# Patient Record
Sex: FEMALE | Race: WHITE | NOT HISPANIC OR LATINO | Employment: OTHER | URBAN - METROPOLITAN AREA
[De-identification: names, ages, dates, MRNs, and addresses within clinical notes are randomized per-mention and may not be internally consistent; named-entity substitution may affect disease eponyms.]

---

## 2017-01-23 DIAGNOSIS — M79.7 FIBROMYALGIA: ICD-10-CM

## 2017-01-23 DIAGNOSIS — E03.9 HYPOTHYROIDISM: ICD-10-CM

## 2017-01-23 DIAGNOSIS — I10 ESSENTIAL (PRIMARY) HYPERTENSION: ICD-10-CM

## 2017-01-23 DIAGNOSIS — Z12.31 ENCOUNTER FOR SCREENING MAMMOGRAM FOR MALIGNANT NEOPLASM OF BREAST: ICD-10-CM

## 2017-01-24 ENCOUNTER — ALLSCRIPTS OFFICE VISIT (OUTPATIENT)
Dept: OTHER | Facility: OTHER | Age: 57
End: 2017-01-24

## 2017-02-24 ENCOUNTER — ALLSCRIPTS OFFICE VISIT (OUTPATIENT)
Dept: OTHER | Facility: OTHER | Age: 57
End: 2017-02-24

## 2017-02-27 ENCOUNTER — APPOINTMENT (OUTPATIENT)
Dept: LAB | Facility: HOSPITAL | Age: 57
End: 2017-02-27
Attending: INTERNAL MEDICINE
Payer: COMMERCIAL

## 2017-02-27 DIAGNOSIS — E03.9 HYPOTHYROIDISM: ICD-10-CM

## 2017-02-27 DIAGNOSIS — I10 ESSENTIAL (PRIMARY) HYPERTENSION: ICD-10-CM

## 2017-02-27 DIAGNOSIS — M79.7 FIBROMYALGIA: ICD-10-CM

## 2017-02-27 LAB
25(OH)D3 SERPL-MCNC: 5.8 NG/ML (ref 30–100)
ALBUMIN SERPL BCP-MCNC: 4.1 G/DL (ref 3.5–5)
ALP SERPL-CCNC: 115 U/L (ref 46–116)
ALT SERPL W P-5'-P-CCNC: 27 U/L (ref 12–78)
ANION GAP SERPL CALCULATED.3IONS-SCNC: 6 MMOL/L (ref 4–13)
AST SERPL W P-5'-P-CCNC: 16 U/L (ref 5–45)
BASOPHILS # BLD AUTO: 0.01 THOUSANDS/ΜL (ref 0–0.1)
BASOPHILS NFR BLD AUTO: 0 % (ref 0–1)
BILIRUB SERPL-MCNC: 0.32 MG/DL (ref 0.2–1)
BUN SERPL-MCNC: 18 MG/DL (ref 5–25)
CALCIUM SERPL-MCNC: 9.1 MG/DL (ref 8.3–10.1)
CHLORIDE SERPL-SCNC: 106 MMOL/L (ref 100–108)
CHOLEST SERPL-MCNC: 245 MG/DL (ref 50–200)
CO2 SERPL-SCNC: 29 MMOL/L (ref 21–32)
CREAT SERPL-MCNC: 0.78 MG/DL (ref 0.6–1.3)
EOSINOPHIL # BLD AUTO: 0.11 THOUSAND/ΜL (ref 0–0.61)
EOSINOPHIL NFR BLD AUTO: 2 % (ref 0–6)
ERYTHROCYTE [DISTWIDTH] IN BLOOD BY AUTOMATED COUNT: 12.9 % (ref 11.6–15.1)
EST. AVERAGE GLUCOSE BLD GHB EST-MCNC: 137 MG/DL
GFR SERPL CREATININE-BSD FRML MDRD: >60 ML/MIN/1.73SQ M
GLUCOSE SERPL-MCNC: 144 MG/DL (ref 65–140)
HBA1C MFR BLD: 6.4 % (ref 4.2–6.3)
HCT VFR BLD AUTO: 42.8 % (ref 34.8–46.1)
HDLC SERPL-MCNC: 37 MG/DL (ref 40–60)
HGB BLD-MCNC: 14.5 G/DL (ref 11.5–15.4)
LDLC SERPL CALC-MCNC: 150 MG/DL (ref 0–100)
LYMPHOCYTES # BLD AUTO: 1.68 THOUSANDS/ΜL (ref 0.6–4.47)
LYMPHOCYTES NFR BLD AUTO: 26 % (ref 14–44)
MCH RBC QN AUTO: 30.6 PG (ref 26.8–34.3)
MCHC RBC AUTO-ENTMCNC: 33.9 G/DL (ref 31.4–37.4)
MCV RBC AUTO: 90 FL (ref 82–98)
MONOCYTES # BLD AUTO: 0.3 THOUSAND/ΜL (ref 0.17–1.22)
MONOCYTES NFR BLD AUTO: 5 % (ref 4–12)
NEUTROPHILS # BLD AUTO: 4.24 THOUSANDS/ΜL (ref 1.85–7.62)
NEUTS SEG NFR BLD AUTO: 67 % (ref 43–75)
NRBC BLD AUTO-RTO: 0 /100 WBCS
PLATELET # BLD AUTO: 271 THOUSANDS/UL (ref 149–390)
PMV BLD AUTO: 11.3 FL (ref 8.9–12.7)
POTASSIUM SERPL-SCNC: 4.5 MMOL/L (ref 3.5–5.3)
PROT SERPL-MCNC: 7.7 G/DL (ref 6.4–8.2)
RBC # BLD AUTO: 4.74 MILLION/UL (ref 3.81–5.12)
SODIUM SERPL-SCNC: 141 MMOL/L (ref 136–145)
T4 FREE SERPL-MCNC: 1.28 NG/DL (ref 0.76–1.46)
TRIGL SERPL-MCNC: 291 MG/DL
TSH SERPL DL<=0.05 MIU/L-ACNC: 0.21 UIU/ML (ref 0.36–3.74)
WBC # BLD AUTO: 6.36 THOUSAND/UL (ref 4.31–10.16)

## 2017-02-27 PROCEDURE — 36415 COLL VENOUS BLD VENIPUNCTURE: CPT

## 2017-02-27 PROCEDURE — 83036 HEMOGLOBIN GLYCOSYLATED A1C: CPT

## 2017-02-27 PROCEDURE — 82306 VITAMIN D 25 HYDROXY: CPT

## 2017-02-27 PROCEDURE — 84439 ASSAY OF FREE THYROXINE: CPT

## 2017-02-27 PROCEDURE — 85025 COMPLETE CBC W/AUTO DIFF WBC: CPT

## 2017-02-27 PROCEDURE — 84443 ASSAY THYROID STIM HORMONE: CPT

## 2017-02-27 PROCEDURE — 80061 LIPID PANEL: CPT

## 2017-02-27 PROCEDURE — 80053 COMPREHEN METABOLIC PANEL: CPT

## 2017-03-21 ENCOUNTER — ALLSCRIPTS OFFICE VISIT (OUTPATIENT)
Dept: OTHER | Facility: OTHER | Age: 57
End: 2017-03-21

## 2017-04-17 ENCOUNTER — TRANSCRIBE ORDERS (OUTPATIENT)
Dept: ADMINISTRATIVE | Facility: HOSPITAL | Age: 57
End: 2017-04-17

## 2017-04-17 ENCOUNTER — ALLSCRIPTS OFFICE VISIT (OUTPATIENT)
Dept: OTHER | Facility: OTHER | Age: 57
End: 2017-04-17

## 2017-04-17 DIAGNOSIS — I25.10 ATHEROSCLEROSIS OF NATIVE CORONARY ARTERY OF NATIVE HEART WITHOUT ANGINA PECTORIS: ICD-10-CM

## 2017-04-17 DIAGNOSIS — E78.00 PURE HYPERCHOLESTEROLEMIA: Primary | ICD-10-CM

## 2017-04-17 DIAGNOSIS — I10 ESSENTIAL HYPERTENSION, MALIGNANT: ICD-10-CM

## 2017-04-18 ENCOUNTER — ALLSCRIPTS OFFICE VISIT (OUTPATIENT)
Dept: OTHER | Facility: OTHER | Age: 57
End: 2017-04-18

## 2017-04-27 ENCOUNTER — HOSPITAL ENCOUNTER (OUTPATIENT)
Dept: NON INVASIVE DIAGNOSTICS | Facility: HOSPITAL | Age: 57
Discharge: HOME | End: 2017-04-28
Attending: INTERNAL MEDICINE | Admitting: INTERNAL MEDICINE
Payer: COMMERCIAL

## 2017-04-27 DIAGNOSIS — I25.10 CORONARY ARTERIOSCLEROSIS: ICD-10-CM

## 2017-04-27 LAB
ANION GAP SERPL CALCULATED.3IONS-SCNC: 9 MMOL/L (ref 4–13)
BUN SERPL-MCNC: 13 MG/DL (ref 5–25)
CALCIUM SERPL-MCNC: 8.8 MG/DL (ref 8.3–10.1)
CHLORIDE SERPL-SCNC: 108 MMOL/L (ref 100–108)
CO2 SERPL-SCNC: 25 MMOL/L (ref 21–32)
CREAT SERPL-MCNC: 0.88 MG/DL (ref 0.6–1.3)
ERYTHROCYTE [DISTWIDTH] IN BLOOD BY AUTOMATED COUNT: 13.1 % (ref 11.6–15.1)
GFR SERPL CREATININE-BSD FRML MDRD: >60 ML/MIN/1.73SQ M
GLUCOSE P FAST SERPL-MCNC: 138 MG/DL (ref 65–99)
GLUCOSE SERPL-MCNC: 138 MG/DL (ref 65–140)
HCT VFR BLD AUTO: 39.3 % (ref 34.8–46.1)
HGB BLD-MCNC: 13.8 G/DL (ref 11.5–15.4)
MCH RBC QN AUTO: 30.9 PG (ref 26.8–34.3)
MCHC RBC AUTO-ENTMCNC: 35.1 G/DL (ref 31.4–37.4)
MCV RBC AUTO: 88 FL (ref 82–98)
PLATELET # BLD AUTO: 227 THOUSANDS/UL (ref 149–390)
PMV BLD AUTO: 10.7 FL (ref 8.9–12.7)
POTASSIUM SERPL-SCNC: 3.2 MMOL/L (ref 3.5–5.3)
RBC # BLD AUTO: 4.47 MILLION/UL (ref 3.81–5.12)
SODIUM SERPL-SCNC: 142 MMOL/L (ref 136–145)
WBC # BLD AUTO: 4.54 THOUSAND/UL (ref 4.31–10.16)

## 2017-04-27 PROCEDURE — 99152 MOD SED SAME PHYS/QHP 5/>YRS: CPT | Performed by: INTERNAL MEDICINE

## 2017-04-27 PROCEDURE — C1725 CATH, TRANSLUMIN NON-LASER: HCPCS | Performed by: INTERNAL MEDICINE

## 2017-04-27 PROCEDURE — C1769 GUIDE WIRE: HCPCS | Performed by: INTERNAL MEDICINE

## 2017-04-27 PROCEDURE — C9607 PERC D-E COR REVASC CHRO SIN: HCPCS | Performed by: INTERNAL MEDICINE

## 2017-04-27 PROCEDURE — C1887 CATHETER, GUIDING: HCPCS | Performed by: INTERNAL MEDICINE

## 2017-04-27 PROCEDURE — C1874 STENT, COATED/COV W/DEL SYS: HCPCS

## 2017-04-27 PROCEDURE — 85027 COMPLETE CBC AUTOMATED: CPT | Performed by: PHYSICIAN ASSISTANT

## 2017-04-27 PROCEDURE — C1894 INTRO/SHEATH, NON-LASER: HCPCS | Performed by: INTERNAL MEDICINE

## 2017-04-27 PROCEDURE — 85347 COAGULATION TIME ACTIVATED: CPT

## 2017-04-27 PROCEDURE — 93458 L HRT ARTERY/VENTRICLE ANGIO: CPT | Performed by: INTERNAL MEDICINE

## 2017-04-27 PROCEDURE — 99153 MOD SED SAME PHYS/QHP EA: CPT | Performed by: INTERNAL MEDICINE

## 2017-04-27 PROCEDURE — 80048 BASIC METABOLIC PNL TOTAL CA: CPT | Performed by: PHYSICIAN ASSISTANT

## 2017-04-27 RX ORDER — LOSARTAN POTASSIUM 50 MG/1
50 TABLET ORAL DAILY
Status: DISCONTINUED | OUTPATIENT
Start: 2017-04-27 | End: 2017-04-28 | Stop reason: HOSPADM

## 2017-04-27 RX ORDER — ZOLPIDEM TARTRATE 10 MG/1
10 TABLET ORAL
COMMUNITY
End: 2018-04-16 | Stop reason: SDUPTHER

## 2017-04-27 RX ORDER — LEVOTHYROXINE SODIUM 0.1 MG/1
100 TABLET ORAL DAILY
Status: DISCONTINUED | OUTPATIENT
Start: 2017-04-28 | End: 2017-04-28 | Stop reason: HOSPADM

## 2017-04-27 RX ORDER — CLOPIDOGREL BISULFATE 75 MG/1
75 TABLET ORAL DAILY
Status: DISCONTINUED | OUTPATIENT
Start: 2017-04-27 | End: 2017-04-28 | Stop reason: HOSPADM

## 2017-04-27 RX ORDER — SODIUM CHLORIDE 9 MG/ML
100 INJECTION, SOLUTION INTRAVENOUS CONTINUOUS
Status: DISPENSED | OUTPATIENT
Start: 2017-04-27 | End: 2017-04-27

## 2017-04-27 RX ORDER — LOSARTAN POTASSIUM 50 MG/1
50 TABLET ORAL DAILY
COMMUNITY
End: 2018-03-12

## 2017-04-27 RX ORDER — FENTANYL CITRATE 50 UG/ML
INJECTION, SOLUTION INTRAMUSCULAR; INTRAVENOUS CODE/TRAUMA/SEDATION MEDICATION
Status: COMPLETED | OUTPATIENT
Start: 2017-04-27 | End: 2017-04-27

## 2017-04-27 RX ORDER — ASPIRIN 81 MG/1
81 TABLET, CHEWABLE ORAL DAILY
COMMUNITY

## 2017-04-27 RX ORDER — SODIUM CHLORIDE 9 MG/ML
100 INJECTION, SOLUTION INTRAVENOUS ONCE
Status: COMPLETED | OUTPATIENT
Start: 2017-04-27 | End: 2017-04-27

## 2017-04-27 RX ORDER — PANTOPRAZOLE SODIUM 40 MG/1
40 TABLET, DELAYED RELEASE ORAL
Status: DISCONTINUED | OUTPATIENT
Start: 2017-04-27 | End: 2017-04-28 | Stop reason: HOSPADM

## 2017-04-27 RX ORDER — ASPIRIN 81 MG/1
81 TABLET, CHEWABLE ORAL DAILY
Status: DISCONTINUED | OUTPATIENT
Start: 2017-04-27 | End: 2017-04-27 | Stop reason: SDUPTHER

## 2017-04-27 RX ORDER — ASPIRIN 81 MG/1
81 TABLET, CHEWABLE ORAL DAILY
Status: DISCONTINUED | OUTPATIENT
Start: 2017-04-27 | End: 2017-04-28 | Stop reason: HOSPADM

## 2017-04-27 RX ORDER — METOPROLOL SUCCINATE 25 MG/1
25 TABLET, EXTENDED RELEASE ORAL DAILY
Status: DISCONTINUED | OUTPATIENT
Start: 2017-04-27 | End: 2017-04-28 | Stop reason: HOSPADM

## 2017-04-27 RX ORDER — MIDAZOLAM HYDROCHLORIDE 1 MG/ML
INJECTION INTRAMUSCULAR; INTRAVENOUS CODE/TRAUMA/SEDATION MEDICATION
Status: COMPLETED | OUTPATIENT
Start: 2017-04-27 | End: 2017-04-27

## 2017-04-27 RX ORDER — ALBUTEROL SULFATE 90 UG/1
2 AEROSOL, METERED RESPIRATORY (INHALATION) EVERY 6 HOURS PRN
COMMUNITY
End: 2018-03-16 | Stop reason: SDUPTHER

## 2017-04-27 RX ORDER — PREGABALIN 150 MG/1
150 CAPSULE ORAL 3 TIMES DAILY
COMMUNITY
End: 2018-02-12 | Stop reason: SDUPTHER

## 2017-04-27 RX ORDER — ZOLPIDEM TARTRATE 5 MG/1
10 TABLET ORAL
Status: DISCONTINUED | OUTPATIENT
Start: 2017-04-27 | End: 2017-04-28 | Stop reason: HOSPADM

## 2017-04-27 RX ORDER — CLOPIDOGREL BISULFATE 75 MG/1
75 TABLET ORAL DAILY
COMMUNITY
End: 2018-05-07 | Stop reason: SDUPTHER

## 2017-04-27 RX ORDER — PREGABALIN 75 MG/1
150 CAPSULE ORAL 3 TIMES DAILY
Status: DISCONTINUED | OUTPATIENT
Start: 2017-04-27 | End: 2017-04-28 | Stop reason: HOSPADM

## 2017-04-27 RX ORDER — VERAPAMIL HYDROCHLORIDE 2.5 MG/ML
INJECTION, SOLUTION INTRAVENOUS CODE/TRAUMA/SEDATION MEDICATION
Status: COMPLETED | OUTPATIENT
Start: 2017-04-27 | End: 2017-04-27

## 2017-04-27 RX ORDER — ATORVASTATIN CALCIUM 40 MG/1
80 TABLET, FILM COATED ORAL
Status: DISCONTINUED | OUTPATIENT
Start: 2017-04-27 | End: 2017-04-28 | Stop reason: HOSPADM

## 2017-04-27 RX ORDER — HEPARIN SODIUM 1000 [USP'U]/ML
INJECTION, SOLUTION INTRAVENOUS; SUBCUTANEOUS CODE/TRAUMA/SEDATION MEDICATION
Status: COMPLETED | OUTPATIENT
Start: 2017-04-27 | End: 2017-04-27

## 2017-04-27 RX ORDER — METOPROLOL SUCCINATE 25 MG/1
25 TABLET, EXTENDED RELEASE ORAL DAILY
COMMUNITY

## 2017-04-27 RX ORDER — LEVOTHYROXINE SODIUM 0.1 MG/1
100 TABLET ORAL DAILY
COMMUNITY
End: 2018-03-12

## 2017-04-27 RX ORDER — LIDOCAINE HYDROCHLORIDE 10 MG/ML
INJECTION, SOLUTION INFILTRATION; PERINEURAL CODE/TRAUMA/SEDATION MEDICATION
Status: COMPLETED | OUTPATIENT
Start: 2017-04-27 | End: 2017-04-27

## 2017-04-27 RX ORDER — ALBUTEROL SULFATE 90 UG/1
2 AEROSOL, METERED RESPIRATORY (INHALATION) EVERY 6 HOURS PRN
Status: DISCONTINUED | OUTPATIENT
Start: 2017-04-27 | End: 2017-04-28 | Stop reason: HOSPADM

## 2017-04-27 RX ORDER — CLOPIDOGREL BISULFATE 75 MG/1
75 TABLET ORAL DAILY
Status: DISCONTINUED | OUTPATIENT
Start: 2017-04-27 | End: 2017-04-27 | Stop reason: SDUPTHER

## 2017-04-27 RX ORDER — POTASSIUM CHLORIDE 20 MEQ/1
TABLET, EXTENDED RELEASE ORAL CODE/TRAUMA/SEDATION MEDICATION
Status: COMPLETED | OUTPATIENT
Start: 2017-04-27 | End: 2017-04-27

## 2017-04-27 RX ADMIN — MIDAZOLAM 2 MG: 1 INJECTION INTRAMUSCULAR; INTRAVENOUS at 08:35

## 2017-04-27 RX ADMIN — FENTANYL CITRATE 50 MCG: 50 INJECTION, SOLUTION INTRAMUSCULAR; INTRAVENOUS at 08:35

## 2017-04-27 RX ADMIN — IOHEXOL 100 ML: 350 INJECTION, SOLUTION INTRAVENOUS at 09:06

## 2017-04-27 RX ADMIN — MIDAZOLAM 1 MG: 1 INJECTION INTRAMUSCULAR; INTRAVENOUS at 08:57

## 2017-04-27 RX ADMIN — SODIUM CHLORIDE 100 ML/HR: 0.9 INJECTION, SOLUTION INTRAVENOUS at 10:18

## 2017-04-27 RX ADMIN — SODIUM CHLORIDE 100 ML/HR: 0.9 INJECTION, SOLUTION INTRAVENOUS at 07:46

## 2017-04-27 RX ADMIN — LIDOCAINE HYDROCHLORIDE 1 ML: 10 INJECTION, SOLUTION INFILTRATION; PERINEURAL at 08:40

## 2017-04-27 RX ADMIN — ATORVASTATIN CALCIUM 80 MG: 40 TABLET, FILM COATED ORAL at 17:49

## 2017-04-27 RX ADMIN — POTASSIUM CHLORIDE 30 MEQ: 1500 TABLET, EXTENDED RELEASE ORAL at 08:40

## 2017-04-27 RX ADMIN — HEPARIN SODIUM 4000 UNITS: 1000 INJECTION INTRAVENOUS; SUBCUTANEOUS at 08:47

## 2017-04-27 RX ADMIN — MIDAZOLAM 2 MG: 1 INJECTION INTRAMUSCULAR; INTRAVENOUS at 08:40

## 2017-04-27 RX ADMIN — FENTANYL CITRATE 50 MCG: 50 INJECTION, SOLUTION INTRAMUSCULAR; INTRAVENOUS at 08:56

## 2017-04-27 RX ADMIN — VERAPAMIL HYDROCHLORIDE 2.5 MG: 2.5 INJECTION, SOLUTION INTRAVENOUS at 08:46

## 2017-04-27 RX ADMIN — SODIUM CHLORIDE 100 ML/HR: 0.9 INJECTION, SOLUTION INTRAVENOUS at 14:49

## 2017-04-27 RX ADMIN — FENTANYL CITRATE 50 MCG: 50 INJECTION, SOLUTION INTRAMUSCULAR; INTRAVENOUS at 08:40

## 2017-04-27 RX ADMIN — PREGABALIN 150 MG: 75 CAPSULE ORAL at 22:00

## 2017-04-27 RX ADMIN — PREGABALIN 150 MG: 75 CAPSULE ORAL at 17:49

## 2017-04-27 RX ADMIN — PANTOPRAZOLE SODIUM 40 MG: 40 TABLET, DELAYED RELEASE ORAL at 10:20

## 2017-04-27 RX ADMIN — ZOLPIDEM TARTRATE 10 MG: 5 TABLET, COATED ORAL at 22:33

## 2017-04-27 RX ADMIN — LOSARTAN POTASSIUM 50 MG: 50 TABLET, FILM COATED ORAL at 17:51

## 2017-04-27 RX ADMIN — HEPARIN SODIUM 6000 UNITS: 1000 INJECTION INTRAVENOUS; SUBCUTANEOUS at 08:55

## 2017-04-28 VITALS
OXYGEN SATURATION: 98 % | TEMPERATURE: 98.1 F | HEIGHT: 64 IN | WEIGHT: 128.97 LBS | DIASTOLIC BLOOD PRESSURE: 67 MMHG | SYSTOLIC BLOOD PRESSURE: 117 MMHG | RESPIRATION RATE: 20 BRPM | BODY MASS INDEX: 22.02 KG/M2 | HEART RATE: 70 BPM

## 2017-04-28 LAB
KCT BLD-ACNC: 486 SEC (ref 89–137)
SPECIMEN SOURCE: ABNORMAL

## 2017-04-28 RX ORDER — CLOPIDOGREL BISULFATE 75 MG/1
75 TABLET ORAL DAILY
Qty: 30 TABLET | Refills: 12 | Status: SHIPPED | OUTPATIENT
Start: 2017-04-28 | End: 2018-02-05 | Stop reason: SDUPTHER

## 2017-04-28 RX ORDER — ATORVASTATIN CALCIUM 80 MG/1
80 TABLET, FILM COATED ORAL
Qty: 30 TABLET | Refills: 12 | Status: SHIPPED | OUTPATIENT
Start: 2017-04-28 | End: 2018-05-08 | Stop reason: SDUPTHER

## 2017-04-28 RX ADMIN — PANTOPRAZOLE SODIUM 40 MG: 40 TABLET, DELAYED RELEASE ORAL at 06:03

## 2017-04-28 RX ADMIN — PREGABALIN 150 MG: 75 CAPSULE ORAL at 09:03

## 2017-04-28 RX ADMIN — FLUTICASONE PROPIONATE AND SALMETEROL 1 PUFF: 50; 250 POWDER RESPIRATORY (INHALATION) at 09:16

## 2017-04-28 RX ADMIN — LOSARTAN POTASSIUM 50 MG: 50 TABLET, FILM COATED ORAL at 09:03

## 2017-04-28 RX ADMIN — ASPIRIN 81 MG: 81 TABLET, CHEWABLE ORAL at 09:03

## 2017-04-28 RX ADMIN — LEVOTHYROXINE SODIUM 100 MCG: 100 TABLET ORAL at 06:03

## 2017-04-28 RX ADMIN — METOPROLOL SUCCINATE 25 MG: 25 TABLET, EXTENDED RELEASE ORAL at 09:05

## 2017-04-28 RX ADMIN — CLOPIDOGREL BISULFATE 75 MG: 75 TABLET, FILM COATED ORAL at 09:03

## 2017-05-01 ENCOUNTER — GENERIC CONVERSION - ENCOUNTER (OUTPATIENT)
Dept: OTHER | Facility: OTHER | Age: 57
End: 2017-05-01

## 2017-05-08 ENCOUNTER — ALLSCRIPTS OFFICE VISIT (OUTPATIENT)
Dept: OTHER | Facility: OTHER | Age: 57
End: 2017-05-08

## 2017-05-08 DIAGNOSIS — I20.8 OTHER FORMS OF ANGINA PECTORIS (HCC): ICD-10-CM

## 2017-05-08 DIAGNOSIS — E78.00 PURE HYPERCHOLESTEROLEMIA: ICD-10-CM

## 2017-05-08 DIAGNOSIS — M54.12 RADICULOPATHY OF CERVICAL REGION: ICD-10-CM

## 2017-05-08 DIAGNOSIS — I25.10 ATHEROSCLEROTIC HEART DISEASE OF NATIVE CORONARY ARTERY WITHOUT ANGINA PECTORIS: ICD-10-CM

## 2017-05-10 ENCOUNTER — GENERIC CONVERSION - ENCOUNTER (OUTPATIENT)
Dept: OTHER | Facility: OTHER | Age: 57
End: 2017-05-10

## 2017-05-12 ENCOUNTER — GENERIC CONVERSION - ENCOUNTER (OUTPATIENT)
Dept: OTHER | Facility: OTHER | Age: 57
End: 2017-05-12

## 2017-05-15 ENCOUNTER — APPOINTMENT (OUTPATIENT)
Dept: PHYSICAL THERAPY | Facility: REHABILITATION | Age: 57
End: 2017-05-15
Payer: COMMERCIAL

## 2017-05-15 DIAGNOSIS — M54.12 RADICULOPATHY OF CERVICAL REGION: ICD-10-CM

## 2017-05-15 PROCEDURE — 97163 PT EVAL HIGH COMPLEX 45 MIN: CPT

## 2017-05-15 PROCEDURE — 97140 MANUAL THERAPY 1/> REGIONS: CPT

## 2017-05-16 ENCOUNTER — ALLSCRIPTS OFFICE VISIT (OUTPATIENT)
Dept: OTHER | Facility: OTHER | Age: 57
End: 2017-05-16

## 2017-05-18 ENCOUNTER — APPOINTMENT (OUTPATIENT)
Dept: PHYSICAL THERAPY | Facility: REHABILITATION | Age: 57
End: 2017-05-18
Payer: COMMERCIAL

## 2017-05-22 ENCOUNTER — APPOINTMENT (OUTPATIENT)
Dept: PHYSICAL THERAPY | Facility: REHABILITATION | Age: 57
End: 2017-05-22
Payer: COMMERCIAL

## 2017-05-25 ENCOUNTER — APPOINTMENT (OUTPATIENT)
Dept: PHYSICAL THERAPY | Facility: REHABILITATION | Age: 57
End: 2017-05-25
Payer: COMMERCIAL

## 2017-06-01 ENCOUNTER — APPOINTMENT (OUTPATIENT)
Dept: PHYSICAL THERAPY | Facility: REHABILITATION | Age: 57
End: 2017-06-01
Payer: COMMERCIAL

## 2017-06-05 ENCOUNTER — APPOINTMENT (OUTPATIENT)
Dept: PHYSICAL THERAPY | Facility: REHABILITATION | Age: 57
End: 2017-06-05
Payer: COMMERCIAL

## 2017-06-08 ENCOUNTER — APPOINTMENT (OUTPATIENT)
Dept: PHYSICAL THERAPY | Facility: REHABILITATION | Age: 57
End: 2017-06-08
Payer: COMMERCIAL

## 2017-06-08 PROCEDURE — 97110 THERAPEUTIC EXERCISES: CPT

## 2017-06-08 PROCEDURE — 97140 MANUAL THERAPY 1/> REGIONS: CPT

## 2017-06-12 ENCOUNTER — ALLSCRIPTS OFFICE VISIT (OUTPATIENT)
Dept: OTHER | Facility: OTHER | Age: 57
End: 2017-06-12

## 2017-06-28 ENCOUNTER — ALLSCRIPTS OFFICE VISIT (OUTPATIENT)
Dept: RADIOLOGY | Facility: CLINIC | Age: 57
End: 2017-06-28
Payer: COMMERCIAL

## 2017-07-06 ENCOUNTER — HOSPITAL ENCOUNTER (OUTPATIENT)
Dept: NON INVASIVE DIAGNOSTICS | Facility: CLINIC | Age: 57
Discharge: HOME | End: 2017-07-06
Payer: COMMERCIAL

## 2017-07-06 ENCOUNTER — ALLSCRIPTS OFFICE VISIT (OUTPATIENT)
Dept: OTHER | Facility: OTHER | Age: 57
End: 2017-07-06

## 2017-07-06 DIAGNOSIS — I20.8 OTHER FORMS OF ANGINA PECTORIS (HCC): ICD-10-CM

## 2017-07-06 DIAGNOSIS — E78.00 PURE HYPERCHOLESTEROLEMIA: ICD-10-CM

## 2017-07-06 DIAGNOSIS — I25.10 ATHEROSCLEROTIC HEART DISEASE OF NATIVE CORONARY ARTERY WITHOUT ANGINA PECTORIS: ICD-10-CM

## 2017-07-06 PROCEDURE — 93306 TTE W/DOPPLER COMPLETE: CPT

## 2017-07-21 ENCOUNTER — GENERIC CONVERSION - ENCOUNTER (OUTPATIENT)
Dept: OTHER | Facility: OTHER | Age: 57
End: 2017-07-21

## 2017-08-30 ENCOUNTER — ALLSCRIPTS OFFICE VISIT (OUTPATIENT)
Dept: OTHER | Facility: OTHER | Age: 57
End: 2017-08-30

## 2017-08-30 DIAGNOSIS — I10 ESSENTIAL (PRIMARY) HYPERTENSION: ICD-10-CM

## 2017-09-11 ENCOUNTER — APPOINTMENT (OUTPATIENT)
Dept: LAB | Facility: HOSPITAL | Age: 57
End: 2017-09-11
Attending: INTERNAL MEDICINE
Payer: COMMERCIAL

## 2017-09-11 DIAGNOSIS — I10 ESSENTIAL (PRIMARY) HYPERTENSION: ICD-10-CM

## 2017-09-11 DIAGNOSIS — I25.10 ATHEROSCLEROTIC HEART DISEASE OF NATIVE CORONARY ARTERY WITHOUT ANGINA PECTORIS: ICD-10-CM

## 2017-09-11 LAB
ALBUMIN SERPL BCP-MCNC: 3.9 G/DL (ref 3.5–5)
ALP SERPL-CCNC: 109 U/L (ref 46–116)
ALT SERPL W P-5'-P-CCNC: 25 U/L (ref 12–78)
ANION GAP SERPL CALCULATED.3IONS-SCNC: 7 MMOL/L (ref 4–13)
AST SERPL W P-5'-P-CCNC: 16 U/L (ref 5–45)
BILIRUB SERPL-MCNC: 0.35 MG/DL (ref 0.2–1)
BUN SERPL-MCNC: 10 MG/DL (ref 5–25)
CALCIUM SERPL-MCNC: 8.9 MG/DL (ref 8.3–10.1)
CHLORIDE SERPL-SCNC: 106 MMOL/L (ref 100–108)
CHOLEST SERPL-MCNC: 161 MG/DL (ref 50–200)
CO2 SERPL-SCNC: 29 MMOL/L (ref 21–32)
CREAT SERPL-MCNC: 0.78 MG/DL (ref 0.6–1.3)
GFR SERPL CREATININE-BSD FRML MDRD: 85 ML/MIN/1.73SQ M
GLUCOSE SERPL-MCNC: 114 MG/DL (ref 65–140)
HDLC SERPL-MCNC: 34 MG/DL (ref 40–60)
LDLC SERPL CALC-MCNC: 89 MG/DL (ref 0–100)
POTASSIUM SERPL-SCNC: 4.6 MMOL/L (ref 3.5–5.3)
PROT SERPL-MCNC: 7.4 G/DL (ref 6.4–8.2)
SODIUM SERPL-SCNC: 142 MMOL/L (ref 136–145)
T4 FREE SERPL-MCNC: 0.88 NG/DL (ref 0.76–1.46)
TRIGL SERPL-MCNC: 188 MG/DL
TSH SERPL DL<=0.05 MIU/L-ACNC: 8.58 UIU/ML (ref 0.36–3.74)

## 2017-09-11 PROCEDURE — 36415 COLL VENOUS BLD VENIPUNCTURE: CPT

## 2017-09-11 PROCEDURE — 80053 COMPREHEN METABOLIC PANEL: CPT

## 2017-09-11 PROCEDURE — 84443 ASSAY THYROID STIM HORMONE: CPT

## 2017-09-11 PROCEDURE — 80061 LIPID PANEL: CPT

## 2017-09-11 PROCEDURE — 84439 ASSAY OF FREE THYROXINE: CPT

## 2017-09-19 ENCOUNTER — GENERIC CONVERSION - ENCOUNTER (OUTPATIENT)
Dept: OTHER | Facility: OTHER | Age: 57
End: 2017-09-19

## 2017-09-29 ENCOUNTER — GENERIC CONVERSION - ENCOUNTER (OUTPATIENT)
Dept: OTHER | Facility: OTHER | Age: 57
End: 2017-09-29

## 2017-10-25 NOTE — PROGRESS NOTES
Assessment  1  Benign essential HTN (401 1) (I10)   2  Depression with anxiety (300 4) (F41 8)   3  Insomnia (780 52) (G47 00)   4  Hypothyroidism (244 9) (E03 9)   5  Bites (959 9) (T14 8)   6  Skin rash (782 1) (R21)   7  Coronary arteriosclerosis (414 00) (I25 10)    Plan  Benign essential HTN    · (1) COMPREHENSIVE METABOLIC PANEL; Status:Active; Requested for:51Vcd7419;    · (1) T4, FREE; Status:Active; Requested for:42Kgx9695;    · (1) TSH; Status:Active; Requested for:20Llo0052; Insomnia    · Zolpidem Tartrate 10 MG Oral Tablet (Ambien); TAKE 1 TABLET AT  BEDTIME AS NEEDED  FOR INSOMNIA  PMH: Diverticulitis, PMH: Screening for malignant neoplasm of colon    · COLONOSCOPY; Status:Active; Requested for:91Zzy0753;   PMH: Encounter for screening mammogram for malignant neoplasm of breast    · * MAMMO SCREENING BILATERAL W CAD; Status:Active; Requested for:35Qme5529;   Skin rash    · Triamcinolone Acetonide 0 1 % External Cream; APPLY SPARINGLY AND MASSAGE IN TWICE  DAILY AS NEEDED   · From  HydrOXYzine HCl - 25 MG Oral Tablet TAKE 1 TABLET 3 TIMES DAILY AS NEEDED  To HydrOXYzine HCl - 25 MG Oral Tablet Take 1 tablet 2 times daily as needed   · DrRx Keflex 500 MG #30; one tab tid for 10 days    Discussion/Summary  Discussion Summary:   Keflex for the skin rash/bites, along with Hydroxizine for itch, triamcinolone cream gargles for throat  daily for low potassium  blood work done  Chief Complaint  Chief Complaint Free Text Note Form: pt here for 3 month f/u of HTNis requesting medication for bug biteswill be getting blood work for Cardiologist c/o swollen gland on left side of neck for about 4 days      Active Problems  1  Abnormal glucose (790 29) (R73 09)   2  Back pain (724 5) (M54 9)   3  Benign essential HTN (401 1) (I10)   4  Bites (959 9) (T14 8)   5  Cervical herniated disc (722 0) (M50 20)   6  Cervical radiculopathy (723 4) (M54 12)   7  Chronic stable angina (413 9) (I20 8)   8   Coronary arteriosclerosis (414 00) (I25 10)   9  Degenerative disc disease, cervical (722 4) (M50 30)   10  Depression with anxiety (300 4) (F41 8)   11  Fibromyalgia (729 1) (M79 7)   12  Gastroesophageal reflux disease (530 81) (K21 9)   13  Hypercholesterolemia (272 0) (E78 00)   14  Hypothyroidism (244 9) (E03 9)   15  Insomnia (780 52) (G47 00)   16  Lumbar foraminal stenosis (724 02) (M99 83)   17  Lumbar radiculopathy (724 4) (M54 16)   18  S/P cardiac cath (V45 89) (Z98 890)   19  Sacroiliitis (720 2) (M46 1)   20  Severe episode of recurrent major depressive disorder, without psychotic features (296 33) (F33 2)    Past Medical History  1  History of Abnormal echocardiogram (793 2) (R93 1)   2  History of Abnormal stress test (794 39) (R94 39)   3  History of Brachial neuritis (723 4) (M54 12)   4  History of Displacement of intervertebral disc of mid-cervical region (722 0) (M50 220)   5  History of Diverticulitis (562 11) (K57 92)   6  History of Hemoptysis (786 30) (R04 2)   7  History of arthritis (V13 4) (Z87 39)   8  History of cardiac disorder (V12 50) (Z86 79)   9  History of chest pain (V13 89) (Z87 898)   10  History of fatigue (V13 89) (Z87 898)   11  History of frequent headaches   12  History of hearing loss (V12 49) (Z86 69)   13  History of herpes simplex infection (V12 09) (Z86 19)   14  History of hiatal hernia (V12 79) (Z87 19)   15  History of neck disorder (V13 59) (Z87 39)   16  History of reflex sympathetic dystrophy (V12 49) (Z86 69)   17  History of shortness of breath (V13 89) (Z87 898)   18  History of spinal stenosis (V13 59) (Z87 39)   19  History of HNP (herniated nucleus pulposus) (722 2) (M51 9)   20  History of Insect bite, infected (919 5,E906 4) (W57 XXXA)   21  History of Memory loss (780 93) (R41 3)   22  Personal history of asthma (V12 69) (Z87 09)   23  History of Restless leg syndrome (333 94) (G25 81)   24  History of Somnolence, daytime (780 54) (R40 0)   25   History of Spinal stenosis of cervical region (723 0) (M48 02)    Surgical History  1  History of Coronary Angiography With Concomitant Left Heart Catheterization   2  History of Lower Back Surgery   3  History of Tonsillectomy   4  History of Tympanoplasty    Family History  Mother    1  Family history of Coronary artery atheroma   2  Family history of cardiac disorder (V17 49) (Z82 49)   3  Family history of diabetes mellitus (V18 0) (Z83 3)   4  Family history of hypertension (V17 49) (Z82 49)  Father    5  No pertinent family history  Sister    10  Family history of Coronary artery atheroma   7  Family history of cardiac disorder (V17 49) (Z82 49)   8  Family history of cerebrovascular accident (CVA) (V17 1) (Z82 3)   9  Family history of diabetes mellitus (V18 0) (Z83 3)   10  Family history of hypertension (V17 49) (Z82 49)    Social History   · Former smoker (V15 82) (O07 684)   · No alcohol use   · Uses marijuana (305 20) (F12 90)    Current Meds   1  Advair Diskus 250-50 MCG/DOSE Inhalation Aerosol Powder Breath Activated; INHALE 1 PUFF   TWICE DAILY; Therapy: 35NUD9665 to (Last Rx:24Jan2017) Ordered   2  Aspirin Low Dose TABS; Therapy: 16GKJ4068 to Recorded   3  Atorvastatin Calcium 80 MG Oral Tablet; TAKE 1 TABLET DAILY; Therapy: 38VFS0420 to (Pedro Gandhi)  Requested for: 31VGK2322; Last Rx:23Nsg6408 Ordered   4  Clopidogrel Bisulfate 75 MG Oral Tablet; Take 4 tablets today then 1 tablet daily; Therapy: 58DKQ7549 to (Bhaskar May)  Requested for: 60DJH4022; Last Rx:05Oqi4375   Ordered   5  DULoxetine HCl - 30 MG Oral Capsule Delayed Release Particles; take 1 capsule daily; Therapy: 65GJK3967 to (Evaluate:23Jjb0159)  Requested for: 12Jun2017; Last Rx:12Jun2017   Ordered   6  Famotidine 40 MG Oral Tablet; TAKE 1 TABLET DAILY AS DIRECTED; Therapy: 95GMR4374 to (Evaluate:13Nov2017)  Requested for: 52Oph7813; Last Rx:38Tkg7829   Ordered   7   Levothyroxine Sodium 100 MCG Oral Tablet; TAKE 1 TABLET DAILY; Therapy: 55MLI5727 to (Evaluate:27Aug2017)  Requested for: 28Aug2017; Last Rx:28Feb2017   Ordered   8  Losartan Potassium 25 MG Oral Tablet; TAKE 1 TABLET DAILY; Therapy: 28OQP0541 to (Evaluate:02Jan2018)  Requested for: 20RMO1407; Last Rx:14Sfd7120   Ordered   9  Lyrica 150 MG Oral Capsule; TAKE 1 CAPSULE 3 times daily; Therapy: 32ULK8701 to (Evaluate:14Sep2017); Last Rx:15Aug2017 Ordered   10  TiZANidine HCl - 2 MG Oral Tablet; TAKE 1 TABLET EVERY 8 HOURS AS NEEDED; Therapy: 91JFP4718 to (Evaluate:10Sep2017)  Requested for: 12Jun2017; Last Rx:12Jun2017    Ordered   11  Ventolin  (90 Base) MCG/ACT Inhalation Aerosol Solution; INHALE 1 TO 2 PUFFS EVERY 4    TO 6 HOURS AS NEEDED; Therapy: 95QMS5638 to (Evaluate:13Feb2017); Last Rx:24Jan2017 Ordered   12  Zolpidem Tartrate 10 MG Oral Tablet; TAKE 1 TABLET AT  BEDTIME AS NEEDED FOR INSOMNIA; Last    Rx:27Jun2017 Ordered    Allergies  1  Augmentin TABS   2  Chantix TABS  3  No Known Environmental Allergies   4  No Known Food Allergies    Vitals  Vital Signs    Recorded: 30Aug2017 11:06AM   Temperature 96 5 F, Tympanic   Heart Rate 58   Systolic 314, LUE, Sitting   Diastolic 70, LUE, Sitting   Height 5 ft 4 in   Weight 182 lb 8 oz   BMI Calculated 31 33   BSA Calculated 1 88   O2 Saturation 98, RA     Results/Data  PHQ-9 Adult Depression Screening 30Aug2017 11:00AM User, s     Test Name Result Flag Reference   PHQ-9 Adult Depression Score 2     Over the last two weeks, how often have you been bothered by any of the following problems?   Little interest or pleasure in doing things: Not at all - 0  Feeling down, depressed, or hopeless: Not at all - 0  Trouble falling or staying asleep, or sleeping too much: Several days - 1  Feeling tired or having little energy: Several days - 1  Poor appetite or over eating: Not at all - 0  Feeling bad about yourself - or that you are a failure or have let yourself or your family down: Not at all - 0  Trouble concentrating on things, such as reading the newspaper or watching television: Not at all - 0  Moving or speaking so slowly that other people could have noticed   Or the opposite -  being so fidgety or restless that you have been moving around a lot more than usual: Not at all - 0  Thoughts that you would be better off dead, or of hurting yourself in some way: Not at all - 0   PHQ-9 Adult Depression Screening Negative     PHQ-9 Difficulty Level Not difficult at all     PHQ-9 Severity Minimal Depression         Future Appointments    Date/Time Provider Specialty Site   11/08/2017 02:20 PM Shea Olea DO Cardiology [address] [place] [address]     Signatures   Electronically signed by : Faheem Rodriguez MD; Aug 30 2017 11:42AM EST                       (Author)

## 2017-11-08 ENCOUNTER — ALLSCRIPTS OFFICE VISIT (OUTPATIENT)
Dept: OTHER | Facility: OTHER | Age: 57
End: 2017-11-08

## 2017-11-10 NOTE — PROGRESS NOTES
Assessment  Assessed    1  Benign essential HTN (401 1) (I10)   2  Chronic stable angina (413 9) (I20 8)   3  Coronary arteriosclerosis (414 00) (I25 10)   4  S/P cardiac cath (V45 89) (Z98 890)   5  Hypercholesterolemia (272 0) (E78 00)   6  Palpitations (785 1) (R00 2)    Plan  Benign essential HTN    · Losartan Potassium 25 MG Oral Tablet; TAKE 1 TABLET DAILY   Rx By: Renato Milan; Dispense: 30 Days ; #:30 Tablet; Refill: 5;For: Benign essential HTN; LIBRADO = N; Verified Transmission to [id number] Last Updated By: System [employer]; 11/8/2017 3:06:29 PM  Chronic stable angina, Health Maintenance, Hypercholesterolemia    · EKG/ECG- POC; Status:Complete;   Done: 98WSI6904 03:20PM   Perform: In Office; Last Updated By:Woods, Severa Crow; 11/8/2017 3:20:09 PM;Ordered; For:Chronic stable angina, Health Maintenance, Hypercholesterolemia; Ordered By:Juancarlos Lau; Hypercholesterolemia    · Ezetimibe 10 MG Oral Tablet; Take 1 tablet daily   Rx By: Renato Milan; Dispense: 90 Days ; #:90 Tablet; Refill: 3;Hypercholesterolemia; LIBRADO = N; Verified Transmission to [id number] Last Updated By: System, [employer]; 11/8/2017 3:06:30 PM   · (1) LIPID PANEL FASTING W DIRECT LDL REFLEX; Status:Active; Requestedfor:72Bjc5633; Perform:[facility]; DLW:94LHO8048;BIVFJVE;OLWIYVP By:Juancarlos Lau;    Discussion/Summary  Cardiology Discussion Summary Free Text Note Form [facility]:   #1  CAD status post PCI/UTE to the proximal LAD in July 2014, status post PCI/UTE to the OM 2 in April 2017, no residual obstructive disease: Patient feeling well, one episode of throat burning, but significantly milder since PCI 3 months ago  Continue aspirin and Plavix , atorvastatin 80 milligrams daily  Okay to hold Plavix for back injection, and is now post 6 months after drug-eluting stent placement April 2017  Hypertension: well controlled, continue current medications  Dyslipidemia: Continue atorvastatin 80 milligrams daily  Reviewed lipid panel from September 2017 with LDL 89 milligrams/deciliter, not at goal in this high-risk patient  Will add ezetimibe 10 milligrams daily to attempt to get LDL less than 70 milligrams/deciliter for optimal risk reduction    Palpitations: Described as a pounding / rapid heartbeat about 2 months ago for several days associated with lightheadedness  Strongly encouraged patient to get medical attention right away if any recurrent symptoms  She has been symptom free for the past month, therefore will hold off on cardiac monitoring for now  ECG normal on today's visit  in 4 months after lipid panel  Chief Complaint  Chief Complaint Free Text Note Form: CAD      History of Present Illness  Cardiology HPI Free Text Note Form [facility]: This is a 70-year-old female who I had initially seen in July 2014 secondary to symptoms of exertional throat discomfort, exertional shortness of breath, and several episodes of atypical chest discomfort  Her nuclear stress test was somewhat ambiguous, therefore I had requested a cardiac CT which she completed revealing LAD stenosis  Thereafter a cardiac catheterization on 7/18/14 revealed 90% proximal LAD stenosis, and she received a 2 5 x 28 mm drug-eluting stent for the lesion  underwent a repeat cardiac catheterization on 3/27/15  The patient's prior LAD stent was patent  She had 99% midcircumflex stenosis after the origin of the major OM branch, which was unchanged compared to her prior study from 2014  She also had 70% RCA stenosis with FFR performed within normal limits at 0 8 to indicating that the lesion was not slow critical   underwent repeat cardiac catheterization on 4/27/17 secondary to recurrent exertional throat burning revealing patent LAD stent, with 100% chronic total occlusion of the OM 2  She underwent PCI/drug-eluting placement with 2 25 x 30 mm Resolute Integrity UTE with good results  presents today for followup   About 2 months ago, she had symptoms of palpitations where she felt her heart pounding hard and fast, lasting up to 1 hour at 1 point, associated with lightheadedness and dizziness  She had several days of intermittent palpitations with similar sensations lasting several minutes  She did not call me, nor did she get medical attention  She states for the past month she has had no palpitations whatsoever  Review of Systems  Cardiology Female ROS:    Cardiac: No complaints of chest pain, no palpitations, no fainting ,-- no syncope/fainting,-- no AM fatigue-- and-- no witnessed apnea episodes  Skin: No complaints of nonhealing sores or skin rash  Genitourinary: frequent urination at night-- (2x per night)-- and-- post menopausal, but-- no recurrent urinary tract infections,-- no difficult urination,-- no blood in urine,-- no kidney stones,-- no loss of bladder control,-- no kidney problems,-- no birth control or hormone replacement therapy-- and-- not pregnant  Psychological: anxiety, but-- no depression,-- no panic attacks,-- no difficulty concentrating-- and-- no palpitations present  General: lack of energy/fatigue, but-- no trouble sleeping,-- no appetite changes,-- no changes in weight,-- no fever,-- no night sweats-- and-- no frequent infections  Respiratory: No complaints of shortness of breath, cough with sputum, or wheezing  HEENT: No complaints of serious problems, hearing problems, nose problems, throat problems, or snoring  Gastrointestinal: heartburn-- and-- constipation, but-- no liver problems,-- no nausea,-- no vomiting,-- no bloody stools,-- no diarrhea,-- no abdonimal pain-- and-- no rectal bleeding  Hematologic: No complaints of bleeding disorders, anemia, blood clots, or excessive brusing    Neurological: numbnes-- (fingers)-- and-- tingling-- (fingers), but-- no weakness,-- no seizures,-- no headaches,-- no dizziness,-- no diplopia-- and-- no daytime sleepiness  Musculoskeletal: arthritis,-- back pain-- and-- swelling/pain   ROS Reviewed:   ROS reviewed  Active Problems  Problems    1  Abnormal glucose (790 29) (R73 09)   2  Back pain (724 5) (M54 9)   3  Benign essential HTN (401 1) (I10)   4  Bites (959 9) (T14 8XXA)   5  Cervical herniated disc (722 0) (M50 20)   6  Cervical radiculopathy (723 4) (M54 12)   7  Chronic stable angina (413 9) (I20 8)   8  Coronary arteriosclerosis (414 00) (I25 10)   9  Degenerative disc disease, cervical (722 4) (M50 30)   10  Depression with anxiety (300 4) (F41 8)   11  Fibromyalgia (729 1) (M79 7)   12  Gastroesophageal reflux disease (530 81) (K21 9)   13  Hypercholesterolemia (272 0) (E78 00)   14  Hypothyroidism (244 9) (E03 9)   15  Insomnia (780 52) (G47 00)   16  Lumbar foraminal stenosis (724 02) (M99 83)   17  Lumbar radiculopathy (724 4) (M54 16)   18  S/P cardiac cath (V45 89) (Z98 890)   19  Sacroiliitis (720 2) (M46 1)   20  Severe episode of recurrent major depressive disorder, without psychotic features  (296 33) (F33 2)   21  Skin rash (782 1) (R21)    Past Medical History  Problems    1  History of Abnormal echocardiogram (793 2) (R93 1)   2  History of Abnormal stress test (794 39) (R94 39)   3  History of Brachial neuritis (723 4) (M54 12)   4  History of Displacement of intervertebral disc of mid-cervical region (722 0) (M50 220)   5  History of Diverticulitis (562 11) (K57 92)   6  History of Hemoptysis (786 30) (R04 2)   7  History of arthritis (V13 4) (Z87 39)   8  History of cardiac disorder (V12 50) (Z86 79)   9  History of chest pain (V13 89) (Z87 898)   10  History of fatigue (V13 89) (Z87 898)   11  History of frequent headaches   12  History of hearing loss (V12 49) (Z86 69)   13  History of herpes simplex infection (V12 09) (Z86 19)   14  History of hiatal hernia (V12 79) (Z87 19)   15  History of neck disorder (V13 59) (Z87 39)   16  History of reflex sympathetic dystrophy (V12 49) (Z86 69)   17   History of shortness of breath (V13 89) (L14 669) 18  History of spinal stenosis (V13 59) (Z87 39)   19  History of HNP (herniated nucleus pulposus) (722 2) (M51 9)   20  History of Insect bite, infected (919 5,E906 4) (W57 XXXA)   21  History of Memory loss (780 93) (R41 3)   22  Personal history of asthma (V12 69) (Z87 09)   23  History of Restless leg syndrome (333 94) (G25 81)   24  History of Somnolence, daytime (780 54) (R40 0)   25  History of Spinal stenosis of cervical region (723 0) (M48 02)  Active Problems And Past Medical History Reviewed: The active problems and past medical history were reviewed and updated today  Surgical History  Problems    1  History of Coronary Angiography With Concomitant Left Heart Catheterization   2  History of Lower Back Surgery   3  History of Tonsillectomy   4  History of Tympanoplasty    Family History  Mother    1  Family history of Coronary artery atheroma   2  Family history of cardiac disorder (V17 49) (Z82 49)   3  Family history of diabetes mellitus (V18 0) (Z83 3)   4  Family history of hypertension (V17 49) (Z82 49)  Father    5  No pertinent family history  Sister    10  Family history of Coronary artery atheroma   7  Family history of cardiac disorder (V17 49) (Z82 49)   8  Family history of cerebrovascular accident (CVA) (V17 1) (Z82 3)   9  Family history of diabetes mellitus (V18 0) (Z83 3)   10  Family history of hypertension (V17 49) (Z82 49)  Family History Reviewed: The family history was reviewed and updated today  Social History  Problems    · Former smoker (L47 44) (M38 489)   · No alcohol use   · Uses marijuana (305 20) (F12 90)  Social History Reviewed: The social history was reviewed and updated today  Current Meds   1  Advair Diskus 250-50 MCG/DOSE Inhalation Aerosol Powder Breath Activated; INHALE 1 PUFF TWICE DAILY; Therapy: 33YZT3005 to (Last Rx:24Jan2017) Ordered   2  Aspirin Low Dose TABS; Therapy: 27RIK0923 to Recorded   3   Atorvastatin Calcium 80 MG Oral Tablet; TAKE 1 TABLET DAILY; Therapy: 05VKC2826 to (467 76 127)  Requested for: 55WIM7865; Last Rx:01Mlm8229 Ordered   4  Clopidogrel Bisulfate 75 MG Oral Tablet; Take 4 tablets today then 1 tablet daily; Therapy: 27PBM6336 to (Hauser Neds)  Requested for: 58YGV6883; Last Rx:21Fqh9360 Ordered   5  DrRx Keflex 500 MG #30; one tab tid for 10 days; Therapy: 21YCL9237 to (Evaluate:09Sep2017); Last Rx:19Jol7712 Ordered   6  DULoxetine HCl - 30 MG Oral Capsule Delayed Release Particles; take 1 capsule daily; Therapy: 50CRD0282 to (Evaluate:76Inr9991)  Requested for: 12Jun2017; Last Rx:12Jun2017 Ordered   7  Famotidine 40 MG Oral Tablet; TAKE 1 TABLET DAILY AS DIRECTED; Therapy: 17PCR2329 to (Evaluate:13Nov2017)  Requested for: 34MQA8207; Last Rx:25Qpj6032; Status: ACTIVE - Renewal Denied Ordered   8  HydrOXYzine HCl - 25 MG Oral Tablet; Take 1 tablet 2 times daily as needed  Requested for: 77Xnp0744; Last Rx:25Dnu8738 Ordered   9  Levothyroxine Sodium 125 MCG Oral Tablet; TAKE 1 TABLET DAILY; Therapy: 82QTQ9541 to (Evaluate:13Mar2018)  Requested for: 02RXI8187; Last Rx:99Kps4563 Ordered   10  Losartan Potassium 25 MG Oral Tablet; TAKE 1 TABLET DAILY; Therapy: 92HFX7698 to (Evaluate:02Jan2018)  Requested for: 31ZER9412; Last  Rx:72Ibp2325 Ordered   11  Lyrica 150 MG Oral Capsule; TAKE 1 CAPSULE 3 times daily; Therapy: 99USL4679 to (Evaluate:18Nov2017); Last Rx:19Oct2017 Ordered   12  TiZANidine HCl - 2 MG Oral Tablet; TAKE 1 TABLET EVERY 8 HOURS AS NEEDED; Therapy: 43GOS3188 to (Evaluate:88Ywh3896)  Requested for: 03Ajl5212; Last  Rx:32Ptu5387 Ordered   13  Triamcinolone Acetonide 0 1 % External Cream; APPLY SPARINGLY AND MASSAGE IN  TWICE DAILY AS NEEDED; Therapy: 47Eem7368 to (Last Rx:55Snf3265)  Requested for: 37Oki1930 Ordered   14  Ventolin  (90 Base) MCG/ACT Inhalation Aerosol Solution; INHALE 1 TO 2 PUFFS  EVERY 4 TO 6 HOURS AS NEEDED; Therapy: 39FVG8435 to (Evaluate:85Qoh1795);  Last CK:39YBX8659 Ordered   15  Zolpidem Tartrate 10 MG Oral Tablet; TAKE 1 TABLET AT  BEDTIME AS NEEDED FOR  INSOMNIA; Last Rx:76Eap0955 Ordered  Medication List Reviewed: The medication list was reviewed and updated today  Allergies  Medication    1  Augmentin TABS   2  Chantix TABS  Non-Medication    3  No Known Environmental Allergies   4  No Known Food Allergies    Vitals  Vital Signs    Recorded: 95MTZ5713 02:34PM   Heart Rate 68   Pulse Quality Normal, L Radial   Respiration Quality Normal   Respiration 16   Systolic 702   Diastolic 84   Height 5 ft 4 in   Weight 183 lb 8 oz   BMI Calculated 31 5   BSA Calculated 1 89       Physical Exam   Constitutional  General appearance: No acute distress, well appearing and well nourished  Eyes  Conjunctiva and Sclera examination: Conjunctiva pink, sclera anicteric  Ears, Nose, Mouth, and Throat - External inspection of ears and nose: Normal without deformities or discharge  Neck  Neck and thyroid: Normal, supple, trachea midline, no thyromegaly  Pulmonary  Respiratory effort: No increased work of breathing or signs of respiratory distress  Auscultation of lungs: Clear to auscultation, no rales, no rhonchi, no wheezing, good air movement  Cardiovascular  Auscultation of heart: Normal rate and rhythm, normal S1 and S2, no murmurs  Carotid pulses: Normal, 2+ bilaterally  Peripheral vascular exam: Normal pulses throughout, no tenderness, erythema or swelling  Pedal pulses: Normal, 2+ bilaterally  Examination of extremities for edema and/or varicosities: Normal    Abdomen  Abdomen: Non-tender and no distention  Liver and spleen: No hepatomegaly or splenomegaly  Musculoskeletal Gait and station: Normal gait  -- Digits and nails: Normal without clubbing or cyanosis  -- Inspection/palpation of joints, bones, and muscles: Normal, ROM normal    Skin - Skin and subcutaneous tissue: Normal without rashes or lesions  Skin is warm and well perfused, normal turgor  Neurologic - Cranial nerves: II - XII intact  -- Speech: Normal    Psychiatric - Orientation to person, place, and time: Normal -- Mood and affect: Normal       Results/Data  ECG Report: A 12 lead ECG was performed and was normal       Future Appointments    Date/Time Provider Specialty Site   12/19/2017 01:30 PM Cha Rosenberg MD Internal Medicine [facility]       Signatures   Electronically signed by : Harris Burnham DO; Nov 8 2017  3:39PM EST                       (Author)

## 2017-11-13 DIAGNOSIS — M54.12 RADICULOPATHY OF CERVICAL REGION: ICD-10-CM

## 2017-11-13 DIAGNOSIS — M54.16 RADICULOPATHY OF LUMBAR REGION: ICD-10-CM

## 2017-11-14 ENCOUNTER — GENERIC CONVERSION - ENCOUNTER (OUTPATIENT)
Dept: OTHER | Facility: OTHER | Age: 57
End: 2017-11-14

## 2017-11-21 ENCOUNTER — GENERIC CONVERSION - ENCOUNTER (OUTPATIENT)
Dept: OTHER | Facility: OTHER | Age: 57
End: 2017-11-21

## 2017-11-22 ENCOUNTER — APPOINTMENT (OUTPATIENT)
Dept: PHYSICAL THERAPY | Age: 57
End: 2017-11-22
Payer: COMMERCIAL

## 2017-11-22 DIAGNOSIS — M54.12 RADICULOPATHY OF CERVICAL REGION: ICD-10-CM

## 2017-11-22 DIAGNOSIS — M54.16 RADICULOPATHY OF LUMBAR REGION: ICD-10-CM

## 2017-11-22 PROCEDURE — 97162 PT EVAL MOD COMPLEX 30 MIN: CPT

## 2017-11-22 PROCEDURE — G8991 OTHER PT/OT GOAL STATUS: HCPCS

## 2017-11-22 PROCEDURE — G8990 OTHER PT/OT CURRENT STATUS: HCPCS

## 2017-11-29 ENCOUNTER — GENERIC CONVERSION - ENCOUNTER (OUTPATIENT)
Dept: OTHER | Facility: OTHER | Age: 57
End: 2017-11-29

## 2017-12-06 ENCOUNTER — ALLSCRIPTS OFFICE VISIT (OUTPATIENT)
Dept: RADIOLOGY | Facility: CLINIC | Age: 57
End: 2017-12-06
Payer: COMMERCIAL

## 2017-12-11 ENCOUNTER — APPOINTMENT (OUTPATIENT)
Dept: PHYSICAL THERAPY | Age: 57
End: 2017-12-11
Payer: COMMERCIAL

## 2017-12-15 ENCOUNTER — APPOINTMENT (OUTPATIENT)
Dept: PHYSICAL THERAPY | Age: 57
End: 2017-12-15
Payer: COMMERCIAL

## 2017-12-15 PROCEDURE — 97150 GROUP THERAPEUTIC PROCEDURES: CPT

## 2017-12-15 PROCEDURE — 97113 AQUATIC THERAPY/EXERCISES: CPT

## 2017-12-18 ENCOUNTER — APPOINTMENT (OUTPATIENT)
Dept: PHYSICAL THERAPY | Age: 57
End: 2017-12-18
Payer: COMMERCIAL

## 2017-12-18 PROCEDURE — 97150 GROUP THERAPEUTIC PROCEDURES: CPT

## 2017-12-18 PROCEDURE — 97113 AQUATIC THERAPY/EXERCISES: CPT

## 2017-12-22 ENCOUNTER — APPOINTMENT (OUTPATIENT)
Dept: PHYSICAL THERAPY | Age: 57
End: 2017-12-22
Payer: COMMERCIAL

## 2017-12-22 PROCEDURE — 97113 AQUATIC THERAPY/EXERCISES: CPT

## 2017-12-27 ENCOUNTER — APPOINTMENT (OUTPATIENT)
Dept: PHYSICAL THERAPY | Age: 57
End: 2017-12-27
Payer: COMMERCIAL

## 2017-12-27 PROCEDURE — 97113 AQUATIC THERAPY/EXERCISES: CPT

## 2017-12-29 ENCOUNTER — APPOINTMENT (OUTPATIENT)
Dept: PHYSICAL THERAPY | Age: 57
End: 2017-12-29
Payer: COMMERCIAL

## 2017-12-29 PROCEDURE — 97113 AQUATIC THERAPY/EXERCISES: CPT

## 2017-12-29 PROCEDURE — 97150 GROUP THERAPEUTIC PROCEDURES: CPT

## 2018-01-09 NOTE — MISCELLANEOUS
Message   Recorded as Task   Date: 11/10/2017 08:12 AM, Created By: Ishan Paz   Task Name: Financial Authorization   Assigned To: Liss Singh   Regarding Patient: Doug Wilson, Status: Active   CommentGwesandra Foil - 10 Nov 6298 1:26 AM     TASK CREATED  MRI Cervical spine was denied because there is no evidence that patient has done any supervised HEP, Physical therapy  I called the physical therapy to find out if the patient completed the therapy and they confirmed patient went for the initial evalation and that was it  If you would like to perform a peer to peer you can do so by calling     [phone]   Patient ID # [id number]   Amrit Holder - 13 Nov 2017 7:34 AM     TASK REPLIED TO: Previously Assigned To Praful Aburto                   Aware  The patient will need to go back to physical therapy for at least 4 weeks and if she does not improve we can then Re submit for MRI of cervical spine  Prescription for physical therapy printed at office for patient to    Ishan Paz - 13 Nov 8368 6:50 PM     TASK EDITED  Patient was informed of below and she said that she went to PT 2 times and the first time she was really sore then the next time she went all they did was massage her  She said PT is very hard for her to do because it hurts her too much  Do you think aqua therapy would be an option? Amrit Holder - 13 Nov 2017 4:09 PM     TASK REPLIED TO: Previously Assigned To Amrit Holder                      Aquatic therapy with prescription printed  I am not sure if this will count for insurance purposes, but worth trying   Ishan Paz - 14 Nov 5733 6:99 AM     TASK EDITED  MSG left for patient to advise rx for aqua therapy was sent in mail  Active Problems    1  Abnormal glucose (790 29) (R73 09)   2  Back pain (724 5) (M54 9)   3  Benign essential HTN (401 1) (I10)   4  Bites (959 9) (T14 8XXA)   5  Cervical herniated disc (722 0) (M50 20)   6   Cervical radiculopathy (723 4) (M54 12)   7  Chronic stable angina (413 9) (I20 8)   8  Coronary arteriosclerosis (414 00) (I25 10)   9  Degenerative disc disease, cervical (722 4) (M50 30)   10  Depression with anxiety (300 4) (F41 8)   11  Fibromyalgia (729 1) (M79 7)   12  Gastroesophageal reflux disease (530 81) (K21 9)   13  Hypercholesterolemia (272 0) (E78 00)   14  Hypothyroidism (244 9) (E03 9)   15  Insomnia (780 52) (G47 00)   16  Lumbar foraminal stenosis (724 02) (M99 83)   17  Lumbar radiculopathy (724 4) (M54 16)   18  Palpitations (785 1) (R00 2)   19  S/P cardiac cath (V45 89) (Z98 890)   20  Sacroiliitis (720 2) (M46 1)   21  Severe episode of recurrent major depressive disorder, without psychotic features    (296 33) (F33 2)   22  Skin rash (782 1) (R21)    Current Meds   1  Advair Diskus 250-50 MCG/DOSE Inhalation Aerosol Powder Breath Activated; INHALE   1 PUFF TWICE DAILY; Therapy: 88FBY2294 to (Last Rx:24Jan2017) Ordered   2  Aspirin Low Dose TABS; Therapy: 07PVJ8460 to Recorded   3  Atorvastatin Calcium 80 MG Oral Tablet; TAKE 1 TABLET DAILY; Therapy: 12UXD4330 to (Mylene Felty)  Requested for: 88TGA6444; Last   Rx:71Mwx5741 Ordered   4  Clopidogrel Bisulfate 75 MG Oral Tablet; Take 4 tablets today then 1 tablet daily; Therapy: 21DIQ3937 to (Ryanne Sam)  Requested for: 22HPI9376; Last   Rx:69Aay4596 Ordered   5  DULoxetine HCl - 30 MG Oral Capsule Delayed Release Particles; take 1 capsule daily; Therapy: 81AKS8411 to (Evaluate:26Sbx4452)  Requested for: 12Jun2017; Last   Rx:12Jun2017 Ordered   6  Ezetimibe 10 MG Oral Tablet; Take 1 tablet daily; Therapy: 25RBO8693 to (Nena Carreon)  Requested for: 66EXK4268; Last   Rx:08Nov2017 Ordered   7  Famotidine 40 MG Oral Tablet; TAKE 1 TABLET DAILY AS DIRECTED; Therapy: 28EPV0276 to (Evaluate:13Nov2017)  Requested for: 85KUW1916; Last   Rx:46Fcq0253; Status: ACTIVE - Renewal Denied Ordered   8   HydrOXYzine HCl - 25 MG Oral Tablet; Take 1 tablet 2 times daily as needed  Requested   for: 59Fiu7949; Last Rx:55Pka9309 Ordered   9  Levothyroxine Sodium 125 MCG Oral Tablet; TAKE 1 TABLET DAILY; Therapy: 88OSF9577 to (Evaluate:13Mar2018)  Requested for: 36VFR1379; Last   Rx:54Rrb5263 Ordered   10  Losartan Potassium 25 MG Oral Tablet; TAKE 1 TABLET DAILY; Therapy: 92WXI9432 to (Sunitha Porras)  Requested for: 06UJE0131; Last    Rx:08Nov2017 Ordered   11  Lyrica 150 MG Oral Capsule; TAKE 1 CAPSULE 3 times daily; Therapy: 60PWK1775 to (Evaluate:18Nov2017); Last Rx:12Jhj7173 Ordered   12  TiZANidine HCl - 2 MG Oral Tablet; TAKE 1 TABLET EVERY 8 HOURS AS NEEDED; Therapy: 96XWS1754 to (Evaluate:38Zqo5831)  Requested for: 76Eaf2769; Last    Rx:18Ycw5815 Ordered   13  Triamcinolone Acetonide 0 1 % External Cream; APPLY SPARINGLY AND MASSAGE IN    TWICE DAILY AS NEEDED; Therapy: 78Cwt4253 to (Last Rx:36Jkc1132)  Requested for: 80Cfr7085 Ordered   14  Ventolin  (90 Base) MCG/ACT Inhalation Aerosol Solution; INHALE 1 TO 2    PUFFS EVERY 4 TO 6 HOURS AS NEEDED; Therapy: 38ZAI7813 to (Evaluate:17Sgg5601); Last Rx:24Jan2017 Ordered   15  Zolpidem Tartrate 10 MG Oral Tablet (Ambien); TAKE 1 TABLET AT  BEDTIME AS    NEEDED FOR INSOMNIA; Last Rx:32Oiy8321 Ordered    Allergies    1  Augmentin TABS   2  Chantix TABS    3  No Known Environmental Allergies   4   No Known Food Allergies    Signatures   Electronically signed by : Mookie Decker, ; Nov 14 2017  9:27AM EST                       (Author)

## 2018-01-10 NOTE — RESULT NOTES
Message   Recorded as Task   Date: 05/08/2017 03:11 PM, Created By: Catie Garcia   Task Name: Miscellaneous   Assigned To: [facility]   Regarding Patient: Felipe Peoples, Status: Active   CommentCathlene Burgess - 08 May 2017 3:11 PM     TASK CREATED  Faxed anti coag hold form to Dr Lisa Adrian at [address] [place] [address] for pt to hold Plavix  Pt will need to be scheduled for B/L L5 TFESI with Dr Keely Bender in [place]  Dr Gilberto Echevarria fax: [phone], phone: [phone]  [place] - 11 May 2017 10:21 AM     TASK IN PROGRESS   Cypress - 11 May 2017 5:06 PM     TASK EDITED  received anticoagulant hold request form from Dr Gilberto Echevarria  He does not given permission to hold plavix and wrote a note on the form  He also faxed a patient's office note  Form and note to be scanned into the pt's chart  Please advise  Pranav Amaral - 11 May 2017 6:39 PM     TASK REPLIED TO: Previously Assigned To [facility]                      Since we do not have her permission to hold anticoagulation please notify the patient that until anticoagulation is able to safely be held we will not be able to proceed with intervention  We will discuss further treatment options at next office visit   Deja Dominique - 12 May 2017 9:25 AM     TASK REASSIGNED: Previously Assigned To [facility]   Rebekah Wilburn - 12 May 2017 9:39 AM     TASK EDITED  S/w the pt  and discussed previous task  Pt  verbalized understanding and scheduled a sovs c/ JW to review treatment options          Signatures   Electronically signed by : Gerianne Goltz, ; May 12 2017  9:40AM EST                       (Author)

## 2018-01-12 VITALS
BODY MASS INDEX: 1.72 KG/M2 | HEART RATE: 46 BPM | WEIGHT: 10 LBS | RESPIRATION RATE: 16 BRPM | SYSTOLIC BLOOD PRESSURE: 100 MMHG | DIASTOLIC BLOOD PRESSURE: 60 MMHG

## 2018-01-12 VITALS
HEIGHT: 64 IN | TEMPERATURE: 98.2 F | WEIGHT: 180 LBS | HEART RATE: 98 BPM | SYSTOLIC BLOOD PRESSURE: 146 MMHG | BODY MASS INDEX: 30.73 KG/M2 | DIASTOLIC BLOOD PRESSURE: 80 MMHG

## 2018-01-12 VITALS
HEART RATE: 58 BPM | TEMPERATURE: 96.5 F | WEIGHT: 182.5 LBS | HEIGHT: 64 IN | BODY MASS INDEX: 31.16 KG/M2 | OXYGEN SATURATION: 98 % | SYSTOLIC BLOOD PRESSURE: 120 MMHG | DIASTOLIC BLOOD PRESSURE: 70 MMHG

## 2018-01-12 NOTE — MISCELLANEOUS
Assessment    1  S/P cardiac cath (V45 89) (Z98 890)    History of Present Illness  TCM Communication [facility]: The patient is being contacted for follow-up after hospitalization and Pt is following up w/ cardiology and declined to schedule WHIT  She was hospitalized at [facility]  The date of admission: 4/27/17, date of discharge: 4/28/17  Diagnosis: coronary arteriosclerosis, s/p elective cath PCI for stable angina  She was discharged to home  Medications were not reviewed today  She did not schedule a follow up appointment  Follow-up appointments with other specialists: cardiology  Counseling was provided to the patient  Communication performed and completed by kb      Active Problems    1  Abnormal glucose (790 29) (R73 09)   2  Back pain (724 5) (M54 9)   3  Benign essential HTN (401 1) (I10)   4  Chronic stable angina (413 9) (I20 8)   5  Coronary arteriosclerosis (414 00) (I25 10)   6  Depression screen (V79 0) (Z13 89)   7  Depression with anxiety (300 4) (F41 8)   8  Fibromyalgia (729 1) (M79 7)   9  Gastroesophageal reflux disease (530 81) (K21 9)   10  Hypercholesterolemia (272 0) (E78 00)   11  Hypothyroidism (244 9) (E03 9)   12  Hypothyroidism (244 9) (E03 9)   13  Insomnia (780 52) (G47 00)   14  Severe episode of recurrent major depressive disorder, without psychotic features    (296 33) (F33 2)    Past Medical History    1  History of Abnormal echocardiogram (793 2) (R93 1)   2  History of Abnormal stress test (794 39) (R94 39)   3  History of Brachial neuritis (723 4) (M54 12)   4  History of Displacement of intervertebral disc of mid-cervical region (722 0) (M50 220)   5  History of Diverticulitis (562 11) (K57 92)   6  History of Hemoptysis (786 30) (R04 2)   7  History of arthritis (V13 4) (Z87 39)   8  History of cardiac disorder (V12 50) (Z86 79)   9  History of chest pain (V13 89) (Z87 898)   10  History of fatigue (V13 89) (Z87 898)   11  History of frequent headaches   12   History of hearing loss (V12 49) (Z86 69)   13  History of herpes simplex infection (V12 09) (Z86 19)   14  History of hiatal hernia (V12 79) (Z87 19)   15  History of neck disorder (V13 59) (Z87 39)   16  History of reflex sympathetic dystrophy (V12 49) (Z86 69)   17  History of shortness of breath (V13 89) (Z87 898)   18  History of spinal stenosis (V13 59) (Z87 39)   19  History of HNP (herniated nucleus pulposus) (722 2) (M51 9)   20  History of Insect bite, infected (919 5,E906 4) (W57 XXXA)   21  History of Memory loss (780 93) (R41 3)   22  Personal history of asthma (V12 69) (Z87 09)   23  History of Restless leg syndrome (333 94) (G25 81)   24  History of Skin rash (782 1) (R21)   25  History of Somnolence, daytime (780 54) (R40 0)   26  History of Spinal stenosis of cervical region (723 0) (M48 02)    Surgical History    1  History of Coronary Angiography With Concomitant Left Heart Catheterization   2  History of Lower Back Surgery   3  History of Tonsillectomy   4  History of Tympanoplasty    Family History  Mother    1  Family history of Coronary artery atheroma   2  Family history of cardiac disorder (V17 49) (Z82 49)   3  Family history of diabetes mellitus (V18 0) (Z83 3)   4  Family history of hypertension (V17 49) (Z82 49)  Father    5  No pertinent family history  Sister    10  Family history of Coronary artery atheroma   7  Family history of cardiac disorder (V17 49) (Z82 49)   8  Family history of cerebrovascular accident (CVA) (V17 1) (Z82 3)   9  Family history of diabetes mellitus (V18 0) (Z83 3)   10  Family history of hypertension (V17 49) (Z82 49)    Social History    · Former smoker (V15 82) (V42 556)   · No alcohol use   · Uses marijuana (305 20) (F12 90)    Current Meds   1  Advair Diskus 250-50 MCG/DOSE Inhalation Aerosol Powder Breath Activated; INHALE 1   PUFF TWICE DAILY; Therapy: 17YMO8182 to (Last Rx:24Jan2017) Ordered   2  Aspirin Low Dose TABS; Therapy: 22SZP0047 to Recorded   3  Atorvastatin Calcium 80 MG Oral Tablet; TAKE 1 TABLET DAILY; Therapy: 35Bml2200 to (Evaluate:31Gfw1215)  Requested for: 93Msr3605; Last   Rx:57Hij3575 Ordered   4  Clopidogrel Bisulfate 75 MG Oral Tablet; TAKE 4 TABLETS (300 MG) ON MORNING OF   CARDIAC CATHETERIZATION; Therapy: 79Jei0372 to (Last Rx:03Bdk3686)  Requested for: 93Scb5155 Ordered   5  Levothyroxine Sodium 100 MCG Oral Tablet; TAKE 1 TABLET DAILY; Therapy: 13SGW9813 to (Evaluate:06Rwd2720)  Requested for: 63VKI7266; Last   Rx:25Ldc6316 Ordered   6  Losartan Potassium 50 MG Oral Tablet; take 1 tablet by mouth every day; Therapy: 18KYM2240 to (Evaluate:02Cev7407)  Requested for: 79RHG3941; Last   Rx:24Jan2017 Ordered   7  Lyrica 150 MG Oral Capsule; TAKE 1 CAPSULE 3 times daily; Therapy: 13BWY6914 to (Evaluate:62Bhd2642); Last Rx:23Mar2017 Ordered   8  Metoprolol Succinate ER 25 MG Oral Tablet Extended Release 24 Hour; take 1 tablet   once daily; Therapy: 85NED3755 to (Evaluate:48Sfh3467)  Requested for: 29HWO7576; Last   Rx:24Jan2017 Ordered   9  Omeprazole 40 MG Oral Capsule Delayed Release; Take 1 capsule twice daily; Therapy: 62Obl2844 to (Evaluate:58Vrx1992)  Requested for: 18Apr2017; Last   Rx:18Apr2017 Ordered   10  Trintellix 10 MG Oral Tablet; Take 1 tablet daily; Therapy: 14ZHG9374 to (Evaluate:84Xqj9371); Last Rx:21Mar2017 Ordered   11  Trintellix 5 MG Oral Tablet; TAKE 1 TABLET Daily for 2 weeks then increase to 10mg; Therapy: 75RAJ4525 to (Evaluate:12Lco5850); Last Rx:21Mar2017 Ordered   12  Ventolin  (90 Base) MCG/ACT Inhalation Aerosol Solution; INHALE 1 TO 2 PUFFS    EVERY 4 TO 6 HOURS AS NEEDED; Therapy: 61NJA6230 to (Evaluate:35Bce9960); Last Rx:24Jan2017 Ordered   13  Zolpidem Tartrate 10 MG Oral Tablet; TAKE 1 TABLET AT  BEDTIME AS NEEDED FOR    INSOMNIA; Last Rx:18Apr2017 Ordered    Allergies    1  Augmentin TABS   2  Chantix TABS    3  No Known Environmental Allergies   4   No Known Food Allergies    Future Appointments    Date/Time Provider Specialty Site   05/16/2017 11:15 AM Jefry Brewster MD Internal Medicine [facility]   05/24/2017 04:00 PM Shanelle Whiting DO Cardiology  CARDIOLOGY  [place]   05/08/2017 01:30 PM Quentin Mchugh DO Pain Management [address]   Electronically signed by : Letty Todd, ; May  1 2017  3:53PM EST                       (Co-author)    Electronically signed by : Rosa Wu MD; May  8 2017  7:13AM EST                       (Validation)

## 2018-01-13 VITALS
BODY MASS INDEX: 30.63 KG/M2 | HEIGHT: 64 IN | WEIGHT: 179.38 LBS | HEART RATE: 82 BPM | TEMPERATURE: 98.4 F | SYSTOLIC BLOOD PRESSURE: 120 MMHG | DIASTOLIC BLOOD PRESSURE: 71 MMHG

## 2018-01-13 VITALS
SYSTOLIC BLOOD PRESSURE: 116 MMHG | TEMPERATURE: 98.2 F | OXYGEN SATURATION: 98 % | HEART RATE: 75 BPM | HEIGHT: 64 IN | WEIGHT: 179 LBS | DIASTOLIC BLOOD PRESSURE: 80 MMHG | BODY MASS INDEX: 30.56 KG/M2

## 2018-01-13 VITALS
HEIGHT: 64 IN | DIASTOLIC BLOOD PRESSURE: 84 MMHG | BODY MASS INDEX: 31.33 KG/M2 | RESPIRATION RATE: 16 BRPM | WEIGHT: 183.5 LBS | HEART RATE: 68 BPM | SYSTOLIC BLOOD PRESSURE: 124 MMHG

## 2018-01-13 VITALS
SYSTOLIC BLOOD PRESSURE: 136 MMHG | WEIGHT: 180.38 LBS | HEART RATE: 64 BPM | OXYGEN SATURATION: 98 % | BODY MASS INDEX: 30.8 KG/M2 | DIASTOLIC BLOOD PRESSURE: 78 MMHG | HEIGHT: 64 IN | TEMPERATURE: 98 F

## 2018-01-14 VITALS
HEART RATE: 67 BPM | WEIGHT: 177.25 LBS | BODY MASS INDEX: 30.26 KG/M2 | SYSTOLIC BLOOD PRESSURE: 130 MMHG | DIASTOLIC BLOOD PRESSURE: 78 MMHG | OXYGEN SATURATION: 98 % | HEIGHT: 64 IN | TEMPERATURE: 98 F

## 2018-01-14 VITALS
HEART RATE: 72 BPM | DIASTOLIC BLOOD PRESSURE: 82 MMHG | SYSTOLIC BLOOD PRESSURE: 120 MMHG | RESPIRATION RATE: 16 BRPM | BODY MASS INDEX: 31.65 KG/M2 | WEIGHT: 181.5 LBS

## 2018-01-14 VITALS
OXYGEN SATURATION: 98 % | DIASTOLIC BLOOD PRESSURE: 72 MMHG | HEART RATE: 72 BPM | WEIGHT: 182.13 LBS | TEMPERATURE: 98.5 F | HEIGHT: 64 IN | SYSTOLIC BLOOD PRESSURE: 126 MMHG | BODY MASS INDEX: 31.09 KG/M2

## 2018-01-14 VITALS
WEIGHT: 181.38 LBS | SYSTOLIC BLOOD PRESSURE: 134 MMHG | BODY MASS INDEX: 30.96 KG/M2 | HEIGHT: 64 IN | HEART RATE: 77 BPM | OXYGEN SATURATION: 98 % | TEMPERATURE: 98.3 F | DIASTOLIC BLOOD PRESSURE: 90 MMHG

## 2018-01-15 NOTE — MISCELLANEOUS
Message  Received letter from pain management requesting hold of plavix x 7 days prior to back injection  Called pt, as recent UTE placed in April  Pt states she has not been taking plavix and that she "never got it " Reviewed hospital records, and Rx was printed out for her, not sure if she lost Rx, or never got it from RN  Called in another Rx for Plavix  She is to take 300 mg today and 75 mg daily thereafter  Pt NOT cleared for back injection as would wait atleast 6 months before interrupation, 3 months if she can't wait much longer and significant back pain  Plan  S/P cardiac cath    · Clopidogrel Bisulfate 75 MG Oral Tablet;  Take 4 tablets today then 1 tablet daily    Signatures   Electronically signed by : Fredo Betts DO; May 10 2017 10:58AM EST                       (Author)

## 2018-01-16 NOTE — RESULT NOTES
Message   Recorded as Task   Date: 07/06/2017 09:56 AM, Created By: Keny Balevelyn   Task Name: Follow Up   Assigned To: [facility]   Regarding Patient: Ihsan Nayak, Status: In Progress   CommentAdolphus Nip - 06 Jul 5069 9:90 AM     TASK CREATED  S/P B/L SIJ INJ ON 6/28/2017 W/ DR Severiano Brown - NO F/U SCHEDULED   Liss Singh - 06 Jul 5452 0:23 PM     TASK EDITED  Pt reports no relief so far  I advised patient ot give more time and we will f/u next week to check on progress  No f/u scheduled   Xu May - 06 Jul 2017 1:51 PM     TASK REPLIED TO: Previously Assigned To Praful Aburto   Kenymonique Ervin - 06 Jul 4611 2:87 PM     TASK EDITED   Kenymonique Ervin - 13 Jul 1544 3:25 AM     TASK EDITED   Kenymonique Ervin - 20 Jul 5235 64:94 AM     TASK EDITED  MSG left on Vm to CB to get update if any progress  Kenymonique Ervin - 21 Jul 3801 42:31 AM     TASK EDITED  Attempted to reach patient     Patient did not   - No MSg left          Signatures   Electronically signed by : Emily Dalton, ; Jul 21 2017 10:17AM EST                       (Author)

## 2018-01-16 NOTE — MISCELLANEOUS
Message   Recorded as Task   Date: 09/19/2017 11:23 AM, Created By: Mari Antonio   Task Name: Miscellaneous   Assigned To: [facility]   Regarding Patient: Claire Herrera, Status: Active   Comment:    Cintia Cavazos - 19 Sep 2017 11:23 AM     TASK CREATED  Pt called requesting refill of Tizanidine 2mg  She said she has about 6 left  Pt uses [facility] pharmacy (on file in [facility])  Pt can be reached at [phone]  Jeff Gross - 19 Sep 2017 2:17 PM     TASK EDITED  LMOM with c/b # and office hours  Cintia Cavazos - 19 Sep 2017 2:28 PM     TASK EDITED  Pt returned call and can be reached at [phone]  Jeff Gross - 19 Sep 2017 2:44 PM     TASK EDITED  S/W pt regarding Tizanidine 2 mg  Pt advised she has 6 pills left  Please advise  Thanks  Jayden Hope - 19 Sep 2017 4:27 PM     TASK REPLIED TO: Previously Assigned To Praful Aburto                      Refill sent to pharmacy        Active Problems    1  Abnormal glucose (790 29) (R73 09)   2  Back pain (724 5) (M54 9)   3  Benign essential HTN (401 1) (I10)   4  Bites (959 9) (T14 8)   5  Cervical herniated disc (722 0) (M50 20)   6  Cervical radiculopathy (723 4) (M54 12)   7  Chronic stable angina (413 9) (I20 8)   8  Coronary arteriosclerosis (414 00) (I25 10)   9  Degenerative disc disease, cervical (722 4) (M50 30)   10  Depression with anxiety (300 4) (F41 8)   11  Fibromyalgia (729 1) (M79 7)   12  Gastroesophageal reflux disease (530 81) (K21 9)   13  Hypercholesterolemia (272 0) (E78 00)   14  Hypothyroidism (244 9) (E03 9)   15  Insomnia (780 52) (G47 00)   16  Lumbar foraminal stenosis (724 02) (M99 83)   17  Lumbar radiculopathy (724 4) (M54 16)   18  S/P cardiac cath (V45 89) (Z98 890)   19  Sacroiliitis (720 2) (M46 1)   20  Severe episode of recurrent major depressive disorder, without psychotic features    (296 33) (F33 2)   21  Skin rash (782 1) (R21)    Current Meds   1   Advair Diskus 250-50 MCG/DOSE Inhalation Aerosol Powder Breath Activated; INHALE   1 PUFF TWICE DAILY; Therapy: 69PUA8486 to (Last Rx:24Jan2017) Ordered   2  Aspirin Low Dose TABS; Therapy: 41UWW2092 to Recorded   3  Atorvastatin Calcium 80 MG Oral Tablet; TAKE 1 TABLET DAILY; Therapy: 40PWG5436 to (Sarmad Govea)  Requested for: 81AGJ8194; Last   Rx:67Hkt4425 Ordered   4  Clopidogrel Bisulfate 75 MG Oral Tablet; Take 4 tablets today then 1 tablet daily; Therapy: 50MYM5745 to (Dorinda Casanova)  Requested for: 48RGZ9176; Last   Rx:93Oew6346 Ordered   5  DrRx Keflex 500 MG #30; one tab tid for 10 days; Therapy: 46PQW6366 to (Evaluate:09Sep2017); Last Rx:61Mpj5145 Ordered   6  DULoxetine HCl - 30 MG Oral Capsule Delayed Release Particles; take 1 capsule daily; Therapy: 63YTP2357 to (Evaluate:15Gxs4193)  Requested for: 12Jun2017; Last   Rx:12Jun2017 Ordered   7  Famotidine 40 MG Oral Tablet; TAKE 1 TABLET DAILY AS DIRECTED; Therapy: 94OZY6479 to (Evaluate:13Nov2017)  Requested for: 85Ipy0664; Last   Rx:86Ajl1504 Ordered   8  HydrOXYzine HCl - 25 MG Oral Tablet; Take 1 tablet 2 times daily as needed  Requested   for: 33Uug9694; Last Rx:44Nfz6220 Ordered   9  Levothyroxine Sodium 125 MCG Oral Tablet; TAKE 1 TABLET DAILY; Therapy: 43KSQ7905 to (Evaluate:13Mar2018)  Requested for: 16Nna6758; Last   Rx:08Nqe4129; Status: ACTIVE - Retrospective By Protocol Authorization Ordered   10  Losartan Potassium 25 MG Oral Tablet; TAKE 1 TABLET DAILY; Therapy: 59WBL7983 to (Evaluate:02Jan2018)  Requested for: 71QRF3726; Last    Rx:04Dyv4540 Ordered   11  Lyrica 150 MG Oral Capsule; TAKE 1 CAPSULE 3 times daily; Therapy: 24SNM6085 to (Evaluate:19Oct2017); Last Rx:76Cww9064 Ordered   12  TiZANidine HCl - 2 MG Oral Tablet; TAKE 1 TABLET EVERY 8 HOURS AS NEEDED; Therapy: 99ZHT1567 to (Evaluate:33Zzg2695)  Requested for: 05Yqv4524; Last    Rx:57Qlo4426 Ordered   13   Triamcinolone Acetonide 0 1 % External Cream; APPLY SPARINGLY AND MASSAGE IN    TWICE DAILY AS NEEDED; Therapy: 41Mvq4617 to (Last Rx:30Aug2017)  Requested for: 30Aug2017 Ordered   14  Ventolin  (90 Base) MCG/ACT Inhalation Aerosol Solution; INHALE 1 TO 2    PUFFS EVERY 4 TO 6 HOURS AS NEEDED; Therapy: 38NMC6444 to (Evaluate:30Kom6218); Last Rx:24Jan2017 Ordered   15  Zolpidem Tartrate 10 MG Oral Tablet (Ambien); TAKE 1 TABLET AT  BEDTIME AS    NEEDED FOR INSOMNIA; Last Rx:30Aug2017 Ordered    Allergies    1  Augmentin TABS   2  Chantix TABS    3  No Known Environmental Allergies   4   No Known Food Allergies    Signatures   Electronically signed by : Cathy Carrera RN; Sep 19 2017  4:34PM EST                       (Author)

## 2018-01-29 ENCOUNTER — TELEPHONE (OUTPATIENT)
Dept: OTHER | Facility: OTHER | Age: 58
End: 2018-01-29

## 2018-01-29 DIAGNOSIS — M54.16 LUMBAR RADICULOPATHY: Primary | ICD-10-CM

## 2018-01-29 DIAGNOSIS — M79.18 MYOFASCIAL PAIN: ICD-10-CM

## 2018-01-29 NOTE — TELEPHONE ENCOUNTER
[address] [place] [address]- message left on anserve    Given to:             Yahir RAMOS [facility]    Reason: ROUTINE/OFFICE   Pt's Dr: David Jobs       For: OFFICE     2nd Call: NO        From: [facility]     Phone: [phone]   Ext:     Pt Name: [facility]    Pt : 1960     Message: REFILLS FOR TIZANIDINE HCL 2MG;& DULOXETINE HCL DR-30MG            CALL MEDS TO PHARM & PT NEEDS A CB TO DISCUSS PHYS THER/INJS

## 2018-01-30 RX ORDER — DULOXETIN HYDROCHLORIDE 30 MG/1
1 CAPSULE, DELAYED RELEASE ORAL DAILY
COMMUNITY
Start: 2017-06-12 | End: 2018-01-30 | Stop reason: SDUPTHER

## 2018-01-30 RX ORDER — DULOXETIN HYDROCHLORIDE 30 MG/1
30 CAPSULE, DELAYED RELEASE ORAL DAILY
Qty: 30 CAPSULE | Refills: 1 | Status: SHIPPED | OUTPATIENT
Start: 2018-01-30 | End: 2018-03-12

## 2018-01-30 RX ORDER — TIZANIDINE 2 MG/1
2 TABLET ORAL EVERY 8 HOURS PRN
Qty: 90 TABLET | Refills: 1 | Status: SHIPPED | OUTPATIENT
Start: 2018-01-30 | End: 2018-03-12 | Stop reason: SDUPTHER

## 2018-01-30 RX ORDER — TIZANIDINE 2 MG/1
1 TABLET ORAL EVERY 8 HOURS PRN
COMMUNITY
Start: 2017-06-12 | End: 2018-01-30 | Stop reason: SDUPTHER

## 2018-01-30 NOTE — TELEPHONE ENCOUNTER
The Tizanidine last ordered on 9/19/17 c/ 2 refills  The Duloxetine last ordered on 6/12/17 c/ 1 refill  Pt  Last seen on 6/12/17  JW to advise  thanks  Would you like to see in the office first?

## 2018-01-30 NOTE — TELEPHONE ENCOUNTER
S/W Nia-pt's daughter as per release of health information  Advised her of the same  She verbalized understanding and will tell the pt

## 2018-02-05 ENCOUNTER — OFFICE VISIT (OUTPATIENT)
Dept: INTERNAL MEDICINE CLINIC | Facility: CLINIC | Age: 58
End: 2018-02-05
Payer: COMMERCIAL

## 2018-02-05 VITALS
TEMPERATURE: 97.3 F | BODY MASS INDEX: 31.65 KG/M2 | HEART RATE: 74 BPM | OXYGEN SATURATION: 98 % | SYSTOLIC BLOOD PRESSURE: 132 MMHG | DIASTOLIC BLOOD PRESSURE: 76 MMHG | WEIGHT: 185.4 LBS | HEIGHT: 64 IN

## 2018-02-05 DIAGNOSIS — I10 BENIGN ESSENTIAL HTN: ICD-10-CM

## 2018-02-05 DIAGNOSIS — I20.8 CHRONIC STABLE ANGINA (HCC): ICD-10-CM

## 2018-02-05 DIAGNOSIS — R42 DIZZINESS: Primary | ICD-10-CM

## 2018-02-05 DIAGNOSIS — E03.9 HYPOTHYROIDISM, UNSPECIFIED TYPE: ICD-10-CM

## 2018-02-05 PROBLEM — M79.7 FIBROMYALGIA: Status: ACTIVE | Noted: 2017-01-24

## 2018-02-05 PROBLEM — R73.09 ABNORMAL GLUCOSE: Status: ACTIVE | Noted: 2017-02-24

## 2018-02-05 PROBLEM — R00.2 PALPITATIONS: Status: ACTIVE | Noted: 2017-11-08

## 2018-02-05 PROBLEM — M50.30 DEGENERATIVE DISC DISEASE, CERVICAL: Status: ACTIVE | Noted: 2017-05-08

## 2018-02-05 PROBLEM — M48.061 LUMBAR FORAMINAL STENOSIS: Status: ACTIVE | Noted: 2017-05-08

## 2018-02-05 PROBLEM — F33.2 SEVERE EPISODE OF RECURRENT MAJOR DEPRESSIVE DISORDER, WITHOUT PSYCHOTIC FEATURES (HCC): Status: ACTIVE | Noted: 2017-04-18

## 2018-02-05 PROBLEM — M54.12 CERVICAL RADICULOPATHY: Status: ACTIVE | Noted: 2017-05-08

## 2018-02-05 PROBLEM — M50.20 CERVICAL HERNIATED DISC: Status: ACTIVE | Noted: 2017-05-08

## 2018-02-05 PROBLEM — M54.9 BACK PAIN: Status: ACTIVE | Noted: 2017-01-24

## 2018-02-05 PROBLEM — F41.8 DEPRESSION WITH ANXIETY: Status: ACTIVE | Noted: 2017-03-24

## 2018-02-05 PROBLEM — R21 SKIN RASH: Status: ACTIVE | Noted: 2017-01-24

## 2018-02-05 PROBLEM — E78.00 HYPERCHOLESTEROLEMIA: Status: ACTIVE | Noted: 2017-03-24

## 2018-02-05 PROBLEM — I20.89 CHRONIC STABLE ANGINA: Status: ACTIVE | Noted: 2017-04-17

## 2018-02-05 PROBLEM — T14.8XXA BITES: Status: ACTIVE | Noted: 2017-05-16

## 2018-02-05 PROBLEM — M54.16 LUMBAR RADICULOPATHY: Status: ACTIVE | Noted: 2017-05-08

## 2018-02-05 PROBLEM — K21.9 GASTROESOPHAGEAL REFLUX DISEASE: Status: ACTIVE | Noted: 2017-01-24

## 2018-02-05 PROBLEM — M46.1 SACROILIITIS (HCC): Status: ACTIVE | Noted: 2017-06-12

## 2018-02-05 PROCEDURE — 3075F SYST BP GE 130 - 139MM HG: CPT | Performed by: PHYSICIAN ASSISTANT

## 2018-02-05 PROCEDURE — 3078F DIAST BP <80 MM HG: CPT | Performed by: PHYSICIAN ASSISTANT

## 2018-02-05 PROCEDURE — 93000 ELECTROCARDIOGRAM COMPLETE: CPT | Performed by: PHYSICIAN ASSISTANT

## 2018-02-05 PROCEDURE — 99214 OFFICE O/P EST MOD 30 MIN: CPT | Performed by: PHYSICIAN ASSISTANT

## 2018-02-05 RX ORDER — MELOXICAM 15 MG/1
15 TABLET ORAL DAILY
COMMUNITY
Start: 2014-10-01 | End: 2018-03-16 | Stop reason: ALTCHOICE

## 2018-02-05 RX ORDER — HYDROXYZINE HYDROCHLORIDE 25 MG/1
1 TABLET, FILM COATED ORAL 2 TIMES DAILY PRN
COMMUNITY
End: 2018-03-16 | Stop reason: SDUPTHER

## 2018-02-05 RX ORDER — TRIAMCINOLONE ACETONIDE 1 MG/G
CREAM TOPICAL
COMMUNITY
Start: 2017-08-30 | End: 2018-03-16 | Stop reason: SDUPTHER

## 2018-02-05 RX ORDER — ATORVASTATIN CALCIUM 80 MG/1
1 TABLET, FILM COATED ORAL DAILY
COMMUNITY
Start: 2017-07-06 | End: 2018-02-05 | Stop reason: SDUPTHER

## 2018-02-05 RX ORDER — FLUTICASONE PROPIONATE 50 MCG
1 SPRAY, SUSPENSION (ML) NASAL DAILY
Qty: 16 G | Refills: 0 | Status: SHIPPED | OUTPATIENT
Start: 2018-02-05 | End: 2019-03-14 | Stop reason: ALTCHOICE

## 2018-02-05 NOTE — PATIENT INSTRUCTIONS
Hypothyroidism  Will follow TSH, continue synthroid  Benign essential HTN  BP stable, non-orthostatic today in office  Will obtain labs to further evaluate  Encouraged hydration, do not skip meals  Continue same meds  Chronic stable angina (HCC)  NO cardiac sx at this time  EKG unchanged from prior  Continue with cardio follow up  Will check carotid duplex to r/o carotid disease as cause of dizziness with known CAD  Continue same meds no change  Dizziness  Suspect BPPV  Will send for labs to further eval   With known CAD s/p stending will check carotid duplex r/o carotid cause  Sx prevent for 2-3 months, intermittent and fluctuate  Will start therapy    May need imaging if sx persist

## 2018-02-05 NOTE — ASSESSMENT & PLAN NOTE
NO cardiac sx at this time  EKG unchanged from prior  Continue with cardio follow up  Will check carotid duplex to r/o carotid disease as cause of dizziness with known CAD  Continue same meds no change

## 2018-02-05 NOTE — ASSESSMENT & PLAN NOTE
Suspect BPPV  Will send for labs to further eval   With known CAD s/p stending will check carotid duplex r/o carotid cause  Sx prevent for 2-3 months, intermittent and fluctuate  Will start therapy    May need imaging if sx persist

## 2018-02-05 NOTE — PROGRESS NOTES
Assessment/Plan:    Hypothyroidism  Will follow TSH, continue synthroid  Benign essential HTN  BP stable, non-orthostatic today in office  Will obtain labs to further evaluate  Encouraged hydration, do not skip meals  Continue same meds  Chronic stable angina (HCC)  NO cardiac sx at this time  EKG unchanged from prior  Continue with cardio follow up  Will check carotid duplex to r/o carotid disease as cause of dizziness with known CAD  Continue same meds no change  Dizziness  Suspect BPPV  Will send for labs to further eval   With known CAD s/p stending will check carotid duplex r/o carotid cause  Sx prevent for 2-3 months, intermittent and fluctuate  Will start therapy  May need imaging if sx persist          Diagnoses and all orders for this visit:    Dizziness  -     VAS carotid complete study; Future  -     Comprehensive metabolic panel; Future  -     CBC and differential; Future  -     Vitamin B12; Future  -     TSH, 3rd generation with T4 reflex; Future  -     Lyme Antibody Profile with reflex to WB; Future  -     Ambulatory referral to Physical Therapy; Future  -     Folate; Future  -     POCT ECG  -     fluticasone (FLONASE) 50 mcg/act nasal spray; 1 spray into each nostril daily    Hypothyroidism, unspecified type  -     TSH, 3rd generation with T4 reflex; Future    Chronic stable angina (HCC)  -     Lipid panel; Future    Benign essential HTN    Other orders  -     hydrOXYzine HCL (ATARAX) 25 mg tablet; Take 1 tablet by mouth 2 (two) times a day as needed  -     meloxicam (MOBIC) 15 mg tablet; Take 15 mg by mouth daily  -     triamcinolone (KENALOG) 0 1 % cream; Apply topically  -     atorvastatin (LIPITOR) 80 mg tablet; Take 1 tablet by mouth daily          Subjective:          Patient ID: Golden Rodríguez is a 65 y o  female  64 yo female for eval for dizziness  No recent head cold  No personal hx of vertigo  Described it as spinning sensation    Worse when she is laying and gets up to stand  Occurs every day when she gets up out of bed  Lasts seconds  No headache with it  No visual changes  NO CP or palpitations with it  Makes her nervous  No nausea or vomiting with it  No hearing changes  Was happening last summer and had to lay down it was so bad  Sensation of spinning when it occurs  No headache  Dizziness   This is a recurrent problem  The current episode started more than 1 month ago (2-3 months)  The problem occurs daily  The problem has been waxing and waning  Associated symptoms include fatigue (+ fibromyaligia, chronic ) and vertigo  Pertinent negatives include no abdominal pain, chest pain, coughing, diaphoresis, fever, headaches, nausea, numbness, visual change, vomiting or weakness  The treatment provided no relief  Thyroid Problem   Presents for follow-up visit  Symptoms include depressed mood (stable, chronic, on meds for her fibromyalgia) and fatigue (+ fibromyaligia, chronic )  Patient reports no diaphoresis, palpitations or visual change  Hypertension   This is a chronic problem  The current episode started more than 1 year ago  Progression since onset: stable  The problem is controlled  Pertinent negatives include no blurred vision, chest pain, headaches, palpitations or shortness of breath  There are no compliance problems  Hypertensive end-organ damage includes CAD/MI and a thyroid problem  There is no history of angina or kidney disease  The following portions of the patient's history were reviewed and updated as appropriate: allergies, current medications, past family history, past medical history, past social history, past surgical history and problem list     Review of Systems   Constitutional: Positive for fatigue (+ fibromyaligia, chronic )  Negative for diaphoresis and fever  Eyes: Negative for blurred vision  Respiratory: Negative for cough and shortness of breath  Cardiovascular: Negative for chest pain and palpitations  Gastrointestinal: Negative for abdominal pain, nausea and vomiting  Musculoskeletal:        Chronic aches and pains, tx for fibromyalgia  Neurological: Positive for dizziness and vertigo  Negative for syncope, speech difficulty, weakness, light-headedness, numbness and headaches     Psychiatric/Behavioral:        Stable on meds         Past Medical History:   Diagnosis Date    Abnormal echocardiogram     Abnormal stress test     Brachial neuritis     Displacement of intervertebral disc of mid-cervical region     Frequent headaches     Herniated nucleus pulposus     History of arthritis     History of asthma     History of cardiac disorder     History of chest pain     History of diverticulitis     History of fatigue     History of hearing loss     History of hemoptysis     History of herpes simplex infection     History of hiatal hernia     History of insect bite     INFECTED    History of memory loss     History of neck disorder     History of reflex sympathetic dystrophy     History of restless legs syndrome     History of shortness of breath     History of spinal stenosis     Hyperlipidemia     Hypertension     Skin rash     Somnolence, daytime     Spinal stenosis of cervical region          Current Outpatient Prescriptions:     albuterol (PROVENTIL HFA,VENTOLIN HFA) 90 mcg/act inhaler, Inhale 2 puffs every 6 (six) hours as needed for wheezing, Disp: , Rfl:     aspirin 81 mg chewable tablet, Chew 81 mg daily, Disp: , Rfl:     atorvastatin (LIPITOR) 80 mg tablet, Take 1 tablet by mouth daily, Disp: , Rfl:     clopidogrel (PLAVIX) 75 mg tablet, Take 75 mg by mouth daily, Disp: , Rfl:     DULoxetine (CYMBALTA) 30 mg delayed release capsule, Take 1 capsule (30 mg total) by mouth daily, Disp: 30 capsule, Rfl: 1    fluticasone-salmeterol (ADVAIR) 250-50 mcg/dose inhaler, Inhale 1 puff every 12 (twelve) hours as needed  , Disp: , Rfl:     hydrOXYzine HCL (ATARAX) 25 mg tablet, Take 1 tablet by mouth 2 (two) times a day as needed, Disp: , Rfl:     levothyroxine 100 mcg tablet, Take 100 mcg by mouth daily, Disp: , Rfl:     losartan (COZAAR) 50 mg tablet, Take 50 mg by mouth daily, Disp: , Rfl:     meloxicam (MOBIC) 15 mg tablet, Take 15 mg by mouth daily, Disp: , Rfl:     pregabalin (LYRICA) 150 mg capsule, Take 150 mg by mouth 3 (three) times a day, Disp: , Rfl:     tiZANidine (ZANAFLEX) 2 mg tablet, Take 1 tablet (2 mg total) by mouth every 8 (eight) hours as needed for muscle spasms, Disp: 90 tablet, Rfl: 1    triamcinolone (KENALOG) 0 1 % cream, Apply topically, Disp: , Rfl:     zolpidem (AMBIEN) 10 mg tablet, Take 10 mg by mouth daily at bedtime as needed for sleep, Disp: , Rfl:     atorvastatin (LIPITOR) 80 mg tablet, Take 1 tablet by mouth daily with dinner for 30 days, Disp: 30 tablet, Rfl: 12    clopidogrel (PLAVIX) 75 mg tablet, Take 1 tablet by mouth daily for 30 days, Disp: 30 tablet, Rfl: 12    fluticasone (FLONASE) 50 mcg/act nasal spray, 1 spray into each nostril daily, Disp: 16 g, Rfl: 0    metoprolol succinate (TOPROL-XL) 25 mg 24 hr tablet, Take 25 mg by mouth daily, Disp: , Rfl:     Vortioxetine HBr (TRINTELLIX) 10 MG TABS, Take by mouth, Disp: , Rfl:     Allergies   Allergen Reactions    Amoxicillin-Pot Clavulanate     Chantix [Varenicline] Swelling     Other reaction(s): Swollen abdomen  stomach bloating       Social History   Past Surgical History:   Procedure Laterality Date    BACK SURGERY      BACK SURGERY      LOWER BACK SURGERY    CARDIAC CATHETERIZATION      HISTORY OF CORONARY ANGIOGRAPHY WITH CONCOMITANT LEFT HEART CATHETERIZATION    EAR SURGERY      TONSILLECTOMY      TYMPANOPLASTY       Family History   Problem Relation Age of Onset    Coronary artery disease Mother      ATHEROMA    Heart disease Mother     Diabetes Mother     Hypertension Mother     No Known Problems Father      NO PERTINENT FAMILY HISTORY    Coronary artery disease Sister      ATHEROMA    Heart disease Sister     Stroke Sister     Diabetes Sister     Hypertension Sister        Objective:  /76 (BP Location: Left arm, Patient Position: Sitting, Cuff Size: Large)   Pulse 74   Temp (!) 97 3 °F (36 3 °C) (Oral)   Ht 5' 3 5" (1 613 m)   Wt 84 1 kg (185 lb 6 4 oz)   SpO2 98%   BMI 32 33 kg/m²   Body mass index is 32 33 kg/m²  Physical Exam   Constitutional: She is oriented to person, place, and time  She appears well-developed and well-nourished  She appears distressed  HENT:   Head: Normocephalic and atraumatic  Mouth/Throat: No oropharyngeal exudate  Scaring of TM bilaterally Right more than left  No erythema   Eyes: Conjunctivae and EOM are normal  Pupils are equal, round, and reactive to light  Right eye exhibits no discharge  Left eye exhibits no discharge  Slight lateral beating nystagmus, no vertical nystagmus  Pupils reactive   Neck: Neck supple  Cardiovascular: Normal rate, regular rhythm and normal heart sounds  No murmur heard  Pulmonary/Chest: Effort normal and breath sounds normal  No respiratory distress  She has no wheezes  Abdominal: Soft  Bowel sounds are normal  She exhibits no distension  There is no guarding  Musculoskeletal: She exhibits no edema  Symmetric UE and LE strength   Neurological: She is alert and oriented to person, place, and time  No gross focal neuro deficits  Symmetric strength UE and LE   Skin: Skin is warm and dry  No rash noted  She is not diaphoretic  Psychiatric: She has a normal mood and affect  Her behavior is normal  Thought content normal    Alert, pleasant cooperative, good eye contact  Nursing note and vitals reviewed

## 2018-02-05 NOTE — ASSESSMENT & PLAN NOTE
BP stable, non-orthostatic today in office  Will obtain labs to further evaluate  Encouraged hydration, do not skip meals  Continue same meds

## 2018-02-12 DIAGNOSIS — M79.7 FIBROMYALGIA: Primary | ICD-10-CM

## 2018-02-12 RX ORDER — PREGABALIN 150 MG/1
150 CAPSULE ORAL 3 TIMES DAILY
Qty: 90 CAPSULE | Refills: 0 | Status: SHIPPED | OUTPATIENT
Start: 2018-02-12 | End: 2018-03-16 | Stop reason: SDUPTHER

## 2018-02-13 ENCOUNTER — TELEPHONE (OUTPATIENT)
Dept: INTERNAL MEDICINE CLINIC | Facility: CLINIC | Age: 58
End: 2018-02-13

## 2018-02-13 NOTE — TELEPHONE ENCOUNTER
Called pt to notify her script for Tawana ready for p/u @ [facility] office, but phone # says that it is "not reachable"

## 2018-02-14 RX ORDER — FAMOTIDINE 40 MG/1
TABLET, FILM COATED ORAL
Qty: 30 TABLET | Refills: 2 | OUTPATIENT
Start: 2018-02-14

## 2018-02-20 ENCOUNTER — TELEPHONE (OUTPATIENT)
Dept: CARDIOLOGY CLINIC | Facility: CLINIC | Age: 58
End: 2018-02-20

## 2018-02-27 DIAGNOSIS — K21.9 GASTROESOPHAGEAL REFLUX DISEASE, ESOPHAGITIS PRESENCE NOT SPECIFIED: Primary | ICD-10-CM

## 2018-02-27 RX ORDER — FAMOTIDINE 40 MG/1
40 TABLET, FILM COATED ORAL DAILY
Qty: 30 TABLET | Refills: 0 | Status: SHIPPED | OUTPATIENT
Start: 2018-02-27 | End: 2018-03-26 | Stop reason: SDUPTHER

## 2018-03-05 DIAGNOSIS — E78.00 PURE HYPERCHOLESTEROLEMIA: ICD-10-CM

## 2018-03-05 DIAGNOSIS — E03.9 HYPOTHYROIDISM, UNSPECIFIED TYPE: Primary | ICD-10-CM

## 2018-03-06 RX ORDER — LEVOTHYROXINE SODIUM 0.12 MG/1
TABLET ORAL
Qty: 30 TABLET | Refills: 5 | Status: SHIPPED | OUTPATIENT
Start: 2018-03-06 | End: 2018-09-02 | Stop reason: SDUPTHER

## 2018-03-07 NOTE — PROCEDURES
Procedure    Bilateral L5 Transforaminal Epidural Steroid Injection    Indication: Low back and leg pain  Preoperative diagnosis: Lumbar radiculitis  Postoperative diagnosis: Lumbar radiculitis    Procedure: Fluoroscopically-guided bilateral L5 transforaminal epidural steroid injection under fluoroscopy      After discussing the risks, benefits, and alternatives to the procedure, the patient expressed understanding and wished to proceed  The patient was brought to the fluoroscopy suite and placed in the prone position  A procedural pause was conducted to verify: correct patient identity, procedure to be performed and as applicable, correct side and site, correct patient position, and availability of implants, special equipment and special requirements  After identifying the bilateral L5 pedicles fluoroscopically with an oblique view, the skin was sterilely prepped and draped in the usual fashion using Chloraprep skin prep  The skin and subcutaneous tissue were anesthetized with 1% lidocaine  A 3 and half inch 22 gauge spinal needle was then advanced under fluoroscopic guidance to the posterior aspect of the bilateral L5 neural foramens  Appropriate foraminal depth was determined with a lateral fluoroscopic view, and AP visualization confirmed needle positioning at approximately the 6 o'clock position relative to the pedicles  After negative aspiration, 2 mL of Omnipaque 300 contrast was injected using live fluoroscopy/digital subtraction angiography, confirming appropriate transforaminal spread without evidence of intravascular or intrathecal uptake  Next, a local anesthetic test dose consisting of 1 mL of 2% lidocaine was injected through the needle at each level  After an appropriate period of observation, a directed neurological exam was performed which revealed no new neurologic deficits   Next, a 1 5 ml solution consisting of 7 5 mg of dexamethasone in sterile saline was injected slowly and incrementally into the epidural space at each level  Following the injection the needles were withdrawn slightly and flushed with lidocaine as they were fully extracted  The patient tolerated the procedure well and there were no apparent complications  The patient did not develop any new neurologic deficits  After appropriate observation, the patient was dismissed from the clinic in good condition under their own power  COMMENTS   The patient received a total steroid dose of 15 mg of dexamethasone        Signatures   Electronically signed by : Bishnu Condon DO; Dec  6 2017  5:20PM EST                       (Author)

## 2018-03-12 ENCOUNTER — CLINICAL SUPPORT (OUTPATIENT)
Dept: PAIN MEDICINE | Facility: CLINIC | Age: 58
End: 2018-03-12
Payer: COMMERCIAL

## 2018-03-12 VITALS
SYSTOLIC BLOOD PRESSURE: 138 MMHG | TEMPERATURE: 98 F | BODY MASS INDEX: 31.41 KG/M2 | WEIGHT: 184 LBS | HEIGHT: 64 IN | DIASTOLIC BLOOD PRESSURE: 71 MMHG | HEART RATE: 75 BPM

## 2018-03-12 DIAGNOSIS — M79.7 FIBROMYALGIA: ICD-10-CM

## 2018-03-12 DIAGNOSIS — M79.18 MYOFASCIAL PAIN: ICD-10-CM

## 2018-03-12 DIAGNOSIS — M46.1 SACROILIITIS (HCC): ICD-10-CM

## 2018-03-12 DIAGNOSIS — M54.12 CERVICAL RADICULOPATHY: ICD-10-CM

## 2018-03-12 DIAGNOSIS — M54.16 LUMBAR RADICULOPATHY: Primary | ICD-10-CM

## 2018-03-12 DIAGNOSIS — M48.061 LUMBAR FORAMINAL STENOSIS: ICD-10-CM

## 2018-03-12 PROCEDURE — 99214 OFFICE O/P EST MOD 30 MIN: CPT | Performed by: ANESTHESIOLOGY

## 2018-03-12 RX ORDER — TIZANIDINE 2 MG/1
2 TABLET ORAL EVERY 8 HOURS PRN
Qty: 90 TABLET | Refills: 2 | Status: SHIPPED | OUTPATIENT
Start: 2018-03-12 | End: 2018-03-12 | Stop reason: SDUPTHER

## 2018-03-12 RX ORDER — TIZANIDINE 2 MG/1
2 TABLET ORAL EVERY 8 HOURS PRN
Qty: 90 TABLET | Refills: 2 | Status: SHIPPED | OUTPATIENT
Start: 2018-03-12 | End: 2018-05-08 | Stop reason: SDUPTHER

## 2018-03-12 RX ORDER — LOSARTAN POTASSIUM 25 MG/1
TABLET ORAL
COMMUNITY
Start: 2018-03-05 | End: 2018-07-03 | Stop reason: SDUPTHER

## 2018-03-12 NOTE — PROGRESS NOTES
Assessment:  1  Lumbar radiculopathy    2  Myofascial pain    3  Cervical radiculopathy    4  Fibromyalgia    5  Lumbar foraminal stenosis    6  Sacroiliitis ([address] )        Plan:  60-year-old female with a history of fibromyalgia, previous L5-S1 anterior interbody fusion, cervical degenerative disc disease, lumbar degenerative disc disease and foraminal stenosis with chronic radiculopathy into the lower extremities and radiculopathy into bilateral upper extremities more so on the left than the right returning for follow-up  The patient did have bilateral L5 TFESI without any significant relief  She has also had SI joint injections and other epidural steroid injections in the past without any relief  The patient was prescribed physical therapy for her neck and radicular symptoms, however the patient was unable to go secondary to transportation issues  The patient is currently taking Lyrica 150 mg t i d , duloxetine 30 mg daily, and tizanidine 2 mg q 8 hours p r n     This gives her mild relief  She is not experiencing any side effects from the medications  I did discuss possibly repeating the epidural steroid injection versus spinal cord stimulation  I discussed spinal cord stimulation using diagrams and models including the trial and permanent implantation  The patient was interested in this option and would like to learn more about it  I did provide the patient with a booklet of information regarding spinal cord stimulation to review  I also discussed the authorization process including MRI of her thoracic spine, psychological evaluation, and education from the [facility] team   The patient does not wish to move forward with any more injections since they were not helpful  I also discussed with the patient that once her transportation issues improve I will send her back to physical therapy to see if this improves her cervical symptoms and if not we may proceed with an MRI of her cervical spine    1   The patient was given a booklet of literature regarding spinal cord stimulation  I discussed with the patient that if she would like to move forward with the authorization procedure after reviewing the literature she may call the office and we will place the order for MRI of her thoracic spine and psychological evaluation  The patient was agreeable  2   The patient may continue with Lyrica 150 mg t i d  as prescribed for  3  The patient may continue with tizanidine 2 mg q 8 hours p r n  for myofascial pain  4  The patient may continue with duloxetine 30 mg daily  5  Will avoid opioids secondary to marijuana use and average urine drug screens in the past  6  Will avoid NSAIDs secondary to cardiovascular disease  7  Once her transportation issues improved we will send her back to physical therapy for neck and cervical radicular symptoms  8  If she fails conservative therapy for the neck and cervical radicular symptoms we may consider an MRI of her cervical spine  9  I will follow up the patient in 2 months or sooner if needed    [place] Prescription Drug Monitoring Program report was reviewed and was appropriate       My impressions and treatment recommendations were discussed in detail with the patient who verbalized understanding and had no further questions  Discharge instructions were provided  I personally saw and examined the patient and I agree with the above discussed plan of care  No orders of the defined types were placed in this encounter      New Medications Ordered This Visit   Medications    losartan (COZAAR) 25 mg tablet       History of Present Illness:    Yusuf Montilla is a 65 y o  female with a history of fibromyalgia, previous L5-S1 anterior interbody fusion, cervical degenerative disc disease, lumbar degenerative disc disease and foraminal stenosis with chronic radiculopathy into the lower extremities and radiculopathy into bilateral upper extremities more so on the left than the right returning for follow-up  The patient did have bilateral L5 TFESI without any significant relief  She has also had SI joint injections and other epidural steroid injections in the past without any relief  The patient was prescribed physical therapy for her neck and radicular symptoms, however the patient was unable to go secondary to transportation issues  She denies any bladder or bowel incontinence or saddle anesthesia  The patient is currently taking Lyrica 150 mg t i d , duloxetine 30 mg daily, and tizanidine 2 mg q 8 hours p r n     This gives her mild relief  She is not experiencing any side effects from the medications  The patient rates her pain 8/10 and the pain does not follow any particular pattern throughout the day  The pain is constant and described as burning, sharp, pressure-like, and shooting  The pain is worse with standing, walking, exercise, bending and twisting at the neck and waste  The pain is alleviated with relaxation and lying down  I have personally reviewed and/or updated the patient's past medical history, past surgical history, family history, social history, current medications, allergies, and vital signs today  Other than as stated above, the patient denies any interval changes in medications, medical condition, mental condition, symptoms, or allergies since the last office visit  Review of Systems:    Review of Systems   Respiratory: Negative for shortness of breath  Cardiovascular: Positive for chest pain  Gastrointestinal: Positive for constipation  Negative for diarrhea, nausea and vomiting  Musculoskeletal: Negative for arthralgias, gait problem, joint swelling and myalgias  Difficulty walking, Decreased ROM, Joint stiffness    Skin: Negative for rash  Neurological: Positive for dizziness  Negative for seizures and weakness  All other systems reviewed and are negative        Patient Active Problem List   Diagnosis    Abnormal glucose  Back pain    Benign essential HTN    Bites    Cervical herniated disc    Cervical radiculopathy    Coronary arteriosclerosis    Chronic stable angina (HCC)    Degenerative disc disease, cervical    Depression with anxiety    Fibromyalgia    Gastroesophageal reflux disease    Hypothyroidism    Hypercholesterolemia    Insomnia    Lumbar foraminal stenosis    Lumbar radiculopathy    Palpitations    Sacroiliitis (HCC)    Severe episode of recurrent major depressive disorder, without psychotic features ([facility] [address] )    Skin rash    Dizziness       Past Medical History:   Diagnosis Date    Abnormal echocardiogram     Abnormal stress test     Brachial neuritis     Displacement of intervertebral disc of mid-cervical region     Frequent headaches     Herniated nucleus pulposus     History of arthritis     History of asthma     History of cardiac disorder     History of chest pain     History of diverticulitis     History of fatigue     History of hearing loss     History of hemoptysis     History of herpes simplex infection     History of hiatal hernia     History of insect bite     INFECTED    History of memory loss     History of neck disorder     History of reflex sympathetic dystrophy     History of restless legs syndrome     History of shortness of breath     History of spinal stenosis     Hyperlipidemia     Hypertension     Skin rash     Somnolence, daytime     Spinal stenosis of cervical region        Past Surgical History:   Procedure Laterality Date    BACK SURGERY      BACK SURGERY      LOWER BACK SURGERY    CARDIAC CATHETERIZATION      HISTORY OF CORONARY ANGIOGRAPHY WITH CONCOMITANT LEFT HEART CATHETERIZATION    EAR SURGERY      TONSILLECTOMY      TYMPANOPLASTY         Family History   Problem Relation Age of Onset    Coronary artery disease Mother      ATHEROMA    Heart disease Mother     Diabetes Mother     Hypertension Mother     No Known Problems Father NO PERTINENT FAMILY HISTORY    Coronary artery disease Sister      ATHEROMA    Heart disease Sister     Stroke Sister     Diabetes Sister     Hypertension Sister        Social History     Occupational History    Not on file  Social History Main Topics    Smoking status: Former Smoker    Smokeless tobacco: Former User      Comment: quit in 2013    Alcohol use No    Drug use: Yes      Comment: USES [facility] [facility] [address] [place] [address]    Sexual activity: Not on file       Current Outpatient Prescriptions on File Prior to Visit   Medication Sig    albuterol (PROVENTIL HFA,VENTOLIN HFA) 90 mcg/act inhaler Inhale 2 puffs every 6 (six) hours as needed for wheezing    aspirin 81 mg chewable tablet Chew 81 mg daily    atorvastatin (LIPITOR) 80 mg tablet Take 1 tablet by mouth daily with dinner for 30 days    clopidogrel (PLAVIX) 75 mg tablet Take 75 mg by mouth daily    DULoxetine (CYMBALTA) 30 mg delayed release capsule Take 1 capsule (30 mg total) by mouth daily    famotidine (PEPCID) 40 MG tablet Take 1 tablet (40 mg total) by mouth daily    fluticasone (FLONASE) 50 mcg/act nasal spray 1 spray into each nostril daily    fluticasone-salmeterol (ADVAIR) 250-50 mcg/dose inhaler Inhale 1 puff every 12 (twelve) hours as needed      hydrOXYzine HCL (ATARAX) 25 mg tablet Take 1 tablet by mouth 2 (two) times a day as needed    levothyroxine 125 mcg tablet TAKE 1 TABLET DAILY      meloxicam (MOBIC) 15 mg tablet Take 15 mg by mouth daily    metoprolol succinate (TOPROL-XL) 25 mg 24 hr tablet Take 25 mg by mouth daily    pregabalin (LYRICA) 150 mg capsule Take 1 capsule (150 mg total) by mouth 3 (three) times a day    tiZANidine (ZANAFLEX) 2 mg tablet Take 1 tablet (2 mg total) by mouth every 8 (eight) hours as needed for muscle spasms    triamcinolone (KENALOG) 0 1 % cream Apply topically    zolpidem (AMBIEN) 10 mg tablet Take 10 mg by mouth daily at bedtime as needed for sleep    [DISCONTINUED] levothyroxine 100 mcg tablet Take 100 mcg by mouth daily    [DISCONTINUED] losartan (COZAAR) 50 mg tablet Take 50 mg by mouth daily    [DISCONTINUED] Vortioxetine HBr (TRINTELLIX) 10 MG TABS Take by mouth     No current facility-administered medications on file prior to visit  Allergies   Allergen Reactions    Amoxicillin-Pot Clavulanate        Physical Exam:    /71   Pulse 75   Temp 98 °F (36 7 °C)   Ht 5' 3 5" (1 613 m)   Wt 83 5 kg (184 lb)   BMI 32 08 kg/m²     Constitutional: normal, well developed, well nourished, alert, in no distress and non-toxic and no overt pain behavior  Eyes: anicteric  HEENT: grossly intact  Neck: supple, symmetric, trachea midline and no masses   Pulmonary:even and unlabored  Cardiovascular:No edema or pitting edema present  Skin:Normal without rashes or lesions and well hydrated  Psychiatric:Mood and affect appropriate  Neurologic:Cranial Nerves II-XII grossly intact  Musculoskeletal:antalgic gait  Bilateral cervical paraspinals and trapezii tender to palpation and ropy in texture  Positive Spurling's to the left and equivocal to the right  Bilateral upper extremity strength 5/5 in all muscle groups  Bilateral lumbar paraspinals tender to palpation ropy in texture  Bilateral SI joints tender to palpation  Bilateral lower extremity strength 5/5 in all muscle groups  Positive seated straight leg raise on the left and equivocal on the right      Imaging  Imaging reviewed

## 2018-03-15 ENCOUNTER — TELEPHONE (OUTPATIENT)
Dept: INTERNAL MEDICINE CLINIC | Facility: CLINIC | Age: 58
End: 2018-03-15

## 2018-03-16 ENCOUNTER — TELEPHONE (OUTPATIENT)
Dept: PAIN MEDICINE | Facility: MEDICAL CENTER | Age: 58
End: 2018-03-16

## 2018-03-16 ENCOUNTER — OFFICE VISIT (OUTPATIENT)
Dept: INTERNAL MEDICINE CLINIC | Facility: CLINIC | Age: 58
End: 2018-03-16
Payer: COMMERCIAL

## 2018-03-16 VITALS
TEMPERATURE: 97.9 F | DIASTOLIC BLOOD PRESSURE: 80 MMHG | WEIGHT: 186.2 LBS | BODY MASS INDEX: 31.79 KG/M2 | HEART RATE: 86 BPM | HEIGHT: 64 IN | SYSTOLIC BLOOD PRESSURE: 138 MMHG | OXYGEN SATURATION: 98 %

## 2018-03-16 DIAGNOSIS — R21 RASH, SKIN: ICD-10-CM

## 2018-03-16 DIAGNOSIS — R06.02 SOBOE (SHORTNESS OF BREATH ON EXERTION): Primary | ICD-10-CM

## 2018-03-16 DIAGNOSIS — M54.16 LUMBAR RADICULOPATHY: ICD-10-CM

## 2018-03-16 DIAGNOSIS — R21 SKIN RASH: ICD-10-CM

## 2018-03-16 DIAGNOSIS — M79.7 FIBROMYALGIA: ICD-10-CM

## 2018-03-16 DIAGNOSIS — F41.8 DEPRESSION WITH ANXIETY: ICD-10-CM

## 2018-03-16 DIAGNOSIS — R42 DIZZINESS: ICD-10-CM

## 2018-03-16 DIAGNOSIS — Z12.31 ENCOUNTER FOR SCREENING MAMMOGRAM FOR MALIGNANT NEOPLASM OF BREAST: ICD-10-CM

## 2018-03-16 DIAGNOSIS — E03.9 HYPOTHYROIDISM, UNSPECIFIED TYPE: ICD-10-CM

## 2018-03-16 DIAGNOSIS — G89.4 CHRONIC PAIN SYNDROME: Primary | ICD-10-CM

## 2018-03-16 DIAGNOSIS — F32.89 OTHER DEPRESSION: ICD-10-CM

## 2018-03-16 DIAGNOSIS — I20.8 CHRONIC STABLE ANGINA (HCC): ICD-10-CM

## 2018-03-16 DIAGNOSIS — J45.909 ASTHMA, UNSPECIFIED ASTHMA SEVERITY, UNSPECIFIED WHETHER COMPLICATED, UNSPECIFIED WHETHER PERSISTENT: ICD-10-CM

## 2018-03-16 DIAGNOSIS — Z12.31 SCREENING MAMMOGRAM, ENCOUNTER FOR: ICD-10-CM

## 2018-03-16 LAB
ALBUMIN SERPL BCP-MCNC: 4.1 G/DL (ref 3.5–5)
ALP SERPL-CCNC: 141 U/L (ref 46–116)
ALT SERPL W P-5'-P-CCNC: 38 U/L (ref 12–78)
ANION GAP SERPL CALCULATED.3IONS-SCNC: 8 MMOL/L (ref 4–13)
AST SERPL W P-5'-P-CCNC: 20 U/L (ref 5–45)
BASOPHILS # BLD AUTO: 0.03 THOUSANDS/ΜL (ref 0–0.1)
BASOPHILS NFR BLD AUTO: 0 % (ref 0–1)
BILIRUB SERPL-MCNC: 0.56 MG/DL (ref 0.2–1)
BUN SERPL-MCNC: 12 MG/DL (ref 5–25)
CALCIUM SERPL-MCNC: 8.9 MG/DL (ref 8.3–10.1)
CHLORIDE SERPL-SCNC: 105 MMOL/L (ref 100–108)
CHOLEST SERPL-MCNC: 157 MG/DL (ref 50–200)
CO2 SERPL-SCNC: 26 MMOL/L (ref 21–32)
CREAT SERPL-MCNC: 0.67 MG/DL (ref 0.6–1.3)
EOSINOPHIL # BLD AUTO: 0.09 THOUSAND/ΜL (ref 0–0.61)
EOSINOPHIL NFR BLD AUTO: 1 % (ref 0–6)
ERYTHROCYTE [DISTWIDTH] IN BLOOD BY AUTOMATED COUNT: 13.4 % (ref 11.6–15.1)
FOLATE SERPL-MCNC: 19.8 NG/ML (ref 3.1–17.5)
GFR SERPL CREATININE-BSD FRML MDRD: 99 ML/MIN/1.73SQ M
GLUCOSE SERPL-MCNC: 115 MG/DL (ref 65–140)
HCT VFR BLD AUTO: 41.4 % (ref 34.8–46.1)
HDLC SERPL-MCNC: 41 MG/DL (ref 40–60)
HGB BLD-MCNC: 14.2 G/DL (ref 11.5–15.4)
LDLC SERPL CALC-MCNC: 56 MG/DL (ref 0–100)
LYMPHOCYTES # BLD AUTO: 2.9 THOUSANDS/ΜL (ref 0.6–4.47)
LYMPHOCYTES NFR BLD AUTO: 40 % (ref 14–44)
MCH RBC QN AUTO: 29.7 PG (ref 26.8–34.3)
MCHC RBC AUTO-ENTMCNC: 34.3 G/DL (ref 31.4–37.4)
MCV RBC AUTO: 87 FL (ref 82–98)
MONOCYTES # BLD AUTO: 0.38 THOUSAND/ΜL (ref 0.17–1.22)
MONOCYTES NFR BLD AUTO: 5 % (ref 4–12)
NEUTROPHILS # BLD AUTO: 3.81 THOUSANDS/ΜL (ref 1.85–7.62)
NEUTS SEG NFR BLD AUTO: 54 % (ref 43–75)
NRBC BLD AUTO-RTO: 0 /100 WBCS
PLATELET # BLD AUTO: 269 THOUSANDS/UL (ref 149–390)
PMV BLD AUTO: 10.6 FL (ref 8.9–12.7)
POTASSIUM SERPL-SCNC: 3.6 MMOL/L (ref 3.5–5.3)
PROT SERPL-MCNC: 8 G/DL (ref 6.4–8.2)
RBC # BLD AUTO: 4.78 MILLION/UL (ref 3.81–5.12)
SODIUM SERPL-SCNC: 139 MMOL/L (ref 136–145)
TRIGL SERPL-MCNC: 299 MG/DL
TSH SERPL DL<=0.05 MIU/L-ACNC: 1.03 UIU/ML (ref 0.36–3.74)
VIT B12 SERPL-MCNC: 351 PG/ML (ref 100–900)
WBC # BLD AUTO: 7.23 THOUSAND/UL (ref 4.31–10.16)

## 2018-03-16 PROCEDURE — 84443 ASSAY THYROID STIM HORMONE: CPT | Performed by: NURSE PRACTITIONER

## 2018-03-16 PROCEDURE — 36415 COLL VENOUS BLD VENIPUNCTURE: CPT | Performed by: NURSE PRACTITIONER

## 2018-03-16 PROCEDURE — 99213 OFFICE O/P EST LOW 20 MIN: CPT | Performed by: NURSE PRACTITIONER

## 2018-03-16 PROCEDURE — 82607 VITAMIN B-12: CPT | Performed by: NURSE PRACTITIONER

## 2018-03-16 PROCEDURE — 80053 COMPREHEN METABOLIC PANEL: CPT | Performed by: NURSE PRACTITIONER

## 2018-03-16 PROCEDURE — 80061 LIPID PANEL: CPT | Performed by: NURSE PRACTITIONER

## 2018-03-16 PROCEDURE — 85025 COMPLETE CBC W/AUTO DIFF WBC: CPT | Performed by: NURSE PRACTITIONER

## 2018-03-16 PROCEDURE — 86618 LYME DISEASE ANTIBODY: CPT | Performed by: NURSE PRACTITIONER

## 2018-03-16 PROCEDURE — 82746 ASSAY OF FOLIC ACID SERUM: CPT | Performed by: NURSE PRACTITIONER

## 2018-03-16 RX ORDER — CITALOPRAM 10 MG/1
10 TABLET ORAL DAILY
Qty: 30 TABLET | Refills: 1 | Status: SHIPPED | OUTPATIENT
Start: 2018-03-16 | End: 2018-04-26 | Stop reason: SDUPTHER

## 2018-03-16 RX ORDER — PREGABALIN 150 MG/1
150 CAPSULE ORAL 3 TIMES DAILY
Qty: 270 CAPSULE | Refills: 1 | Status: CANCELLED | OUTPATIENT
Start: 2018-03-16

## 2018-03-16 RX ORDER — HYDROXYZINE HYDROCHLORIDE 25 MG/1
25 TABLET, FILM COATED ORAL 2 TIMES DAILY
Qty: 180 TABLET | Refills: 1 | Status: CANCELLED | OUTPATIENT
Start: 2018-03-16

## 2018-03-16 RX ORDER — ALBUTEROL SULFATE 90 UG/1
2 AEROSOL, METERED RESPIRATORY (INHALATION) EVERY 6 HOURS PRN
Qty: 18 G | Refills: 1 | Status: CANCELLED | OUTPATIENT
Start: 2018-03-16

## 2018-03-16 RX ORDER — PREGABALIN 150 MG/1
150 CAPSULE ORAL 3 TIMES DAILY
Qty: 180 CAPSULE | Refills: 0 | Status: SHIPPED | OUTPATIENT
Start: 2018-03-16 | End: 2018-04-16 | Stop reason: SDUPTHER

## 2018-03-16 RX ORDER — HYDROXYZINE HYDROCHLORIDE 25 MG/1
25 TABLET, FILM COATED ORAL 2 TIMES DAILY PRN
Qty: 90 TABLET | Refills: 1 | Status: SHIPPED | OUTPATIENT
Start: 2018-03-16 | End: 2018-08-07 | Stop reason: SDUPTHER

## 2018-03-16 RX ORDER — ALBUTEROL SULFATE 90 UG/1
2 AEROSOL, METERED RESPIRATORY (INHALATION) EVERY 6 HOURS PRN
Qty: 18 G | Refills: 1 | Status: SHIPPED | OUTPATIENT
Start: 2018-03-16 | End: 2018-03-20 | Stop reason: SDUPTHER

## 2018-03-16 RX ORDER — TRIAMCINOLONE ACETONIDE 1 MG/G
CREAM TOPICAL 2 TIMES DAILY
Qty: 80 G | Refills: 1 | Status: SHIPPED | OUTPATIENT
Start: 2018-03-16 | End: 2020-09-10 | Stop reason: SDUPTHER

## 2018-03-16 RX ORDER — TRIAMCINOLONE ACETONIDE 1 MG/G
CREAM TOPICAL 2 TIMES DAILY
Qty: 453.6 G | Refills: 1 | Status: CANCELLED | OUTPATIENT
Start: 2018-03-16

## 2018-03-16 NOTE — PROGRESS NOTES
Assessment/Plan:     Diagnoses and all orders for this visit:    Depression with anxiety  Patient with significant life/family stress  Will start Celexa 10 mg daily  Patient instructed to call office should her depression/anxiety worsen while on this medication  Instructed to schedule follow up in 4-6weeks  Dizziness  Patient has been unable to get testing ordered at her last visit which included blood work, carotid duplex, and physical therapy  She states her dizziness has improved but has not resolved  Will have blood drawn today in our office  Instructed to get testing performed when she is able to find transportation  Encounter for screening mammogram for malignant neoplasm of breast  -     Cancel: Mammo screening bilateral w cad; Future      Subjective:      Patient ID: Tung Jensen is a 65 y o  female  Patient here for one month follow up for multiple medical conditions  She was unable to go for physical therapy for her vertigo as she does not have a car and no one to take her  She was given a slip for carotid doppler but she has not had this performed either due to lack of transportation  Patient taking prednisone from her dermatologist and will be finsihed with this on Sunday  She has order for blood work and did try to have blood drawn on Monday but "they couldn't get any blood out of me"  She has been having family stress  She was renting the upstairs in someone else's home  She had to move in with her daughter due to her son getting out of custodial for 7 years - this is a "toxic" relationship  Last summer, she had been living in a "tent in the woods" for 3 weeks (hiding from her son)  She states if she could go back to tent living at this time, she would  On last office visit, she was evaluated for dizziness  She had no recent head cold  No personal hx of vertigo  Described her symptoms as "spinning sensation "  Worse when she is laying and gets up to stand    Occurs every day when she gets up out of bed  Lasts seconds  No headache with it  No visual changes  NO CP or palpitations with it  Makes her nervous  No nausea or vomiting with it  No hearing changes  Was happening last summer and had to lay down it was so bad  Sensation of spinning when it occurs  No headache        Dizziness   This is a recurrent problem  The current episode started more than 1 month ago (2-3 months)  The problem occurs daily  The problem has been waxing and waning  Associated symptoms include fatigue (+ fibromyaligia, chronic ) and vertigo  Pertinent negatives include no abdominal pain, chest pain, coughing, diaphoresis, fever, headaches, nausea, numbness, visual change, vomiting or weakness  The treatment provided no relief  She has been using Flonase nasal spray since her last visit and feels this has helped her dizziness  She states she only now gets dizzy in the morning when going from lying to standing  Thyroid Problem   Presents for follow-up visit  Symptoms include depressed mood (stable, chronic, on meds for her fibromyalgia) and fatigue (+ fibromyaligia, chronic )  Patient reports no diaphoresis, palpitations or visual change  Hypertension   This is a chronic problem  The current episode started more than 1 year ago  Progression since onset: stable  The problem is controlled  Pertinent negatives include no blurred vision, chest pain, headaches, palpitations or shortness of breath  There are no compliance problems  Hypertensive end-organ damage includes CAD/MI and a thyroid problem  There is no history of angina or kidney disease         The following portions of the patient's history were reviewed and updated as appropriate: allergies, current medications, past family history, past medical history, past social history, past surgical history and problem list     Review of Systems   Constitutional: Negative  HENT: Negative  Eyes: Negative      Respiratory: Negative for cough, choking, chest tightness, shortness of breath and wheezing  Cardiovascular: Negative for chest pain, palpitations and leg swelling  Gastrointestinal: Negative for abdominal distention, abdominal pain, blood in stool, constipation, diarrhea and nausea  Genitourinary: Negative for difficulty urinating, dysuria, flank pain, hematuria and urgency  Neurological: Positive for dizziness  Negative for syncope, light-headedness, numbness and headaches  Psychiatric/Behavioral: The patient is nervous/anxious  Objective:    Vitals:    03/16/18 1354   BP: 138/80   Pulse: 86   Temp: 97 9 °F (36 6 °C)   SpO2: 98%        Physical Exam   Constitutional: She is oriented to person, place, and time  She appears well-developed and well-nourished  No distress  HENT:   Head: Normocephalic and atraumatic  Eyes: Conjunctivae are normal  Pupils are equal, round, and reactive to light  Right eye exhibits no discharge  Left eye exhibits no discharge  No scleral icterus  Neck: Normal range of motion  Neck supple  No thyromegaly present  Cardiovascular: Normal rate and regular rhythm  No murmur heard  Pulmonary/Chest: Effort normal and breath sounds normal  She has no wheezes  Abdominal: Soft  Bowel sounds are normal  She exhibits no distension  There is no tenderness  Musculoskeletal: Normal range of motion  She exhibits no edema  Lymphadenopathy:     She has no cervical adenopathy  Neurological: She is alert and oriented to person, place, and time  Skin: Skin is warm and dry  No rash noted  No erythema  Psychiatric: Her behavior is normal  Judgment and thought content normal  Her mood appears anxious  Cognition and memory are normal  She expresses no suicidal plans and no homicidal plans

## 2018-03-16 NOTE — TELEPHONE ENCOUNTER
Dr Duane Osborne,  Pt would like to move forward with SCS trial  Can you please order psych eval and MRI and I will mail them to her?  Thanks

## 2018-03-16 NOTE — PATIENT INSTRUCTIONS
We will try to draw blood today  Try to get other testing prior to next visit in 4-6 weeks  Start Celexa 10 mg daily  Call office should your depression/anxiety symptoms increase on this medication  If you are tolerating this medication, we will see you at your next visit to further discuss  Call with questions

## 2018-03-16 NOTE — TELEPHONE ENCOUNTER
[facility]- patient called and LM on [place] in regards to s/w someone to schedule an appt (stimulator)    c/b [phone]

## 2018-03-19 LAB
B BURGDOR IGG SER IA-ACNC: 0.08
B BURGDOR IGM SER IA-ACNC: 0.24

## 2018-03-20 DIAGNOSIS — J45.909 ASTHMA, UNSPECIFIED ASTHMA SEVERITY, UNSPECIFIED WHETHER COMPLICATED, UNSPECIFIED WHETHER PERSISTENT: ICD-10-CM

## 2018-03-20 RX ORDER — ALBUTEROL SULFATE 90 UG/1
2 AEROSOL, METERED RESPIRATORY (INHALATION) EVERY 6 HOURS PRN
Qty: 18 G | Refills: 0 | Status: SHIPPED | OUTPATIENT
Start: 2018-03-20 | End: 2019-03-14 | Stop reason: SDUPTHER

## 2018-03-23 ENCOUNTER — OFFICE VISIT (OUTPATIENT)
Dept: URGENT CARE | Age: 58
End: 2018-03-23
Payer: COMMERCIAL

## 2018-03-23 VITALS
RESPIRATION RATE: 18 BRPM | TEMPERATURE: 97.8 F | HEART RATE: 83 BPM | DIASTOLIC BLOOD PRESSURE: 68 MMHG | OXYGEN SATURATION: 97 % | BODY MASS INDEX: 31.58 KG/M2 | WEIGHT: 185 LBS | SYSTOLIC BLOOD PRESSURE: 143 MMHG | HEIGHT: 64 IN

## 2018-03-23 DIAGNOSIS — J06.9 ACUTE URI: Primary | ICD-10-CM

## 2018-03-23 DIAGNOSIS — J02.9 SORE THROAT: ICD-10-CM

## 2018-03-23 LAB — S PYO AG THROAT QL: NEGATIVE

## 2018-03-23 PROCEDURE — 87070 CULTURE OTHR SPECIMN AEROBIC: CPT | Performed by: NURSE PRACTITIONER

## 2018-03-23 PROCEDURE — 99213 OFFICE O/P EST LOW 20 MIN: CPT | Performed by: FAMILY MEDICINE

## 2018-03-23 NOTE — PATIENT INSTRUCTIONS
As we discussed your illness is viral   No antibiotics are indicated at this time  Rest and drink extra fluids  OTC cough and cold medications as needed  Tylenol or Motrin as needed for pain or fever  Salt water gargles and throat lozenges for sore throat  Rapid strep negative  Call in 2-3 days for throat culture results  Follow up with PCP if no improvement  Go to ER with worsening symptoms  Cold Symptoms   WHAT YOU NEED TO KNOW:   A cold is an infection caused by a virus  The infection causes your upper respiratory system to become inflamed  Common symptoms of a cold include sneezing, dry throat, a stuffy nose, headache, watery eyes, and a cough  Your cough may be dry, or you may cough up mucus  You may also have muscle aches, joint pain, and tiredness  Rarely, you may have a fever  Most colds go away without treatment  DISCHARGE INSTRUCTIONS:   Return to the emergency department if:   · You have increased tiredness and weakness  · You are unable to eat  · Your heart is beating much faster than usual for you  · You see white spots in the back of your throat and your neck is swollen and sore to the touch  · You see pinpoint or larger reddish-purple dots on your skin  Contact your healthcare provider if:   · You have a fever higher than 102°F (38 9°C)  · You have new or worsening shortness of breath  · You have thick nasal drainage for more than 2 days  · Your symptoms do not improve or get worse within 5 days  · You have questions or concerns about your condition or care  Medicines: The following medicines may be suggested by your healthcare provider to decrease your cold symptoms  These medicines are available without a doctor's order  Ask which medicines to take and when to take them  Follow directions  · NSAIDs or acetaminophen  help to bring down a fever or decrease pain  · Decongestants  help decrease nasal stuffiness       · Antihistamines  help decrease sneezing and a runny nose  · Cough suppressants  help decrease how much you cough  · Expectorants  help loosen mucus so you can cough it up  · Take your medicine as directed  Contact your healthcare provider if you think your medicine is not helping or if you have side effects  Tell him of her if you are allergic to any medicine  Keep a list of the medicines, vitamins, and herbs you take  Include the amounts, and when and why you take them  Bring the list or the pill bottles to follow-up visits  Carry your medicine list with you in case of an emergency  Symptom relief: The following may help relieve cold symptoms, such as a dry throat and congestion:  · Gargle with mouthwash or warm salt water as directed  · Suck on throat lozenges or hard candy  · Use a cold or warm vaporizer or humidifier to ease your breathing  · Rest for at least 2 days and then as needed to decrease tiredness and weakness  · Use petroleum based jelly around your nostrils to decrease irritation from blowing your nose  Drink liquids:  Liquids will help thin and loosen thick mucus so you can cough it up  Liquids will also keep you hydrated  Ask your healthcare provider which liquids are best for you and how much to drink each day  Prevent the spread of germs: You can spread your cold germs to others for at least 3 days after your symptoms start  Wash your hands often  Do not share items, such as eating utensils  Cover your nose and mouth when you cough or sneeze using the crook of your elbow instead of your hands  Throw used tissues in the garbage  Do not smoke:  Smoking may worsen your symptoms and increase the length of time you feel sick  Talk with your healthcare provider if you need help to stop smoking  Follow up with your healthcare provider as directed:  Write down your questions so you remember to ask them during your visits     © 2017 [employer] Information is for End User's use only and may not be sold, redistributed or otherwise used for commercial purposes  All illustrations and images included in CareNotes® are the copyrighted property of A [employer] Inc  or Declan Garcia  The above information is an  only  It is not intended as medical advice for individual conditions or treatments  Talk to your doctor, nurse or pharmacist before following any medical regimen to see if it is safe and effective for you

## 2018-03-23 NOTE — PROGRESS NOTES
[facility]        NAME: Claudetta Maria is a 65 y o  female  : 1960    MRN: [id number]  DATE: 2018  TIME: 1:10 PM    Assessment and Plan   Acute URI [J06 9]  1  Acute URI     2  Sore throat  POCT rapid strepA    Throat culture         Patient Instructions     Patient Instructions   As we discussed your illness is viral   No antibiotics are indicated at this time  Rest and drink extra fluids  OTC cough and cold medications as needed  Tylenol or Motrin as needed for pain or fever  Salt water gargles and throat lozenges for sore throat  Rapid strep negative  Call in 2-3 days for throat culture results  Follow up with PCP if no improvement  Go to ER with worsening symptoms  Chief Complaint     Chief Complaint   Patient presents with    Cold Like Symptoms     sinus pressure ,sore throat, body aches x tuesday         History of Present Illness   Claudetta Maria presents to the clinic c/o    This is a 65year old female here today with sore throat, cough, congestion  She states symptoms started about 3- 4 days ago  She states she has loss of voice  She has been using clearquil, honey with tea  She has been using Flonase  She denies having fevers but is feeling poorly  She states she was exposed to strep  Review of Systems   Review of Systems   Constitutional: Positive for fatigue  Negative for chills and fever  HENT: Positive for congestion, sinus pain, sinus pressure and sore throat  Respiratory: Positive for cough  Negative for chest tightness and wheezing  Cardiovascular: Negative  Neurological: Negative  Psychiatric/Behavioral: Negative            Current Medications     Long-Term Prescriptions   Medication Sig Dispense Refill    aspirin 81 mg chewable tablet Chew 81 mg daily      atorvastatin (LIPITOR) 80 mg tablet Take 1 tablet by mouth daily with dinner for 30 days 30 tablet 12    citalopram (CeleXA) 10 mg tablet Take 1 tablet (10 mg total) by mouth daily 30 tablet 1    clopidogrel (PLAVIX) 75 mg tablet Take 75 mg by mouth daily      famotidine (PEPCID) 40 MG tablet Take 1 tablet (40 mg total) by mouth daily 30 tablet 0    fluticasone (FLONASE) 50 mcg/act nasal spray 1 spray into each nostril daily 16 g 0    hydrOXYzine HCL (ATARAX) 25 mg tablet Take 1 tablet (25 mg total) by mouth 2 (two) times a day as needed for itching 90 tablet 1    levothyroxine 125 mcg tablet TAKE 1 TABLET DAILY  30 tablet 5    losartan (COZAAR) 25 mg tablet       metoprolol succinate (TOPROL-XL) 25 mg 24 hr tablet Take 25 mg by mouth daily      pregabalin (LYRICA) 150 mg capsule Take 1 capsule (150 mg total) by mouth 3 (three) times a day 180 capsule 0    tiZANidine (ZANAFLEX) 2 mg tablet Take 1 tablet (2 mg total) by mouth every 8 (eight) hours as needed for muscle spasms 90 tablet 2    triamcinolone (KENALOG) 0 1 % cream Apply topically 2 (two) times a day 80 g 1    zolpidem (AMBIEN) 10 mg tablet Take 10 mg by mouth daily at bedtime as needed for sleep         Current Allergies     Allergies as of 03/23/2018 - Reviewed 03/23/2018   Allergen Reaction Noted    Augmentin [amoxicillin-pot clavulanate] Nausea Only             The following portions of the patient's history were reviewed and updated as appropriate: allergies, current medications, past family history, past medical history, past social history, past surgical history and problem list     Objective   /68   Pulse 83   Temp 97 8 °F (36 6 °C) (Temporal)   Resp 18   Ht 5' 4" (1 626 m)   Wt 83 9 kg (185 lb)   SpO2 97%   BMI 31 76 kg/m²        Physical Exam     Physical Exam   Constitutional: She is oriented to person, place, and time  She appears well-developed and well-nourished  HENT:   Head: Normocephalic  Right Ear: External ear normal    Left Ear: External ear normal    TM clear    Neck: Normal range of motion  Neck supple  Cardiovascular: Normal rate, regular rhythm and normal heart sounds  Pulmonary/Chest: Effort normal and breath sounds normal    Neurological: She is alert and oriented to person, place, and time  Skin: Skin is warm  Psychiatric: She has a normal mood and affect  Her behavior is normal    Nursing note and vitals reviewed  Rapid strep: negative

## 2018-03-25 LAB — BACTERIA THROAT CULT: NORMAL

## 2018-03-26 DIAGNOSIS — K21.9 GASTROESOPHAGEAL REFLUX DISEASE, ESOPHAGITIS PRESENCE NOT SPECIFIED: ICD-10-CM

## 2018-03-26 RX ORDER — FAMOTIDINE 40 MG/1
40 TABLET, FILM COATED ORAL DAILY
Qty: 90 TABLET | Refills: 1 | Status: SHIPPED | OUTPATIENT
Start: 2018-03-26 | End: 2018-10-30 | Stop reason: ALTCHOICE

## 2018-03-29 ENCOUNTER — TELEPHONE (OUTPATIENT)
Dept: INTERNAL MEDICINE CLINIC | Facility: CLINIC | Age: 58
End: 2018-03-29

## 2018-03-29 NOTE — TELEPHONE ENCOUNTER
Phoned patient to discuss elevated alk phos and elevated triglycerides  LMOM  Recommendation is to repeat alk phos as this is the first time she had elevation  Patient is on Lipitor 80 mg daily with LDL of 56, total cholesterol of 157 and HDL of 41  Triglycerides are elevated at 299  Consider decreasing Lipitor? And add fish oil or Tricor for elevated triglycerides? Patient does have CAD with stent placement so LDL of 56 might be appropriate due to her history  Also, patient's folate was elevated but specimen was hemolyzed  Consider reordering folate

## 2018-03-30 ENCOUNTER — OFFICE VISIT (OUTPATIENT)
Dept: INTERNAL MEDICINE CLINIC | Facility: CLINIC | Age: 58
End: 2018-03-30
Payer: COMMERCIAL

## 2018-03-30 VITALS
WEIGHT: 184.2 LBS | DIASTOLIC BLOOD PRESSURE: 84 MMHG | HEART RATE: 84 BPM | SYSTOLIC BLOOD PRESSURE: 140 MMHG | BODY MASS INDEX: 31.45 KG/M2 | TEMPERATURE: 98.3 F | OXYGEN SATURATION: 98 % | HEIGHT: 64 IN

## 2018-03-30 DIAGNOSIS — J04.0 LARYNGITIS: Primary | ICD-10-CM

## 2018-03-30 DIAGNOSIS — J01.00 ACUTE NON-RECURRENT MAXILLARY SINUSITIS: ICD-10-CM

## 2018-03-30 PROCEDURE — 99213 OFFICE O/P EST LOW 20 MIN: CPT | Performed by: NURSE PRACTITIONER

## 2018-03-30 RX ORDER — DOXYCYCLINE HYCLATE 100 MG/1
100 CAPSULE ORAL EVERY 12 HOURS SCHEDULED
Qty: 14 CAPSULE | Refills: 0 | Status: SHIPPED | OUTPATIENT
Start: 2018-03-30 | End: 2018-04-06

## 2018-03-30 RX ORDER — ATORVASTATIN CALCIUM 80 MG/1
80 TABLET, FILM COATED ORAL DAILY
Refills: 3 | COMMUNITY
Start: 2018-03-16 | End: 2018-07-12 | Stop reason: SDUPTHER

## 2018-03-30 NOTE — PATIENT INSTRUCTIONS
Continue same therapeutic remedies such as neti pot, warm tea and honey and warm salt water gargles  Avoid decongestants  Start antibiotic and take twice a day for 2 days  If no improvement in 5 days we would like to see you back in the office  Laryngitis may take 2-3 weeks to resolve   Voice rest

## 2018-03-30 NOTE — PROGRESS NOTES
Assessment/Plan:     Diagnoses and all orders for this visit:    Laryngitis       -     Can take 2-3 weeks to resolve  Advised to continue warm gargles, warm tea with honey and voice  rest  Acute non-recurrent maxillary sinusitis  -     doxycycline hyclate (VIBRAMYCIN) 100 mg capsule; Take 1 capsule (100 mg total) by mouth every 12 (twelve) hours for 7 days        -     Likely super-imposed bacterial infection        -     Continue Neti Pot        -     Plenty of rest and hydration    Subjective:      Patient ID: Gilford Cranker is a 65 y o  female  HPI    Patient is here today complaining of possible laryngitis  Symptom onset was 2 weeks ago  Symptoms started as sinus pain, ear pain, chest congestion and sore throat  She tried OTC cold medications without relief and went to urgent care 1 week ago  She was told it was viral   She has been drinking tea with honey, throat lozenges, gargling with warm salt water and using a Neti pot  Symptoms are unchanged with therapies  Today her symptoms include losing her voice, swollen gland on right side, sore throat, sinus headache, rhinorrhea with yellow thick mucous, sweating, cough with yellowish green mucous  She has been around sick contacts, including her grandson with strep throat  She was tested for strep at urgent care and was negative  The following portions of the patient's history were reviewed and updated as appropriate: allergies, current medications, past family history, past medical history, past social history, past surgical history and problem list     Review of Systems   Constitutional: Positive for activity change (run down), appetite change (diminished appetite), chills, diaphoresis, fatigue and fever (feverish with no measured temps)  HENT: Positive for congestion, ear pain (bilateral), postnasal drip, rhinorrhea, sinus pain, sinus pressure, sneezing and sore throat  Negative for trouble swallowing      Respiratory: Positive for cough, chest tightness and wheezing  Negative for shortness of breath  Gastrointestinal: Negative for diarrhea, nausea and vomiting  Neurological: Positive for light-headedness and headaches  Negative for dizziness and syncope           Past Medical History:   Diagnosis Date    Abnormal echocardiogram     Abnormal stress test     Brachial neuritis     Displacement of intervertebral disc of mid-cervical region     Frequent headaches     Herniated nucleus pulposus     History of arthritis     History of asthma     History of cardiac disorder     History of chest pain     History of diverticulitis     History of fatigue     History of hearing loss     History of hemoptysis     History of herpes simplex infection     History of hiatal hernia     History of insect bite     INFECTED    History of memory loss     History of neck disorder     History of reflex sympathetic dystrophy     History of restless legs syndrome     History of shortness of breath     History of spinal stenosis     Hyperlipidemia     Hypertension     Skin rash     Somnolence, daytime     Spinal stenosis of cervical region          Current Outpatient Prescriptions:     albuterol (PROVENTIL HFA,VENTOLIN HFA) 90 mcg/act inhaler, Inhale 2 puffs every 6 (six) hours as needed for wheezing, Disp: 18 g, Rfl: 0    aspirin 81 mg chewable tablet, Chew 81 mg daily, Disp: , Rfl:     atorvastatin (LIPITOR) 80 mg tablet, Take 80 mg by mouth daily, Disp: , Rfl: 3    clopidogrel (PLAVIX) 75 mg tablet, Take 75 mg by mouth daily, Disp: , Rfl:     famotidine (PEPCID) 40 MG tablet, Take 1 tablet (40 mg total) by mouth daily, Disp: 90 tablet, Rfl: 1    fluticasone (FLONASE) 50 mcg/act nasal spray, 1 spray into each nostril daily, Disp: 16 g, Rfl: 0    fluticasone-salmeterol (ADVAIR) 250-50 mcg/dose inhaler, Inhale 1 puff every 12 (twelve) hours as needed (sob), Disp: 1 each, Rfl: 1    hydrOXYzine HCL (ATARAX) 25 mg tablet, Take 1 tablet (25 mg total) by mouth 2 (two) times a day as needed for itching, Disp: 90 tablet, Rfl: 1    levothyroxine 125 mcg tablet, TAKE 1 TABLET DAILY  , Disp: 30 tablet, Rfl: 5    losartan (COZAAR) 25 mg tablet, , Disp: , Rfl:     metoprolol succinate (TOPROL-XL) 25 mg 24 hr tablet, Take 25 mg by mouth daily, Disp: , Rfl:     pregabalin (LYRICA) 150 mg capsule, Take 1 capsule (150 mg total) by mouth 3 (three) times a day, Disp: 180 capsule, Rfl: 0    tiZANidine (ZANAFLEX) 2 mg tablet, Take 1 tablet (2 mg total) by mouth every 8 (eight) hours as needed for muscle spasms, Disp: 90 tablet, Rfl: 2    triamcinolone (KENALOG) 0 1 % cream, Apply topically 2 (two) times a day, Disp: 80 g, Rfl: 1    zolpidem (AMBIEN) 10 mg tablet, Take 10 mg by mouth daily at bedtime as needed for sleep, Disp: , Rfl:     atorvastatin (LIPITOR) 80 mg tablet, Take 1 tablet by mouth daily with dinner for 30 days, Disp: 30 tablet, Rfl: 12    citalopram (CeleXA) 10 mg tablet, Take 1 tablet (10 mg total) by mouth daily, Disp: 30 tablet, Rfl: 1    Allergies   Allergen Reactions    Augmentin [Amoxicillin-Pot Clavulanate] Nausea Only     PT stated she only allergic to medication AUGMENTIN       Social History   Past Surgical History:   Procedure Laterality Date    BACK SURGERY      BACK SURGERY      LOWER BACK SURGERY    CARDIAC CATHETERIZATION      HISTORY OF CORONARY ANGIOGRAPHY WITH CONCOMITANT LEFT HEART CATHETERIZATION    EAR SURGERY      TONSILLECTOMY      TYMPANOPLASTY       Family History   Problem Relation Age of Onset    Coronary artery disease Mother      ATHEROMA    Heart disease Mother     Diabetes Mother     Hypertension Mother     No Known Problems Father      NO PERTINENT FAMILY HISTORY    Coronary artery disease Sister      ATHEROMA    Heart disease Sister     Stroke Sister     Diabetes Sister     Hypertension Sister        Objective:  /84 (BP Location: Left arm, Patient Position: Sitting, Cuff Size: Adult)   Pulse 84   Temp 98 3 °F (36 8 °C) (Oral)   Ht 5' 4" (1 626 m)   Wt 83 6 kg (184 lb 3 2 oz)   SpO2 98%   BMI 31 62 kg/m²      Physical Exam   Constitutional: She is oriented to person, place, and time  She appears well-developed and well-nourished  She appears ill  HENT:   Head: Normocephalic and atraumatic  Right Ear: External ear normal  Tympanic membrane is perforated (chronic)  Left Ear: Tympanic membrane and external ear normal    Nose: Rhinorrhea present  No mucosal edema  Right sinus exhibits maxillary sinus tenderness  Right sinus exhibits no frontal sinus tenderness  Left sinus exhibits maxillary sinus tenderness  Left sinus exhibits no frontal sinus tenderness  Mouth/Throat: Mucous membranes are normal  Posterior oropharyngeal erythema present  No oropharyngeal exudate or posterior oropharyngeal edema  Eyes: Conjunctivae are normal  Pupils are equal, round, and reactive to light  Neck: Neck supple  No thyromegaly present  Cardiovascular: Normal rate, regular rhythm and normal heart sounds  No murmur heard  Pulmonary/Chest: No respiratory distress  She has no decreased breath sounds  She has no wheezes  She has rhonchi in the right upper field and the left upper field  Rhonchi improves with cough  Productive cough on exam   Musculoskeletal: She exhibits no edema  Lymphadenopathy:     She has cervical adenopathy  Neurological: She is alert and oriented to person, place, and time  Skin: Skin is warm and dry  She is not diaphoretic  Psychiatric: She has a normal mood and affect  Her behavior is normal    Vitals reviewed

## 2018-04-02 ENCOUNTER — HOSPITAL ENCOUNTER (OUTPATIENT)
Dept: RADIOLOGY | Facility: HOSPITAL | Age: 58
Discharge: HOME | End: 2018-04-02
Attending: ANESTHESIOLOGY
Payer: COMMERCIAL

## 2018-04-02 ENCOUNTER — TRANSCRIBE ORDERS (OUTPATIENT)
Dept: INTERNAL MEDICINE CLINIC | Facility: CLINIC | Age: 58
End: 2018-04-02

## 2018-04-02 DIAGNOSIS — M54.16 LUMBAR RADICULOPATHY: ICD-10-CM

## 2018-04-02 PROCEDURE — 72146 MRI CHEST SPINE W/O DYE: CPT

## 2018-04-12 ENCOUNTER — TELEPHONE (OUTPATIENT)
Dept: INTERNAL MEDICINE CLINIC | Facility: CLINIC | Age: 58
End: 2018-04-12

## 2018-04-12 DIAGNOSIS — M79.7 FIBROMYALGIA: Primary | ICD-10-CM

## 2018-04-12 NOTE — TELEPHONE ENCOUNTER
This patient called the office and needs to get a physician referral for Rheumatologist due to her fibromyalgia  Once the order is done, I will call the patient and let her know

## 2018-04-13 NOTE — TELEPHONE ENCOUNTER
lmom informing this patient that her physician order for Rheumatology is in her chart and can be picked up at any office of her choice

## 2018-04-16 DIAGNOSIS — G47.00 INSOMNIA, UNSPECIFIED TYPE: Primary | ICD-10-CM

## 2018-04-16 DIAGNOSIS — M79.7 FIBROMYALGIA: ICD-10-CM

## 2018-04-16 RX ORDER — PREGABALIN 150 MG/1
150 CAPSULE ORAL 3 TIMES DAILY
Qty: 90 CAPSULE | Refills: 0 | Status: SHIPPED | OUTPATIENT
Start: 2018-04-16 | End: 2018-06-07 | Stop reason: SDUPTHER

## 2018-04-16 RX ORDER — ZOLPIDEM TARTRATE 10 MG/1
10 TABLET ORAL
Qty: 30 TABLET | Refills: 0 | Status: SHIPPED | OUTPATIENT
Start: 2018-04-16 | End: 2018-05-18 | Stop reason: SDUPTHER

## 2018-04-16 NOTE — TELEPHONE ENCOUNTER
PDMP verified ok to fill    Lyrica last filled 03/19/2018  Zolpidem last filled 03/17/2018 04/23/2018 is up coming appointment

## 2018-04-18 NOTE — TELEPHONE ENCOUNTER
Received a phone call from Dr Madison Campos to say patient was a no show for her psych eval appointment with him today  Attempt to reach pt to discuss, LM for return call

## 2018-04-26 ENCOUNTER — OFFICE VISIT (OUTPATIENT)
Dept: INTERNAL MEDICINE CLINIC | Facility: CLINIC | Age: 58
End: 2018-04-26
Payer: COMMERCIAL

## 2018-04-26 VITALS
HEART RATE: 68 BPM | HEIGHT: 64 IN | OXYGEN SATURATION: 96 % | BODY MASS INDEX: 31.14 KG/M2 | SYSTOLIC BLOOD PRESSURE: 144 MMHG | WEIGHT: 182.4 LBS | TEMPERATURE: 97.8 F | DIASTOLIC BLOOD PRESSURE: 74 MMHG

## 2018-04-26 DIAGNOSIS — F41.8 DEPRESSION WITH ANXIETY: Primary | ICD-10-CM

## 2018-04-26 DIAGNOSIS — F32.89 OTHER DEPRESSION: ICD-10-CM

## 2018-04-26 PROCEDURE — 99214 OFFICE O/P EST MOD 30 MIN: CPT | Performed by: NURSE PRACTITIONER

## 2018-04-26 RX ORDER — CITALOPRAM 20 MG/1
20 TABLET ORAL DAILY
Qty: 30 TABLET | Refills: 1 | Status: SHIPPED | OUTPATIENT
Start: 2018-04-26 | End: 2018-07-24 | Stop reason: SDUPTHER

## 2018-04-26 RX ORDER — CITALOPRAM 20 MG/1
20 TABLET ORAL DAILY
Qty: 30 TABLET | Refills: 1
Start: 2018-04-26 | End: 2018-04-26 | Stop reason: SDUPTHER

## 2018-04-26 RX ORDER — ALBUTEROL SULFATE 90 UG/1
POWDER, METERED RESPIRATORY (INHALATION)
COMMUNITY
Start: 2018-04-11 | End: 2018-11-07

## 2018-04-26 NOTE — PROGRESS NOTES
Assessment/Plan:     Diagnoses and all orders for this visit:    Depression with anxiety    Other depression  -     citalopram (CeleXA) 20 mg tablet; Take 1 tablet (20 mg total) by mouth daily    Other orders  -     PROAIR RESPICLICK 527 (90 Base) MCG/ACT AEPB;       Patient without improvement on Celexa at week 5 of therapy  Will increase her dose from Celexa 10 mg daily to Celexa 20 mg daily and re-eval in 1 month  Much of her anxiety and depression is secondary to her chronic pain with her fibromyalgia  She will see Psychiatry next week for psych clearance prior to potentially having a pain stimulator placed in her back by [facility] Pain [facility]ist    25 minutes was spent with the patient  20 minutes was spent counseling     Subjective:      Patient ID: Vitaliy Velazco is a 65 y o  female  HPI     Patient is here today for 1 month follow up anxiety and depression  At her last visit she was started on celexa 10mg daily  She is not sure if it is working  She states she still has good and bad days  Anxiety  Patient is here for follow up of anxiety  Current symptoms include: excessive crying, excessive worrying  She does not feel that it is situational  Right now she thinks most of her anxiety is  Secondary to her chronic pain related to fibromyalgia and back problems  She is overwhelmed with appointments coming pertaining to potentially having a stim placed in her back  She is also concerned whether this will work for her not and is frustrated that her symptoms have been ongoing for the past 20 years  Symptoms have been unchanged since last visit  She denies current suicidal and homicidal ideation  On days that she feels good, she feels very good, but on bad days she feels very down  Family history significant for none  Current treatment includes celexa 10mg daily  She complains of the following medication side effects: none  Depression  Patient is here for follow up of depression   Current symptoms include: frustration related to her chronic pain, family issues  Symptoms have been unchanged since last appointment  Patient Denies suicidal and homicidal thoughts and ideation  Family history significant for none  Current treatment includes celexa 10mg daily    She complains of the following side effects from the treatment: none  She is complaint with medications  Depression is also worsened by her chronic pain which has made her unable to work  She currently lives with her daughter who also has boyfriend with schizophrenia that she states is very hard to live with  Currently her daughter needs to pay for everything for her  Patient is [facility] [place]  Her appointment is next week  The following portions of the patient's history were reviewed and updated as appropriate: allergies, current medications, past family history, past medical history, past social history, past surgical history and problem list     Review of Systems   Constitutional: Negative for activity change, appetite change, chills, diaphoresis, fatigue, fever and unexpected weight change  HENT: Negative for congestion  Hypersensitivity to sound     Respiratory: Negative for cough, chest tightness, shortness of breath and wheezing  Cardiovascular: Negative for chest pain, palpitations and leg swelling  Musculoskeletal: Positive for back pain and neck pain  Neurological: Positive for headaches  Negative for dizziness, syncope and light-headedness  Psychiatric/Behavioral: Positive for dysphoric mood  The patient is nervous/anxious            Past Medical History:   Diagnosis Date    Abnormal echocardiogram     Abnormal stress test     Brachial neuritis     Displacement of intervertebral disc of mid-cervical region     Frequent headaches     Herniated nucleus pulposus     History of arthritis     History of asthma     History of cardiac disorder     History of chest pain     History of diverticulitis     History of fatigue     History of hearing loss     History of hemoptysis     History of herpes simplex infection     History of hiatal hernia     History of insect bite     INFECTED    History of memory loss     History of neck disorder     History of reflex sympathetic dystrophy     History of restless legs syndrome     History of shortness of breath     History of spinal stenosis     Hyperlipidemia     Hypertension     Skin rash     Somnolence, daytime     Spinal stenosis of cervical region          Current Outpatient Prescriptions:     albuterol (PROVENTIL HFA,VENTOLIN HFA) 90 mcg/act inhaler, Inhale 2 puffs every 6 (six) hours as needed for wheezing, Disp: 18 g, Rfl: 0    aspirin 81 mg chewable tablet, Chew 81 mg daily, Disp: , Rfl:     atorvastatin (LIPITOR) 80 mg tablet, Take 80 mg by mouth daily, Disp: , Rfl: 3    citalopram (CeleXA) 10 mg tablet, Take 1 tablet (10 mg total) by mouth daily, Disp: 30 tablet, Rfl: 1    clopidogrel (PLAVIX) 75 mg tablet, Take 75 mg by mouth daily, Disp: , Rfl:     famotidine (PEPCID) 40 MG tablet, Take 1 tablet (40 mg total) by mouth daily, Disp: 90 tablet, Rfl: 1    fluticasone-salmeterol (ADVAIR) 250-50 mcg/dose inhaler, Inhale 1 puff every 12 (twelve) hours as needed (sob), Disp: 1 each, Rfl: 1    hydrOXYzine HCL (ATARAX) 25 mg tablet, Take 1 tablet (25 mg total) by mouth 2 (two) times a day as needed for itching, Disp: 90 tablet, Rfl: 1    levothyroxine 125 mcg tablet, TAKE 1 TABLET DAILY  , Disp: 30 tablet, Rfl: 5    losartan (COZAAR) 25 mg tablet, , Disp: , Rfl:     pregabalin (LYRICA) 150 mg capsule, Take 1 capsule (150 mg total) by mouth 3 (three) times a day, Disp: 90 capsule, Rfl: 0    PROAIR RESPICLICK 568 (90 Base) MCG/ACT AEPB, , Disp: , Rfl:     tiZANidine (ZANAFLEX) 2 mg tablet, Take 1 tablet (2 mg total) by mouth every 8 (eight) hours as needed for muscle spasms, Disp: 90 tablet, Rfl: 2    triamcinolone (KENALOG) 0 1 % cream, Apply topically 2 (two) times a day, Disp: 80 g, Rfl: 1    zolpidem (AMBIEN) 10 mg tablet, Take 1 tablet (10 mg total) by mouth daily at bedtime as needed for sleep, Disp: 30 tablet, Rfl: 0    atorvastatin (LIPITOR) 80 mg tablet, Take 1 tablet by mouth daily with dinner for 30 days, Disp: 30 tablet, Rfl: 12    fluticasone (FLONASE) 50 mcg/act nasal spray, 1 spray into each nostril daily, Disp: 16 g, Rfl: 0    metoprolol succinate (TOPROL-XL) 25 mg 24 hr tablet, Take 25 mg by mouth daily, Disp: , Rfl:     Allergies   Allergen Reactions    Augmentin [Amoxicillin-Pot Clavulanate] Nausea Only     PT stated she only allergic to medication AUGMENTIN       Social History   Past Surgical History:   Procedure Laterality Date    BACK SURGERY      BACK SURGERY      LOWER BACK SURGERY    CARDIAC CATHETERIZATION      HISTORY OF CORONARY ANGIOGRAPHY WITH CONCOMITANT LEFT HEART CATHETERIZATION    EAR SURGERY      TONSILLECTOMY      TYMPANOPLASTY       Family History   Problem Relation Age of Onset    Coronary artery disease Mother      ATHEROMA    Heart disease Mother     Diabetes Mother     Hypertension Mother     No Known Problems Father      NO PERTINENT FAMILY HISTORY    Coronary artery disease Sister      ATHEROMA    Heart disease Sister     Stroke Sister     Diabetes Sister     Hypertension Sister        Objective:  /74 (BP Location: Left arm, Patient Position: Sitting, Cuff Size: Adult)   Pulse 68   Temp 97 8 °F (36 6 °C) (Oral)   Ht 5' 3 5" (1 613 m)   Wt 82 7 kg (182 lb 6 4 oz)   SpO2 96%   BMI 31 80 kg/m²      Physical Exam   Constitutional: She is oriented to person, place, and time  She appears well-developed and well-nourished  No distress  HENT:   Head: Normocephalic and atraumatic  Right Ear: External ear normal  Tympanic membrane is perforated (chronic)     Left Ear: Tympanic membrane and external ear normal    Eyes: Conjunctivae and EOM are normal  Pupils are equal, round, and reactive to light  Neck: Neck supple  No thyromegaly present  Cardiovascular: Normal rate, regular rhythm and normal heart sounds  No murmur heard  Pulmonary/Chest: Effort normal and breath sounds normal  No respiratory distress  She has no decreased breath sounds  She has no wheezes  She has no rhonchi  Musculoskeletal: She exhibits no edema  Lymphadenopathy:     She has no cervical adenopathy  Neurological: She is alert and oriented to person, place, and time  Skin: Skin is warm and dry  She is not diaphoretic  Psychiatric:   Tearful   Vitals reviewed

## 2018-05-01 ENCOUNTER — TELEPHONE (OUTPATIENT)
Dept: INTERNAL MEDICINE CLINIC | Facility: CLINIC | Age: 58
End: 2018-05-01

## 2018-05-01 ENCOUNTER — OFFICE VISIT (OUTPATIENT)
Dept: CARDIOLOGY CLINIC | Facility: CLINIC | Age: 58
End: 2018-05-01
Payer: COMMERCIAL

## 2018-05-01 VITALS
RESPIRATION RATE: 16 BRPM | SYSTOLIC BLOOD PRESSURE: 124 MMHG | DIASTOLIC BLOOD PRESSURE: 90 MMHG | HEIGHT: 64 IN | BODY MASS INDEX: 31.24 KG/M2 | WEIGHT: 183 LBS | HEART RATE: 60 BPM

## 2018-05-01 DIAGNOSIS — E78.5 DYSLIPIDEMIA: ICD-10-CM

## 2018-05-01 DIAGNOSIS — I25.10 CORONARY ARTERY DISEASE INVOLVING NATIVE CORONARY ARTERY OF NATIVE HEART WITHOUT ANGINA PECTORIS: ICD-10-CM

## 2018-05-01 DIAGNOSIS — R06.00 DOE (DYSPNEA ON EXERTION): Primary | ICD-10-CM

## 2018-05-01 DIAGNOSIS — I10 BENIGN ESSENTIAL HTN: ICD-10-CM

## 2018-05-01 PROCEDURE — 99214 OFFICE O/P EST MOD 30 MIN: CPT | Performed by: INTERNAL MEDICINE

## 2018-05-01 NOTE — PROGRESS NOTES
Cardiology Follow Up        Nixon Graves      1960      [id number]      Discussion/Summary:    1   CAD, prior PCI/UTE to proximal LAD July 2014, PCI/UTE to the OM 2 April 2017 without residual obstructive disease:  Recommend long-term dual anti-platelet treatment as DAPT score is 2  Okay to hold aspirin and Plavix for potential spinal cord stimulator placement in the future  Continue atoravstatin  2   Hypertension:  Well controlled    3  Dyslipidemia:  Reviewed lipid panel 03/2018, elevated triglycerides noted with well controlled LDL  Advised her to cut down on carbohydrate intake which she has been eating more of  Follow-up in 6 months, patient to call if any changes        Interval History: This is a 44-year-old female who I had initially seen in July 2014 secondary to symptoms of exertional throat discomfort, exertional shortness of breath, and several episodes of atypical chest discomfort  Her nuclear stress test was somewhat ambiguous, therefore I had requested a cardiac CT which she completed revealing LAD stenosis  Thereafter a cardiac catheterization on 7/18/14 revealed 90% proximal LAD stenosis, and she received a 2 5 x 28 mm drug-eluting stent for the lesion  underwent a repeat cardiac catheterization on 3/27/15  The patient's prior LAD stent was patent  She had 99% midcircumflex stenosis after the origin of the major OM branch, which was unchanged compared to her prior study from 2014  She also had 70% RCA stenosis with FFR performed within normal limits at 0 8 to indicating that the lesion was not slow critical   She underwent repeat cardiac catheterization on 4/27/17 secondary to recurrent exertional throat burning revealing patent LAD stent, with 100% chronic total occlusion of the OM 2  She underwent PCI/drug-eluting placement with 2 25 x 30 mm Resolute Integrity UTE with good results      She presents today for follow-up with no complaints  She denies exertional dyspnea or chest discomfort  Occasionally she will become short of breath simply sitting there which she attributes seen anxiety and stress  She denies lower extremity edema, dizziness  Vitals:  Vitals:    05/01/18 1025   BP: 124/90   BP Location: Left arm   Patient Position: Sitting   Cuff Size: Large   Pulse: 60   Resp: 16   Weight: 83 kg (183 lb)   Height: 5' 4" (1 626 m)         Past Medical History:   Diagnosis Date    Abnormal echocardiogram     Abnormal stress test     Brachial neuritis     Displacement of intervertebral disc of mid-cervical region     Frequent headaches     Herniated nucleus pulposus     History of arthritis     History of asthma     History of cardiac disorder     History of chest pain     History of diverticulitis     History of fatigue     History of hearing loss     History of hemoptysis     History of herpes simplex infection     History of hiatal hernia     History of insect bite     INFECTED    History of memory loss     History of neck disorder     History of reflex sympathetic dystrophy     History of restless legs syndrome     History of shortness of breath     History of spinal stenosis     Hyperlipidemia     Hypertension     Skin rash     Somnolence, daytime     Spinal stenosis of cervical region      Social History     Social History    Marital status: Single     Spouse name: N/A    Number of children: N/A    Years of education: N/A     Occupational History    Not on file       Social History Main Topics    Smoking status: Former Smoker     Quit date: 2012    Smokeless tobacco: Former User      Comment: quit in 2013    Alcohol use No    Drug use: Yes      Comment: USES [address]    Sexual activity: Not on file     Other Topics Concern    Not on file     Social History Narrative    No narrative on file      Family History   Problem Relation Age of Onset    Coronary artery disease Mother ATHEROMA    Heart disease Mother     Diabetes Mother     Hypertension Mother     No Known Problems Father      NO PERTINENT FAMILY HISTORY    Coronary artery disease Sister      ATHEROMA    Heart disease Sister     Stroke Sister     Diabetes Sister     Hypertension Sister      Past Surgical History:   Procedure Laterality Date    BACK SURGERY      BACK SURGERY      LOWER BACK SURGERY    CARDIAC CATHETERIZATION      HISTORY OF CORONARY ANGIOGRAPHY WITH CONCOMITANT LEFT HEART CATHETERIZATION    EAR SURGERY      TONSILLECTOMY      TYMPANOPLASTY         Current Outpatient Prescriptions:     albuterol (PROVENTIL HFA,VENTOLIN HFA) 90 mcg/act inhaler, Inhale 2 puffs every 6 (six) hours as needed for wheezing, Disp: 18 g, Rfl: 0    aspirin 81 mg chewable tablet, Chew 81 mg daily, Disp: , Rfl:     atorvastatin (LIPITOR) 80 mg tablet, Take 80 mg by mouth daily, Disp: , Rfl: 3    citalopram (CeleXA) 20 mg tablet, Take 1 tablet (20 mg total) by mouth daily, Disp: 30 tablet, Rfl: 1    clopidogrel (PLAVIX) 75 mg tablet, Take 75 mg by mouth daily, Disp: , Rfl:     famotidine (PEPCID) 40 MG tablet, Take 1 tablet (40 mg total) by mouth daily, Disp: 90 tablet, Rfl: 1    fluticasone (FLONASE) 50 mcg/act nasal spray, 1 spray into each nostril daily, Disp: 16 g, Rfl: 0    fluticasone-salmeterol (ADVAIR) 250-50 mcg/dose inhaler, Inhale 1 puff every 12 (twelve) hours as needed (sob), Disp: 1 each, Rfl: 1    hydrOXYzine HCL (ATARAX) 25 mg tablet, Take 1 tablet (25 mg total) by mouth 2 (two) times a day as needed for itching, Disp: 90 tablet, Rfl: 1    levothyroxine 125 mcg tablet, TAKE 1 TABLET DAILY  , Disp: 30 tablet, Rfl: 5    losartan (COZAAR) 25 mg tablet, , Disp: , Rfl:     metoprolol succinate (TOPROL-XL) 25 mg 24 hr tablet, Take 25 mg by mouth daily, Disp: , Rfl:     pregabalin (LYRICA) 150 mg capsule, Take 1 capsule (150 mg total) by mouth 3 (three) times a day, Disp: 90 capsule, Rfl: 0    PROAIR RESPICLICK 408 (90 Base) MCG/ACT AEPB, , Disp: , Rfl:     tiZANidine (ZANAFLEX) 2 mg tablet, Take 1 tablet (2 mg total) by mouth every 8 (eight) hours as needed for muscle spasms, Disp: 90 tablet, Rfl: 2    triamcinolone (KENALOG) 0 1 % cream, Apply topically 2 (two) times a day, Disp: 80 g, Rfl: 1    zolpidem (AMBIEN) 10 mg tablet, Take 1 tablet (10 mg total) by mouth daily at bedtime as needed for sleep, Disp: 30 tablet, Rfl: 0    atorvastatin (LIPITOR) 80 mg tablet, Take 1 tablet by mouth daily with dinner for 30 days, Disp: 30 tablet, Rfl: 12        Review of Systems:  Review of Systems   Constitutional: Negative for activity change, fever and unexpected weight change  HENT: Negative for facial swelling, nosebleeds and voice change  Respiratory: Negative for chest tightness, shortness of breath and wheezing  Cardiovascular: Negative for chest pain, palpitations and leg swelling  Gastrointestinal: Negative for abdominal distention  Genitourinary: Negative for hematuria  Musculoskeletal: Positive for back pain  Negative for arthralgias  Skin: Negative for color change, pallor, rash and wound  Neurological: Negative for dizziness, seizures and syncope  Psychiatric/Behavioral: Negative for agitation  Physical Exam:  Physical Exam   Constitutional: She is oriented to person, place, and time  She appears well-developed and well-nourished  HENT:   Head: Normocephalic and atraumatic  Eyes: EOM are normal    Neck: Normal range of motion  Neck supple  Cardiovascular: Normal rate, regular rhythm, normal heart sounds and intact distal pulses  Pulmonary/Chest: Effort normal and breath sounds normal    Abdominal: Soft  Musculoskeletal: Normal range of motion  Neurological: She is alert and oriented to person, place, and time  Skin: Skin is warm and dry  Psychiatric: She has a normal mood and affect   Her behavior is normal  Judgment and thought content normal    Vitals reviewed

## 2018-05-01 NOTE — TELEPHONE ENCOUNTER
Pt called script line and stated she needs a prior Auth for her Lyrica, she says she is out of medication  Pharmacy said they had sent paperwork but was not sure if you received anything    Thank You

## 2018-05-02 ENCOUNTER — TELEPHONE (OUTPATIENT)
Dept: INTERNAL MEDICINE CLINIC | Facility: CLINIC | Age: 58
End: 2018-05-02

## 2018-05-02 NOTE — TELEPHONE ENCOUNTER
Prior Desmond Echavarria is pending approval  Please see message in chart from yesterday for details  Thank you!

## 2018-05-02 NOTE — TELEPHONE ENCOUNTER
Patient calling because her Lyrica 150 mg 3 times daily needs prior authorization  Uses [facility] in [place]

## 2018-05-03 ENCOUNTER — TELEPHONE (OUTPATIENT)
Dept: INTERNAL MEDICINE CLINIC | Facility: CLINIC | Age: 58
End: 2018-05-03

## 2018-05-03 DIAGNOSIS — G89.4 CHRONIC PAIN SYNDROME: ICD-10-CM

## 2018-05-03 DIAGNOSIS — M79.7 FIBROMYALGIA: Primary | ICD-10-CM
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
## 2018-05-03 NOTE — TELEPHONE ENCOUNTER
This patient called the [facility] referral line on 5/2/2018 which stated to give her a call back in regards to referral for [facility]  I did call her and lmom for her to give me a call back

## 2018-05-07 DIAGNOSIS — I25.10 CORONARY ARTERY DISEASE INVOLVING NATIVE HEART WITHOUT ANGINA PECTORIS, UNSPECIFIED VESSEL OR LESION TYPE: Primary | ICD-10-CM

## 2018-05-07 RX ORDER — CLOPIDOGREL BISULFATE 75 MG/1
TABLET ORAL
Qty: 90 TABLET | Refills: 3 | Status: SHIPPED | OUTPATIENT
Start: 2018-05-07 | End: 2018-10-30 | Stop reason: SDUPTHER

## 2018-05-08 ENCOUNTER — CLINICAL SUPPORT (OUTPATIENT)
Dept: PAIN MEDICINE | Facility: CLINIC | Age: 58
End: 2018-05-08
Payer: COMMERCIAL

## 2018-05-08 VITALS
TEMPERATURE: 98.4 F | BODY MASS INDEX: 31.24 KG/M2 | SYSTOLIC BLOOD PRESSURE: 128 MMHG | DIASTOLIC BLOOD PRESSURE: 72 MMHG | HEIGHT: 64 IN | WEIGHT: 183 LBS | HEART RATE: 72 BPM

## 2018-05-08 DIAGNOSIS — M48.061 LUMBAR FORAMINAL STENOSIS: ICD-10-CM

## 2018-05-08 DIAGNOSIS — M46.1 SACROILIITIS (HCC): ICD-10-CM

## 2018-05-08 DIAGNOSIS — G89.4 CHRONIC PAIN SYNDROME: Primary | ICD-10-CM

## 2018-05-08 DIAGNOSIS — M54.16 LUMBAR RADICULOPATHY: ICD-10-CM

## 2018-05-08 DIAGNOSIS — M79.7 FIBROMYALGIA: ICD-10-CM

## 2018-05-08 DIAGNOSIS — M79.18 MYOFASCIAL PAIN: ICD-10-CM

## 2018-05-08 DIAGNOSIS — M54.12 CERVICAL RADICULOPATHY: ICD-10-CM

## 2018-05-08 PROCEDURE — 99213 OFFICE O/P EST LOW 20 MIN: CPT | Performed by: ANESTHESIOLOGY

## 2018-05-08 RX ORDER — PREGABALIN 150 MG/1
150 CAPSULE ORAL 3 TIMES DAILY
Qty: 90 CAPSULE | Refills: 2 | Status: CANCELLED | OUTPATIENT
Start: 2018-05-08

## 2018-05-08 RX ORDER — TIZANIDINE 2 MG/1
2 TABLET ORAL EVERY 8 HOURS PRN
Qty: 90 TABLET | Refills: 2 | Status: SHIPPED | OUTPATIENT
Start: 2018-05-08 | End: 2018-08-02 | Stop reason: SDUPTHER

## 2018-05-08 NOTE — PROGRESS NOTES
Assessment:  1  Chronic pain syndrome    2  Myofascial pain    3  Fibromyalgia    4  Sacroiliitis ([address] )    5  Lumbar foraminal stenosis    6  Lumbar radiculopathy    7  Cervical radiculopathy        Plan:  69-year-old female with a history of fibromyalgia, previous L5-S1 fusion, cervical degenerative disc disease, lumbar degenerative disc disease and foraminal stenosis with chronic radiculopathy in bilateral lower extremities returning for follow-up  She also complains of radiculopathy into bilateral upper extremities as well  The patient was ordered physical therapy for her neck and upper extremity radicular symptoms but because of transportation issue she was unable to start this  She did have bilateral L5 TFESI, SI joint injections, and other epidural steroid injections without any relief of her low back and lower extremity symptoms  She has almost completed the authorization process for spinal cord stimulator trial for her low back and lower extremity symptoms  Before she moves forward with the trial, the patient would like to get an opinion from a rheumatologist that she will be seen next week  The patient is currently taking Lyrica 150 milligrams t i d  and tizanidine 2 milligrams q 8 hours p r n  Oksana Lightning She has previously trying duloxetine without any relief  She gets about 15-20 percent relief from her current pain medicine regimen and denies any side effects other than some drowsiness with the tizanidine  1   I advised the patient that when she has her rheumatology consult she may call our office whenever she would like to proceed with spinal cord stimulator trial   The patient was agreeable to this  I do feel that this is the best long-term option at controlling the patient's low back and lower extremity symptoms  2   The patient may continue with Lyrica 150 milligrams t i d   3   I will continue tizanidine 2 milligrams q 8 hours p r n     The patient was unable tolerate higher doses of this    4   Once her transportation issues resolve we will proceed with physical therapy for the patient's neck and upper extremity radicular symptoms  5  If the patient fails conservative therapy for her neck and upper extremity symptoms we may consider an MRI of her cervical spine  6  Will avoid opioids secondary to aberramt urine drug screens in the past and marijuana use  7  I will follow up the patient in 2-3 months      [place] [id number] Program report was reviewed and was appropriate       My impressions and treatment recommendations were discussed in detail with the patient who verbalized understanding and had no further questions  Discharge instructions were provided  I personally saw and examined the patient and I agree with the above discussed plan of care  No orders of the defined types were placed in this encounter  No orders of the defined types were placed in this encounter  History of Present Illness:    Keaton Subramanian is a 65 y o  female with a history of fibromyalgia, previous L5-S1 fusion, cervical degenerative disc disease, lumbar degenerative disc disease and foraminal stenosis with chronic radiculopathy in bilateral lower extremities returning for follow-up  She also complains of radiculopathy into bilateral upper extremities as well  She denies any bladder or bowel incontinence or saddle anesthesia  She denies any balance issues  The patient was ordered physical therapy for her neck and upper extremity radicular symptoms but because of transportation issue she was unable to start this  She did have bilateral L5 TFESI, SI joint injections, and other epidural steroid injections without any relief of her low back and lower extremity symptoms  She has almost completed the authorization process for spinal cord stimulator trial for her low back and lower extremity symptoms    Before she moves forward with the trial, the patient would like to get an opinion from a rheumatologist that she will be seen next week  The patient is currently taking Lyrica 150 milligrams t i d  and tizanidine 2 milligrams q 8 hours p r n  Marinadaina Chaudhry She has previously trying duloxetine without any relief  She gets about 15-20 percent relief from her current pain medicine regimen and denies any side effects other than some drowsiness with the tizanidine  The patient rates her pain a 7 to 8/10 on the pain is worse at night  The pain is constant and described as burning, dull, aching, sharp, pressure-like, and shooting  The pain is worse with standing, walking, exercise, and bending and twisting  The pain is alleviated with relaxation and lying down  I have personally reviewed and/or updated the patient's past medical history, past surgical history, family history, social history, current medications, allergies, and vital signs today  Other than as stated above, the patient denies any interval changes in medications, medical condition, mental condition, symptoms, or allergies since the last office visit  Review of Systems:    Review of Systems   Respiratory: Negative for shortness of breath  Cardiovascular: Positive for chest pain  Gastrointestinal: Positive for constipation  Negative for diarrhea, nausea and vomiting  Musculoskeletal: Positive for gait problem  Negative for arthralgias, joint swelling and myalgias  Difficulty walking   Skin: Negative for rash  Neurological: Negative for dizziness, seizures and weakness  All other systems reviewed and are negative        Patient Active Problem List   Diagnosis    Abnormal glucose    Back pain    Benign essential HTN    Bites    Cervical herniated disc    Cervical radiculopathy    Coronary arteriosclerosis    Chronic stable angina (HCC)    Degenerative disc disease, cervical    Depression with anxiety    Fibromyalgia    Gastroesophageal reflux disease    Hypothyroidism    Hypercholesterolemia    Insomnia    Lumbar foraminal stenosis    Lumbar radiculopathy    Palpitations    Sacroiliitis (HCC)    Severe episode of recurrent major depressive disorder, without psychotic features (HCC)    Skin rash    Dizziness    Myofascial pain    Asthma    Brachial neuritis    Cervical spondylosis    Chronic low back pain    Diverticulitis of colon    Dysthymic disorder    Irritable bowel syndrome without diarrhea    Pain in thoracic spine    Reflex sympathetic dystrophy    Cervical stenosis of spinal canal    Status post lumbar surgery    Chronic pain syndrome    Acute non-recurrent maxillary sinusitis    Laryngitis       Past Medical History:   Diagnosis Date    Abnormal echocardiogram     Abnormal stress test     Brachial neuritis     Displacement of intervertebral disc of mid-cervical region     Frequent headaches     Herniated nucleus pulposus     History of arthritis     History of asthma     History of cardiac disorder     History of chest pain     History of diverticulitis     History of fatigue     History of hearing loss     History of hemoptysis     History of herpes simplex infection     History of hiatal hernia     History of insect bite     INFECTED    History of memory loss     History of neck disorder     History of reflex sympathetic dystrophy     History of restless legs syndrome     History of shortness of breath     History of spinal stenosis     Hyperlipidemia     Hypertension     Skin rash     Somnolence, daytime     Spinal stenosis of cervical region        Past Surgical History:   Procedure Laterality Date    BACK SURGERY      BACK SURGERY      LOWER BACK SURGERY    CARDIAC CATHETERIZATION      HISTORY OF CORONARY ANGIOGRAPHY WITH CONCOMITANT LEFT HEART CATHETERIZATION    EAR SURGERY      TONSILLECTOMY      TYMPANOPLASTY         Family History   Problem Relation Age of Onset    Coronary artery disease Mother      ATHEROMA    Heart disease Mother    [facility] Diabetes Mother     Hypertension Mother     No Known Problems Father      NO PERTINENT FAMILY HISTORY    Coronary artery disease Sister      ATHEROMA    Heart disease Sister     Stroke Sister     Diabetes Sister     Hypertension Sister        Social History     Occupational History    Not on file  Social History Main Topics    Smoking status: Former Smoker     Quit date: 2012    Smokeless tobacco: Former User      Comment: quit in 2013    Alcohol use No    Drug use: Yes      Comment: USES [address] [place] [address]    Sexual activity: Not on file       Current Outpatient Prescriptions on File Prior to Visit   Medication Sig    albuterol (PROVENTIL HFA,VENTOLIN HFA) 90 mcg/act inhaler Inhale 2 puffs every 6 (six) hours as needed for wheezing    aspirin 81 mg chewable tablet Chew 81 mg daily    atorvastatin (LIPITOR) 80 mg tablet Take 80 mg by mouth daily    citalopram (CeleXA) 20 mg tablet Take 1 tablet (20 mg total) by mouth daily    clopidogrel (PLAVIX) 75 mg tablet TAKE 4 TABLETS TODAY THEN 1 TABLET DAILY    famotidine (PEPCID) 40 MG tablet Take 1 tablet (40 mg total) by mouth daily    fluticasone (FLONASE) 50 mcg/act nasal spray 1 spray into each nostril daily    fluticasone-salmeterol (ADVAIR) 250-50 mcg/dose inhaler Inhale 1 puff every 12 (twelve) hours as needed (sob)    hydrOXYzine HCL (ATARAX) 25 mg tablet Take 1 tablet (25 mg total) by mouth 2 (two) times a day as needed for itching    levothyroxine 125 mcg tablet TAKE 1 TABLET DAILY      losartan (COZAAR) 25 mg tablet     metoprolol succinate (TOPROL-XL) 25 mg 24 hr tablet Take 25 mg by mouth daily    pregabalin (LYRICA) 150 mg capsule Take 1 capsule (150 mg total) by mouth 3 (three) times a day    PROAIR RESPICLICK 100 (90 Base) MCG/ACT AEPB     tiZANidine (ZANAFLEX) 2 mg tablet Take 1 tablet (2 mg total) by mouth every 8 (eight) hours as needed for muscle spasms    triamcinolone (KENALOG) 0 1 % cream Apply topically 2 (two) times a day    zolpidem (AMBIEN) 10 mg tablet Take 1 tablet (10 mg total) by mouth daily at bedtime as needed for sleep    [DISCONTINUED] atorvastatin (LIPITOR) 80 mg tablet Take 1 tablet by mouth daily with dinner for 30 days     No current facility-administered medications on file prior to visit  Allergies   Allergen Reactions    Augmentin [Amoxicillin-Pot Clavulanate] Nausea Only     PT stated she only allergic to medication AUGMENTIN       Physical Exam:    /72   Pulse 72   Temp 98 4 °F (36 9 °C)   Ht 5' 4" (1 626 m)   Wt 83 kg (183 lb)   BMI 31 41 kg/m²     Constitutional: normal, well developed, well nourished, alert, in no distress and non-toxic and no overt pain behavior  Eyes: anicteric  HEENT: grossly intact  Neck: supple, symmetric, trachea midline and no masses   Pulmonary:even and unlabored  Cardiovascular:No edema or pitting edema present  Skin:Normal without rashes or lesions and well hydrated  Psychiatric:Mood and affect appropriate  Neurologic:Cranial Nerves II-XII grossly intact  Musculoskeletal:antalgic gait  The bilateral lumbar paraspinals and SI joints tender to palpation  Bilateral lower extremity strength 5/5 in all muscle groups  He negative seated straight leg raise bilaterally  Bilateral cervical paraspinals and trapezii tender to palpation ropy in texture  Bilateral upper extremity strength 5/5 in all muscle groups  Negative Spurling's bilaterally      Imaging  Imaging reviewed

## 2018-05-12 DIAGNOSIS — F32.89 OTHER DEPRESSION: ICD-10-CM

## 2018-05-12 DIAGNOSIS — J45.909 ASTHMA, UNSPECIFIED ASTHMA SEVERITY, UNSPECIFIED WHETHER COMPLICATED, UNSPECIFIED WHETHER PERSISTENT: ICD-10-CM

## 2018-05-14 RX ORDER — CITALOPRAM 10 MG/1
TABLET ORAL
Qty: 30 TABLET | Refills: 1 | OUTPATIENT
Start: 2018-05-14

## 2018-05-18 DIAGNOSIS — G47.00 INSOMNIA, UNSPECIFIED TYPE: ICD-10-CM

## 2018-05-21 RX ORDER — ZOLPIDEM TARTRATE 10 MG/1
10 TABLET ORAL
Qty: 30 TABLET | Refills: 0 | Status: SHIPPED | OUTPATIENT
Start: 2018-05-21 | End: 2018-06-20 | Stop reason: SDUPTHER

## 2018-05-23 NOTE — TELEPHONE ENCOUNTER
Pt left 3 messages inquiring about Rx refill  She was upset no one called her to let her know it was ready  I apologized and let her know that the Rx was sent electronically to [facility] pharmacy and this was a change with narcotic  Pt was happy I called

## 2018-05-31 ENCOUNTER — OFFICE VISIT (OUTPATIENT)
Dept: INTERNAL MEDICINE CLINIC | Facility: CLINIC | Age: 58
End: 2018-05-31
Payer: COMMERCIAL

## 2018-05-31 VITALS
TEMPERATURE: 98.4 F | HEART RATE: 68 BPM | SYSTOLIC BLOOD PRESSURE: 130 MMHG | OXYGEN SATURATION: 98 % | RESPIRATION RATE: 20 BRPM | HEIGHT: 64 IN | BODY MASS INDEX: 30.97 KG/M2 | DIASTOLIC BLOOD PRESSURE: 88 MMHG | WEIGHT: 181.4 LBS

## 2018-05-31 DIAGNOSIS — Z13.89 SCREENING FOR RHEUMATIC DISORDER: ICD-10-CM

## 2018-05-31 DIAGNOSIS — K21.9 GASTROESOPHAGEAL REFLUX DISEASE, ESOPHAGITIS PRESENCE NOT SPECIFIED: ICD-10-CM

## 2018-05-31 DIAGNOSIS — F41.8 DEPRESSION WITH ANXIETY: Primary | ICD-10-CM

## 2018-05-31 DIAGNOSIS — M54.5 CHRONIC LOW BACK PAIN, UNSPECIFIED BACK PAIN LATERALITY, WITH SCIATICA PRESENCE UNSPECIFIED: ICD-10-CM

## 2018-05-31 DIAGNOSIS — G89.29 CHRONIC LOW BACK PAIN, UNSPECIFIED BACK PAIN LATERALITY, WITH SCIATICA PRESENCE UNSPECIFIED: ICD-10-CM

## 2018-05-31 DIAGNOSIS — R53.81 MALAISE: ICD-10-CM

## 2018-05-31 DIAGNOSIS — E78.00 HYPERCHOLESTEROLEMIA: ICD-10-CM

## 2018-05-31 DIAGNOSIS — H91.93 HEARING PROBLEM OF BOTH EARS: ICD-10-CM

## 2018-05-31 DIAGNOSIS — M25.50 DIFFUSE ARTHRALGIA: ICD-10-CM

## 2018-05-31 LAB
ALBUMIN SERPL BCP-MCNC: 4.2 G/DL (ref 3.5–5)
ALP SERPL-CCNC: 97 U/L (ref 46–116)
ALT SERPL W P-5'-P-CCNC: 37 U/L (ref 12–78)
ANION GAP SERPL CALCULATED.3IONS-SCNC: 7 MMOL/L (ref 4–13)
AST SERPL W P-5'-P-CCNC: 21 U/L (ref 5–45)
BASOPHILS # BLD AUTO: 0.02 THOUSANDS/ΜL (ref 0–0.1)
BASOPHILS NFR BLD AUTO: 0 % (ref 0–1)
BILIRUB SERPL-MCNC: 0.87 MG/DL (ref 0.2–1)
BUN SERPL-MCNC: 11 MG/DL (ref 5–25)
CALCIUM SERPL-MCNC: 8.9 MG/DL (ref 8.3–10.1)
CHLORIDE SERPL-SCNC: 105 MMOL/L (ref 100–108)
CHOLEST SERPL-MCNC: 144 MG/DL (ref 50–200)
CK SERPL-CCNC: 93 U/L (ref 26–192)
CO2 SERPL-SCNC: 27 MMOL/L (ref 21–32)
CREAT SERPL-MCNC: 0.77 MG/DL (ref 0.6–1.3)
CRP SERPL QL: 4 MG/L
EOSINOPHIL # BLD AUTO: 0.1 THOUSAND/ΜL (ref 0–0.61)
EOSINOPHIL NFR BLD AUTO: 2 % (ref 0–6)
ERYTHROCYTE [DISTWIDTH] IN BLOOD BY AUTOMATED COUNT: 12.8 % (ref 11.6–15.1)
ERYTHROCYTE [SEDIMENTATION RATE] IN BLOOD: 11 MM/HOUR (ref 0–20)
GFR SERPL CREATININE-BSD FRML MDRD: 87 ML/MIN/1.73SQ M
GLUCOSE P FAST SERPL-MCNC: 98 MG/DL (ref 65–99)
HCT VFR BLD AUTO: 42.1 % (ref 34.8–46.1)
HDLC SERPL-MCNC: 34 MG/DL (ref 40–60)
HGB BLD-MCNC: 14.1 G/DL (ref 11.5–15.4)
IMM GRANULOCYTES # BLD AUTO: 0.01 THOUSAND/UL (ref 0–0.2)
IMM GRANULOCYTES NFR BLD AUTO: 0 % (ref 0–2)
LDLC SERPL CALC-MCNC: 70 MG/DL (ref 0–100)
LYMPHOCYTES # BLD AUTO: 1.95 THOUSANDS/ΜL (ref 0.6–4.47)
LYMPHOCYTES NFR BLD AUTO: 38 % (ref 14–44)
MCH RBC QN AUTO: 29.7 PG (ref 26.8–34.3)
MCHC RBC AUTO-ENTMCNC: 33.5 G/DL (ref 31.4–37.4)
MCV RBC AUTO: 89 FL (ref 82–98)
MONOCYTES # BLD AUTO: 0.32 THOUSAND/ΜL (ref 0.17–1.22)
MONOCYTES NFR BLD AUTO: 6 % (ref 4–12)
NEUTROPHILS # BLD AUTO: 2.68 THOUSANDS/ΜL (ref 1.85–7.62)
NEUTS SEG NFR BLD AUTO: 54 % (ref 43–75)
NRBC BLD AUTO-RTO: 0 /100 WBCS
PLATELET # BLD AUTO: 260 THOUSANDS/UL (ref 149–390)
PMV BLD AUTO: 10.7 FL (ref 8.9–12.7)
POTASSIUM SERPL-SCNC: 4.4 MMOL/L (ref 3.5–5.3)
PROT SERPL-MCNC: 7.7 G/DL (ref 6.4–8.2)
RBC # BLD AUTO: 4.74 MILLION/UL (ref 3.81–5.12)
SODIUM SERPL-SCNC: 139 MMOL/L (ref 136–145)
T4 FREE SERPL-MCNC: 1.05 NG/DL (ref 0.76–1.46)
TRIGL SERPL-MCNC: 201 MG/DL
TSH SERPL DL<=0.05 MIU/L-ACNC: 0.52 UIU/ML (ref 0.36–3.74)
WBC # BLD AUTO: 5.08 THOUSAND/UL (ref 4.31–10.16)

## 2018-05-31 PROCEDURE — 80053 COMPREHEN METABOLIC PANEL: CPT | Performed by: NURSE PRACTITIONER

## 2018-05-31 PROCEDURE — 84165 PROTEIN E-PHORESIS SERUM: CPT | Performed by: INTERNAL MEDICINE

## 2018-05-31 PROCEDURE — 85652 RBC SED RATE AUTOMATED: CPT | Performed by: INTERNAL MEDICINE

## 2018-05-31 PROCEDURE — 86618 LYME DISEASE ANTIBODY: CPT | Performed by: INTERNAL MEDICINE

## 2018-05-31 PROCEDURE — 86235 NUCLEAR ANTIGEN ANTIBODY: CPT | Performed by: INTERNAL MEDICINE

## 2018-05-31 PROCEDURE — 82306 VITAMIN D 25 HYDROXY: CPT | Performed by: NURSE PRACTITIONER

## 2018-05-31 PROCEDURE — 84439 ASSAY OF FREE THYROXINE: CPT | Performed by: NURSE PRACTITIONER

## 2018-05-31 PROCEDURE — 86140 C-REACTIVE PROTEIN: CPT | Performed by: NURSE PRACTITIONER

## 2018-05-31 PROCEDURE — 80074 ACUTE HEPATITIS PANEL: CPT | Performed by: INTERNAL MEDICINE

## 2018-05-31 PROCEDURE — 80061 LIPID PANEL: CPT | Performed by: NURSE PRACTITIONER

## 2018-05-31 PROCEDURE — 85025 COMPLETE CBC W/AUTO DIFF WBC: CPT | Performed by: INTERNAL MEDICINE

## 2018-05-31 PROCEDURE — 99214 OFFICE O/P EST MOD 30 MIN: CPT | Performed by: NURSE PRACTITIONER

## 2018-05-31 PROCEDURE — 84443 ASSAY THYROID STIM HORMONE: CPT | Performed by: NURSE PRACTITIONER

## 2018-05-31 PROCEDURE — 84165 PROTEIN E-PHORESIS SERUM: CPT | Performed by: PATHOLOGY

## 2018-05-31 PROCEDURE — 81374 HLA I TYPING 1 ANTIGEN LR: CPT | Performed by: INTERNAL MEDICINE

## 2018-05-31 PROCEDURE — 82550 ASSAY OF CK (CPK): CPT | Performed by: NURSE PRACTITIONER

## 2018-05-31 PROCEDURE — 36415 COLL VENOUS BLD VENIPUNCTURE: CPT | Performed by: NURSE PRACTITIONER

## 2018-05-31 PROCEDURE — 86200 CCP ANTIBODY: CPT | Performed by: INTERNAL MEDICINE

## 2018-05-31 PROCEDURE — 86430 RHEUMATOID FACTOR TEST QUAL: CPT | Performed by: INTERNAL MEDICINE

## 2018-05-31 PROCEDURE — 86038 ANTINUCLEAR ANTIBODIES: CPT | Performed by: INTERNAL MEDICINE

## 2018-05-31 RX ORDER — PANTOPRAZOLE SODIUM 40 MG/1
40 TABLET, DELAYED RELEASE ORAL DAILY
Qty: 90 TABLET | Refills: 0 | Status: SHIPPED | OUTPATIENT
Start: 2018-05-31 | End: 2018-09-05 | Stop reason: SDUPTHER

## 2018-05-31 NOTE — PROGRESS NOTES
Assessment/Plan:     Diagnoses and all orders for this visit:    Depression with anxiety  -     CBC and differential    Gastroesophageal reflux disease, esophagitis presence not specified  -     pantoprazole (PROTONIX) 40 mg tablet; Take 1 tablet (40 mg total) by mouth daily  -     CBC and differential    Hearing problem of both ears  -     Ambulatory Referral to Otolaryngology; Future  -     CBC and differential    Hypercholesterolemia  -     Cancel: Lipid Panel with Direct LDL reflex;  Future  -     Lipid Panel with Direct LDL reflex  -     CBC and differential    Chronic low back pain, unspecified back pain laterality, with sciatica presence unspecified  -     CBC and differential  -     LUANN Screen w/ Reflex to Titer/Pattern  -     RF Screen w/ Reflex to Titer  -     Sedimentation rate, automated  -     Sjogren's Antibodies  -     TSH, 3rd generation  -     T4, free  -     Vitamin D 25 hydroxy  -     HLA-B27 antigen  -     Lyme Antibody Profile with reflex to WB  -     Protein electrophoresis, serum  -     C-reactive protein  -     Cyclic citrul peptide antibody, IgG  -     Hepatitis panel, acute  -     CK  -     Comprehensive metabolic panel    Diffuse arthralgia  -     CBC and differential  -     LUANN Screen w/ Reflex to Titer/Pattern  -     RF Screen w/ Reflex to Titer  -     Sedimentation rate, automated  -     Sjogren's Antibodies  -     TSH, 3rd generation  -     T4, free  -     Vitamin D 25 hydroxy  -     HLA-B27 antigen  -     Lyme Antibody Profile with reflex to WB  -     Protein electrophoresis, serum  -     C-reactive protein  -     Cyclic citrul peptide antibody, IgG  -     Hepatitis panel, acute  -     CK  -     Comprehensive metabolic panel    Malaise  -     CBC and differential  -     LUANN Screen w/ Reflex to Titer/Pattern  -     RF Screen w/ Reflex to Titer  -     Sedimentation rate, automated  -     Sjogren's Antibodies  -     TSH, 3rd generation  -     T4, free  -     Vitamin D 25 hydroxy  - HLA-B27 antigen  -     Lyme Antibody Profile with reflex to WB  -     Protein electrophoresis, serum  -     C-reactive protein  -     Cyclic citrul peptide antibody, IgG  -     Hepatitis panel, acute  -     CK  -     Comprehensive metabolic panel    Screening for rheumatic disorder  -     CBC and differential  -     LUANN Screen w/ Reflex to Titer/Pattern  -     RF Screen w/ Reflex to Titer  -     Sedimentation rate, automated  -     Sjogren's Antibodies  -     TSH, 3rd generation  -     T4, free  -     Vitamin D 25 hydroxy  -     HLA-B27 antigen  -     Lyme Antibody Profile with reflex to WB  -     Protein electrophoresis, serum  -     C-reactive protein  -     Cyclic citrul peptide antibody, IgG  -     Hepatitis panel, acute  -     CK  -     Comprehensive metabolic panel      Patient will continue on Celexa 20 mg daily  I started her on Protonix 40 mg once a day for her chronic GERD  Advised to stop Pepcid  Referral given to ENT and advised patient to follow up for her hyper sensitivity to noise  Patient will have labs drawn after her appointment today that were ordered by her rheumatologist   We have added on a lipid panel as well  Patient will follow up in 1 month with Dr Giovany Moon for follow-up of her chronic conditions  Subjective:      Patient ID: Michael Alvarez is a 65 y o  female  HPI    Anxiety  Patient is here for 1 follow up of anxiety  At her last visit we increased her celexa from 10mg daily to 20mg daily  Current symptoms include: moments of crying which have become less frequent  Symptoms have been gradually improving since last visit  She denies current suicidal and homicidal ideation  She feels that she is doing better on the celexa 20mg daily  Depression  Patient is here for follow up of depression  Current symptoms include: continue to be depressed related to her chronic pain  She is very tearful and crying    Symptoms have been unchanged since last appointment  Current treatment includes celexa 20mg daily  She is complaint with medications  Hypersensitivity to noise  She reports excessive hypersensitivity to noise  She feels like "my ears drums are vibrating" She often wears cotton in her ears to muffle the sounds because it bothers her so much  She cant drive with windows open, use headphones or fans because she is so sensitive to the noise  This has been ongoing over the past year, but has been worsening over past few months  GERD  Paitent complains of heartburn  This has been associated with abdominal bloating, heartburn and acid taste, worse at night  She denies belching, difficulty swallowing and dysphagia  Symptoms have been present for >30 years  She denies dysphagia  She has not lost weight  She denies melena, hematochezia, hematemesis, and coffee ground emesis  Medical therapy in the past has included H2 antagonists and proton pump inhibitors  Patient did not see her psychiatrist for the eval for the pain stimulator in her back that was possibly being recommended by pain and spine specialists  She instead made an appointment to follow up with rheumatology  She was not previously following with them  She saw Dr Radha Barbosa with [facility] rheumatology  She sees them again at the beginning June  She was given an extensive panel of labs to have done to further work up possible etiologies of her pain  The following portions of the patient's history were reviewed and updated as appropriate: allergies, current medications, past family history, past medical history, past social history, past surgical history and problem list     Review of Systems   Constitutional: Negative for activity change, appetite change, chills, diaphoresis, fatigue and fever  HENT: Positive for hearing loss (and hypersensitivity to noise)  Respiratory: Negative for cough, chest tightness, shortness of breath and wheezing      Cardiovascular: Negative for chest pain, palpitations and leg swelling  Gastrointestinal: Positive for abdominal distention (bloating and reflux)  Musculoskeletal: Positive for arthralgias, back pain and myalgias  Psychiatric/Behavioral: Positive for dysphoric mood  The patient is nervous/anxious            Past Medical History:   Diagnosis Date    Abnormal echocardiogram     Abnormal stress test     Brachial neuritis     Displacement of intervertebral disc of mid-cervical region     Frequent headaches     Herniated nucleus pulposus     History of arthritis     History of asthma     History of cardiac disorder     History of chest pain     History of diverticulitis     History of fatigue     History of hearing loss     History of hemoptysis     History of herpes simplex infection     History of hiatal hernia     History of insect bite     INFECTED    History of memory loss     History of neck disorder     History of reflex sympathetic dystrophy     History of restless legs syndrome     History of shortness of breath     History of spinal stenosis     Hyperlipidemia     Hypertension     Skin rash     Somnolence, daytime     Spinal stenosis of cervical region          Current Outpatient Prescriptions:     albuterol (PROVENTIL HFA,VENTOLIN HFA) 90 mcg/act inhaler, Inhale 2 puffs every 6 (six) hours as needed for wheezing, Disp: 18 g, Rfl: 0    aspirin 81 mg chewable tablet, Chew 81 mg daily, Disp: , Rfl:     atorvastatin (LIPITOR) 80 mg tablet, Take 80 mg by mouth daily, Disp: , Rfl: 3    citalopram (CeleXA) 20 mg tablet, Take 1 tablet (20 mg total) by mouth daily, Disp: 30 tablet, Rfl: 1    clopidogrel (PLAVIX) 75 mg tablet, TAKE 4 TABLETS TODAY THEN 1 TABLET DAILY, Disp: 90 tablet, Rfl: 3    famotidine (PEPCID) 40 MG tablet, Take 1 tablet (40 mg total) by mouth daily, Disp: 90 tablet, Rfl: 1    fluticasone (FLONASE) 50 mcg/act nasal spray, 1 spray into each nostril daily, Disp: 16 g, Rfl: 0    fluticasone-salmeterol (ADVAIR) 250-50 mcg/dose inhaler, Inhale 1 puff every 12 (twelve) hours as needed (sob), Disp: 1 each, Rfl: 1    hydrOXYzine HCL (ATARAX) 25 mg tablet, Take 1 tablet (25 mg total) by mouth 2 (two) times a day as needed for itching, Disp: 90 tablet, Rfl: 1    levothyroxine 125 mcg tablet, TAKE 1 TABLET DAILY  , Disp: 30 tablet, Rfl: 5    losartan (COZAAR) 25 mg tablet, , Disp: , Rfl:     pregabalin (LYRICA) 150 mg capsule, Take 1 capsule (150 mg total) by mouth 3 (three) times a day, Disp: 90 capsule, Rfl: 0    PROAIR RESPICLICK 805 (90 Base) MCG/ACT AEPB, , Disp: , Rfl:     tiZANidine (ZANAFLEX) 2 mg tablet, Take 1 tablet (2 mg total) by mouth every 8 (eight) hours as needed for muscle spasms, Disp: 90 tablet, Rfl: 2    triamcinolone (KENALOG) 0 1 % cream, Apply topically 2 (two) times a day, Disp: 80 g, Rfl: 1    zolpidem (AMBIEN) 10 mg tablet, Take 1 tablet (10 mg total) by mouth daily at bedtime as needed for sleep, Disp: 30 tablet, Rfl: 0    metoprolol succinate (TOPROL-XL) 25 mg 24 hr tablet, Take 25 mg by mouth daily, Disp: , Rfl:     Allergies   Allergen Reactions    Augmentin [Amoxicillin-Pot Clavulanate] Nausea Only     PT stated she only allergic to medication AUGMENTIN       Social History   Past Surgical History:   Procedure Laterality Date    BACK SURGERY      BACK SURGERY      LOWER BACK SURGERY    CARDIAC CATHETERIZATION      HISTORY OF CORONARY ANGIOGRAPHY WITH CONCOMITANT LEFT HEART CATHETERIZATION    EAR SURGERY      TONSILLECTOMY      TYMPANOPLASTY       Family History   Problem Relation Age of Onset    Coronary artery disease Mother      ATHEROMA    Heart disease Mother     Diabetes Mother     Hypertension Mother     No Known Problems Father      NO PERTINENT FAMILY HISTORY    Coronary artery disease Sister      ATHEROMA    Heart disease Sister     Stroke Sister     Diabetes Sister     Hypertension Sister        Objective:  /88 (BP Location: Left arm, Patient Position: Sitting, Cuff Size: Large)   Pulse 68   Temp 98 4 °F (36 9 °C) (Oral)   Resp 20   Ht 5' 4" (1 626 m)   Wt 82 3 kg (181 lb 6 4 oz)   SpO2 98%   BMI 31 14 kg/m²      Physical Exam   Constitutional: She is oriented to person, place, and time  She appears well-developed and well-nourished  No distress  HENT:   Head: Normocephalic and atraumatic  Right Ear: Tympanic membrane and external ear normal    Left Ear: Tympanic membrane and external ear normal    Nose: Nose normal    Mouth/Throat: Oropharynx is clear and moist  No oropharyngeal exudate, posterior oropharyngeal edema or posterior oropharyngeal erythema  Eyes: Conjunctivae and EOM are normal  Pupils are equal, round, and reactive to light  Neck: Normal range of motion  Neck supple  Cardiovascular: Normal rate, regular rhythm and normal heart sounds  No murmur heard  Pulmonary/Chest: Effort normal and breath sounds normal  No respiratory distress  She has no decreased breath sounds  She has no wheezes  She has no rhonchi  Musculoskeletal: She exhibits no edema  Right knee: Tenderness found  Left knee: Tenderness found  Arms:  Lymphadenopathy:     She has no cervical adenopathy  Neurological: She is alert and oriented to person, place, and time  Skin: Skin is warm and dry  She is not diaphoretic  Psychiatric: She has a normal mood and affect  Her behavior is normal    Tearful when discussing depression     Vitals reviewed

## 2018-05-31 NOTE — PATIENT INSTRUCTIONS
Continue celexa 20mg daily    Go for labs, keep follow up with rheumatology    Bring copy of labs to visit in 1 month    Follow up with ENT for sensitive hearing    Start protonix 1 tablet daily 30 minutes before breakfast

## 2018-06-01 LAB
B BURGDOR IGG SER IA-ACNC: 0.08
B BURGDOR IGM SER IA-ACNC: 0.21
ENA SS-A AB SER-ACNC: <0.2 AI (ref 0–0.9)
ENA SS-B AB SER-ACNC: <0.2 AI (ref 0–0.9)
HAV IGM SER QL: NORMAL
HBV CORE IGM SER QL: NORMAL
HBV SURFACE AG SER QL: NORMAL
HCV AB SER QL: NORMAL
RHEUMATOID FACT SER QL LA: NEGATIVE
RYE IGE QN: NEGATIVE

## 2018-06-02 LAB — CCP IGA+IGG SERPL IA-ACNC: 4 UNITS (ref 0–19)

## 2018-06-03 DIAGNOSIS — M79.18 MYOFASCIAL PAIN: ICD-10-CM

## 2018-06-03 LAB
ALBUMIN SERPL ELPH-MCNC: 4.55 G/DL (ref 3.5–5)
ALBUMIN SERPL ELPH-MCNC: 57.6 % (ref 52–65)
ALPHA1 GLOB SERPL ELPH-MCNC: 0.32 G/DL (ref 0.1–0.4)
ALPHA1 GLOB SERPL ELPH-MCNC: 4 % (ref 2.5–5)
ALPHA2 GLOB SERPL ELPH-MCNC: 0.73 G/DL (ref 0.4–1.2)
ALPHA2 GLOB SERPL ELPH-MCNC: 9.3 % (ref 7–13)
BETA GLOB ABNORMAL SERPL ELPH-MCNC: 0.43 G/DL (ref 0.4–0.8)
BETA1 GLOB SERPL ELPH-MCNC: 5.5 % (ref 5–13)
BETA2 GLOB SERPL ELPH-MCNC: 5.6 % (ref 2–8)
BETA2+GAMMA GLOB SERPL ELPH-MCNC: 0.44 G/DL (ref 0.2–0.5)
GAMMA GLOB ABNORMAL SERPL ELPH-MCNC: 1.42 G/DL (ref 0.5–1.6)
GAMMA GLOB SERPL ELPH-MCNC: 18 % (ref 12–22)
IGG/ALB SER: 1.36 {RATIO} (ref 1.1–1.8)
PROT PATTERN SERPL ELPH-IMP: NORMAL
PROT SERPL-MCNC: 7.9 G/DL (ref 6.4–8.2)

## 2018-06-04 RX ORDER — TIZANIDINE 2 MG/1
TABLET ORAL
Qty: 90 TABLET | Refills: 2 | OUTPATIENT
Start: 2018-06-04

## 2018-06-05 LAB — 25(OH)D3 SERPL-MCNC: 9.5 NG/ML (ref 30–100)

## 2018-06-06 ENCOUNTER — TELEPHONE (OUTPATIENT)
Dept: PAIN MEDICINE | Facility: CLINIC | Age: 58
End: 2018-06-06

## 2018-06-06 NOTE — TELEPHONE ENCOUNTER
Pt called and stated she does not need a refill for Tizanidine 2mg  Will call when she needs a refill

## 2018-06-07 DIAGNOSIS — M79.7 FIBROMYALGIA: ICD-10-CM

## 2018-06-07 LAB — HLA-B27 QL NAA+PROBE: NEGATIVE

## 2018-06-07 RX ORDER — PREGABALIN 150 MG/1
150 CAPSULE ORAL 3 TIMES DAILY
Qty: 90 CAPSULE | Refills: 0 | Status: SHIPPED | OUTPATIENT
Start: 2018-06-07 | End: 2018-07-11 | Stop reason: SDUPTHER

## 2018-06-09 DIAGNOSIS — R21 SKIN RASH: ICD-10-CM

## 2018-06-10 RX ORDER — HYDROXYZINE HYDROCHLORIDE 25 MG/1
TABLET, FILM COATED ORAL
Qty: 90 TABLET | Refills: 1 | OUTPATIENT
Start: 2018-06-10

## 2018-06-20 DIAGNOSIS — G47.00 INSOMNIA, UNSPECIFIED TYPE: ICD-10-CM

## 2018-06-20 RX ORDER — ZOLPIDEM TARTRATE 10 MG/1
10 TABLET ORAL
Qty: 30 TABLET | Refills: 0 | Status: SHIPPED | OUTPATIENT
Start: 2018-06-20 | End: 2018-07-24 | Stop reason: SDUPTHER

## 2018-07-03 DIAGNOSIS — I10 HTN (HYPERTENSION), BENIGN: Primary | ICD-10-CM

## 2018-07-03 RX ORDER — LOSARTAN POTASSIUM 25 MG/1
TABLET ORAL
Qty: 30 TABLET | Refills: 5 | Status: SHIPPED | OUTPATIENT
Start: 2018-07-03 | End: 2018-12-30 | Stop reason: SDUPTHER

## 2018-07-09 ENCOUNTER — TELEPHONE (OUTPATIENT)
Dept: PAIN MEDICINE | Facility: CLINIC | Age: 58
End: 2018-07-09

## 2018-07-11 ENCOUNTER — OFFICE VISIT (OUTPATIENT)
Dept: INTERNAL MEDICINE CLINIC | Facility: CLINIC | Age: 58
End: 2018-07-11
Payer: COMMERCIAL

## 2018-07-11 VITALS
BODY MASS INDEX: 30.82 KG/M2 | DIASTOLIC BLOOD PRESSURE: 70 MMHG | TEMPERATURE: 98.4 F | SYSTOLIC BLOOD PRESSURE: 124 MMHG | RESPIRATION RATE: 20 BRPM | WEIGHT: 185 LBS | OXYGEN SATURATION: 98 % | HEIGHT: 65 IN | HEART RATE: 67 BPM

## 2018-07-11 DIAGNOSIS — M54.16 LUMBAR RADICULOPATHY: ICD-10-CM

## 2018-07-11 DIAGNOSIS — M79.7 FIBROMYALGIA: ICD-10-CM

## 2018-07-11 DIAGNOSIS — M54.12 CERVICAL RADICULOPATHY: ICD-10-CM

## 2018-07-11 DIAGNOSIS — B00.1 RECURRENT COLD SORES: Primary | ICD-10-CM

## 2018-07-11 PROCEDURE — 99214 OFFICE O/P EST MOD 30 MIN: CPT | Performed by: INTERNAL MEDICINE

## 2018-07-11 RX ORDER — PREGABALIN 150 MG/1
150 CAPSULE ORAL 3 TIMES DAILY
Qty: 90 CAPSULE | Refills: 2 | Status: SHIPPED | OUTPATIENT
Start: 2018-07-11 | End: 2018-08-02 | Stop reason: SDUPTHER

## 2018-07-11 RX ORDER — PREGABALIN 150 MG/1
150 CAPSULE ORAL 3 TIMES DAILY
Qty: 90 CAPSULE | Refills: 0 | Status: CANCELLED | OUTPATIENT
Start: 2018-07-11

## 2018-07-11 RX ORDER — VALACYCLOVIR HYDROCHLORIDE 1 G/1
1000 TABLET, FILM COATED ORAL 3 TIMES DAILY
Qty: 30 TABLET | Refills: 1 | Status: SHIPPED | OUTPATIENT
Start: 2018-07-11 | End: 2018-09-02 | Stop reason: SDUPTHER

## 2018-07-12 DIAGNOSIS — E78.5 DYSLIPIDEMIA: Primary | ICD-10-CM

## 2018-07-12 NOTE — PROGRESS NOTES
Assessment/Plan:    No problem-specific Assessment & Plan notes found for this encounter  Diagnoses and all orders for this visit:    Recurrent cold sores  -     valACYclovir (VALTREX) 1,000 mg tablet; Take 1 tablet (1,000 mg total) by mouth 3 (three) times a day for 10 days  -     silver sulfadiazine (SILVADENE,SSD) 1 % cream; Apply topically daily    Fibromyalgia  -     pregabalin (LYRICA) 150 mg capsule; Take 1 capsule (150 mg total) by mouth 3 (three) times a day          Subjective:      Patient ID: Nixon Graves is a 65 y o  female      HPI    The following portions of the patient's history were reviewed and updated as appropriate: past family history, past medical history, past social history, past surgical history and problem list     Review of Systems      Objective:      /70 (BP Location: Left arm, Patient Position: Sitting, Cuff Size: Large)   Pulse 67   Temp 98 4 °F (36 9 °C) (Oral)   Resp 20   Ht 5' 4 5" (1 638 m)   Wt 83 9 kg (185 lb)   SpO2 98%   BMI 31 26 kg/m²          Physical Exam

## 2018-07-13 RX ORDER — ATORVASTATIN CALCIUM 80 MG/1
TABLET, FILM COATED ORAL
Qty: 90 TABLET | Refills: 2 | Status: SHIPPED | OUTPATIENT
Start: 2018-07-13 | End: 2019-04-10 | Stop reason: SDUPTHER

## 2018-07-24 DIAGNOSIS — G47.00 INSOMNIA, UNSPECIFIED TYPE: ICD-10-CM

## 2018-07-24 DIAGNOSIS — F32.89 OTHER DEPRESSION: ICD-10-CM

## 2018-07-25 RX ORDER — CITALOPRAM 20 MG/1
20 TABLET ORAL DAILY
Qty: 30 TABLET | Refills: 3 | Status: SHIPPED | OUTPATIENT
Start: 2018-07-25 | End: 2018-11-18 | Stop reason: SDUPTHER

## 2018-07-25 RX ORDER — ZOLPIDEM TARTRATE 10 MG/1
10 TABLET ORAL
Qty: 30 TABLET | Refills: 0 | Status: SHIPPED | OUTPATIENT
Start: 2018-07-25 | End: 2018-08-22 | Stop reason: SDUPTHER

## 2018-08-02 ENCOUNTER — CLINICAL SUPPORT (OUTPATIENT)
Dept: PAIN MEDICINE | Facility: CLINIC | Age: 58
End: 2018-08-02
Payer: COMMERCIAL

## 2018-08-02 ENCOUNTER — TELEPHONE (OUTPATIENT)
Dept: PAIN MEDICINE | Facility: CLINIC | Age: 58
End: 2018-08-02

## 2018-08-02 VITALS
TEMPERATURE: 97.7 F | BODY MASS INDEX: 30.42 KG/M2 | DIASTOLIC BLOOD PRESSURE: 60 MMHG | WEIGHT: 180 LBS | SYSTOLIC BLOOD PRESSURE: 130 MMHG

## 2018-08-02 DIAGNOSIS — M46.1 SACROILIITIS (HCC): ICD-10-CM

## 2018-08-02 DIAGNOSIS — M48.061 LUMBAR FORAMINAL STENOSIS: ICD-10-CM

## 2018-08-02 DIAGNOSIS — M54.12 CERVICAL RADICULOPATHY: Primary | ICD-10-CM

## 2018-08-02 DIAGNOSIS — M79.18 MYOFASCIAL PAIN: ICD-10-CM

## 2018-08-02 DIAGNOSIS — M54.16 LUMBAR RADICULOPATHY: ICD-10-CM

## 2018-08-02 DIAGNOSIS — G89.4 CHRONIC PAIN SYNDROME: ICD-10-CM

## 2018-08-02 DIAGNOSIS — M79.7 FIBROMYALGIA: ICD-10-CM

## 2018-08-02 PROCEDURE — 99214 OFFICE O/P EST MOD 30 MIN: CPT | Performed by: ANESTHESIOLOGY

## 2018-08-02 RX ORDER — ERGOCALCIFEROL 1.25 MG/1
50000 CAPSULE ORAL WEEKLY
Refills: 0 | COMMUNITY
Start: 2018-07-11 | End: 2018-10-30 | Stop reason: ALTCHOICE

## 2018-08-02 RX ORDER — TIZANIDINE 2 MG/1
4 TABLET ORAL EVERY 8 HOURS PRN
Qty: 180 TABLET | Refills: 2 | Status: SHIPPED | OUTPATIENT
Start: 2018-08-02 | End: 2018-10-31

## 2018-08-02 RX ORDER — PREGABALIN 150 MG/1
150 CAPSULE ORAL 3 TIMES DAILY
Qty: 90 CAPSULE | Refills: 2 | Status: SHIPPED | OUTPATIENT
Start: 2018-08-02 | End: 2018-11-21 | Stop reason: SDUPTHER

## 2018-08-02 NOTE — PROGRESS NOTES
Pt is c/o back pain  Assessment:  1  Cervical radiculopathy    2  Myofascial pain    3  Fibromyalgia    4  Lumbar radiculopathy    5  Lumbar foraminal stenosis    6  Chronic pain syndrome    7  Sacroiliitis ([facility] [address] )        Plan:  52-year-old female with a history of fibromyalgia and previous L5-S1 fusion, cervical degenerative disc disease, lumbar degenerative disc disease and foraminal stenosis with radiculopathy in the bilateral lower extremities and radicular symptoms into bilateral upper extremities returning for follow-up  The patient did have epidural steroid injections and SI joint injections without any relief in the past   She has undergone most of the workup for a spinal cord stimulator trial for her low back and lower extremity symptoms, however she is having difficulty finding a psychologist that is affordable in order to obtain her psychological evaluation  The patient would like to move forward with the spinal cord stimulator trial if she can complete the evaluation  The patient is currently taking Lyrica 150 mg t i d  and tizanidine 2-4 mg q 8 hours p r n  with approximately 20-25% relief  She denies any side effects from the medications  The patient was ordered physical therapy for her neck and upper extremity symptoms, however was unable to participate in physical therapy as she was having transportation issues  The patient now has transportation and would like to proceed with physical therapy  The patient does note that she was prescribed aquatic therapy for her fibromyalgia as well as for her neck by her rheumatologist   The patient is currently in the process of scheduling this  1   I advised the patient that I would reach out to our spinal cord stimulator coordinator to see if we can find her some assistance in finding an affordable psychologist for evaluation  2    The patient will continue with Lyrica 150 mg t i d   3   The patient may continue with tizanidine 2-4 mg q 8 hours p r n   4  The patient will proceed with physical therapy as ordered  5  If the patient fails conservative therapy we may consider an MRI of her cervical spine  6  Will avoid opioids secondary to aberrant urine drug screen in the past and marijuana use  7  I will follow up the patient in 3 months    [place] [address] Program report was reviewed and was appropriate       History of Present Illness: The patient is a 63 y o  female with a history of fibromyalgia and previous L5-S1 fusion, cervical degenerative disc disease, lumbar degenerative disc disease and foraminal stenosis with radiculopathy in the bilateral lower extremities and radicular symptoms into bilateral upper extremities returning for follow-up  She denies any bladder or bowel incontinence, saddle anesthesia, or balance issues  The patient did recently see a rheumatologist who is ordered aquatic therapy for her fibromyalgia as well as her neck and upper extremity symptoms  The patient is currently in the process of setting this up  The patient did have epidural steroid injections and SI joint injections without any relief in the past   She has undergone most of the workup for a spinal cord stimulator trial for her low back and lower extremity symptoms, however she is having difficulty finding a psychologist that is affordable in order to obtain her psychological evaluation  The patient would like to move forward with the spinal cord stimulator trial if she can complete the evaluation  The patient is currently taking Lyrica 150 mg t i d  and tizanidine 2-4 mg q 8 hours p r n  with approximately 20-25% relief  She denies any side effects from the medications  The patient was ordered physical therapy for her neck and upper extremity symptoms, however was unable to participate in physical therapy as she was having transportation issues   Now that her transportation issues have resolved she will proceed with physical therapy as ordered  The patient rates her pain as 6/10 on the pain is worse at night  The pain is constant and described as burning, dull, aching, and pressure like  The pain is worse with standing, walking, bending, and exercise  The pain is alleviated with relaxation, sitting, and lying down  I have personally reviewed and/or updated the patient's past medical history, past surgical history, family history, social history, allergies, and vital signs today  Other than as stated above, the patient denies any interval changes in medications, medical condition, mental condition, symptoms, or allergies since the last office visit  Review of Systems:    Review of Systems   Constitutional: Negative for fever and unexpected weight change  HENT: Negative for trouble swallowing  Eyes: Negative for visual disturbance  Respiratory: Negative for shortness of breath and wheezing  Cardiovascular: Negative for chest pain and palpitations  Gastrointestinal: Positive for constipation  Negative for diarrhea, nausea and vomiting  Endocrine: Negative for cold intolerance, heat intolerance and polydipsia  Genitourinary: Negative for difficulty urinating and frequency  Musculoskeletal: Negative for arthralgias, gait problem, joint swelling and myalgias  Difficulty walking  Decreased rom  Joint stiffness   Skin: Negative for rash  Neurological: Negative for dizziness, seizures, syncope, weakness and headaches  Hematological: Does not bruise/bleed easily  Psychiatric/Behavioral: Negative for dysphoric mood  All other systems reviewed and are negative          Past Medical History:   Diagnosis Date    Abnormal echocardiogram     Abnormal stress test     Anxiety     Asthma     Brachial neuritis     Depression     Disease of thyroid gland     Displacement of intervertebral disc of mid-cervical region     Fibromyalgia     Frequent headaches     GERD (gastroesophageal reflux disease)     Herniated nucleus pulposus     History of arthritis     History of asthma     History of cardiac disorder     History of chest pain     History of diverticulitis     History of fatigue     History of hearing loss     History of hemoptysis     History of herpes simplex infection     History of hiatal hernia     History of insect bite     INFECTED    History of memory loss     History of neck disorder     History of reflex sympathetic dystrophy     History of restless legs syndrome     History of shortness of breath     History of spinal stenosis     Hyperlipidemia     Hyperlipidemia     Hypertension     Skin rash     Somnolence, daytime     Spinal stenosis of cervical region        Past Surgical History:   Procedure Laterality Date    BACK SURGERY      BACK SURGERY      LOWER BACK SURGERY    CARDIAC CATHETERIZATION      HISTORY OF CORONARY ANGIOGRAPHY WITH CONCOMITANT LEFT HEART CATHETERIZATION    CORONARY ANGIOPLASTY WITH STENT PLACEMENT      EAR SURGERY      TONSILLECTOMY      TYMPANOPLASTY         Family History   Problem Relation Age of Onset    Coronary artery disease Mother         ATHEROMA    Heart disease Mother     Diabetes Mother     Hypertension Mother     No Known Problems Father         NO PERTINENT FAMILY HISTORY    Coronary artery disease Sister         ATHEROMA    Heart disease Sister     Stroke Sister     Diabetes Sister     Hypertension Sister        Social History     Occupational History    Not on file       Social History Main Topics    Smoking status: Former Smoker     Quit date: 2012    Smokeless tobacco: Former User      Comment: quit in 2013    Alcohol use No    Drug use: Yes      Comment: USES [facility] [address]    Sexual activity: Not on file         Current Outpatient Prescriptions:     albuterol (PROVENTIL HFA,VENTOLIN HFA) 90 mcg/act inhaler, Inhale 2 puffs every 6 (six) hours as needed for wheezing, Disp: 18 g, Rfl: 0    aspirin 81 mg chewable tablet, Chew 81 mg daily, Disp: , Rfl:     atorvastatin (LIPITOR) 80 mg tablet, TAKE 1 TABLET BY MOUTH EVERY DAY, Disp: 90 tablet, Rfl: 2    citalopram (CeleXA) 20 mg tablet, Take 1 tablet (20 mg total) by mouth daily, Disp: 30 tablet, Rfl: 3    clopidogrel (PLAVIX) 75 mg tablet, TAKE 4 TABLETS TODAY THEN 1 TABLET DAILY, Disp: 90 tablet, Rfl: 3    ergocalciferol (VITAMIN D2) 50,000 units, Take 50,000 Units by mouth once a week, Disp: , Rfl: 0    famotidine (PEPCID) 40 MG tablet, Take 1 tablet (40 mg total) by mouth daily, Disp: 90 tablet, Rfl: 1    fluticasone (FLONASE) 50 mcg/act nasal spray, 1 spray into each nostril daily, Disp: 16 g, Rfl: 0    fluticasone-salmeterol (ADVAIR) 250-50 mcg/dose inhaler, Inhale 1 puff every 12 (twelve) hours as needed (sob), Disp: 1 each, Rfl: 1    hydrOXYzine HCL (ATARAX) 25 mg tablet, Take 1 tablet (25 mg total) by mouth 2 (two) times a day as needed for itching, Disp: 90 tablet, Rfl: 1    levothyroxine 125 mcg tablet, TAKE 1 TABLET DAILY  , Disp: 30 tablet, Rfl: 5    losartan (COZAAR) 25 mg tablet, TAKE 1 TABLET DAILY  , Disp: 30 tablet, Rfl: 5    metoprolol succinate (TOPROL-XL) 25 mg 24 hr tablet, Take 25 mg by mouth daily, Disp: , Rfl:     pantoprazole (PROTONIX) 40 mg tablet, Take 1 tablet (40 mg total) by mouth daily, Disp: 90 tablet, Rfl: 0    pregabalin (LYRICA) 150 mg capsule, Take 1 capsule (150 mg total) by mouth 3 (three) times a day, Disp: 90 capsule, Rfl: 2    PROAIR RESPICLICK 446 (90 Base) MCG/ACT AEPB, , Disp: , Rfl:     silver sulfadiazine (SILVADENE,SSD) 1 % cream, Apply topically daily, Disp: 50 g, Rfl: 0    tiZANidine (ZANAFLEX) 2 mg tablet, Take 1 tablet (2 mg total) by mouth every 8 (eight) hours as needed for muscle spasms, Disp: 90 tablet, Rfl: 2    triamcinolone (KENALOG) 0 1 % cream, Apply topically 2 (two) times a day, Disp: 80 g, Rfl: 1    zolpidem (AMBIEN) 10 mg tablet, Take 1 tablet (10 mg total) by mouth daily at bedtime as needed for sleep, Disp: 30 tablet, Rfl: 0    valACYclovir (VALTREX) 1,000 mg tablet, Take 1 tablet (1,000 mg total) by mouth 3 (three) times a day for 10 days, Disp: 30 tablet, Rfl: 1    Allergies   Allergen Reactions    Augmentin [Amoxicillin-Pot Clavulanate] Nausea Only     PT stated she only allergic to medication AUGMENTIN       Physical Exam:    /60   Temp 97 7 °F (36 5 °C)   Wt 81 6 kg (180 lb)   BMI 30 42 kg/m²     Constitutional: normal, well developed, well nourished, alert, in no distress and non-toxic and no overt pain behavior  Eyes: anicteric  HEENT: grossly intact  Neck: supple, symmetric, trachea midline and no masses   Pulmonary:even and unlabored  Cardiovascular:No edema or pitting edema present  Skin:Normal without rashes or lesions and well hydrated  Psychiatric:Mood and affect appropriate  Neurologic:Cranial Nerves II-XII grossly intact  Musculoskeletal:normal gait  Bilateral cervical paraspinals and trapezii tender to palpation ropy in texture  Bilateral upper extremity strength 5/5 in all muscle groups  Negative Spurling's bilaterally  Bilateral lumbar paraspinals tender to palpation and ropy in texture from L2-L5  Bilateral SI joints tender to palpation  Bilateral lower extremity strength 5/5 in all muscle groups  Negative seated straight leg raise bilaterally  Imaging  No orders to display   MRI thoracic spine wo contrast   Status: Final result   PACS Images     Show images for MRI thoracic spine wo contrast   Order Report      Order Details   Study Result     MRI THORACIC SPINE WITHOUT CONTRAST     INDICATION:  32-year-old female, chronic back and bilateral leg pain     COMPARISON:  None      TECHNIQUE:  Sagittal T1, sagittal T2, sagittal inversion recovery, axial T2,  axial 2D MERGE      IMAGE QUALITY: Diagnostic      FINDINGS:     ALIGNMENT: Normal alignment of the thoracic spine  No compression fracture  No subluxation    No scoliosis      MARROW SIGNAL:  Normal marrow signal is identified within the visualized bony structures  No discrete marrow lesion      THORACIC CORD: Normal signal within the thoracic cord      PARAVERTEBRAL SOFT TISSUES:  Normal      THORACIC DEGENERATIVE CHANGE:   Mild degenerative spondylosis is present throughout the mid to lower thoracic segments  No evidence of disc herniation, central canal or foraminal stenosis      IMPRESSION:  Mild multilevel degenerative spondylosis     No evidence of disc herniation, central canal or foraminal stenosis           Workstation performed: [id number]          No orders of the defined types were placed in this encounter

## 2018-08-02 NOTE — TELEPHONE ENCOUNTER
Feliciano Crooks,    This patient is interested in a spinal cord stimulator trial, however she is having difficulty finding an affordable psychologist to get the psychological evaluation  The only when she was able to fine was charging 250 dollars out-of-pocket which she is unable to afford it    Are there any other psychologists in the area that may be more affordable that could evaluate the patient that you know of?

## 2018-08-06 NOTE — TELEPHONE ENCOUNTER
Attempt to reach pt, LM informing her that she may try [facility] or Dr Morris Aragon, where she would have to pay OOP put he generally works with patients whose insurance does not cover evals

## 2018-08-07 DIAGNOSIS — R21 SKIN RASH: ICD-10-CM

## 2018-08-07 RX ORDER — HYDROXYZINE HYDROCHLORIDE 25 MG/1
25 TABLET, FILM COATED ORAL 2 TIMES DAILY PRN
Qty: 180 TABLET | Refills: 1 | Status: SHIPPED | OUTPATIENT
Start: 2018-08-07 | End: 2019-04-15 | Stop reason: SDUPTHER

## 2018-08-22 ENCOUNTER — EVALUATION (OUTPATIENT)
Dept: PHYSICAL THERAPY | Age: 58
End: 2018-08-22
Payer: COMMERCIAL

## 2018-08-22 ENCOUNTER — TRANSCRIBE ORDERS (OUTPATIENT)
Dept: PHYSICAL THERAPY | Age: 58
End: 2018-08-22

## 2018-08-22 DIAGNOSIS — G89.29 CHRONIC NECK PAIN: Primary | ICD-10-CM

## 2018-08-22 DIAGNOSIS — M54.2 CERVICALGIA: Primary | ICD-10-CM

## 2018-08-22 DIAGNOSIS — G47.00 INSOMNIA, UNSPECIFIED TYPE: ICD-10-CM

## 2018-08-22 DIAGNOSIS — M54.2 CHRONIC NECK PAIN: Primary | ICD-10-CM

## 2018-08-22 PROCEDURE — G8981 BODY POS CURRENT STATUS: HCPCS | Performed by: PHYSICAL THERAPIST

## 2018-08-22 PROCEDURE — G8982 BODY POS GOAL STATUS: HCPCS | Performed by: PHYSICAL THERAPIST

## 2018-08-22 PROCEDURE — 97162 PT EVAL MOD COMPLEX 30 MIN: CPT | Performed by: PHYSICAL THERAPIST

## 2018-08-22 PROCEDURE — G8990 OTHER PT/OT CURRENT STATUS: HCPCS | Performed by: PHYSICAL THERAPIST

## 2018-08-22 PROCEDURE — G8991 OTHER PT/OT GOAL STATUS: HCPCS | Performed by: PHYSICAL THERAPIST

## 2018-08-22 RX ORDER — ZOLPIDEM TARTRATE 10 MG/1
10 TABLET ORAL
Qty: 30 TABLET | Refills: 0 | Status: SHIPPED | OUTPATIENT
Start: 2018-08-22 | End: 2018-09-21 | Stop reason: SDUPTHER
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
## 2018-08-29 ENCOUNTER — OFFICE VISIT (OUTPATIENT)
Dept: PHYSICAL THERAPY | Age: 58
End: 2018-08-29
Payer: COMMERCIAL

## 2018-08-29 DIAGNOSIS — G89.29 CHRONIC NECK PAIN: Primary | ICD-10-CM

## 2018-08-29 DIAGNOSIS — M54.2 CHRONIC NECK PAIN: Primary | ICD-10-CM

## 2018-08-29 PROCEDURE — 97113 AQUATIC THERAPY/EXERCISES: CPT
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
## 2018-08-31 ENCOUNTER — OFFICE VISIT (OUTPATIENT)
Dept: PHYSICAL THERAPY | Age: 58
End: 2018-08-31
Payer: COMMERCIAL

## 2018-08-31 DIAGNOSIS — G89.29 CHRONIC NECK PAIN: Primary | ICD-10-CM

## 2018-08-31 DIAGNOSIS — M54.2 CHRONIC NECK PAIN: Primary | ICD-10-CM

## 2018-08-31 PROCEDURE — 97113 AQUATIC THERAPY/EXERCISES: CPT
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
## 2018-09-02 DIAGNOSIS — B00.1 RECURRENT COLD SORES: ICD-10-CM

## 2018-09-02 DIAGNOSIS — E03.9 HYPOTHYROIDISM, UNSPECIFIED TYPE: ICD-10-CM

## 2018-09-02 RX ORDER — VALACYCLOVIR HYDROCHLORIDE 1 G/1
TABLET, FILM COATED ORAL
Qty: 30 TABLET | Refills: 1 | Status: SHIPPED | OUTPATIENT
Start: 2018-09-02 | End: 2018-10-28 | Stop reason: HOSPADM

## 2018-09-02 RX ORDER — LEVOTHYROXINE SODIUM 0.12 MG/1
TABLET ORAL
Qty: 30 TABLET | Refills: 5 | Status: SHIPPED | OUTPATIENT
Start: 2018-09-02 | End: 2019-03-14 | Stop reason: SDUPTHER

## 2018-09-05 ENCOUNTER — TELEPHONE (OUTPATIENT)
Dept: PAIN MEDICINE | Facility: MEDICAL CENTER | Age: 58
End: 2018-09-05

## 2018-09-05 DIAGNOSIS — K21.9 GASTROESOPHAGEAL REFLUX DISEASE, ESOPHAGITIS PRESENCE NOT SPECIFIED: ICD-10-CM

## 2018-09-05 RX ORDER — PANTOPRAZOLE SODIUM 40 MG/1
40 TABLET, DELAYED RELEASE ORAL DAILY
Qty: 90 TABLET | Refills: 0 | Status: SHIPPED | OUTPATIENT
Start: 2018-09-05 | End: 2019-01-14 | Stop reason: SDUPTHER

## 2018-09-05 NOTE — TELEPHONE ENCOUNTER
SW patient, states that she started aquatic therapy last week on Wednesday  On Friday her arm started hurting so bad she was unable to continue the session  Her PT therapists said she should contact our office before continuing the therapy  Patient states that she has been putting ice to the area since Friday and it is not helping  Ask patient if she s/w the PT therapist about starting a less strenuous exercise until she could get some relief and her arm was stronger  Patient verbalized she did not because the PT said to call her doctor first   Please advise

## 2018-09-05 NOTE — TELEPHONE ENCOUNTER
Patient can hold off on physical therapy for the next week and then restart  Let her arm recover    Patient to continue with Lyrica and tizanidine as prescribed

## 2018-09-05 NOTE — TELEPHONE ENCOUNTER
S/W patient made aware of JW recommendations   Patient verbalized understanding and appreciative of c/b

## 2018-09-05 NOTE — TELEPHONE ENCOUNTER
Patient called stating she has been doing water therapy  Stated since starting therapy her L arm hurts and she doesn't know what to do   C/b [phone]

## 2018-09-10 ENCOUNTER — APPOINTMENT (OUTPATIENT)
Dept: PHYSICAL THERAPY | Age: 58
End: 2018-09-10
Payer: COMMERCIAL

## 2018-09-12 ENCOUNTER — TELEPHONE (OUTPATIENT)
Dept: INTERNAL MEDICINE CLINIC | Facility: CLINIC | Age: 58
End: 2018-09-12

## 2018-09-12 NOTE — TELEPHONE ENCOUNTER
Our office no longer fills pt's Tawana Carpenter through pain management now fills medication  It was last filled on 8/2/18 for 90 and 3 refills  Pt needs to call pharmacy for medication refill
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
## 2018-09-12 NOTE — TELEPHONE ENCOUNTER
Please check PMED for patients lyrica  Patient would need to be seen for bite on her face, as she was not seen for this recently  Please call and schedule

## 2018-09-14 ENCOUNTER — OFFICE VISIT (OUTPATIENT)
Dept: PHYSICAL THERAPY | Age: 58
End: 2018-09-14
Payer: COMMERCIAL

## 2018-09-14 DIAGNOSIS — G89.29 CHRONIC NECK PAIN: Primary | ICD-10-CM

## 2018-09-14 DIAGNOSIS — M54.2 CHRONIC NECK PAIN: Primary | ICD-10-CM

## 2018-09-14 PROCEDURE — G8978 MOBILITY CURRENT STATUS: HCPCS | Performed by: PHYSICAL THERAPIST

## 2018-09-14 PROCEDURE — 97113 AQUATIC THERAPY/EXERCISES: CPT

## 2018-09-14 PROCEDURE — G8979 MOBILITY GOAL STATUS: HCPCS | Performed by: PHYSICAL THERAPIST

## 2018-09-14 NOTE — PROGRESS NOTES
Daily Note     Today's date: 2018  Patient name: Samantha Chaidez  : 1960  MRN: [id number]  Referring provider: Gabe Hicks MD  Dx:   Encounter Diagnosis     ICD-10-CM    1  Chronic neck pain M54 2     G89 29                   Subjective: Reports pain as 7/10  Pain diffuse all over body  Reports she took time off therapy due to L arm pain after last tx      Objective: See treatment diary below      Assessment: Decreased arm ex emphasis due to pain issues Added in sidesteps for more activity in water  Will monitpr pt response as pt has delayed pain reactions      Plan: Cont with plan of care         Precautions: PMHx includes cLBP that has required two surgeries ( and ) and resulted in L-sided nerve damage  Fibromyalgia  Opioid addiction       Daily Treatment Diary   Manual                                                                  Exercise Diary      Pool  reg  sidesteps 2 laps pre 2 laps pre 2laps pre  SS x 5 reps     Corner st 10x 10sec  At corner  standing on pool speedo step Declined due to pain      Chin tucks seated   20x 3sec  20sec x3 3sec x10     Shoulder rolls seated retro  20x  20x 20x     UT st B  20sec 5x  20sec x5 [id number]     Cervical AROM 4 ways  20x 20 20     LS st B  20sec 3x    20sec x3 20sx3     Jets at bench seated    5 min  5min 5min     shld extension and rows, horiz ab,add  20 Rows x 15  horiz abd/add x15

## 2018-09-21 DIAGNOSIS — G47.00 INSOMNIA, UNSPECIFIED TYPE: ICD-10-CM

## 2018-09-21 RX ORDER — ZOLPIDEM TARTRATE 10 MG/1
10 TABLET ORAL
Qty: 30 TABLET | Refills: 0 | Status: SHIPPED | OUTPATIENT
Start: 2018-09-21 | End: 2018-10-22 | Stop reason: SDUPTHER

## 2018-10-02 ENCOUNTER — EVALUATION (OUTPATIENT)
Dept: PHYSICAL THERAPY | Age: 58
End: 2018-10-02
Payer: COMMERCIAL

## 2018-10-02 DIAGNOSIS — G89.29 CHRONIC NECK PAIN: Primary | ICD-10-CM

## 2018-10-02 DIAGNOSIS — M54.42 CHRONIC LEFT-SIDED LOW BACK PAIN WITH LEFT-SIDED SCIATICA: ICD-10-CM

## 2018-10-02 DIAGNOSIS — G89.29 CHRONIC LEFT-SIDED LOW BACK PAIN WITH LEFT-SIDED SCIATICA: ICD-10-CM

## 2018-10-02 DIAGNOSIS — M54.2 CHRONIC NECK PAIN: Primary | ICD-10-CM

## 2018-10-02 DIAGNOSIS — M79.7 FIBROMYALGIA: ICD-10-CM

## 2018-10-02 PROCEDURE — G8990 OTHER PT/OT CURRENT STATUS: HCPCS | Performed by: PHYSICAL THERAPIST

## 2018-10-02 PROCEDURE — G8991 OTHER PT/OT GOAL STATUS: HCPCS | Performed by: PHYSICAL THERAPIST

## 2018-10-02 PROCEDURE — 97164 PT RE-EVAL EST PLAN CARE: CPT | Performed by: PHYSICAL THERAPIST
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
## 2018-10-05 ENCOUNTER — APPOINTMENT (OUTPATIENT)
Dept: PHYSICAL THERAPY | Age: 58
End: 2018-10-05
Payer: COMMERCIAL

## 2018-10-05 ENCOUNTER — TRANSCRIBE ORDERS (OUTPATIENT)
Dept: PHYSICAL THERAPY | Age: 58
End: 2018-10-05

## 2018-10-05 DIAGNOSIS — M54.2 CERVICALGIA: Primary | ICD-10-CM

## 2018-10-08 ENCOUNTER — APPOINTMENT (OUTPATIENT)
Dept: PHYSICAL THERAPY | Age: 58
End: 2018-10-08
Payer: COMMERCIAL

## 2018-10-12 ENCOUNTER — OFFICE VISIT (OUTPATIENT)
Dept: PHYSICAL THERAPY | Age: 58
End: 2018-10-12
Payer: COMMERCIAL

## 2018-10-12 DIAGNOSIS — M54.2 CHRONIC NECK PAIN: ICD-10-CM

## 2018-10-12 DIAGNOSIS — G89.29 CHRONIC NECK PAIN: ICD-10-CM

## 2018-10-12 DIAGNOSIS — M79.7 FIBROMYALGIA: Primary | ICD-10-CM

## 2018-10-12 PROCEDURE — 97113 AQUATIC THERAPY/EXERCISES: CPT

## 2018-10-12 NOTE — PROGRESS NOTES
Daily Note     Today's date: 10/12/2018  Patient name: Tung Jensen  : 1960  MRN: [id number]  Referring provider: Tiffani Gomez MD  Dx:   Encounter Diagnosis     ICD-10-CM    1  Fibromyalgia M79 7    2  Chronic neck pain M54 2     G89 29                   Subjective: Pain rated at 7/10   Most pain in ankle & foot that does not change when unloaded    Objective: See treatment diary below  Manual  10/02            CP and TENS to cervical and lumbar spine seated              STM to cervical and thoracic spine and bilateral UT regions while seated                                                                                      Exercise Diary        Pool  reg  sidesteps 2 laps pre 2 laps pre 2laps pre  SS x 5 reps       Corner st 10x 10sec  At corner  standing on pool speedo step Declined due to pain         Chin tucks seated    20x 3sec  20sec x3 3sec x10       Shoulder rolls seated retro  20x  20x 20x       UT st B  20sec 5x  20sec x5 [id number]       Cervical AROM 4 ways  20x 20 20       LS st B  20sec 3x     20sec x3 20sx3       Jets at bench seated     5 min  5min 5min       shld extension and rows, horiz ab,add   20 Rows x 15  horiz abd/add x15       Pool:  10/12           Water walking pre and post             Seated ankle arom 15           Seated LAQ and hip flexion:B:  20x           DLS abdominal bracing  + pain           Postural correction slough over correct             Cervical spine arom:  10x           Scapular squeeze  15           Standing slr x 3:B:  15           Standing HR and TR:B:  15           SLS and Tandem stance:B:             Side stepping             Tandem ambulation             Shoulder scaption and flexion:B:  +pain                         Land:             pulleys             Scapular squeeze             Postural correction slouch over correct             Cervical spine arom: seated             Shoulder table slides flexion and scaption:B:             Biceps curls             Supine shoulder flexion and bench press with spc             Right side lying left shoulder abduction and ER:             Supine DLS abdominal bracing             LTR:B:             DLS with hip flexion             DLS with slr flexoin                                           Assessment: Tolerated treatment fair  Painful in legs, back and neck with most ex which limits ex progression  Pool is still best option to exercise without as much pain increase  No pain improvement with pool ex        Plan: Cont PT

## 2018-10-15 ENCOUNTER — TELEPHONE (OUTPATIENT)
Dept: PAIN MEDICINE | Facility: MEDICAL CENTER | Age: 58
End: 2018-10-15

## 2018-10-15 NOTE — TELEPHONE ENCOUNTER
Pt left a message in voicemail at 1:25 this afternoon, stating that she has tried physical therapy several times and it just causes her more pain for several days after each session  Pt states she is not able to continue  Asking for a call back to advise  Pt left a call back number of [phone]

## 2018-10-15 NOTE — TELEPHONE ENCOUNTER
S/w pt, she said she started therapy and has gone twice and feels like she can not go anymore d/t the pain it is causing during and after  Pt said they wanted her to go twice a week but she needs up to a week to recuperate from it  Pt said that her insurance company is requiring 6 weeks prior to her MRI  Pt wondering if she could get an MRI if she went to the ER for her pain  Per PT notes it says plan is continued PT  Pt wondering what else she can do  Any suggestions?

## 2018-10-15 NOTE — TELEPHONE ENCOUNTER
No, going to the ER will not change the insurance is requirements    My suggestion is to continue physical therapy so that we can get the diagnostic imaging if she continues to not respond to conservative treatment

## 2018-10-17 ENCOUNTER — DOCUMENTATION (OUTPATIENT)
Dept: PHYSICAL THERAPY | Age: 58
End: 2018-10-17

## 2018-10-17 DIAGNOSIS — M79.7 FIBROMYALGIA: Primary | ICD-10-CM

## 2018-10-17 DIAGNOSIS — G89.29 CHRONIC NECK PAIN: ICD-10-CM

## 2018-10-17 DIAGNOSIS — G89.29 CHRONIC LEFT-SIDED LOW BACK PAIN WITH LEFT-SIDED SCIATICA: ICD-10-CM

## 2018-10-17 DIAGNOSIS — M54.2 CHRONIC NECK PAIN: ICD-10-CM

## 2018-10-17 DIAGNOSIS — M54.42 CHRONIC LEFT-SIDED LOW BACK PAIN WITH LEFT-SIDED SCIATICA: ICD-10-CM

## 2018-10-17 NOTE — PROGRESS NOTES
I spoke with patient after 10-12-18 aqua PT visit and again today with patient noting low back pain was aggravated to severe levels immediately after aqua therapy with pt noting she not only had difficulty time walking out of the pool but she noted she had difficulty with getting dressing and walking out of the building and has had an increase in low back and bilateral le pain since her 10-12-18 aqua therapy PT visit  Plus, she noted after initial trial aqua therapy for chronic neck pain she had left arm pain aggravation that required holding PT for a week  Patient has indicated after trial of aqua therapy for neck pain and low back pain with left sided sciatica that pain aggravation increased to severe levels that limited all functional activities for over a week  Thus, PT recommends d/c of aqua PT to hep

## 2018-10-22 DIAGNOSIS — G47.00 INSOMNIA, UNSPECIFIED TYPE: ICD-10-CM

## 2018-10-23 ENCOUNTER — APPOINTMENT (EMERGENCY)
Dept: CT IMAGING | Facility: HOSPITAL | Age: 58
End: 2018-10-23
Payer: COMMERCIAL

## 2018-10-23 ENCOUNTER — OFFICE VISIT (OUTPATIENT)
Dept: INTERNAL MEDICINE CLINIC | Facility: CLINIC | Age: 58
End: 2018-10-23
Payer: COMMERCIAL

## 2018-10-23 ENCOUNTER — HOSPITAL ENCOUNTER (EMERGENCY)
Facility: HOSPITAL | Age: 58
Discharge: HOME | End: 2018-10-24
Attending: EMERGENCY MEDICINE
Payer: COMMERCIAL

## 2018-10-23 VITALS
SYSTOLIC BLOOD PRESSURE: 167 MMHG | RESPIRATION RATE: 16 BRPM | BODY MASS INDEX: 29.81 KG/M2 | HEART RATE: 56 BPM | TEMPERATURE: 97.6 F | OXYGEN SATURATION: 96 % | WEIGHT: 176.37 LBS | DIASTOLIC BLOOD PRESSURE: 72 MMHG

## 2018-10-23 VITALS
TEMPERATURE: 98.7 F | BODY MASS INDEX: 29.39 KG/M2 | HEART RATE: 70 BPM | SYSTOLIC BLOOD PRESSURE: 130 MMHG | HEIGHT: 65 IN | OXYGEN SATURATION: 97 % | WEIGHT: 176.4 LBS | DIASTOLIC BLOOD PRESSURE: 80 MMHG

## 2018-10-23 DIAGNOSIS — R10.32 LLQ ABDOMINAL PAIN: Primary | ICD-10-CM

## 2018-10-23 DIAGNOSIS — K57.92 DIVERTICULITIS: ICD-10-CM

## 2018-10-23 DIAGNOSIS — Z87.19 HISTORY OF DIVERTICULITIS: ICD-10-CM

## 2018-10-23 DIAGNOSIS — R10.824 LEFT LOWER QUADRANT ABDOMINAL TENDERNESS WITH REBOUND TENDERNESS: Primary | ICD-10-CM

## 2018-10-23 LAB
ALBUMIN SERPL BCP-MCNC: 3.9 G/DL (ref 3.5–5)
ALP SERPL-CCNC: 132 U/L (ref 46–116)
ALT SERPL W P-5'-P-CCNC: 44 U/L (ref 12–78)
ANION GAP SERPL CALCULATED.3IONS-SCNC: 7 MMOL/L (ref 4–13)
AST SERPL W P-5'-P-CCNC: 31 U/L (ref 5–45)
BASOPHILS # BLD AUTO: 0.03 THOUSANDS/ΜL (ref 0–0.1)
BASOPHILS NFR BLD AUTO: 0 % (ref 0–1)
BILIRUB SERPL-MCNC: 0.35 MG/DL (ref 0.2–1)
BILIRUB UR QL STRIP: ABNORMAL
BUN SERPL-MCNC: 13 MG/DL (ref 5–25)
CALCIUM SERPL-MCNC: 8.9 MG/DL (ref 8.3–10.1)
CHLORIDE SERPL-SCNC: 104 MMOL/L (ref 100–108)
CLARITY UR: CLEAR
CO2 SERPL-SCNC: 29 MMOL/L (ref 21–32)
COLOR UR: YELLOW
COLOR, POC: YELLOW
CREAT SERPL-MCNC: 1.03 MG/DL (ref 0.6–1.3)
EOSINOPHIL # BLD AUTO: 0.06 THOUSAND/ΜL (ref 0–0.61)
EOSINOPHIL NFR BLD AUTO: 1 % (ref 0–6)
ERYTHROCYTE [DISTWIDTH] IN BLOOD BY AUTOMATED COUNT: 13.1 % (ref 11.6–15.1)
GFR SERPL CREATININE-BSD FRML MDRD: 60 ML/MIN/1.73SQ M
GLUCOSE SERPL-MCNC: 132 MG/DL (ref 65–140)
GLUCOSE UR STRIP-MCNC: NEGATIVE MG/DL
HCT VFR BLD AUTO: 39.9 % (ref 34.8–46.1)
HGB BLD-MCNC: 13.9 G/DL (ref 11.5–15.4)
HGB UR QL STRIP.AUTO: NEGATIVE
IMM GRANULOCYTES # BLD AUTO: 0.03 THOUSAND/UL (ref 0–0.2)
IMM GRANULOCYTES NFR BLD AUTO: 0 % (ref 0–2)
KETONES UR STRIP-MCNC: ABNORMAL MG/DL
LACTATE SERPL-SCNC: 1.1 MMOL/L (ref 0.5–2)
LEUKOCYTE ESTERASE UR QL STRIP: NEGATIVE
LIPASE SERPL-CCNC: 97 U/L (ref 73–393)
LYMPHOCYTES # BLD AUTO: 2.88 THOUSANDS/ΜL (ref 0.6–4.47)
LYMPHOCYTES NFR BLD AUTO: 36 % (ref 14–44)
MCH RBC QN AUTO: 30.2 PG (ref 26.8–34.3)
MCHC RBC AUTO-ENTMCNC: 34.8 G/DL (ref 31.4–37.4)
MCV RBC AUTO: 87 FL (ref 82–98)
MONOCYTES # BLD AUTO: 0.41 THOUSAND/ΜL (ref 0.17–1.22)
MONOCYTES NFR BLD AUTO: 5 % (ref 4–12)
NEUTROPHILS # BLD AUTO: 4.51 THOUSANDS/ΜL (ref 1.85–7.62)
NEUTS SEG NFR BLD AUTO: 58 % (ref 43–75)
NITRITE UR QL STRIP: NEGATIVE
NRBC BLD AUTO-RTO: 0 /100 WBCS
PH UR STRIP.AUTO: 5 [PH] (ref 4.5–8)
PLATELET # BLD AUTO: 256 THOUSANDS/UL (ref 149–390)
PMV BLD AUTO: 10.3 FL (ref 8.9–12.7)
POTASSIUM SERPL-SCNC: 3.4 MMOL/L (ref 3.5–5.3)
PROT SERPL-MCNC: 8.3 G/DL (ref 6.4–8.2)
PROT UR STRIP-MCNC: NEGATIVE MG/DL
RBC # BLD AUTO: 4.6 MILLION/UL (ref 3.81–5.12)
SODIUM SERPL-SCNC: 140 MMOL/L (ref 136–145)
SP GR UR STRIP.AUTO: >=1.03 (ref 1–1.03)
UROBILINOGEN UR QL STRIP.AUTO: 0.2 E.U./DL
WBC # BLD AUTO: 7.92 THOUSAND/UL (ref 4.31–10.16)

## 2018-10-23 PROCEDURE — 96375 TX/PRO/DX INJ NEW DRUG ADDON: CPT

## 2018-10-23 PROCEDURE — 74177 CT ABD & PELVIS W/CONTRAST: CPT

## 2018-10-23 PROCEDURE — 80053 COMPREHEN METABOLIC PANEL: CPT | Performed by: EMERGENCY MEDICINE

## 2018-10-23 PROCEDURE — 36415 COLL VENOUS BLD VENIPUNCTURE: CPT | Performed by: EMERGENCY MEDICINE

## 2018-10-23 PROCEDURE — 81003 URINALYSIS AUTO W/O SCOPE: CPT

## 2018-10-23 PROCEDURE — 85025 COMPLETE CBC W/AUTO DIFF WBC: CPT | Performed by: EMERGENCY MEDICINE

## 2018-10-23 PROCEDURE — 96367 TX/PROPH/DG ADDL SEQ IV INF: CPT

## 2018-10-23 PROCEDURE — 96365 THER/PROPH/DIAG IV INF INIT: CPT

## 2018-10-23 PROCEDURE — 99284 EMERGENCY DEPT VISIT MOD MDM: CPT

## 2018-10-23 PROCEDURE — 83605 ASSAY OF LACTIC ACID: CPT | Performed by: EMERGENCY MEDICINE

## 2018-10-23 PROCEDURE — 83690 ASSAY OF LIPASE: CPT | Performed by: EMERGENCY MEDICINE

## 2018-10-23 PROCEDURE — 99214 OFFICE O/P EST MOD 30 MIN: CPT | Performed by: NURSE PRACTITIONER

## 2018-10-23 RX ORDER — VALACYCLOVIR HYDROCHLORIDE 1 G/1
1 TABLET, FILM COATED ORAL 3 TIMES DAILY
Refills: 1 | COMMUNITY
Start: 2018-10-04 | End: 2018-10-28 | Stop reason: HOSPADM

## 2018-10-23 RX ORDER — ZOLPIDEM TARTRATE 10 MG/1
10 TABLET ORAL
Qty: 30 TABLET | Refills: 2 | Status: SHIPPED | OUTPATIENT
Start: 2018-10-22 | End: 2018-10-28 | Stop reason: HOSPADM

## 2018-10-23 RX ORDER — CIPROFLOXACIN 2 MG/ML
400 INJECTION, SOLUTION INTRAVENOUS ONCE
Status: COMPLETED | OUTPATIENT
Start: 2018-10-23 | End: 2018-10-24

## 2018-10-23 RX ORDER — KETOROLAC TROMETHAMINE 30 MG/ML
30 INJECTION, SOLUTION INTRAMUSCULAR; INTRAVENOUS ONCE
Status: COMPLETED | OUTPATIENT
Start: 2018-10-23 | End: 2018-10-23

## 2018-10-23 RX ORDER — ONDANSETRON 2 MG/ML
4 INJECTION INTRAMUSCULAR; INTRAVENOUS ONCE
Status: COMPLETED | OUTPATIENT
Start: 2018-10-23 | End: 2018-10-23

## 2018-10-23 RX ORDER — MORPHINE SULFATE 4 MG/ML
4 INJECTION, SOLUTION INTRAMUSCULAR; INTRAVENOUS ONCE
Status: COMPLETED | OUTPATIENT
Start: 2018-10-23 | End: 2018-10-23

## 2018-10-23 RX ADMIN — ONDANSETRON 4 MG: 2 INJECTION INTRAMUSCULAR; INTRAVENOUS at 22:33

## 2018-10-23 RX ADMIN — CIPROFLOXACIN 400 MG: 2 INJECTION, SOLUTION INTRAVENOUS at 23:45

## 2018-10-23 RX ADMIN — MORPHINE SULFATE 4 MG: 4 INJECTION INTRAVENOUS at 22:35

## 2018-10-23 RX ADMIN — METRONIDAZOLE 500 MG: 500 INJECTION, SOLUTION INTRAVENOUS at 22:44

## 2018-10-23 RX ADMIN — KETOROLAC TROMETHAMINE 30 MG: 30 INJECTION, SOLUTION INTRAMUSCULAR at 22:35

## 2018-10-23 RX ADMIN — IOHEXOL 100 ML: 350 INJECTION, SOLUTION INTRAVENOUS at 23:13

## 2018-10-23 RX ADMIN — SODIUM CHLORIDE 1000 ML: 0.9 INJECTION, SOLUTION INTRAVENOUS at 22:33

## 2018-10-23 NOTE — TELEPHONE ENCOUNTER
Spoke with patient, states PT is making her pain worse, PT agrees, patient spoke w/[employer] insurance, who directed patient to have Dr Osmar Paris send a letter stating pain increases w/PT therefore she can have the MRI          Call back number for patient is [phone] to discuss

## 2018-10-23 NOTE — TELEPHONE ENCOUNTER
Please type note to sent to insurance company stating: The patient has been participating in physical therapy and has completed four sessions  The patient's symptoms are worsening with PT and she is no longer able to continue with physical therapy secondary to the pain    Since the patient is failing conservative therapy and is unable to continue with physical therapy secondary to worsening pain I am requesting an authorization for MRI of the cervical

## 2018-10-23 NOTE — TELEPHONE ENCOUNTER
Spoke w/ patient, returning call to nurse; tried to reach nurse through IM; nurse could not take call is assisting another patient; requests to update task and will call back patient   Patient states the letter is not for her to , but to have [facility] - Dr Thi Oneal take care of w/her insurance co  So she can have the MRI -- ASAP    Call back number  Is [phone], patient requests call back today

## 2018-10-23 NOTE — PROGRESS NOTES
Assessment/Plan:    No problem-specific Assessment & Plan notes found for this encounter  Diagnoses and all orders for this visit:    Left lower quadrant abdominal tenderness with rebound tenderness  -     Transfer to other facility    History of diverticulitis  -     Transfer to other facility    Other orders  -     valACYclovir (VALTREX) 1,000 mg tablet; Take 1 tablet by mouth 3 (three) times a day For 10 days      Outpatient testing and potential treatment was offered to the patient initially to assess her likely diagnosis of diverticulitis, however, the patient's significant and seemingly worsening abdominal pain made this option less feasible as the appointment  Progressed  It was suggested to the patient that she go to the emergency department for more urgent evaluation and prompt treatment, as well as symptom control  Initially the patient was reluctant, however, when she realized that her pain was worsening throughout the course of the appointment, she was agreeable to going to the emergency department tonight  Referral placed in chart  Patient to follow up post hospitalization  Subjective:      Patient ID: Montana Salinas is a 63 y o  female  Patient presents today for an acute visit  She reports a 3 day history of pain that started in her left lower back and radiated around to her left lower quadrant  She reports that the pain has worsened significantly today  She wrote describes the pain as constant sharp and stabbing  She feels that her lower abdomen is swollen and the pain is worsening to the point of having difficulty walking  While she denies a known fever, she does have the sensation of feeling off yesterday and takes Tylenol around the clock for her chronic back pain at baseline  She reports that her abdominal pain awoke her from an Ambien-induced sleep     She reports a history of diverticulitis in the past, most recently in 2014, which resulted in a 5 day hospital admission  She also reports nausea, but denies vomiting or diarrhea  She reports a decreased appetite  When asked about the left-sided back pain, she reports that her back is tender  She denies urinary symptoms at this time  She also denies a history of kidney stones  Abdominal Pain   This is a new problem  The current episode started in the past 7 days  The onset quality is sudden  The problem occurs constantly  The problem has been rapidly worsening  The pain is located in the LLQ and left flank  The pain is severe  The quality of the pain is sharp  The abdominal pain radiates to the back and left flank  Associated symptoms include anorexia, constipation (takes stool softener ) and nausea  Pertinent negatives include no arthralgias, belching, diarrhea, flatus, frequency, hematochezia, hematuria, melena or vomiting  The pain is aggravated by certain positions, movement and palpation  The pain is relieved by nothing  She has tried nothing for the symptoms  Diverticulitis in 2014       The following portions of the patient's history were reviewed and updated as appropriate: allergies, current medications, past family history, past medical history, past social history, past surgical history and problem list     Review of Systems   Constitutional: Negative for chills, diaphoresis and fatigue  Respiratory: Negative for shortness of breath  Cardiovascular: Positive for palpitations  Gastrointestinal: Positive for abdominal distention, anorexia, constipation (takes stool softener ) and nausea  Negative for anal bleeding, blood in stool, diarrhea, flatus, hematochezia, melena, rectal pain and vomiting  Genitourinary: Positive for flank pain  Negative for difficulty urinating, frequency and hematuria  Musculoskeletal: Positive for neck pain  Negative for arthralgias  Neurological: Positive for dizziness and light-headedness           Objective:      /80 (BP Location: Left arm, Patient Position: Sitting, Cuff Size: Large)   Pulse 70   Temp 98 7 °F (37 1 °C) (Oral)   Ht 5' 4 5" (1 638 m)   Wt 80 kg (176 lb 6 4 oz)   SpO2 97%   BMI 29 81 kg/m²          Physical Exam   Constitutional: She is oriented to person, place, and time  Patient lying on examination table, appears to be very uncomfortable  HENT:   Head: Normocephalic and atraumatic  Mouth/Throat: No oropharyngeal exudate  Eyes: Pupils are equal, round, and reactive to light  No scleral icterus  Neck: Normal range of motion  Neck supple  Cardiovascular: Normal rate, regular rhythm and normal heart sounds  Pulmonary/Chest: Effort normal and breath sounds normal  No respiratory distress  Abdominal: Bowel sounds are normal  She exhibits distension  She exhibits no fluid wave, no pulsatile midline mass and no mass  There is no hepatosplenomegaly  There is tenderness in the left lower quadrant  There is no rebound and no guarding  Musculoskeletal: Normal range of motion  Arms:  Neurological: She is alert and oriented to person, place, and time  Skin: Skin is warm and dry  Psychiatric: She has a normal mood and affect   Her behavior is normal  Judgment and thought content normal

## 2018-10-23 NOTE — PATIENT INSTRUCTIONS
Due to patient's significant pain and her symptoms, which are consistent with diverticulitis, outpatient vs ER testing/evaluation was discussed with patient  Pain is worsening and therefore patient has elected to go to the ER at [facility] [place] [facility]OS[facility]  Referral placed in chart

## 2018-10-23 NOTE — TELEPHONE ENCOUNTER
S/W pt  Asked pt for fax # she wants the letter faxed to  Pt stated she will either call her insurance company today or tomorrow and then the pt will call [facility] back with the fax number  Letter is at the  in the pt  envelope

## 2018-10-23 NOTE — TELEPHONE ENCOUNTER
Letter drafted, reviewed by Jazzmine Diego and Jazzmine Diego signed  LMOM for pt to C/B, C/B # provided  Put letter at  for pt to pickup

## 2018-10-23 NOTE — PROGRESS NOTES
MEDICAL STUDENT  Inpatient Progress Note for TRAINING ONLY  Not Part of Legal Medical Record       Progress Note - Stacey Edgar 63 y o  female MRN: [id number]    Unit/Bed#:  Encounter: [id number]      Assessment:  ***    Plan:  ***    Subjective:   ***    Objective:     Vitals: Blood pressure 130/80, pulse 70, temperature 98 7 °F (37 1 °C), temperature source Oral, height 5' 4 5" (1 638 m), weight 80 kg (176 lb 6 4 oz), SpO2 97 %  ,Body mass index is 29 81 kg/m²      [unfilled]    Physical Exam: {Exam, Complete:66094}     Invasive Devices          No matching active lines, drains, or airways          Lab, Imaging and other studies: {Results Review Statement:94460}  VTE Pharmacologic Prophylaxis: {Pharmacologic VTE Prophylaxis:[id number]}  VTE Mechanical Prophylaxis: {Mechanical VTE Prophylaxis:02545}

## 2018-10-24 RX ORDER — CIPROFLOXACIN 500 MG/1
500 TABLET, FILM COATED ORAL 2 TIMES DAILY
Qty: 14 TABLET | Refills: 0 | Status: ON HOLD | OUTPATIENT
Start: 2018-10-24 | End: 2018-10-28

## 2018-10-24 RX ORDER — METRONIDAZOLE 500 MG/1
500 TABLET ORAL EVERY 8 HOURS SCHEDULED
Qty: 21 TABLET | Refills: 0 | Status: ON HOLD | OUTPATIENT
Start: 2018-10-24 | End: 2018-10-28

## 2018-10-24 NOTE — DISCHARGE INSTRUCTIONS
Diverticulitis   WHAT YOU NEED TO KNOW:   Diverticulitis is a condition that causes small pockets along your intestine called diverticula to become inflamed or infected  This is caused by hard bowel movements, food, or bacteria that get stuck in the pockets  DISCHARGE INSTRUCTIONS:   Seek care immediately if:   · You have bowel movement or foul-smelling discharge leaking from your vagina or in your urine  · You have severe diarrhea  · You urinate less than usual or not at all  · You are not able to have a bowel movement  · You cannot stop vomiting  · You have severe abdominal pain, a fever, and your abdomen is larger than usual      · You have new or increased blood in your bowel movements  Contact your healthcare provider if:   · You have pain when you urinate  · Your symptoms get worse or do not go away  · You have questions or concerns about your condition or care  Medicines:   · Antibiotics  may be given to help prevent or treat a bacterial infection  · Prescription pain medicine  may be given  Ask your healthcare provider how to take this medicine safely  Some prescription pain medicines contain acetaminophen  Do not take other medicines that contain acetaminophen without talking to your healthcare provider  Too much acetaminophen may cause liver damage  Prescription pain medicine may cause constipation  Ask your healthcare provider how to prevent or treat constipation  · Take your medicine as directed  Contact your healthcare provider if you think your medicine is not helping or if you have side effects  Tell him or her if you are allergic to any medicine  Keep a list of the medicines, vitamins, and herbs you take  Include the amounts, and when and why you take them  Bring the list or the pill bottles to follow-up visits  Carry your medicine list with you in case of an emergency  Clear liquid diet:  A clear liquid diet includes any liquids that you can see through  Examples include water, ginger-jeramy, cranberry or apple juice, frozen fruit ice, or broth  Stay on a clear liquid diet until your symptoms are gone, or as directed  Follow up with your healthcare provider as directed: You may need to return for a colonoscopy  When your symptoms are gone, you may need a low-fat, high-fiber diet to help prevent diverticulitis from developing again  Your healthcare provider or dietitian can help you create meal plans  Write down your questions so you remember to ask them during your visits  © 2017 [facility] Information is for End User's use only and may not be sold, redistributed or otherwise used for commercial purposes  All illustrations and images included in CareNotes® are the copyrighted property of A D A M , Inc  or Declan Garcia  The above information is an  only  It is not intended as medical advice for individual conditions or treatments  Talk to your doctor, nurse or pharmacist before following any medical regimen to see if it is safe and effective for you  Full Liquid Diet - advance after 24 hours as tolerated, slowly  WHAT YOU NEED TO KNOW:   A full liquid diet includes only liquids and foods that are liquid at room temperature  You may need to follow this diet if you have trouble chewing or swallowing  You may also need to follow this diet if you have a severe intestinal illness or had surgery on your intestine  Your healthcare provider will tell you how long to follow this diet and when to start solid foods     DISCHARGE INSTRUCTIONS:   Liquids and foods to include:   · Fruit juices or pureed fruit without seeds    · Vegetable juices or pureed vegetables in broth     · Broth or strained cream soups    · Refined and strained cooked cereals thinned with milk or half-and-half creamer    · Flavored gelatin without chunks of fruit    · Plain ice cream, milk shakes, sherbet, or popsicles    · Yogurt, pudding, or soft custard    · Milk or liquid nutrition supplements    · Coffee, tea, sodas, water, or sports drinks    · Butter, margarine, or cream  Other things you should know about a full liquid diet:   · You may need nutrition supplements  if you will be on this diet for more than 5 to 7 days  The full liquid diet does not provide all the nutrients, vitamins, minerals, or calories that your body needs  · You may need other diet changes  if you have another medical condition such as diabetes, high blood pressure, or heart disease  For example, you may need to choose sugar-free, low-sodium, or low-fat foods as part of your full liquid diet  · Choose lactose-free liquids if you are lactose intolerant  Examples include acidophilus milk, lactose-free milk, soy milk, or lactose-free liquid nutrition supplements  © 2017 [address] [employer] Information is for End User's use only and may not be sold, redistributed or otherwise used for commercial purposes  All illustrations and images included in CareNotes® are the copyrighted property of [employer] Inc  or Declan Garcia  The above information is an  only  It is not intended as medical advice for individual conditions or treatments  Talk to your doctor, nurse or pharmacist before following any medical regimen to see if it is safe and effective for you

## 2018-10-24 NOTE — ED PROVIDER NOTES
History  Chief Complaint   Patient presents with    Abdominal Pain     Pt states "think I am having a flare up of diverticulitis  left side pain  nausea"     62 yo female with 3 days of LLQ abd pain which is worsening  Pt seen by PCP today who was going to start oral abx but did not like how tender she was as she has a hx of abscess and perf and sent her in  Pt very uncomfortable, car ride here hurt pt  History provided by:  Patient   used: No    Abdominal Pain   Pain location:  LLQ  Pain quality: aching    Pain radiates to:  L flank  Pain severity:  Severe  Onset quality:  Gradual  Duration:  3 days  Timing:  Constant  Progression:  Worsening  Chronicity:  New  Relieved by:  Nothing  Worsened by: Movement and palpation  Ineffective treatments:  None tried  Associated symptoms: anorexia, fatigue and nausea    Associated symptoms: no chest pain, no chills, no constipation, no cough, no diarrhea, no dysuria, no fever, no shortness of breath, no sore throat, no vaginal bleeding, no vaginal discharge and no vomiting    Risk factors: has not had multiple surgeries        Prior to Admission Medications   Prescriptions Last Dose Informant Patient Reported? Taking?    PROAIR RESPICLICK 482 (90 Base) MCG/ACT AEPB  Self Yes No   albuterol (PROVENTIL HFA,VENTOLIN HFA) 90 mcg/act inhaler  Self No No   Sig: Inhale 2 puffs every 6 (six) hours as needed for wheezing   aspirin 81 mg chewable tablet  Self Yes No   Sig: Chew 81 mg daily   atorvastatin (LIPITOR) 80 mg tablet  Self No No   Sig: TAKE 1 TABLET BY MOUTH EVERY DAY   citalopram (CeleXA) 20 mg tablet  Self No No   Sig: Take 1 tablet (20 mg total) by mouth daily   clopidogrel (PLAVIX) 75 mg tablet  Self No No   Sig: TAKE 4 TABLETS TODAY THEN 1 TABLET DAILY   ergocalciferol (VITAMIN D2) 50,000 units  Self Yes No   Sig: Take 50,000 Units by mouth once a week   famotidine (PEPCID) 40 MG tablet  Self No No   Sig: Take 1 tablet (40 mg total) by mouth daily   fluticasone (FLONASE) 50 mcg/act nasal spray  Self No No   Si spray into each nostril daily   fluticasone-salmeterol (ADVAIR) 250-50 mcg/dose inhaler  Self No No   Sig: Inhale 1 puff every 12 (twelve) hours as needed (sob)   hydrOXYzine HCL (ATARAX) 25 mg tablet  Self No No   Sig: Take 1 tablet (25 mg total) by mouth 2 (two) times a day as needed for itching   levothyroxine 125 mcg tablet  Self No No   Sig: TAKE 1 TABLET DAILY  losartan (COZAAR) 25 mg tablet  Self No No   Sig: TAKE 1 TABLET DAILY     metoprolol succinate (TOPROL-XL) 25 mg 24 hr tablet  Self Yes No   Sig: Take 25 mg by mouth daily   pantoprazole (PROTONIX) 40 mg tablet  Self No No   Sig: Take 1 tablet (40 mg total) by mouth daily   pregabalin (LYRICA) 150 mg capsule  Self No No   Sig: Take 1 capsule (150 mg total) by mouth 3 (three) times a day   silver sulfadiazine (SILVADENE,SSD) 1 % cream  Self No No   Sig: Apply topically daily   tiZANidine (ZANAFLEX) 2 mg tablet  Self No No   Sig: Take 2 tablets (4 mg total) by mouth every 8 (eight) hours as needed for muscle spasms   triamcinolone (KENALOG) 0 1 % cream  Self No No   Sig: Apply topically 2 (two) times a day   valACYclovir (VALTREX) 1,000 mg tablet   No No   Sig: TAKE 1 TABLET BY MOUTH THREE TIMES A DAY FOR 10 DAYS   valACYclovir (VALTREX) 1,000 mg tablet  Self Yes No   Sig: Take 1 tablet by mouth 3 (three) times a day For 10 days   zolpidem (AMBIEN) 10 mg tablet  Self No No   Sig: Take 1 tablet (10 mg total) by mouth daily at bedtime as needed for sleep for up to 30 days      Facility-Administered Medications: None       Past Medical History:   Diagnosis Date    Abnormal echocardiogram     Abnormal stress test     Anxiety     Asthma     Brachial neuritis     Depression     Disease of thyroid gland     Displacement of intervertebral disc of mid-cervical region     Fibromyalgia     Frequent headaches     GERD (gastroesophageal reflux disease)     Herniated nucleus pulposus     History of arthritis     History of asthma     History of cardiac disorder     History of chest pain     History of diverticulitis     History of fatigue     History of hearing loss     History of hemoptysis     History of herpes simplex infection     History of hiatal hernia     History of insect bite     INFECTED    History of memory loss     History of neck disorder     History of reflex sympathetic dystrophy     History of restless legs syndrome     History of shortness of breath     History of spinal stenosis     Hyperlipidemia     Hyperlipidemia     Hypertension     Skin rash     Somnolence, daytime     Spinal stenosis of cervical region        Past Surgical History:   Procedure Laterality Date    BACK SURGERY      BACK SURGERY      LOWER BACK SURGERY    CARDIAC CATHETERIZATION      HISTORY OF CORONARY ANGIOGRAPHY WITH CONCOMITANT LEFT HEART CATHETERIZATION    CORONARY ANGIOPLASTY WITH STENT PLACEMENT      EAR SURGERY      TONSILLECTOMY      TYMPANOPLASTY         Family History   Problem Relation Age of Onset    Coronary artery disease Mother         ATHEROMA    Heart disease Mother     Diabetes Mother     Hypertension Mother     No Known Problems Father         NO PERTINENT FAMILY HISTORY    Coronary artery disease Sister         ATHEROMA    Heart disease Sister     Stroke Sister     Diabetes Sister     Hypertension Sister      I have reviewed and agree with the history as documented  Social History   Substance Use Topics    Smoking status: Former Smoker     Quit date: 2012    Smokeless tobacco: Former User      Comment: quit in 2013    Alcohol use No        Review of Systems   Constitutional: Positive for appetite change and fatigue  Negative for chills and fever  HENT: Negative for congestion and sore throat  Eyes: Negative for visual disturbance  Respiratory: Negative for cough and shortness of breath      Cardiovascular: Negative for chest pain    Gastrointestinal: Positive for abdominal pain, anorexia and nausea  Negative for constipation, diarrhea and vomiting  Genitourinary: Negative for dysuria, frequency, vaginal bleeding and vaginal discharge  Musculoskeletal: Negative for back pain, neck pain and neck stiffness  Skin: Negative for pallor and rash  Allergic/Immunologic: Negative for immunocompromised state  Neurological: Negative for light-headedness and headaches  Psychiatric/Behavioral: Negative for confusion  All other systems reviewed and are negative  Physical Exam  Physical Exam   Constitutional: She is oriented to person, place, and time  She appears well-developed and well-nourished  She appears distressed (uncomfortable)  HENT:   Head: Normocephalic and atraumatic  Right Ear: External ear normal    Left Ear: External ear normal    Mouth/Throat: Oropharynx is clear and moist    Eyes: EOM are normal    Neck: Neck supple  Cardiovascular: Normal rate and regular rhythm  No murmur heard  Pulmonary/Chest: Effort normal and breath sounds normal  No respiratory distress  Abdominal: Soft  Bowel sounds are normal  There is tenderness (severe LLQ tenderness)  Musculoskeletal: Normal range of motion  Neurological: She is alert and oriented to person, place, and time  Skin: Skin is warm  Capillary refill takes less than 2 seconds  No rash noted  No pallor  Psychiatric: She has a normal mood and affect  Her behavior is normal    Nursing note and vitals reviewed        Vital Signs  ED Triage Vitals   Temperature Pulse Respirations Blood Pressure SpO2   10/23/18 1906 10/23/18 1906 10/23/18 1906 10/23/18 1906 10/23/18 1906   97 6 °F (36 4 °C) 69 18 120/74 97 %      Temp src Heart Rate Source Patient Position - Orthostatic VS BP Location FiO2 (%)   -- 10/23/18 2108 10/23/18 2108 10/23/18 2108 --    Monitor Lying Right arm       Pain Score       10/23/18 1906       7           Vitals:    10/23/18 1906 10/23/18 2108 10/23/18 2303   BP: 120/74 148/71 167/72   Pulse: 69 60 56   Patient Position - Orthostatic VS:  Lying Lying       Visual Acuity      ED Medications  Medications   sodium chloride 0 9 % bolus 1,000 mL (0 mL Intravenous Stopped 10/23/18 2340)   ondansetron (ZOFRAN) injection 4 mg (4 mg Intravenous Given 10/23/18 2233)   morphine (PF) 4 mg/mL injection 4 mg (4 mg Intravenous Given 10/23/18 2235)   ketorolac (TORADOL) injection 30 mg (30 mg Intravenous Given 10/23/18 2235)   ciprofloxacin (CIPRO) IVPB (premix) 400 mg (0 mg Intravenous Stopped 10/24/18 0107)   metroNIDAZOLE (FLAGYL) IVPB (premix) 500 mg (0 mg Intravenous Stopped 10/23/18 2340)   iohexol (OMNIPAQUE) 350 MG/ML injection (SINGLE-DOSE) 100 mL (100 mL Intravenous Given 10/23/18 2313)       Diagnostic Studies  Results Reviewed     Procedure Component Value Units Date/Time    Comprehensive metabolic panel [[id number]]  (Abnormal) Collected:  10/23/18 2221    Lab Status:  Final result Specimen:  Blood from Arm, Left Updated:  10/23/18 2300     Sodium 140 mmol/L      Potassium 3 4 (L) mmol/L      Chloride 104 mmol/L      CO2 29 mmol/L      ANION GAP 7 mmol/L      BUN 13 mg/dL      Creatinine 1 03 mg/dL      Glucose 132 mg/dL      Calcium 8 9 mg/dL      AST 31 U/L      ALT 44 U/L      Alkaline Phosphatase 132 (H) U/L      Total Protein 8 3 (H) g/dL      Albumin 3 9 g/dL      Total Bilirubin 0 35 mg/dL      eGFR 60 ml/min/1 73sq m     Narrative:         National Kidney Disease Education Program recommendations are as follows:  GFR calculation is accurate only with a steady state creatinine  Chronic Kidney disease less than 60 ml/min/1 73 sq  meters  Kidney failure less than 15 ml/min/1 73 sq  meters      Lipase [[id number]]  (Normal) Collected:  10/23/18 2221    Lab Status:  Final result Specimen:  Blood from Arm, Left Updated:  10/23/18 2300     Lipase 97 u/L     Lactic acid, plasma [[id number]]  (Normal) Collected:  10/23/18 2222    Lab Status:  Final result Specimen: Blood from Arm, Left Updated:  10/23/18 2251     LACTIC ACID 1 1 mmol/L     Narrative:         Result may be elevated if tourniquet was used during collection  CBC and differential [[id number]] Collected:  10/23/18 2221    Lab Status:  Final result Specimen:  Blood from Arm, Left Updated:  10/23/18 2229     WBC 7 92 Thousand/uL      RBC 4 60 Million/uL      Hemoglobin 13 9 g/dL      Hematocrit 39 9 %      MCV 87 fL      MCH 30 2 pg      MCHC 34 8 g/dL      RDW 13 1 %      MPV 10 3 fL      Platelets 892 Thousands/uL      nRBC 0 /100 WBCs      Neutrophils Relative 58 %      Immat GRANS % 0 %      Lymphocytes Relative 36 %      Monocytes Relative 5 %      Eosinophils Relative 1 %      Basophils Relative 0 %      Neutrophils Absolute 4 51 Thousands/µL      Immature Grans Absolute 0 03 Thousand/uL      Lymphocytes Absolute 2 88 Thousands/µL      Monocytes Absolute 0 41 Thousand/µL      Eosinophils Absolute 0 06 Thousand/µL      Basophils Absolute 0 03 Thousands/µL     POCT urinalysis dipstick [[id number]]  (Normal) Resulted:  10/23/18 2208    Lab Status:  Final result Specimen:  Urine Updated:  10/23/18 2208     Color, UA yellow    ED Urine Macroscopic [[id number]]  (Abnormal) Collected:  10/23/18 2219    Lab Status:  Final result Specimen:  Urine Updated:  10/23/18 2207     Color, UA Yellow     Clarity, UA Clear     pH, UA 5 0     Leukocytes, UA Negative     Nitrite, UA Negative     Protein, UA Negative mg/dl      Glucose, UA Negative mg/dl      Ketones, UA Trace (A) mg/dl      Urobilinogen, UA 0 2 E U /dl      Bilirubin, UA Interference- unable to analyze (A)     Blood, UA Negative     Specific Gravity, UA >=1 030    Narrative:       CLINITEK RESULT                 CT abdomen pelvis with contrast   Final Result by Leonard Keller MD (10/23 2346)      Very mild soft tissue stranding adjacent to the distal descending colon which may represent very mild/early diverticulitis        Asymmetric nodular focus in the region of the left labia  Location suggesting possible Bartholin's gland cyst       Workstation performed: [id number]                    Procedures  Procedures       Phone Contacts  ED Phone Contact    ED Course  ED Course as of Oct 24 0309   Tue Oct 23, 2018   2108 Pt seen and examined  62 yo female with 3 days of LLQ abd pain which is worsening  Pt seen by PCP today who was going to start oral abx but did not like how tender she was as she has a hx of abscess and perf and sent her in  Pt very uncomfortable, car ride here hurt pt  Will give IVF, keep NPO, give morphine, toradol, cipro and flagyl and check labs, CT a/p      2252 CBC WNL, lactate 1 1      2320 Creat ok, GFR 60 - will send for CT a/p  Wed Oct 24, 2018   0002 CT a/p -   Very mild soft tissue stranding adjacent to the distal descending colon which may represent very mild/early diverticulitis  Asymmetric nodular focus in the region of the left labia   Location suggesting possible Bartholin's gland cyst - no pain in this area but discussed with pt f/u with her GYN  Pt feels good and wishes to go home on ciproflagyl, liquids and advance diet  Will d/c when IV cipro finished  MDM  CritCare Time    Disposition  Final diagnoses:   LLQ abdominal pain   Diverticulitis     Time reflects when diagnosis was documented in both MDM as applicable and the Disposition within this note     Time User Action Codes Description Comment    10/24/2018 12:47 AM Ruthell Fleischer M Add [R10 32] LLQ abdominal pain     10/24/2018 12:47 AM Felicia Marr Add [K57 92] Diverticulitis       ED Disposition     ED Disposition Condition Comment    Discharge  [address] discharge to home/self care      Condition at discharge: Good        Follow-up Information     Follow up With Specialties Details Why [address], [place] Internal Medicine Schedule an appointment as soon as possible for a visit As needed [address]  [facility]   [phone]            Discharge Medication List as of 10/24/2018 12:49 AM      START taking these medications    Details   ciprofloxacin (CIPRO) 500 mg tablet Take 1 tablet (500 mg total) by mouth 2 (two) times a day for 7 days, Starting Wed 10/24/2018, Until Wed 10/31/2018, Print      metroNIDAZOLE (FLAGYL) 500 mg tablet Take 1 tablet (500 mg total) by mouth every 8 (eight) hours for 7 days, Starting Wed 10/24/2018, Until Wed 10/31/2018, Print         CONTINUE these medications which have NOT CHANGED    Details   albuterol (PROVENTIL HFA,VENTOLIN HFA) 90 mcg/act inhaler Inhale 2 puffs every 6 (six) hours as needed for wheezing, Starting Tue 3/20/2018, Normal      aspirin 81 mg chewable tablet Chew 81 mg daily, Historical Med      atorvastatin (LIPITOR) 80 mg tablet TAKE 1 TABLET BY MOUTH EVERY DAY, Normal      citalopram (CeleXA) 20 mg tablet Take 1 tablet (20 mg total) by mouth daily, Starting Wed 7/25/2018, Normal      clopidogrel (PLAVIX) 75 mg tablet TAKE 4 TABLETS TODAY THEN 1 TABLET DAILY, Normal      ergocalciferol (VITAMIN D2) 50,000 units Take 50,000 Units by mouth once a week, Starting Wed 7/11/2018, Historical Med      famotidine (PEPCID) 40 MG tablet Take 1 tablet (40 mg total) by mouth daily, Starting Mon 3/26/2018, Normal      fluticasone (FLONASE) 50 mcg/act nasal spray 1 spray into each nostril daily, Starting Mon 2/5/2018, Normal      fluticasone-salmeterol (ADVAIR) 250-50 mcg/dose inhaler Inhale 1 puff every 12 (twelve) hours as needed (sob), Starting Fri 3/16/2018, Normal      hydrOXYzine HCL (ATARAX) 25 mg tablet Take 1 tablet (25 mg total) by mouth 2 (two) times a day as needed for itching, Starting Tue 8/7/2018, Normal      levothyroxine 125 mcg tablet TAKE 1 TABLET DAILY , Normal      losartan (COZAAR) 25 mg tablet TAKE 1 TABLET DAILY , Normal      metoprolol succinate (TOPROL-XL) 25 mg 24 hr tablet Take 25 mg by mouth daily, Historical Med      pantoprazole (PROTONIX) 40 mg tablet Take 1 tablet (40 mg total) by mouth daily, Starting Wed 9/5/2018, Normal      pregabalin (LYRICA) 150 mg capsule Take 1 capsule (150 mg total) by mouth 3 (three) times a day, Starting Thu 8/2/2018, Normal      PROAIR RESPICLICK 339 (90 Base) MCG/ACT AEPB Starting Wed 4/11/2018, Historical Med      silver sulfadiazine (SILVADENE,SSD) 1 % cream Apply topically daily, Starting Wed 7/11/2018, Normal      tiZANidine (ZANAFLEX) 2 mg tablet Take 2 tablets (4 mg total) by mouth every 8 (eight) hours as needed for muscle spasms, Starting Thu 8/2/2018, Normal      triamcinolone (KENALOG) 0 1 % cream Apply topically 2 (two) times a day, Starting Fri 3/16/2018, Normal      valACYclovir (VALTREX) 1,000 mg tablet Take 1 tablet by mouth 3 (three) times a day For 10 days, Starting Thu 10/4/2018, Historical Med      zolpidem (AMBIEN) 10 mg tablet Take 1 tablet (10 mg total) by mouth daily at bedtime as needed for sleep for up to 30 days, Starting Mon 10/22/2018, Until Wed 11/21/2018, Normal           No discharge procedures on file      ED Provider  Electronically Signed by           Mauricio Herrera DO  10/24/18 5862

## 2018-10-24 NOTE — ED NOTES
RN unable to obtain IV access at this time  Another RN at patient bedside at this time attempting        Freedom Zuniga RN  10/23/18 2246

## 2018-10-25 ENCOUNTER — APPOINTMENT (EMERGENCY)
Dept: RADIOLOGY | Facility: HOSPITAL | Age: 58
DRG: 351 | End: 2018-10-25
Payer: COMMERCIAL

## 2018-10-25 ENCOUNTER — HOSPITAL ENCOUNTER (INPATIENT)
Facility: HOSPITAL | Age: 58
LOS: 3 days | Discharge: HOME | DRG: 351 | End: 2018-10-28
Attending: EMERGENCY MEDICINE | Admitting: INTERNAL MEDICINE
Payer: COMMERCIAL

## 2018-10-25 ENCOUNTER — TELEPHONE (OUTPATIENT)
Dept: INTERNAL MEDICINE CLINIC | Facility: CLINIC | Age: 58
End: 2018-10-25

## 2018-10-25 DIAGNOSIS — M54.16 LUMBAR RADICULOPATHY: ICD-10-CM

## 2018-10-25 DIAGNOSIS — R51.9 HEADACHE: Primary | ICD-10-CM

## 2018-10-25 DIAGNOSIS — H02.409 PTOSIS: ICD-10-CM

## 2018-10-25 DIAGNOSIS — M79.7 FIBROMYALGIA: ICD-10-CM

## 2018-10-25 DIAGNOSIS — K57.92 DIVERTICULITIS: ICD-10-CM

## 2018-10-25 DIAGNOSIS — R53.1 GENERALIZED WEAKNESS: ICD-10-CM

## 2018-10-25 PROBLEM — H02.403: Status: ACTIVE | Noted: 2018-10-25

## 2018-10-25 LAB
ALBUMIN SERPL BCP-MCNC: 3.7 G/DL (ref 3.5–5)
ALP SERPL-CCNC: 119 U/L (ref 46–116)
ALT SERPL W P-5'-P-CCNC: 48 U/L (ref 12–78)
ANION GAP SERPL CALCULATED.3IONS-SCNC: 7 MMOL/L (ref 4–13)
AST SERPL W P-5'-P-CCNC: 34 U/L (ref 5–45)
ATRIAL RATE: 50 BPM
BASOPHILS # BLD AUTO: 0.03 THOUSANDS/ΜL (ref 0–0.1)
BASOPHILS NFR BLD AUTO: 1 % (ref 0–1)
BILIRUB SERPL-MCNC: 0.58 MG/DL (ref 0.2–1)
BILIRUB UR QL STRIP: NEGATIVE
BUN SERPL-MCNC: 11 MG/DL (ref 5–25)
CALCIUM SERPL-MCNC: 8.9 MG/DL (ref 8.3–10.1)
CHLORIDE SERPL-SCNC: 107 MMOL/L (ref 100–108)
CLARITY UR: CLEAR
CO2 SERPL-SCNC: 28 MMOL/L (ref 21–32)
COLOR UR: YELLOW
COLOR, POC: NORMAL
CREAT SERPL-MCNC: 0.88 MG/DL (ref 0.6–1.3)
EOSINOPHIL # BLD AUTO: 0.08 THOUSAND/ΜL (ref 0–0.61)
EOSINOPHIL NFR BLD AUTO: 2 % (ref 0–6)
ERYTHROCYTE [DISTWIDTH] IN BLOOD BY AUTOMATED COUNT: 13.2 % (ref 11.6–15.1)
GFR SERPL CREATININE-BSD FRML MDRD: 73 ML/MIN/1.73SQ M
GLUCOSE SERPL-MCNC: 111 MG/DL (ref 65–140)
GLUCOSE UR STRIP-MCNC: NEGATIVE MG/DL
HCT VFR BLD AUTO: 38 % (ref 34.8–46.1)
HGB BLD-MCNC: 12.9 G/DL (ref 11.5–15.4)
HGB UR QL STRIP.AUTO: NEGATIVE
IMM GRANULOCYTES # BLD AUTO: 0.02 THOUSAND/UL (ref 0–0.2)
IMM GRANULOCYTES NFR BLD AUTO: 0 % (ref 0–2)
KETONES UR STRIP-MCNC: NEGATIVE MG/DL
LEUKOCYTE ESTERASE UR QL STRIP: NEGATIVE
LYMPHOCYTES # BLD AUTO: 2.11 THOUSANDS/ΜL (ref 0.6–4.47)
LYMPHOCYTES NFR BLD AUTO: 42 % (ref 14–44)
MCH RBC QN AUTO: 29.8 PG (ref 26.8–34.3)
MCHC RBC AUTO-ENTMCNC: 33.9 G/DL (ref 31.4–37.4)
MCV RBC AUTO: 88 FL (ref 82–98)
MONOCYTES # BLD AUTO: 0.27 THOUSAND/ΜL (ref 0.17–1.22)
MONOCYTES NFR BLD AUTO: 5 % (ref 4–12)
NEUTROPHILS # BLD AUTO: 2.56 THOUSANDS/ΜL (ref 1.85–7.62)
NEUTS SEG NFR BLD AUTO: 50 % (ref 43–75)
NITRITE UR QL STRIP: NEGATIVE
NRBC BLD AUTO-RTO: 0 /100 WBCS
P AXIS: 41 DEGREES
PH UR STRIP.AUTO: 5.5 [PH] (ref 4.5–8)
PLATELET # BLD AUTO: 241 THOUSANDS/UL (ref 149–390)
PMV BLD AUTO: 10.7 FL (ref 8.9–12.7)
POTASSIUM SERPL-SCNC: 3.7 MMOL/L (ref 3.5–5.3)
PR INTERVAL: 176 MS
PROT SERPL-MCNC: 7.5 G/DL (ref 6.4–8.2)
PROT UR STRIP-MCNC: NEGATIVE MG/DL
QRS AXIS: 15 DEGREES
QRSD INTERVAL: 88 MS
QT INTERVAL: 460 MS
QTC INTERVAL: 419 MS
RBC # BLD AUTO: 4.33 MILLION/UL (ref 3.81–5.12)
SODIUM SERPL-SCNC: 142 MMOL/L (ref 136–145)
SP GR UR STRIP.AUTO: 1.01 (ref 1–1.03)
T WAVE AXIS: 20 DEGREES
T4 FREE SERPL-MCNC: 1.48 NG/DL (ref 0.76–1.46)
TROPONIN I SERPL-MCNC: <0.02 NG/ML
TSH SERPL DL<=0.05 MIU/L-ACNC: 0.14 UIU/ML (ref 0.36–3.74)
UROBILINOGEN UR QL STRIP.AUTO: 0.2 E.U./DL
VENTRICULAR RATE: 50 BPM
WBC # BLD AUTO: 5.07 THOUSAND/UL (ref 4.31–10.16)

## 2018-10-25 PROCEDURE — 96375 TX/PRO/DX INJ NEW DRUG ADDON: CPT

## 2018-10-25 PROCEDURE — 84484 ASSAY OF TROPONIN QUANT: CPT | Performed by: EMERGENCY MEDICINE

## 2018-10-25 PROCEDURE — 93005 ELECTROCARDIOGRAM TRACING: CPT

## 2018-10-25 PROCEDURE — 96374 THER/PROPH/DIAG INJ IV PUSH: CPT

## 2018-10-25 PROCEDURE — 71046 X-RAY EXAM CHEST 2 VIEWS: CPT

## 2018-10-25 PROCEDURE — 99223 1ST HOSP IP/OBS HIGH 75: CPT | Performed by: INTERNAL MEDICINE

## 2018-10-25 PROCEDURE — 70450 CT HEAD/BRAIN W/O DYE: CPT

## 2018-10-25 PROCEDURE — 84443 ASSAY THYROID STIM HORMONE: CPT | Performed by: EMERGENCY MEDICINE

## 2018-10-25 PROCEDURE — 80053 COMPREHEN METABOLIC PANEL: CPT | Performed by: EMERGENCY MEDICINE

## 2018-10-25 PROCEDURE — 93010 ELECTROCARDIOGRAM REPORT: CPT | Performed by: INTERNAL MEDICINE

## 2018-10-25 PROCEDURE — 36415 COLL VENOUS BLD VENIPUNCTURE: CPT | Performed by: EMERGENCY MEDICINE

## 2018-10-25 PROCEDURE — 84439 ASSAY OF FREE THYROXINE: CPT | Performed by: EMERGENCY MEDICINE

## 2018-10-25 PROCEDURE — 81003 URINALYSIS AUTO W/O SCOPE: CPT

## 2018-10-25 PROCEDURE — 85025 COMPLETE CBC W/AUTO DIFF WBC: CPT | Performed by: EMERGENCY MEDICINE

## 2018-10-25 PROCEDURE — 99285 EMERGENCY DEPT VISIT HI MDM: CPT

## 2018-10-25 RX ORDER — ALBUTEROL SULFATE 90 UG/1
2 AEROSOL, METERED RESPIRATORY (INHALATION) EVERY 6 HOURS PRN
Status: DISCONTINUED | OUTPATIENT
Start: 2018-10-25 | End: 2018-10-28 | Stop reason: HOSPADM

## 2018-10-25 RX ORDER — FLUTICASONE FUROATE AND VILANTEROL 100; 25 UG/1; UG/1
1 POWDER RESPIRATORY (INHALATION)
Status: DISCONTINUED | OUTPATIENT
Start: 2018-10-26 | End: 2018-10-28 | Stop reason: HOSPADM

## 2018-10-25 RX ORDER — CITALOPRAM 20 MG/1
20 TABLET ORAL DAILY
Status: DISCONTINUED | OUTPATIENT
Start: 2018-10-26 | End: 2018-10-28 | Stop reason: HOSPADM

## 2018-10-25 RX ORDER — LOSARTAN POTASSIUM 25 MG/1
25 TABLET ORAL DAILY
Status: DISCONTINUED | OUTPATIENT
Start: 2018-10-26 | End: 2018-10-28 | Stop reason: HOSPADM

## 2018-10-25 RX ORDER — LEVOTHYROXINE SODIUM 0.12 MG/1
125 TABLET ORAL DAILY
Status: DISCONTINUED | OUTPATIENT
Start: 2018-10-26 | End: 2018-10-26

## 2018-10-25 RX ORDER — ATROPINE SULFATE 0.1 MG/ML
INJECTION INTRAVENOUS
Status: DISCONTINUED
Start: 2018-10-25 | End: 2018-10-25 | Stop reason: WASHOUT

## 2018-10-25 RX ORDER — ATORVASTATIN CALCIUM 80 MG/1
80 TABLET, FILM COATED ORAL
Status: DISCONTINUED | OUTPATIENT
Start: 2018-10-26 | End: 2018-10-28 | Stop reason: HOSPADM

## 2018-10-25 RX ORDER — ACETAMINOPHEN 325 MG/1
650 TABLET ORAL EVERY 6 HOURS PRN
Status: DISCONTINUED | OUTPATIENT
Start: 2018-10-25 | End: 2018-10-28 | Stop reason: HOSPADM

## 2018-10-25 RX ORDER — HYDROXYZINE HYDROCHLORIDE 25 MG/1
25 TABLET, FILM COATED ORAL 2 TIMES DAILY PRN
Status: DISCONTINUED | OUTPATIENT
Start: 2018-10-25 | End: 2018-10-28 | Stop reason: HOSPADM

## 2018-10-25 RX ORDER — ALBUTEROL SULFATE 90 UG/1
1 AEROSOL, METERED RESPIRATORY (INHALATION) EVERY 6 HOURS PRN
Status: DISCONTINUED | OUTPATIENT
Start: 2018-10-25 | End: 2018-10-28 | Stop reason: HOSPADM

## 2018-10-25 RX ORDER — ZOLPIDEM TARTRATE 5 MG/1
10 TABLET ORAL
Status: DISCONTINUED | OUTPATIENT
Start: 2018-10-25 | End: 2018-10-26

## 2018-10-25 RX ORDER — ACETAMINOPHEN 325 MG/1
650 TABLET ORAL EVERY 6 HOURS PRN
Status: DISCONTINUED | OUTPATIENT
Start: 2018-10-25 | End: 2018-10-25

## 2018-10-25 RX ORDER — ASPIRIN 81 MG/1
81 TABLET, CHEWABLE ORAL DAILY
Status: DISCONTINUED | OUTPATIENT
Start: 2018-10-26 | End: 2018-10-28 | Stop reason: HOSPADM

## 2018-10-25 RX ORDER — FLUTICASONE PROPIONATE 50 MCG
1 SPRAY, SUSPENSION (ML) NASAL DAILY
Status: DISCONTINUED | OUTPATIENT
Start: 2018-10-26 | End: 2018-10-28 | Stop reason: HOSPADM

## 2018-10-25 RX ORDER — TRIAMCINOLONE ACETONIDE 1 MG/G
CREAM TOPICAL 2 TIMES DAILY
Status: DISCONTINUED | OUTPATIENT
Start: 2018-10-25 | End: 2018-10-28 | Stop reason: HOSPADM

## 2018-10-25 RX ORDER — PANTOPRAZOLE SODIUM 40 MG/1
40 TABLET, DELAYED RELEASE ORAL DAILY
Status: DISCONTINUED | OUTPATIENT
Start: 2018-10-26 | End: 2018-10-28 | Stop reason: HOSPADM

## 2018-10-25 RX ORDER — METOPROLOL SUCCINATE 25 MG/1
25 TABLET, EXTENDED RELEASE ORAL DAILY
Status: DISCONTINUED | OUTPATIENT
Start: 2018-10-26 | End: 2018-10-28 | Stop reason: HOSPADM

## 2018-10-25 RX ORDER — METOCLOPRAMIDE HYDROCHLORIDE 5 MG/ML
10 INJECTION INTRAMUSCULAR; INTRAVENOUS ONCE
Status: COMPLETED | OUTPATIENT
Start: 2018-10-25 | End: 2018-10-25

## 2018-10-25 RX ORDER — KETOROLAC TROMETHAMINE 30 MG/ML
15 INJECTION, SOLUTION INTRAMUSCULAR; INTRAVENOUS ONCE
Status: COMPLETED | OUTPATIENT
Start: 2018-10-25 | End: 2018-10-25

## 2018-10-25 RX ORDER — HYDRALAZINE HYDROCHLORIDE 20 MG/ML
5 INJECTION INTRAMUSCULAR; INTRAVENOUS EVERY 6 HOURS PRN
Status: DISCONTINUED | OUTPATIENT
Start: 2018-10-25 | End: 2018-10-28 | Stop reason: HOSPADM

## 2018-10-25 RX ORDER — CLOPIDOGREL BISULFATE 75 MG/1
75 TABLET ORAL ONCE
Status: DISCONTINUED | OUTPATIENT
Start: 2018-10-25 | End: 2018-10-28 | Stop reason: HOSPADM

## 2018-10-25 RX ADMIN — KETOROLAC TROMETHAMINE 15 MG: 30 INJECTION, SOLUTION INTRAMUSCULAR at 16:46

## 2018-10-25 RX ADMIN — SODIUM CHLORIDE 1000 ML: 0.9 INJECTION, SOLUTION INTRAVENOUS at 16:46

## 2018-10-25 RX ADMIN — METOCLOPRAMIDE 10 MG: 5 INJECTION, SOLUTION INTRAMUSCULAR; INTRAVENOUS at 16:46

## 2018-10-25 NOTE — ED NOTES
Admitting aware of pt's heart rate of 42-45 at this time  Atropine pulled per admitting verbal orders and on stand-by  Will continue to monitor pt's heart rate        Lorne Villarreal RN  10/25/18 7091

## 2018-10-25 NOTE — H&P
INTERNAL MEDICINE HISTORY AND PHYSICAL  ED 14 SOD Team C     NAME: Isaura Brito  AGE: 63 y o  SEX: female  : 1960   MRN: [id number]  ENCOUNTER: [id number]    DATE: 10/25/2018  TIME: 4:58 PM    Primary Care Physician: Valery Amin MD  Admitting Provider: Cassia Urbina DO    Chief complaint:  Generalized weakness, bilateral ptosis    History of Present Illness     Enrique Chou is a 63 y o  female witha past medical history of hypertension, hyperlipidemia, CAD status post stenting 1 5 years ago, fibromyalgia, thyroid disease and chronic back pain who presents with left-sided upper and lower extremity weakness as well as bilateral ptosis that she noted this morning upon waking up  Her daughter also noticed the symptoms so they sought care at [facility] Emergency Department  She also noted decreased volume in her voice  She reports a 9/10 headache that is made worse by lights and loud noises  She does not have a history of migraines she has not had a headache like this in the past   She reports subjective weakness currently, though she and her daughter believes it has gotten slightly better since this morning  Neurological exam is 5 his out of 5 strength in the upper and lower distal extremities  She has 4/5 in the proximal lower extremities bilaterally  Cranial nerves are grossly intact  There is an area of ulceration in the area anterior to her uvula  Of note on Tuesday night she sought medical attention at [facility] the emergency department for left lower quadrant pain  Of note, she was diagnosed with diverticulitis at this time and given a prescription for oral antibiotics  The patient has not yet had the chance to take these antibiotics  She reports mild left lower quadrant pain  She denies any fevers or chills, nausea or vomiting  She has been constipated for the past 3 days    She reports that this is normal and that if she is constipated after 4 days she uses an enema  She is still passing flatus  CT abdomen demonstrated mild soft tissue stranding along the anterior aspect of the distal descending colon wall which may represent early mild diverticulitis  Review of Systems   Review of Systems   Constitutional: Positive for fatigue and unexpected weight change (10 lb)  Negative for appetite change, chills and fever  HENT: Positive for mouth sores and voice change (Decreased volume)  Negative for hearing loss  Eyes: Positive for photophobia  Negative for visual disturbance  Respiratory: Negative for chest tightness and shortness of breath  Cardiovascular: Negative for chest pain and leg swelling  Gastrointestinal: Positive for constipation  Negative for blood in stool, nausea and vomiting  Genitourinary: Negative  Musculoskeletal: Positive for arthralgias (Chronic) and back pain (Chronic)  Skin: Positive for rash (Blood bites)  Negative for color change  Allergic/Immunologic: Negative  Neurological: Positive for weakness  Negative for numbness  Hematological: Negative  Psychiatric/Behavioral: Negative for behavioral problems and confusion         Past Medical History     Past Medical History:   Diagnosis Date    Abnormal echocardiogram     Abnormal stress test     Anxiety     Asthma     Brachial neuritis     Depression     Disease of thyroid gland     Displacement of intervertebral disc of mid-cervical region     Fibromyalgia     Frequent headaches     GERD (gastroesophageal reflux disease)     Herniated nucleus pulposus     History of arthritis     History of asthma     History of cardiac disorder     History of chest pain     History of diverticulitis     History of fatigue     History of hearing loss     History of hemoptysis     History of herpes simplex infection     History of hiatal hernia     History of insect bite     INFECTED    History of memory loss     History of neck disorder     History of reflex sympathetic dystrophy     History of restless legs syndrome     History of shortness of breath     History of spinal stenosis     Hyperlipidemia     Hyperlipidemia     Hypertension     Skin rash     Somnolence, daytime     Spinal stenosis of cervical region        Past Surgical History     Past Surgical History:   Procedure Laterality Date    BACK SURGERY      BACK SURGERY      LOWER BACK SURGERY    CARDIAC CATHETERIZATION      HISTORY OF CORONARY ANGIOGRAPHY WITH CONCOMITANT LEFT HEART CATHETERIZATION    CORONARY ANGIOPLASTY WITH STENT PLACEMENT      EAR SURGERY      TONSILLECTOMY      TYMPANOPLASTY         Social History     History   Alcohol Use No     History   Drug Use    Types: Marijuana     Comment: USES MARIJUNA     History   Smoking Status    Former Smoker    Quit date: 2012   Smokeless Tobacco    Former User     Comment: quit in 2013       Family History     Family History   Problem Relation Age of Onset    Coronary artery disease Mother         ATHEROMA    Heart disease Mother     Diabetes Mother     Hypertension Mother     No Known Problems Father         NO PERTINENT FAMILY HISTORY    Coronary artery disease Sister         ATHEROMA    Heart disease Sister     Stroke Sister     Diabetes Sister     Hypertension Sister        Medications Prior to Admission     Prior to Admission medications    Medication Sig Start Date End Date Taking?  Authorizing Provider   albuterol (PROVENTIL HFA,VENTOLIN HFA) 90 mcg/act inhaler Inhale 2 puffs every 6 (six) hours as needed for wheezing 3/20/18  Yes JORGE A Marquez   aspirin 81 mg chewable tablet Chew 81 mg daily   Yes Historical Provider, MD   atorvastatin (LIPITOR) 80 mg tablet TAKE 1 TABLET BY MOUTH EVERY DAY 7/13/18  Yes Juancarlos Lau DO   ciprofloxacin (CIPRO) 500 mg tablet Take 1 tablet (500 mg total) by mouth 2 (two) times a day for 7 days 10/24/18 10/31/18 Yes Rene Capps DO   citalopram (CeleXA) 20 mg tablet Take 1 tablet (20 mg total) by mouth daily 7/25/18  Yes Rufus Jackson MD   clopidogrel (PLAVIX) 75 mg tablet TAKE 4 TABLETS TODAY THEN 1 TABLET DAILY 5/7/18  Yes Juancarlos Lau DO   ergocalciferol (VITAMIN D2) 50,000 units Take 50,000 Units by mouth once a week 7/11/18  Yes Historical Provider, MD   famotidine (PEPCID) 40 MG tablet Take 1 tablet (40 mg total) by mouth daily 3/26/18  Yes JORGE A Mccoy   fluticasone (FLONASE) 50 mcg/act nasal spray 1 spray into each nostril daily 2/5/18  Yes Ashley Augustin PA-C   fluticasone-salmeterol (ADVAIR) 250-50 mcg/dose inhaler Inhale 1 puff every 12 (twelve) hours as needed (sob) 3/16/18  Yes Blessing Proctor MD   hydrOXYzine HCL (ATARAX) 25 mg tablet Take 1 tablet (25 mg total) by mouth 2 (two) times a day as needed for itching 8/7/18  Yes JORGE A Marquez   levothyroxine 125 mcg tablet TAKE 1 TABLET DAILY  9/2/18  Yes Rufus Jackson MD   losartan (COZAAR) 25 mg tablet TAKE 1 TABLET DAILY  7/3/18  Yes Juancarlos Lau DO   metoprolol succinate (TOPROL-XL) 25 mg 24 hr tablet Take 25 mg by mouth daily   Yes Historical Provider, MD   metroNIDAZOLE (FLAGYL) 500 mg tablet Take 1 tablet (500 mg total) by mouth every 8 (eight) hours for 7 days 10/24/18 10/31/18 Yes Heavenly Patterson DO   pantoprazole (PROTONIX) 40 mg tablet Take 1 tablet (40 mg total) by mouth daily 9/5/18  Yes [address]   JORGE A Antoine   pregabalin (LYRICA) 150 mg capsule Take 1 capsule (150 mg total) by mouth 3 (three) times a day 8/2/18  Yes Jean Claude Suárez,    PROAIR RESPICLICK 458 (34 Base) MCG/ACT AEPB  4/11/18  Yes Historical Provider, MD   silver sulfadiazine (SILVADENE,SSD) 1 % cream Apply topically daily 7/11/18  Yes Rufus Jackson MD   tiZANidine (ZANAFLEX) 2 mg tablet Take 2 tablets (4 mg total) by mouth every 8 (eight) hours as needed for muscle spasms 8/2/18  Yes Destiney Aburto,    triamcinolone (KENALOG) 0 1 % cream Apply topically 2 (two) times a day 3/16/18  Yes Blessing Proctor MD valACYclovir (VALTREX) 1,000 mg tablet Take 1 tablet by mouth 3 (three) times a day For 10 days 10/4/18  Yes Historical Provider, MD   zolpidem (AMBIEN) 10 mg tablet Take 1 tablet (10 mg total) by mouth daily at bedtime as needed for sleep for up to 30 days 10/22/18 11/21/18 Yes Arleen Loza MD   valACYclovir (VALTREX) 1,000 mg tablet TAKE 1 TABLET BY MOUTH THREE TIMES A DAY FOR 10 DAYS 9/2/18 9/9/18  Arleen Loza MD       Allergies     Allergies   Allergen Reactions    Augmentin [Amoxicillin-Pot Clavulanate] Nausea Only     PT stated she only allergic to medication AUGMENTIN       Objective     Vitals:    10/25/18 1345 10/25/18 1448 10/25/18 1500 10/25/18 1623   BP: 119/56 123/58 129/58 137/62   BP Location:  Right arm     Pulse: (!) 50 (!) 450 (!) 48 (!) 50   Resp: 14 14 16 18   Temp:       TempSrc:       SpO2: 93% 94% 95% 95%     There is no height or weight on file to calculate BMI  No intake or output data in the 24 hours ending 10/25/18 1658  Invasive Devices     Peripheral Intravenous Line            Peripheral IV 10/25/18 Right Antecubital less than 1 day                Physical Exam  Physical Exam   Constitutional: She is oriented to person, place, and time  She appears well-developed and well-nourished  No distress  HENT:   Head: Normocephalic and atraumatic  Clear base ulcer in the oropharynx visible   Eyes: Pupils are equal, round, and reactive to light  Conjunctivae and EOM are normal  No scleral icterus  Cardiovascular: Normal rate, regular rhythm and normal heart sounds  Exam reveals no gallop and no friction rub  No murmur heard  Pulmonary/Chest: Effort normal  No respiratory distress  She has no wheezes  Occasional crackles   Abdominal: Soft  She exhibits no distension and no mass  There is tenderness (Mild tenderness in the left lower quadrant)  There is no rebound and no guarding  Musculoskeletal: Normal range of motion   She exhibits tenderness (Tenderness secondary to fibromyalgia)  She exhibits no edema or deformity  Neurological: She is alert and oriented to person, place, and time  No cranial nerve deficit  5/5 in the upper and lower extremities distally  She has 4/5 in the lower extremities proximally  She has cranial nerves 2-12 grossly intact  Skin: Skin is warm and dry  She is not diaphoretic  No erythema  Psychiatric: She has a normal mood and affect  Her behavior is normal  Judgment and thought content normal      Lab Results: I have personally reviewed pertinent reports      CBC:   Results from last 7 days  Lab Units 10/25/18  1439   WBC Thousand/uL 5 07   RBC Million/uL 4 33   HEMOGLOBIN g/dL 12 9   HEMATOCRIT % 38 0   MCV fL 88   MCH pg 29 8   MCHC g/dL 33 9   RDW % 13 2   MPV fL 10 7   PLATELETS Thousands/uL 241   NRBC AUTO /100 WBCs 0   NEUTROS PCT % 50   LYMPHS PCT % 42   MONOS PCT % 5   EOS PCT % 2   BASOS PCT % 1   NEUTROS ABS Thousands/µL 2 56   LYMPHS ABS Thousands/µL 2 11   MONOS ABS Thousand/µL 0 27   EOS ABS Thousand/µL 0 08   , Chemistry Profile:   Results from last 7 days  Lab Units 10/25/18  1439   SODIUM mmol/L 142   POTASSIUM mmol/L 3 7   CHLORIDE mmol/L 107   CO2 mmol/L 28   BUN mg/dL 11   CREATININE mg/dL 0 88   CALCIUM mg/dL 8 9   AST U/L 34   ALT U/L 48   ALK PHOS U/L 119*   EGFR ml/min/1 73sq m 73   , Coagulation Studies:   , Cardiac Studies:   Results from last 7 days  Lab Units 10/25/18  1439   TROPONIN I ng/mL <0 02   , Additional Labs:   Results from last 7 days  Lab Units 10/23/18  2222 10/23/18  2221   LIPASE u/L  --  97   LACTIC ACID mmol/L 1 1  --    , iSTAT CHEM 8:   Results from last 7 days  Lab Units 10/25/18  1439   ANION GAP mmol/L 7   EGFR ml/min/1 73sq m 73   HEMOGLOBIN g/dL 12 9   , ABG:   , Toxicology:   , Last A1C/Lipid Panel/Thyroid Panel:   Lab Results   Component Value Date    HGBA1C 6 4 (H) 02/27/2017    TRIG 201 (H) 05/31/2018    TRIG 299 (H) 03/16/2018    HDL 34 (L) 05/31/2018    HDL 41 03/16/2018    LDLCALC 70 05/31/2018    LDLCALC 56 03/16/2018    UIL6TMEJUAXM 0 144 (L) 10/25/2018    FREET4 1 48 (H) 10/25/2018       Imaging: I have personally reviewed pertinent films in PACS  Xr Chest 2 Views    Result Date: 10/25/2018  Narrative: CHEST INDICATION:   Altered mental status  COMPARISON:  None EXAM PERFORMED/VIEWS:  XR CHEST PA & LATERAL FINDINGS: Cardiomediastinal silhouette appears unremarkable  The lungs are clear  No pneumothorax or pleural effusion  Osseous structures appear within normal limits for patient age  Impression: No acute cardiopulmonary disease  Workstation performed: [id number]     Ct Head Without Contrast    Result Date: 10/25/2018  Narrative: CT BRAIN - WITHOUT CONTRAST INDICATION:   headache, numbness  COMPARISON:  CT temporal bone 1/5/2004  TECHNIQUE:  CT examination of the brain was performed  In addition to axial images, coronal 2D reformatted images were created and submitted for interpretation  Radiation dose length product (DLP) for this visit:  988 mGy-cm   This examination, like all CT scans performed in the [facility], was performed utilizing techniques to minimize radiation dose exposure, including the use of iterative reconstruction and automated exposure control  IMAGE QUALITY:  Diagnostic  FINDINGS: PARENCHYMA:  No intracranial mass, mass effect or midline shift  No CT signs of acute infarction  No acute parenchymal hemorrhage  VENTRICLES AND EXTRA-AXIAL SPACES:  Normal for the patient's age  VISUALIZED ORBITS AND PARANASAL SINUSES:  No acute abnormality involving the orbits  Mild scattered paranasal sinus mucosal thickening is noted  No fluid levels are seen  Nasal septum is deviated to the right  There are few opacified right mastoid air cells  CALVARIUM AND EXTRACRANIAL SOFT TISSUES:  Normal      Impression: No acute intracranial abnormality   Workstation performed: [id number]     Ct Abdomen Pelvis With Contrast    Result Date: 10/23/2018  Narrative: CT ABDOMEN AND PELVIS WITH IV CONTRAST INDICATION:   LLQ pain - hx of diverticulitis in past with abscess and perforation  COMPARISON:  None  TECHNIQUE:  CT examination of the abdomen and pelvis was performed  Axial, sagittal, and coronal 2D reformatted images were created from the source data and submitted for interpretation  Radiation dose length product (DLP) for this visit:  698 mGy-cm   This examination, like all CT scans performed in the [facility], was performed utilizing techniques to minimize radiation dose exposure, including the use of iterative reconstruction and automated exposure control  IV Contrast:  100 mL of iohexol (OMNIPAQUE) Enteric Contrast:  Enteric contrast was not administered  FINDINGS: ABDOMEN LOWER CHEST:  Mild bibasilar dependent symmetric opacities favoring atelectasis  LIVER/BILIARY TREE:  Unremarkable  GALLBLADDER:  No calcified gallstones  No pericholecystic inflammatory change  SPLEEN:  Unremarkable  PANCREAS:  Unremarkable  ADRENAL GLANDS:  Unremarkable  KIDNEYS/URETERS:  Unremarkable  No hydronephrosis  STOMACH AND BOWEL: Mild to moderate diverticulosis noted  There is very mild soft tissue stranding along the anterior aspect of the distal descending colon without wall thickening or diverticular distention  May represent very early mild diverticulitis  APPENDIX:  A normal appendix was visualized  ABDOMINOPELVIC CAVITY:  No ascites or free intraperitoneal air  No lymphadenopathy  VESSELS:  Unremarkable for patient's age  PELVIS REPRODUCTIVE ORGANS:  Unremarkable for patient's age  Small calcified myomata noted  URINARY BLADDER:  Unremarkable  ABDOMINAL WALL/INGUINAL REGIONS:  Oval nodular asymmetry within the region of the left labia, location and appearance suggesting possible Bartholin's gland cyst   Nodule measures 2 5 cm in maximum dimension  OSSEOUS STRUCTURES:  No acute fracture or destructive osseous lesion    Prior posterior fusion hardware seen at the L5-S1 level  Mild degenerative changes seen at L2-3  Impression: Very mild soft tissue stranding adjacent to the distal descending colon which may represent very mild/early diverticulitis  Asymmetric nodular focus in the region of the left labia  Location suggesting possible Bartholin's gland cyst  Workstation performed: [id number]       Microbiology: cultures obtained in emergency department include none    Urinalysis:   Results from last 7 days  Lab Units 10/25/18  1450   COLOR UA  Yellow   CLARITY UA  Clear   SPEC GRAV UA  1 010   PH UA  5 5   LEUKOCYTES UA  Negative   NITRITE UA  Negative   PROTEIN UA mg/dl Negative   GLUCOSE UA mg/dl Negative   KETONES UA mg/dl Negative   BILIRUBIN UA  Negative   BLOOD UA  Negative        Urine Micro:        EKG, Pathology, and Other Studies: I have personally reviewed pertinent reports        Medications given in Emergency Department     Medication Administration - last 24 hours from 10/24/2018 1658 to 10/25/2018 1658       Date/Time Order Dose Route Action Action by     10/25/2018 1646 ketorolac (TORADOL) injection 15 mg 15 mg Intravenous Given Mary Nice RN     10/25/2018 1646 metoclopramide (REGLAN) injection 10 mg 10 mg Intravenous Given Milady Hale RN     10/25/2018 1646 sodium chloride 0 9 % bolus 1,000 mL 1,000 mL Intravenous New Bag Milady Hale RN          Assessment and Plan     Generalized Weakness/bilateral ptosis  -check magnesium/phosphate  -Check myasthenia gravis antibodies  -patient currently a 5 in 5 in the upper and lower extremities with the exception of proximal lower extremities  -consider Neurology consult  -patient sees a rheumatologist for fibromyalgia and has had extensive autoimmune workup in the past  -currently holding home zanaflex, lyrica, zolpidem  -continue to monitor clinically    Possible diverticulitis  -patient is currently clinically stable with benign abdominal exam  -no fevers, chills, nausea, vomiting, leukocytosis  -very mild tenderness to palpation left lower quadrant  -holding off on oral antibiotics for the moment    Thyroid disease  -patient receives 125 mcg levothyroxine at home  -TSH is elevated at 0 144  -it is unclear of this TSH elevation isn't due to recent diverticulitis  -continue current home dose    Coronary artery disease status post stenting/hypertension  -continue dual antiplatelet therapy, atorvastatin, losartan, metoprolol    GERD  -continue Protonix    Asthma  -continue home medications    Code Status: Level 1 - Full Code  VTE Pharmacologic Prophylaxis: Enoxaparin (Lovenox)   VTE Mechanical Prophylaxis: sequential compression device  Admission Status: OBSERVATION    Admission Time  I spent 30 minutes admitting the patient  This involved direct patient contact where I performed a full history and physical, reviewing previous records, and reviewing laboratory and other diagnostic studies      Silvia Pastor MD  Internal Medicine  PGY-1

## 2018-10-25 NOTE — PROGRESS NOTES
64 [facility] Senior Admission Note   Unit/Bed # @DBLINK (ISIS,87034)@ Encounter: [id number]  2015 [facility] 63 y o  female [id number]       Patient seen and examined  Reviewed H&P per Dr Gilma Chand  Agree with the assessment and plan with any exception/addition as noted below:    Assessment/Plan: Principal Problem:    Generalized weakness  Active Problems:    Back pain    Benign essential HTN    Coronary arteriosclerosis    Chronic stable angina (HCC)    Depression with anxiety    Fibromyalgia    Gastroesophageal reflux disease    Hypothyroidism    Hypercholesterolemia    Diverticulitis of colon    Acquired ptosis of eyelid, bilateral         Disposition:  INPATIENT     Expected LOS: >2 Midnights            SIMONA Del Valle O  Internal Medicine   PGY-2  10/25/2018 8:00 PM    Gigi MOLINA     Internal Medicine   PGY-2  10/25/2018 7:44 PM

## 2018-10-25 NOTE — ED PROVIDER NOTES
History  Chief Complaint   Patient presents with    Altered Mental Status     per patient's daughter, patient was seen 2 days ago for diverticulitis and given IV antibiotics, slept all day yesterday and today has been "unable to open her eyes or move", patient is drowsy but able to stand with assistance     HPI  A 60-year-old female with past medical history of hypertension, hyperlipidemia, CAD with stents on Plavix, GERD, chronic back pain, and diverticulitis presents to the ED with headache, fatigue, and weakness since waking up this morning  Patient reports the severe, pounding frontal headache has been constant since waking up yesterday morning  Patient also reports difficulty opening her eyelids since this morning  She reports they feel heavy    Denies any blurry vision, double vision  Daughter reports patient had a facial droop this morning, but she no longer sees this in the ED  Patient does note some decreased sensation on the left side of her face  She also reports generalized weakness  Daughter reports patient was walking this morning and did walk into the hospital, but she is much slower than usual   Patient also reports she can only speak in a whisper  She did eat a normal breakfast this morning  Denies any nausea, vomiting, dizziness, lightheadedness, abdominal pain, fevers  No numbness of the extremities  Patient was evaluated in the [facility] the ED on 10/23/2018 for left lower quadrant pain and diagnosed with early diverticulitis  She had one dose of IV antibiotics and was discharged  Patient was prescribed p  O  Antibiotics taken home, but she has not taken any of these yet because she only just picked him up today  No other medication changes and patient has been taking all of her other medications as prescribed  Prior to Admission Medications   Prescriptions Last Dose Informant Patient Reported? Taking?    PROAIR RESPICLICK 112 (90 Base) MCG/ACT AEPB  Self Yes Yes   albuterol (PROVENTIL HFA,VENTOLIN HFA) 90 mcg/act inhaler  Self No Yes   Sig: Inhale 2 puffs every 6 (six) hours as needed for wheezing   aspirin 81 mg chewable tablet  Self Yes Yes   Sig: Chew 81 mg daily   atorvastatin (LIPITOR) 80 mg tablet  Self No Yes   Sig: TAKE 1 TABLET BY MOUTH EVERY DAY   ciprofloxacin (CIPRO) 500 mg tablet   No Yes   Sig: Take 1 tablet (500 mg total) by mouth 2 (two) times a day for 7 days   citalopram (CeleXA) 20 mg tablet  Self No Yes   Sig: Take 1 tablet (20 mg total) by mouth daily   clopidogrel (PLAVIX) 75 mg tablet  Self No Yes   Sig: TAKE 4 TABLETS TODAY THEN 1 TABLET DAILY   ergocalciferol (VITAMIN D2) 50,000 units  Self Yes Yes   Sig: Take 50,000 Units by mouth once a week   famotidine (PEPCID) 40 MG tablet  Self No Yes   Sig: Take 1 tablet (40 mg total) by mouth daily   fluticasone (FLONASE) 50 mcg/act nasal spray  Self No Yes   Si spray into each nostril daily   fluticasone-salmeterol (ADVAIR) 250-50 mcg/dose inhaler  Self No Yes   Sig: Inhale 1 puff every 12 (twelve) hours as needed (sob)   hydrOXYzine HCL (ATARAX) 25 mg tablet  Self No Yes   Sig: Take 1 tablet (25 mg total) by mouth 2 (two) times a day as needed for itching   levothyroxine 125 mcg tablet  Self No Yes   Sig: TAKE 1 TABLET DAILY  losartan (COZAAR) 25 mg tablet  Self No Yes   Sig: TAKE 1 TABLET DAILY     metoprolol succinate (TOPROL-XL) 25 mg 24 hr tablet  Self Yes Yes   Sig: Take 25 mg by mouth daily   metroNIDAZOLE (FLAGYL) 500 mg tablet   No Yes   Sig: Take 1 tablet (500 mg total) by mouth every 8 (eight) hours for 7 days   pantoprazole (PROTONIX) 40 mg tablet  Self No Yes   Sig: Take 1 tablet (40 mg total) by mouth daily   pregabalin (LYRICA) 150 mg capsule  Self No Yes   Sig: Take 1 capsule (150 mg total) by mouth 3 (three) times a day   silver sulfadiazine (SILVADENE,SSD) 1 % cream  Self No Yes   Sig: Apply topically daily   tiZANidine (ZANAFLEX) 2 mg tablet  Self No Yes   Sig: Take 2 tablets (4 mg total) by mouth every 8 (eight) hours as needed for muscle spasms   triamcinolone (KENALOG) 0 1 % cream  Self No Yes   Sig: Apply topically 2 (two) times a day   valACYclovir (VALTREX) 1,000 mg tablet   No No   Sig: TAKE 1 TABLET BY MOUTH THREE TIMES A DAY FOR 10 DAYS   valACYclovir (VALTREX) 1,000 mg tablet  Self Yes Yes   Sig: Take 1 tablet by mouth 3 (three) times a day For 10 days   zolpidem (AMBIEN) 10 mg tablet  Self No Yes   Sig: Take 1 tablet (10 mg total) by mouth daily at bedtime as needed for sleep for up to 30 days      Facility-Administered Medications: None       Past Medical History:   Diagnosis Date    Abnormal echocardiogram     Abnormal stress test     Anxiety     Asthma     Brachial neuritis     Depression     Disease of thyroid gland     Displacement of intervertebral disc of mid-cervical region     Fibromyalgia     Frequent headaches     GERD (gastroesophageal reflux disease)     Herniated nucleus pulposus     History of arthritis     History of asthma     History of cardiac disorder     History of chest pain     History of diverticulitis     History of fatigue     History of hearing loss     History of hemoptysis     History of herpes simplex infection     History of hiatal hernia     History of insect bite     INFECTED    History of memory loss     History of neck disorder     History of reflex sympathetic dystrophy     History of restless legs syndrome     History of shortness of breath     History of spinal stenosis     Hyperlipidemia     Hyperlipidemia     Hypertension     Skin rash     Somnolence, daytime     Spinal stenosis of cervical region        Past Surgical History:   Procedure Laterality Date    BACK SURGERY      BACK SURGERY      LOWER BACK SURGERY    CARDIAC CATHETERIZATION      HISTORY OF CORONARY ANGIOGRAPHY WITH CONCOMITANT LEFT HEART CATHETERIZATION    CORONARY ANGIOPLASTY WITH STENT PLACEMENT      EAR SURGERY      TONSILLECTOMY      TYMPANOPLASTY         Family History   Problem Relation Age of Onset    Coronary artery disease Mother         ATHEROMA    Heart disease Mother     Diabetes Mother     Hypertension Mother     No Known Problems Father         NO PERTINENT FAMILY HISTORY    Coronary artery disease Sister         ATHEROMA    Heart disease Sister     Stroke Sister     Diabetes Sister     Hypertension Sister      I have reviewed and agree with the history as documented  Social History   Substance Use Topics    Smoking status: Former Smoker     Quit date: 2012    Smokeless tobacco: Former User      Comment: quit in 2013    Alcohol use No        Review of Systems   Constitutional: Positive for activity change and fatigue  Negative for chills and fever  HENT: Negative for congestion, rhinorrhea and sore throat  Respiratory: Negative for chest tightness and shortness of breath  Cardiovascular: Negative for chest pain  Gastrointestinal: Negative for abdominal pain, diarrhea, nausea and vomiting  Genitourinary: Negative for dysuria and hematuria  Musculoskeletal: Positive for back pain and gait problem  Negative for arthralgias and myalgias  Skin: Negative for rash  Neurological: Positive for weakness, numbness and headaches  Negative for dizziness, syncope and light-headedness  All other systems reviewed and are negative        Physical Exam  ED Triage Vitals [10/25/18 1233]   Temperature Pulse Respirations Blood Pressure SpO2   97 7 °F (36 5 °C) 60 18 169/72 96 %      Temp Source Heart Rate Source Patient Position - Orthostatic VS BP Location FiO2 (%)   Oral Monitor Sitting Right arm --      Pain Score       No Pain           Orthostatic Vital Signs  Vitals:    10/25/18 1500 10/25/18 1623 10/25/18 1700 10/25/18 1825   BP: 129/58 137/62 149/68 163/68   Pulse: (!) 48 (!) 50 (!) 54 (!) 45   Patient Position - Orthostatic VS:    Sitting       Physical Exam   Constitutional: She is oriented to person, place, and time  She appears well-developed and well-nourished  No distress  HENT:   Head: Normocephalic and atraumatic  Right Ear: External ear normal    Left Ear: External ear normal    Nose: Nose normal    Eyes: Pupils are equal, round, and reactive to light  Conjunctivae and EOM are normal    Neck: Normal range of motion  Neck supple  Cardiovascular: Normal rate, regular rhythm, normal heart sounds and intact distal pulses  Exam reveals no gallop and no friction rub  No murmur heard  Pulmonary/Chest: Effort normal and breath sounds normal  No respiratory distress  She has no wheezes  She has no rhonchi  She has no rales  Abdominal: Soft  Bowel sounds are normal  She exhibits no distension and no mass  There is tenderness (mild) in the suprapubic area and left lower quadrant  There is no rebound and no guarding  Musculoskeletal: Normal range of motion  Lymphadenopathy:     She has no cervical adenopathy  Neurological: She is alert and oriented to person, place, and time  A cranial nerve deficit (decreased sensation left face and facial weakness with smile and raising eyelids bilaterally ) and sensory deficit (decreased sensation left side of face  No numbness in extremities) is present  She exhibits abnormal muscle tone (poor effort on motor exam, pt reports generalized weakness throughout and is 4/5 strenght in bilateral upper and lower extremities)  Skin: Skin is warm and dry  She is not diaphoretic  Psychiatric: She has a normal mood and affect  Pt speaking in a whisper     Nursing note and vitals reviewed        ED Medications  Medications   atropine 1 mg/10 mL injection **ADS Override Pull** (not administered)   ketorolac (TORADOL) injection 15 mg (15 mg Intravenous Given 10/25/18 1646)   metoclopramide (REGLAN) injection 10 mg (10 mg Intravenous Given 10/25/18 1646)   sodium chloride 0 9 % bolus 1,000 mL (1,000 mL Intravenous New Bag 10/25/18 1646)       Diagnostic Studies  Results Reviewed Procedure Component Value Units Date/Time    T4, free [[id number]]  (Abnormal) Collected:  10/25/18 1439    Lab Status:  Final result Specimen:  Blood from Arm, Right Updated:  10/25/18 1716     Free T4 1 48 (H) ng/dL     [place] draw [[id number]] Collected:  10/25/18 1439    Lab Status:  Final result Specimen:  Blood Updated:  10/25/18 1601    Narrative: The following orders were created for panel order [place] draw  Procedure                               Abnormality         Status                     ---------                               -----------         ------                     Benton Thomas Top on GCFT[[id number]]                            Final result               Gold top on LKWM[[id number]]                                  Final result                 Please view results for these tests on the individual orders  Comprehensive metabolic panel [[id number]]  (Abnormal) Collected:  10/25/18 1439    Lab Status:  Final result Specimen:  Blood from Arm, Right Updated:  10/25/18 1523     Sodium 142 mmol/L      Potassium 3 7 mmol/L      Chloride 107 mmol/L      CO2 28 mmol/L      ANION GAP 7 mmol/L      BUN 11 mg/dL      Creatinine 0 88 mg/dL      Glucose 111 mg/dL      Calcium 8 9 mg/dL      AST 34 U/L      ALT 48 U/L      Alkaline Phosphatase 119 (H) U/L      Total Protein 7 5 g/dL      Albumin 3 7 g/dL      Total Bilirubin 0 58 mg/dL      eGFR 73 ml/min/1 73sq m     Narrative:         National Kidney Disease Education Program recommendations are as follows:  GFR calculation is accurate only with a steady state creatinine  Chronic Kidney disease less than 60 ml/min/1 73 sq  meters  Kidney failure less than 15 ml/min/1 73 sq  meters      TSH, 3rd generation with Free T4 reflex [[id number]]  (Abnormal) Collected:  10/25/18 1439    Lab Status:  Final result Specimen:  Blood from Arm, Right Updated:  10/25/18 1523     TSH 3RD GENERATON 0 144 (L) uIU/mL     Narrative:         Patients undergoing fluorescein dye angiography may retain small amounts of fluorescein in the body for 48-72 hours post procedure  Samples containing fluorescein can produce falsely depressed TSH values  If the patient had this procedure,a specimen should be resubmitted post fluorescein clearance            The recommended reference ranges for TSH during pregnancy are as follows:  First trimester 0 1 to 2 5 uIU/mL  Second trimester  0 2 to 3 0 uIU/mL  Third trimester 0 3 to 3 0 uIU/m      Troponin I [[id number]]  (Normal) Collected:  10/25/18 1439    Lab Status:  Final result Specimen:  Blood from Arm, Right Updated:  10/25/18 1518     Troponin I <0 02 ng/mL     POCT urinalysis dipstick [[id number]]  (Normal) Resulted:  10/25/18 1448    Lab Status:  Final result Updated:  10/25/18 1448     Color, UA see chart    CBC and differential [[id number]] Collected:  10/25/18 1439    Lab Status:  Final result Specimen:  Blood from Arm, Right Updated:  10/25/18 1448     WBC 5 07 Thousand/uL      RBC 4 33 Million/uL      Hemoglobin 12 9 g/dL      Hematocrit 38 0 %      MCV 88 fL      MCH 29 8 pg      MCHC 33 9 g/dL      RDW 13 2 %      MPV 10 7 fL      Platelets 149 Thousands/uL      nRBC 0 /100 WBCs      Neutrophils Relative 50 %      Immat GRANS % 0 %      Lymphocytes Relative 42 %      Monocytes Relative 5 %      Eosinophils Relative 2 %      Basophils Relative 1 %      Neutrophils Absolute 2 56 Thousands/µL      Immature Grans Absolute 0 02 Thousand/uL      Lymphocytes Absolute 2 11 Thousands/µL      Monocytes Absolute 0 27 Thousand/µL      Eosinophils Absolute 0 08 Thousand/µL      Basophils Absolute 0 03 Thousands/µL     ED Urine Macroscopic [[id number]] Collected:  10/25/18 1450    Lab Status:  Final result Specimen:  Urine Updated:  10/25/18 1448     Color, UA Yellow     Clarity, UA Clear     pH, UA 5 5     Leukocytes, UA Negative     Nitrite, UA Negative     Protein, UA Negative mg/dl      Glucose, UA Negative mg/dl      Ketones, UA Negative mg/dl Urobilinogen, UA 0 2 E U /dl      Bilirubin, UA Negative     Blood, UA Negative     Specific Gravity, UA 1 010    Narrative:       CLINITEK RESULT                 XR chest 2 views   Final Result by [place], MD (10/25 2228)      No acute cardiopulmonary disease  Workstation performed: [id number]         CT head without contrast   Final Result by Clark Thomas MD (10/25 3120)      No acute intracranial abnormality  Workstation performed: [id number]               Procedures  ECG 12 Lead Documentation  Date/Time: 10/25/2018 2:51 PM  Performed by: Rosalinda Palomino by: Saad Dyer     ECG reviewed by me, the ED Provider: yes    Patient location:  ED  Previous ECG:     Previous ECG:  Unavailable  Interpretation:     Interpretation: non-specific    Rate:     ECG rate:  50    ECG rate assessment: bradycardic    Rhythm:     Rhythm: sinus bradycardia    Ectopy:     Ectopy: none    QRS:     QRS axis:  Normal    QRS intervals:  Normal  Conduction:     Conduction: normal    ST segments:     ST segments:  Normal  T waves:     T waves: inverted      Inverted:  III, aVR and V1          Phone Consults  ED Phone Contact    ED Course  ED Course as of Oct 25 1849   Thu Oct 25, 2018   1644 TSH 3RD GENERATON: (!) 0 144   1644 Negative for bleed or stroke  Will treat headache with IV toradol, reglan, and fluids  CT head without contrast   1645 When going in to reassess pt she opened her eyelids  When asked if her symptoms were improving, pt closed her eyes and denied this  Did "ice pack test" to evaluate for improvement of ptosis caused by myasthenia gravis and did not have positive results       12 Will admit pt to medicine for further evaluation of weakness and possible neurology eval      1656 Admitted to [facility]                                MDM  Number of Diagnoses or Management Options  Diagnosis management comments:  56-year-old female with past medical history of hypertension, hyperlipidemia, CAD with stents on Plavix, GERD, chronic back pain, and diverticulitis with headache, fatigue, ptosis, and generalized weakness  Will get CT head to evaluate for intracranial bleed  Will get CBC, CMP, Troponin, CXR, EKG to evaluate for acute cardiac abnormality  CritCare Time    Disposition  Final diagnoses:   Headache   Ptosis   Generalized weakness     Time reflects when diagnosis was documented in both MDM as applicable and the Disposition within this note     Time User Action Codes Description Comment    10/25/2018  4:50 PM Anjum Pedroza Add [R51] Headache     10/25/2018  4:50 PM Anjum Pedroza Add [H02 409] Ptosis     10/25/2018  4:50 PM Anjum Pedroza Add [R53 1] Generalized weakness       ED Disposition     ED Disposition Condition Comment    Admit  Case was discussed with Dr Vannesa Priest ([facility] resident) and the patient's admission status was agreed to be Admission Status: inpatient status to the service of Dr Dori Schirmer   Follow-up Information    None         Patient's Medications   Discharge Prescriptions    No medications on file     No discharge procedures on file  ED Provider  Attending physically available and evaluated Yusuf Montilla I managed the patient along with the ED Attending      Electronically Signed by         Saritha Loya MD  10/25/18 0363

## 2018-10-25 NOTE — ED NOTES
Per pt's daughter this morning at 1130am pt presented with a R sided facial droop  Pt is free of a facial droop at this time        Darrell Brink RN  10/25/18 3529

## 2018-10-25 NOTE — ED ATTENDING ATTESTATION
I, Betsy Hamm, DO, saw and evaluated the patient  I have discussed the patient with the resident/non-physician practitioner and agree with the resident's/non-physician practitioner's findings, Plan of Care, and MDM as documented in the resident's/non-physician practitioner's note, except where noted  All available labs and Radiology studies were reviewed  At this point I agree with the current assessment done in the Emergency Department  I have conducted an independent evaluation of this patient a history and physical is as follows:      Critical Care Time  CritCare Time    Procedures     63 yr old fem to the ED with weakness and pounding headache wo neck pain or fever  Pt seen in the ED on Tues and tx'd for diverticulitis  IV meds and Rx which she did not get  After home, she slept for 12 hours  Headache on Wed upon awakening  Pounding with weakness  Continued into today and still unable to raise lids  Facial weakness for brief period  Taking her other meds  On plavix and thyroid which may be contributory  Exm: Lungs: cta  Heart: heavenly wo acute changes  Generalized weakness including lids  Pupils equal and mild decr sensation one side of face  Abd: soft wo significant tenderness or rebound  Pln: Suspect bleed with pounding headache   ? MG  Will work up and adm

## 2018-10-25 NOTE — ED NOTES
Dr Crespo Part aware pt needing IV access  Pt to obtain US guided IV access        Melisa Noguera, RN  10/25/18 7025

## 2018-10-25 NOTE — TELEPHONE ENCOUNTER
unfortunatley without seeing pt limited on what can be recommended  Possibly side effect of medication or she may have other concerns such as URI, eye infection  Pt should really be seen  We do have late appts if that is easier for pt  If not would recommend urgent care  Also depending on her symptoms she may need to reschedule disability form appt and focus on acute concerns at this time  That will be at the discretion of the provider seeing her tomorrow  Please advise pt of all of the above    THANKS!!

## 2018-10-25 NOTE — ED NOTES
Dr Mitzy Beauchamp at bedside attempting to obtain [address] guided IV access        Ryanne Kamara, RN  10/25/18 5876

## 2018-10-26 ENCOUNTER — APPOINTMENT (INPATIENT)
Dept: RADIOLOGY | Facility: HOSPITAL | Age: 58
DRG: 351 | End: 2018-10-26
Payer: COMMERCIAL

## 2018-10-26 LAB
ANION GAP SERPL CALCULATED.3IONS-SCNC: 8 MMOL/L (ref 4–13)
BUN SERPL-MCNC: 13 MG/DL (ref 5–25)
CALCIUM SERPL-MCNC: 8.4 MG/DL (ref 8.3–10.1)
CHLORIDE SERPL-SCNC: 107 MMOL/L (ref 100–108)
CO2 SERPL-SCNC: 25 MMOL/L (ref 21–32)
CREAT SERPL-MCNC: 0.85 MG/DL (ref 0.6–1.3)
ERYTHROCYTE [DISTWIDTH] IN BLOOD BY AUTOMATED COUNT: 13.1 % (ref 11.6–15.1)
GFR SERPL CREATININE-BSD FRML MDRD: 76 ML/MIN/1.73SQ M
GLUCOSE SERPL-MCNC: 79 MG/DL (ref 65–140)
HCT VFR BLD AUTO: 38.1 % (ref 34.8–46.1)
HGB BLD-MCNC: 12.7 G/DL (ref 11.5–15.4)
MAGNESIUM SERPL-MCNC: 2.1 MG/DL (ref 1.6–2.6)
MCH RBC QN AUTO: 29.4 PG (ref 26.8–34.3)
MCHC RBC AUTO-ENTMCNC: 33.3 G/DL (ref 31.4–37.4)
MCV RBC AUTO: 88 FL (ref 82–98)
PHOSPHATE SERPL-MCNC: 3.9 MG/DL (ref 2.7–4.5)
PLATELET # BLD AUTO: 233 THOUSANDS/UL (ref 149–390)
PMV BLD AUTO: 11.1 FL (ref 8.9–12.7)
POTASSIUM SERPL-SCNC: 3.6 MMOL/L (ref 3.5–5.3)
RBC # BLD AUTO: 4.32 MILLION/UL (ref 3.81–5.12)
SODIUM SERPL-SCNC: 140 MMOL/L (ref 136–145)
WBC # BLD AUTO: 4.88 THOUSAND/UL (ref 4.31–10.16)

## 2018-10-26 PROCEDURE — 99233 SBSQ HOSP IP/OBS HIGH 50: CPT | Performed by: INTERNAL MEDICINE

## 2018-10-26 PROCEDURE — 80048 BASIC METABOLIC PNL TOTAL CA: CPT | Performed by: INTERNAL MEDICINE

## 2018-10-26 PROCEDURE — 84100 ASSAY OF PHOSPHORUS: CPT | Performed by: INTERNAL MEDICINE

## 2018-10-26 PROCEDURE — 85027 COMPLETE CBC AUTOMATED: CPT | Performed by: INTERNAL MEDICINE

## 2018-10-26 PROCEDURE — 71260 CT THORAX DX C+: CPT

## 2018-10-26 PROCEDURE — 83735 ASSAY OF MAGNESIUM: CPT | Performed by: INTERNAL MEDICINE

## 2018-10-26 RX ORDER — ZOLPIDEM TARTRATE 5 MG/1
5 TABLET ORAL
Status: DISCONTINUED | OUTPATIENT
Start: 2018-10-26 | End: 2018-10-28 | Stop reason: HOSPADM

## 2018-10-26 RX ORDER — LEVOTHYROXINE SODIUM 0.1 MG/1
100 TABLET ORAL
Status: DISCONTINUED | OUTPATIENT
Start: 2018-10-27 | End: 2018-10-28 | Stop reason: HOSPADM

## 2018-10-26 RX ORDER — PREGABALIN 75 MG/1
150 CAPSULE ORAL 3 TIMES DAILY
Status: DISCONTINUED | OUTPATIENT
Start: 2018-10-26 | End: 2018-10-28 | Stop reason: HOSPADM

## 2018-10-26 RX ORDER — AMOXICILLIN 250 MG
1 CAPSULE ORAL
Status: DISCONTINUED | OUTPATIENT
Start: 2018-10-26 | End: 2018-10-28 | Stop reason: HOSPADM

## 2018-10-26 RX ORDER — POTASSIUM CHLORIDE 20 MEQ/1
40 TABLET, EXTENDED RELEASE ORAL ONCE
Status: COMPLETED | OUTPATIENT
Start: 2018-10-26 | End: 2018-10-26

## 2018-10-26 RX ORDER — METRONIDAZOLE 500 MG/1
500 TABLET ORAL EVERY 8 HOURS SCHEDULED
Status: DISCONTINUED | OUTPATIENT
Start: 2018-10-26 | End: 2018-10-28 | Stop reason: HOSPADM

## 2018-10-26 RX ORDER — CIPROFLOXACIN 500 MG/1
500 TABLET, FILM COATED ORAL EVERY 12 HOURS SCHEDULED
Status: DISCONTINUED | OUTPATIENT
Start: 2018-10-26 | End: 2018-10-28 | Stop reason: HOSPADM

## 2018-10-26 RX ADMIN — Medication 81 MG: at 08:13

## 2018-10-26 RX ADMIN — PREGABALIN 150 MG: 75 CAPSULE ORAL at 17:30

## 2018-10-26 RX ADMIN — IOHEXOL 85 ML: 350 INJECTION, SOLUTION INTRAVENOUS at 16:29

## 2018-10-26 RX ADMIN — TRIAMCINOLONE ACETONIDE: 1 CREAM TOPICAL at 21:26

## 2018-10-26 RX ADMIN — LOSARTAN POTASSIUM 25 MG: 25 TABLET, FILM COATED ORAL at 08:13

## 2018-10-26 RX ADMIN — PREGABALIN 150 MG: 75 CAPSULE ORAL at 12:12

## 2018-10-26 RX ADMIN — FLUTICASONE FUROATE AND VILANTEROL TRIFENATATE 1 PUFF: 100; 25 POWDER RESPIRATORY (INHALATION) at 08:12

## 2018-10-26 RX ADMIN — CIPROFLOXACIN HYDROCHLORIDE 500 MG: 500 TABLET, FILM COATED ORAL at 12:12

## 2018-10-26 RX ADMIN — METRONIDAZOLE 500 MG: 500 TABLET ORAL at 21:27

## 2018-10-26 RX ADMIN — PREGABALIN 150 MG: 75 CAPSULE ORAL at 21:27

## 2018-10-26 RX ADMIN — ZOLPIDEM TARTRATE 5 MG: 5 TABLET ORAL at 21:28

## 2018-10-26 RX ADMIN — TRIAMCINOLONE ACETONIDE: 1 CREAM TOPICAL at 08:13

## 2018-10-26 RX ADMIN — LEVOTHYROXINE SODIUM 125 MCG: 125 TABLET ORAL at 08:13

## 2018-10-26 RX ADMIN — ATORVASTATIN CALCIUM 80 MG: 80 TABLET, FILM COATED ORAL at 17:30

## 2018-10-26 RX ADMIN — ZOLPIDEM TARTRATE 10 MG: 5 TABLET ORAL at 00:03

## 2018-10-26 RX ADMIN — METRONIDAZOLE 500 MG: 500 TABLET ORAL at 12:12

## 2018-10-26 RX ADMIN — CITALOPRAM HYDROBROMIDE 20 MG: 20 TABLET ORAL at 08:13

## 2018-10-26 RX ADMIN — PANTOPRAZOLE SODIUM 40 MG: 40 TABLET, DELAYED RELEASE ORAL at 08:13

## 2018-10-26 RX ADMIN — METOPROLOL SUCCINATE 25 MG: 25 TABLET, EXTENDED RELEASE ORAL at 08:14

## 2018-10-26 RX ADMIN — SENNOSIDES AND DOCUSATE SODIUM 1 TABLET: 8.6; 5 TABLET ORAL at 21:27

## 2018-10-26 RX ADMIN — ACETAMINOPHEN 650 MG: 325 TABLET, FILM COATED ORAL at 00:03

## 2018-10-26 RX ADMIN — POTASSIUM CHLORIDE 40 MEQ: 1500 TABLET, EXTENDED RELEASE ORAL at 12:12

## 2018-10-26 RX ADMIN — ENOXAPARIN SODIUM 40 MG: 40 INJECTION SUBCUTANEOUS at 08:13

## 2018-10-26 RX ADMIN — CIPROFLOXACIN HYDROCHLORIDE 500 MG: 500 TABLET, FILM COATED ORAL at 21:27

## 2018-10-26 NOTE — MEDICAL STUDENT
MEDICAL STUDENT  Inpatient Progress Note for TRAINING ONLY  Not Part of Legal Medical Record       Progress Note - Loye Sports 63 y o  female MRN: [id number]    Unit/Bed#: [facility] 902-01 Encounter: [id number]  HD#1    Assessment:  1 ) weakness, ptosis  2 ) mild early diverticulitis  3 ) hypothyroidism  4 ) hypertension  5 ) hyperlipidemia  6 ) CAD s/p stent  7 ) fibromyalgia  8 ) chronic low back pain  9 ) GERD  10 ) asthma    Plan:  1 ) Pending MUSK antibodies and acetylcholine receptor binding   Continue to monitor for any neuro changes   CXR, EKG, and CT head indicate no acute abnormalities   Consider neuro consultation    2 ) Begin antibiotics after results for labs come back   Begin levofloxacin 500 mg PO QD x 7 days   CT pelvis showing mild soft tissue stranding of distal descending colon    3 ) Continue levothyroxine 125 microg   Patient historically hypothyroid but labs indicate TSH 0 144 and FT4 1 48    4 ) Continue losartan 25 mg and metoprolol 25 mg    5 ) Continue atorvastatin 80 mg QD   Consider high dose statin as contributing to chronic musculoskeletal pain    6 ) Continue aspirin 81 mg QD    7 ) Continue citalopram 20 mg   Restart lyrica   Continue to hold Ambien for neuro checks    8 ) Continue Kenalog topical    9 ) Continue pantoprazole 40 mg    10 ) Continue fluticasone 50 microg nasal spray and fluticasone-vilanterol (Breo) 100-25 microg inhaler    Subjective:   LISETTE MIGUEL  is a 62 yo F HD#1 with a PMH of CAD s/p stent, diverticulitis, hypertension, hyperlipidemia, fibromyalgia, hypothyroidism, GERD, asthma, and chronic back pain who presented to the ED with a cc of weakness, ptosis, and headache x 1 day  As of today, she reports no events overnight  Patient was unable to sleep stating that she usually needs to take Ambien prior to bed  She reports constipation which is a chronic problem but no urinary changes  She also reports LLQ pain due to diverticulitis and back pain due to fibromyalgia   She reports that the weakness has improved compared to yesterday  She feels that she is still unable to open her eyes normally with increased heaviness of her eye lids  She denies vision changes, blurry vision, fever, chills, cough, chest pain  Objective:     Vitals: Blood pressure 146/77, pulse 72, temperature 98 °F (36 7 °C), temperature source Oral, resp  rate 18, SpO2 98 %  ,There is no height or weight on file to calculate BMI  Intake/Output Summary (Last 24 hours) at 10/26/18 0829  Last data filed at 10/26/18 0700   Gross per 24 hour   Intake             1200 ml   Output                0 ml   Net             1200 ml       Physical Exam: General appearance: alert and oriented, in no acute distress and fatigued  Head: Normocephalic, without obvious abnormality, atraumatic  Eyes: EOMs intact but with excessive blinking throughout eye movements, PERRL  Lungs: clear to auscultation bilaterally  Heart: RRR, no M/R/G  Abdomen: tender to palpation in LLQ, hyperactive BS x 4 quads  Extremities: extremities normal, warm and well-perfused; no cyanosis, clubbing, or edema  Pulses: 2+ and symmetric  Skin: Skin color, texture, turgor normal  No rashes or lesions  Neurologic: Grossly normal, CN intact but with slight ptosis of bilateral eyes, sensation and strength intact with decreased strength at baseline     Invasive Devices     Peripheral Intravenous Line            Peripheral IV 10/25/18 Right Antecubital less than 1 day                Lab, Imaging and other studies: I have personally reviewed pertinent reports         Troponin- neg  TSH- 0 144  FT4- 1 48  Musk- pending  Acetylcholine- pending  CXR- no acute abnormalities  CT head- no acute abnormalities, sinus mucosal thickening  CT pelvis- mild soft tissue stranding of distal descending colon indicating very mild early diverticulitis    VTE Pharmacologic Prophylaxis: Enoxaparin (Lovenox)  VTE Mechanical Prophylaxis: sequential compression device

## 2018-10-26 NOTE — SOCIAL WORK
Cm met with patient to review role of CM  Patient presented as alert during conversation  Patient reported that she lives with her adult daughter, Ryanne Sam  [phone] in a 3 story home with 5 steps to enter with rails  Patient reported that there are 12-15 steps to her 2nd floor bedroom  Patient reported that she is independent with her ADLS, but that her daughter cooks and cleans  Patient reported that she uses a cane to assist with ambulation  Patient denied having any inpatient PT/OT, inpatient MH, substance abuse treatment or [place] [id number] services  Patient reported that she had no other DME in the home  Patient's primary pharmacy is [facility] in [place]  Patient is on disability;ity and was driving independently prior to admission  Patient's PCP is a [facility] physician  CM reviewed d/c planning process including the following: identifying help at home, patient preference for d/c planning needs, Discharge Lounge, [facility] Meds to Bed program, availability of treatment team to discuss questions or concerns patient and/or family may have regarding understanding medications and recognizing signs and symptoms once discharged  CM also encouraged patient to follow up with all recommended appointments after discharge  Patient advised of importance for patient and family to participate in managing patients medical well being

## 2018-10-26 NOTE — TELEPHONE ENCOUNTER
Pt went to hospital she will call to schedule a appt to come in she was supposed to come in for disability forms to be completed

## 2018-10-26 NOTE — UTILIZATION REVIEW
Initial Clinical Review    Admission: Date/Time/Statement: 10/25/18 @ 1656     Orders Placed This Encounter   Procedures    Inpatient Admission (expected length of stay for this patient is greater than two midnights)     Standing Status:   Standing     Number of Occurrences:   1     Order Specific Question:   Admitting Physician     Answer:   Makayla Moran [[id number]]     Order Specific Question:   Level of Care     Answer:   Med Surg [[id number]]     Order Specific Question:   Estimated length of stay     Answer:   More than 2 Midnights     Order Specific Question:   Certification     Answer:   I certify that inpatient services are medically necessary for this patient for a duration of greater than two midnights  See H&P and MD Progress Notes for additional information about the patient's course of treatment  ED: Date/Time/Mode of Arrival:   ED Arrival Information     Expected Arrival Acuity Means of Arrival Escorted By Service Admission Type    - 10/25/2018 12:19 Emergent Walk-In Family Member General Medicine Emergency    Arrival Complaint    Weakness          Chief Complaint:   Chief Complaint   Patient presents with    Altered Mental Status     per patient's daughter, patient was seen 2 days ago for diverticulitis and given IV antibiotics, slept all day yesterday and today has been "unable to open her eyes or move", patient is drowsy but able to stand with assistance       History of Illness:  63 y o  female presents with left-sided upper and lower extremity weakness as well as bilateral ptosis that she noted this morning upon waking up  Her daughter also noticed the symptoms so they sought care at [facility] ED  She also noted decreased volume in her voice  She reports a 9/10 headache that is made worse by lights and loud noises  She reports subjective weakness currently, though she and her daughter believes it has gotten slightly better since this morning   There is an area of ulceration in the area anterior to her uvula      Of note on Tuesday night she sought medical attention at [facility] the emergency department for left lower quadrant pain  Of note, she was diagnosed with diverticulitis at this time and given a prescription for oral antibiotics  The patient has not yet had the chance to take these antibiotics  She reports mild left lower quadrant pain  She has been constipated for the past 3 days  She reports that this is normal and that if she is constipated after 4 days she uses an enema  She is still passing flatus  ED Vital Signs:   ED Triage Vitals [10/25/18 1233]   Temperature Pulse Respirations Blood Pressure SpO2   97 7 °F (36 5 °C) 60 18 169/72 96 %      Temp Source Heart Rate Source Patient Position - Orthostatic VS BP Location FiO2 (%)   Oral Monitor Sitting Right arm --      Pain Score       No Pain        Wt Readings from Last 1 Encounters:   10/23/18 80 kg (176 lb 5 9 oz)       Vital Signs (abnormal): HR = 46, 45, 50  B/P = 185/81, 180/76    Abnormal Labs: Alk Phos = 119    Diagnostic Test Results: CXR - No acute cardiopulmonary disease  ED Treatment:   Medication Administration from 10/25/2018 1218 to 10/25/2018 2209       Date/Time Order Dose Route Action     10/25/2018 1646 ketorolac (TORADOL) injection 15 mg 15 mg Intravenous Given     10/25/2018 1646 metoclopramide (REGLAN) injection 10 mg 10 mg Intravenous Given     10/25/2018 1646 sodium chloride 0 9 % bolus 1,000 mL 1,000 mL Intravenous New Bag     10/25/2018 2150 clopidogrel (PLAVIX) tablet 75 mg 75 mg Oral Not Given          Past Medical/Surgical History:    Active Ambulatory Problems     Diagnosis Date Noted    Abnormal glucose 02/24/2017    Back pain 01/24/2017    Benign essential HTN 03/24/2017    Bites 05/16/2017    Cervical herniated disc 05/08/2017    Cervical radiculopathy 05/08/2017    Coronary arteriosclerosis 07/18/2014    Chronic stable angina ([facility] [address] ) 04/17/2017    Degenerative disc disease, cervical 05/08/2017    Depression with anxiety 03/24/2017    Fibromyalgia 01/24/2017    Gastroesophageal reflux disease 01/24/2017    Hypothyroidism 07/08/2014    Hypercholesterolemia 03/24/2017    Insomnia 04/17/2015    Lumbar foraminal stenosis 05/08/2017    Lumbar radiculopathy 05/08/2017    Palpitations 11/08/2017    Sacroiliitis ([facility] [address] ) 06/12/2017    [facility] of [facility] 04/18/2017    Skin rash 01/24/2017    Dizziness 02/05/2018    Myofascial pain 03/12/2018    Asthma 02/22/2013    Brachial neuritis 05/04/2012    Cervical spondylosis 04/04/2012    Chronic low back pain 03/14/2016    Diverticulitis of colon 10/14/2014    Dysthymic disorder 06/25/2013    Irritable bowel syndrome without diarrhea 03/14/2016    Pain in thoracic spine 09/28/2012    Reflex sympathetic dystrophy 09/09/2013    Cervical stenosis of spinal canal 05/04/2012    Status post lumbar surgery 09/22/2015    Chronic pain syndrome 03/16/2018    Acute non-recurrent maxillary sinusitis 03/30/2018    Laryngitis 03/30/2018    Hearing problem of both ears 05/31/2018     Resolved Ambulatory Problems     Diagnosis Date Noted    No Resolved Ambulatory Problems     Past Medical History:   Diagnosis Date    Abnormal echocardiogram     Abnormal stress test     Anxiety     Asthma     Brachial neuritis     Depression     Disease of thyroid gland     Displacement of intervertebral disc of mid-cervical region     Fibromyalgia     Frequent headaches     GERD (gastroesophageal reflux disease)     Herniated nucleus pulposus     History of arthritis     History of asthma     History of cardiac disorder     History of chest pain     History of diverticulitis     History of fatigue     History of hearing loss     History of hemoptysis     History of herpes simplex infection     History of hiatal hernia     History of insect bite     History of memory loss     History of neck disorder     History of reflex sympathetic dystrophy     History of restless legs syndrome     History of shortness of breath     History of spinal stenosis     Hyperlipidemia     Hyperlipidemia     Hypertension     Skin rash     Somnolence, daytime     Spinal stenosis of cervical region        Admitting Diagnosis: Ptosis [H02 409]  Altered mental status [R41 82]  Generalized weakness [R53 1]  Headache [R51]    Age/Sex: 63 y o  female    Assessment/Plan:   61y Female to ED with Generalized Weakness/ Bilateral Ptosis/ Possible diverticulitis  Check mag/ phos  Check myasthenia gravis antibodies         Admission Orders:  MUSK Antibody  PT/OT eval and treat    Scheduled Meds:   Current Facility-Administered Medications:  aspirin 81 mg Oral Daily   atorvastatin 80 mg Oral Daily With Dinner   ciprofloxacin 500 mg Oral Q12H [address]   citalopram 20 mg Oral Daily   clopidogrel 75 mg Oral Once   enoxaparin 40 mg Subcutaneous Daily   fluticasone 1 spray Nasal Daily   fluticasone-vilanterol 1 puff Inhalation Daily   levothyroxine 125 mcg Oral Daily   losartan 25 mg Oral Daily   metoprolol succinate 25 mg Oral Daily   metroNIDAZOLE 500 mg Oral Q8H JODY   pantoprazole 40 mg Oral Daily   pregabalin 150 mg Oral TID   senna-docusate sodium 1 tablet Oral HS   silver sulfadiazine  Topical Daily   triamcinolone  Topical BID     Continuous Infusions:    PRN Meds:     Acetaminophen po x1    hydrALAZINE    hydrOXYzine HCL    zolpidem

## 2018-10-26 NOTE — ED NOTES
Pt's diet changed to regular house due to passing dysphagia assessment, Pt given turkey sandwich, applesauce, and cup of water        Lola Weaver, BERE  10/25/18 2126

## 2018-10-26 NOTE — PROGRESS NOTES
IMR PROGRESS NOTE     Unit/Bed#: [facility] 902-01   Encounter: [id number]  SOD Team C     PATIENT INFORMATION     Caro Sierra   63 y o  female   MRN: [id number]  Hospital Stay Days: 1    ASSESSMENT/PLAN     Principal Problem:    Generalized weakness  Active Problems:    Back pain    Benign essential HTN    Coronary arteriosclerosis    Chronic stable angina (HCC)    Depression with anxiety    Fibromyalgia    Gastroesophageal reflux disease    Hypothyroidism    Hypercholesterolemia    Diverticulitis of colon    Acquired ptosis of eyelid, bilateral    1  Generalized weakness  · Unclear etiology  · Low suspicion for MG given acuity of onset and lack of fatigability  · Unlikely to be Lambert-Eaton but possible paraneoplastic process? · Unlikely to be Guillain-[place] as pattern of weakness is inconsistent and patient denies any recent viral illnesses or vaccinations  · Suspect possibly medication side-effect from Ambien use vs paraneoplastic syndrome from occult lung malignancy in setting of previous tobacco abuse  · Follow-up anti-MUSK and acetylcholine receptor binding antibody tests  · Continue neuro checks  · Check CT chest as noted below  · PT/OT    2  Mild diverticulitis without evidence of pericolonic abscess  · Slightly worsening abdominal pain this morning after breakfast, no systemic signs and nontoxic in appearance  · Will resume treatment with PO ciprofloxacin 500 mg Q12h and PO metronidazole 500 mg Q8h for total course of 7 days  · Will need surveillance colonoscopy in 6-8 weeks after resolution of acute phase  · Low fiber diet in the acute phase with gradual advancement to hiber fiber diet with clinical improvement  · Pain control  · Bowel regimen    3   Hypothyroidism  · Low TSH with relatively high FT4 levels suggestive of iatrogenic hyperthyroidism  · Clinically, denies any palpitations or heat intolerance but does endorse unintentional weight loss over last couple of months of approximately 10 lbs  · Unclear if weight loss is related to thyroid vs concern for occult malignancy in setting of previous tobacco abuse  · Will decrease levothyroxine dose to 100 mcg daily  · Will need repeat thyroid labs in 4-6 weeks  · Will continue to monitor clinically for signs of hypothyroidism    4  CAD status post stenting, hypertension  · Continue dual antiplatelet therapy  · Continue atorvastatin  · Continue losartan and metoprolol    5  GERD  · Stable  · Continue Protonix    6  Asthma  · Without acute exacerbation  · Respiratory status stable on RA  · Continue home inhalers    7  History of nicotine dependence  · Significant smoking history of 30+ pack years, quit smoking 5 years ago  · No family history of lung cancer  · However, given unintentional weight loss of 10 lbs over last couple of months and concern for paraneoplastic process causing patient's complaints of weakness, will order CT chest with IV contrast to assess for lung masses/nodules suggestive of lung malignancy    Disposition: Continue inpatient care  SUBJECTIVE     Patient seen and examined  No acute events overnight  Weakness improved but overall states that she does not feel back at baseline  Ptosis has improved  Tolerated diet but did experience some worsening of LLQ abdominal pain  Constipation is chronic issue  Last bowel movement 3 days ago, which is normal for her  No other acute complaints  OBJECTIVE     Vitals: Temp (24hrs), Av 5 °F (36 4 °C), Min:97 2 °F (36 2 °C), Max:97 7 °F (36 5 °C)   Temperature: (!) 97 2 °F (36 2 °C)  Vitals:    10/25/18 2000 10/25/18 2002 10/25/18 2145 10/25/18 2230   BP: (!) 180/76 (!) 180/76 151/66 138/63   BP Location: Right arm Right arm Right arm Left arm   Pulse: (!) 46 (!) 45 (!) 50 (!) 50   Resp: (!) 23 15 (!) 32 20   Temp:    (!) 97 2 °F (36 2 °C)   TempSrc:    Oral   SpO2: 98% 97% 96% 97%      There is no height or weight on file to calculate BMI  I/O last 24 hours:   In: 1200 [P O :200; IV Piggyback:1000]  Out: -     Physical Exam   Constitutional: She is oriented to person, place, and time  She appears well-developed and well-nourished  No distress  HENT:   Head: Normocephalic and atraumatic  Nose: Nose normal    Mouth/Throat: Oropharynx is clear and moist  No oropharyngeal exudate  Eyes: Pupils are equal, round, and reactive to light  Conjunctivae and EOM are normal  No scleral icterus  Neck: Neck supple  No JVD present  No tracheal deviation present  Cardiovascular: Normal rate, regular rhythm, normal heart sounds and intact distal pulses  Exam reveals no gallop and no friction rub  No murmur heard  Pulmonary/Chest: Effort normal and breath sounds normal  No respiratory distress  She has no wheezes  She has no rales  She exhibits no tenderness  Abdominal: Soft  Bowel sounds are normal  She exhibits no distension and no mass  There is tenderness (Mild tenderness to palpation, LLQ)  There is no rebound and no guarding  No hernia  Musculoskeletal: She exhibits no edema or deformity  Neurological: She is alert and oriented to person, place, and time  No cranial nerve deficit  Motor strength 5/5 bilateral upper extremities, 4+/5 bilateral lower extremities, sensation intact, no ptosis appreciated   Skin: Skin is warm and dry  No rash noted  She is not diaphoretic  No erythema  No pallor  Psychiatric: She has a normal mood and affect  Her behavior is normal  Judgment and thought content normal    Nursing note and vitals reviewed  Invasive Devices     Peripheral Intravenous Line            Peripheral IV 10/25/18 Right Antecubital less than 1 day              LABORATORY DATA     Labs: I have personally reviewed pertinent reports        Results from last 7 days  Lab Units 10/26/18  0446 10/25/18  1439 10/23/18  2221   WBC Thousand/uL 4 88 5 07 7 92   HEMOGLOBIN g/dL 12 7 12 9 13 9   HEMATOCRIT % 38 1 38 0 39 9   PLATELETS Thousands/uL 233 241 256   NEUTROS PCT %  --  50 58   MONOS PCT %  --  5 5      Results from last 7 days  Lab Units 10/26/18  0446 10/25/18  1439 10/23/18  2221   SODIUM mmol/L 140 142 140   POTASSIUM mmol/L 3 6 3 7 3 4*   CHLORIDE mmol/L 107 107 104   CO2 mmol/L 25 28 29   BUN mg/dL 13 11 13   CREATININE mg/dL 0 85 0 88 1 03   CALCIUM mg/dL 8 4 8 9 8 9   ALK PHOS U/L  --  119* 132*   ALT U/L  --  48 44   AST U/L  --  34 31       Results from last 7 days  Lab Units 10/26/18  0446   MAGNESIUM mg/dL 2 1       Results from last 7 days  Lab Units 10/26/18  0446   PHOSPHORUS mg/dL 3 9            Results from last 7 days  Lab Units 10/23/18  2222   LACTIC ACID mmol/L 1 1       Results from last 7 days  Lab Units 10/25/18  1439   TROPONIN I ng/mL <0 02       IMAGING & DIAGNOSTIC TESTING     Radiology Results: I have personally reviewed pertinent films in PACS  Xr Chest 2 Views    Result Date: 10/25/2018  Impression: No acute cardiopulmonary disease  Workstation performed: [id number]     Ct Head Without Contrast    Result Date: 10/25/2018  Impression: No acute intracranial abnormality  Workstation performed: [id number]     Other Diagnostic Testing: I have personally reviewed pertinent reports        ACTIVE MEDICATIONS     Current Facility-Administered Medications   Medication Dose Route Frequency    acetaminophen (TYLENOL) tablet 650 mg  650 mg Oral Q6H PRN    albuterol (PROVENTIL HFA,VENTOLIN HFA) inhaler 1 puff  1 puff Inhalation Q6H PRN    albuterol (PROVENTIL HFA,VENTOLIN HFA) inhaler 2 puff  2 puff Inhalation Q6H PRN    aspirin chewable tablet 81 mg  81 mg Oral Daily    atorvastatin (LIPITOR) tablet 80 mg  80 mg Oral Daily With Dinner    citalopram (CeleXA) tablet 20 mg  20 mg Oral Daily    clopidogrel (PLAVIX) tablet 75 mg  75 mg Oral Once    enoxaparin (LOVENOX) subcutaneous injection 40 mg  40 mg Subcutaneous Daily    fluticasone (FLONASE) 50 mcg/act nasal spray 1 spray  1 spray Nasal Daily    fluticasone-vilanterol (BREO ELLIPTA) 100-25 mcg/inh inhaler 1 puff  1 puff Inhalation Daily    hydrALAZINE (APRESOLINE) injection 5 mg  5 mg Intravenous Q6H PRN    hydrOXYzine HCL (ATARAX) tablet 25 mg  25 mg Oral BID PRN    levothyroxine tablet 125 mcg  125 mcg Oral Daily    losartan (COZAAR) tablet 25 mg  25 mg Oral Daily    metoprolol succinate (TOPROL-XL) 24 hr tablet 25 mg  25 mg Oral Daily    pantoprazole (PROTONIX) EC tablet 40 mg  40 mg Oral Daily    silver sulfadiazine (SILVADENE,SSD) 1 % cream   Topical Daily    triamcinolone (KENALOG) 0 1 % cream   Topical BID    zolpidem (AMBIEN) tablet 10 mg  10 mg Oral HS PRN     VTE Pharmacologic Prophylaxis: Enoxaparin (Lovenox)  VTE Mechanical Prophylaxis: sequential compression device     ==  SIMONA Roy    [address] [facility]  Internal Medicine Resident PGY-2

## 2018-10-27 LAB
ANION GAP SERPL CALCULATED.3IONS-SCNC: 8 MMOL/L (ref 4–13)
BUN SERPL-MCNC: 13 MG/DL (ref 5–25)
CALCIUM SERPL-MCNC: 8.5 MG/DL (ref 8.3–10.1)
CHLORIDE SERPL-SCNC: 107 MMOL/L (ref 100–108)
CO2 SERPL-SCNC: 25 MMOL/L (ref 21–32)
CREAT SERPL-MCNC: 0.88 MG/DL (ref 0.6–1.3)
GFR SERPL CREATININE-BSD FRML MDRD: 73 ML/MIN/1.73SQ M
GLUCOSE SERPL-MCNC: 104 MG/DL (ref 65–140)
POTASSIUM SERPL-SCNC: 3.5 MMOL/L (ref 3.5–5.3)
SODIUM SERPL-SCNC: 140 MMOL/L (ref 136–145)

## 2018-10-27 PROCEDURE — 80048 BASIC METABOLIC PNL TOTAL CA: CPT | Performed by: STUDENT IN AN ORGANIZED HEALTH CARE EDUCATION/TRAINING PROGRAM

## 2018-10-27 PROCEDURE — 83519 RIA NONANTIBODY: CPT | Performed by: STUDENT IN AN ORGANIZED HEALTH CARE EDUCATION/TRAINING PROGRAM

## 2018-10-27 PROCEDURE — 99233 SBSQ HOSP IP/OBS HIGH 50: CPT | Performed by: INTERNAL MEDICINE

## 2018-10-27 PROCEDURE — G8978 MOBILITY CURRENT STATUS: HCPCS

## 2018-10-27 PROCEDURE — G8988 SELF CARE GOAL STATUS: HCPCS

## 2018-10-27 PROCEDURE — G8987 SELF CARE CURRENT STATUS: HCPCS

## 2018-10-27 PROCEDURE — 97166 OT EVAL MOD COMPLEX 45 MIN: CPT

## 2018-10-27 PROCEDURE — 99255 IP/OBS CONSLTJ NEW/EST HI 80: CPT | Performed by: PSYCHIATRY & NEUROLOGY

## 2018-10-27 PROCEDURE — 97116 GAIT TRAINING THERAPY: CPT

## 2018-10-27 PROCEDURE — 84238 ASSAY NONENDOCRINE RECEPTOR: CPT | Performed by: STUDENT IN AN ORGANIZED HEALTH CARE EDUCATION/TRAINING PROGRAM

## 2018-10-27 PROCEDURE — G8979 MOBILITY GOAL STATUS: HCPCS

## 2018-10-27 PROCEDURE — 97162 PT EVAL MOD COMPLEX 30 MIN: CPT

## 2018-10-27 RX ORDER — MAGNESIUM SULFATE 1 G/100ML
1 INJECTION INTRAVENOUS ONCE
Status: COMPLETED | OUTPATIENT
Start: 2018-10-27 | End: 2018-10-27

## 2018-10-27 RX ORDER — KETOROLAC TROMETHAMINE 30 MG/ML
30 INJECTION, SOLUTION INTRAMUSCULAR; INTRAVENOUS EVERY 6 HOURS PRN
Status: DISCONTINUED | OUTPATIENT
Start: 2018-10-27 | End: 2018-10-28 | Stop reason: HOSPADM

## 2018-10-27 RX ORDER — POTASSIUM CHLORIDE 20 MEQ/1
40 TABLET, EXTENDED RELEASE ORAL ONCE
Status: COMPLETED | OUTPATIENT
Start: 2018-10-27 | End: 2018-10-27

## 2018-10-27 RX ADMIN — ATORVASTATIN CALCIUM 80 MG: 80 TABLET, FILM COATED ORAL at 17:18

## 2018-10-27 RX ADMIN — MAGNESIUM OXIDE TAB 400 MG (241.3 MG ELEMENTAL MG) 400 MG: 400 (241.3 MG) TAB at 17:18

## 2018-10-27 RX ADMIN — TRIAMCINOLONE ACETONIDE: 1 CREAM TOPICAL at 09:48

## 2018-10-27 RX ADMIN — KETOROLAC TROMETHAMINE 30 MG: 30 INJECTION, SOLUTION INTRAMUSCULAR at 19:27

## 2018-10-27 RX ADMIN — FLUTICASONE FUROATE AND VILANTEROL TRIFENATATE 1 PUFF: 100; 25 POWDER RESPIRATORY (INHALATION) at 09:45

## 2018-10-27 RX ADMIN — CIPROFLOXACIN HYDROCHLORIDE 500 MG: 500 TABLET, FILM COATED ORAL at 09:44

## 2018-10-27 RX ADMIN — PREGABALIN 150 MG: 75 CAPSULE ORAL at 17:18

## 2018-10-27 RX ADMIN — METRONIDAZOLE 500 MG: 500 TABLET ORAL at 13:58

## 2018-10-27 RX ADMIN — PREGABALIN 150 MG: 75 CAPSULE ORAL at 09:43

## 2018-10-27 RX ADMIN — PANTOPRAZOLE SODIUM 40 MG: 40 TABLET, DELAYED RELEASE ORAL at 09:44

## 2018-10-27 RX ADMIN — Medication 81 MG: at 09:43

## 2018-10-27 RX ADMIN — CIPROFLOXACIN HYDROCHLORIDE 500 MG: 500 TABLET, FILM COATED ORAL at 21:09

## 2018-10-27 RX ADMIN — POTASSIUM CHLORIDE 40 MEQ: 1500 TABLET, EXTENDED RELEASE ORAL at 09:43

## 2018-10-27 RX ADMIN — METRONIDAZOLE 500 MG: 500 TABLET ORAL at 21:09

## 2018-10-27 RX ADMIN — MAGNESIUM SULFATE HEPTAHYDRATE 1 G: 1 INJECTION, SOLUTION INTRAVENOUS at 21:10

## 2018-10-27 RX ADMIN — PREGABALIN 150 MG: 75 CAPSULE ORAL at 21:09

## 2018-10-27 RX ADMIN — TRIAMCINOLONE ACETONIDE: 1 CREAM TOPICAL at 17:19

## 2018-10-27 RX ADMIN — LOSARTAN POTASSIUM 25 MG: 25 TABLET, FILM COATED ORAL at 09:44

## 2018-10-27 RX ADMIN — ACETAMINOPHEN 650 MG: 325 TABLET, FILM COATED ORAL at 21:09

## 2018-10-27 RX ADMIN — METRONIDAZOLE 500 MG: 500 TABLET ORAL at 05:53

## 2018-10-27 RX ADMIN — LEVOTHYROXINE SODIUM 100 MCG: 100 TABLET ORAL at 05:53

## 2018-10-27 RX ADMIN — CITALOPRAM HYDROBROMIDE 20 MG: 20 TABLET ORAL at 09:43

## 2018-10-27 RX ADMIN — SENNOSIDES AND DOCUSATE SODIUM 1 TABLET: 8.6; 5 TABLET ORAL at 21:09

## 2018-10-27 RX ADMIN — ENOXAPARIN SODIUM 40 MG: 40 INJECTION SUBCUTANEOUS at 09:44

## 2018-10-27 RX ADMIN — ACETAMINOPHEN 650 MG: 325 TABLET, FILM COATED ORAL at 11:40

## 2018-10-27 RX ADMIN — METOPROLOL SUCCINATE 25 MG: 25 TABLET, EXTENDED RELEASE ORAL at 09:43

## 2018-10-27 RX ADMIN — ZOLPIDEM TARTRATE 5 MG: 5 TABLET ORAL at 21:09

## 2018-10-27 NOTE — PLAN OF CARE
Problem: PHYSICAL THERAPY ADULT  Goal: Performs mobility at highest level of function for planned discharge setting  See evaluation for individualized goals  Treatment/Interventions: Functional transfer training, LE strengthening/ROM, Elevations, Therapeutic exercise, Endurance training, Patient/family training, Equipment eval/education, Bed mobility, Gait training, Spoke to nursing, OT  Equipment Recommended: Shilpa Dumont       See flowsheet documentation for full assessment, interventions and recommendations  Prognosis: Fair  Problem List: Decreased strength, Decreased endurance, Impaired balance, Decreased mobility, Decreased coordination, Decreased safety awareness, Decreased skin integrity, Orthopedic restrictions, Pain  Assessment: Pt is 63 y o  female seen for PT moderate complexity evaluation s/p admit to [facility] on 10/25/2018 w/ Generalized weakness  PT consulted to assess pt's functional mobility and d/c needs  Pt presented to ED on Tuesday, 10/23/2018, for left lower quadrant pain and diagnosed with diverticulitis  She was given a prescription for oral antibiotics, which she has not yet taken  On 10/25/2018, she arrived at ED with complaints of left-sided upper and lower extremity weakness, bilateral ptosis, 9/10 headache, and decreased volume in her voice  Order placed for PT eval and tx  Comorbidities affecting pt's physical performance at time of assessment include: hypertension, hyperlipidemia, CAD, fibromyalgia, thyroid disease  PTA, pt was independent w/ all functional mobility w/ SPC, ambulates household distances and on disability  Personal factors affecting pt at time of IE include: inaccessible home environment, lives in 3 story house, stairs to enter home, inability to ambulate household distances, inability to perform IADLs and inability to perform ADLs   Please find objective findings from PT assessment regarding body systems outlined above with impairments and limitations including weakness, impaired balance, decreased endurance, impaired coordination, gait deviations, pain, decreased activity tolerance, decreased functional mobility tolerance and fall risk  The following objective measures performed on IE also reveal limitations: Barthel Index: 55/100  Pt reported increase in headache when looking up for an extended period of time  No fatigability was noted in eyelids or increase in ptosis during session  Pt presented with increased weakness in the LLE compared to the RLE and reported hx of nerve damage  Also noted during the evaluation was end range nystagmus with visual tracking to the left and onset of dizziness  Pt to benefit from continued PT tx to address deficits as defined above and maximize level of functional independent mobility and consistency  From PT/mobility standpoint, recommendation at time of d/c would be Home PT with family support pending progress in order to facilitate return to PLOF  Barriers to Discharge: Inaccessible home environment ([facility] and 3 SH)     Recommendation: Home PT, Home with family support     PT - OK to Discharge: Yes    See flowsheet documentation for full assessment

## 2018-10-27 NOTE — PLAN OF CARE
DISCHARGE PLANNING     Discharge to home or other facility with appropriate resources Not Progressing        DISCHARGE PLANNING - CARE MANAGEMENT     Discharge to post-acute care or home with appropriate resources Not Progressing        INFECTION - ADULT     Absence or prevention of progression during hospitalization Not Progressing        Knowledge Deficit     Patient/family/caregiver demonstrates understanding of disease process, treatment plan, medications, and discharge instructions Not Progressing        Nutrition/Hydration-ADULT     Nutrient/Hydration intake appropriate for improving, restoring or maintaining nutritional needs Not Progressing        PAIN - ADULT     Verbalizes/displays adequate comfort level or baseline comfort level Not Progressing        Potential for Falls     Patient will remain free of falls Not Progressing        SAFETY ADULT     Maintain or return to baseline ADL function Not Progressing     Maintain or return mobility status to optimal level Not Progressing

## 2018-10-27 NOTE — PROGRESS NOTES
IMR PROGRESS NOTE     Unit/Bed#: [facility] 902-01   Encounter: [id number]  SOD Team C     PATIENT INFORMATION     Gilford Cranker   63 y o  female   MRN: [id number]  Hospital Stay Days: 2    ASSESSMENT/PLAN     Principal Problem:    Generalized weakness  Active Problems:    Back pain    Benign essential HTN    Coronary arteriosclerosis    Chronic stable angina (HCC)    Depression with anxiety    Fibromyalgia    Gastroesophageal reflux disease    Hypothyroidism    Hypercholesterolemia    Diverticulitis of colon    Acquired ptosis of eyelid, bilateral    1  Generalized weakness  · Unclear etiology  · Low suspicion for MG given acuity of onset and lack of fatigability and unlikely to be Lambert-[place]  · Unlikely to be Guillain-[place] as pattern of weakness is inconsistent and patient denies any recent viral illnesses or vaccinations  · Suspect possible medication side-effect from Ambien use vs deconditioning as patient does state that she has performed less physical activity over time due to fibromyalgia pain  · Follow-up anti-MUSK and acetylcholine receptor binding antibody tests  · Will consult neurology for evaluation and recommendations  · Continue neuro checks  · PT/OT evaluation for possible rehab vs outpatient PT? 2  Mild diverticulitis without evidence of pericolonic abscess  · Evidenced on CT imaging, no systemic signs and nontoxic in appearance  · Continue PO ciprofloxacin 500 mg Q12h and PO metronidazole 500 mg Q8h for total course of 7 days  · Will need surveillance colonoscopy in 6-8 weeks after resolution of acute phase  · Low fiber diet in the acute phase with gradual advancement to hiber fiber diet with clinical improvement  · Pain control  · Bowel regimen    3   Hypothyroidism  · Low TSH with relatively high FT4 levels suggestive of iatrogenic hyperthyroidism  · Clinically, denies any palpitations or heat intolerance but does endorse unintentional weight loss over last couple of months of approximately 10 lbs  · Unclear if weight loss is related to thyroid vs concern for occult malignancy in setting of previous tobacco abuse  · Continued reduced dose of levothyroxine 100 mcg daily  · Will need repeat thyroid labs in 4-6 weeks  · Will continue to monitor clinically for signs of hypothyroidism    4  CAD status post stenting, hypertension  · Continue dual antiplatelet therapy  · Continue atorvastatin  · Continue losartan and metoprolol    5  GERD  · Stable  · Continue Protonix    6  Asthma  · Without acute exacerbation  · Respiratory status stable on RA  · Continue home inhalers    7  History of nicotine dependence  · Significant smoking history of 30+ pack years, quit smoking 5 years ago  · No family history of lung cancer  · Evidence of emphysema on CT chest    8  Right upper lobe pulmonary nodules  · Multiple right upper lobe pulmonary nodules, 3 mm (x2, pleural) and 5 mm nodule (subpleural)  · Per Fleischner guidelines, repeat CT chest in 12 months for high-risk patient  · Discussed findings with patient     Disposition: Continue inpatient care  SUBJECTIVE     Patient seen and examined  No acute events overnight  Still weak but is able to ambulate to bathroom without assistance  Tolerating diet  Reports poor sleep but this is a chronic issue and Ambien has been reduced due to concerns that it may be contributing to weakness  Some abdominal discomfort in area of LLQ consistent with findings of diverticulitis on CT  Tolerating antibiotics without issues  Offers no other acute complaints      OBJECTIVE     Vitals: Temp (24hrs), Av 1 °F (36 7 °C), Min:97 9 °F (36 6 °C), Max:98 4 °F (36 9 °C)   Temperature: 98 4 °F (36 9 °C)  Vitals:    10/26/18 0700 10/26/18 0848 10/26/18 1500 10/26/18 2350   BP: 146/77  151/68 137/70   BP Location: Left arm  Left arm Left arm   Pulse: 72  62 58   Resp:  18   Temp: 98 °F (36 7 °C)  97 9 °F (36 6 °C) 98 4 °F (36 9 °C)   TempSrc: Oral  Oral Oral   SpO2: 98% 95% 95% 92% There is no height or weight on file to calculate BMI  I/O last 24 hours: In: 2584 [P O :850; IV Piggyback:1000]  Out: -     Physical Exam   Constitutional: She is oriented to person, place, and time  She appears well-developed and well-nourished  No distress  HENT:   Head: Normocephalic and atraumatic  Nose: Nose normal    Mouth/Throat: Oropharynx is clear and moist  No oropharyngeal exudate  Eyes: Pupils are equal, round, and reactive to light  Conjunctivae and EOM are normal  No scleral icterus  Neck: Neck supple  No JVD present  No tracheal deviation present  Cardiovascular: Normal rate, regular rhythm, normal heart sounds and intact distal pulses  Exam reveals no gallop and no friction rub  No murmur heard  Pulmonary/Chest: Effort normal and breath sounds normal  No respiratory distress  She has no wheezes  She has no rales  She exhibits no tenderness  Abdominal: Soft  Bowel sounds are normal  She exhibits no distension  There is tenderness (Mild tenderness to palpation, LLQ)  There is no rebound and no guarding  Musculoskeletal: She exhibits no edema or deformity  Neurological: She is alert and oriented to person, place, and time  No cranial nerve deficit  Motor strength 4+/5 bilateral upper extremities, 4+/5 bilateral lower extremities, sensation intact, no ptosis appreciated   Skin: Skin is warm and dry  No rash noted  She is not diaphoretic  No erythema  No pallor  Psychiatric: She has a normal mood and affect  Her behavior is normal  Judgment and thought content normal    Nursing note and vitals reviewed  Invasive Devices     Peripheral Intravenous Line            Peripheral IV 10/25/18 Right Antecubital 1 day              LABORATORY DATA     Labs: I have personally reviewed pertinent reports        Results from last 7 days  Lab Units 10/26/18  0446 10/25/18  1439 10/23/18  2221   WBC Thousand/uL 4 88 5 07 7 92   HEMOGLOBIN g/dL 12 7 12 9 13 9   HEMATOCRIT % 38 1 38 0 39 9 PLATELETS Thousands/uL 233 241 256   NEUTROS PCT %  --  50 58   MONOS PCT %  --  5 5        Results from last 7 days  Lab Units 10/27/18  0450 10/26/18  0446 10/25/18  1439 10/23/18  2221   SODIUM mmol/L 140 140 142 140   POTASSIUM mmol/L 3 5 3 6 3 7 3 4*   CHLORIDE mmol/L 107 107 107 104   CO2 mmol/L 25 25 28 29   BUN mg/dL 13 13 11 13   CREATININE mg/dL 0 88 0 85 0 88 1 03   CALCIUM mg/dL 8 5 8 4 8 9 8 9   ALK PHOS U/L  --   --  119* 132*   ALT U/L  --   --  48 44   AST U/L  --   --  34 31       Results from last 7 days  Lab Units 10/26/18  0446   MAGNESIUM mg/dL 2 1       Results from last 7 days  Lab Units 10/26/18  0446   PHOSPHORUS mg/dL 3 9            Results from last 7 days  Lab Units 10/23/18  2222   LACTIC ACID mmol/L 1 1       Results from last 7 days  Lab Units 10/25/18  1439   TROPONIN I ng/mL <0 02       IMAGING & DIAGNOSTIC TESTING     Radiology Results: I have personally reviewed pertinent films in PACS  Xr Chest 2 Views    Result Date: 10/25/2018  Impression: No acute cardiopulmonary disease  Workstation performed: [id number]     Ct Head Without Contrast    Result Date: 10/25/2018  Impression: No acute intracranial abnormality  Workstation performed: [id number]     Other Diagnostic Testing: I have personally reviewed pertinent reports        ACTIVE MEDICATIONS     Current Facility-Administered Medications   Medication Dose Route Frequency    acetaminophen (TYLENOL) tablet 650 mg  650 mg Oral Q6H PRN    albuterol (PROVENTIL HFA,VENTOLIN HFA) inhaler 1 puff  1 puff Inhalation Q6H PRN    albuterol (PROVENTIL HFA,VENTOLIN HFA) inhaler 2 puff  2 puff Inhalation Q6H PRN    aspirin chewable tablet 81 mg  81 mg Oral Daily    atorvastatin (LIPITOR) tablet 80 mg  80 mg Oral Daily With Dinner    ciprofloxacin (CIPRO) tablet 500 mg  500 mg Oral Q12H [facility]    citalopram (CeleXA) tablet 20 mg  20 mg Oral Daily    clopidogrel (PLAVIX) tablet 75 mg  75 mg Oral Once    enoxaparin (LOVENOX) subcutaneous injection 40 mg  40 mg Subcutaneous Daily    fluticasone (FLONASE) 50 mcg/act nasal spray 1 spray  1 spray Nasal Daily    fluticasone-vilanterol (BREO ELLIPTA) 100-25 mcg/inh inhaler 1 puff  1 puff Inhalation Daily    hydrALAZINE (APRESOLINE) injection 5 mg  5 mg Intravenous Q6H PRN    hydrOXYzine HCL (ATARAX) tablet 25 mg  25 mg Oral BID PRN    levothyroxine tablet 100 mcg  100 mcg Oral Early Morning    losartan (COZAAR) tablet 25 mg  25 mg Oral Daily    metoprolol succinate (TOPROL-XL) 24 hr tablet 25 mg  25 mg Oral Daily    metroNIDAZOLE (FLAGYL) tablet 500 mg  500 mg Oral Q8H [facility]    pantoprazole (PROTONIX) EC tablet 40 mg  40 mg Oral Daily    pregabalin (LYRICA) capsule 150 mg  150 mg Oral TID    senna-docusate sodium (SENOKOT S) 8 6-50 mg per tablet 1 tablet  1 tablet Oral HS    silver sulfadiazine (SILVADENE,SSD) 1 % cream   Topical Daily    triamcinolone (KENALOG) 0 1 % cream   Topical BID    zolpidem (AMBIEN) tablet 5 mg  5 mg Oral HS PRN     VTE Pharmacologic Prophylaxis: Enoxaparin (Lovenox)  VTE Mechanical Prophylaxis: sequential compression device     ==  SIMONA Can    [address] [facility]  Internal Medicine Resident PGY-2

## 2018-10-27 NOTE — PHYSICAL THERAPY NOTE
Physical Therapy Evaluation     Patient's Name: Claudetta Maria    Admitting Diagnosis  Ptosis [H02 409]  Altered mental status [R41 82]  Generalized weakness [R53 1]  Headache [R51]    Problem List  Patient Active Problem List   Diagnosis    Abnormal glucose    Back pain    Benign essential HTN    Bites    Cervical herniated disc    Cervical radiculopathy    Coronary arteriosclerosis    Chronic stable angina (HCC)    Degenerative disc disease, cervical    Depression with anxiety    Fibromyalgia    Gastroesophageal reflux disease    Hypothyroidism    Hypercholesterolemia    Insomnia    Lumbar foraminal stenosis    Lumbar radiculopathy    Palpitations    Sacroiliitis (HCC)    Severe episode of recurrent major depressive disorder, without psychotic features (HCC)    Skin rash    Dizziness    Myofascial pain    Asthma    Brachial neuritis    Cervical spondylosis    Chronic low back pain    Diverticulitis of colon    Dysthymic disorder    Irritable bowel syndrome without diarrhea    Pain in thoracic spine    Reflex sympathetic dystrophy    Cervical stenosis of spinal canal    Status post lumbar surgery    Chronic pain syndrome    Acute non-recurrent maxillary sinusitis    Laryngitis    Hearing problem of both ears    Generalized weakness    Acquired ptosis of eyelid, bilateral       Past Medical History  Past Medical History:   Diagnosis Date    Abnormal echocardiogram     Abnormal stress test     Anxiety     Asthma     Brachial neuritis     Depression     Disease of thyroid gland     Displacement of intervertebral disc of mid-cervical region     Fibromyalgia     Frequent headaches     GERD (gastroesophageal reflux disease)     Herniated nucleus pulposus     History of arthritis     History of asthma     History of cardiac disorder     History of chest pain     History of diverticulitis     History of fatigue     History of hearing loss     History of hemoptysis  History of herpes simplex infection     History of hiatal hernia     History of insect bite     INFECTED    History of memory loss     History of neck disorder     History of reflex sympathetic dystrophy     History of restless legs syndrome     History of shortness of breath     History of spinal stenosis     Hyperlipidemia     Hyperlipidemia     Hypertension     Skin rash     Somnolence, daytime     Spinal stenosis of cervical region        Past Surgical History  Past Surgical History:   Procedure Laterality Date    BACK SURGERY      BACK SURGERY      LOWER BACK SURGERY    CARDIAC CATHETERIZATION      HISTORY OF CORONARY ANGIOGRAPHY WITH CONCOMITANT LEFT HEART CATHETERIZATION    CORONARY ANGIOPLASTY WITH STENT PLACEMENT      EAR SURGERY      TONSILLECTOMY      TYMPANOPLASTY          10/27/18 1050   Note Type   Note type Eval/Treat   Pain Assessment   Pain Assessment 0-10   Pain Score 7   Pain Type Acute pain   Pain Location Head   Pain Orientation Frontal   Pain Descriptors Headache   Home Living   Type of [address] [place] [address] Multi-level;Stairs to enter with rails  (3 SH with basement, 5 ASHANTI)   Bathroom Shower/Tub Tub/shower unit   Bathroom Toilet Standard   Bathroom Equipment (none)   Bathroom Accessibility Accessible   Home Equipment Cane;Walker  (rollator)   Additional Comments Pt reports that she sleeps on the second floor  There is a bathroom off of the kitchen on the first floor and a full bathroom in the basement  She urinates bedside in a bucket/can and would benefit from bedside commode  Prior Function   Level of [place] Independent with ADLs and functional mobility   Lives With Family;Daughter  (7 people in the home)   Receives Help From Family   ADL Assistance Independent   IADLs Needs assistance   Falls in the last 6 months 0   Vocational On disability   Comments Pt reports being I at baseline with ADLs and IADLs    She uses a SPC as needed, but mostly ambulates short distances within the home without the use of an AD due to pain in her shoulders  Pt enjoys crocheting, but is limited due to pain in her hands  Restrictions/Precautions   Weight Bearing Precautions Per Order No   Other Precautions Fall Risk;Pain   General   Additional Pertinent History hx of fibromyalgia   Family/Caregiver Present No   Cognition   Overall Cognitive Status WFL   Arousal/Participation Alert   Orientation Level Oriented X4   Memory Within functional limits   Following Commands Follows all commands and directions without difficulty   RLE Assessment   RLE Assessment WFL  (3+/5)   LLE Assessment   LLE Assessment WFL  (3/5)   Bed Mobility   Supine to Sit 5  Supervision   Sit to Supine 5  Supervision   Transfers   Sit to Stand 5  Supervision   Stand to Sit 5  Supervision   Stand pivot 4  Minimal assistance   Additional items Assist x 1   Ambulation/Elevation   Gait pattern Improper Weight shift; Forward Flexion;Narrow MARY; Decreased L stance; Short stride; Step to;Excessively slow   Gait Assistance 4  Minimal assist   Additional items Assist x 1   Assistive Device Rolling walker   Distance 8', 60' x4  (2 standing rest breaks and 3 seated breaks)   Stair Management Assistance 5  Supervision   Additional items Verbal cues   Stair Management Technique One rail L;Reciprocal;Step to pattern   Number of Stairs 14   Balance   Static Sitting Fair   Dynamic Sitting Fair   Static Standing Fair -   Dynamic Standing Poor +   Ambulatory Poor +   Endurance Deficit   Endurance Deficit Yes   Endurance Deficit Description fatigue, weakness, pain   Activity Tolerance   Activity Tolerance Patient limited by fatigue;Patient limited by pain   Nurse Made Aware Yes, RN cleared pt for PT evaluation   Assessment   Prognosis Fair   Problem List Decreased strength;Decreased endurance; Impaired balance;Decreased mobility; Decreased coordination;Decreased safety awareness;Decreased skin integrity;Orthopedic restrictions;Pain Assessment Pt is 63 y o  female seen for PT moderate complexity evaluation s/p admit to [facility] on 10/25/2018 w/ Generalized weakness  PT consulted to assess pt's functional mobility and d/c needs  Pt presented to ED on Tuesday, 10/23/2018, for left lower quadrant pain and diagnosed with diverticulitis  She was given a prescription for oral antibiotics, which she has not yet taken  On 10/25/2018, she arrived at ED with complaints of left-sided upper and lower extremity weakness, bilateral ptosis, 9/10 headache, and decreased volume in her voice  Order placed for PT eval and tx  Comorbidities affecting pt's physical performance at time of assessment include: hypertension, hyperlipidemia, CAD, fibromyalgia, thyroid disease  PTA, pt was independent w/ all functional mobility w/ SPC, ambulates household distances and on disability  Personal factors affecting pt at time of IE include: inaccessible home environment, lives in 3 story house, stairs to enter home, inability to ambulate household distances, inability to perform IADLs and inability to perform ADLs  Please find objective findings from PT assessment regarding body systems outlined above with impairments and limitations including weakness, impaired balance, decreased endurance, impaired coordination, gait deviations, pain, decreased activity tolerance, decreased functional mobility tolerance and fall risk  The following objective measures performed on IE also reveal limitations: Barthel Index: 55/100  Pt reported increase in headache when looking up for an extended period of time  No fatigability was noted in eyelids or increase in ptosis during session  Pt presented with increased weakness in the LLE compared to the RLE and reported hx of nerve damage  Also noted during the evaluation was end range nystagmus with visual tracking to the left and onset of dizziness    Pt to benefit from continued PT tx to address deficits as defined above and maximize level of functional independent mobility and consistency  From PT/mobility standpoint, recommendation at time of d/c would be Home PT with family support pending progress in order to facilitate return to PLOF  Barriers to Discharge Inaccessible home environment  (ASHANTI and 3 SH)   Goals   Patient Goals To go home   STG Expiration Date 11/06/18   Short Term Goal #1 Pt will demonstrate: 1) increased BLE strength >/= 1 grade for improved transfers 2) supine <> sit transfer with mod I 3) sit <> stand transfer with mod I 4) increased dynamic balance >/= 1 grade to decrease risk for falls 5) Amb >/= 200' with mod I using least restrictive AD and no rest breaks to return to PLOF 6) up/down 1 flight of stairs with mod I using step to pattern to reduce knee pain   Treatment Day 1   Plan   Treatment/Interventions Functional transfer training;LE strengthening/ROM; Elevations; Therapeutic exercise; Endurance training;Patient/family training;Equipment eval/education; Bed mobility;Gait training;Spoke to nursing;OT   PT Frequency (3-5x/wk)   Recommendation   Recommendation Home PT; Home with family support   Equipment Recommended Walker   PT - OK to Discharge Yes   Additional Comments When medically stable   Barthel Index   Feeding 10   Bathing 0   Grooming Score 5   Dressing Score 5   Bladder Score 10   Bowels Score 10   Toilet Use Score 5   Transfers (Bed/Chair) Score 10   Mobility (Level Surface) Score 0   Stairs Score 0   Barthel Index Score 55     PT Treatment  Time In: 1026  Time Out: 1050  Total Time: 24    Pt agreeable to PT treatment session for ambulation  She was assessed without an AD and with a SPC, but demonstrated instability  Pt benefits from use of RW for balance and energy conservation  She requires VC for a lighter  and presses hard down through her arm increasing shoulder and arm fatigue  She required multiple standing rest breaks and 2 seated rest breaks through gait    Pt would benefit from continued skilled PT to improve strength, endurance, and balance to return to PLOF  Pt is appropriate for d/c to home with family support and follow up with home PT to address deficits      Jessi Sexton, PT, DPT

## 2018-10-27 NOTE — PLAN OF CARE
Problem: OCCUPATIONAL THERAPY ADULT  Goal: Performs self-care activities at highest level of function for planned discharge setting  See evaluation for individualized goals  Treatment Interventions: ADL retraining, Functional transfer training, Endurance training, Patient/family training, Equipment evaluation/education, Compensatory technique education, Energy conservation, Activityengagement          See flowsheet documentation for full assessment, interventions and recommendations  Limitation: Decreased ADL status, Decreased self-care trans, Decreased high-level ADLs, Decreased endurance  Prognosis: Good  Assessment: Pt is a 63 y o  female who was admitted to [facility] on 10/25/2018 with Generalized weakness  Pt presented w/ chronic back pain, L sided UE and LE weakness, headache, and B/L ptosis  W/u is ongoing- CT of head (-)  Pt's other comorbidities include fibromyalgia and depression w/ anxiety  See chart for other PMH  At baseline pt was I w/ ADLs and used [facility] as needed for mobility  Her dtr assists w/ IADLs  Pt lives w/ dtr and her family in a 3 SH w/ bedroom on 2nd floor and bathroom on 1st floor  Currently pt requires S-min A for overall ADLS and S-min A for functional mobility/transfers w/ RW  Pt required 2 seated rest breaks t/o session and was overall fatigued and weaker than baseline  Pt reports her dtr works frequently and pt is alone most of the day  Pt currently presents with impairments in the following categories -steps to enter environment, limited home support, difficulty performing ADLS, activity tolerance, endurance, standing balance/tolerance, sitting balance/tolerance and UE strength  These impairments, as well as pt's fatigue, pain, decreased caregiver support and risk for falls  limit pt's ability to safely engage in all baseline areas of occupation, including grooming, bathing, dressing, toileting, functional mobility/transfers and leisure activities   From OT standpoint, recommend home w/ increased support and home OT upon D/C  OT will continue to follow to address the below stated goals        OT Discharge Recommendation: (S) Home OT  OT - OK to Discharge: Yes (when medically stable)

## 2018-10-27 NOTE — OCCUPATIONAL THERAPY NOTE
[address]  Evaluation     Patient Name: Raj DAVIS Date: 10/27/2018  Problem List  Patient Active Problem List   Diagnosis    Abnormal glucose    Back pain    Benign essential HTN    Bites    Cervical herniated disc    Cervical radiculopathy    Coronary arteriosclerosis    Chronic stable angina (HCC)    Degenerative disc disease, cervical    Depression with anxiety    Fibromyalgia    Gastroesophageal reflux disease    Hypothyroidism    Hypercholesterolemia    Insomnia    Lumbar foraminal stenosis    Lumbar radiculopathy    Palpitations    Sacroiliitis (HCC)    Severe episode of recurrent major depressive disorder, without psychotic features (HCC)    Skin rash    Dizziness    Myofascial pain    Asthma    Brachial neuritis    Cervical spondylosis    Chronic low back pain    Diverticulitis of colon    Dysthymic disorder    Irritable bowel syndrome without diarrhea    Pain in thoracic spine    Reflex sympathetic dystrophy    Cervical stenosis of spinal canal    Status post lumbar surgery    Chronic pain syndrome    Acute non-recurrent maxillary sinusitis    Laryngitis    Hearing problem of both ears    Generalized weakness    Acquired ptosis of eyelid, bilateral     Past Medical History  Past Medical History:   Diagnosis Date    Abnormal echocardiogram     Abnormal stress test     Anxiety     Asthma     Brachial neuritis     Depression     Disease of thyroid gland     Displacement of intervertebral disc of mid-cervical region     Fibromyalgia     Frequent headaches     GERD (gastroesophageal reflux disease)     Herniated nucleus pulposus     History of arthritis     History of asthma     History of cardiac disorder     History of chest pain     History of diverticulitis     History of fatigue     History of hearing loss     History of hemoptysis     History of herpes simplex infection     History of hiatal hernia     History of insect bite     INFECTED    History of memory loss     History of neck disorder     History of reflex sympathetic dystrophy     History of restless legs syndrome     History of shortness of breath     History of spinal stenosis     Hyperlipidemia     Hyperlipidemia     Hypertension     Skin rash     Somnolence, daytime     Spinal stenosis of cervical region      Past Surgical History  Past Surgical History:   Procedure Laterality Date    BACK SURGERY      BACK SURGERY      LOWER BACK SURGERY    CARDIAC CATHETERIZATION      HISTORY OF CORONARY ANGIOGRAPHY WITH CONCOMITANT LEFT HEART CATHETERIZATION    CORONARY ANGIOPLASTY WITH STENT PLACEMENT      EAR SURGERY      TONSILLECTOMY      TYMPANOPLASTY           10/27/18 1035   Note Type   Note type Eval/Treat   Restrictions/Precautions   Weight Bearing Precautions Per Order No   Other Precautions Fall Risk;Pain   Pain Assessment   Pain Assessment 0-10   Pain Score 3   Pain Type Acute pain   Pain Location Head   Home Living   Type of [address] [place] [address] Multi-level;Bed/bath upstairs;Stairs to enter with rails   Bathroom Shower/Tub Tub/shower unit   Bathroom Toilet Standard   Bathroom Equipment (none)   [address] [facility] Cane;Walker  (Rollator)   Additional Comments Pt's bedroom is upstairs and bathroom is on 1st floor  Prior Function   Level of [place] Independent with ADLs and functional mobility; Needs assistance with IADLs   Lives With Family;Daughter  (reports 7 people in household)   Receives Help From Family   ADL Assistance Independent   IADLs Needs assistance   Falls in the last 6 months 0   Vocational On disability   Lifestyle   Autonomy Pt I w/ ADLs and uses SPC PRN for mobility  Her dtr assists w/ IADLs     Reciprocal Relationships Pt lives w/ dtr and other amily members   Service to Others Not working   Intrinsic Gratification Enjoys crocheting   Psychosocial   Psychosocial (WDL) WDL   ADL   Where Assessed Chair   Eating Assistance 6  Modified independent   Eating Deficit Setup   Grooming Assistance 5  Supervision/Setup   UB Bathing Assistance 5  Supervision/Setup   LB Bathing Assistance 4  Minimal Assistance   UB Dressing Assistance 5  Supervision/Setup   LB Dressing Assistance 4  Minimal Assistance   Toileting Assistance  5  Supervision/Setup   Functional Assistance 5  Supervision/Setup   Bed Mobility   Supine to Sit 5  Supervision   Transfers   Sit to Stand 5  Supervision   Additional items Increased time required   Stand to Sit 5  Supervision   Additional items Increased time required   Stand pivot 4  Minimal assistance   Additional items Assist x 1   Functional Mobility   Functional Mobility 4  Minimal assistance   Additional Comments Pt requires min A for mobility 2* fatigue and pain   Additional items Rolling walker   Balance   Static Sitting Fair   Dynamic Sitting Fair   Static Standing Fair -   Dynamic Standing Poor +   Activity Tolerance   Activity Tolerance Patient limited by fatigue;Patient limited by pain   Nurse Made Aware Okay to see per RN   RUE Assessment   RUE Assessment WFL  (3+/5)   LUE Assessment   LUE Assessment WFL  (3+/5)   Hand Function   Gross Motor Coordination Functional   Fine Motor Coordination Functional   Cognition   Overall Cognitive Status WFL   Arousal/Participation Responsive; Cooperative   Attention Within functional limits   Orientation Level Oriented X4   Memory Within functional limits   Following Commands Follows all commands and directions without difficulty   Comments Pt pleasant and cooperative   Assessment   Limitation Decreased ADL status; Decreased self-care trans;Decreased high-level ADLs; Decreased endurance   Prognosis Good   Assessment Pt is a 63 y o  female who was [facility] to [facility] 10/25/2018 with Generalized weakness  Pt presented w/ chronic back pain, L sided UE and LE weakness, headache, and B/L ptosis  W/u is ongoing- CT of head (-)   Pt's other comorbidities include fibromyalgia and depression w/ anxiety  See chart for other PMH  At baseline pt was I w/ ADLs and used [facility] as needed for mobility  Her dtr assists w/ IADLs  Pt lives w/ dtr and her family in a 3 SH w/ bedroom on 2nd floor and bathroom on 1st floor  Currently pt requires S-min A for overall ADLS and S-min A for functional mobility/transfers w/ RW  Pt required 2 seated rest breaks t/o session and was overall fatigued and weaker than baseline  Pt reports her dtr works frequently and pt is alone most of the day  Pt currently presents with impairments in the following categories -steps to enter environment, limited home support, difficulty performing ADLS, activity tolerance, endurance, standing balance/tolerance, sitting balance/tolerance and UE strength  These impairments, as well as pt's fatigue, pain, decreased caregiver support and risk for falls  limit pt's ability to safely engage in all baseline areas of occupation, including grooming, bathing, dressing, toileting, functional mobility/transfers and leisure activities  From OT standpoint, recommend home w/ increased support and home OT upon D/C  OT will continue to follow to address the below stated goals  Goals   Patient Goals to feel better   LTG Time Frame 7-10   Long Term Goal see below goals   Plan   Treatment Interventions ADL retraining;Functional transfer training; Endurance training;Patient/family training;Equipment evaluation/education; Compensatory technique education; Energy conservation; Activityengagement   Goal Expiration Date 11/06/18   OT Frequency 3-5x/wk   Recommendation   OT Discharge Recommendation Home OT   OT - OK to Discharge Yes  (when medically stable)   Barthel Index   Feeding 10   Bathing 0   Grooming Score 5   Dressing Score 5   Bladder Score 10   Bowels Score 10   Toilet Use Score 5   Transfers (Bed/Chair) Score 10   Mobility (Level Surface) Score 0   Stairs Score 0   Barthel Index Score 55   Modified [place] Scale   Modified [place] Scale 4     LTG (7-10 DAYS)  Pt will perform all ADL's at mod I level with G balance and DME/AE as needed      Pt will perform functional transfers, including toilet transfer, at mod I level w/ use of DME/AE as needed       Pt will perform functional mobility at a mod I level w/ use of DME and G balance      Pt will demonstrate good carry over of RW safety and energy conservation techniques      Pt will demonstrate good carry over of pt/family education and training w/ 100% attention to task to assist w/ safe d/c planning      Pt will improve activity tolerance to G for 30 min treatment session      Pt will improve standing balance to G for 8-10 minutes of purposeful activity w/ G endurance  Pt will perform IADL/homemaking tasks at a mod I level w/ G endurance and implementation of energy conservation strategies           Jenn Velasquez, SHANIKAR/L

## 2018-10-27 NOTE — CONSULTS
Consultation - Neurology   Raf Rodgers 63 y o  female MRN: [id number]  Unit/Bed#: [facility] 902-01 Encounter: [id number]      Assessment/Plan   1)  Generalized weakness and ptosis -vastly improved  Likely polyfactorial in setting of diverticulitis, exhaustion, and medication effects as well as migraine HA  Extremely low likelihood of autoimmune process  No dysarthria/dysphagia, head lag as the day goes on  Cogan's lid test negative  She has bilat ptosis but no further worsening testing  She mentions that she does feel her eyelids are less droopy in the mornings and that this has been only for the last few days  Repetitive strength testing unremarkable as well  She also mentions she wakes up tired but this worsens as the days goes on and she does snore she states therefore it's possible she may have obstructure sleep apnea and will need an outpatient sleep study  CTH without acute intracranial abnormality and no stroke like findings on exam  Myasthenia Ice pack test negative  2) migraine HA  Currently 6/10, bifrontal pulsing and photophobia, no nausea  -CTH with multiple RUL nodules, plan to repeat in 12 months   -Myasthenia antibodies pending    -PT/ OT   -Supportive care per primary team   -Will initiate magnesium oxide 400mg PO BID              Mag sulfate 1gm IV X1             toradol 30 mg iv q6 hr prn   D/c tomorrow if headache improved  History of Present Illness     Reason for Consult / Principal Problem: 1  Ptosis 2  Generalized weakness   Hx and PE limited by: None   HPI: Raf Rodgers is a 63 y o   female with a PMH of fibromyalgia and recently diagnosed diverticulitis, who presents with sudden onset generalized weakness and bilateral eyelid ptosis  The patient was recently seen in [facility] and found to have acute diverticulitis  The patient reports that during that ED visit, the patient did not sleep for approximately 32 hours    She went home and slept the entire day, and then when she woke up, experienced severe, 10/10 throbbing headache, with associated photophobia and phonophobia as well as nausea and generalized weakness with difficulty opening bilateral eyes  The patient presented to the [facility] Emergency Department and was admitted to the SOD service  There was initial suspicion that the patient might have a myasthenic issue versus Lambert-Eaton, and Neurology was consulted today  On exam today, the patient reports that her symptoms are largely resolved, exception of continued 5/10 headache, with photophobia and phonophobia  The patient is resting comfortably in bed, and is appropriate and interactive  She does continue to report generalized weakness, but with exception of above, 12 point review systems is negative  The patient demonstrates a nonfocal neurological exam with the exception of left lower extremity weakness, which she states is chronic  The patient is eager for discharge in asked if she can be discharged today      Inpatient consult to Neurology  Consult performed by: CAROLEE [address] [place] [address] ordered by: Laina Sandoval          Review of Systems   See HPI     Historical Information   Past Medical History:   Diagnosis Date    Abnormal echocardiogram     Abnormal stress test     Anxiety     Asthma     Brachial neuritis     Depression     Disease of thyroid gland     Displacement of intervertebral disc of mid-cervical region     Fibromyalgia     Frequent headaches     GERD (gastroesophageal reflux disease)     Herniated nucleus pulposus     History of arthritis     History of asthma     History of cardiac disorder     History of chest pain     History of diverticulitis     History of fatigue     History of hearing loss     History of hemoptysis     History of herpes simplex infection     History of hiatal hernia     History of insect bite     INFECTED    History of memory loss     History of neck disorder     History of reflex sympathetic dystrophy     History of restless legs syndrome     History of shortness of breath     History of spinal stenosis     Hyperlipidemia     Hyperlipidemia     Hypertension     Skin rash     Somnolence, daytime     Spinal stenosis of cervical region      Past Surgical History:   Procedure Laterality Date    BACK SURGERY      BACK SURGERY      LOWER BACK SURGERY    CARDIAC CATHETERIZATION      HISTORY OF CORONARY ANGIOGRAPHY WITH CONCOMITANT LEFT HEART CATHETERIZATION    CORONARY ANGIOPLASTY WITH STENT PLACEMENT      EAR SURGERY      TONSILLECTOMY      TYMPANOPLASTY       Social History   History   Alcohol Use No     History   Drug Use    Types: Marijuana     Comment: USES MARIJUNA occasionally     History   Smoking Status    Former Smoker    Quit date: 2012   Smokeless Tobacco    Former User     Comment: quit in 2013     Family History: non-contributory    Review of previous medical records was completed       Meds/Allergies   Scheduled Meds:  Current Facility-Administered Medications:  acetaminophen 650 mg Oral Q6H PRN Antonio Arguello DO   albuterol 1 puff Inhalation Q6H PRN Jitendra Mendoza MD   albuterol 2 puff Inhalation Q6H PRN Jitendra Mendoza MD   aspirin 81 mg Oral Daily Jitendra Mendoza MD   atorvastatin 80 mg Oral Daily With Tai Andino MD   ciprofloxacin 500 mg Oral Q12H [address] Betty Earnest, DO   citalopram 20 mg Oral Daily Jitendra Mendoza MD   clopidogrel 75 mg Oral Once Jitendra Mendoza MD   enoxaparin 40 mg Subcutaneous Daily Jitendra Mendoza MD   fluticasone 1 spray Nasal Daily Jitendra Mendoza MD   fluticasone-vilanterol 1 puff Inhalation Daily Jitendra Mendoza MD   hydrALAZINE 5 mg Intravenous Q6H PRN Antonio Arguello DO   hydrOXYzine HCL 25 mg Oral BID PRN Jitendra Mendoza MD   levothyroxine 100 mcg Oral Early Morning Betty Earnest, DO   losartan 25 mg Oral Daily Jitendra Mendoza MD   metoprolol succinate 25 mg Oral Daily Anmol Bold Jose Tobar MD   metroNIDAZOLE 500 mg Oral [facility] Magdalena Shirley,    pantoprazole 40 mg Oral Daily Kait Garner MD   pregabalin 150 mg Oral TID Magdalena Shirley, DO   senna-docusate sodium 1 tablet Oral HS Magdalena Shirley, DO   silver sulfadiazine  Topical Daily Kait Garner MD   triamcinolone  Topical BID Kait Garner MD   zolpidem 5 mg Oral HS PRN Marine Blaze, DO     Continuous Infusions:   PRN Meds:   acetaminophen    albuterol    albuterol    hydrALAZINE    hydrOXYzine HCL    zolpidem      Allergies   Allergen Reactions    Augmentin [Amoxicillin-Pot Clavulanate] Nausea Only     PT stated she only allergic to medication AUGMENTIN       Objective   Vitals:Blood pressure 140/65, pulse (!) 53, temperature 98 5 °F (36 9 °C), temperature source Oral, resp  rate 18, SpO2 96 %  ,There is no height or weight on file to calculate BMI  Intake/Output Summary (Last 24 hours) at 10/27/18 1317  Last data filed at 10/27/18 1300   Gross per 24 hour   Intake             1010 ml   Output                0 ml   Net             1010 ml       Invasive Devices: Invasive Devices     Peripheral Intravenous Line            Peripheral IV 10/27/18 Right Hand less than 1 day                Physical Exam   Constitutional: She is oriented to person, place, and time  She appears well-developed and well-nourished  No distress  HENT:   Head: Normocephalic and atraumatic  Right Ear: External ear normal    Left Ear: External ear normal    Nose: Nose normal    Mouth/Throat: No oropharyngeal exudate  Eyes: Conjunctivae are normal  Right eye exhibits no discharge  Left eye exhibits no discharge  No scleral icterus  Neck: Normal range of motion  Neck supple  No tracheal deviation present  No thyromegaly present  Cardiovascular: Normal rate and regular rhythm  Pulmonary/Chest: Breath sounds normal    Abdominal: Soft  Bowel sounds are normal    Musculoskeletal: She exhibits no edema or deformity     Reports low back pain with lower extremity movement antigravity   Neurological: She is oriented to person, place, and time  She has a normal Finger-Nose-Finger Test    Reflex Scores:       Tricep reflexes are 2+ on the right side and 2+ on the left side  Bicep reflexes are 2+ on the right side and 2+ on the left side  Brachioradialis reflexes are 2+ on the right side and 2+ on the left side  Patellar reflexes are 2+ on the right side and 2+ on the left side  Achilles reflexes are 2+ on the right side and 2+ on the left side  Skin: Skin is warm and dry  No rash noted  She is not diaphoretic  No erythema  No pallor  Psychiatric: She has a normal mood and affect  Her speech is normal and behavior is normal    Nursing note and vitals reviewed  Neurologic Exam     Mental Status   Oriented to person, place, and time  Follows 2 step commands  Attention: normal  Concentration: normal    Speech: speech is normal   Level of consciousness: alert  Knowledge: good  Normal comprehension  Cranial Nerves   Cranial nerves II through XII intact  mild bilateral ptosis  No dystonia, dysphagia or nasalized voice noted  Fundoscopy wnl     Motor Exam   Muscle bulk: normal  Overall muscle tone: normal  Strength is 5/5 throughout with exception of  4+ out of 5 bilaterally, and left hip flexion 4/5     Sensory Exam   Light touch normal      Gait, Coordination, and Reflexes     Gait  Gait: (Deferred for safety)    Coordination   Finger to nose coordination: normal    Tremor   Resting tremor: absent    Reflexes   Right brachioradialis: 2+  Left brachioradialis: 2+  Right biceps: 2+  Left biceps: 2+  Right triceps: 2+  Left triceps: 2+  Right patellar: 2+  Left patellar: 2+  Right achilles: 2+  Left achilles: 2+  Right : 2+  Left : 2+  Right plantar: normal  Left plantar: normal  Right ankle clonus: absent  Left ankle clonus: absent      Lab Results: I have personally reviewed pertinent reports       Recent Results (from the past 24 hour(s))   Basic metabolic panel    Collection Time: 10/27/18  4:50 AM   Result Value Ref Range    Sodium 140 136 - 145 mmol/L    Potassium 3 5 3 5 - 5 3 mmol/L    Chloride 107 100 - 108 mmol/L    CO2 25 21 - 32 mmol/L    ANION GAP 8 4 - 13 mmol/L    BUN 13 5 - 25 mg/dL    Creatinine 0 88 0 60 - 1 30 mg/dL    Glucose 104 65 - 140 mg/dL    Calcium 8 5 8 3 - 10 1 mg/dL    eGFR 73 ml/min/1 73sq m   ]    Imaging Studies: I have personally reviewed pertinent reports  and I have personally reviewed pertinent films in PACS  EKG, Pathology, and Other Studies: I have personally reviewed pertinent reports      VTE Prophylaxis: Sequential compression device (Venodyne)  and Enoxaparin (Lovenox)    Code Status: Level 1 - Full Code  Advance Directive and Living Will:      Power of :    POLST:

## 2018-10-28 VITALS
RESPIRATION RATE: 18 BRPM | TEMPERATURE: 97.5 F | OXYGEN SATURATION: 99 % | SYSTOLIC BLOOD PRESSURE: 174 MMHG | HEART RATE: 52 BPM | DIASTOLIC BLOOD PRESSURE: 76 MMHG

## 2018-10-28 LAB
ANION GAP SERPL CALCULATED.3IONS-SCNC: 8 MMOL/L (ref 4–13)
BUN SERPL-MCNC: 16 MG/DL (ref 5–25)
CALCIUM SERPL-MCNC: 8.7 MG/DL (ref 8.3–10.1)
CHLORIDE SERPL-SCNC: 106 MMOL/L (ref 100–108)
CO2 SERPL-SCNC: 25 MMOL/L (ref 21–32)
CREAT SERPL-MCNC: 0.92 MG/DL (ref 0.6–1.3)
GFR SERPL CREATININE-BSD FRML MDRD: 69 ML/MIN/1.73SQ M
GLUCOSE SERPL-MCNC: 131 MG/DL (ref 65–140)
POTASSIUM SERPL-SCNC: 3.6 MMOL/L (ref 3.5–5.3)
SODIUM SERPL-SCNC: 139 MMOL/L (ref 136–145)

## 2018-10-28 PROCEDURE — 80048 BASIC METABOLIC PNL TOTAL CA: CPT | Performed by: STUDENT IN AN ORGANIZED HEALTH CARE EDUCATION/TRAINING PROGRAM

## 2018-10-28 PROCEDURE — 99238 HOSP IP/OBS DSCHRG MGMT 30/<: CPT | Performed by: INTERNAL MEDICINE

## 2018-10-28 RX ORDER — METRONIDAZOLE 500 MG/1
500 TABLET ORAL EVERY 8 HOURS SCHEDULED
Qty: 12 TABLET | Refills: 0 | Status: SHIPPED | OUTPATIENT
Start: 2018-10-28 | End: 2018-11-01

## 2018-10-28 RX ORDER — CIPROFLOXACIN 500 MG/1
500 TABLET, FILM COATED ORAL 2 TIMES DAILY
Qty: 8 TABLET | Refills: 0 | Status: CANCELLED | OUTPATIENT
Start: 2018-10-28 | End: 2018-11-01

## 2018-10-28 RX ORDER — CIPROFLOXACIN 500 MG/1
500 TABLET, FILM COATED ORAL 2 TIMES DAILY
Qty: 8 TABLET | Refills: 0 | Status: SHIPPED | OUTPATIENT
Start: 2018-10-28 | End: 2018-11-01

## 2018-10-28 RX ORDER — ACETAMINOPHEN 325 MG/1
TABLET ORAL
Qty: 30 TABLET | Refills: 0 | Status: SHIPPED | OUTPATIENT
Start: 2018-10-28 | End: 2020-03-31

## 2018-10-28 RX ORDER — METRONIDAZOLE 500 MG/1
500 TABLET ORAL EVERY 8 HOURS SCHEDULED
Qty: 12 TABLET | Refills: 0 | Status: CANCELLED | OUTPATIENT
Start: 2018-10-28 | End: 2018-11-01

## 2018-10-28 RX ADMIN — CIPROFLOXACIN HYDROCHLORIDE 500 MG: 500 TABLET, FILM COATED ORAL at 09:05

## 2018-10-28 RX ADMIN — CITALOPRAM HYDROBROMIDE 20 MG: 20 TABLET ORAL at 09:05

## 2018-10-28 RX ADMIN — METOPROLOL SUCCINATE 25 MG: 25 TABLET, EXTENDED RELEASE ORAL at 09:05

## 2018-10-28 RX ADMIN — PANTOPRAZOLE SODIUM 40 MG: 40 TABLET, DELAYED RELEASE ORAL at 09:05

## 2018-10-28 RX ADMIN — FLUTICASONE FUROATE AND VILANTEROL TRIFENATATE 1 PUFF: 100; 25 POWDER RESPIRATORY (INHALATION) at 09:07

## 2018-10-28 RX ADMIN — LOSARTAN POTASSIUM 25 MG: 25 TABLET, FILM COATED ORAL at 09:05

## 2018-10-28 RX ADMIN — MAGNESIUM OXIDE TAB 400 MG (241.3 MG ELEMENTAL MG) 400 MG: 400 (241.3 MG) TAB at 09:05

## 2018-10-28 RX ADMIN — PREGABALIN 150 MG: 75 CAPSULE ORAL at 09:05

## 2018-10-28 RX ADMIN — LEVOTHYROXINE SODIUM 100 MCG: 100 TABLET ORAL at 05:40

## 2018-10-28 RX ADMIN — METRONIDAZOLE 500 MG: 500 TABLET ORAL at 05:40

## 2018-10-28 RX ADMIN — Medication 81 MG: at 09:05

## 2018-10-28 NOTE — SOCIAL WORK
Cm was requested to speak to pt about home pt  Pt has received outpatient PT in the past  Pt does drive self and reports that if she cannot drive that her daughter will  Pt is agreeable to outpatient pt due to not being homebound  SOD C notified

## 2018-10-28 NOTE — PROGRESS NOTES
IM Residency Progress Note   Unit/Bed#: [facility] 902-01 Encounter: [id number]  SOD Team C       Tung Jensen 63 y o  female [id number]    Hospital Stay Days: 3      Assessment/Plan:    Generalized weakness - unclear etiology  - MG workup - anti-MUSK and acetylcholine receptor binding antibody tests - pending  - possible medication side-effect from Ambien use vs deconditioning due to diverticulitis  - PT home with home PT recommendations  - stable for d/c today    Migraine - magnesium sulfate oxide 400mg BID, toradol iv prn for pain  - resolved, will d/c of magnesium and tylenol  - appreciate neurology recommendations    Diverticulitis - PO ciprofloxacin 500 mg Q12h and PO metronidazole 500 mg Q8h for total course for 4 more days  - low fiber diet     Hypothyroidism - continued reduced dose of levothyroxine 100 mcg daily  - repeat thyroid labs in 4-6 weeks     CAD status post stenting, hypertension - continue dual antiplatelet therapy, atorvastatin  - continue losartan and metoprolol     GERD - continue Protonix     Asthma - continue home inhalers     Right upper lobe pulmonary nodules - Multiple right upper lobe pulmonary nodules, 3 mm (x2, pleural) and 5 mm nodule (subpleural)  - Per Fleischner guidelines, repeat CT chest in 12 months for high-risk patient       Principal Problem:    Generalized weakness  Active Problems:    Back pain    Benign essential HTN    Coronary arteriosclerosis    Chronic stable angina (HCC)    Depression with anxiety    Fibromyalgia    Gastroesophageal reflux disease    Hypothyroidism    Hypercholesterolemia    Diverticulitis of colon    Acquired ptosis of eyelid, bilateral      Disposition: per SOD, for d/c home today       Subjective: Headache resolved, comfortable going home, does not want PT, no other issues         Vitals: Temp (24hrs), Av 9 °F (36 6 °C), Min:97 8 °F (36 6 °C), Max:97 9 °F (36 6 °C)  Current: Temperature: 97 9 °F (36 6 °C)  Vitals:    10/26/18 2350 10/27/18 0700 10/27/18 1500 10/27/18 2344   BP: 137/70 140/65 129/60 137/65   BP Location: Left arm Left arm Left arm Left arm   Pulse: 58 (!) 53 55 (!) 51   Resp: 18 18 18 18   Temp: 98 4 °F (36 9 °C) 98 5 °F (36 9 °C) 97 8 °F (36 6 °C) 97 9 °F (36 6 °C)   TempSrc: Oral Oral Oral Oral   SpO2: 92% 96% 91% 93%    There is no height or weight on file to calculate BMI  I/O last 24 hours: In: 1440 [P O :1300; I V :40; IV Piggyback:100]  Out: -       Physical Exam:   Physical Exam   Constitutional: She appears well-developed and well-nourished  obese   HENT:   Head: Normocephalic  Eyes: Pupils are equal, round, and reactive to light  Conjunctivae are normal    Neck: Neck supple  Cardiovascular: Normal rate and regular rhythm  No murmur heard  Pulmonary/Chest: Effort normal and breath sounds normal  No respiratory distress  Abdominal: Soft  She exhibits no distension  There is no tenderness  Musculoskeletal: She exhibits no edema  Neurological: She is alert  No gross motor or sensory deficits  Skin: Skin is warm and dry  Psychiatric: She has a normal mood and affect  Invasive Devices     Peripheral Intravenous Line            Peripheral IV 10/27/18 Right Hand 1 day                Labs:   Recent Results (from the past 24 hour(s))   Basic metabolic panel    Collection Time: 10/28/18  4:49 AM   Result Value Ref Range    Sodium 139 136 - 145 mmol/L    Potassium 3 6 3 5 - 5 3 mmol/L    Chloride 106 100 - 108 mmol/L    CO2 25 21 - 32 mmol/L    ANION GAP 8 4 - 13 mmol/L    BUN 16 5 - 25 mg/dL    Creatinine 0 92 0 60 - 1 30 mg/dL    Glucose 131 65 - 140 mg/dL    Calcium 8 7 8 3 - 10 1 mg/dL    eGFR 69 ml/min/1 73sq m       Radiology Results: I have personally reviewed pertinent reports  Other Diagnostic Testing:   I have personally reviewed pertinent reports          Active Meds:   Current Facility-Administered Medications   Medication Dose Route Frequency    acetaminophen (TYLENOL) tablet 650 mg  650 mg Oral Q6H PRN    albuterol (PROVENTIL HFA,VENTOLIN HFA) inhaler 1 puff  1 puff Inhalation Q6H PRN    albuterol (PROVENTIL HFA,VENTOLIN HFA) inhaler 2 puff  2 puff Inhalation Q6H PRN    aspirin chewable tablet 81 mg  81 mg Oral Daily    atorvastatin (LIPITOR) tablet 80 mg  80 mg Oral Daily With Dinner    ciprofloxacin (CIPRO) tablet 500 mg  500 mg Oral Q12H JODY    citalopram (CeleXA) tablet 20 mg  20 mg Oral Daily    clopidogrel (PLAVIX) tablet 75 mg  75 mg Oral Once    enoxaparin (LOVENOX) subcutaneous injection 40 mg  40 mg Subcutaneous Daily    fluticasone (FLONASE) 50 mcg/act nasal spray 1 spray  1 spray Nasal Daily    fluticasone-vilanterol (BREO ELLIPTA) 100-25 mcg/inh inhaler 1 puff  1 puff Inhalation Daily    hydrALAZINE (APRESOLINE) injection 5 mg  5 mg Intravenous Q6H PRN    hydrOXYzine HCL (ATARAX) tablet 25 mg  25 mg Oral BID PRN    ketorolac (TORADOL) injection 30 mg  30 mg Intravenous Q6H PRN    levothyroxine tablet 100 mcg  100 mcg Oral Early Morning    losartan (COZAAR) tablet 25 mg  25 mg Oral Daily    magnesium oxide (MAG-OX) tablet 400 mg  400 mg Oral BID    metoprolol succinate (TOPROL-XL) 24 hr tablet 25 mg  25 mg Oral Daily    metroNIDAZOLE (FLAGYL) tablet 500 mg  500 mg Oral Q8H [address]    pantoprazole (PROTONIX) EC tablet 40 mg  40 mg Oral Daily    pregabalin (LYRICA) capsule 150 mg  150 mg Oral TID    senna-docusate sodium (SENOKOT S) 8 6-50 mg per tablet 1 tablet  1 tablet Oral HS    silver sulfadiazine (SILVADENE,SSD) 1 % cream   Topical Daily    triamcinolone (KENALOG) 0 1 % cream   Topical BID    zolpidem (AMBIEN) tablet 5 mg  5 mg Oral HS PRN         VTE Pharmacologic Prophylaxis: Enoxaparin (Lovenox)  VTE Mechanical Prophylaxis: sequential compression device    Joanne Kennedy MD

## 2018-10-29 ENCOUNTER — TRANSITIONAL CARE MANAGEMENT (OUTPATIENT)
Dept: INTERNAL MEDICINE CLINIC | Facility: CLINIC | Age: 58
End: 2018-10-29

## 2018-10-29 NOTE — DISCHARGE SUMMARY
IMR DISCHARGE SUMMARY     Felisha Hernandez   63 y o  female  MRN: [id number]  Room/Bed: [facility] 90/[facility] [address] [place] [address]    Encounter: [id number]    DISCHARGE INFORMATION     PCP at Discharge: Mary Velez MD    Admitting Provider: Kelvin Aguirre DO  Admission Date: 10/25/2018    Discharge Provider: Kelvin Aguirre DO  Discharge Date: 10/28/2018    Discharge Disposition: Home/Self Care  Discharge Condition: stable  Discharge with Lines: no    Discharge Diet: regular diet  Activity Restrictions: none  Test Results Pending at Discharge: anti-MUSK, acetylcholine receptor binding antibody    Discharge Diagnoses:  Principal Problem:    Generalized weakness    Active Problems:    Back pain    Benign essential HTN    Coronary arteriosclerosis    Chronic stable angina (HCC)    Depression with anxiety    Fibromyalgia    Gastroesophageal reflux disease    Hypothyroidism    Hypercholesterolemia    Diverticulitis of colon    Acquired ptosis of eyelid, bilateral    Consulting Providers:  Shakira Middleton MD (Neurology)    Diagnostic & Therapeutic Procedures Performed:  XR Chest 2 Views  · Result Date: 10/25/2018  · Impression: No acute cardiopulmonary disease  CT Head Without Contrast  · Result Date: 10/25/2018  · Impression: No acute intracranial abnormality  CT Chest W Contrast  · Result Date: 10/26/2018  · Impression: 1  Multiple right upper lobe nodules on the order of 3 to 5 mm  Based on current Fleischner Society 2017 Guidelines on incidental pulmonary nodule, no routine follow-up is needed if the patient is considered low risk for lung cancer  If the patient is considered high risk for lung cancer, 12 month follow-up non-contrast chest CT is recommended  2  Moderate emphysema in the upper lobes  3  Cholelithiasis      Code Status: Prior  Advance Directive & Living Will: <no information>  Power of :    POLST:      Medications:  Discharge Medication List as of 10/28/2018 10:17 AM      STOP taking these medications       valACYclovir (VALTREX) 1,000 mg tablet Comments:   Reason for Stopping:         zolpidem (AMBIEN) 10 mg tablet Comments:   Reason for Stopping:         valACYclovir (VALTREX) 1,000 mg tablet Comments:   Reason for Stopping:          Discharge Medication List as of 10/28/2018 10:17 AM      START taking these medications    Details   magnesium oxide (MAG-OX) 400 mg Take 1 tablet (400 mg total) by mouth 2 (two) times a day, Starting Sun 10/28/2018, Print        Discharge Medication List as of 10/28/2018 10:17 AM      CONTINUE these medications which have NOT CHANGED    Details   albuterol (PROVENTIL HFA,VENTOLIN HFA) 90 mcg/act inhaler Inhale 2 puffs every 6 (six) hours as needed for wheezing, Starting Tue 3/20/2018, Normal      aspirin 81 mg chewable tablet Chew 81 mg daily, Historical Med      atorvastatin (LIPITOR) 80 mg tablet TAKE 1 TABLET BY MOUTH EVERY DAY, Normal      citalopram (CeleXA) 20 mg tablet Take 1 tablet (20 mg total) by mouth daily, Starting Wed 7/25/2018, Normal      clopidogrel (PLAVIX) 75 mg tablet TAKE 4 TABLETS TODAY THEN 1 TABLET DAILY, Normal      ergocalciferol (VITAMIN D2) 50,000 units Take 50,000 Units by mouth once a week, Starting Wed 7/11/2018, Historical Med      famotidine (PEPCID) 40 MG tablet Take 1 tablet (40 mg total) by mouth daily, Starting Mon 3/26/2018, Normal      fluticasone (FLONASE) 50 mcg/act nasal spray 1 spray into each nostril daily, Starting Mon 2/5/2018, Normal      fluticasone-salmeterol (ADVAIR) 250-50 mcg/dose inhaler Inhale 1 puff every 12 (twelve) hours as needed (sob), Starting Fri 3/16/2018, Normal      hydrOXYzine HCL (ATARAX) 25 mg tablet Take 1 tablet (25 mg total) by mouth 2 (two) times a day as needed for itching, Starting Tue 8/7/2018, Normal      levothyroxine 125 mcg tablet TAKE 1 TABLET DAILY , Normal      losartan (COZAAR) 25 mg tablet TAKE 1 TABLET DAILY , Normal      metoprolol succinate (TOPROL-XL) 25 mg 24 hr tablet Take 25 mg by mouth daily, Historical Med      pantoprazole (PROTONIX) 40 mg tablet Take 1 tablet (40 mg total) by mouth daily, Starting Wed 9/5/2018, Normal      pregabalin (LYRICA) 150 mg capsule Take 1 capsule (150 mg total) by mouth 3 (three) times a day, Starting Thu 8/2/2018, Normal      PROAIR RESPICLICK 741 (90 Base) MCG/ACT AEPB Starting Wed 4/11/2018, Historical Med      silver sulfadiazine (SILVADENE,SSD) 1 % cream Apply topically daily, Starting Wed 7/11/2018, Normal      tiZANidine (ZANAFLEX) 2 mg tablet Take 2 tablets (4 mg total) by mouth every 8 (eight) hours as needed for muscle spasms, Starting Thu 8/2/2018, Normal      triamcinolone (KENALOG) 0 1 % cream Apply topically 2 (two) times a day, Starting Fri 3/16/2018, Normal        Allergies: Allergies   Allergen Reactions    Augmentin [Amoxicillin-Pot Clavulanate] Nausea Only     PT stated she only allergic to medication AUGMENTIN     FOLLOW-UP     PCP Outpatient Follow-up:   Follow up: Yadira Reyes MD  Location: [address] [place] [address] Primary Care  Follow up within next: 1-2 weeks    Consulting Providers Follow-up:  none required     Active Issues Requiring Follow-up:     Issue: Weakness  Responsible Individual: Patient  What is Needed: Follow-up with PCP, follow-up of anti-MUSK and acetylcholine receptor binding antibody tests  Follow-up Appointments Arranged: No    Issue: Diverticulitis  Responsible Individual: Patient  What is Needed: Follow-up with PCP, complete 7-day course of PO ciprofloxacin and metronidazole, will need surveillance colonoscopy in 4-6 weeks  Follow-up Appointments Arranged: No    Issue: Multiple RUL lung nodules  Responsible Individual: Patient  What is Needed: Follow-up with PCP, repeat CT scan of chest in 12 months  Follow-up Appointments Arranged: No     [address] COURSE     63year old female presented to [facility] ED with complaint of acute onset weakness and ptosis   In the ED, initial head CT revealed no significant acute findings  Patient was admitted for further workup and management  There was concern for possible myasthenia gravis, although the acuity of presentation would be atypical  Anti-MUSK and acetylcholine receptor antibody tests were ordered  Of note, patient had a history of diverticulosis and diverticulitis  She had recently been evaluated for LLQ abdominal pain and diagnosed with acute mild diverticulitis a few days prior to presentation  She had been treated with IV antibiotics and discharged with a prescription for PO antibiotics which she had not filled yet  Given that concern for MG was low, PO ciprofloxacin and metronidazole was started for a total course of 7 days  No acute worsening of her weakness was appreciated with initiation of fluoroquinolone  By hospital day 1, her weakness had significantly improved to the point that she was able to ambulate to the bathroom and ptosis was almost completely resolved  There was belief that the weakness was multifactorial, possibly related to Ambien that the patient was taking for insomnia plus physical deconditioning in setting of fibromyalgia which was limited her functional status  However, the acuity of weakness was still unexplained  There was some concern that this was a paraneoplastic process due to the patient's extensive smoking history (30+ pack years, quit 5 years ago) and the patient's report of unintentional weight loss of approximately 10 lbs over the last couple of months  Because of this, a CT of the chest was obtained which showed emphysematous changes as well as multiple RUL subcentimeter nodules, the largest measuring 5mm  Given their size, it was unclear if these were malignant and unknown if there was enough for cause of paraneoplastic process  Per Fleischner guidelines, a repeat CT of the chest in 12 months was recommended      Neurology was consulted for recommendations regarding the patient's weakness  Given that her examination was nonfocal and her weakness exhibited no fatigability, this was unlikely to be myasthenia gravis  They agreed with follow-up of the antibody tests  She was treated for a headache and cleared for discharge from a neurology standpoint  The was seen and evaluated by PT who recommend home with home PT  She was evaluated by the primary team on 10/28 and deemed stable for discharge  She was discharged in stable condition  Discharge Statement:   I spent 30 minutes minutes discharging the patient  This time was spent on the day of discharge  I had direct contact with the patient on the day of discharge  Additional documentation is required if more than 30 minutes were spent on discharge     ==  SIMONA Weiss    [address] [facility]  Internal Medicine Resident PGY-2

## 2018-10-30 LAB — ACHR BIND AB SER-SCNC: <0.03 NMOL/L (ref 0–0.24)

## 2018-10-30 RX ORDER — VALACYCLOVIR HYDROCHLORIDE 1 G/1
1000 TABLET, FILM COATED ORAL 3 TIMES DAILY PRN
COMMUNITY
End: 2018-10-30 | Stop reason: ALTCHOICE

## 2018-10-30 RX ORDER — CLOPIDOGREL BISULFATE 75 MG/1
75 TABLET ORAL DAILY
COMMUNITY
End: 2019-05-01 | Stop reason: SDUPTHER

## 2018-10-31 ENCOUNTER — TELEPHONE (OUTPATIENT)
Dept: PAIN MEDICINE | Facility: CLINIC | Age: 58
End: 2018-10-31

## 2018-10-31 ENCOUNTER — OFFICE VISIT (OUTPATIENT)
Dept: PAIN MEDICINE | Facility: CLINIC | Age: 58
End: 2018-10-31
Payer: COMMERCIAL

## 2018-10-31 VITALS
HEIGHT: 65 IN | BODY MASS INDEX: 29.66 KG/M2 | HEART RATE: 66 BPM | DIASTOLIC BLOOD PRESSURE: 81 MMHG | SYSTOLIC BLOOD PRESSURE: 151 MMHG | WEIGHT: 178 LBS

## 2018-10-31 DIAGNOSIS — G89.29 CHRONIC MIDLINE LOW BACK PAIN WITH BILATERAL SCIATICA: ICD-10-CM

## 2018-10-31 DIAGNOSIS — M50.30 DEGENERATIVE DISC DISEASE, CERVICAL: ICD-10-CM

## 2018-10-31 DIAGNOSIS — M54.16 LUMBAR RADICULOPATHY: ICD-10-CM

## 2018-10-31 DIAGNOSIS — M50.20 CERVICAL HERNIATED DISC: ICD-10-CM

## 2018-10-31 DIAGNOSIS — M79.7 FIBROMYALGIA: ICD-10-CM

## 2018-10-31 DIAGNOSIS — G89.29 CHRONIC BILATERAL THORACIC BACK PAIN: ICD-10-CM

## 2018-10-31 DIAGNOSIS — M48.061 LUMBAR FORAMINAL STENOSIS: ICD-10-CM

## 2018-10-31 DIAGNOSIS — M54.12 CERVICAL RADICULOPATHY: Primary | ICD-10-CM

## 2018-10-31 DIAGNOSIS — M54.41 CHRONIC MIDLINE LOW BACK PAIN WITH BILATERAL SCIATICA: ICD-10-CM

## 2018-10-31 DIAGNOSIS — M54.6 CHRONIC BILATERAL THORACIC BACK PAIN: ICD-10-CM

## 2018-10-31 DIAGNOSIS — M79.18 MYOFASCIAL PAIN SYNDROME: ICD-10-CM

## 2018-10-31 DIAGNOSIS — M54.42 CHRONIC MIDLINE LOW BACK PAIN WITH BILATERAL SCIATICA: ICD-10-CM

## 2018-10-31 PROCEDURE — 99214 OFFICE O/P EST MOD 30 MIN: CPT | Performed by: NURSE PRACTITIONER

## 2018-10-31 RX ORDER — METHOCARBAMOL 500 MG/1
500 TABLET, FILM COATED ORAL 3 TIMES DAILY PRN
Qty: 90 TABLET | Refills: 2 | Status: SHIPPED | OUTPATIENT
Start: 2018-10-31 | End: 2019-03-14 | Stop reason: SDUPTHER

## 2018-10-31 NOTE — PROGRESS NOTES
Assessment:  1  Cervical radiculopathy    2  Myofascial pain syndrome    3  Lumbar radiculopathy    4  Lumbar foraminal stenosis    5  Degenerative disc disease, cervical    6  Cervical herniated disc    7  Fibromyalgia    8  Chronic midline low back pain with bilateral sciatica    9  Chronic bilateral thoracic back pain        Plan:  1  I will order an MRI of the cervical spine   2  Patient will continue Lyrica 150 mg t i d   3   I will discontinue tizanidine secondary to side effects and trial the patient on methocarbamol 500 mg t i d  p r n  myofascial pain  I advised the patient that they should not drive or operate machinery while on this medication until they see how it affects them, as it could cause lethargy and mental cloudyness  I advised the patient to call our office if they experience any side effects or issues with the medication changes  The patient verbalized an understanding  4   The patient is still in the process of having a psychological evaluation and moving forward with the spinal cord stimulator trial    She will call our office once she has had this psychological evaluation done  5   The patient's thoracic pain may be facet mediated or myofascial in nature  It may also be secondary to the patient's fibromyalgia  I explained that based on the MRI of her thoracic spine, I do not have any explanation for her pain complaints as there was no pathology on the MRI to explain the symptoms  She will discuss further with rheumatology  6   I will avoid opioid secondary to aberrant urine drug screen in the past and marijuana use  7  The patient will continue with her home exercise program  8  The patient will continue with Tylenol p r n  and should not exceed more than 3000 mg daily  9  I will avoid NSAIDs secondary to anticoagulation on Plavix  10  The patient will continue to follow-up with rheumatology for her diagnosis of fibromyalgia  11    The patient will follow-up in 3 months for medication prescription refill and reevaluation  The patient was advised to contact the office should their symptoms worsen in the interim  The patient was agreeable and verbalized an understanding  [place] Prescription Drug Monitoring Program report was reviewed and was appropriate     History of Present Illness: The patient is a 63 y o  female with a history of fibromyalgia in previous L5-S1 fusion last seen on 8/2/18 who presents for a follow up office visit in regards to chronic neck pain with radiculopathy into the bilateral upper extremities, thoracic pain with radiculopathy into the thorax and abdomen, and low back pain with radiculopathy into the bilateral lower extremities secondary to cervical degenerative disc disease, lumbar degenerative disc disease, lumbar foraminal stenosis, myofascial pain syndrome, fibromyalgia, and chronic pain syndrome  The patient currently reports that since our last office visit she has been able to move forward with physical therapy as it was causing too much pain and she is having transportation issues  She continues with multiple pain complaints in her neck with radicular symptoms into the bilateral upper extremities  The patient was requesting that a letter be sent to her insurance explaining why she was unable to complete physical therapy prior to having an MRI of her cervical spine, which was completed  She denies any balance issues at this time  In regards to her thoracic back pain  She does state that she has midline thoracic back pain that radiates bilaterally into the thorax and abdomen  She does have an MRI of her thoracic spine which shows mild degenerative spondylosis, however does not demonstrate any disc herniation, central, or foraminal stenosis  In regards to the patient's low back pain, she continues to have radiating symptoms into the bilateral lower extremities   She is interested in pursuing a spinal cord stimulator, however she is unable to afford to psychological eval therefore has not been able to move forward with this at this time  She has had epidural steroid injections and SI joint injections in the past without any relief  She states that her friend is having a  for her in December and is hopeful that with the money raised, she will be able to complete the psychological eval     The patient currently rates her pain as 7/10 on the numeric pain rating scale  She states the pain is constant in nature and follows no particular pattern throughout the day  She characterizes the pain as dull aching, sharp, throbbing, pressure like and pins and needles  Current pain medications includes:  Lyrica 150 mg t i d  tizanidine 2 mg every 8 hours p r n , and Tylenol p r n     The patient reports that this regimen is providing 10% pain relief  The patient is reporting sleepiness from this pain medication regimen  I have personally reviewed and/or updated the patient's past medical history, past surgical history, family history, social history, current medications, allergies, and vital signs today  Review of Systems:    Review of Systems   Respiratory: Negative for shortness of breath  Cardiovascular: Negative for chest pain  Gastrointestinal: Positive for constipation  Negative for diarrhea, nausea and vomiting  Musculoskeletal: Positive for gait problem (Difficulty walking, decreased range of motion)  Negative for arthralgias, joint swelling and myalgias  Skin: Negative for rash  Neurological: Negative for dizziness, seizures and weakness  All other systems reviewed and are negative          Past Medical History:   Diagnosis Date    Abnormal echocardiogram     Abnormal stress test     Anxiety     Asthma     Brachial neuritis     Depression     Disease of thyroid gland     Displacement of intervertebral disc of mid-cervical region     Fibromyalgia     Frequent headaches     GERD (gastroesophageal reflux disease)     Herniated nucleus pulposus     History of arthritis     History of asthma     History of cardiac disorder     History of chest pain     History of diverticulitis     History of fatigue     History of hearing loss     History of hemoptysis     History of herpes simplex infection     History of hiatal hernia     History of insect bite     INFECTED    History of memory loss     History of neck disorder     History of reflex sympathetic dystrophy     History of restless legs syndrome     History of shortness of breath     History of spinal stenosis     Hyperlipidemia     Hyperlipidemia     Hypertension     Skin rash     Somnolence, daytime     Spinal stenosis of cervical region        Past Surgical History:   Procedure Laterality Date    BACK SURGERY      BACK SURGERY      LOWER BACK SURGERY    CARDIAC CATHETERIZATION      HISTORY OF CORONARY ANGIOGRAPHY WITH CONCOMITANT LEFT HEART CATHETERIZATION    CORONARY ANGIOPLASTY WITH STENT PLACEMENT      EAR SURGERY      TONSILLECTOMY      TYMPANOPLASTY         Family History   Problem Relation Age of Onset    Coronary artery disease Mother         ATHEROMA    Heart disease Mother     Diabetes Mother     Hypertension Mother     No Known Problems Father         NO PERTINENT FAMILY HISTORY    Coronary artery disease Sister         ATHEROMA    Heart disease Sister     Stroke Sister     Diabetes Sister     Hypertension Sister        Social History     Occupational History    Not on file       Social History Main Topics    Smoking status: Former Smoker     Quit date: 2012    Smokeless tobacco: Former User      Comment: quit in 2013    Alcohol use No    Drug use: Yes     Types: Marijuana      Comment: USES [employer] occasionally    Sexual activity: Not on file         Current Outpatient Prescriptions:     acetaminophen (TYLENOL) 325 mg tablet, For headache, Disp: 30 tablet, Rfl: 0    albuterol (PROVENTIL HFA,VENTOLIN HFA) 90 mcg/act inhaler, Inhale 2 puffs every 6 (six) hours as needed for wheezing, Disp: 18 g, Rfl: 0    aspirin 81 mg chewable tablet, Chew 81 mg daily, Disp: , Rfl:     atorvastatin (LIPITOR) 80 mg tablet, TAKE 1 TABLET BY MOUTH EVERY DAY, Disp: 90 tablet, Rfl: 2    ciprofloxacin (CIPRO) 500 mg tablet, Take 1 tablet (500 mg total) by mouth 2 (two) times a day for 4 days, Disp: 8 tablet, Rfl: 0    citalopram (CeleXA) 20 mg tablet, Take 1 tablet (20 mg total) by mouth daily, Disp: 30 tablet, Rfl: 3    clopidogrel (PLAVIX) 75 mg tablet, Take 75 mg by mouth daily, Disp: , Rfl:     fluticasone (FLONASE) 50 mcg/act nasal spray, 1 spray into each nostril daily (Patient taking differently: 1 spray into each nostril daily as needed  ), Disp: 16 g, Rfl: 0    fluticasone-salmeterol (ADVAIR) 250-50 mcg/dose inhaler, Inhale 1 puff every 12 (twelve) hours as needed (sob), Disp: 1 each, Rfl: 1    hydrOXYzine HCL (ATARAX) 25 mg tablet, Take 1 tablet (25 mg total) by mouth 2 (two) times a day as needed for itching, Disp: 180 tablet, Rfl: 1    levothyroxine 125 mcg tablet, TAKE 1 TABLET DAILY  , Disp: 30 tablet, Rfl: 5    losartan (COZAAR) 25 mg tablet, TAKE 1 TABLET DAILY  , Disp: 30 tablet, Rfl: 5    magnesium oxide (MAG-OX) 400 mg, Take 1 tablet (400 mg total) by mouth 2 (two) times a day, Disp: 60 tablet, Rfl: 0    metoprolol succinate (TOPROL-XL) 25 mg 24 hr tablet, Take 25 mg by mouth daily, Disp: , Rfl:     metroNIDAZOLE (FLAGYL) 500 mg tablet, Take 1 tablet (500 mg total) by mouth every 8 (eight) hours for 4 days, Disp: 12 tablet, Rfl: 0    pantoprazole (PROTONIX) 40 mg tablet, Take 1 tablet (40 mg total) by mouth daily, Disp: 90 tablet, Rfl: 0    pregabalin (LYRICA) 150 mg capsule, Take 1 capsule (150 mg total) by mouth 3 (three) times a day, Disp: 90 capsule, Rfl: 2    PROAIR RESPICLICK 961 (90 Base) MCG/ACT AEPB, , Disp: , Rfl:     triamcinolone (KENALOG) 0 1 % cream, Apply topically 2 (two) times a day, Disp: 80 g, Rfl: 1    methocarbamol (ROBAXIN) 500 mg tablet, Take 1 tablet (500 mg total) by mouth 3 (three) times a day as needed for muscle spasms, Disp: 90 tablet, Rfl: 2    Allergies   Allergen Reactions    Augmentin [Amoxicillin-Pot Clavulanate] Nausea Only     PT stated she only allergic to medication AUGMENTIN       Physical Exam:    /81   Pulse 66   Ht 5' 4 5" (1 638 m)   Wt 80 7 kg (178 lb)   BMI 30 08 kg/m²     Constitutional:normal, well developed, well nourished, alert, in no distress and non-toxic and no overt pain behavior  Eyes:anicteric  HEENT:grossly intact  Neck:supple, symmetric, trachea midline and no masses   Pulmonary:even and unlabored  Cardiovascular:No edema or pitting edema present  Skin:Normal without rashes or lesions and well hydrated  Psychiatric:Mood and affect appropriate  Neurologic:Cranial Nerves II-XII grossly intact  Musculoskeletal:normal gait  Bilateral cervical paraspinal trapezii tender to palpation  Bilateral upper extremity strength 5/5 in all muscle groups  Negative Spurling's and Antoine's reflex bilaterally  Thoracic paraspinal musculature tender to palpation  Bilateral straight leg raise is not reproduce the patient's radiating pain into the thorax or abdomen  Bilateral lumbar paraspinals tender to palpation  Bilateral SI joints nontender to palpation  Negative straight leg raise bilaterally        Imaging  MRI cervical spine without contrast    (Results Pending)     MRI THORACIC SPINE WITHOUT CONTRAST     INDICATION:  65year-old female, chronic back and bilateral leg pain     COMPARISON:  None      TECHNIQUE:  Sagittal T1, sagittal T2, sagittal inversion recovery, axial T2,  axial 2D MERGE      IMAGE QUALITY: Diagnostic      FINDINGS:     ALIGNMENT: Normal alignment of the thoracic spine   No compression fracture   No subluxation   No scoliosis      MARROW SIGNAL:  Normal marrow signal is identified within the visualized bony structures   No discrete marrow lesion      THORACIC CORD: Normal signal within the thoracic cord      PARAVERTEBRAL SOFT TISSUES:  Normal      THORACIC DEGENERATIVE CHANGE:   Mild degenerative spondylosis is present throughout the mid to lower thoracic segments   No evidence of disc herniation, central canal or foraminal stenosis      IMPRESSION:  Mild multilevel degenerative spondylosis     No evidence of disc herniation, central canal or foraminal stenosis       Orders Placed This Encounter   Procedures    MRI cervical spine without contrast

## 2018-10-31 NOTE — TELEPHONE ENCOUNTER
----- Message from Michelle Hill sent at 10/24/2018 10:38 AM EDT -----  When she has the script she will need to contact to scheduling to schedule appointment first, before the auth will be obtained  ----- Message -----  From: Deyvi Alejandra RN  Sent: 10/24/2018  10:24 AM  To: Michelle Hill    I just sent Dr Gonzalez Ano the message  She needs a cervical MRI   ----- Message -----  From: Michelle Hill  Sent: 10/24/2018  10:15 AM  To: Deyvi Alejandra RN, Michelle Hill    Good Morning Tana Lewis,    I looked in [facility], I did not see a MRI request for this patient

## 2018-11-05 DIAGNOSIS — B00.1 RECURRENT COLD SORES: ICD-10-CM

## 2018-11-05 RX ORDER — VALACYCLOVIR HYDROCHLORIDE 1 G/1
TABLET, FILM COATED ORAL
Qty: 30 TABLET | Refills: 1 | Status: SHIPPED | OUTPATIENT
Start: 2018-11-05 | End: 2019-03-14 | Stop reason: ALTCHOICE

## 2018-11-06 LAB — MUSK AB SER IA-ACNC: <1 U/ML

## 2018-11-07 ENCOUNTER — OFFICE VISIT (OUTPATIENT)
Dept: INTERNAL MEDICINE CLINIC | Facility: CLINIC | Age: 58
End: 2018-11-07
Payer: COMMERCIAL

## 2018-11-07 ENCOUNTER — HOSPITAL ENCOUNTER (OUTPATIENT)
Dept: RADIOLOGY | Facility: IMAGING CENTER | Age: 58
Discharge: HOME | End: 2018-11-07
Payer: COMMERCIAL

## 2018-11-07 VITALS
WEIGHT: 175.6 LBS | TEMPERATURE: 98.5 F | BODY MASS INDEX: 29.68 KG/M2 | SYSTOLIC BLOOD PRESSURE: 120 MMHG | OXYGEN SATURATION: 97 % | HEART RATE: 73 BPM | DIASTOLIC BLOOD PRESSURE: 82 MMHG

## 2018-11-07 DIAGNOSIS — M54.12 CERVICAL RADICULOPATHY: ICD-10-CM

## 2018-11-07 DIAGNOSIS — M54.41 CHRONIC MIDLINE LOW BACK PAIN WITH BILATERAL SCIATICA: ICD-10-CM

## 2018-11-07 DIAGNOSIS — F34.1 DYSTHYMIC DISORDER: Primary | ICD-10-CM

## 2018-11-07 DIAGNOSIS — M54.42 CHRONIC MIDLINE LOW BACK PAIN WITH BILATERAL SCIATICA: ICD-10-CM

## 2018-11-07 DIAGNOSIS — G89.29 CHRONIC MIDLINE LOW BACK PAIN WITH BILATERAL SCIATICA: ICD-10-CM

## 2018-11-07 DIAGNOSIS — I10 BENIGN ESSENTIAL HTN: ICD-10-CM

## 2018-11-07 PROCEDURE — 99496 TRANSJ CARE MGMT HIGH F2F 7D: CPT | Performed by: INTERNAL MEDICINE

## 2018-11-07 PROCEDURE — 72141 MRI NECK SPINE W/O DYE: CPT

## 2018-11-07 RX ORDER — ZOLPIDEM TARTRATE 10 MG/1
10 TABLET ORAL
COMMUNITY
End: 2018-11-21 | Stop reason: SDUPTHER

## 2018-11-09 ENCOUNTER — TELEPHONE (OUTPATIENT)
Dept: PAIN MEDICINE | Facility: MEDICAL CENTER | Age: 58
End: 2018-11-09

## 2018-11-09 PROBLEM — R00.2 PALPITATIONS: Status: RESOLVED | Noted: 2017-11-08 | Resolved: 2018-11-09

## 2018-11-09 PROBLEM — R42 DIZZINESS: Status: RESOLVED | Noted: 2018-02-05 | Resolved: 2018-11-09

## 2018-11-09 PROBLEM — J01.00 ACUTE NON-RECURRENT MAXILLARY SINUSITIS: Status: RESOLVED | Noted: 2018-03-30 | Resolved: 2018-11-09

## 2018-11-09 PROBLEM — H02.403: Status: RESOLVED | Noted: 2018-10-25 | Resolved: 2018-11-09

## 2018-11-09 NOTE — PROGRESS NOTES
Assessment/Plan:    Coronary arteriosclerosis  CAD, prior PCI/UTE to proximal LAD July 2014, PCI/UTE to the OM 2 April 2017 without residual obstructive disease    Diverticulitis of colon  Improving       Diagnoses and all orders for this visit:    Dysthymic disorder    Chronic midline low back pain with bilateral sciatica    Benign essential HTN    Other orders  -     zolpidem (AMBIEN) 10 mg tablet; Take 10 mg by mouth daily at bedtime as needed for sleep          Subjective:      Patient ID: Ella Self is a 63 y o  female  HPI   This is a 62 yo lady recently admitted for Diverticulitis, rx with Cipro and Flagyl with complete resolution of symptoms  Her other history consists of CAD S/P angioplasty 07/14 and 04/2017  CAD, prior PCI/UTE to proximal LAD July 2014, PCI/UTE to the OM 2 April 2017 without residual obstructive disease  CARDIAC STRUCTURES:  EF calculated by contrast ventriculography was 55 %      HTN, Dyslipidemia, Hypothyroidism, GERD, chronic insomnia, chronic back pain,     The following portions of the patient's history were reviewed and updated as appropriate: past family history, past medical history, past social history, past surgical history and problem list     Review of Systems   Constitutional: Positive for fatigue  Gastrointestinal: Positive for abdominal pain  Musculoskeletal: Positive for arthralgias and back pain  Skin: Positive for rash  Allergic/Immunologic: Positive for environmental allergies  Neurological: Positive for headaches  Psychiatric/Behavioral: The patient is nervous/anxious  All other systems reviewed and are negative          Past Medical History:   Diagnosis Date    Abnormal echocardiogram     Abnormal stress test     Anxiety     Asthma     Brachial neuritis     Depression     Disease of thyroid gland     Displacement of intervertebral disc of mid-cervical region     Fibromyalgia     Frequent headaches     GERD (gastroesophageal reflux disease)     Herniated nucleus pulposus     History of arthritis     History of asthma     History of cardiac disorder     History of chest pain     History of diverticulitis     History of fatigue     History of hearing loss     History of hemoptysis     History of herpes simplex infection     History of hiatal hernia     History of insect bite     INFECTED    History of memory loss     History of neck disorder     History of reflex sympathetic dystrophy     History of restless legs syndrome     History of shortness of breath     History of spinal stenosis     Hyperlipidemia     Hyperlipidemia     Hypertension     Skin rash     Somnolence, daytime     Spinal stenosis of cervical region          Current Outpatient Prescriptions:     acetaminophen (TYLENOL) 325 mg tablet, For headache, Disp: 30 tablet, Rfl: 0    albuterol (PROVENTIL HFA,VENTOLIN HFA) 90 mcg/act inhaler, Inhale 2 puffs every 6 (six) hours as needed for wheezing, Disp: 18 g, Rfl: 0    aspirin 81 mg chewable tablet, Chew 81 mg daily, Disp: , Rfl:     atorvastatin (LIPITOR) 80 mg tablet, TAKE 1 TABLET BY MOUTH EVERY DAY, Disp: 90 tablet, Rfl: 2    citalopram (CeleXA) 20 mg tablet, Take 1 tablet (20 mg total) by mouth daily, Disp: 30 tablet, Rfl: 3    clopidogrel (PLAVIX) 75 mg tablet, Take 75 mg by mouth daily, Disp: , Rfl:     fluticasone (FLONASE) 50 mcg/act nasal spray, 1 spray into each nostril daily (Patient taking differently: 1 spray into each nostril daily as needed  ), Disp: 16 g, Rfl: 0    fluticasone-salmeterol (ADVAIR) 250-50 mcg/dose inhaler, Inhale 1 puff every 12 (twelve) hours as needed (sob), Disp: 1 each, Rfl: 1    hydrOXYzine HCL (ATARAX) 25 mg tablet, Take 1 tablet (25 mg total) by mouth 2 (two) times a day as needed for itching, Disp: 180 tablet, Rfl: 1    levothyroxine 125 mcg tablet, TAKE 1 TABLET DAILY  , Disp: 30 tablet, Rfl: 5    losartan (COZAAR) 25 mg tablet, TAKE 1 TABLET DAILY  , Disp: 30 tablet, Rfl: 5    magnesium oxide (MAG-OX) 400 mg, Take 1 tablet (400 mg total) by mouth 2 (two) times a day, Disp: 60 tablet, Rfl: 0    methocarbamol (ROBAXIN) 500 mg tablet, Take 1 tablet (500 mg total) by mouth 3 (three) times a day as needed for muscle spasms, Disp: 90 tablet, Rfl: 2    metoprolol succinate (TOPROL-XL) 25 mg 24 hr tablet, Take 25 mg by mouth daily, Disp: , Rfl:     pantoprazole (PROTONIX) 40 mg tablet, Take 1 tablet (40 mg total) by mouth daily, Disp: 90 tablet, Rfl: 0    pregabalin (LYRICA) 150 mg capsule, Take 1 capsule (150 mg total) by mouth 3 (three) times a day, Disp: 90 capsule, Rfl: 2    triamcinolone (KENALOG) 0 1 % cream, Apply topically 2 (two) times a day, Disp: 80 g, Rfl: 1    valACYclovir (VALTREX) 1,000 mg tablet, TAKE 1 TABLET BY MOUTH THREE TIMES A DAY FOR 10 DAYS, Disp: 30 tablet, Rfl: 1    zolpidem (AMBIEN) 10 mg tablet, Take 10 mg by mouth daily at bedtime as needed for sleep, Disp: , Rfl:     Allergies   Allergen Reactions    Augmentin [Amoxicillin-Pot Clavulanate] Nausea Only     PT stated she only allergic to medication AUGMENTIN       Social History   Past Surgical History:   Procedure Laterality Date    BACK SURGERY      BACK SURGERY      LOWER BACK SURGERY    CARDIAC CATHETERIZATION      HISTORY OF CORONARY ANGIOGRAPHY WITH CONCOMITANT LEFT HEART CATHETERIZATION    CORONARY ANGIOPLASTY WITH STENT PLACEMENT      EAR SURGERY      TONSILLECTOMY      TYMPANOPLASTY       Family History   Problem Relation Age of Onset    Coronary artery disease Mother         ATHEROMA    Heart disease Mother     Diabetes Mother     Hypertension Mother     No Known Problems Father         NO PERTINENT FAMILY HISTORY    Coronary artery disease Sister         ATHEROMA    Heart disease Sister     Stroke Sister     Diabetes Sister     Hypertension Sister        Objective:  /82 (BP Location: Right arm, Patient Position: Sitting, Cuff Size: Large)   Pulse 73   Temp 98 5 °F (36 9 °C) (Oral)   Wt 79 7 kg (175 lb 9 6 oz) Comment: shoes on  LMP  (LMP Unknown)   SpO2 97%   BMI 29 68 kg/m²     Recent Results (from the past 1344 hour(s))   POCT urinalysis dipstick    Collection Time: 10/23/18 10:08 PM   Result Value Ref Range    Color, UA yellow    ED Urine Macroscopic    Collection Time: 10/23/18 10:19 PM   Result Value Ref Range    Color, UA Yellow     Clarity, UA Clear     pH, UA 5 0 4 5 - 8 0    Leukocytes, UA Negative Negative    Nitrite, UA Negative Negative    Protein, UA Negative Negative mg/dl    Glucose, UA Negative Negative mg/dl    Ketones, UA Trace (A) Negative mg/dl    Urobilinogen, UA 0 2 0 2, 1 0 E U /dl E U /dl    Bilirubin, UA Interference- unable to analyze (A) Negative    Blood, UA Negative Negative    Specific Gravity, UA >=1 030 1 003 - 1 030   CBC and differential    Collection Time: 10/23/18 10:21 PM   Result Value Ref Range    WBC 7 92 4 31 - 10 16 Thousand/uL    RBC 4 60 3 81 - 5 12 Million/uL    Hemoglobin 13 9 11 5 - 15 4 g/dL    Hematocrit 39 9 34 8 - 46 1 %    MCV 87 82 - 98 fL    MCH 30 2 26 8 - 34 3 pg    MCHC 34 8 31 4 - 37 4 g/dL    RDW 13 1 11 6 - 15 1 %    MPV 10 3 8 9 - 12 7 fL    Platelets 599 848 - 435 Thousands/uL    nRBC 0 /100 WBCs    Neutrophils Relative 58 43 - 75 %    Immat GRANS % 0 0 - 2 %    Lymphocytes Relative 36 14 - 44 %    Monocytes Relative 5 4 - 12 %    Eosinophils Relative 1 0 - 6 %    Basophils Relative 0 0 - 1 %    Neutrophils Absolute 4 51 1 85 - 7 62 Thousands/µL    Immature Grans Absolute 0 03 0 00 - 0 20 Thousand/uL    Lymphocytes Absolute 2 88 0 60 - 4 47 Thousands/µL    Monocytes Absolute 0 41 0 17 - 1 22 Thousand/µL    Eosinophils Absolute 0 06 0 00 - 0 61 Thousand/µL    Basophils Absolute 0 03 0 00 - 0 10 Thousands/µL   Comprehensive metabolic panel    Collection Time: 10/23/18 10:21 PM   Result Value Ref Range    Sodium 140 136 - 145 mmol/L    Potassium 3 4 (L) 3 5 - 5 3 mmol/L    Chloride 104 100 - 108 mmol/L CO2 29 21 - 32 mmol/L    ANION GAP 7 4 - 13 mmol/L    BUN 13 5 - 25 mg/dL    Creatinine 1 03 0 60 - 1 30 mg/dL    Glucose 132 65 - 140 mg/dL    Calcium 8 9 8 3 - 10 1 mg/dL    AST 31 5 - 45 U/L    ALT 44 12 - 78 U/L    Alkaline Phosphatase 132 (H) 46 - 116 U/L    Total Protein 8 3 (H) 6 4 - 8 2 g/dL    Albumin 3 9 3 5 - 5 0 g/dL    Total Bilirubin 0 35 0 20 - 1 00 mg/dL    eGFR 60 ml/min/1 73sq m   Lipase    Collection Time: 10/23/18 10:21 PM   Result Value Ref Range    Lipase 97 73 - 393 u/L   Lactic acid, plasma    Collection Time: 10/23/18 10:22 PM   Result Value Ref Range    LACTIC ACID 1 1 0 5 - 2 0 mmol/L   ECG 12 lead    Collection Time: 10/25/18 12:50 PM   Result Value Ref Range    Ventricular Rate 50 BPM    Atrial Rate 50 BPM    NE Interval 176 ms    QRSD Interval 88 ms    QT Interval 460 ms    QTC Interval 419 ms    P Axis 41 degrees    QRS Axis 15 degrees    T Wave Axis 20 degrees   CBC and differential    Collection Time: 10/25/18  2:39 PM   Result Value Ref Range    WBC 5 07 4 31 - 10 16 Thousand/uL    RBC 4 33 3 81 - 5 12 Million/uL    Hemoglobin 12 9 11 5 - 15 4 g/dL    Hematocrit 38 0 34 8 - 46 1 %    MCV 88 82 - 98 fL    MCH 29 8 26 8 - 34 3 pg    MCHC 33 9 31 4 - 37 4 g/dL    RDW 13 2 11 6 - 15 1 %    MPV 10 7 8 9 - 12 7 fL    Platelets 803 643 - 798 Thousands/uL    nRBC 0 /100 WBCs    Neutrophils Relative 50 43 - 75 %    Immat GRANS % 0 0 - 2 %    Lymphocytes Relative 42 14 - 44 %    Monocytes Relative 5 4 - 12 %    Eosinophils Relative 2 0 - 6 %    Basophils Relative 1 0 - 1 %    Neutrophils Absolute 2 56 1 85 - 7 62 Thousands/µL    Immature Grans Absolute 0 02 0 00 - 0 20 Thousand/uL    Lymphocytes Absolute 2 11 0 60 - 4 47 Thousands/µL    Monocytes Absolute 0 27 0 17 - 1 22 Thousand/µL    Eosinophils Absolute 0 08 0 00 - 0 61 Thousand/µL    Basophils Absolute 0 03 0 00 - 0 10 Thousands/µL   Comprehensive metabolic panel    Collection Time: 10/25/18  2:39 PM   Result Value Ref Range    Sodium 142 136 - 145 mmol/L    Potassium 3 7 3 5 - 5 3 mmol/L    Chloride 107 100 - 108 mmol/L    CO2 28 21 - 32 mmol/L    ANION GAP 7 4 - 13 mmol/L    BUN 11 5 - 25 mg/dL    Creatinine 0 88 0 60 - 1 30 mg/dL    Glucose 111 65 - 140 mg/dL    Calcium 8 9 8 3 - 10 1 mg/dL    AST 34 5 - 45 U/L    ALT 48 12 - 78 U/L    Alkaline Phosphatase 119 (H) 46 - 116 U/L    Total Protein 7 5 6 4 - 8 2 g/dL    Albumin 3 7 3 5 - 5 0 g/dL    Total Bilirubin 0 58 0 20 - 1 00 mg/dL    eGFR 73 ml/min/1 73sq m   Troponin I    Collection Time: 10/25/18  2:39 PM   Result Value Ref Range    Troponin I <0 02 <=0 04 ng/mL   TSH, 3rd generation with Free T4 reflex    Collection Time: 10/25/18  2:39 PM   Result Value Ref Range    TSH 3RD GENERATON 0 144 (L) 0 358 - 3 740 uIU/mL   T4, free    Collection Time: 10/25/18  2:39 PM   Result Value Ref Range    Free T4 1 48 (H) 0 76 - 1 46 ng/dL   POCT urinalysis dipstick    Collection Time: 10/25/18  2:48 PM   Result Value Ref Range    Color, UA see chart    ED Urine Macroscopic    Collection Time: 10/25/18  2:50 PM   Result Value Ref Range    Color, UA Yellow     Clarity, UA Clear     pH, UA 5 5 4 5 - 8 0    Leukocytes, UA Negative Negative    Nitrite, UA Negative Negative    Protein, UA Negative Negative mg/dl    Glucose, UA Negative Negative mg/dl    Ketones, UA Negative Negative mg/dl    Urobilinogen, UA 0 2 0 2, 1 0 E U /dl E U /dl    Bilirubin, UA Negative Negative    Blood, UA Negative Negative    Specific Gravity, UA 1 010 1 003 - 1 030   Magnesium    Collection Time: 10/26/18  4:46 AM   Result Value Ref Range    Magnesium 2 1 1 6 - 2 6 mg/dL   Basic metabolic panel    Collection Time: 10/26/18  4:46 AM   Result Value Ref Range    Sodium 140 136 - 145 mmol/L    Potassium 3 6 3 5 - 5 3 mmol/L    Chloride 107 100 - 108 mmol/L    CO2 25 21 - 32 mmol/L    ANION GAP 8 4 - 13 mmol/L    BUN 13 5 - 25 mg/dL    Creatinine 0 85 0 60 - 1 30 mg/dL    Glucose 79 65 - 140 mg/dL    Calcium 8 4 8 3 - 10 1 mg/dL eGFR 76 ml/min/1 73sq m   Phosphorus    Collection Time: 10/26/18  4:46 AM   Result Value Ref Range    Phosphorus 3 9 2 7 - 4 5 mg/dL   CBC (With Platelets)    Collection Time: 10/26/18  4:46 AM   Result Value Ref Range    WBC 4 88 4 31 - 10 16 Thousand/uL    RBC 4 32 3 81 - 5 12 Million/uL    Hemoglobin 12 7 11 5 - 15 4 g/dL    Hematocrit 38 1 34 8 - 46 1 %    MCV 88 82 - 98 fL    MCH 29 4 26 8 - 34 3 pg    MCHC 33 3 31 4 - 37 4 g/dL    RDW 13 1 11 6 - 15 1 %    Platelets 129 624 - 350 Thousands/uL    MPV 11 1 8 9 - 12 7 fL   Basic metabolic panel    Collection Time: 10/27/18  4:50 AM   Result Value Ref Range    Sodium 140 136 - 145 mmol/L    Potassium 3 5 3 5 - 5 3 mmol/L    Chloride 107 100 - 108 mmol/L    CO2 25 21 - 32 mmol/L    ANION GAP 8 4 - 13 mmol/L    BUN 13 5 - 25 mg/dL    Creatinine 0 88 0 60 - 1 30 mg/dL    Glucose 104 65 - 140 mg/dL    Calcium 8 5 8 3 - 10 1 mg/dL    eGFR 73 ml/min/1 73sq m   Acetylcholine receptor, binding    Collection Time: 10/27/18 12:56 PM   Result Value Ref Range    AChR Binding Ab, Serum <0 03 0 00 - 0 24 nmol/L   MUSK Antibody    Collection Time: 10/27/18 12:56 PM   Result Value Ref Range    MUSK ANTIBODY <1 0 U/mL   Basic metabolic panel    Collection Time: 10/28/18  4:49 AM   Result Value Ref Range    Sodium 139 136 - 145 mmol/L    Potassium 3 6 3 5 - 5 3 mmol/L    Chloride 106 100 - 108 mmol/L    CO2 25 21 - 32 mmol/L    ANION GAP 8 4 - 13 mmol/L    BUN 16 5 - 25 mg/dL    Creatinine 0 92 0 60 - 1 30 mg/dL    Glucose 131 65 - 140 mg/dL    Calcium 8 7 8 3 - 10 1 mg/dL    eGFR 69 ml/min/1 73sq m            Physical Exam      Constitutional: She is oriented to person, place, and time  She appears well-developed and well-nourished  No distress  HENT:   Head: Normocephalic and atraumatic  Nose: Nose normal    Mouth/Throat: Oropharynx is clear and moist  No oropharyngeal exudate  Eyes: Pupils are equal, round, and reactive to light   Conjunctivae and EOM are normal  No scleral icterus  Neck: Neck supple  No JVD present  No tracheal deviation present  Cardiovascular: Normal rate, regular rhythm, normal heart sounds and intact distal pulses  Exam reveals no gallop and no friction rub  No murmur heard  Pulmonary/Chest: Effort normal and breath sounds normal  No respiratory distress  She has no wheezes  She has no rales  She exhibits no tenderness  Abdominal: Soft  Bowel sounds are normal  She exhibits no distension    There is no rebound and no guarding  Musculoskeletal: She exhibits no edema or deformity  Neurological: She is alert and oriented to person, place, and time  No cranial nerve deficit  Motor strength 4+/5 bilateral upper extremities, 4+/5 bilateral lower extremities, sensation intact, no ptosis appreciated   Skin: Skin is warm and dry  No rash noted  She is not diaphoretic  No erythema  No pallor  Psychiatric: She has a normal mood and affect   Her behavior is normal  Judgment and thought content normal    Nursing note and vitals reviewed

## 2018-11-09 NOTE — TELEPHONE ENCOUNTER
S/w the patient and reviewed the previous findings with her  Consent to hold Plavix and ASA faxed to Dr Tc Nava today to consent hold  Reviewed with the patient the hold process and she is aware

## 2018-11-09 NOTE — TELEPHONE ENCOUNTER
Patient's cervical MRI does show right disc herniation with moderate right foraminal narrowing at C3-4, a left foraminal disc protrusion with moderate left foraminal narrowing at C4-5, and left-sided disc herniation shin with mild to moderate left foraminal narrowing at C5-6 with scattered mild to moderate spondylosis (arthritis)  We can offer the patient a cervical epidural steroid injection with Dr Beata Amaro if she would like, however we would need to send to hold request for Plavix and aspirin prior to scheduling if she wishes to proceed  Thanks

## 2018-11-09 NOTE — ASSESSMENT & PLAN NOTE
CAD, prior PCI/UTE to proximal LAD July 2014, PCI/UTE to the OM 2 April 2017 without residual obstructive disease

## 2018-11-14 ENCOUNTER — TELEPHONE (OUTPATIENT)
Dept: INTERNAL MEDICINE CLINIC | Facility: CLINIC | Age: 58
End: 2018-11-14

## 2018-11-14 NOTE — TELEPHONE ENCOUNTER
Received ok for pt to hold ASA and Plavix for the procedure dated 11/13  Called pt and she was not home and asked if she can call back tomorrow to schedule b/c she is not in front of her calendar at the moment

## 2018-11-15 ENCOUNTER — TELEPHONE (OUTPATIENT)
Dept: PAIN MEDICINE | Facility: CLINIC | Age: 58
End: 2018-11-15

## 2018-11-18 DIAGNOSIS — F32.89 OTHER DEPRESSION: ICD-10-CM

## 2018-11-18 RX ORDER — CITALOPRAM 20 MG/1
TABLET ORAL
Qty: 30 TABLET | Refills: 3 | Status: SHIPPED | OUTPATIENT
Start: 2018-11-18 | End: 2019-03-14 | Stop reason: SDUPTHER

## 2018-11-19 NOTE — TELEPHONE ENCOUNTER
S/w pt, scheduled her for [facility] on 12/12/18 at 9:40  Pt to arrive 15 mins prior  Pt's LD of Plavix will be 12/4 and hold 12/5, pt's LD of ASA will be 12/5 and hold 12/6, pt's LD of any NSAIDS will be 12/7 and hold 12/8, Tylenol ok  Reviewed pre-procedure instructions: pt will need a , NPO one hour prior, wear loose fitting clothing and call back if she gets sick or started on any abx  Pt verbalized understanding

## 2018-11-21 ENCOUNTER — OFFICE VISIT (OUTPATIENT)
Dept: INTERNAL MEDICINE CLINIC | Facility: CLINIC | Age: 58
End: 2018-11-21
Payer: COMMERCIAL

## 2018-11-21 VITALS
WEIGHT: 177 LBS | TEMPERATURE: 98.3 F | SYSTOLIC BLOOD PRESSURE: 124 MMHG | HEIGHT: 62 IN | DIASTOLIC BLOOD PRESSURE: 82 MMHG | BODY MASS INDEX: 32.57 KG/M2 | OXYGEN SATURATION: 98 % | HEART RATE: 85 BPM

## 2018-11-21 DIAGNOSIS — M79.7 FIBROMYALGIA: ICD-10-CM

## 2018-11-21 DIAGNOSIS — G43.001 MIGRAINE WITHOUT AURA AND WITH STATUS MIGRAINOSUS, NOT INTRACTABLE: ICD-10-CM

## 2018-11-21 DIAGNOSIS — J04.0 LARYNGITIS: Primary | ICD-10-CM

## 2018-11-21 DIAGNOSIS — F51.01 PRIMARY INSOMNIA: ICD-10-CM

## 2018-11-21 PROCEDURE — 99214 OFFICE O/P EST MOD 30 MIN: CPT | Performed by: INTERNAL MEDICINE

## 2018-11-21 PROCEDURE — 3008F BODY MASS INDEX DOCD: CPT | Performed by: INTERNAL MEDICINE

## 2018-11-21 PROCEDURE — 1036F TOBACCO NON-USER: CPT | Performed by: INTERNAL MEDICINE

## 2018-11-21 RX ORDER — ZOLPIDEM TARTRATE 10 MG/1
10 TABLET ORAL
Qty: 30 TABLET | Refills: 0 | Status: SHIPPED | OUTPATIENT
Start: 2018-11-21 | End: 2018-12-26 | Stop reason: SDUPTHER

## 2018-11-21 RX ORDER — BUTALBITAL, ACETAMINOPHEN AND CAFFEINE 300; 40; 50 MG/1; MG/1; MG/1
2 CAPSULE ORAL DAILY PRN
Qty: 30 CAPSULE | Refills: 0 | Status: SHIPPED | OUTPATIENT
Start: 2018-11-21 | End: 2019-03-06 | Stop reason: ALTCHOICE

## 2018-11-21 RX ORDER — CEFDINIR 300 MG/1
300 CAPSULE ORAL EVERY 12 HOURS SCHEDULED
Qty: 14 CAPSULE | Refills: 0 | Status: SHIPPED | OUTPATIENT
Start: 2018-11-21 | End: 2018-11-28

## 2018-11-21 RX ORDER — BUTALBITAL, ACETAMINOPHEN AND CAFFEINE 300; 40; 50 MG/1; MG/1; MG/1
CAPSULE ORAL
Refills: 1 | COMMUNITY
Start: 2018-11-07 | End: 2018-11-21 | Stop reason: SDUPTHER

## 2018-11-21 RX ORDER — PREGABALIN 150 MG/1
150 CAPSULE ORAL 3 TIMES DAILY
Qty: 270 CAPSULE | Refills: 1 | Status: SHIPPED | OUTPATIENT
Start: 2018-11-21 | End: 2018-12-26 | Stop reason: SDUPTHER

## 2018-11-21 NOTE — PROGRESS NOTES
Assessment/Plan:    No problem-specific Assessment & Plan notes found for this encounter  Diagnoses and all orders for this visit:    Laryngitis    Other orders  -     Butalbital-APAP-Caffeine -40 MG CAPS; TAKE 1 CAPSULE DAILY AS NEEDED FOR HEADACHE        Subjective:   Loss of voice mild cough, patient used to be a smoker quit smoking 7 years ago, she smoked for 35  Almost a pack a day history of emphysema  The symptoms started without any cold symptoms she does bring up some cough  No fever no chills no nausea or vomiting   Patient ID: Jose Rodriguez is a 63 y o  female  HPI    The following portions of the patient's history were reviewed and updated as appropriate: allergies, current medications, past family history, past medical history, past social history, past surgical history and problem list     Review of Systems   HENT: Positive for congestion and voice change  Respiratory: Positive for cough            Past Medical History:   Diagnosis Date    Abnormal echocardiogram     Abnormal stress test     Anxiety     Asthma     Brachial neuritis     Depression     Disease of thyroid gland     Displacement of intervertebral disc of mid-cervical region     Fibromyalgia     Frequent headaches     GERD (gastroesophageal reflux disease)     Herniated nucleus pulposus     History of arthritis     History of asthma     History of cardiac disorder     History of chest pain     History of diverticulitis     History of fatigue     History of hearing loss     History of hemoptysis     History of herpes simplex infection     History of hiatal hernia     History of insect bite     INFECTED    History of memory loss     History of neck disorder     History of reflex sympathetic dystrophy     History of restless legs syndrome     History of shortness of breath     History of spinal stenosis     Hyperlipidemia     Hyperlipidemia     Hypertension     Skin rash     Somnolence, daytime     Spinal stenosis of cervical region          Current Outpatient Prescriptions:     acetaminophen (TYLENOL) 325 mg tablet, For headache, Disp: 30 tablet, Rfl: 0    albuterol (PROVENTIL HFA,VENTOLIN HFA) 90 mcg/act inhaler, Inhale 2 puffs every 6 (six) hours as needed for wheezing, Disp: 18 g, Rfl: 0    aspirin 81 mg chewable tablet, Chew 81 mg daily, Disp: , Rfl:     atorvastatin (LIPITOR) 80 mg tablet, TAKE 1 TABLET BY MOUTH EVERY DAY, Disp: 90 tablet, Rfl: 2    Butalbital-APAP-Caffeine -40 MG CAPS, TAKE 1 CAPSULE DAILY AS NEEDED FOR HEADACHE, Disp: , Rfl: 1    citalopram (CeleXA) 20 mg tablet, TAKE 1 TABLET BY MOUTH EVERY DAY, Disp: 30 tablet, Rfl: 3    clopidogrel (PLAVIX) 75 mg tablet, Take 75 mg by mouth daily, Disp: , Rfl:     fluticasone (FLONASE) 50 mcg/act nasal spray, 1 spray into each nostril daily (Patient taking differently: 1 spray into each nostril daily as needed  ), Disp: 16 g, Rfl: 0    fluticasone-salmeterol (ADVAIR) 250-50 mcg/dose inhaler, Inhale 1 puff every 12 (twelve) hours as needed (sob), Disp: 1 each, Rfl: 1    hydrOXYzine HCL (ATARAX) 25 mg tablet, Take 1 tablet (25 mg total) by mouth 2 (two) times a day as needed for itching, Disp: 180 tablet, Rfl: 1    levothyroxine 125 mcg tablet, TAKE 1 TABLET DAILY  , Disp: 30 tablet, Rfl: 5    losartan (COZAAR) 25 mg tablet, TAKE 1 TABLET DAILY  , Disp: 30 tablet, Rfl: 5    magnesium oxide (MAG-OX) 400 mg, Take 1 tablet (400 mg total) by mouth 2 (two) times a day, Disp: 60 tablet, Rfl: 0    methocarbamol (ROBAXIN) 500 mg tablet, Take 1 tablet (500 mg total) by mouth 3 (three) times a day as needed for muscle spasms, Disp: 90 tablet, Rfl: 2    metoprolol succinate (TOPROL-XL) 25 mg 24 hr tablet, Take 25 mg by mouth daily, Disp: , Rfl:     pantoprazole (PROTONIX) 40 mg tablet, Take 1 tablet (40 mg total) by mouth daily, Disp: 90 tablet, Rfl: 0    pregabalin (LYRICA) 150 mg capsule, Take 1 capsule (150 mg total) by mouth 3 (three) times a day, Disp: 90 capsule, Rfl: 2    triamcinolone (KENALOG) 0 1 % cream, Apply topically 2 (two) times a day, Disp: 80 g, Rfl: 1    zolpidem (AMBIEN) 10 mg tablet, Take 10 mg by mouth daily at bedtime as needed for sleep, Disp: , Rfl:     valACYclovir (VALTREX) 1,000 mg tablet, TAKE 1 TABLET BY MOUTH THREE TIMES A DAY FOR 10 DAYS, Disp: 30 tablet, Rfl: 1    Allergies   Allergen Reactions    Augmentin [Amoxicillin-Pot Clavulanate] Nausea Only     PT stated she only allergic to medication AUGMENTIN       Social History   Past Surgical History:   Procedure Laterality Date    BACK SURGERY      BACK SURGERY      LOWER BACK SURGERY    CARDIAC CATHETERIZATION      HISTORY OF CORONARY ANGIOGRAPHY WITH CONCOMITANT LEFT HEART CATHETERIZATION    CORONARY ANGIOPLASTY WITH STENT PLACEMENT      EAR SURGERY      TONSILLECTOMY      TYMPANOPLASTY       Family History   Problem Relation Age of Onset    Coronary artery disease Mother         ATHEROMA    Heart disease Mother     Diabetes Mother     Hypertension Mother     No Known Problems Father         NO PERTINENT FAMILY HISTORY    Coronary artery disease Sister         ATHEROMA    Heart disease Sister     Stroke Sister     Diabetes Sister     Hypertension Sister        Objective:  /82 (BP Location: Left arm, Patient Position: Sitting, Cuff Size: Large)   Pulse 85   Temp 98 3 °F (36 8 °C) (Oral)   Ht 5' 1 5" (1 562 m)   Wt 80 3 kg (177 lb)   LMP  (LMP Unknown)   SpO2 98%   BMI 32 90 kg/m²        Physical Exam   Constitutional: She is oriented to person, place, and time  She appears well-developed and well-nourished  HENT:   Right Ear: External ear normal    Mouth/Throat: Oropharynx is clear and moist    Laryngitis with some erythema of the posterior pharynx hoarseness of the voice congested upper respiratory tract   Eyes: Pupils are equal, round, and reactive to light  Conjunctivae and EOM are normal    Neck: Normal range of motion  No JVD present  No thyromegaly present  Cardiovascular: Normal rate, regular rhythm, normal heart sounds and intact distal pulses  Pulmonary/Chest: Effort normal and breath sounds normal  No respiratory distress  She has no wheezes  She has no rales  She exhibits no tenderness  Abdominal: Soft  Bowel sounds are normal    Musculoskeletal: Normal range of motion  Lymphadenopathy:     She has no cervical adenopathy  Neurological: She is alert and oriented to person, place, and time  She has normal reflexes  Skin: Skin is dry  Psychiatric: She has a normal mood and affect  Her behavior is normal  Judgment and thought content normal    Nursing note and vitals reviewed          Recent Results (from the past 672 hour(s))   ECG 12 lead    Collection Time: 10/25/18 12:50 PM   Result Value Ref Range    Ventricular Rate 50 BPM    Atrial Rate 50 BPM    IL Interval 176 ms    QRSD Interval 88 ms    QT Interval 460 ms    QTC Interval 419 ms    P Axis 41 degrees    QRS Axis 15 degrees    T Wave Axis 20 degrees   CBC and differential    Collection Time: 10/25/18  2:39 PM   Result Value Ref Range    WBC 5 07 4 31 - 10 16 Thousand/uL    RBC 4 33 3 81 - 5 12 Million/uL    Hemoglobin 12 9 11 5 - 15 4 g/dL    Hematocrit 38 0 34 8 - 46 1 %    MCV 88 82 - 98 fL    MCH 29 8 26 8 - 34 3 pg    MCHC 33 9 31 4 - 37 4 g/dL    RDW 13 2 11 6 - 15 1 %    MPV 10 7 8 9 - 12 7 fL    Platelets 458 650 - 040 Thousands/uL    nRBC 0 /100 WBCs    Neutrophils Relative 50 43 - 75 %    Immat GRANS % 0 0 - 2 %    Lymphocytes Relative 42 14 - 44 %    Monocytes Relative 5 4 - 12 %    Eosinophils Relative 2 0 - 6 %    Basophils Relative 1 0 - 1 %    Neutrophils Absolute 2 56 1 85 - 7 62 Thousands/µL    Immature Grans Absolute 0 02 0 00 - 0 20 Thousand/uL    Lymphocytes Absolute 2 11 0 60 - 4 47 Thousands/µL    Monocytes Absolute 0 27 0 17 - 1 22 Thousand/µL    Eosinophils Absolute 0 08 0 00 - 0 61 Thousand/µL    Basophils Absolute 0 03 0 00 - 0 10 Thousands/µL   Comprehensive metabolic panel    Collection Time: 10/25/18  2:39 PM   Result Value Ref Range    Sodium 142 136 - 145 mmol/L    Potassium 3 7 3 5 - 5 3 mmol/L    Chloride 107 100 - 108 mmol/L    CO2 28 21 - 32 mmol/L    ANION GAP 7 4 - 13 mmol/L    BUN 11 5 - 25 mg/dL    Creatinine 0 88 0 60 - 1 30 mg/dL    Glucose 111 65 - 140 mg/dL    Calcium 8 9 8 3 - 10 1 mg/dL    AST 34 5 - 45 U/L    ALT 48 12 - 78 U/L    Alkaline Phosphatase 119 (H) 46 - 116 U/L    Total Protein 7 5 6 4 - 8 2 g/dL    Albumin 3 7 3 5 - 5 0 g/dL    Total Bilirubin 0 58 0 20 - 1 00 mg/dL    eGFR 73 ml/min/1 73sq m   Troponin I    Collection Time: 10/25/18  2:39 PM   Result Value Ref Range    Troponin I <0 02 <=0 04 ng/mL   TSH, 3rd generation with Free T4 reflex    Collection Time: 10/25/18  2:39 PM   Result Value Ref Range    TSH 3RD GENERATON 0 144 (L) 0 358 - 3 740 uIU/mL   T4, free    Collection Time: 10/25/18  2:39 PM   Result Value Ref Range    Free T4 1 48 (H) 0 76 - 1 46 ng/dL   POCT urinalysis dipstick    Collection Time: 10/25/18  2:48 PM   Result Value Ref Range    Color, UA see chart    ED Urine Macroscopic    Collection Time: 10/25/18  2:50 PM   Result Value Ref Range    Color, UA Yellow     Clarity, UA Clear     pH, UA 5 5 4 5 - 8 0    Leukocytes, UA Negative Negative    Nitrite, UA Negative Negative    Protein, UA Negative Negative mg/dl    Glucose, UA Negative Negative mg/dl    Ketones, UA Negative Negative mg/dl    Urobilinogen, UA 0 2 0 2, 1 0 E U /dl E U /dl    Bilirubin, UA Negative Negative    Blood, UA Negative Negative    Specific Gravity, UA 1 010 1 003 - 1 030   Magnesium    Collection Time: 10/26/18  4:46 AM   Result Value Ref Range    Magnesium 2 1 1 6 - 2 6 mg/dL   Basic metabolic panel    Collection Time: 10/26/18  4:46 AM   Result Value Ref Range    Sodium 140 136 - 145 mmol/L    Potassium 3 6 3 5 - 5 3 mmol/L    Chloride 107 100 - 108 mmol/L    CO2 25 21 - 32 mmol/L    ANION GAP 8 4 - 13 mmol/L    BUN 13 5 - 25 mg/dL    Creatinine 0 85 0 60 - 1 30 mg/dL    Glucose 79 65 - 140 mg/dL    Calcium 8 4 8 3 - 10 1 mg/dL    eGFR 76 ml/min/1 73sq m   Phosphorus    Collection Time: 10/26/18  4:46 AM   Result Value Ref Range    Phosphorus 3 9 2 7 - 4 5 mg/dL   CBC (With Platelets)    Collection Time: 10/26/18  4:46 AM   Result Value Ref Range    WBC 4 88 4 31 - 10 16 Thousand/uL    RBC 4 32 3 81 - 5 12 Million/uL    Hemoglobin 12 7 11 5 - 15 4 g/dL    Hematocrit 38 1 34 8 - 46 1 %    MCV 88 82 - 98 fL    MCH 29 4 26 8 - 34 3 pg    MCHC 33 3 31 4 - 37 4 g/dL    RDW 13 1 11 6 - 15 1 %    Platelets 449 651 - 647 Thousands/uL    MPV 11 1 8 9 - 12 7 fL   Basic metabolic panel    Collection Time: 10/27/18  4:50 AM   Result Value Ref Range    Sodium 140 136 - 145 mmol/L    Potassium 3 5 3 5 - 5 3 mmol/L    Chloride 107 100 - 108 mmol/L    CO2 25 21 - 32 mmol/L    ANION GAP 8 4 - 13 mmol/L    BUN 13 5 - 25 mg/dL    Creatinine 0 88 0 60 - 1 30 mg/dL    Glucose 104 65 - 140 mg/dL    Calcium 8 5 8 3 - 10 1 mg/dL    eGFR 73 ml/min/1 73sq m   Acetylcholine receptor, binding    Collection Time: 10/27/18 12:56 PM   Result Value Ref Range    AChR Binding Ab, Serum <0 03 0 00 - 0 24 nmol/L   MUSK Antibody    Collection Time: 10/27/18 12:56 PM   Result Value Ref Range    MUSK ANTIBODY <1 0 U/mL   Basic metabolic panel    Collection Time: 10/28/18  4:49 AM   Result Value Ref Range    Sodium 139 136 - 145 mmol/L    Potassium 3 6 3 5 - 5 3 mmol/L    Chloride 106 100 - 108 mmol/L    CO2 25 21 - 32 mmol/L    ANION GAP 8 4 - 13 mmol/L    BUN 16 5 - 25 mg/dL    Creatinine 0 92 0 60 - 1 30 mg/dL    Glucose 131 65 - 140 mg/dL    Calcium 8 7 8 3 - 10 1 mg/dL    eGFR 69 ml/min/1 73sq m

## 2018-12-12 ENCOUNTER — HOSPITAL ENCOUNTER (OUTPATIENT)
Dept: RADIOLOGY | Facility: CLINIC | Age: 58
Discharge: HOME | End: 2018-12-12
Attending: ANESTHESIOLOGY | Admitting: ANESTHESIOLOGY
Payer: COMMERCIAL

## 2018-12-12 VITALS
DIASTOLIC BLOOD PRESSURE: 81 MMHG | TEMPERATURE: 97.9 F | SYSTOLIC BLOOD PRESSURE: 137 MMHG | RESPIRATION RATE: 20 BRPM | HEART RATE: 67 BPM | OXYGEN SATURATION: 97 %

## 2018-12-12 DIAGNOSIS — M54.12 CERVICAL RADICULOPATHY: ICD-10-CM

## 2018-12-12 PROCEDURE — 62321 NJX INTERLAMINAR CRV/THRC: CPT | Performed by: ANESTHESIOLOGY

## 2018-12-12 RX ORDER — PAPAVERINE HCL 150 MG
10 CAPSULE, EXTENDED RELEASE ORAL ONCE
Status: COMPLETED | OUTPATIENT
Start: 2018-12-12 | End: 2018-12-12

## 2018-12-12 RX ORDER — LIDOCAINE HYDROCHLORIDE 10 MG/ML
5 INJECTION, SOLUTION EPIDURAL; INFILTRATION; INTRACAUDAL; PERINEURAL ONCE
Status: COMPLETED | OUTPATIENT
Start: 2018-12-12 | End: 2018-12-12

## 2018-12-12 RX ADMIN — LIDOCAINE HYDROCHLORIDE 3 ML: 10 INJECTION, SOLUTION EPIDURAL; INFILTRATION; INTRACAUDAL; PERINEURAL at 10:24

## 2018-12-12 RX ADMIN — DEXAMETHASONE SODIUM PHOSPHATE 10 MG: 10 INJECTION, SOLUTION INTRAMUSCULAR; INTRAVENOUS at 10:28

## 2018-12-12 RX ADMIN — IOHEXOL 1 ML: 300 INJECTION, SOLUTION INTRAVENOUS at 10:26

## 2018-12-12 NOTE — DISCHARGE INSTR - LAB
Epidural Steroid Injection   WHAT YOU NEED TO KNOW:   An epidural steroid injection (JJ) is a procedure to inject steroid medicine into the epidural space  The epidural space is between your spinal cord and vertebrae  Steroids reduce inflammation and fluid buildup in your spine that may be causing pain  You may be given pain medicine along with the steroids  ACTIVITY  · Do not drive or operate machinery today  · No strenuous activity today - bending, lifting, etc   · You may resume normal activites starting tomorrow - start slowly and as tolerated  · You may shower today, but no tub baths or hot tubs  · You may have numbness for several hours from the local anesthetic  Please use caution and common sense, especially with weight-bearing activities  CARE OF THE INJECTION SITE  · If you have soreness or pain, apply ice to the area today (20 minutes on/20 minutes off)  · Starting tomorrow, you may use warm, moist heat or ice if needed  · You may have an increase or change in your discomfort for 36-48 hours after your treatment  · Apply ice and continue with any pain medication you have been prescribed  · Notify the [facility] if you have any of the following: redness, drainage, swelling, headache, stiff neck or fever above 100°F     SPECIAL INSTRUCTIONS  · Our office will contact you in approximately 7 days for a progress report  MEDICATIONS  · Continue to take all routine medications  · Our office may have instructed you to hold some medications  If you have a problem specifically related to your procedure, please call our office at [phone]  Problems not related to your procedure should be directed to your primary care physician

## 2018-12-14 ENCOUNTER — TELEPHONE (OUTPATIENT)
Dept: INTERNAL MEDICINE CLINIC | Age: 58
End: 2018-12-14

## 2018-12-17 RX ORDER — ERGOCALCIFEROL 1.25 MG/1
50000 CAPSULE ORAL WEEKLY
Refills: 0 | COMMUNITY
Start: 2018-11-19 | End: 2019-03-14 | Stop reason: ALTCHOICE

## 2018-12-17 RX ORDER — TIZANIDINE 2 MG/1
4 TABLET ORAL EVERY 8 HOURS PRN
Refills: 2 | COMMUNITY
Start: 2018-11-19 | End: 2019-03-14 | Stop reason: ALTCHOICE

## 2018-12-18 ENCOUNTER — TELEPHONE (OUTPATIENT)
Dept: PAIN MEDICINE | Facility: CLINIC | Age: 58
End: 2018-12-18

## 2018-12-19 ENCOUNTER — TELEPHONE (OUTPATIENT)
Dept: PAIN MEDICINE | Facility: CLINIC | Age: 58
End: 2018-12-19

## 2018-12-19 NOTE — TELEPHONE ENCOUNTER
Pt reports no improvement post inj  Pain level 8/10 goes up to 10/10  Pt reports a having a headache almost everyday after the injection  She said shes been taking over the counter medication

## 2018-12-19 NOTE — TELEPHONE ENCOUNTER
Aware, will see how the patient does over the next week as it can take up to 2 weeks for her to notice full effect of injection

## 2018-12-26 ENCOUNTER — OFFICE VISIT (OUTPATIENT)
Dept: CARDIOLOGY CLINIC | Facility: CLINIC | Age: 58
End: 2018-12-26
Payer: COMMERCIAL

## 2018-12-26 VITALS
HEIGHT: 64 IN | SYSTOLIC BLOOD PRESSURE: 120 MMHG | HEART RATE: 72 BPM | WEIGHT: 183 LBS | BODY MASS INDEX: 31.24 KG/M2 | DIASTOLIC BLOOD PRESSURE: 80 MMHG

## 2018-12-26 DIAGNOSIS — F51.01 PRIMARY INSOMNIA: ICD-10-CM

## 2018-12-26 DIAGNOSIS — M79.7 FIBROMYALGIA: ICD-10-CM

## 2018-12-26 DIAGNOSIS — R07.9 CHEST PAIN, UNSPECIFIED TYPE: Primary | ICD-10-CM

## 2018-12-26 PROCEDURE — 99214 OFFICE O/P EST MOD 30 MIN: CPT | Performed by: INTERNAL MEDICINE

## 2018-12-26 PROCEDURE — 93000 ELECTROCARDIOGRAM COMPLETE: CPT | Performed by: INTERNAL MEDICINE

## 2018-12-26 RX ORDER — ZOLPIDEM TARTRATE 10 MG/1
10 TABLET ORAL
Qty: 30 TABLET | Refills: 0 | Status: SHIPPED | OUTPATIENT
Start: 2018-12-26 | End: 2019-01-25 | Stop reason: SDUPTHER

## 2018-12-26 RX ORDER — PREGABALIN 150 MG/1
150 CAPSULE ORAL 3 TIMES DAILY
Qty: 270 CAPSULE | Refills: 1 | Status: SHIPPED | OUTPATIENT
Start: 2018-12-26 | End: 2019-05-28 | Stop reason: SDUPTHER

## 2018-12-26 NOTE — PROGRESS NOTES
Cardiology Follow Up        iMller Johnson      1960      [id number]      Discussion/Summary:    1   CAD, prior PCI/UTE to proximal LAD July 2014, PCI/UTE to the OM 2 April 2017 without residual obstructive disease:  Recommend long-term dual anti-platelet treatment as DAPT score is 2  Continue clopidogrel, and pursue adequate blood pressure control  2   Hypertension:  Well controlled, continue losartan, metoprolol    3  Dyslipidemia:  Reviewed lipid panel 05/2018, elevated triglycerides noted with well controlled LDL  Lower carbohydrate intake has been recommended along with weight loss  Follow-up in 6 months, patient to call if any changes        Interval History: This is a 50-year-old female who I had initially seen in July 2014 secondary to symptoms of exertional throat discomfort, exertional shortness of breath, and several episodes of atypical chest discomfort  Her nuclear stress test was somewhat ambiguous, therefore I had requested a cardiac CT which she completed revealing LAD stenosis  Thereafter a cardiac catheterization on 7/18/14 revealed 90% proximal LAD stenosis, and she received a 2 5 x 28 mm drug-eluting stent for the lesion  She underwent a repeat cardiac catheterization due to stable symptoms, on 3/27/15  The patient's prior LAD stent was patent  She had 99% midcircumflex stenosis after the origin of the major OM branch, which was unchanged compared to her prior study from 2014  She also had 70% RCA stenosis with FFR performed within normal limits at 0 8 to indicating that the lesion was not slow critical   She underwent repeat cardiac catheterization on 4/27/17 secondary to recurrent exertional throat burning revealing patent LAD stent, with 100% chronic total occlusion of the OM 2  She underwent PCI/drug-eluting placement with 2 25 x 30 mm Resolute Integrity UTE with good results      She presents today for follow-up with no complaints  She denies exertional dyspnea or chest discomfort  She does have rib pain bilaterally which radiated to the front, occurring at rest   She is applying for disability at this time  Vitals:  Vitals:    12/26/18 1346   BP: 120/80   BP Location: Right arm   Patient Position: Sitting   Cuff Size: Standard   Pulse: 72   Weight: 83 kg (183 lb)   Height: 5' 4" (1 626 m)         Past Medical History:   Diagnosis Date    Abnormal echocardiogram     Abnormal stress test     Anxiety     Asthma     Brachial neuritis     Depression     Disease of thyroid gland     Displacement of intervertebral disc of mid-cervical region     Fibromyalgia     Frequent headaches     GERD (gastroesophageal reflux disease)     Herniated nucleus pulposus     History of arthritis     History of asthma     History of cardiac disorder     History of chest pain     History of diverticulitis     History of fatigue     History of hearing loss     History of hemoptysis     History of herpes simplex infection     History of hiatal hernia     History of insect bite     INFECTED    History of memory loss     History of neck disorder     History of reflex sympathetic dystrophy     History of restless legs syndrome     History of shortness of breath     History of spinal stenosis     Hyperlipidemia     Hyperlipidemia     Hypertension     Skin rash     Somnolence, daytime     Spinal stenosis of cervical region      Social History     Social History    Marital status: Single     Spouse name: N/A    Number of children: N/A    Years of education: N/A     Occupational History    Not on file       Social History Main Topics    Smoking status: Former Smoker     Quit date: 2012    Smokeless tobacco: Former User      Comment: quit in 2013    Alcohol use No    Drug use: Yes     Types: Marijuana      Comment: USES [employer] occasionally    Sexual activity: Not on file     Other Topics Concern    Not on file Social History Narrative    No narrative on file      Family History   Problem Relation Age of Onset    Coronary artery disease Mother         ATHEROMA    Heart disease Mother     Diabetes Mother     Hypertension Mother     No Known Problems Father         NO PERTINENT FAMILY HISTORY    Coronary artery disease Sister         ATHEROMA    Heart disease Sister     Stroke Sister     Diabetes Sister     Hypertension Sister      Past Surgical History:   Procedure Laterality Date    BACK SURGERY      BACK SURGERY      LOWER BACK SURGERY    CARDIAC CATHETERIZATION      HISTORY OF CORONARY ANGIOGRAPHY WITH CONCOMITANT LEFT HEART CATHETERIZATION    CORONARY ANGIOPLASTY WITH STENT PLACEMENT      EAR SURGERY      TONSILLECTOMY      TYMPANOPLASTY         Current Outpatient Prescriptions:     acetaminophen (TYLENOL) 325 mg tablet, For headache, Disp: 30 tablet, Rfl: 0    albuterol (PROVENTIL HFA,VENTOLIN HFA) 90 mcg/act inhaler, Inhale 2 puffs every 6 (six) hours as needed for wheezing, Disp: 18 g, Rfl: 0    aspirin 81 mg chewable tablet, Chew 81 mg daily, Disp: , Rfl:     atorvastatin (LIPITOR) 80 mg tablet, TAKE 1 TABLET BY MOUTH EVERY DAY, Disp: 90 tablet, Rfl: 2    citalopram (CeleXA) 20 mg tablet, TAKE 1 TABLET BY MOUTH EVERY DAY, Disp: 30 tablet, Rfl: 3    clopidogrel (PLAVIX) 75 mg tablet, Take 75 mg by mouth daily, Disp: , Rfl:     fluticasone (FLONASE) 50 mcg/act nasal spray, 1 spray into each nostril daily (Patient taking differently: 1 spray into each nostril daily as needed  ), Disp: 16 g, Rfl: 0    fluticasone-salmeterol (ADVAIR) 250-50 mcg/dose inhaler, Inhale 1 puff every 12 (twelve) hours as needed (sob), Disp: 1 each, Rfl: 1    hydrOXYzine HCL (ATARAX) 25 mg tablet, Take 1 tablet (25 mg total) by mouth 2 (two) times a day as needed for itching, Disp: 180 tablet, Rfl: 1    levothyroxine 125 mcg tablet, TAKE 1 TABLET DAILY  , Disp: 30 tablet, Rfl: 5    losartan (COZAAR) 25 mg tablet, TAKE 1 TABLET DAILY  , Disp: 30 tablet, Rfl: 5    magnesium oxide (MAG-OX) 400 mg, Take 1 tablet (400 mg total) by mouth 2 (two) times a day, Disp: 60 tablet, Rfl: 0    methocarbamol (ROBAXIN) 500 mg tablet, Take 1 tablet (500 mg total) by mouth 3 (three) times a day as needed for muscle spasms, Disp: 90 tablet, Rfl: 2    metoprolol succinate (TOPROL-XL) 25 mg 24 hr tablet, Take 25 mg by mouth daily, Disp: , Rfl:     pantoprazole (PROTONIX) 40 mg tablet, Take 1 tablet (40 mg total) by mouth daily, Disp: 90 tablet, Rfl: 0    pregabalin (LYRICA) 150 mg capsule, Take 1 capsule (150 mg total) by mouth 3 (three) times a day, Disp: 270 capsule, Rfl: 1    tiZANidine (ZANAFLEX) 2 mg tablet, Take 4 mg by mouth every 8 (eight) hours as needed, Disp: , Rfl: 2    triamcinolone (KENALOG) 0 1 % cream, Apply topically 2 (two) times a day, Disp: 80 g, Rfl: 1    valACYclovir (VALTREX) 1,000 mg tablet, TAKE 1 TABLET BY MOUTH THREE TIMES A DAY FOR 10 DAYS, Disp: 30 tablet, Rfl: 1    zolpidem (AMBIEN) 10 mg tablet, Take 1 tablet (10 mg total) by mouth daily at bedtime as needed for sleep, Disp: 30 tablet, Rfl: 0    Butalbital-APAP-Caffeine -40 MG CAPS, Take 2 capsules by mouth daily as needed (PRN) (Patient not taking: Reported on 12/26/2018 ), Disp: 30 capsule, Rfl: 0    dextromethorphan-guaifenesin (MUCINEX DM)  MG per 12 hr tablet, Take 1 tablet by mouth every 12 (twelve) hours (Patient not taking: Reported on 12/26/2018 ), Disp: 20 tablet, Rfl: 1    ergocalciferol (VITAMIN D2) 50,000 units, Take 50,000 Units by mouth once a week, Disp: , Rfl: 0        Review of Systems:  Review of Systems   Constitutional: Negative for activity change, fever and unexpected weight change  HENT: Negative for facial swelling, nosebleeds and voice change  Respiratory: Negative for chest tightness, shortness of breath and wheezing  Cardiovascular: Negative for chest pain, palpitations and leg swelling     Gastrointestinal: Negative for abdominal distention  Genitourinary: Negative for hematuria  Musculoskeletal: Positive for back pain  Negative for arthralgias  Skin: Negative for color change, pallor, rash and wound  Neurological: Negative for dizziness, seizures and syncope  Psychiatric/Behavioral: Negative for agitation  Physical Exam:  Physical Exam   Constitutional: She is oriented to person, place, and time  She appears well-developed and well-nourished  HENT:   Head: Normocephalic and atraumatic  Eyes: EOM are normal    Neck: Normal range of motion  Neck supple  Cardiovascular: Normal rate, regular rhythm, normal heart sounds and intact distal pulses  Pulmonary/Chest: Effort normal and breath sounds normal    Abdominal: Soft  Musculoskeletal: Normal range of motion  Neurological: She is alert and oriented to person, place, and time  Skin: Skin is warm and dry  Psychiatric: She has a normal mood and affect  Her behavior is normal  Judgment and thought content normal    Vitals reviewed

## 2018-12-26 NOTE — TELEPHONE ENCOUNTER
S/w pt, she is not doing any better since her SAURABH on 12/12, pt said the pain is severe in her arms, shoulder and hands  Pt having trouble driving due to the pain  Pt continuing her Lyrica, Robaxin and Tylenol and is using ice 24 hours a day with no relief  Please advise

## 2018-12-26 NOTE — TELEPHONE ENCOUNTER
Pt called back stating that her pain level is close to a 10 today  She has not noticed any additional improvement  States that she was just at her cardiologist, and the drive home was unbearable  Her arms and shoulders were hurting from steering, and her head and neck were hurting from hitting little bumps in the road  Pt would like a call back to advise  She can be reached at at [phone]

## 2018-12-30 DIAGNOSIS — I10 HTN (HYPERTENSION), BENIGN: ICD-10-CM

## 2019-01-02 RX ORDER — LOSARTAN POTASSIUM 25 MG/1
TABLET ORAL
Qty: 30 TABLET | Refills: 5 | Status: SHIPPED | OUTPATIENT
Start: 2019-01-02 | End: 2019-06-17 | Stop reason: SDUPTHER

## 2019-01-14 ENCOUNTER — TRANSCRIBE ORDERS (OUTPATIENT)
Dept: ADMINISTRATIVE | Facility: HOSPITAL | Age: 59
End: 2019-01-14

## 2019-01-14 DIAGNOSIS — K21.9 GASTROESOPHAGEAL REFLUX DISEASE, ESOPHAGITIS PRESENCE NOT SPECIFIED: ICD-10-CM

## 2019-01-14 RX ORDER — PANTOPRAZOLE SODIUM 40 MG/1
40 TABLET, DELAYED RELEASE ORAL DAILY
Qty: 90 TABLET | Refills: 0 | Status: SHIPPED | OUTPATIENT
Start: 2019-01-14 | End: 2019-05-28 | Stop reason: SDUPTHER

## 2019-01-25 DIAGNOSIS — F51.01 PRIMARY INSOMNIA: ICD-10-CM

## 2019-01-25 RX ORDER — ZOLPIDEM TARTRATE 10 MG/1
10 TABLET ORAL
Qty: 30 TABLET | Refills: 0 | Status: SHIPPED | OUTPATIENT
Start: 2019-01-25 | End: 2019-03-14 | Stop reason: SDUPTHER

## 2019-01-25 NOTE — TELEPHONE ENCOUNTER
Last O/V: 11/21/2018  Next O/V: N/A      PMED checked, no red flags identified; safe to proceed with prescription refills

## 2019-01-30 DIAGNOSIS — R21 SKIN RASH: ICD-10-CM

## 2019-01-30 RX ORDER — HYDROXYZINE HYDROCHLORIDE 25 MG/1
25 TABLET, FILM COATED ORAL 2 TIMES DAILY PRN
Qty: 180 TABLET | Refills: 1 | OUTPATIENT
Start: 2019-01-30

## 2019-03-13 DIAGNOSIS — E03.9 HYPOTHYROIDISM, UNSPECIFIED TYPE: ICD-10-CM

## 2019-03-14 ENCOUNTER — OFFICE VISIT (OUTPATIENT)
Dept: INTERNAL MEDICINE CLINIC | Facility: CLINIC | Age: 59
End: 2019-03-14
Payer: MEDICARE

## 2019-03-14 VITALS
HEART RATE: 75 BPM | WEIGHT: 179.4 LBS | BODY MASS INDEX: 30.63 KG/M2 | HEIGHT: 64 IN | OXYGEN SATURATION: 97 % | SYSTOLIC BLOOD PRESSURE: 126 MMHG | DIASTOLIC BLOOD PRESSURE: 74 MMHG | TEMPERATURE: 98.3 F

## 2019-03-14 DIAGNOSIS — I20.8 CHRONIC STABLE ANGINA (HCC): ICD-10-CM

## 2019-03-14 DIAGNOSIS — J45.909 ASTHMA, UNSPECIFIED ASTHMA SEVERITY, UNSPECIFIED WHETHER COMPLICATED, UNSPECIFIED WHETHER PERSISTENT: ICD-10-CM

## 2019-03-14 DIAGNOSIS — M79.18 MYOFASCIAL PAIN SYNDROME: ICD-10-CM

## 2019-03-14 DIAGNOSIS — I25.10 CORONARY ARTERIOSCLEROSIS: ICD-10-CM

## 2019-03-14 DIAGNOSIS — F51.01 PRIMARY INSOMNIA: ICD-10-CM

## 2019-03-14 DIAGNOSIS — I10 BENIGN ESSENTIAL HTN: Primary | ICD-10-CM

## 2019-03-14 DIAGNOSIS — F32.89 OTHER DEPRESSION: ICD-10-CM

## 2019-03-14 DIAGNOSIS — K21.9 GASTROESOPHAGEAL REFLUX DISEASE, ESOPHAGITIS PRESENCE NOT SPECIFIED: ICD-10-CM

## 2019-03-14 DIAGNOSIS — E03.9 HYPOTHYROIDISM, UNSPECIFIED TYPE: ICD-10-CM

## 2019-03-14 DIAGNOSIS — E78.00 HYPERCHOLESTEROLEMIA: ICD-10-CM

## 2019-03-14 DIAGNOSIS — M79.7 FIBROMYALGIA: ICD-10-CM

## 2019-03-14 DIAGNOSIS — M46.1 SACROILIITIS (HCC): ICD-10-CM

## 2019-03-14 DIAGNOSIS — F33.2 SEVERE EPISODE OF RECURRENT MAJOR DEPRESSIVE DISORDER, WITHOUT PSYCHOTIC FEATURES (HCC): ICD-10-CM

## 2019-03-14 PROBLEM — J04.0 LARYNGITIS: Status: RESOLVED | Noted: 2018-03-30 | Resolved: 2019-03-14

## 2019-03-14 PROCEDURE — 3074F SYST BP LT 130 MM HG: CPT | Performed by: NURSE PRACTITIONER

## 2019-03-14 PROCEDURE — 3078F DIAST BP <80 MM HG: CPT | Performed by: NURSE PRACTITIONER

## 2019-03-14 PROCEDURE — 99215 OFFICE O/P EST HI 40 MIN: CPT | Performed by: NURSE PRACTITIONER

## 2019-03-14 PROCEDURE — 1036F TOBACCO NON-USER: CPT | Performed by: NURSE PRACTITIONER

## 2019-03-14 PROCEDURE — 3008F BODY MASS INDEX DOCD: CPT | Performed by: NURSE PRACTITIONER

## 2019-03-14 RX ORDER — METHOCARBAMOL 500 MG/1
500 TABLET, FILM COATED ORAL 3 TIMES DAILY PRN
Qty: 90 TABLET | Refills: 2 | Status: SHIPPED | OUTPATIENT
Start: 2019-03-14 | End: 2019-11-21 | Stop reason: SDUPTHER

## 2019-03-14 RX ORDER — ZOLPIDEM TARTRATE 10 MG/1
10 TABLET ORAL
Qty: 30 TABLET | Refills: 0 | Status: SHIPPED | OUTPATIENT
Start: 2019-03-14 | End: 2019-04-15 | Stop reason: SDUPTHER

## 2019-03-14 RX ORDER — CITALOPRAM 10 MG/1
10 TABLET ORAL DAILY
Qty: 30 TABLET | Refills: 5 | Status: SHIPPED | OUTPATIENT
Start: 2019-03-14 | End: 2019-05-28 | Stop reason: ALTCHOICE

## 2019-03-14 RX ORDER — LEVOTHYROXINE SODIUM 0.12 MG/1
125 TABLET ORAL DAILY
Qty: 30 TABLET | Refills: 5 | Status: SHIPPED | OUTPATIENT
Start: 2019-03-14 | End: 2019-09-20 | Stop reason: SDUPTHER

## 2019-03-14 RX ORDER — ALBUTEROL SULFATE 90 UG/1
2 AEROSOL, METERED RESPIRATORY (INHALATION) EVERY 6 HOURS PRN
Qty: 18 G | Refills: 3 | Status: SHIPPED | OUTPATIENT
Start: 2019-03-14 | End: 2019-12-11 | Stop reason: SDUPTHER

## 2019-03-14 RX ORDER — CITALOPRAM 20 MG/1
20 TABLET ORAL DAILY
Qty: 30 TABLET | Refills: 3 | Status: SHIPPED | OUTPATIENT
Start: 2019-03-14 | End: 2019-09-03 | Stop reason: SDUPTHER

## 2019-03-14 NOTE — ASSESSMENT & PLAN NOTE
Patient unable to contract for safety but refuses hospitalization  She is not having active suicidal ideation at this time  She is open to a referral to Psychiatry  I will increase her Celexa to 30 mg daily  Our office will help schedule her an appointment with Psychiatry as soon as possible  Patient does have suicide hotline number  I have educated her to call 911 go to the ED or call the suicide hotline number if she develops any suicidal ideation

## 2019-03-14 NOTE — ASSESSMENT & PLAN NOTE
Continue to follow with Cardiology, Dr Haja Boyd  Note from December reviewed    Continue dual anti-platelet therapy

## 2019-03-14 NOTE — ASSESSMENT & PLAN NOTE
Referral given to Gastroenterology for repeat EGD  Patient continues to have symptoms after continued therapy of Protonix 40 mg daily

## 2019-03-14 NOTE — PROGRESS NOTES
Assessment/Plan:       Problem List Items Addressed This Visit        Digestive    Gastroesophageal reflux disease     Referral given to Gastroenterology for repeat EGD  Patient continues to have symptoms after continued therapy of Protonix 40 mg daily         Relevant Orders    Ambulatory referral to Gastroenterology       Endocrine    Hypothyroidism     Continue levothyroxine 125 mcg daily and update labs         Relevant Medications    levothyroxine 125 mcg tablet    Other Relevant Orders    TSH, 3rd generation with Free T4 reflex       Respiratory    Asthma    Relevant Medications    albuterol (PROVENTIL HFA,VENTOLIN HFA) 90 mcg/act inhaler       Cardiovascular and Mediastinum    Benign essential HTN - Primary     Well controlled  Continue losartan 25 mg daily and metoprolol XL 25 mg daily         Coronary arteriosclerosis     Continue to follow with Cardiology, Dr Radha Thornton  Note from December reviewed  Continue dual anti-platelet therapy         Chronic stable angina (HCC)       Musculoskeletal and Integument    Sacroiliitis (HCC)     Continue to follow with Dr Micheal Zhao with pain management  Continue with Robaxin            Other    Fibromyalgia     Continue to follow with Rheumatology and continue Lyrica         Hypercholesterolemia    Relevant Orders    Lipid Panel with Direct LDL reflex    Comprehensive metabolic panel    CBC    Insomnia     Continue Ambien 10 mg q h s  Refill given today         Relevant Medications    zolpidem (AMBIEN) 10 mg tablet    Severe episode of recurrent major depressive disorder, without psychotic features [facility])     Patient unable to contract for safety but refuses hospitalization  She is not having active suicidal ideation at this time  She is open to a referral to Psychiatry  I will increase her Celexa to 30 mg daily  Our office will help schedule her an appointment with Psychiatry as soon as possible  Patient does have suicide hotline number    I have educated her to call 911 go to the ED or call the suicide hotline number if she develops any suicidal ideation  Relevant Medications    zolpidem (AMBIEN) 10 mg tablet    citalopram (CeleXA) 20 mg tablet    citalopram (CeleXA) 10 mg tablet    Other Relevant Orders    Ambulatory referral to Psychiatry      Other Visit Diagnoses     Other depression        Relevant Medications    zolpidem (AMBIEN) 10 mg tablet    citalopram (CeleXA) 20 mg tablet    citalopram (CeleXA) 10 mg tablet    Myofascial pain syndrome        Relevant Medications    methocarbamol (ROBAXIN) 500 mg tablet            Subjective:      Patient ID: Cecilio Rollins is a 63 y o  female  HPI    Patient presents for four-month follow-up of chronic conditions  She did not have any labs completed prior to her appointment today  CAD  The patient has known coronary artery disease with prior PCI/UTE in July 2014 after cardiac catheterization on 7/18/14 revealed 90% proximal LAD stenosis  Patient then had repeat cardiac catheterization in April 2017 for recurrent symptoms, she was found to have total occlusion of the OM 2  She underwent PCI-UTE  She follows with Dr Marrian Severe with Cardiology and last saw them 12/26/2018  She will see them again in 6 months  It is our recommendation that she continue dual anti-platelet therapy with Plavix and aspirin       Hypertension  Patient is here for follow-up of elevated blood pressure  She is trying to exercise by walking and is adherent to a low-salt diet  She does notchecks her blood pressure at home  Blood pressure is well controlled  Current cardiac symptoms: none  Patient denies chest pain and chest pressure/discomfort  Cardiovascular risk factors: dyslipidemia and smoking/ tobacco exposure  History of target organ damage: angina/ prior myocardial infarction and prior coronary revascularization  She is currently taking losartan 25 mg daily, metoprolol XL 25 mg daily  She is compliant with medication use  Hypothyroidism  Isabel Pike is a 63 y o  female who presents for follow up of hypothyroidism  Current symptoms: none   Patient denies change in energy level, diarrhea, heat / cold intolerance, nervousness, palpitations and weight changes  Symptoms have been basically asymptomatic  Current medications include levothyroxine 125 mcg daily   No recent TSH level  Hyperlipidemia  Isabel Pike is a 63 y o  female who presents for follow up of dyslipidemia  A repeat fasting lipid profile was not done  Previous history of cardiac disease includes: coronary angioplasty  She is currently on atorvastatin 80 mg daily and aspirin 81 mg  Compliance with treatment has been excellent  The patient exercises infrequently  Patient denies muscle pain associated with her medications  Depression  Patient is currently on Celexa 20 mg daily  Her depression is from chronic pain  Occasionally she gets "very suicidal " When she gets suicidal, she smokes marijuana which helps  She is not followed by psychiatry  She is unable to contract for safety  She states that she has come very close to suicide in the past   She states that she does cannot bear the pain that she is in  She does have a suicide hotline phone number  She is not currently suicidal at this time  She does not want inpatient treatment at a hospital   She is open to the idea of following up with a psychiatrist      Fibromyalgia  Patient follows with rheumatology  Currently on Lyrica    Chronic pain  Patient follows with pain management Dr Armani Barrientos  They have considered referring her for medical marijuana  In the past she was addicted to opioids  The only thing she is currently on for pain is methocarbamol and lyrica  Insomnia  Patient is currently using Ambien 10 mg daily  Currently not sleeping well because she was out of her medication  GERD  Requesting to increase the dose of her Protonix    She is currently on Protonix 40 mg daily but continues to have reflux symptoms  She does not watch what she eats  She states that she is [place] and she cannot not eat some of those foods  The following portions of the patient's history were reviewed and updated as appropriate: allergies, current medications, past family history, past medical history, past social history, past surgical history and problem list     Review of Systems   Respiratory: Negative for cough, chest tightness, shortness of breath and wheezing  Cardiovascular: Negative for chest pain, palpitations and leg swelling  Musculoskeletal: Positive for back pain  Skin: Negative for rash  Psychiatric/Behavioral: Positive for dysphoric mood and suicidal ideas  Negative for decreased concentration and self-injury  The patient is nervous/anxious            Past Medical History:   Diagnosis Date    Abnormal echocardiogram     Abnormal stress test     Anxiety     Asthma     Brachial neuritis     Depression     Disease of thyroid gland     Displacement of intervertebral disc of mid-cervical region     Fibromyalgia     Frequent headaches     GERD (gastroesophageal reflux disease)     Herniated nucleus pulposus     History of arthritis     History of asthma     History of cardiac disorder     History of chest pain     History of diverticulitis     History of fatigue     History of hearing loss     History of hemoptysis     History of herpes simplex infection     History of hiatal hernia     History of insect bite     INFECTED    History of memory loss     History of neck disorder     History of reflex sympathetic dystrophy     History of restless legs syndrome     History of shortness of breath     History of spinal stenosis     Hyperlipidemia     Hyperlipidemia     Hypertension     Skin rash     Somnolence, daytime     Spinal stenosis of cervical region          Current Outpatient Medications:     acetaminophen (TYLENOL) 325 mg tablet, For headache, Disp: 30 tablet, Rfl: 0    albuterol (PROVENTIL HFA,VENTOLIN HFA) 90 mcg/act inhaler, Inhale 2 puffs every 6 (six) hours as needed for wheezing, Disp: 18 g, Rfl: 0    aspirin 81 mg chewable tablet, Chew 81 mg daily, Disp: , Rfl:     atorvastatin (LIPITOR) 80 mg tablet, TAKE 1 TABLET BY MOUTH EVERY DAY, Disp: 90 tablet, Rfl: 2    citalopram (CeleXA) 20 mg tablet, TAKE 1 TABLET BY MOUTH EVERY DAY, Disp: 30 tablet, Rfl: 3    clopidogrel (PLAVIX) 75 mg tablet, Take 75 mg by mouth daily, Disp: , Rfl:     fluticasone-salmeterol (ADVAIR) 250-50 mcg/dose inhaler, Inhale 1 puff every 12 (twelve) hours as needed (sob), Disp: 1 each, Rfl: 1    hydrOXYzine HCL (ATARAX) 25 mg tablet, Take 1 tablet (25 mg total) by mouth 2 (two) times a day as needed for itching, Disp: 180 tablet, Rfl: 1    levothyroxine 125 mcg tablet, TAKE 1 TABLET DAILY  , Disp: 30 tablet, Rfl: 5    losartan (COZAAR) 25 mg tablet, TAKE 1 TABLET DAILY  , Disp: 30 tablet, Rfl: 5    magnesium oxide (MAG-OX) 400 mg, Take 1 tablet (400 mg total) by mouth 2 (two) times a day, Disp: 60 tablet, Rfl: 0    methocarbamol (ROBAXIN) 500 mg tablet, Take 1 tablet (500 mg total) by mouth 3 (three) times a day as needed for muscle spasms, Disp: 90 tablet, Rfl: 2    metoprolol succinate (TOPROL-XL) 25 mg 24 hr tablet, Take 25 mg by mouth daily, Disp: , Rfl:     pantoprazole (PROTONIX) 40 mg tablet, Take 1 tablet (40 mg total) by mouth daily, Disp: 90 tablet, Rfl: 0    pregabalin (LYRICA) 150 mg capsule, Take 1 capsule (150 mg total) by mouth 3 (three) times a day, Disp: 270 capsule, Rfl: 1    triamcinolone (KENALOG) 0 1 % cream, Apply topically 2 (two) times a day, Disp: 80 g, Rfl: 1    zolpidem (AMBIEN) 10 mg tablet, Take 1 tablet (10 mg total) by mouth daily at bedtime as needed for sleep, Disp: 30 tablet, Rfl: 0    ergocalciferol (VITAMIN D2) 50,000 units, Take 50,000 Units by mouth once a week, Disp: , Rfl: 0    fluticasone (FLONASE) 50 mcg/act nasal spray, 1 spray into each nostril daily (Patient not taking: Reported on 3/14/2019), Disp: 16 g, Rfl: 0    tiZANidine (ZANAFLEX) 2 mg tablet, Take 4 mg by mouth every 8 (eight) hours as needed, Disp: , Rfl: 2    valACYclovir (VALTREX) 1,000 mg tablet, TAKE 1 TABLET BY MOUTH THREE TIMES A DAY FOR 10 DAYS, Disp: 30 tablet, Rfl: 1    Allergies   Allergen Reactions    Augmentin [Amoxicillin-Pot Clavulanate] Nausea Only     PT stated she only allergic to medication AUGMENTIN       Social History   Past Surgical History:   Procedure Laterality Date    BACK SURGERY      BACK SURGERY      LOWER BACK SURGERY    CARDIAC CATHETERIZATION      HISTORY OF CORONARY ANGIOGRAPHY WITH CONCOMITANT LEFT HEART CATHETERIZATION    CORONARY ANGIOPLASTY WITH STENT PLACEMENT      EAR SURGERY      TONSILLECTOMY      TYMPANOPLASTY       Family History   Problem Relation Age of Onset    Coronary artery disease Mother         ATHEROMA    Heart disease Mother     Diabetes Mother     Hypertension Mother     No Known Problems Father         NO PERTINENT FAMILY HISTORY    Coronary artery disease Sister         ATHEROMA    Heart disease Sister     Stroke Sister     Diabetes Sister     Hypertension Sister        Objective:  /74 (BP Location: Left arm, Patient Position: Sitting, Cuff Size: Standard)   Pulse 75   Temp 98 3 °F (36 8 °C)   Ht 5' 4" (1 626 m)   Wt 81 4 kg (179 lb 6 4 oz)   SpO2 97%   BMI 30 79 kg/m²      Physical Exam   Constitutional: She is oriented to person, place, and time  She appears well-developed and well-nourished  No distress  HENT:   Head: Normocephalic and atraumatic  Right Ear: Tympanic membrane and external ear normal    Left Ear: Tympanic membrane and external ear normal    Nose: Nose normal    Mouth/Throat: Oropharynx is clear and moist  No oropharyngeal exudate, posterior oropharyngeal edema or posterior oropharyngeal erythema  Eyes: Pupils are equal, round, and reactive to light   Conjunctivae and EOM are normal    Neck: Normal range of motion  Neck supple  Cardiovascular: Normal rate, regular rhythm and normal heart sounds  No murmur heard  Pulmonary/Chest: Effort normal and breath sounds normal  No respiratory distress  She has no decreased breath sounds  She has no wheezes  She has no rhonchi  Musculoskeletal: She exhibits no edema  Lymphadenopathy:     She has no cervical adenopathy  Neurological: She is alert and oriented to person, place, and time  Skin: Skin is warm and dry  She is not diaphoretic  Psychiatric: She has a normal mood and affect  Her speech is normal and behavior is normal    Vitals reviewed

## 2019-03-14 NOTE — PATIENT INSTRUCTIONS
Schedule appt with gastroenterology for reflux  Continue protonix 40mg daily   You can use pepcid 15 min before acidic meal if needed    Follow up with psychiatry  Increase celexa to 30mg daily  For any suicidal thoughts call 911, go to the ER or call suicide hotline    Continue all other medications    Go for labs and follow up in 3 months

## 2019-04-10 DIAGNOSIS — E78.5 DYSLIPIDEMIA: ICD-10-CM

## 2019-04-10 RX ORDER — ATORVASTATIN CALCIUM 80 MG/1
TABLET, FILM COATED ORAL
Qty: 90 TABLET | Refills: 2 | Status: SHIPPED | OUTPATIENT
Start: 2019-04-10 | End: 2019-12-11 | Stop reason: SDUPTHER

## 2019-04-15 DIAGNOSIS — F51.01 PRIMARY INSOMNIA: ICD-10-CM

## 2019-04-15 DIAGNOSIS — R21 SKIN RASH: ICD-10-CM

## 2019-04-15 RX ORDER — LEVOTHYROXINE SODIUM 0.12 MG/1
TABLET ORAL
Qty: 30 TABLET | Refills: 5 | OUTPATIENT
Start: 2019-04-15

## 2019-04-15 RX ORDER — HYDROXYZINE HYDROCHLORIDE 25 MG/1
25 TABLET, FILM COATED ORAL 2 TIMES DAILY PRN
Qty: 180 TABLET | Refills: 1 | Status: SHIPPED | OUTPATIENT
Start: 2019-04-15 | End: 2019-12-27 | Stop reason: SDUPTHER

## 2019-04-15 RX ORDER — ZOLPIDEM TARTRATE 10 MG/1
10 TABLET ORAL
Qty: 30 TABLET | Refills: 0 | Status: SHIPPED | OUTPATIENT
Start: 2019-04-15 | End: 2019-05-15 | Stop reason: SDUPTHER

## 2019-04-26 DIAGNOSIS — I25.10 CORONARY ARTERY DISEASE INVOLVING NATIVE HEART WITHOUT ANGINA PECTORIS, UNSPECIFIED VESSEL OR LESION TYPE: ICD-10-CM

## 2019-04-29 ENCOUNTER — CLINICAL SUPPORT (OUTPATIENT)
Dept: INTERNAL MEDICINE CLINIC | Facility: CLINIC | Age: 59
End: 2019-04-29
Payer: MEDICARE

## 2019-04-29 DIAGNOSIS — R73.01 IMPAIRED FASTING GLUCOSE: ICD-10-CM

## 2019-04-29 DIAGNOSIS — E03.9 HYPOTHYROIDISM, UNSPECIFIED TYPE: ICD-10-CM

## 2019-04-29 DIAGNOSIS — E78.00 HYPERCHOLESTEROLEMIA: Primary | ICD-10-CM

## 2019-04-29 LAB
ALBUMIN SERPL BCP-MCNC: 4 G/DL (ref 3.5–5)
ALP SERPL-CCNC: 141 U/L (ref 46–116)
ALT SERPL W P-5'-P-CCNC: 61 U/L (ref 12–78)
ANION GAP SERPL CALCULATED.3IONS-SCNC: 5 MMOL/L (ref 4–13)
AST SERPL W P-5'-P-CCNC: 27 U/L (ref 5–45)
BILIRUB SERPL-MCNC: 0.57 MG/DL (ref 0.2–1)
BUN SERPL-MCNC: 7 MG/DL (ref 5–25)
CALCIUM SERPL-MCNC: 8.6 MG/DL (ref 8.3–10.1)
CHLORIDE SERPL-SCNC: 101 MMOL/L (ref 100–108)
CHOLEST SERPL-MCNC: 218 MG/DL (ref 50–200)
CO2 SERPL-SCNC: 28 MMOL/L (ref 21–32)
CREAT SERPL-MCNC: 0.87 MG/DL (ref 0.6–1.3)
ERYTHROCYTE [DISTWIDTH] IN BLOOD BY AUTOMATED COUNT: 12.5 % (ref 11.6–15.1)
EST. AVERAGE GLUCOSE BLD GHB EST-MCNC: 306 MG/DL
GFR SERPL CREATININE-BSD FRML MDRD: 74 ML/MIN/1.73SQ M
GLUCOSE P FAST SERPL-MCNC: 315 MG/DL (ref 65–99)
HBA1C MFR BLD: 12.3 % (ref 4.2–6.3)
HCT VFR BLD AUTO: 44.2 % (ref 34.8–46.1)
HDLC SERPL-MCNC: 33 MG/DL (ref 40–60)
HGB BLD-MCNC: 14.7 G/DL (ref 11.5–15.4)
LDLC SERPL DIRECT ASSAY-MCNC: 124 MG/DL (ref 0–100)
MCH RBC QN AUTO: 29.9 PG (ref 26.8–34.3)
MCHC RBC AUTO-ENTMCNC: 33.3 G/DL (ref 31.4–37.4)
MCV RBC AUTO: 90 FL (ref 82–98)
PLATELET # BLD AUTO: 227 THOUSANDS/UL (ref 149–390)
PMV BLD AUTO: 10.7 FL (ref 8.9–12.7)
POTASSIUM SERPL-SCNC: 4.1 MMOL/L (ref 3.5–5.3)
PROT SERPL-MCNC: 7.5 G/DL (ref 6.4–8.2)
RBC # BLD AUTO: 4.91 MILLION/UL (ref 3.81–5.12)
SODIUM SERPL-SCNC: 134 MMOL/L (ref 136–145)
TRIGL SERPL-MCNC: 426 MG/DL
TSH SERPL DL<=0.05 MIU/L-ACNC: 0.46 UIU/ML (ref 0.36–3.74)
WBC # BLD AUTO: 4.84 THOUSAND/UL (ref 4.31–10.16)

## 2019-04-29 PROCEDURE — 80053 COMPREHEN METABOLIC PANEL: CPT | Performed by: NURSE PRACTITIONER

## 2019-04-29 PROCEDURE — 84443 ASSAY THYROID STIM HORMONE: CPT | Performed by: NURSE PRACTITIONER

## 2019-04-29 PROCEDURE — 85027 COMPLETE CBC AUTOMATED: CPT | Performed by: NURSE PRACTITIONER

## 2019-04-29 PROCEDURE — 36415 COLL VENOUS BLD VENIPUNCTURE: CPT

## 2019-04-29 PROCEDURE — 83036 HEMOGLOBIN GLYCOSYLATED A1C: CPT

## 2019-04-29 PROCEDURE — 80061 LIPID PANEL: CPT | Performed by: NURSE PRACTITIONER

## 2019-04-29 PROCEDURE — 83721 ASSAY OF BLOOD LIPOPROTEIN: CPT

## 2019-04-29 RX ORDER — CLOPIDOGREL BISULFATE 75 MG/1
TABLET ORAL
Qty: 90 TABLET | Refills: 3 | Status: SHIPPED | OUTPATIENT
Start: 2019-04-29 | End: 2019-07-15 | Stop reason: ALTCHOICE

## 2019-05-01 ENCOUNTER — TELEPHONE (OUTPATIENT)
Dept: INTERNAL MEDICINE CLINIC | Facility: CLINIC | Age: 59
End: 2019-05-01

## 2019-05-01 ENCOUNTER — OFFICE VISIT (OUTPATIENT)
Dept: INTERNAL MEDICINE CLINIC | Facility: CLINIC | Age: 59
End: 2019-05-01
Payer: MEDICARE

## 2019-05-01 VITALS
BODY MASS INDEX: 29.47 KG/M2 | SYSTOLIC BLOOD PRESSURE: 130 MMHG | DIASTOLIC BLOOD PRESSURE: 78 MMHG | WEIGHT: 172.6 LBS | HEART RATE: 85 BPM | OXYGEN SATURATION: 98 % | HEIGHT: 64 IN

## 2019-05-01 DIAGNOSIS — E11.65 UNCONTROLLED TYPE 2 DIABETES MELLITUS WITH HYPERGLYCEMIA (HCC): Primary | ICD-10-CM

## 2019-05-01 DIAGNOSIS — E03.9 HYPOTHYROIDISM, UNSPECIFIED TYPE: ICD-10-CM

## 2019-05-01 DIAGNOSIS — Z12.4 CERVICAL CANCER SCREENING: ICD-10-CM

## 2019-05-01 DIAGNOSIS — E78.00 HYPERCHOLESTEROLEMIA: ICD-10-CM

## 2019-05-01 DIAGNOSIS — Z12.39 SCREENING FOR BREAST CANCER: ICD-10-CM

## 2019-05-01 DIAGNOSIS — H91.93 BILATERAL HEARING LOSS, UNSPECIFIED HEARING LOSS TYPE: ICD-10-CM

## 2019-05-01 PROBLEM — H91.90 HEARING LOSS: Status: ACTIVE | Noted: 2019-05-01

## 2019-05-01 PROCEDURE — 99215 OFFICE O/P EST HI 40 MIN: CPT | Performed by: NURSE PRACTITIONER

## 2019-05-01 RX ORDER — BLOOD-GLUCOSE METER
EACH MISCELLANEOUS 3 TIMES DAILY
Qty: 1 KIT | Refills: 0 | Status: SHIPPED | OUTPATIENT
Start: 2019-05-01 | End: 2020-03-31

## 2019-05-01 RX ORDER — OMEGA-3-ACID ETHYL ESTERS 1 G/1
2 CAPSULE, LIQUID FILLED ORAL 2 TIMES DAILY
Qty: 120 CAPSULE | Refills: 3 | Status: SHIPPED | OUTPATIENT
Start: 2019-05-01 | End: 2020-04-09 | Stop reason: SDUPTHER

## 2019-05-01 RX ORDER — LANCETS
EACH MISCELLANEOUS 3 TIMES DAILY
Qty: 300 EACH | Refills: 1 | Status: SHIPPED | OUTPATIENT
Start: 2019-05-01 | End: 2020-03-31

## 2019-05-01 RX ORDER — EZETIMIBE 10 MG/1
10 TABLET ORAL DAILY
Qty: 30 TABLET | Refills: 5 | Status: SHIPPED | OUTPATIENT
Start: 2019-05-01 | End: 2019-10-29 | Stop reason: SDUPTHER

## 2019-05-13 ENCOUNTER — OFFICE VISIT (OUTPATIENT)
Dept: DIABETES SERVICES | Facility: CLINIC | Age: 59
End: 2019-05-13
Payer: MEDICARE

## 2019-05-13 DIAGNOSIS — E11.65 TYPE 2 DIABETES MELLITUS WITH HYPERGLYCEMIA, WITHOUT LONG-TERM CURRENT USE OF INSULIN (HCC): Primary | ICD-10-CM

## 2019-05-13 PROCEDURE — G0109 DIAB MANAGE TRN IND/GROUP: HCPCS | Performed by: DIETITIAN, REGISTERED

## 2019-05-15 DIAGNOSIS — F51.01 PRIMARY INSOMNIA: ICD-10-CM

## 2019-05-15 RX ORDER — ZOLPIDEM TARTRATE 10 MG/1
10 TABLET ORAL
Qty: 30 TABLET | Refills: 0 | Status: SHIPPED | OUTPATIENT
Start: 2019-05-15 | End: 2019-06-17 | Stop reason: SDUPTHER

## 2019-05-28 ENCOUNTER — OFFICE VISIT (OUTPATIENT)
Dept: INTERNAL MEDICINE CLINIC | Facility: CLINIC | Age: 59
End: 2019-05-28
Payer: MEDICARE

## 2019-05-28 VITALS
WEIGHT: 167.2 LBS | OXYGEN SATURATION: 97 % | HEIGHT: 62 IN | HEART RATE: 64 BPM | DIASTOLIC BLOOD PRESSURE: 60 MMHG | TEMPERATURE: 98.9 F | BODY MASS INDEX: 30.77 KG/M2 | SYSTOLIC BLOOD PRESSURE: 138 MMHG

## 2019-05-28 DIAGNOSIS — M79.7 FIBROMYALGIA: ICD-10-CM

## 2019-05-28 DIAGNOSIS — E11.65 UNCONTROLLED TYPE 2 DIABETES MELLITUS WITH HYPERGLYCEMIA (HCC): Primary | ICD-10-CM

## 2019-05-28 DIAGNOSIS — K21.9 GASTROESOPHAGEAL REFLUX DISEASE, ESOPHAGITIS PRESENCE NOT SPECIFIED: ICD-10-CM

## 2019-05-28 DIAGNOSIS — E78.00 HYPERCHOLESTEROLEMIA: ICD-10-CM

## 2019-05-28 PROCEDURE — 99213 OFFICE O/P EST LOW 20 MIN: CPT | Performed by: NURSE PRACTITIONER

## 2019-05-28 RX ORDER — PREGABALIN 150 MG/1
150 CAPSULE ORAL 3 TIMES DAILY
Qty: 90 CAPSULE | Refills: 2 | Status: SHIPPED | OUTPATIENT
Start: 2019-05-28 | End: 2019-07-08 | Stop reason: SDUPTHER

## 2019-05-28 RX ORDER — PANTOPRAZOLE SODIUM 40 MG/1
40 TABLET, DELAYED RELEASE ORAL DAILY
Qty: 90 TABLET | Refills: 1 | Status: SHIPPED | OUTPATIENT
Start: 2019-05-28 | End: 2019-11-21 | Stop reason: SDUPTHER

## 2019-05-30 ENCOUNTER — OFFICE VISIT (OUTPATIENT)
Dept: INTERNAL MEDICINE CLINIC | Facility: CLINIC | Age: 59
End: 2019-05-30
Payer: MEDICARE

## 2019-05-30 VITALS
WEIGHT: 165.8 LBS | HEIGHT: 62 IN | DIASTOLIC BLOOD PRESSURE: 68 MMHG | SYSTOLIC BLOOD PRESSURE: 108 MMHG | BODY MASS INDEX: 30.51 KG/M2 | HEART RATE: 88 BPM | TEMPERATURE: 98.1 F | OXYGEN SATURATION: 97 %

## 2019-05-30 DIAGNOSIS — J06.9 VIRAL URI: Primary | ICD-10-CM

## 2019-05-30 DIAGNOSIS — J02.9 SORE THROAT: ICD-10-CM

## 2019-05-30 LAB — S PYO AG THROAT QL: NEGATIVE

## 2019-05-30 PROCEDURE — 99213 OFFICE O/P EST LOW 20 MIN: CPT | Performed by: NURSE PRACTITIONER

## 2019-05-30 PROCEDURE — 87880 STREP A ASSAY W/OPTIC: CPT | Performed by: NURSE PRACTITIONER

## 2019-06-03 ENCOUNTER — OFFICE VISIT (OUTPATIENT)
Dept: INTERNAL MEDICINE CLINIC | Facility: CLINIC | Age: 59
End: 2019-06-03
Payer: MEDICARE

## 2019-06-03 VITALS
OXYGEN SATURATION: 97 % | DIASTOLIC BLOOD PRESSURE: 70 MMHG | BODY MASS INDEX: 30.36 KG/M2 | HEART RATE: 73 BPM | WEIGHT: 166 LBS | SYSTOLIC BLOOD PRESSURE: 110 MMHG | TEMPERATURE: 98.6 F

## 2019-06-03 DIAGNOSIS — J02.8 ACUTE PHARYNGITIS DUE TO OTHER SPECIFIED ORGANISMS: Primary | ICD-10-CM

## 2019-06-03 PROBLEM — J06.9 VIRAL URI: Status: RESOLVED | Noted: 2019-05-30 | Resolved: 2019-06-03

## 2019-06-03 PROCEDURE — 99213 OFFICE O/P EST LOW 20 MIN: CPT | Performed by: NURSE PRACTITIONER

## 2019-06-03 RX ORDER — PREDNISONE 20 MG/1
40 TABLET ORAL DAILY
Qty: 10 TABLET | Refills: 0 | Status: SHIPPED | OUTPATIENT
Start: 2019-06-03 | End: 2019-06-08

## 2019-06-03 RX ORDER — AZITHROMYCIN 250 MG/1
500 TABLET, FILM COATED ORAL EVERY 24 HOURS
Qty: 10 TABLET | Refills: 0 | Status: SHIPPED | OUTPATIENT
Start: 2019-06-03 | End: 2019-06-08

## 2019-06-17 DIAGNOSIS — F51.01 PRIMARY INSOMNIA: ICD-10-CM

## 2019-06-17 DIAGNOSIS — I10 HTN (HYPERTENSION), BENIGN: ICD-10-CM

## 2019-06-17 RX ORDER — LOSARTAN POTASSIUM 25 MG/1
TABLET ORAL
Qty: 30 TABLET | Refills: 4 | Status: SHIPPED | OUTPATIENT
Start: 2019-06-17 | End: 2019-10-07 | Stop reason: SDUPTHER

## 2019-06-17 RX ORDER — ZOLPIDEM TARTRATE 10 MG/1
10 TABLET ORAL
Qty: 30 TABLET | Refills: 0 | Status: SHIPPED | OUTPATIENT
Start: 2019-06-17 | End: 2019-07-15 | Stop reason: SDUPTHER

## 2019-06-21 DIAGNOSIS — E78.00 HYPERCHOLESTEROLEMIA: ICD-10-CM

## 2019-06-21 RX ORDER — OMEGA-3-ACID ETHYL ESTERS 1 G/1
CAPSULE, LIQUID FILLED ORAL
Qty: 120 CAPSULE | Refills: 3 | OUTPATIENT
Start: 2019-06-21

## 2019-07-07 DIAGNOSIS — F32.89 OTHER DEPRESSION: ICD-10-CM

## 2019-07-07 RX ORDER — CITALOPRAM 20 MG/1
TABLET ORAL
Qty: 30 TABLET | Refills: 3 | OUTPATIENT
Start: 2019-07-07

## 2019-07-08 ENCOUNTER — OFFICE VISIT (OUTPATIENT)
Dept: INTERNAL MEDICINE CLINIC | Facility: CLINIC | Age: 59
End: 2019-07-08
Payer: MEDICARE

## 2019-07-08 VITALS
DIASTOLIC BLOOD PRESSURE: 76 MMHG | HEART RATE: 60 BPM | TEMPERATURE: 98.4 F | BODY MASS INDEX: 30.62 KG/M2 | WEIGHT: 167.4 LBS | SYSTOLIC BLOOD PRESSURE: 116 MMHG | OXYGEN SATURATION: 99 %

## 2019-07-08 DIAGNOSIS — M79.7 FIBROMYALGIA: ICD-10-CM

## 2019-07-08 DIAGNOSIS — R21 RASH OF HANDS: Primary | ICD-10-CM

## 2019-07-08 PROCEDURE — 99213 OFFICE O/P EST LOW 20 MIN: CPT | Performed by: NURSE PRACTITIONER

## 2019-07-08 RX ORDER — PREGABALIN 150 MG/1
150 CAPSULE ORAL 3 TIMES DAILY
Qty: 90 CAPSULE | Refills: 2 | Status: SHIPPED | OUTPATIENT
Start: 2019-07-08 | End: 2020-01-20 | Stop reason: SDUPTHER

## 2019-07-08 NOTE — PATIENT INSTRUCTIONS
Stop all topical agents   Start zyrtec or Claritin daily     Start Eucerin cream 3-4 applications a day   Wear gloves to bed to keep hands well hydrated   Try to avoid excessive water exposure   Follow up 1 week

## 2019-07-08 NOTE — ASSESSMENT & PLAN NOTE
Suspect irritation and skin peeling from harsh chemicals and continued steroid use    Stop bleach  Stop all topical applicants    Start Eucerin cream 3-4 applications per day to affected areas  Thick application with gloves before bed  Follow up 1 week

## 2019-07-08 NOTE — PROGRESS NOTES
Assessment/Plan:    Rash of hands  Suspect irritation and skin peeling from harsh chemicals and continued steroid use  Stop bleach  Stop all topical applicants    Start Eucerin cream 3-4 applications per day to affected areas  Thick application with gloves before bed  Follow up 1 week     Subjective:      Patient ID: Ricci Hernandez is a 63 y o  female  HPI     Pt is being seen today for a worsening rash on b/l hands that started 3 weeks ago  Pt reports the rash started on right middle finger and spread to the right index finger and left index finger  Pt reports when the rash first presented it was very itchy and blistered  Pt states she has some associated burning pain in the lesions with the itching  Pt states her skin around the lesion has been peeling and she has tried to cut away some of the skin which has caused some irritation to the lesions as well  Pt has tried putting bleach and Kenalog on the lesions but has not found relief  Pt has been using neosporin to keep the areas clean  Pt denies any changes to soaps or detergents  Pt denies history of eczema  Pt denies recent changes to medications  Pt denies any recent trips outdoors or exposure to poison ivy  Pt denies fevers, chills, recent illnesses  The following portions of the patient's history were reviewed and updated as appropriate: allergies, current medications, past family history, past medical history, past social history, past surgical history and problem list     Review of Systems   Constitutional: Negative for chills and fever  HENT: Negative for congestion, postnasal drip, rhinorrhea and sore throat  Eyes: Negative for itching  Skin: Positive for rash  Allergic/Immunologic: Negative for environmental allergies           Past Medical History:   Diagnosis Date    Abnormal echocardiogram     Abnormal stress test     Anxiety     Asthma     Brachial neuritis     Depression     Disease of thyroid gland     Displacement of intervertebral disc of mid-cervical region     Fibromyalgia     Frequent headaches     GERD (gastroesophageal reflux disease)     Herniated nucleus pulposus     History of arthritis     History of asthma     History of cardiac disorder     History of chest pain     History of diverticulitis     History of fatigue     History of hearing loss     History of hemoptysis     History of herpes simplex infection     History of hiatal hernia     History of insect bite     INFECTED    History of memory loss     History of neck disorder     History of reflex sympathetic dystrophy     History of restless legs syndrome     History of shortness of breath     History of spinal stenosis     Hyperlipidemia     Hyperlipidemia     Hypertension     Skin rash     Somnolence, daytime     Spinal stenosis of cervical region          Current Outpatient Medications:     acetaminophen (TYLENOL) 325 mg tablet, For headache (Patient taking differently: Take 325 mg by mouth as needed For headache), Disp: 30 tablet, Rfl: 0    albuterol (PROVENTIL HFA,VENTOLIN HFA) 90 mcg/act inhaler, Inhale 2 puffs every 6 (six) hours as needed for wheezing, Disp: 18 g, Rfl: 3    aspirin 81 mg chewable tablet, Chew 81 mg daily, Disp: , Rfl:     atorvastatin (LIPITOR) 80 mg tablet, TAKE 1 TABLET BY MOUTH EVERY DAY, Disp: 90 tablet, Rfl: 2    Blood Glucose Monitoring Suppl (ONETOUCH VERIO) w/Device KIT, by Does not apply route 3 (three) times a day, Disp: 1 kit, Rfl: 0    citalopram (CeleXA) 20 mg tablet, Take 1 tablet (20 mg total) by mouth daily, Disp: 30 tablet, Rfl: 3    Empagliflozin (JARDIANCE) 10 MG TABS, Take 1 tablet (10 mg total) by mouth every morning, Disp: 14 tablet, Rfl: 0    ezetimibe (ZETIA) 10 mg tablet, Take 1 tablet (10 mg total) by mouth daily, Disp: 30 tablet, Rfl: 5    fluticasone-salmeterol (ADVAIR) 250-50 mcg/dose inhaler, Inhale 1 puff every 12 (twelve) hours as needed (sob), Disp: 1 each, Rfl: 1   glucose blood (ONETOUCH VERIO) test strip, 1 each by Other route 3 (three) times a day, Disp: 300 each, Rfl: 1    hydrOXYzine HCL (ATARAX) 25 mg tablet, Take 1 tablet (25 mg total) by mouth 2 (two) times a day as needed for itching, Disp: 180 tablet, Rfl: 1    Lancets (ONETOUCH ULTRASOFT) lancets, by Other route 3 (three) times a day, Disp: 300 each, Rfl: 1    levothyroxine 125 mcg tablet, Take 1 tablet (125 mcg total) by mouth daily, Disp: 30 tablet, Rfl: 5    losartan (COZAAR) 25 mg tablet, TAKE 1 TABLET DAILY  , Disp: 30 tablet, Rfl: 4    magnesium oxide (MAG-OX) 400 mg, Take 1 tablet (400 mg total) by mouth 2 (two) times a day, Disp: 60 tablet, Rfl: 0    metFORMIN (GLUCOPHAGE) 500 mg tablet, Take 1 tablet (500 mg total) by mouth 2 (two) times a day with meals, Disp: 60 tablet, Rfl: 5    methocarbamol (ROBAXIN) 500 mg tablet, Take 1 tablet (500 mg total) by mouth 3 (three) times a day as needed for muscle spasms, Disp: 90 tablet, Rfl: 2    metoprolol succinate (TOPROL-XL) 25 mg 24 hr tablet, Take 25 mg by mouth daily, Disp: , Rfl:     omega-3-acid ethyl esters (LOVAZA) 1 g capsule, Take 2 capsules (2 g total) by mouth 2 (two) times a day, Disp: 120 capsule, Rfl: 3    pantoprazole (PROTONIX) 40 mg tablet, Take 1 tablet (40 mg total) by mouth daily, Disp: 90 tablet, Rfl: 1    pregabalin (LYRICA) 150 mg capsule, Take 1 capsule (150 mg total) by mouth 3 (three) times a day, Disp: 90 capsule, Rfl: 2    triamcinolone (KENALOG) 0 1 % cream, Apply topically 2 (two) times a day, Disp: 80 g, Rfl: 1    zolpidem (AMBIEN) 10 mg tablet, Take 1 tablet (10 mg total) by mouth daily at bedtime as needed for sleep, Disp: 30 tablet, Rfl: 0    clopidogrel (PLAVIX) 75 mg tablet, TAKE 4 TABLETS TODAY THEN 1 TABLET DAILY (Patient not taking: Reported on 7/8/2019), Disp: 90 tablet, Rfl: 3    Allergies   Allergen Reactions    Augmentin [Amoxicillin-Pot Clavulanate] Nausea Only     PT stated she only allergic to medication AUGMENTIN       Social History   Past Surgical History:   Procedure Laterality Date    BACK SURGERY      BACK SURGERY      LOWER BACK SURGERY    CARDIAC CATHETERIZATION      HISTORY OF CORONARY ANGIOGRAPHY WITH CONCOMITANT LEFT HEART CATHETERIZATION    CORONARY ANGIOPLASTY WITH STENT PLACEMENT      EAR SURGERY      TONSILLECTOMY      TYMPANOPLASTY       Family History   Problem Relation Age of Onset    Coronary artery disease Mother         ATHEROMA    Heart disease Mother     Diabetes Mother     Hypertension Mother     No Known Problems Father         NO PERTINENT FAMILY HISTORY    Coronary artery disease Sister         ATHEROMA    Heart disease Sister     Stroke Sister     Diabetes Sister     Hypertension Sister        Objective:  /76 (BP Location: Left arm, Patient Position: Sitting, Cuff Size: Adult)   Pulse 60   Temp 98 4 °F (36 9 °C) (Oral)   Wt 75 9 kg (167 lb 6 4 oz)   SpO2 99% Comment: ra  BMI 30 62 kg/m²        Physical Exam   Constitutional: She is oriented to person, place, and time  She appears well-developed and well-nourished  No distress  HENT:   Head: Normocephalic and atraumatic  Eyes: Pupils are equal, round, and reactive to light  Conjunctivae and EOM are normal    Musculoskeletal: She exhibits no edema  Neurological: She is alert and oriented to person, place, and time  Skin: Skin is warm and dry  Capillary refill takes less than 2 seconds  Rash (left first finger with pink, peeling skin  Appears to be new growth  right hand rash between right second and third finger, 1 vesicle, otherwise irritated skin ) noted  She is not diaphoretic  Psychiatric: She has a normal mood and affect  Her behavior is normal    Vitals reviewed

## 2019-07-10 ENCOUNTER — OFFICE VISIT (OUTPATIENT)
Dept: CARDIOLOGY CLINIC | Facility: CLINIC | Age: 59
End: 2019-07-10
Payer: MEDICARE

## 2019-07-10 VITALS
HEART RATE: 67 BPM | OXYGEN SATURATION: 96 % | BODY MASS INDEX: 30.91 KG/M2 | HEIGHT: 62 IN | SYSTOLIC BLOOD PRESSURE: 122 MMHG | DIASTOLIC BLOOD PRESSURE: 70 MMHG | WEIGHT: 168 LBS

## 2019-07-10 DIAGNOSIS — E78.5 DYSLIPIDEMIA: ICD-10-CM

## 2019-07-10 DIAGNOSIS — I25.10 CORONARY ARTERY DISEASE INVOLVING NATIVE CORONARY ARTERY OF NATIVE HEART WITHOUT ANGINA PECTORIS: Primary | ICD-10-CM

## 2019-07-10 DIAGNOSIS — I10 BENIGN ESSENTIAL HTN: ICD-10-CM

## 2019-07-10 PROCEDURE — 99214 OFFICE O/P EST MOD 30 MIN: CPT | Performed by: INTERNAL MEDICINE

## 2019-07-10 NOTE — PROGRESS NOTES
Cardiology Follow Up        Cindy Lim      1960      [id number]      Discussion/Summary:    1  CAD, prior PCI/UTE to proximal LAD July 2014, PCI/UTE to the OM 2 April 2017 without residual obstructive disease  2  Essential hypertension  3  Dyslipidemia    · No symptoms of angina, no shortness of breath  Will continue aspirin, high-dose statin, ezetimibe  · Blood pressure controlled, continue losartan, metoprolol  · Prior lipid panel reviewed 04/2018, fairly uncontrolled  Patient admits to compliance, but attributes it to dietary indiscretion  Her diet has gotten better  Have requested she get repeat lipid panel, as ordered by her PCP, in the near future and call me to discuss results over the phone after completion  She is agreeable  Follow-up in 6 months        Interval History: This is a 73-year-old female who I had initially seen in July 2014 secondary to symptoms of exertional throat discomfort, exertional shortness of breath, and several episodes of atypical chest discomfort  Her nuclear stress test was somewhat ambiguous, therefore I had requested a cardiac CT which she completed revealing LAD stenosis  Thereafter a cardiac catheterization on 7/18/14 revealed 90% proximal LAD stenosis, and she received a 2 5 x 28 mm drug-eluting stent for the lesion  She underwent a repeat cardiac catheterization due to stable symptoms, on 3/27/15  The patient's prior LAD stent was patent  She had 99% midcircumflex stenosis after the origin of the major OM branch, which was unchanged compared to her prior study from 2014  She also had 70% RCA stenosis with FFR performed within normal limits at 0 8 to indicating that the lesion was not slow critical   She underwent repeat cardiac catheterization on 4/27/17 secondary to recurrent exertional throat burning revealing patent LAD stent, with 100% chronic total occlusion of the OM 2   She underwent PCI/drug-eluting placement with 2 25 x 30 mm Resolute Integrity UTE with good results      She presents today for follow-up with no complaints  She denies exertional chest pain, shortness of breath, dizziness, lightheadedness, lower extremity edema, palpitations  She has been taking and tolerating her medications  Prior lipid panel 04/2018 revealed significantly elevated LDL and triglyceride levels  She does state that she had been compliant with her atorvastatin, but states even her blood sugar was quite elevated at that time, attributing it to possible dietary indiscretion  She states she is scheduled for blood work for her PCP in the near future               Vitals:  Vitals:    07/10/19 1012   BP: 122/70   BP Location: Right arm   Patient Position: Sitting   Cuff Size: Adult   Pulse: 67   SpO2: 96%   Weight: 76 2 kg (168 lb)   Height: 5' 2" (1 575 m)         Past Medical History:   Diagnosis Date    Abnormal echocardiogram     Abnormal stress test     Anxiety     Asthma     Brachial neuritis     Depression     Disease of thyroid gland     Displacement of intervertebral disc of mid-cervical region     Fibromyalgia     Frequent headaches     GERD (gastroesophageal reflux disease)     Herniated nucleus pulposus     History of arthritis     History of asthma     History of cardiac disorder     History of chest pain     History of diverticulitis     History of fatigue     History of hearing loss     History of hemoptysis     History of herpes simplex infection     History of hiatal hernia     History of insect bite     INFECTED    History of memory loss     History of neck disorder     History of reflex sympathetic dystrophy     History of restless legs syndrome     History of shortness of breath     History of spinal stenosis     Hyperlipidemia     Hyperlipidemia     Hypertension     Skin rash     Somnolence, daytime     Spinal stenosis of cervical region      Social History     Socioeconomic History    Marital status: Single     Spouse name: Not on file    Number of children: Not on file    Years of education: Not on file    Highest education level: Not on file   Occupational History    Not on file   Social Needs    Financial resource strain: Not on file    Food insecurity:     Worry: Not on file     Inability: Not on file    Transportation needs:     Medical: Not on file     Non-medical: Not on file   Tobacco Use    Smoking status: Former Smoker     Last attempt to quit:      Years since quittin 5    Smokeless tobacco: Never Used   Substance and Sexual Activity    Alcohol use: No    Drug use: Yes     Types: Marijuana     Comment: USES MARIJUANA occasionally    Sexual activity: Not on file   Lifestyle    Physical activity:     Days per week: Not on file     Minutes per session: Not on file    Stress: Not on file   Relationships    Social connections:     Talks on phone: Not on file     Gets together: Not on file     Attends Orthodox service: Not on file     Active member of club or organization: Not on file     Attends meetings of clubs or organizations: Not on file     Relationship status: Not on file    Intimate partner violence:     Fear of current or ex partner: Not on file     Emotionally abused: Not on file     Physically abused: Not on file     Forced sexual activity: Not on file   Other Topics Concern    Not on file   Social History Narrative    Not on file      Family History   Problem Relation Age of Onset    Coronary artery disease Mother         ATHEROMA    Heart disease Mother     Diabetes Mother     Hypertension Mother     No Known Problems Father         NO PERTINENT FAMILY HISTORY    Coronary artery disease Sister         ATHEROMA    Heart disease Sister     Stroke Sister     Diabetes Sister     Hypertension Sister      Past Surgical History:   Procedure Laterality Date    BACK SURGERY      BACK SURGERY      [address]IZATION      HISTORY OF CORONARY ANGIOGRAPHY WITH CONCOMITANT LEFT HEART CATHETERIZATION    CORONARY ANGIOPLASTY WITH STENT PLACEMENT      EAR SURGERY      TONSILLECTOMY      TYMPANOPLASTY         Current Outpatient Medications:     acetaminophen (TYLENOL) 325 mg tablet, For headache (Patient taking differently: Take 325 mg by mouth as needed For headache), Disp: 30 tablet, Rfl: 0    albuterol (PROVENTIL HFA,VENTOLIN HFA) 90 mcg/act inhaler, Inhale 2 puffs every 6 (six) hours as needed for wheezing, Disp: 18 g, Rfl: 3    aspirin 81 mg chewable tablet, Chew 81 mg daily, Disp: , Rfl:     atorvastatin (LIPITOR) 80 mg tablet, TAKE 1 TABLET BY MOUTH EVERY DAY, Disp: 90 tablet, Rfl: 2    Blood Glucose Monitoring Suppl ([employer]) w/Device KIT, by Does not apply route 3 (three) times a day, Disp: 1 kit, Rfl: 0    citalopram (CeleXA) 20 mg tablet, Take 1 tablet (20 mg total) by mouth daily, Disp: 30 tablet, Rfl: 3    Empagliflozin (JARDIANCE) 10 MG TABS, Take 1 tablet (10 mg total) by mouth every morning, Disp: 14 tablet, Rfl: 0    ezetimibe (ZETIA) 10 mg tablet, Take 1 tablet (10 mg total) by mouth daily, Disp: 30 tablet, Rfl: 5    fluticasone-salmeterol (ADVAIR) 250-50 mcg/dose inhaler, Inhale 1 puff every 12 (twelve) hours as needed (sob), Disp: 1 each, Rfl: 1    glucose blood (ONETOUCH VERIO) test strip, 1 each by Other route 3 (three) times a day, Disp: 300 each, Rfl: 1    hydrOXYzine HCL (ATARAX) 25 mg tablet, Take 1 tablet (25 mg total) by mouth 2 (two) times a day as needed for itching, Disp: 180 tablet, Rfl: 1    Lancets (ONETOUCH ULTRASOFT) lancets, by Other route 3 (three) times a day, Disp: 300 each, Rfl: 1    levothyroxine 125 mcg tablet, Take 1 tablet (125 mcg total) by mouth daily, Disp: 30 tablet, Rfl: 5    losartan (COZAAR) 25 mg tablet, TAKE 1 TABLET DAILY  , Disp: 30 tablet, Rfl: 4    magnesium oxide (MAG-OX) 400 mg, Take 1 tablet (400 mg total) by mouth 2 (two) times a day, Disp: 60 tablet, Rfl: 0    metFORMIN (GLUCOPHAGE) 500 mg tablet, Take 1 tablet (500 mg total) by mouth 2 (two) times a day with meals, Disp: 60 tablet, Rfl: 5    methocarbamol (ROBAXIN) 500 mg tablet, Take 1 tablet (500 mg total) by mouth 3 (three) times a day as needed for muscle spasms, Disp: 90 tablet, Rfl: 2    metoprolol succinate (TOPROL-XL) 25 mg 24 hr tablet, Take 25 mg by mouth daily, Disp: , Rfl:     omega-3-acid ethyl esters (LOVAZA) 1 g capsule, Take 2 capsules (2 g total) by mouth 2 (two) times a day, Disp: 120 capsule, Rfl: 3    pantoprazole (PROTONIX) 40 mg tablet, Take 1 tablet (40 mg total) by mouth daily, Disp: 90 tablet, Rfl: 1    pregabalin (LYRICA) 150 mg capsule, Take 1 capsule (150 mg total) by mouth 3 (three) times a day, Disp: 90 capsule, Rfl: 2    triamcinolone (KENALOG) 0 1 % cream, Apply topically 2 (two) times a day, Disp: 80 g, Rfl: 1    zolpidem (AMBIEN) 10 mg tablet, Take 1 tablet (10 mg total) by mouth daily at bedtime as needed for sleep, Disp: 30 tablet, Rfl: 0    clopidogrel (PLAVIX) 75 mg tablet, TAKE 4 TABLETS TODAY THEN 1 TABLET DAILY (Patient not taking: Reported on 7/8/2019), Disp: 90 tablet, Rfl: 3        Review of Systems:  Review of Systems   Constitutional: Negative for activity change, fever and unexpected weight change  HENT: Negative for facial swelling, nosebleeds and voice change  Respiratory: Negative for chest tightness, shortness of breath and wheezing  Cardiovascular: Negative for chest pain, palpitations and leg swelling  Gastrointestinal: Negative for abdominal distention  Genitourinary: Negative for hematuria  Musculoskeletal: Negative for arthralgias  Skin: Negative for color change, pallor, rash and wound  Neurological: Negative for dizziness, seizures and syncope  Psychiatric/Behavioral: Negative for agitation  Physical Exam:  Physical Exam   Constitutional: She is oriented to person, place, and time   She appears well-developed and well-nourished  HENT:   Head: Normocephalic and atraumatic  Eyes: EOM are normal    Neck: Normal range of motion  Neck supple  Cardiovascular: Normal rate, regular rhythm, normal heart sounds and intact distal pulses  Pulmonary/Chest: Effort normal and breath sounds normal    Abdominal: Soft  Musculoskeletal: Normal range of motion  Neurological: She is alert and oriented to person, place, and time  Skin: Skin is warm and dry  Psychiatric: She has a normal mood and affect  Her behavior is normal  Judgment and thought content normal    Vitals reviewed  This note was completed in part utilizing [employer] Software  Grammatical errors, random word insertions, spelling mistakes, and incomplete sentences can be an occasional consequence of this system secondary to software limitations, ambient noise, and hardware issues  If you have any questions or concerns about the content, text, or information contained within the body of this dictation, please contact the provider for clarification

## 2019-07-11 ENCOUNTER — OFFICE VISIT (OUTPATIENT)
Dept: DIABETES SERVICES | Facility: CLINIC | Age: 59
End: 2019-07-11
Payer: MEDICARE

## 2019-07-11 DIAGNOSIS — E11.65 TYPE 2 DIABETES MELLITUS WITH HYPERGLYCEMIA, WITHOUT LONG-TERM CURRENT USE OF INSULIN (HCC): Primary | ICD-10-CM

## 2019-07-11 PROCEDURE — G0109 DIAB MANAGE TRN IND/GROUP: HCPCS | Performed by: DIETITIAN, REGISTERED

## 2019-07-13 NOTE — PROGRESS NOTES
Living Well with Diabetes Group Class #1    Ericlinh Weber attended the Living Well with Diabetes Group Class #1  Topics Covered in class today include: What is diabetes; Types of Diabetes; How Diabetes is diagnosed; Management skills; the role of exercise in blood sugar managements, Home glucose monitoring, and target ranges  Odalis Rodriguez participated in group activities    The patient's history was reviewed and updated as appropriate: allergies, current medications  Lab Results   Component Value Date    HGBA1C 12 3 (H) 04/29/2019       Diabetes Education Record  Odalis Rodriguez was provided Living Well with Diabetes Class #1 book      Patient response to instruction    Comprehensiongood  Motivationgood  Expected Compliancegood    Begin Time: 6:00 pm  End Time: 8:00 pm  Referring Provider: Venancio Toussaint    Thank you for referring your patient to [facility], it was a pleasure working with them today  Please feel free to call with any questions or concerns      Peterson Gusman RD  [place] [phone]

## 2019-07-15 ENCOUNTER — OFFICE VISIT (OUTPATIENT)
Dept: INTERNAL MEDICINE CLINIC | Facility: CLINIC | Age: 59
End: 2019-07-15
Payer: MEDICARE

## 2019-07-15 VITALS
OXYGEN SATURATION: 97 % | HEIGHT: 64 IN | BODY MASS INDEX: 28.24 KG/M2 | SYSTOLIC BLOOD PRESSURE: 120 MMHG | TEMPERATURE: 97.9 F | WEIGHT: 165.4 LBS | HEART RATE: 60 BPM | DIASTOLIC BLOOD PRESSURE: 70 MMHG

## 2019-07-15 DIAGNOSIS — F51.01 PRIMARY INSOMNIA: ICD-10-CM

## 2019-07-15 DIAGNOSIS — Z00.00 WELCOME TO MEDICARE PREVENTIVE VISIT: Primary | ICD-10-CM

## 2019-07-15 LAB — ECG INTERP DURING EX: NORMAL MS

## 2019-07-15 PROCEDURE — G0403 EKG FOR INITIAL PREVENT EXAM: HCPCS | Performed by: NURSE PRACTITIONER

## 2019-07-15 PROCEDURE — G0402 INITIAL PREVENTIVE EXAM: HCPCS | Performed by: NURSE PRACTITIONER

## 2019-07-15 RX ORDER — ZOLPIDEM TARTRATE 10 MG/1
10 TABLET ORAL
Qty: 30 TABLET | Refills: 0 | Status: CANCELLED | OUTPATIENT
Start: 2019-07-15

## 2019-07-15 RX ORDER — ZOLPIDEM TARTRATE 10 MG/1
10 TABLET ORAL
Qty: 30 TABLET | Refills: 2 | Status: SHIPPED | OUTPATIENT
Start: 2019-07-15 | End: 2019-08-20 | Stop reason: SDUPTHER

## 2019-07-15 NOTE — PATIENT INSTRUCTIONS
Continue monthly self breast exam     Recommend mammogram and PAP  Colonoscopy due August 2025  Recommend looking into creating a living will/advanced directed  Follow up as scheduled  Sooner for any new or worsening symptoms      Obesity   AMBULATORY CARE:   Obesity  is when your body mass index (BMI) is greater than 30  Your healthcare provider will use your height and weight to measure your BMI  The risks of obesity include  many health problems, such as injuries or physical disability  You may need tests to check for the following:  · Diabetes     · High blood pressure or high cholesterol     · Heart disease     · Gallbladder or liver disease     · Cancer of the colon, breast, prostate, liver, or kidney     · Sleep apnea     · Arthritis or gout  Seek care immediately if:   · You have a severe headache, confusion, or difficulty speaking  · You have weakness on one side of your body  · You have chest pain, sweating, or shortness of breath  Contact your healthcare provider if:   · You have symptoms of gallbladder or liver disease, such as pain in your upper abdomen  · You have knee or hip pain and discomfort while walking  · You have symptoms of diabetes, such as intense hunger and thirst, and frequent urination  · You have symptoms of sleep apnea, such as snoring or daytime sleepiness  · You have questions or concerns about your condition or care  Treatment for obesity  focuses on helping you lose weight to improve your health  Even a small decrease in BMI can reduce the risk for many health problems  Your healthcare provider will help you set a weight-loss goal   · Lifestyle changes  are the first step in treating obesity  These include making healthy food choices and getting regular physical activity  Your healthcare provider may suggest a weight-loss program that involves coaching, education, and therapy       · Medicine  may help you lose weight when it is used with a healthy diet and physical activity  · Surgery  can help you lose weight if you are very obese and have other health problems  There are several types of weight-loss surgery  Ask your healthcare provider for more information  Be successful losing weight:   · Set small, realistic goals  An example of a small goal is to walk for 20 minutes 5 days a week  Anther goal is to lose 5% of your body weight  · Tell friends, family members, and coworkers about your goals  and ask for their support  Ask a friend to lose weight with you, or join a weight-loss support group  · Identify foods or triggers that may cause you to overeat , and find ways to avoid them  Remove tempting high-calorie foods from your home and workplace  Place a bowl of fresh fruit on your kitchen counter  If stress causes you to eat, then find other ways to cope with stress  · Keep a diary to track what you eat and drink  Also write down how many minutes of physical activity you do each day  Weigh yourself once a week and record it in your diary  Eating changes: You will need to eat 500 to 1,000 fewer calories each day than you currently eat to lose 1 to 2 pounds a week  The following changes will help you cut calories:  · Eat smaller portions  Use small plates, no larger than 9 inches in diameter  Fill your plate half full of fruits and vegetables  Measure your food using measuring cups until you know what a serving size looks like  · Eat 3 meals and 1 or 2 snacks each day  Plan your meals in advance  Emmanuel Celeste and eat at home most of the time  Eat slowly  · Eat fruits and vegetables at every meal   They are low in calories and high in fiber, which makes you feel full  Do not add butter, margarine, or cream sauce to vegetables  Use herbs to season steamed vegetables  · Eat less fat and fewer fried foods  Eat more baked or grilled chicken and fish  These protein sources are lower in calories and fat than red meat  Limit fast food   Dress your salads with olive oil and vinegar instead of bottled dressing  · Limit the amount of sugar you eat  Do not drink sugary beverages  Limit alcohol  Activity changes:  Physical activity is good for your body in many ways  It helps you burn calories and build strong muscles  It decreases stress and depression, and improves your mood  It can also help you sleep better  Talk to your healthcare provider before you begin an exercise program   · Exercise for at least 30 minutes 5 days a week  Start slowly  Set aside time each day for physical activity that you enjoy and that is convenient for you  It is best to do both weight training and an activity that increases your heart rate, such as walking, bicycling, or swimming  · Find ways to be more active  Do yard work and housecleaning  Walk up the stairs instead of using elevators  Spend your leisure time going to events that require walking, such as outdoor festivals or fairs  This extra physical activity can help you lose weight and keep it off  Follow up with your healthcare provider as directed: You may need to meet with a dietitian  Write down your questions so you remember to ask them during your visits  © 2017 [facility] Information is for End User's use only and may not be sold, redistributed or otherwise used for commercial purposes  All illustrations and images included in CareNotes® are the copyrighted property of A D A M , Inc  or Declan Garcia  The above information is an  only  It is not intended as medical advice for individual conditions or treatments  Talk to your doctor, nurse or pharmacist before following any medical regimen to see if it is safe and effective for you  Urinary Incontinence   WHAT YOU NEED TO KNOW:   What is urinary incontinence? Urinary incontinence (UI) is when you lose control of your bladder  What causes UI? UI occurs because your bladder cannot store or empty urine properly  The following are the most common types of UI:  · Stress incontinence  is when you leak urine due to increased bladder pressure  This may happen when you cough, sneeze, or exercise  · Urge incontinence  is when you feel the need to urinate right away and leak urine accidentally  · Mixed incontinence  is when you have both stress and urge UI  What are the signs and symptoms of UI?   · You feel like your bladder does not empty completely when you urinate  · You urinate often and need to urinate immediately  · You leak urine when you sleep, or you wake up with the urge to urinate  · You leak urine when you cough, sneeze, exercise, or laugh  How is UI diagnosed? Your healthcare provider will ask how often you leak urine and whether you have stress or urge symptoms  Tell him which medicines you take, how often you urinate, and how much liquid you drink each day  You may need any of the following tests:  · Urine tests  may show infection or kidney function  · A pelvic exam  may be done to check for blockages  A pelvic exam will also show if your bladder, uterus, or other organs have moved out of place  · An x-ray, ultrasound, or CT  may show problems with parts of your urinary system  You may be given contrast liquid to help your organs show up better in the pictures  Tell the healthcare provider if you have ever had an allergic reaction to contrast liquid  Do not enter the MRI room with anything metal  Metal can cause serious injury  Tell the healthcare provider if you have any metal in or on your body  · A bladder scan  will show how much urine is left in your bladder after you urinate  You will be asked to urinate and then healthcare providers will use a small ultrasound machine to check the urine left in your bladder  · Cystometry  is used to check the function of your urinary system  Your healthcare provider checks the pressure in your bladder while filling it with fluid   Your bladder pressure may also be tested when your bladder is full and while you urinate  How is UI treated? · Medicines  can help strengthen your bladder control  · Electrical stimulation  is used to send a small amount of electrical energy to your pelvic floor muscles  This helps control your bladder function  Electrodes may be placed outside your body or in your rectum  For women, the electrodes may be placed in the vagina  · A bulking agent  may be injected into the wall of your urethra to make it thicker  This helps keep your urethra closed and decreases urine leakage  · Devices  such as a clamp, pessary, or tampon may help stop urine leaks  Ask your healthcare provider for more information about these and other devices  · Surgery  may be needed if other treatments do not work  Several types of surgery can help improve your bladder control  Ask your healthcare provider for more information about the surgery you may need  How can I manage my symptoms? · Do pelvic muscle exercises often  Your pelvic muscles help you stop urinating  Squeeze these muscles tight for 5 seconds, then relax for 5 seconds  Gradually work up to squeezing for 10 seconds  Do 3 sets of 15 repetitions a day, or as directed  This will help strengthen your pelvic muscles and improve bladder control  · A catheter  may be used to help empty your bladder  A catheter is a tiny, plastic tube that is put into your bladder to drain your urine  Your healthcare provider may tell you to use a catheter to prevent your bladder from getting too full and leaking urine  · Keep a UI record  Write down how often you leak urine and how much you leak  Make a note of what you were doing when you leaked urine  · Train your bladder  Go to the bathroom at set times, such as every 2 hours, even if you do not feel the urge to go  You can also try to hold your urine when you feel the urge to go   For example, hold your urine for 5 minutes when you feel the urge to go  As that becomes easier, hold your urine for 10 minutes  · Drink liquids as directed  Ask your healthcare provider how much liquid to drink each day and which liquids are best for you  You may need to limit the amount of liquid you drink to help control your urine leakage  Limit or do not have drinks that contain caffeine or alcohol  Do not drink any liquid right before you go to bed  · Prevent constipation  Eat a variety of high-fiber foods  Good examples are high-fiber cereals, beans, vegetables, and whole-grain breads  Prune juice may help make your bowel movement softer  Walking is the best way to trigger your intestines to have a bowel movement  · Exercise regularly and maintain a healthy weight  Ask your healthcare provider how much you should weigh and about the best exercise plan for you  Weight loss and exercise will decrease pressure on your bladder and help you control your leakage  Ask him to help you create a weight loss plan if you are overweight  When should I seek immediate care? · You have severe pain  · You are confused or cannot think clearly  When should I contact my healthcare provider? · You have a fever  · You see blood in your urine  · You have pain when you urinate  · You have new or worse pain, even after treatment  · Your mouth feels dry or you have vision changes  · Your urine is cloudy or smells bad  · You have questions or concerns about your condition or care  CARE AGREEMENT:   You have the right to help plan your care  Learn about your health condition and how it may be treated  Discuss treatment options with your caregivers to decide what care you want to receive  You always have the right to refuse treatment  The above information is an  only  It is not intended as medical advice for individual conditions or treatments   Talk to your doctor, nurse or pharmacist before following any medical regimen to see if it is safe and effective for you  © 2017 [address] [employer] Information is for End User's use only and may not be sold, redistributed or otherwise used for commercial purposes  All illustrations and images included in CareNotes® are the copyrighted property of A D A M , Inc  or Declan Garcia  Cigarette Smoking and Your Health   AMBULATORY CARE:   Risks to your health if you smoke:  Nicotine and other chemicals found in tobacco damage every cell in your body  Even if you are a light smoker, you have an increased risk for cancer, heart disease, and lung disease  If you are pregnant or have diabetes, smoking increases your risk for complications  Benefits to your health if you stop smoking:   · You decrease respiratory symptoms such as coughing, wheezing, and shortness of breath  · You reduce your risk for cancers of the lung, mouth, throat, kidney, bladder, pancreas, stomach, and cervix  If you already have cancer, you increase the benefits of chemotherapy  You also reduce your risk for cancer returning or a second cancer from developing  · You reduce your risk for heart disease, blood clots, heart attack, and stroke  · You reduce your risk for lung infections, and diseases such as pneumonia, asthma, chronic bronchitis, and emphysema  · Your circulation improves  More oxygen can be delivered to your body  If you have diabetes, you lower your risk for complications, such as kidney, artery, and eye diseases  You also lower your risk for nerve damage  Nerve damage can lead to amputations, poor vision, and blindness  · You improve your body's ability to heal and to fight infections  Benefits to the health of others if you stop smoking:  Tobacco is harmful to nonsmokers who breathe in your secondhand smoke  The following are ways the health of others around you may improve when you stop smoking:  · You lower the risks for lung cancer and heart disease in nonsmoking adults  · If you are pregnant, you lower the risk for miscarriage, early delivery, low birth weight, and stillbirth  You also lower your baby's risk for SIDS, obesity, developmental delay, and neurobehavioral problems, such as ADHD  · If you have children, you lower their risk for ear infections, colds, pneumonia, bronchitis, and asthma  For more information and support to stop smoking:   · [employer]  Phone: 5- 383 [phone] [phone]  Web Address: www WhoWantsMe  Follow up with your healthcare provider as directed:  Write down your questions so you remember to ask them during your visits  © 2017 [address] [employer] Information is for End User's use only and may not be sold, redistributed or otherwise used for commercial purposes  All illustrations and images included in CareNotes® are the copyrighted property of A D A M , Inc  or Declan Garcia  The above information is an  only  It is not intended as medical advice for individual conditions or treatments  Talk to your doctor, nurse or pharmacist before following any medical regimen to see if it is safe and effective for you  Fall Prevention   AMBULATORY CARE:   Fall prevention  includes ways to make your home and other areas safer  It also includes ways you can move more carefully to prevent a fall  Health conditions that cause changes in your blood pressure, vision, or muscle strength and coordination may increase your risk for falls  Medicines may also increase your risk for falls if they make you dizzy, weak, or sleepy  Call 911 or have someone else call if:   · You have fallen and are unconscious  · You have fallen and cannot move part of your body  Contact your healthcare provider if:   · You have fallen and have pain or a headache  · You have questions or concerns about your condition or care  Fall prevention tips:   · Stand or sit up slowly  This may help you keep your balance and prevent falls      · Use assistive devices as directed  Your healthcare provider may suggest that you use a cane or walker to help you keep your balance  You may need to have grab bars put in your bathroom near the toilet or in the shower  · Wear shoes that fit well and have soles that   Wear shoes both inside and outside  Use slippers with good   Do not wear shoes with high heels  · Wear a personal alarm  This is a device that allows you to call 911 if you fall and need help  Ask your healthcare provider for more information  · Stay active  Exercise can help strengthen your muscles and improve your balance  Your healthcare provider may recommend water aerobics or walking  He or she may also recommend physical therapy to improve your coordination  Never start an exercise program without talking to your healthcare provider first      · Manage your medical conditions  Keep all appointments with your healthcare providers  Visit your eye doctor as directed  Home safety tips:   · Add items to prevent falls in the bathroom  Put nonslip strips on your bath or shower floor to prevent you from slipping  Use a bath mat if you do not have carpet in the bathroom  This will prevent you from falling when you step out of the bath or shower  Use a shower seat so you do not need to stand while you shower  Sit on the toilet or a chair in your bathroom to dry yourself and put on clothing  This will prevent you from losing your balance from drying or dressing yourself while you are standing  · Keep paths clear  Remove books, shoes, and other objects from walkways and stairs  Place cords for telephones and lamps out of the way so that you do not need to walk over them  Tape them down if you cannot move them  Remove small rugs  If you cannot remove a rug, secure it with double-sided tape  This will prevent you from tripping  · Install bright lights in your home    Use night lights to help light paths to the bathroom or kitchen  Always turn on the light before you start walking  · Keep items you use often on shelves within reach  Do not use a step stool to help you reach an item  · Paint or place reflective tape on the edges of your stairs  This will help you see the stairs better  Follow up with your healthcare provider as directed:  Write down your questions so you remember to ask them during your visits  © 2017 [address] [employer] Information is for End User's use only and may not be sold, redistributed or otherwise used for commercial purposes  All illustrations and images included in CareNotes® are the copyrighted property of A D A M , Inc  or Declan Garcia  The above information is an  only  It is not intended as medical advice for individual conditions or treatments  Talk to your doctor, nurse or pharmacist before following any medical regimen to see if it is safe and effective for you  Advance Directives   WHAT YOU NEED TO KNOW:   What are advance directives? Advance directives are legal documents that state your wishes and plans for medical care  These plans are made ahead of time in case you lose your ability to make decisions for yourself  Advance directives can apply to any medical decision, such as the treatments you want, and if you want to donate organs  What are the types of advance directives? There are many types of advance directives, and each state has rules about how to use them  You may choose a combination of any of the following:  · Living will: This is a written record of the treatment you want  You can also choose which treatments you do not want, which to limit, and which to stop at a certain time  This includes surgery, medicine, IV fluid, and tube feedings  · Durable power of  for healthcare [facility]): This is a written record that states who you want to make healthcare choices for you when you are unable to make them for yourself   This person, called a proxy, is usually a family member or a friend  You may choose more than 1 proxy  · Do not resuscitate (DNR) order:  A DNR order is used in case your heart stops beating or you stop breathing  It is a request not to have certain forms of treatment, such as CPR  A DNR order may be included in other types of advance directives  · Medical directive: This covers the care that you want if you are in a coma, near death, or unable to make decisions for yourself  You can list the treatments you want for each condition  Treatment may include pain medicine, surgery, blood transfusions, dialysis, IV or tube feedings, and a ventilator (breathing machine)  · Values history: This document has questions about your views, beliefs, and how you feel and think about life  This information can help others choose the care that you would choose  Why are advance directives important? An advance directive helps you control your care  Although spoken wishes may be used, it is better to have your wishes written down  Spoken wishes can be misunderstood, or not followed  Treatments may be given even if you do not want them  An advance directive may make it easier for your family to make difficult choices about your care  How do I decide what to put in my advance directives? · Make informed decisions:  Make sure you fully understand treatments or care you may receive  Think about the benefits and problems your decisions could cause for you or your family  Talk to healthcare providers if you have concerns or questions before you write down your wishes  You may also want to talk with your [employer] or , or a   Check your state laws to make sure that what you put in your advance directive is legal      · Sign all forms:  Sign and date your advance directive when you have finished  You may also need 2 witnesses to sign the forms   Witnesses cannot be your doctor or his staff, your spouse, heirs or beneficiaries, people you owe money to, or your chosen proxy  Talk to your family, proxy, and healthcare providers about your advance directive  Give each person a copy, and keep one for yourself in a place you can get to easily  Do not keep it hidden or locked away  · Review and revise your plans: You can revise your advance directive at any time, as long as you are able to make decisions  Review your plan every year, and when there are changes in your life, or your health  When you make changes, let your family, proxy, and healthcare providers know  Give each a new copy  Where can I find more information? · [employer]  [address] [place] , [place]  Phone: [phone]  Phone: [phone]  Web Address: http://www  aa org  · [address] [facility])  [address] [address] [facility], [address]  Phone: [phone]  Phone: [phone]  Web Address: Camille hancock  CARE AGREEMENT:   You have the right to help plan your care  To help with this plan, you must learn about your health condition and treatment options  You must also learn about advance directives and how they are used  Work with your healthcare providers to decide what care will be used to treat you  You always have the right to refuse treatment  The above information is an  only  It is not intended as medical advice for individual conditions or treatments  Talk to your doctor, nurse or pharmacist before following any medical regimen to see if it is safe and effective for you  © 2017 [address] [employer] Information is for End User's use only and may not be sold, redistributed or otherwise used for commercial purposes  All illustrations and images included in CareNotes® are the copyrighted property of [employer]  or Declan Garcia

## 2019-07-15 NOTE — PROGRESS NOTES
Assessment and Plan:     Problem List Items Addressed This Visit        Other    Welcome to Medicare preventive visit - Primary    Relevant Orders    POCT ECG        Patient refused mammogram and Pap  Colonoscopy up-to-date next due August 2025  Patient given paperwork to create living will/advance directive  Refused all preventative immunizations     History of Present Illness:     Patient presents for Welcome to Medicare visit  Patient Care Team:  Zaida Roman MD as PCP - General  Arnaldo Salazar, DO Emily Conteh,      Review of Systems:     Review of Systems   Constitutional: Positive for appetite change (feeling hungry - improved her diet )  Negative for activity change, chills, diaphoresis, fatigue, fever and unexpected weight change  Eyes: Negative for photophobia and visual disturbance  Respiratory: Negative for cough, chest tightness, shortness of breath and wheezing  Cardiovascular: Positive for chest pain (about 30 seconds - 1 episode, no associated symptoms  no dizziness, diaphoresis, etc  )  Negative for palpitations and leg swelling  Gastrointestinal: Positive for abdominal distention (from constipation) and constipation  Negative for abdominal pain, blood in stool, bowel incontinence, diarrhea, nausea and vomiting  Endocrine: Negative for cold intolerance, heat intolerance, polydipsia, polyphagia and polyuria  Genitourinary: Negative for difficulty urinating, dysuria, frequency, hematuria and vaginal bleeding  Musculoskeletal: Positive for arthralgias, back pain and myalgias (fibro)  Negative for gait problem  Skin: Negative for rash and wound  Neurological: Positive for headaches (chronic secondary to neck pain)  Negative for dizziness, seizures, syncope and light-headedness  Hematological: Does not bruise/bleed easily  Psychiatric/Behavioral: Positive for decreased concentration and dysphoric mood   Negative for self-injury, sleep disturbance (improved with ambien) and suicidal ideas  The patient is nervous/anxious           Problem List:     Patient Active Problem List   Diagnosis    Abnormal glucose    Back pain    Benign essential HTN    Bites    Cervical herniated disc    Cervical radiculopathy    Coronary arteriosclerosis    Chronic stable angina (HCC)    Degenerative disc disease, cervical    Depression with anxiety    Fibromyalgia    Gastroesophageal reflux disease    Hypothyroidism    Hypercholesterolemia    Insomnia    Lumbar foraminal stenosis    Lumbar radiculopathy    Sacroiliitis (HCC)    Severe episode of recurrent major depressive disorder, without psychotic features (HCC)    Skin rash    Myofascial pain    Asthma    Brachial neuritis    Cervical spondylosis    Chronic low back pain    Dysthymic disorder    Irritable bowel syndrome without diarrhea    Pain in thoracic spine    Reflex sympathetic dystrophy    Cervical stenosis of spinal canal    Status post lumbar surgery    Chronic pain syndrome    Hearing problem of both ears    Generalized weakness    Uncontrolled type 2 diabetes mellitus with hyperglycemia ([facility])    Hearing loss    Acute pharyngitis due to other specified organisms    Rash of hands    Welcome to Medicare preventive visit      Past Medical and Surgical History:     Past Medical History:   Diagnosis Date    Abnormal echocardiogram     Abnormal stress test     Anxiety     Asthma     Brachial neuritis     Depression     Disease of thyroid gland     Displacement of intervertebral disc of mid-cervical region     Fibromyalgia     Frequent headaches     GERD (gastroesophageal reflux disease)     Herniated nucleus pulposus     History of arthritis     History of asthma     History of cardiac disorder     History of chest pain     History of diverticulitis     History of fatigue     History of hearing loss     History of hemoptysis     History of herpes simplex infection     History of hiatal hernia     History of insect bite     INFECTED    History of memory loss     History of neck disorder     History of reflex sympathetic dystrophy     History of restless legs syndrome     History of shortness of breath     History of spinal stenosis     Hyperlipidemia     Hyperlipidemia     Hypertension     Skin rash     Somnolence, daytime     Spinal stenosis of cervical region      Past Surgical History:   Procedure Laterality Date    BACK SURGERY      BACK SURGERY      LOWER BACK SURGERY    CARDIAC CATHETERIZATION      HISTORY OF CORONARY ANGIOGRAPHY WITH CONCOMITANT LEFT HEART CATHETERIZATION    CORONARY ANGIOPLASTY WITH STENT PLACEMENT      EAR SURGERY      TONSILLECTOMY      TYMPANOPLASTY        Family History:     Family History   Problem Relation Age of Onset    Coronary artery disease Mother         ATHEROMA    Heart disease Mother     Diabetes Mother     Hypertension Mother     No Known Problems Father         NO PERTINENT FAMILY HISTORY    Coronary artery disease Sister         ATHEROMA    Heart disease Sister     Stroke Sister     Diabetes Sister     Hypertension Sister       Social History:     Social History     Tobacco Use   Smoking Status Former Smoker    Last attempt to quit:     Years since quittin 5   Smokeless Tobacco Never Used     Social History     Substance and Sexual Activity   Alcohol Use No     Social History     Substance and Sexual Activity   Drug Use Yes    Types: Marijuana    Comment: USES MARIJUANA occasionally      Medications and Allergies:     Current Outpatient Medications   Medication Sig Dispense Refill    acetaminophen (TYLENOL) 325 mg tablet For headache (Patient taking differently: Take 325 mg by mouth as needed For headache) 30 tablet 0    albuterol (PROVENTIL HFA,VENTOLIN HFA) 90 mcg/act inhaler Inhale 2 puffs every 6 (six) hours as needed for wheezing 18 g 3    aspirin 81 mg chewable tablet Chew 81 mg daily      atorvastatin (LIPITOR) 80 mg tablet TAKE 1 TABLET BY MOUTH EVERY DAY 90 tablet 2    Blood Glucose Monitoring Suppl (Satish Higgins) w/Device KIT by Does not apply route 3 (three) times a day 1 kit 0    citalopram (CeleXA) 20 mg tablet Take 1 tablet (20 mg total) by mouth daily 30 tablet 3    Empagliflozin (JARDIANCE) 10 MG TABS Take 1 tablet (10 mg total) by mouth every morning 14 tablet 0    ezetimibe (ZETIA) 10 mg tablet Take 1 tablet (10 mg total) by mouth daily 30 tablet 5    fluticasone-salmeterol (ADVAIR) 250-50 mcg/dose inhaler Inhale 1 puff every 12 (twelve) hours as needed (sob) 1 each 1    glucose blood (ONETOUCH VERIO) test strip 1 each by Other route 3 (three) times a day 300 each 1    hydrOXYzine HCL (ATARAX) 25 mg tablet Take 1 tablet (25 mg total) by mouth 2 (two) times a day as needed for itching 180 tablet 1    Lancets (ONETOUCH ULTRASOFT) lancets by Other route 3 (three) times a day 300 each 1    levothyroxine 125 mcg tablet Take 1 tablet (125 mcg total) by mouth daily 30 tablet 5    losartan (COZAAR) 25 mg tablet TAKE 1 TABLET DAILY   30 tablet 4    magnesium oxide (MAG-OX) 400 mg Take 1 tablet (400 mg total) by mouth 2 (two) times a day 60 tablet 0    metFORMIN (GLUCOPHAGE) 500 mg tablet Take 1 tablet (500 mg total) by mouth 2 (two) times a day with meals 60 tablet 5    methocarbamol (ROBAXIN) 500 mg tablet Take 1 tablet (500 mg total) by mouth 3 (three) times a day as needed for muscle spasms 90 tablet 2    metoprolol succinate (TOPROL-XL) 25 mg 24 hr tablet Take 25 mg by mouth daily      omega-3-acid ethyl esters (LOVAZA) 1 g capsule Take 2 capsules (2 g total) by mouth 2 (two) times a day 120 capsule 3    pantoprazole (PROTONIX) 40 mg tablet Take 1 tablet (40 mg total) by mouth daily 90 tablet 1    pregabalin (LYRICA) 150 mg capsule Take 1 capsule (150 mg total) by mouth 3 (three) times a day 90 capsule 2    triamcinolone (KENALOG) 0 1 % cream Apply topically 2 (two) times a day 80 g 1    zolpidem (AMBIEN) 10 mg tablet Take 1 tablet (10 mg total) by mouth daily at bedtime as needed for sleep 30 tablet 2     No current facility-administered medications for this visit  Allergies   Allergen Reactions    Augmentin [Amoxicillin-Pot Clavulanate] Nausea Only     PT stated she only allergic to medication AUGMENTIN      Immunizations:     Immunization History   Administered Date(s) Administered    Influenza TIV (IM) 11/15/2011, 10/28/2013      Medicare Screening Tests and Risk Assessments:     Ada Miles is here for her Welcome to Medicare visit  Health Risk Assessment:  Patient rates overall health as fair  Patient feels that their physical health rating is Slightly worse  Eyesight was rated as Slightly worse  Hearing was rated as Slightly worse  Patient feels that their emotional and mental health rating is Much better  Pain experienced by patient in the last 7 days has been Some  Patient's pain rating has been 5/10  Patient states that she has experienced weight loss or gain in last 6 months  Emotional/Mental Health:  Patient has been feeling nervous/anxious  PHQ-9 Depression Screening:    Frequency of the following problems over the past two weeks:      1  Little interest or pleasure in doing things: 3 - nearly every day      2  Feeling down, depressed, or hopeless: 0 - not at all      3  Trouble falling or staying asleep, or sleeping too much: 0 - not at all      4  Feeling tired or having little energy: 3 - nearly every day      5  Poor appetite or overeatin - several days      6  Feeling bad about yourself - or that you are a failure or have let yourself or your family down: 2 - more than half the days      7  Trouble concentrating on things, such as reading the newspaper or watching television: 0 - not at all      8  Moving or speaking so slowly that other people could have noticed   Or the opposite - being so fidgety or restless that you have been moving around a lot more than usual: 0 - not at all      9  Thoughts that you would be better off dead, or of hurting yourself in some way: 0 - not at all  PHQ-2 Score: 3  PHQ-9 Score: 9    Broken Bones/Falls: Fall Risk Assessment:    In the past year, patient has experienced: No history of falling in past year          Bladder/Bowel:  Patient has not leaked urine accidently in the last six months  Patient reports no loss of bowel control  Immunizations:  Patient has not had a flu vaccination within the last year  Patient has not received a pneumonia shot  Patient has not received a shingles shot  Patient has not received tetanus/diphtheria shot  Home Safety:  Patient has trouble with stairs inside or outside of their home  Patient currently reports that there are no safety hazards present in home, working smoke alarms, working carbon monoxide detectors  Preventative Screenings:   No breast cancer screening performed, colon cancer screen completed, 8/11/2015  cholesterol screen completed, 5/1/2019  glaucoma eye exam completed, 4/1/2019      Nutrition:  Current diet: Diabetic and Limited junk food with servings of the following:    Medications:  Patient is currently taking over-the-counter supplements  List of OTC medications includes: vitamin,aspirin  Patient is able to manage medications  Lifestyle Choices:  Patient reports no tobacco use  Patient has smoked or used tobacco in the past   Patient has stopped her tobacco use  Tobacco use quit date: 2014  Patient reports no alcohol use  Patient drives a vehicle  Patient wears seat belt          Activities of Daily Living:  Can get out of bed by his or her self, able to dress self, able to make own meals, able to do own shopping, able to bathe self, can do own laundry/housekeeping, can manage own money, pay bills and track expenses    Previous Hospitalizations:  Hospitalization or ED visit in past 12 months  Number of hospitalizations within the last year: 1-2        Advanced Directives:  Patient has decided on a power of   Patient has spoken to designated power of   Patient has not completed advanced directive  Preventative Screening/Counseling:      Cardiovascular:      General: Risks and Benefits Discussed and Screening Current      Counseling: Healthy Diet, Healthy Weight and Improve Cholesterol     Due for Labs/Analytes/Optional EKG: echocardiogram      Comments: EKG normal sinus rhythm  unchanged when compared to EKG from 12/26/2018        Diabetes:      General: Risks and Benefits Discussed and Screening Current      Counseling: Healthy Diet, Healthy Weight and Improve Physical Activity          Colorectal Cancer:      General: Risks and Benefits Discussed and Screening Current          Breast Cancer:      General: Risks and Benefits Discussed and Patient Declines      Comments: Refuses mammogram  Does monthly self-breast exams        Cervical Cancer:      General: Patient Declines          Osteoporosis:      General: Screening Not Indicated          AAA:      General: Screening Not Indicated          Glaucoma:      General: Risks and Benefits Discussed and Screening Current      Comments: Eye exam up to date         HIV:      General: Screening Not Indicated          Hepatitis C:      General: Screening Current        Advanced Directives:   Patient has no living will for healthcare, does not have durable POA for healthcare, patient does not have an advanced directive  Information on ACP and/or AD provided  5 wishes given  End of life assessment reviewed with patient  Provider agrees with end of life decisions        Immunizations:      Influenza: Patient Declines      Pneumococcal: Patient Declines      Shingrix: Patient Declines      Zostavax: Patient Declines      TD: Patient Declines      TDAP: Patient Declines          No exam data present     Physical Exam:     /70 (BP Location: Left arm, Patient Position: Sitting, Cuff Size: Adult)   Pulse 60   Temp 97 9 °F (36 6 °C) (Oral)   Ht 5' 3 5" (1 613 m)   Wt 75 kg (165 lb 6 4 oz)   SpO2 97% Comment: room air  BMI 28 84 kg/m²     Physical Exam   Constitutional: She is oriented to person, place, and time  She appears well-developed and well-nourished  No distress  HENT:   Head: Normocephalic and atraumatic  Right Ear: Tympanic membrane and external ear normal    Left Ear: Tympanic membrane and external ear normal    Nose: Nose normal  No mucosal edema or rhinorrhea  Mouth/Throat: Oropharynx is clear and moist  No oropharyngeal exudate, posterior oropharyngeal edema or posterior oropharyngeal erythema  Eyes: Pupils are equal, round, and reactive to light  Conjunctivae and EOM are normal    Neck: Normal range of motion  Neck supple  Carotid bruit is not present  No edema present  No thyromegaly present  Cardiovascular: Normal rate, regular rhythm and normal heart sounds  No murmur heard  Pulmonary/Chest: Effort normal and breath sounds normal  No respiratory distress  She has no decreased breath sounds  She has no wheezes  She has no rhonchi  Right breast exhibits no inverted nipple, no mass, no nipple discharge, no skin change and no tenderness  Left breast exhibits no inverted nipple, no mass, no nipple discharge, no skin change and no tenderness  Breast exam normal  Rai Anderson MA in room as chaperone during breast exam   Abdominal: Soft  Normal appearance and bowel sounds are normal  She exhibits no distension and no mass  There is no hepatosplenomegaly  There is no tenderness  Musculoskeletal: Normal range of motion  She exhibits no edema or tenderness  Lymphadenopathy:     She has no cervical adenopathy  She has no axillary adenopathy  Right: No supraclavicular adenopathy present  Left: No supraclavicular adenopathy present  Neurological: She is alert and oriented to person, place, and time  She has normal reflexes  No cranial nerve deficit   She exhibits normal muscle tone  Coordination normal    Skin: Skin is warm  No rash noted  She is not diaphoretic  Psychiatric: She has a normal mood and affect  Her behavior is normal  Judgment and thought content normal    Vitals reviewed

## 2019-07-15 NOTE — PROGRESS NOTES
Assessment and Plan:     Problem List Items Addressed This Visit     None      Visit Diagnoses     Healthcare maintenance             History of Present Illness:     Patient presents for Welcome to Medicare visit  Patient Care Team:  Maira Corley MD as PCP - General  DO Monserrat Sosa DO     Review of Systems:     Review of Systems   Constitutional: Positive for appetite change (feeling hungry - improved her diet )  Negative for activity change, chills, diaphoresis, fatigue, fever and unexpected weight change  Eyes: Negative for photophobia and visual disturbance  Respiratory: Negative for cough, chest tightness, shortness of breath and wheezing  Cardiovascular: Positive for chest pain (about 30 seconds - 1 episode, no associated symptoms  no dizziness, diaphoresis, etc  )  Negative for palpitations and leg swelling  Gastrointestinal: Positive for abdominal distention (from constipation) and constipation  Negative for abdominal pain, blood in stool, bowel incontinence, diarrhea, nausea and vomiting  Endocrine: Negative for cold intolerance, heat intolerance, polydipsia, polyphagia and polyuria  Genitourinary: Negative for difficulty urinating, dysuria, frequency, hematuria and vaginal bleeding  Musculoskeletal: Positive for arthralgias, back pain and myalgias (fibro)  Negative for gait problem  Skin: Negative for rash and wound  Neurological: Positive for headaches (chronic secondary to neck pain)  Negative for dizziness, seizures, syncope and light-headedness  Hematological: Does not bruise/bleed easily  Psychiatric/Behavioral: Positive for decreased concentration and dysphoric mood  Negative for self-injury, sleep disturbance (improved with ambien) and suicidal ideas  The patient is nervous/anxious           Problem List:     Patient Active Problem List   Diagnosis    Abnormal glucose    Back pain    Benign essential HTN    Bites    Cervical herniated disc    Cervical radiculopathy    Coronary arteriosclerosis    Chronic stable angina (HCC)    Degenerative disc disease, cervical    Depression with anxiety    Fibromyalgia    Gastroesophageal reflux disease    Hypothyroidism    Hypercholesterolemia    Insomnia    Lumbar foraminal stenosis    Lumbar radiculopathy    Sacroiliitis (HCC)    Severe episode of recurrent major depressive disorder, without psychotic features ([facility])    Skin rash    Myofascial pain    Asthma    Brachial neuritis    Cervical spondylosis    Chronic low back pain    Dysthymic disorder    Irritable bowel syndrome without diarrhea    Pain in thoracic spine    Reflex sympathetic dystrophy    Cervical stenosis of spinal canal    Status post lumbar surgery    Chronic pain syndrome    Hearing problem of both ears    Generalized weakness    Uncontrolled type 2 diabetes mellitus with hyperglycemia ([facility])    Hearing loss    Acute pharyngitis due to other specified organisms    Rash of hands      Past Medical and Surgical History:     Past Medical History:   Diagnosis Date    Abnormal echocardiogram     Abnormal stress test     Anxiety     Asthma     Brachial neuritis     Depression     Disease of thyroid gland     Displacement of intervertebral disc of mid-cervical region     Fibromyalgia     Frequent headaches     GERD (gastroesophageal reflux disease)     Herniated nucleus pulposus     History of arthritis     History of asthma     History of cardiac disorder     History of chest pain     History of diverticulitis     History of fatigue     History of hearing loss     History of hemoptysis     History of herpes simplex infection     History of hiatal hernia     History of insect bite     INFECTED    History of memory loss     History of neck disorder     History of reflex sympathetic dystrophy     History of restless legs syndrome     History of shortness of breath     History of spinal stenosis     Hyperlipidemia     Hyperlipidemia     Hypertension     Skin rash     Somnolence, daytime     Spinal stenosis of cervical region      Past Surgical History:   Procedure Laterality Date    BACK SURGERY      BACK SURGERY      LOWER BACK SURGERY    CARDIAC CATHETERIZATION      HISTORY OF CORONARY ANGIOGRAPHY WITH CONCOMITANT LEFT HEART CATHETERIZATION    CORONARY ANGIOPLASTY WITH STENT PLACEMENT      EAR SURGERY      TONSILLECTOMY      TYMPANOPLASTY        Family History:     Family History   Problem Relation Age of Onset    Coronary artery disease Mother         ATHEROMA    Heart disease Mother     Diabetes Mother     Hypertension Mother     No Known Problems Father         NO PERTINENT FAMILY HISTORY    Coronary artery disease Sister         ATHEROMA    Heart disease Sister     Stroke Sister     Diabetes Sister     Hypertension Sister       Social History:     Social History     Tobacco Use   Smoking Status Former Smoker    Last attempt to quit:     Years since quittin 5   Smokeless Tobacco Never Used     Social History     Substance and Sexual Activity   Alcohol Use No     Social History     Substance and Sexual Activity   Drug Use Yes    Types: Marijuana    Comment: USES MARIJUANA occasionally      Medications and Allergies:     Current Outpatient Medications   Medication Sig Dispense Refill    acetaminophen (TYLENOL) 325 mg tablet For headache (Patient taking differently: Take 325 mg by mouth as needed For headache) 30 tablet 0    albuterol (PROVENTIL HFA,VENTOLIN HFA) 90 mcg/act inhaler Inhale 2 puffs every 6 (six) hours as needed for wheezing 18 g 3    aspirin 81 mg chewable tablet Chew 81 mg daily      atorvastatin (LIPITOR) 80 mg tablet TAKE 1 TABLET BY MOUTH EVERY DAY 90 tablet 2    Blood Glucose Monitoring Suppl (Sejal Bui) w/Device KIT by Does not apply route 3 (three) times a day 1 kit 0    citalopram (CeleXA) 20 mg tablet Take 1 tablet (20 mg total) by mouth daily 30 tablet 3    Empagliflozin (JARDIANCE) 10 MG TABS Take 1 tablet (10 mg total) by mouth every morning 14 tablet 0    ezetimibe (ZETIA) 10 mg tablet Take 1 tablet (10 mg total) by mouth daily 30 tablet 5    fluticasone-salmeterol (ADVAIR) 250-50 mcg/dose inhaler Inhale 1 puff every 12 (twelve) hours as needed (sob) 1 each 1    glucose blood (ONETOUCH VERIO) test strip 1 each by Other route 3 (three) times a day 300 each 1    hydrOXYzine HCL (ATARAX) 25 mg tablet Take 1 tablet (25 mg total) by mouth 2 (two) times a day as needed for itching 180 tablet 1    Lancets (ONETOUCH ULTRASOFT) lancets by Other route 3 (three) times a day 300 each 1    levothyroxine 125 mcg tablet Take 1 tablet (125 mcg total) by mouth daily 30 tablet 5    losartan (COZAAR) 25 mg tablet TAKE 1 TABLET DAILY  30 tablet 4    magnesium oxide (MAG-OX) 400 mg Take 1 tablet (400 mg total) by mouth 2 (two) times a day 60 tablet 0    metFORMIN (GLUCOPHAGE) 500 mg tablet Take 1 tablet (500 mg total) by mouth 2 (two) times a day with meals 60 tablet 5    methocarbamol (ROBAXIN) 500 mg tablet Take 1 tablet (500 mg total) by mouth 3 (three) times a day as needed for muscle spasms 90 tablet 2    metoprolol succinate (TOPROL-XL) 25 mg 24 hr tablet Take 25 mg by mouth daily      omega-3-acid ethyl esters (LOVAZA) 1 g capsule Take 2 capsules (2 g total) by mouth 2 (two) times a day 120 capsule 3    pantoprazole (PROTONIX) 40 mg tablet Take 1 tablet (40 mg total) by mouth daily 90 tablet 1    pregabalin (LYRICA) 150 mg capsule Take 1 capsule (150 mg total) by mouth 3 (three) times a day 90 capsule 2    triamcinolone (KENALOG) 0 1 % cream Apply topically 2 (two) times a day 80 g 1    zolpidem (AMBIEN) 10 mg tablet Take 1 tablet (10 mg total) by mouth daily at bedtime as needed for sleep 30 tablet 0     No current facility-administered medications for this visit        Allergies   Allergen Reactions    Augmentin [Amoxicillin-Pot Clavulanate] Nausea Only     PT stated she only allergic to medication AUGMENTIN      Immunizations:     Immunization History   Administered Date(s) Administered    Influenza TIV (IM) 11/15/2011, 10/28/2013      Medicare Screening Tests and Risk Assessments:     [facility] is here for her Welcome to Medicare visit  Health Risk Assessment:  Patient rates overall health as fair  Patient feels that their physical health rating is Slightly worse  Eyesight was rated as Slightly worse  Hearing was rated as Slightly worse  Patient feels that their emotional and mental health rating is Much better  Pain experienced by patient in the last 7 days has been Some  Patient's pain rating has been 5/10  Patient states that she has experienced weight loss or gain in last 6 months  Emotional/Mental Health:  Patient has been feeling nervous/anxious  PHQ-9 Depression Screening:    Frequency of the following problems over the past two weeks:      1  Little interest or pleasure in doing things: 3 - nearly every day      2  Feeling down, depressed, or hopeless: 0 - not at all      3  Trouble falling or staying asleep, or sleeping too much: 0 - not at all      4  Feeling tired or having little energy: 3 - nearly every day      5  Poor appetite or overeatin - several days      6  Feeling bad about yourself - or that you are a failure or have let yourself or your family down: 2 - more than half the days      7  Trouble concentrating on things, such as reading the newspaper or watching television: 0 - not at all      8  Moving or speaking so slowly that other people could have noticed  Or the opposite - being so fidgety or restless that you have been moving around a lot more than usual: 0 - not at all      9  Thoughts that you would be better off dead, or of hurting yourself in some way: 0 - not at all  PHQ-2 Score: 3  PHQ-9 Score: 9    Broken Bones/Falls: Fall Risk Assessment:    In the past year, patient has experienced:  No history of falling in past year          Bladder/Bowel:  Patient has not leaked urine accidently in the last six months  Patient reports no loss of bowel control  Immunizations:  Patient has not had a flu vaccination within the last year  Patient has not received a pneumonia shot  Patient has not received a shingles shot  Patient has not received tetanus/diphtheria shot  Home Safety:  Patient has trouble with stairs inside or outside of their home  Patient currently reports that there are no safety hazards present in home, working smoke alarms, working carbon monoxide detectors  Preventative Screenings:   No breast cancer screening performed, colon cancer screen completed, 8/11/2015  cholesterol screen completed, 5/1/2019  glaucoma eye exam completed, 4/1/2019      Nutrition:  Current diet: Diabetic and Limited junk food with servings of the following:    Medications:  Patient is currently taking over-the-counter supplements  List of OTC medications includes: vitamin,aspirin  Patient is able to manage medications  Lifestyle Choices:  Patient reports no tobacco use  Patient has smoked or used tobacco in the past   Patient has stopped her tobacco use  Tobacco use quit date: 2014  Patient reports no alcohol use  Patient drives a vehicle  Patient wears seat belt  Activities of Daily Living:  Can get out of bed by his or her self, able to dress self, able to make own meals, able to do own shopping, able to bathe self, can do own laundry/housekeeping, can manage own money, pay bills and track expenses    Previous Hospitalizations:  Hospitalization or ED visit in past 12 months  Number of hospitalizations within the last year: 1-2        Advanced Directives:  Patient has decided on a power of   Patient has spoken to designated power of   Patient has not completed advanced directive          Preventative Screening/Counseling:      Cardiovascular:      General: Risks and Benefits Discussed and Screening Current      Counseling: Healthy Diet, Healthy Weight and Improve Cholesterol     Due for Labs/Analytes/Optional EKG: echocardiogram      Comments: EKG normal sinus rhythm  unchanged when compared to EKG from 12/26/2018        Diabetes:      General: Risks and Benefits Discussed and Screening Current      Counseling: Healthy Diet, Healthy Weight and Improve Physical Activity          Colorectal Cancer:      General: Risks and Benefits Discussed and Screening Current          Breast Cancer:      General: Risks and Benefits Discussed and Patient Declines      Comments: Refuses mammogram  Does monthly self-breast exams        Cervical Cancer:      General: Patient Declines          Osteoporosis:      General: Screening Not Indicated          AAA:      General: Screening Not Indicated          Glaucoma:      General: Risks and Benefits Discussed and Screening Current      Comments: Eye exam up to date         HIV:      General: Screening Not Indicated          Hepatitis C:      General: Screening Current        Advanced Directives:   Patient has no living will for healthcare, does not have durable POA for healthcare, patient does not have an advanced directive  Information on ACP and/or AD provided  5 wishes given  End of life assessment reviewed with patient  Provider agrees with end of life decisions        Immunizations:      Influenza: Patient Declines      Pneumococcal: Patient Declines      Shingrix: Patient Declines      Zostavax: Patient Declines      TD: Patient Declines      TDAP: Patient Declines          No exam data present     Physical Exam:     /70 (BP Location: Left arm, Patient Position: Sitting, Cuff Size: Adult)   Pulse 60   Temp 97 9 °F (36 6 °C) (Oral)   Ht 5' 3 5" (1 613 m)   Wt 75 kg (165 lb 6 4 oz)   SpO2 97% Comment: room air  BMI 28 84 kg/m²     Physical Exam

## 2019-07-18 ENCOUNTER — OFFICE VISIT (OUTPATIENT)
Dept: DIABETES SERVICES | Facility: CLINIC | Age: 59
End: 2019-07-18
Payer: MEDICARE

## 2019-07-18 DIAGNOSIS — E11.65 TYPE 2 DIABETES MELLITUS WITH HYPERGLYCEMIA, WITHOUT LONG-TERM CURRENT USE OF INSULIN (HCC): Primary | ICD-10-CM

## 2019-07-18 PROCEDURE — G0109 DIAB MANAGE TRN IND/GROUP: HCPCS | Performed by: DIETITIAN, REGISTERED

## 2019-07-19 ENCOUNTER — TELEPHONE (OUTPATIENT)
Dept: PAIN MEDICINE | Facility: CLINIC | Age: 59
End: 2019-07-19

## 2019-07-19 NOTE — TELEPHONE ENCOUNTER
Patient would like information about medical marijuana  She states that she had discussed this with Dr Bel Grigsby previously & would like further information/resources about it   Please advise, prema    Call back# [phone]

## 2019-07-22 NOTE — TELEPHONE ENCOUNTER
SW patient and advised our office does not provide medical marijuana cards  Advised she will be receiving a list of providers in the mail and should contact them for advice on how to obtain a card  Patient appreciative of call back

## 2019-07-22 NOTE — TELEPHONE ENCOUNTER
Patient would like to know how to obtain medical marijuana card   Please advise, prema    Call back# [phone]

## 2019-07-23 NOTE — PROGRESS NOTES
Living Well with Diabetes Group Class #2    Adonis  attended the Living Well with Diabetes Group Class #2  During class, Yoel Mace was instructed on the following topics: Macronutrients, Carbohydrate sources, What one serving of carbohydrate equals, effects of diet on blood glucose levels, effect of carbohydrates on blood glucose levels, basics of meal planning: balance, portions, meal times, measurements, reading food labels to determine carbohydrates  Yoel Mace participated in group activities of reading labels together and completing the meal planning activity  Yoel Nakita will follow up with class #3  Will call with any questions or concerns prior to next session  The patient's history was reviewed and updated as appropriate: allergies, current medications  Lab Results   Component Value Date    HGBA1C 12 3 (H) 04/29/2019       Diabetes Education Record  Yoel Mace was provided Living Well with Diabetes Class #2 book      Patient response to instruction    Comprehensiongood  Motivationgood  Expected Compliancegood    Begin Time: 6:00 pm    End Time: 8:00 pm  Referring Provider: JORGE A Bains    Thank you for referring your patient to [facility], it was a pleasure working with them today  Please feel free to call with any questions or concerns      Fidela Primrose, RD [phone]

## 2019-08-01 ENCOUNTER — OFFICE VISIT (OUTPATIENT)
Dept: DIABETES SERVICES | Facility: CLINIC | Age: 59
End: 2019-08-01
Payer: MEDICARE

## 2019-08-01 DIAGNOSIS — E11.65 TYPE 2 DIABETES MELLITUS WITH HYPERGLYCEMIA, WITHOUT LONG-TERM CURRENT USE OF INSULIN (HCC): Primary | ICD-10-CM

## 2019-08-01 DIAGNOSIS — E11.65 UNCONTROLLED TYPE 2 DIABETES MELLITUS WITH HYPERGLYCEMIA (HCC): ICD-10-CM

## 2019-08-01 PROCEDURE — G0109 DIAB MANAGE TRN IND/GROUP: HCPCS | Performed by: DIETITIAN, REGISTERED

## 2019-08-01 RX ORDER — EMPAGLIFLOZIN 10 MG/1
TABLET, FILM COATED ORAL
Qty: 30 TABLET | Refills: 2 | OUTPATIENT
Start: 2019-08-01

## 2019-08-04 NOTE — PROGRESS NOTES
Living Well with Diabetes Group Class #4    Jael Aviles attended the Living Well with Diabetes Group Class #4  During class, Catalina Deras was instructed on the following topics: Types of cholesterol, dietary sources of cholesterol, types of fat, types of fiber, reading food labels- in depth, healthy choices when dining out  Catalina Deras participated in group activities of reading labels together and completed the dining out activity  Catalina Deras will follow up with class #5 or additional DSMT/MNT as desired  Will call with any questions or concerns prior to next session  The patient's history was reviewed and updated as appropriate: allergies, current medications  Lab Results   Component Value Date    HGBA1C 12 3 (H) 04/29/2019       Diabetes Education Record  Catalina Deras was provided Living Well with Diabetes Class #4 book      Patient response to instruction    Comprehensiongood  Motivationgood  Expected Compliancegood    Begin Time: 6:00 pm   End Time: 8:00 pm  Referring Provider: Arash Saul    Thank you for referring your patient to [address] [place] [address], it was a pleasure working with them today  Please feel free to call with any questions or concerns      Guillermina Sandhu RD  [place] [phone]

## 2019-08-04 NOTE — PATIENT INSTRUCTIONS
Please follow up with class #5 or additional DSMT/MNT as desired  Call with any questions or concerns prior to next session

## 2019-08-05 ENCOUNTER — OFFICE VISIT (OUTPATIENT)
Dept: AUDIOLOGY | Age: 59
End: 2019-08-05
Payer: MEDICARE

## 2019-08-05 DIAGNOSIS — H90.6 MIXED CONDUCTIVE AND SENSORINEURAL HEARING LOSS OF BOTH EARS: Primary | ICD-10-CM

## 2019-08-05 PROCEDURE — 92567 TYMPANOMETRY: CPT | Performed by: AUDIOLOGIST

## 2019-08-05 PROCEDURE — 92557 COMPREHENSIVE HEARING TEST: CPT | Performed by: AUDIOLOGIST

## 2019-08-05 NOTE — LETTER
2019     Marli Wilcox, [address] [address]    Patient: Zunilda Schaefer   YOB: 1960   Date of Visit: 2019       Dear Dr Morgan West: Thank you for referring Nubia Lazarus to me for evaluation  Below are my notes for this consultation  If you have questions, please do not hesitate to call me  I look forward to following your patient along with you  Sincerely,        Joss Teresa        CC: No Recipients  Joss Moore  2019  4:20 PM  Sign at close encounter  HEARING EVALUATION    Name:  Zunilda Schaefer  :  1960  Age:  63 y o  Date of Evaluation: 19     History: Ear Infection, Ear Pain and Family Hx  Reason for visit: Zunilda Schaefer is being seen today at the request of Dr José Miguel Min for an evaluation of hearing  Patient reports that she requires frequent repetition  She reports that many noises are painful, including road noise while driving, the icemaker, fans and air conditioners  The patient reports needing to stuff her ears with cotton at night because the sound of the fans in her room is painful  She reports a long history of middle ear issues, including several sets of PE tubes and two surgeries (tympanoplasty/myringotomy) on her right ear  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  The patient reports hearing ringing as well as music and voices in her ears  EVALUATION:    Otoscopic Evaluation:   Right Ear: visible perforation and scarring   Left Ear: some scarring of TM visualized    Tympanometry:   Right: Type B (large ear canal volume) - perforated eardrum    Left: Type Ad - hypermobile compliance    Audiogram Results:  Right ear: Moderately severe to moderate mixed hearing loss  WRS was 100% at 65dBHL  Left ear:  Mild to moderately severe mixed hearing loss  WRS was 92% at 55 dBHL      *see attached audiogram      RECOMMENDATIONS:  Hearing Aid Evaluation    PATIENT EDUCATION:   Discussed results and recommendations with patient  Counseled patient on the effect plugging ears can have on tolerance  Patient was adamant that she cannot stop plugging her ears or she will not sleep  Patient is only interested in an HAE if her insurance covers hearing aids  Will ask Go Rodrigez to do insurance verification and contact patient with info so she can decide if she wants to come in for HAE  Questions were addressed and the patient was encouraged to contact our department should concerns arise        Graciela Kwon , CCC-A  Clinical Audiologist

## 2019-08-05 NOTE — PROGRESS NOTES
HEARING EVALUATION    Name:  Nini Fernando  :  1960  Age:  63 y o  Date of Evaluation: 19     History: Ear Infection, Ear Pain and Family Hx  Reason for visit: Nini Fernando is being seen today at the request of Dr Maria Ines Whitmore for an evaluation of hearing  Patient reports that she requires frequent repetition  She reports that many noises are painful, including road noise while driving, the icemaker, fans and air conditioners  The patient reports needing to stuff her ears with cotton at night because the sound of the fans in her room is painful  She reports a long history of middle ear issues, including several sets of PE tubes and two surgeries (tympanoplasty/myringotomy) on her right ear  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  The patient reports hearing ringing as well as music and voices in her ears  EVALUATION:    Otoscopic Evaluation:   Right Ear: visible perforation and scarring   Left Ear: some scarring of TM visualized    Tympanometry:   Right: Type B (large ear canal volume) - perforated eardrum    Left: Type Ad - hypermobile compliance    Audiogram Results:  Right ear: Moderately severe to moderate mixed hearing loss  WRS was 100% at 65dBHL  Left ear:  Mild to moderately severe mixed hearing loss  WRS was 92% at 55 dBHL  *see attached audiogram      RECOMMENDATIONS:  Hearing Aid Evaluation    PATIENT EDUCATION:   Discussed results and recommendations with patient  Counseled patient that plugging ears can have effect of reduced tolerance to sound  Patient was adamant that she cannot stop plugging her ears or she will not sleep  Patient is only interested in an HAE if her insurance covers hearing aids  Will ask Jillian Tovar to do insurance verification and contact patient with info so she can decide if she wants to come in for HAE  Patient is not interested in seeing ENT regarding perforation    Questions were addressed and the patient was encouraged to contact our department should concerns arise        Graciela Fuentes , CCC-A  Clinical Audiologist

## 2019-08-20 DIAGNOSIS — F51.01 PRIMARY INSOMNIA: ICD-10-CM

## 2019-08-20 DIAGNOSIS — R51.9 HEADACHE: ICD-10-CM

## 2019-08-20 DIAGNOSIS — M79.7 FIBROMYALGIA: ICD-10-CM

## 2019-08-20 DIAGNOSIS — F32.89 OTHER DEPRESSION: ICD-10-CM

## 2019-08-20 DIAGNOSIS — K21.9 GASTROESOPHAGEAL REFLUX DISEASE, ESOPHAGITIS PRESENCE NOT SPECIFIED: ICD-10-CM

## 2019-08-20 RX ORDER — CITALOPRAM 20 MG/1
20 TABLET ORAL DAILY
Qty: 30 TABLET | Refills: 3 | Status: CANCELLED | OUTPATIENT
Start: 2019-08-20

## 2019-08-20 RX ORDER — PANTOPRAZOLE SODIUM 40 MG/1
40 TABLET, DELAYED RELEASE ORAL DAILY
Qty: 90 TABLET | Refills: 1 | Status: CANCELLED | OUTPATIENT
Start: 2019-08-20

## 2019-08-20 RX ORDER — PREGABALIN 150 MG/1
150 CAPSULE ORAL 3 TIMES DAILY
Qty: 90 CAPSULE | Refills: 2 | Status: CANCELLED | OUTPATIENT
Start: 2019-08-20

## 2019-08-20 NOTE — TELEPHONE ENCOUNTER
MED: Citalopram 20mg  SUPPLY: 90Day  PHARMACY: [facility]  PATIENT PHONE #: [phone]  LAST OV: 7/15/2019  UPCOMING OV: 8/28/2019    MED: Magnesium 400mg  SUPPLY: 90Day  PHARMACY: [facility]  PATIENT PHONE #: [phone]  LAST OV: 7/15/2019  UPCOMING OV: 8/28/2019    MED: Pantoprazole 40mg   SUPPLY: 90Day  PHARMACY: [facility]  PATIENT PHONE #: [phone]  LAST OV: 7/15/2019  UPCOMING OV: 8/28/2019    MED: Pregabalin 150mg  SUPPLY: 90Day  PHARMACY: [facility]  PATIENT PHONE #: [phone]  LAST OV: 7/15/2019  UPCOMING OV: 8/28/2019    MED: Zolpidem 10mg   SUPPLY: 90Day  PHARMACY: [facility]  PATIENT PHONE #: [phone]  LAST OV: 7/15/2019  UPCOMING OV: 8/28/2019

## 2019-08-20 NOTE — TELEPHONE ENCOUNTER
Out of all the prescriptions, only Zolpidem is needed  Others either have refills or can be addressed at [address] next week w Jonathan Cuenca      PDMP:

## 2019-08-21 RX ORDER — ZOLPIDEM TARTRATE 10 MG/1
10 TABLET ORAL
Qty: 30 TABLET | Refills: 2 | Status: SHIPPED | OUTPATIENT
Start: 2019-08-21 | End: 2019-12-03 | Stop reason: SDUPTHER

## 2019-08-27 ENCOUNTER — CLINICAL SUPPORT (OUTPATIENT)
Dept: INTERNAL MEDICINE CLINIC | Facility: CLINIC | Age: 59
End: 2019-08-27
Payer: MEDICARE

## 2019-08-27 DIAGNOSIS — E11.65 UNCONTROLLED TYPE 2 DIABETES MELLITUS WITH HYPERGLYCEMIA (HCC): Primary | ICD-10-CM

## 2019-08-27 DIAGNOSIS — E78.00 HYPERCHOLESTEROLEMIA: ICD-10-CM

## 2019-08-27 LAB
ALBUMIN SERPL BCP-MCNC: 4.5 G/DL (ref 3.5–5)
ALP SERPL-CCNC: 112 U/L (ref 46–116)
ALT SERPL W P-5'-P-CCNC: 47 U/L (ref 12–78)
ANION GAP SERPL CALCULATED.3IONS-SCNC: 8 MMOL/L (ref 4–13)
AST SERPL W P-5'-P-CCNC: 20 U/L (ref 5–45)
BILIRUB SERPL-MCNC: 0.66 MG/DL (ref 0.2–1)
BUN SERPL-MCNC: 12 MG/DL (ref 5–25)
CALCIUM SERPL-MCNC: 9.6 MG/DL (ref 8.3–10.1)
CHLORIDE SERPL-SCNC: 109 MMOL/L (ref 100–108)
CHOLEST SERPL-MCNC: 108 MG/DL (ref 50–200)
CO2 SERPL-SCNC: 26 MMOL/L (ref 21–32)
CREAT SERPL-MCNC: 0.78 MG/DL (ref 0.6–1.3)
CREAT UR-MCNC: 47.6 MG/DL
EST. AVERAGE GLUCOSE BLD GHB EST-MCNC: 140 MG/DL
GFR SERPL CREATININE-BSD FRML MDRD: 84 ML/MIN/1.73SQ M
GLUCOSE P FAST SERPL-MCNC: 114 MG/DL (ref 65–99)
HBA1C MFR BLD: 6.5 % (ref 4.2–6.3)
HDLC SERPL-MCNC: 40 MG/DL (ref 40–60)
LDLC SERPL CALC-MCNC: 47 MG/DL (ref 0–100)
MICROALBUMIN UR-MCNC: 5.4 MG/L (ref 0–20)
MICROALBUMIN/CREAT 24H UR: 11 MG/G CREATININE (ref 0–30)
POTASSIUM SERPL-SCNC: 4.1 MMOL/L (ref 3.5–5.3)
PROT SERPL-MCNC: 7.7 G/DL (ref 6.4–8.2)
SODIUM SERPL-SCNC: 143 MMOL/L (ref 136–145)
TRIGL SERPL-MCNC: 106 MG/DL

## 2019-08-27 PROCEDURE — 82570 ASSAY OF URINE CREATININE: CPT | Performed by: NURSE PRACTITIONER

## 2019-08-27 PROCEDURE — 36415 COLL VENOUS BLD VENIPUNCTURE: CPT

## 2019-08-27 PROCEDURE — 80061 LIPID PANEL: CPT | Performed by: NURSE PRACTITIONER

## 2019-08-27 PROCEDURE — 82043 UR ALBUMIN QUANTITATIVE: CPT | Performed by: NURSE PRACTITIONER

## 2019-08-27 PROCEDURE — 83036 HEMOGLOBIN GLYCOSYLATED A1C: CPT | Performed by: NURSE PRACTITIONER

## 2019-08-27 PROCEDURE — 80053 COMPREHEN METABOLIC PANEL: CPT | Performed by: NURSE PRACTITIONER

## 2019-08-28 DIAGNOSIS — E11.65 UNCONTROLLED TYPE 2 DIABETES MELLITUS WITH HYPERGLYCEMIA (HCC): ICD-10-CM

## 2019-08-28 NOTE — TELEPHONE ENCOUNTER
MED: Metformin 500mg  SUPPLY: 90Day  PHARMACY: [facility]  PATIENT PHONE #: [phone]  LAST OV: 7/15/2019  UPCOMING OV: 9/3/2019    MED: Empagliflozin 10mg  SUPPLY: 90Day  PHARMACY: [facility]  PATIENT PHONE #: [phone]  LAST OV: 7/15/2019  UPCOMING OV: 9/3/2019

## 2019-08-31 DIAGNOSIS — M79.18 MYOFASCIAL PAIN SYNDROME: ICD-10-CM

## 2019-09-03 ENCOUNTER — OFFICE VISIT (OUTPATIENT)
Dept: INTERNAL MEDICINE CLINIC | Facility: CLINIC | Age: 59
End: 2019-09-03
Payer: MEDICARE

## 2019-09-03 VITALS
BODY MASS INDEX: 28.95 KG/M2 | TEMPERATURE: 98.1 F | HEIGHT: 63 IN | OXYGEN SATURATION: 98 % | SYSTOLIC BLOOD PRESSURE: 140 MMHG | WEIGHT: 163.4 LBS | HEART RATE: 72 BPM | DIASTOLIC BLOOD PRESSURE: 86 MMHG

## 2019-09-03 DIAGNOSIS — R21 SKIN RASH: ICD-10-CM

## 2019-09-03 DIAGNOSIS — M79.7 FIBROMYALGIA: ICD-10-CM

## 2019-09-03 DIAGNOSIS — F33.2 SEVERE EPISODE OF RECURRENT MAJOR DEPRESSIVE DISORDER, WITHOUT PSYCHOTIC FEATURES (HCC): ICD-10-CM

## 2019-09-03 DIAGNOSIS — E11.9 TYPE 2 DIABETES MELLITUS WITHOUT COMPLICATION, WITHOUT LONG-TERM CURRENT USE OF INSULIN (HCC): Primary | ICD-10-CM

## 2019-09-03 DIAGNOSIS — F51.01 PRIMARY INSOMNIA: ICD-10-CM

## 2019-09-03 DIAGNOSIS — R21 RASH OF HANDS: ICD-10-CM

## 2019-09-03 DIAGNOSIS — J45.909 ASTHMA, UNSPECIFIED ASTHMA SEVERITY, UNSPECIFIED WHETHER COMPLICATED, UNSPECIFIED WHETHER PERSISTENT: ICD-10-CM

## 2019-09-03 DIAGNOSIS — F32.89 OTHER DEPRESSION: ICD-10-CM

## 2019-09-03 DIAGNOSIS — E78.00 HYPERCHOLESTEROLEMIA: ICD-10-CM

## 2019-09-03 DIAGNOSIS — E11.65 UNCONTROLLED TYPE 2 DIABETES MELLITUS WITH HYPERGLYCEMIA (HCC): ICD-10-CM

## 2019-09-03 DIAGNOSIS — I10 BENIGN ESSENTIAL HTN: ICD-10-CM

## 2019-09-03 DIAGNOSIS — E03.9 HYPOTHYROIDISM, UNSPECIFIED TYPE: ICD-10-CM

## 2019-09-03 PROBLEM — J02.8 ACUTE PHARYNGITIS DUE TO OTHER SPECIFIED ORGANISMS: Status: RESOLVED | Noted: 2019-06-03 | Resolved: 2019-09-03

## 2019-09-03 PROBLEM — Z00.00 WELCOME TO MEDICARE PREVENTIVE VISIT: Status: RESOLVED | Noted: 2019-07-15 | Resolved: 2019-09-03

## 2019-09-03 PROCEDURE — 99214 OFFICE O/P EST MOD 30 MIN: CPT | Performed by: NURSE PRACTITIONER

## 2019-09-03 RX ORDER — CITALOPRAM 20 MG/1
20 TABLET ORAL DAILY
Qty: 90 TABLET | Refills: 1 | Status: SHIPPED | OUTPATIENT
Start: 2019-09-03 | End: 2019-12-11 | Stop reason: SDUPTHER

## 2019-09-03 RX ORDER — CITALOPRAM 10 MG/1
10 TABLET ORAL DAILY
COMMUNITY
End: 2019-09-03 | Stop reason: SDUPTHER

## 2019-09-03 RX ORDER — METHOCARBAMOL 500 MG/1
500 TABLET, FILM COATED ORAL 3 TIMES DAILY PRN
Qty: 90 TABLET | Refills: 2 | OUTPATIENT
Start: 2019-09-03

## 2019-09-03 RX ORDER — CITALOPRAM 10 MG/1
10 TABLET ORAL DAILY
Qty: 90 TABLET | Refills: 1 | Status: SHIPPED | OUTPATIENT
Start: 2019-09-03 | End: 2019-12-11

## 2019-09-03 RX ORDER — TRIAMCINOLONE ACETONIDE 0.25 MG/G
CREAM TOPICAL 2 TIMES DAILY
Qty: 30 G | Refills: 0 | Status: SHIPPED | OUTPATIENT
Start: 2019-09-03 | End: 2020-03-31 | Stop reason: ALTCHOICE

## 2019-09-03 NOTE — ASSESSMENT & PLAN NOTE
Continue to monitor closely  Pt thinks slightly elevated today due to family issue at home  Usually 120s over 80s, slightly elevated today  Continue losartan and metoprolol

## 2019-09-03 NOTE — ASSESSMENT & PLAN NOTE
Lab Results   Component Value Date    HGBA1C 6 5 (H) 08/27/2019       No results for input(s): POCGLU in the last 72 hours      Blood Sugar Average: Last 72 hrs:     Significantly improved with dietary changes, metformin and jardiance  Continue same medications   Continue checking fasting BS   Recheck HbA1c in 3 months

## 2019-09-03 NOTE — PATIENT INSTRUCTIONS
Continue same medications  Continue the awesome work with diet and diabetes control!!!   Can cut back to checking BS once a day in the morning     Follow up in 3 months

## 2019-09-03 NOTE — PROGRESS NOTES
Assessment/Plan:       Problem List Items Addressed This Visit        Endocrine    Hypothyroidism - Primary     Continue levothyroxine 125mcg daily         Relevant Orders    TSH, 3rd generation with Free T4 reflex    Type 2 diabetes mellitus without complication, without long-term current use of insulin ([facility])     Lab Results   Component Value Date    HGBA1C 6 5 (H) 08/27/2019       No results for input(s): POCGLU in the last 72 hours      Blood Sugar Average: Last 72 hrs:     Significantly improved with dietary changes, metformin and jardiance  Continue same medications   Continue checking fasting BS   Recheck HbA1c in 3 months          Relevant Medications    metFORMIN (GLUCOPHAGE) 500 mg tablet    Other Relevant Orders    Hemoglobin U8R    Basic metabolic panel       Respiratory    Asthma     Continue advair bid            Cardiovascular and Mediastinum    Benign essential HTN     Continue to monitor closely  Pt thinks slightly elevated today due to family issue at home  Usually 120s over 80s, slightly elevated today  Continue losartan and metoprolol            Musculoskeletal and Integument    Skin rash     Start triamcinolone on affected areas on fingers  Continue eucerin cream         Relevant Medications    triamcinolone (KENALOG) 0 025 % cream    Rash of hands    Relevant Medications    triamcinolone (KENALOG) 0 025 % cream       Other    Fibromyalgia     Continue lyrica  Likely pain will worsen in winter time as cold weather worsens her pain         Hypercholesterolemia     Significantly improved and at goal  Continue lipitor 80mg, zetia 10mg, ASA 81 mg and lovaza 2g bid           Insomnia     Continue ambien         Severe episode of recurrent major depressive disorder, without psychotic features ([facility])    Relevant Medications    citalopram (CeleXA) 20 mg tablet    citalopram (CeleXA) 10 mg tablet      Other Visit Diagnoses     Other depression        Relevant Medications    citalopram (CeleXA) 20 mg tablet citalopram (CeleXA) 10 mg tablet            BMI Counseling: Body mass index is 28 95 kg/m²  Discussed the patient's BMI with her  The BMI is above average  BMI counseling and education was provided to the patient  Nutrition recommendations include reducing portion sizes, decreasing overall calorie intake, 3-5 servings of fruits/vegetables daily, consuming healthier snacks and reducing intake of saturated fat and trans fat  Exercise recommendations include vigorous aerobic physical activity for 75 minutes/week  Subjective:      Patient ID: Bc Lindquist is a 63 y o  female  HPI    Patient presents today for 3 month follow up chronic conditions and to review labs from 8/27/2019    DM2  MCR 11  Fasting blood sugar 114  HbA1c 6 5 significantly improved from 12 3 four months ago  Currently on jardiance 10mg daily and metformin 500mg bid   She is checking her BS tid and brought her log with her  Her fasting BS appears to be generally 120-140s  She is also logging her diet daily  She did see [facility] diabetic education  She tries to exercise when she can  She has significantly improved her diet  Hypothyroidism   Currently on levothyroxine 125mcg daily   Asymptomatic     HLD  Current lipid panel   Total cholesterol 108; triglycerides 106; HDL 40; LDL 47  Significantly improved over the last 4 months  Currently on lovaza 2 g bid, lipitor 80mg and zetia 10mg   She is also on ASA 81 mg daily   Significantly improved diet     HTN  Currently on Losartan 25mg and Metoprolol XL 25mg daily     Asthma  Currently controlled on advair 250-50 bid     Depression  Currently controlled on celexa 30mg daily    Fibromyalgia  Currently controlled on lyrica 150mg tid  Her pain waxes and wanes - gets significantly worse with season changes and especially winter     She states she likes to stay inside all winter and ice her body due to the worsening pain    The following portions of the patient's history were reviewed and updated as appropriate: allergies, current medications, past family history, past medical history, past social history, past surgical history and problem list     Review of Systems   Constitutional: Negative for activity change, appetite change, chills and fever  Respiratory: Negative for chest tightness, shortness of breath and wheezing  Cardiovascular: Negative for chest pain  Musculoskeletal: Positive for arthralgias and myalgias  Psychiatric/Behavioral: Positive for dysphoric mood (controlled) and sleep disturbance  The patient is nervous/anxious (controlled)            Past Medical History:   Diagnosis Date    Abnormal echocardiogram     Abnormal stress test     Anxiety     Asthma     Brachial neuritis     Depression     Disease of thyroid gland     Displacement of intervertebral disc of mid-cervical region     Fibromyalgia     Frequent headaches     GERD (gastroesophageal reflux disease)     Herniated nucleus pulposus     History of arthritis     History of asthma     History of cardiac disorder     History of chest pain     History of diverticulitis     History of fatigue     History of hearing loss     History of hemoptysis     History of herpes simplex infection     History of hiatal hernia     History of insect bite     INFECTED    History of memory loss     History of neck disorder     History of reflex sympathetic dystrophy     History of restless legs syndrome     History of shortness of breath     History of spinal stenosis     Hyperlipidemia     Hyperlipidemia     Hypertension     Skin rash     Somnolence, daytime     Spinal stenosis of cervical region          Current Outpatient Medications:     acetaminophen (TYLENOL) 325 mg tablet, For headache (Patient taking differently: Take 325 mg by mouth as needed For headache), Disp: 30 tablet, Rfl: 0    albuterol (PROVENTIL HFA,VENTOLIN HFA) 90 mcg/act inhaler, Inhale 2 puffs every 6 (six) hours as needed for wheezing, Disp: 18 g, Rfl: 3    aspirin 81 mg chewable tablet, Chew 81 mg daily, Disp: , Rfl:     atorvastatin (LIPITOR) 80 mg tablet, TAKE 1 TABLET BY MOUTH EVERY DAY, Disp: 90 tablet, Rfl: 2    Blood Glucose Monitoring Suppl (ONETOUCH VERIO) w/Device KIT, by Does not apply route 3 (three) times a day, Disp: 1 kit, Rfl: 0    citalopram (CeleXA) 10 mg tablet, Take 10 mg by mouth daily, Disp: , Rfl:     citalopram (CeleXA) 20 mg tablet, Take 1 tablet (20 mg total) by mouth daily, Disp: 30 tablet, Rfl: 3    Empagliflozin (JARDIANCE) 10 MG TABS, Take 1 tablet (10 mg total) by mouth every morning, Disp: 90 tablet, Rfl: 1    ezetimibe (ZETIA) 10 mg tablet, Take 1 tablet (10 mg total) by mouth daily, Disp: 30 tablet, Rfl: 5    fluticasone-salmeterol (ADVAIR) 250-50 mcg/dose inhaler, Inhale 1 puff every 12 (twelve) hours as needed (sob), Disp: 1 each, Rfl: 1    glucose blood (ONETOUCH VERIO) test strip, 1 each by Other route 3 (three) times a day, Disp: 300 each, Rfl: 1    hydrOXYzine HCL (ATARAX) 25 mg tablet, Take 1 tablet (25 mg total) by mouth 2 (two) times a day as needed for itching, Disp: 180 tablet, Rfl: 1    Lancets (ONETOUCH ULTRASOFT) lancets, by Other route 3 (three) times a day, Disp: 300 each, Rfl: 1    levothyroxine 125 mcg tablet, Take 1 tablet (125 mcg total) by mouth daily, Disp: 30 tablet, Rfl: 5    losartan (COZAAR) 25 mg tablet, TAKE 1 TABLET DAILY  , Disp: 30 tablet, Rfl: 4    magnesium oxide (MAG-OX) 400 mg, Take 1 tablet (400 mg total) by mouth 2 (two) times a day, Disp: 60 tablet, Rfl: 0    metFORMIN (GLUCOPHAGE) 500 mg tablet, Take 1 tablet (500 mg total) by mouth 2 (two) times a day with meals, Disp: 180 tablet, Rfl: 1    methocarbamol (ROBAXIN) 500 mg tablet, Take 1 tablet (500 mg total) by mouth 3 (three) times a day as needed for muscle spasms, Disp: 90 tablet, Rfl: 2    metoprolol succinate (TOPROL-XL) 25 mg 24 hr tablet, Take 25 mg by mouth daily, Disp: , Rfl:     omega-3-acid ethyl esters (LOVAZA) 1 g capsule, Take 2 capsules (2 g total) by mouth 2 (two) times a day, Disp: 120 capsule, Rfl: 3    pantoprazole (PROTONIX) 40 mg tablet, Take 1 tablet (40 mg total) by mouth daily, Disp: 90 tablet, Rfl: 1    pregabalin (LYRICA) 150 mg capsule, Take 1 capsule (150 mg total) by mouth 3 (three) times a day, Disp: 90 capsule, Rfl: 2    triamcinolone (KENALOG) 0 1 % cream, Apply topically 2 (two) times a day, Disp: 80 g, Rfl: 1    zolpidem (AMBIEN) 10 mg tablet, Take 1 tablet (10 mg total) by mouth daily at bedtime as needed for sleep, Disp: 30 tablet, Rfl: 2    Allergies   Allergen Reactions    Augmentin [Amoxicillin-Pot Clavulanate] Nausea Only     PT stated she only allergic to medication AUGMENTIN       Social History   Past Surgical History:   Procedure Laterality Date    BACK SURGERY      BACK SURGERY      LOWER BACK SURGERY    CARDIAC CATHETERIZATION      HISTORY OF CORONARY ANGIOGRAPHY WITH CONCOMITANT LEFT HEART CATHETERIZATION    CORONARY ANGIOPLASTY WITH STENT PLACEMENT      EAR SURGERY      TONSILLECTOMY      TYMPANOPLASTY       Family History   Problem Relation Age of Onset    Coronary artery disease Mother         ATHEROMA    Heart disease Mother     Diabetes Mother     Hypertension Mother     No Known Problems Father         NO PERTINENT FAMILY HISTORY    Coronary artery disease Sister         ATHEROMA    Heart disease Sister     Stroke Sister     Diabetes Sister     Hypertension Sister        Objective:  /86 (BP Location: Left arm, Patient Position: Sitting, Cuff Size: Adult)   Pulse 72   Temp 98 1 °F (36 7 °C) (Oral)   Ht 5' 3" (1 6 m)   Wt 74 1 kg (163 lb 6 4 oz)   SpO2 98% Comment: room air  BMI 28 95 kg/m²      Physical Exam   Constitutional: She is oriented to person, place, and time  She appears well-developed and well-nourished  No distress  HENT:   Head: Normocephalic and atraumatic  Eyes: Pupils are equal, round, and reactive to light  Conjunctivae and EOM are normal    Neck: Normal range of motion  Neck supple  Cardiovascular: Normal rate, regular rhythm and normal heart sounds  No murmur heard  Pulmonary/Chest: Effort normal and breath sounds normal  No respiratory distress  She has no decreased breath sounds  She has no wheezes  She has no rhonchi  Musculoskeletal: She exhibits no edema  Lymphadenopathy:     She has no cervical adenopathy  Neurological: She is alert and oriented to person, place, and time  Skin: Skin is warm and dry  She is not diaphoretic  Psychiatric: She has a normal mood and affect  Her behavior is normal    Vitals reviewed

## 2019-09-11 DIAGNOSIS — E03.9 HYPOTHYROIDISM, UNSPECIFIED TYPE: ICD-10-CM

## 2019-09-11 RX ORDER — LEVOTHYROXINE SODIUM 0.12 MG/1
TABLET ORAL
Qty: 90 TABLET | Refills: 1 | OUTPATIENT
Start: 2019-09-11

## 2019-09-12 ENCOUNTER — TELEPHONE (OUTPATIENT)
Dept: DIABETES SERVICES | Facility: CLINIC | Age: 59
End: 2019-09-12

## 2019-09-20 DIAGNOSIS — E03.9 HYPOTHYROIDISM, UNSPECIFIED TYPE: ICD-10-CM

## 2019-09-20 RX ORDER — LEVOTHYROXINE SODIUM 0.12 MG/1
125 TABLET ORAL DAILY
Qty: 90 TABLET | Refills: 1 | Status: SHIPPED | OUTPATIENT
Start: 2019-09-20 | End: 2019-12-11 | Stop reason: SDUPTHER

## 2019-09-25 DIAGNOSIS — E78.00 HYPERCHOLESTEROLEMIA: ICD-10-CM

## 2019-09-25 RX ORDER — EZETIMIBE 10 MG/1
TABLET ORAL
Qty: 90 TABLET | Refills: 1 | OUTPATIENT
Start: 2019-09-25

## 2019-10-02 DIAGNOSIS — R21 SKIN RASH: ICD-10-CM

## 2019-10-02 RX ORDER — HYDROXYZINE HYDROCHLORIDE 25 MG/1
25 TABLET, FILM COATED ORAL 2 TIMES DAILY PRN
Qty: 180 TABLET | Refills: 1 | OUTPATIENT
Start: 2019-10-02

## 2019-10-07 DIAGNOSIS — I10 HTN (HYPERTENSION), BENIGN: ICD-10-CM

## 2019-10-07 RX ORDER — LOSARTAN POTASSIUM 25 MG/1
TABLET ORAL
Qty: 90 TABLET | Refills: 3 | Status: SHIPPED | OUTPATIENT
Start: 2019-10-07 | End: 2020-10-12

## 2019-10-16 DIAGNOSIS — E78.00 HYPERCHOLESTEROLEMIA: ICD-10-CM

## 2019-10-16 RX ORDER — OMEGA-3-ACID ETHYL ESTERS 1 G/1
CAPSULE, LIQUID FILLED ORAL
Qty: 120 CAPSULE | Refills: 3 | OUTPATIENT
Start: 2019-10-16

## 2019-10-24 DIAGNOSIS — E78.00 HYPERCHOLESTEROLEMIA: ICD-10-CM

## 2019-10-24 RX ORDER — EZETIMIBE 10 MG/1
TABLET ORAL
Qty: 90 TABLET | Refills: 1 | OUTPATIENT
Start: 2019-10-24

## 2019-10-29 DIAGNOSIS — E78.00 HYPERCHOLESTEROLEMIA: ICD-10-CM

## 2019-10-29 RX ORDER — EZETIMIBE 10 MG/1
10 TABLET ORAL DAILY
Qty: 30 TABLET | Refills: 5 | Status: SHIPPED | OUTPATIENT
Start: 2019-10-29 | End: 2020-06-09 | Stop reason: SDUPTHER

## 2019-11-20 DIAGNOSIS — K21.9 GASTROESOPHAGEAL REFLUX DISEASE, ESOPHAGITIS PRESENCE NOT SPECIFIED: ICD-10-CM

## 2019-11-20 RX ORDER — PANTOPRAZOLE SODIUM 40 MG/1
TABLET, DELAYED RELEASE ORAL
Qty: 90 TABLET | Refills: 1 | OUTPATIENT
Start: 2019-11-20

## 2019-11-21 DIAGNOSIS — M79.18 MYOFASCIAL PAIN SYNDROME: ICD-10-CM

## 2019-11-21 DIAGNOSIS — K21.9 GASTROESOPHAGEAL REFLUX DISEASE, ESOPHAGITIS PRESENCE NOT SPECIFIED: ICD-10-CM

## 2019-11-21 RX ORDER — PANTOPRAZOLE SODIUM 40 MG/1
40 TABLET, DELAYED RELEASE ORAL DAILY
Qty: 90 TABLET | Refills: 1 | Status: SHIPPED | OUTPATIENT
Start: 2019-11-21 | End: 2020-06-09 | Stop reason: SDUPTHER

## 2019-11-21 RX ORDER — METHOCARBAMOL 500 MG/1
500 TABLET, FILM COATED ORAL 3 TIMES DAILY PRN
Qty: 90 TABLET | Refills: 2 | Status: SHIPPED | OUTPATIENT
Start: 2019-11-21 | End: 2020-04-21 | Stop reason: SDUPTHER

## 2019-12-02 ENCOUNTER — TELEPHONE (OUTPATIENT)
Dept: INTERNAL MEDICINE CLINIC | Facility: CLINIC | Age: 59
End: 2019-12-02

## 2019-12-03 ENCOUNTER — CLINICAL SUPPORT (OUTPATIENT)
Dept: INTERNAL MEDICINE CLINIC | Facility: CLINIC | Age: 59
End: 2019-12-03
Payer: MEDICARE

## 2019-12-03 DIAGNOSIS — E03.9 HYPOTHYROIDISM, UNSPECIFIED TYPE: ICD-10-CM

## 2019-12-03 DIAGNOSIS — F51.01 PRIMARY INSOMNIA: ICD-10-CM

## 2019-12-03 DIAGNOSIS — E11.9 TYPE 2 DIABETES MELLITUS WITHOUT COMPLICATION, WITHOUT LONG-TERM CURRENT USE OF INSULIN (HCC): Primary | ICD-10-CM

## 2019-12-03 LAB
ANION GAP SERPL CALCULATED.3IONS-SCNC: 7 MMOL/L (ref 4–13)
BUN SERPL-MCNC: 13 MG/DL (ref 5–25)
CALCIUM SERPL-MCNC: 9.5 MG/DL (ref 8.3–10.1)
CHLORIDE SERPL-SCNC: 107 MMOL/L (ref 100–108)
CO2 SERPL-SCNC: 26 MMOL/L (ref 21–32)
CREAT SERPL-MCNC: 0.84 MG/DL (ref 0.6–1.3)
EST. AVERAGE GLUCOSE BLD GHB EST-MCNC: 151 MG/DL
GFR SERPL CREATININE-BSD FRML MDRD: 77 ML/MIN/1.73SQ M
GLUCOSE P FAST SERPL-MCNC: 157 MG/DL (ref 65–99)
HBA1C MFR BLD: 6.9 % (ref 4.2–6.3)
POTASSIUM SERPL-SCNC: 4.2 MMOL/L (ref 3.5–5.3)
SODIUM SERPL-SCNC: 140 MMOL/L (ref 136–145)
T4 FREE SERPL-MCNC: 1.35 NG/DL (ref 0.76–1.46)
TSH SERPL DL<=0.05 MIU/L-ACNC: 0.19 UIU/ML (ref 0.36–3.74)

## 2019-12-03 PROCEDURE — 84443 ASSAY THYROID STIM HORMONE: CPT | Performed by: NURSE PRACTITIONER

## 2019-12-03 PROCEDURE — 83036 HEMOGLOBIN GLYCOSYLATED A1C: CPT | Performed by: NURSE PRACTITIONER

## 2019-12-03 PROCEDURE — 36415 COLL VENOUS BLD VENIPUNCTURE: CPT

## 2019-12-03 PROCEDURE — 80048 BASIC METABOLIC PNL TOTAL CA: CPT | Performed by: NURSE PRACTITIONER

## 2019-12-03 PROCEDURE — 84439 ASSAY OF FREE THYROXINE: CPT

## 2019-12-03 RX ORDER — ZOLPIDEM TARTRATE 10 MG/1
10 TABLET ORAL
Qty: 30 TABLET | Refills: 2 | Status: SHIPPED | OUTPATIENT
Start: 2019-12-03 | End: 2020-02-03 | Stop reason: SDUPTHER

## 2019-12-11 ENCOUNTER — OFFICE VISIT (OUTPATIENT)
Dept: INTERNAL MEDICINE CLINIC | Facility: CLINIC | Age: 59
End: 2019-12-11
Payer: MEDICARE

## 2019-12-11 VITALS
WEIGHT: 171 LBS | SYSTOLIC BLOOD PRESSURE: 132 MMHG | OXYGEN SATURATION: 97 % | HEART RATE: 65 BPM | DIASTOLIC BLOOD PRESSURE: 82 MMHG | BODY MASS INDEX: 30.29 KG/M2 | TEMPERATURE: 98.1 F

## 2019-12-11 DIAGNOSIS — M79.7 FIBROMYALGIA: ICD-10-CM

## 2019-12-11 DIAGNOSIS — M54.16 LUMBAR RADICULOPATHY: ICD-10-CM

## 2019-12-11 DIAGNOSIS — E03.9 HYPOTHYROIDISM, UNSPECIFIED TYPE: ICD-10-CM

## 2019-12-11 DIAGNOSIS — E78.5 DYSLIPIDEMIA: ICD-10-CM

## 2019-12-11 DIAGNOSIS — K21.9 GASTROESOPHAGEAL REFLUX DISEASE, ESOPHAGITIS PRESENCE NOT SPECIFIED: Primary | ICD-10-CM

## 2019-12-11 DIAGNOSIS — E11.65 UNCONTROLLED TYPE 2 DIABETES MELLITUS WITH HYPERGLYCEMIA (HCC): ICD-10-CM

## 2019-12-11 DIAGNOSIS — F41.8 DEPRESSION WITH ANXIETY: ICD-10-CM

## 2019-12-11 DIAGNOSIS — M54.12 CERVICAL RADICULOPATHY: ICD-10-CM

## 2019-12-11 DIAGNOSIS — H91.93 BILATERAL HEARING LOSS, UNSPECIFIED HEARING LOSS TYPE: ICD-10-CM

## 2019-12-11 DIAGNOSIS — I10 BENIGN ESSENTIAL HTN: ICD-10-CM

## 2019-12-11 DIAGNOSIS — E78.00 HYPERCHOLESTEROLEMIA: ICD-10-CM

## 2019-12-11 DIAGNOSIS — J45.909 ASTHMA, UNSPECIFIED ASTHMA SEVERITY, UNSPECIFIED WHETHER COMPLICATED, UNSPECIFIED WHETHER PERSISTENT: ICD-10-CM

## 2019-12-11 DIAGNOSIS — E11.9 TYPE 2 DIABETES MELLITUS WITHOUT COMPLICATION, WITHOUT LONG-TERM CURRENT USE OF INSULIN (HCC): ICD-10-CM

## 2019-12-11 DIAGNOSIS — F32.89 OTHER DEPRESSION: ICD-10-CM

## 2019-12-11 DIAGNOSIS — F51.01 PRIMARY INSOMNIA: ICD-10-CM

## 2019-12-11 PROBLEM — T14.8XXA BITES: Status: RESOLVED | Noted: 2017-05-16 | Resolved: 2019-12-11

## 2019-12-11 PROBLEM — R21 SKIN RASH: Status: RESOLVED | Noted: 2017-01-24 | Resolved: 2019-12-11

## 2019-12-11 PROBLEM — R21 RASH OF HANDS: Status: RESOLVED | Noted: 2019-07-08 | Resolved: 2019-12-11

## 2019-12-11 PROBLEM — R73.09 ABNORMAL GLUCOSE: Status: RESOLVED | Noted: 2017-02-24 | Resolved: 2019-12-11

## 2019-12-11 PROCEDURE — 99214 OFFICE O/P EST MOD 30 MIN: CPT | Performed by: NURSE PRACTITIONER

## 2019-12-11 RX ORDER — ATORVASTATIN CALCIUM 80 MG/1
80 TABLET, FILM COATED ORAL DAILY
Qty: 90 TABLET | Refills: 2 | Status: SHIPPED | OUTPATIENT
Start: 2019-12-11 | End: 2020-11-19 | Stop reason: SDUPTHER

## 2019-12-11 RX ORDER — CITALOPRAM 40 MG/1
40 TABLET ORAL DAILY
Qty: 90 TABLET | Refills: 1 | Status: SHIPPED | OUTPATIENT
Start: 2019-12-11 | End: 2020-04-21 | Stop reason: SDUPTHER

## 2019-12-11 RX ORDER — ALBUTEROL SULFATE 90 UG/1
2 AEROSOL, METERED RESPIRATORY (INHALATION) EVERY 6 HOURS PRN
Qty: 1 INHALER | Refills: 3 | Status: SHIPPED | OUTPATIENT
Start: 2019-12-11 | End: 2021-04-08 | Stop reason: SDUPTHER

## 2019-12-11 RX ORDER — LEVOTHYROXINE SODIUM 112 UG/1
112 TABLET ORAL DAILY
Qty: 90 TABLET | Refills: 1 | Status: SHIPPED | OUTPATIENT
Start: 2019-12-11 | End: 2020-06-09 | Stop reason: SDUPTHER

## 2019-12-11 NOTE — ASSESSMENT & PLAN NOTE
Lab Results   Component Value Date    HGBA1C 6 9 (H) 12/03/2019   slight increase of HbA1c from 6 5 previously   Patient will improve her diet   Advised to continue metformin 500mg bid and Jardiance 10mg daily   If no improvement in 3 months, increase metformin to 1000mg bid

## 2019-12-11 NOTE — PROGRESS NOTES
Assessment/Plan:    Problem List Items Addressed This Visit        Digestive    Gastroesophageal reflux disease - Primary     Protonix 40mg daily             Endocrine    Hypothyroidism     Decrease levothyroxine to 112mcg and repeat TSH in 8 weeks          Relevant Medications    levothyroxine 112 mcg tablet    Other Relevant Orders    TSH, 3rd generation with Free T4 reflex    Type 2 diabetes mellitus without complication, without long-term current use of insulin ([facility])       Lab Results   Component Value Date    HGBA1C 6 9 (H) 12/03/2019   slight increase of HbA1c from 6 5 previously   Patient will improve her diet   Advised to continue metformin 500mg bid and Jardiance 10mg daily   If no improvement in 3 months, increase metformin to 1000mg bid          Relevant Medications    Empagliflozin (JARDIANCE) 10 MG TABS       Respiratory    Asthma     Continue advair bid         Relevant Medications    albuterol (PROVENTIL HFA,VENTOLIN HFA) 90 mcg/act inhaler       Cardiovascular and Mediastinum    Benign essential HTN     Controlled on losartan and metoprolol            Nervous and Auditory    Cervical radiculopathy     Patient planning to follow up with dr Vivienne Schuster with pain management at [facility]         Lumbar radiculopathy     Patient planning to follow up with dr Vivienne Schuster with pain management at [facility]         Hearing loss       Other    Depression with anxiety     Worsening due to worsening fibro pain with weather  Increase celexa to 40mg daily          Relevant Medications    citalopram (CeleXA) 40 mg tablet    Fibromyalgia     Continue lyrica  Pain worse in winter months          Hypercholesterolemia     Significantly improved and at goal  Continue lipitor 80mg, zetia 10mg, ASA 81 mg and lovaza 2g bid           Relevant Medications    atorvastatin (LIPITOR) 80 mg tablet    Insomnia     Continue ambien           Other Visit Diagnoses     Uncontrolled type 2 diabetes mellitus with hyperglycemia ([facility] [address] )        Relevant Medications    Empagliflozin (JARDIANCE) 10 MG TABS    Other Relevant Orders    Hemoglobin H7J    Basic metabolic panel    Dyslipidemia        Relevant Medications    atorvastatin (LIPITOR) 80 mg tablet    Other Relevant Orders    Lipid Panel with Direct LDL reflex    Other depression        Relevant Medications    citalopram (CeleXA) 40 mg tablet          BMI Counseling: Body mass index is 30 29 kg/m²  Discussed the patient's BMI with her  The BMI is above normal  Nutrition recommendations include reducing portion sizes, decreasing overall calorie intake, 3-5 servings of fruits/vegetables daily, reducing fast food intake, consuming healthier snacks, decreasing soda and/or juice intake and moderation in carbohydrate intake  Exercise recommendations include exercising 3-5 times per week  M*Modal software was used to dictate this note  It may contain errors with dictating incorrect words or incorrect spelling  Please contact the provider directly with any questions  Subjective:      Patient ID: Lavinia Ellis is a 63 y o  female  HPI    Patient presents today for 3 month follow up chronic conditions and to review labs from 12/3/19     DM2  HbA1c 6 9, slightly worsened from previous 6 5   Currently on jardiance 10mg daily and metformin 500mg bid   She is checking her BS tid and brought her log with her  She is watching what she is eating   She did have 2 weeks of eating a lot of sweets   She did see [facility] diabetic education in the past      Hypothyroidism   Currently on levothyroxine 125mcg daily   TSH low at 0 189, Free T4 normal 1 35     HLD  Lipids at goal when checked in Aug 2019  Total cholesterol 108; triglycerides 106; HDL 40; LDL 47  Currently on lovaza 2 g bid, lipitor 80mg and zetia 10mg   She is also on ASA 81 mg daily      HTN  Currently on Losartan 25mg and Metoprolol XL 25mg daily      Asthma  Currently controlled on advair 250-50 bid      Depression  Currently controlled on celexa 30mg daily  Worse with the pain  Always worsens in the winter     Fibromyalgia  Currently controlled on lyrica 150mg tid  Her pain waxes and wanes - gets significantly worse with season changes and especially winter  She states she likes to stay inside all winter and ice her body due to the worsening pain    Chronic neck and spine pain  She is planning to pain management with Dr Galen Whitfield at [facility] ([facility])       She saw audiology on 8/5/19 and was diagnosed with hyperacusis syndrome per her report  The following portions of the patient's history were reviewed and updated as appropriate: allergies, current medications, past family history, past medical history, past social history, past surgical history and problem list     Review of Systems   Constitutional: Positive for fatigue  Negative for activity change, appetite change, chills and fever  Respiratory: Negative for cough, chest tightness, shortness of breath and wheezing  Musculoskeletal: Positive for arthralgias and myalgias  Psychiatric/Behavioral: Positive for dysphoric mood and sleep disturbance  The patient is nervous/anxious            Past Medical History:   Diagnosis Date    Abnormal echocardiogram     Abnormal stress test     Anxiety     Asthma     Brachial neuritis     Depression     Disease of thyroid gland     Displacement of intervertebral disc of mid-cervical region     Fibromyalgia     Frequent headaches     GERD (gastroesophageal reflux disease)     Herniated nucleus pulposus     History of arthritis     History of asthma     History of cardiac disorder     History of chest pain     History of diverticulitis     History of fatigue     History of hearing loss     History of hemoptysis     History of herpes simplex infection     History of hiatal hernia     History of insect bite     INFECTED    History of memory loss     History of neck disorder     History of reflex sympathetic dystrophy     History of restless legs syndrome     History of shortness of breath     History of spinal stenosis     Hyperlipidemia     Hyperlipidemia     Hypertension     Skin rash     Somnolence, daytime     Spinal stenosis of cervical region          Current Outpatient Medications:     acetaminophen (TYLENOL) 325 mg tablet, For headache (Patient taking differently: Take 325 mg by mouth as needed For headache), Disp: 30 tablet, Rfl: 0    albuterol (PROVENTIL HFA,VENTOLIN HFA) 90 mcg/act inhaler, Inhale 2 puffs every 6 (six) hours as needed for wheezing, Disp: 1 Inhaler, Rfl: 3    aspirin 81 mg chewable tablet, Chew 81 mg daily, Disp: , Rfl:     atorvastatin (LIPITOR) 80 mg tablet, Take 1 tablet (80 mg total) by mouth daily, Disp: 90 tablet, Rfl: 2    Blood Glucose Monitoring Suppl (ONETOUCH VERIO) w/Device KIT, by Does not apply route 3 (three) times a day, Disp: 1 kit, Rfl: 0    citalopram (CeleXA) 40 mg tablet, Take 1 tablet (40 mg total) by mouth daily, Disp: 90 tablet, Rfl: 1    Empagliflozin (JARDIANCE) 10 MG TABS, Take 1 tablet (10 mg total) by mouth every morning, Disp: 90 tablet, Rfl: 1    fluticasone-salmeterol (ADVAIR) 250-50 mcg/dose inhaler, Inhale 1 puff every 12 (twelve) hours as needed (sob), Disp: 1 each, Rfl: 1    glucose blood (ONETOUCH VERIO) test strip, 1 each by Other route 3 (three) times a day, Disp: 300 each, Rfl: 1    hydrOXYzine HCL (ATARAX) 25 mg tablet, Take 1 tablet (25 mg total) by mouth 2 (two) times a day as needed for itching, Disp: 180 tablet, Rfl: 1    Lancets (ONETOUCH ULTRASOFT) lancets, by Other route 3 (three) times a day, Disp: 300 each, Rfl: 1    levothyroxine 112 mcg tablet, Take 1 tablet (112 mcg total) by mouth daily, Disp: 90 tablet, Rfl: 1    losartan (COZAAR) 25 mg tablet, TAKE 1 TABLET DAILY  , Disp: 90 tablet, Rfl: 3    magnesium oxide (MAG-OX) 400 mg, Take 1 tablet (400 mg total) by mouth 2 (two) times a day, Disp: 60 tablet, Rfl: 0   metFORMIN (GLUCOPHAGE) 500 mg tablet, Take 1 tablet (500 mg total) by mouth 2 (two) times a day with meals, Disp: 180 tablet, Rfl: 1    methocarbamol (ROBAXIN) 500 mg tablet, Take 1 tablet (500 mg total) by mouth 3 (three) times a day as needed for muscle spasms, Disp: 90 tablet, Rfl: 2    metoprolol succinate (TOPROL-XL) 25 mg 24 hr tablet, Take 25 mg by mouth daily, Disp: , Rfl:     omega-3-acid ethyl esters (LOVAZA) 1 g capsule, Take 2 capsules (2 g total) by mouth 2 (two) times a day, Disp: 120 capsule, Rfl: 3    pantoprazole (PROTONIX) 40 mg tablet, Take 1 tablet (40 mg total) by mouth daily, Disp: 90 tablet, Rfl: 1    pregabalin (LYRICA) 150 mg capsule, Take 1 capsule (150 mg total) by mouth 3 (three) times a day, Disp: 90 capsule, Rfl: 2    triamcinolone (KENALOG) 0 025 % cream, Apply topically 2 (two) times a day, Disp: 30 g, Rfl: 0    triamcinolone (KENALOG) 0 1 % cream, Apply topically 2 (two) times a day, Disp: 80 g, Rfl: 1    zolpidem (AMBIEN) 10 mg tablet, Take 1 tablet (10 mg total) by mouth daily at bedtime as needed for sleep, Disp: 30 tablet, Rfl: 2    ezetimibe (ZETIA) 10 mg tablet, Take 1 tablet (10 mg total) by mouth daily (Patient not taking: Reported on 12/11/2019), Disp: 30 tablet, Rfl: 5    Allergies   Allergen Reactions    Augmentin [Amoxicillin-Pot Clavulanate] Nausea Only     PT stated she only allergic to medication AUGMENTIN       Social History   Past Surgical History:   Procedure Laterality Date    BACK SURGERY      BACK SURGERY      LOWER BACK SURGERY    CARDIAC CATHETERIZATION      HISTORY OF CORONARY ANGIOGRAPHY WITH CONCOMITANT LEFT HEART CATHETERIZATION    CORONARY ANGIOPLASTY WITH STENT PLACEMENT      EAR SURGERY      TONSILLECTOMY      TYMPANOPLASTY       Family History   Problem Relation Age of Onset    Coronary artery disease Mother         ATHEROMA    Heart disease Mother     Diabetes Mother     Hypertension Mother     No Known Problems Father         NO PERTINENT FAMILY HISTORY    Coronary artery disease Sister         ATHEROMA    Heart disease Sister     Stroke Sister     Diabetes Sister     Hypertension Sister        Objective:  /82 (BP Location: Left arm, Patient Position: Sitting, Cuff Size: Adult)   Pulse 65   Temp 98 1 °F (36 7 °C) (Oral)   Wt 77 6 kg (171 lb)   SpO2 97%   BMI 30 29 kg/m²      Physical Exam   Constitutional: She is oriented to person, place, and time  She appears well-developed and well-nourished  No distress  HENT:   Head: Normocephalic and atraumatic  Right Ear: External ear normal  Tympanic membrane is perforated  Left Ear: Tympanic membrane and external ear normal    Nose: Nose normal    Mouth/Throat: Oropharynx is clear and moist  No oropharyngeal exudate, posterior oropharyngeal edema or posterior oropharyngeal erythema  Eyes: Pupils are equal, round, and reactive to light  Conjunctivae and EOM are normal    Neck: Normal range of motion  Neck supple  Cardiovascular: Normal rate, regular rhythm and normal heart sounds  No murmur heard  Pulmonary/Chest: Effort normal and breath sounds normal  No respiratory distress  She has no decreased breath sounds  She has no wheezes  She has no rhonchi  Musculoskeletal: She exhibits no edema  Lymphadenopathy:     She has no cervical adenopathy  Neurological: She is alert and oriented to person, place, and time  Skin: Skin is warm and dry  She is not diaphoretic  Psychiatric: She has a normal mood and affect  Her behavior is normal    Tearful when talking about her chronic pain   Vitals reviewed

## 2019-12-11 NOTE — ASSESSMENT & PLAN NOTE
Patient planning to follow up with dr Brice Dow with pain management at [facility]

## 2019-12-16 DIAGNOSIS — E78.00 HYPERCHOLESTEROLEMIA: ICD-10-CM

## 2019-12-16 RX ORDER — OMEGA-3-ACID ETHYL ESTERS 1 G/1
CAPSULE, LIQUID FILLED ORAL
Qty: 120 CAPSULE | Refills: 3 | OUTPATIENT
Start: 2019-12-16

## 2019-12-27 DIAGNOSIS — R21 SKIN RASH: ICD-10-CM

## 2019-12-27 RX ORDER — HYDROXYZINE HYDROCHLORIDE 25 MG/1
25 TABLET, FILM COATED ORAL 2 TIMES DAILY PRN
Qty: 180 TABLET | Refills: 1 | Status: SHIPPED | OUTPATIENT
Start: 2019-12-27 | End: 2020-06-09 | Stop reason: SDUPTHER

## 2020-01-01 NOTE — TELEPHONE ENCOUNTER
Patient called has a bad headache and can barely open her eyes  She was in er for diverticulitis  She is on cipro,toradol, flagyl, onmipaque, and zofran  She was also given morphine  She does not know what it could be from, if any of the meds are causing it  I tried to schedule her but she cant come in today she has no ride  She is coming in tomorrow for a disability form to be filled out  What is recommended for her eyes and headache? Ren

## 2020-01-14 ENCOUNTER — TELEPHONE (OUTPATIENT)
Dept: PAIN MEDICINE | Facility: CLINIC | Age: 60
End: 2020-01-14

## 2020-01-14 NOTE — TELEPHONE ENCOUNTER
Pt needs the last MRI report of 11/7/2018 faxed to Dr Leif Leroy fax# [phone]  Patients ph# [phone]  Warm transferred to [place] Rivera

## 2020-01-20 DIAGNOSIS — M79.7 FIBROMYALGIA: ICD-10-CM

## 2020-01-20 RX ORDER — PREGABALIN 150 MG/1
150 CAPSULE ORAL 3 TIMES DAILY
Qty: 90 CAPSULE | Refills: 1 | Status: SHIPPED | OUTPATIENT
Start: 2020-01-20 | End: 2020-04-21 | Stop reason: SDUPTHER

## 2020-02-03 DIAGNOSIS — F51.01 PRIMARY INSOMNIA: ICD-10-CM

## 2020-02-03 RX ORDER — ZOLPIDEM TARTRATE 10 MG/1
10 TABLET ORAL
Qty: 30 TABLET | Refills: 1 | Status: SHIPPED | OUTPATIENT
Start: 2020-02-03 | End: 2020-04-07 | Stop reason: SDUPTHER

## 2020-02-22 DIAGNOSIS — F32.89 OTHER DEPRESSION: ICD-10-CM

## 2020-03-02 RX ORDER — CITALOPRAM 20 MG/1
TABLET ORAL
Qty: 90 TABLET | Refills: 1 | OUTPATIENT
Start: 2020-03-02

## 2020-03-24 DIAGNOSIS — E03.9 HYPOTHYROIDISM, UNSPECIFIED TYPE: ICD-10-CM

## 2020-03-29 RX ORDER — LEVOTHYROXINE SODIUM 0.12 MG/1
TABLET ORAL
Qty: 90 TABLET | Refills: 1 | OUTPATIENT
Start: 2020-03-29

## 2020-03-31 ENCOUNTER — TELEPHONE (OUTPATIENT)
Dept: INTERNAL MEDICINE CLINIC | Facility: CLINIC | Age: 60
End: 2020-03-31

## 2020-03-31 ENCOUNTER — TELEMEDICINE (OUTPATIENT)
Dept: INTERNAL MEDICINE CLINIC | Facility: CLINIC | Age: 60
End: 2020-03-31
Payer: MEDICARE

## 2020-03-31 DIAGNOSIS — J40 BRONCHITIS: ICD-10-CM

## 2020-03-31 DIAGNOSIS — M54.16 LUMBAR RADICULOPATHY: ICD-10-CM

## 2020-03-31 DIAGNOSIS — J45.909 ASTHMA, UNSPECIFIED ASTHMA SEVERITY, UNSPECIFIED WHETHER COMPLICATED, UNSPECIFIED WHETHER PERSISTENT: ICD-10-CM

## 2020-03-31 DIAGNOSIS — E11.9 TYPE 2 DIABETES MELLITUS WITHOUT COMPLICATION, WITHOUT LONG-TERM CURRENT USE OF INSULIN (HCC): Primary | ICD-10-CM

## 2020-03-31 DIAGNOSIS — E03.9 HYPOTHYROIDISM, UNSPECIFIED TYPE: ICD-10-CM

## 2020-03-31 DIAGNOSIS — E11.65 UNCONTROLLED TYPE 2 DIABETES MELLITUS WITH HYPERGLYCEMIA (HCC): ICD-10-CM

## 2020-03-31 PROCEDURE — 99443 PR PHYS/QHP TELEPHONE EVALUATION 21-30 MIN: CPT | Performed by: INTERNAL MEDICINE

## 2020-03-31 RX ORDER — ACETAMINOPHEN 325 MG/1
325 TABLET ORAL AS NEEDED
Start: 2020-03-31

## 2020-03-31 RX ORDER — LEVOTHYROXINE SODIUM 0.12 MG/1
TABLET ORAL
COMMUNITY
Start: 2020-01-02 | End: 2020-03-31 | Stop reason: ALTCHOICE

## 2020-03-31 RX ORDER — AZITHROMYCIN 250 MG/1
TABLET, FILM COATED ORAL
Qty: 6 TABLET | Refills: 0 | Status: SHIPPED | OUTPATIENT
Start: 2020-03-31 | End: 2020-04-05

## 2020-03-31 RX ORDER — BLOOD-GLUCOSE METER
EACH MISCELLANEOUS 2 TIMES DAILY
Start: 2020-03-31 | End: 2022-03-02

## 2020-03-31 RX ORDER — LANCETS
EACH MISCELLANEOUS 2 TIMES DAILY
Start: 2020-03-31

## 2020-03-31 NOTE — PATIENT INSTRUCTIONS
Will mail prescription for Zithromax to patient's home  Patient to check on azithromycin coverage  Patient is only able to tolerate metformin 500 mg half tablet a day plan to continue that as per present routine  Continue to watch diet  Mail script for blood work to be done before next visit

## 2020-03-31 NOTE — PROGRESS NOTES
Virtual Brief Visit    Problem List Items Addressed This Visit        Endocrine    Hypothyroidism    Relevant Orders    TSH, 3rd generation    T4, free    Type 2 diabetes mellitus without complication, without long-term current use of insulin (HCC) - Primary    Relevant Medications    metFORMIN (GLUCOPHAGE) 500 mg tablet    glucose blood (OneTouch Verio) test strip    Blood Glucose Monitoring Suppl (ONETOUCH VERIO) w/Device KIT    Lancets (ONETOUCH ULTRASOFT) lancets       Respiratory    Asthma    Relevant Medications    azithromycin (Zithromax) 250 mg tablet       Nervous and Auditory    Lumbar radiculopathy    Relevant Medications    acetaminophen (TYLENOL) 325 mg tablet      Other Visit Diagnoses     Uncontrolled type 2 diabetes mellitus with hyperglycemia (HCC)        Relevant Medications    metFORMIN (GLUCOPHAGE) 500 mg tablet    glucose blood (OneTouch Verio) test strip    Blood Glucose Monitoring Suppl (ONETOUCH VERIO) w/Device KIT    Lancets (ONETOUCH ULTRASOFT) lancets    Bronchitis        Relevant Medications    azithromycin (Zithromax) 250 mg tablet                Reason for visit is   Chief Complaint   Patient presents with    Follow-up     pt is here for 3M check up  No recent labs    pt has [employer]  phone call only at [phone]   [facility]     pt is due for mammo  Pt does not like to get mammo's due to breast tenderness  Pt would like to discuss mammo vs US of breast    Gynecologic Exam     infored her to schedule gyn with you   Medication Problem     pt states they cut her part D and she cannot afford her medications  She had to cut some  Encounter provider Katy Fernandez MD    Provider located at [place] [id number]  [address] [place] [id number]      Recent Visits  No visits were found meeting these conditions     Showing recent visits within past 7 days and meeting all other requirements     Today's Visits  Date Type Provider Dept   03/31/20 Telephone [facility]   03/31/20 Telemedicine Fredy Murray MD [facility]   Showing today's visits and meeting all other requirements     Future Appointments  Date Type Provider Dept   03/31/20 Telephone [facility]   Showing future appointments within next 150 days and meeting all other requirements        After connecting through telephone, the patient was identified by name and date of birth  Gabo Jeronimo was informed that this is a telemedicine visit and that the visit is being conducted through telephone  My office door was closed  No one else was in the room  She acknowledged consent and understanding of privacy and security of the video platform  The patient has agreed to participate and understands they can discontinue the visit at any time  Patient is aware this is a billable service  Subjective  Gabo Jeronimo is a 63 y o  female who wanted to discuss her issues with her diabetic medications and a recent sinus infection  As far as her diabetes is concerned, she has been cutting down on the metformin to half a 500 mg tablet daily  Presently she is on the Jardiance 10 mg tablets today, she is not sure of coverage at the present time  She developed a sinus infection a few weeks ago but is afraid to go to the hospital   She does have a history of asthma with good response to Zithromax in the past   She continues to have yellow phlegm with a cough but no chest congestion or shortness of breath  She requested that a prescription for Zithromax recent to home  She has not had any blood work done recently however her son thyroid medications were changed after her last blood work    Other than that overall she feels well      Past Medical History:   Diagnosis Date    Abnormal echocardiogram     Abnormal stress test     Anxiety     Asthma     Brachial neuritis     Depression     Disease of thyroid gland     Displacement of intervertebral disc of mid-cervical region     Fibromyalgia     Frequent headaches     GERD (gastroesophageal reflux disease)     Herniated nucleus pulposus     History of arthritis     History of asthma     History of cardiac disorder     History of chest pain     History of diverticulitis     History of fatigue     History of hearing loss     History of hemoptysis     History of herpes simplex infection     History of hiatal hernia     History of insect bite     INFECTED    History of memory loss     History of neck disorder     History of reflex sympathetic dystrophy     History of restless legs syndrome     History of shortness of breath     History of spinal stenosis     Hyperlipidemia     Hyperlipidemia     Hypertension     Skin rash     Somnolence, daytime     Spinal stenosis of cervical region        Past Surgical History:   Procedure Laterality Date    BACK SURGERY      BACK SURGERY      LOWER BACK SURGERY    CARDIAC CATHETERIZATION      HISTORY OF CORONARY ANGIOGRAPHY WITH CONCOMITANT LEFT HEART CATHETERIZATION    CORONARY ANGIOPLASTY WITH STENT PLACEMENT      EAR SURGERY      TONSILLECTOMY      TYMPANOPLASTY         Current Outpatient Medications   Medication Sig Dispense Refill    acetaminophen (TYLENOL) 325 mg tablet Take 1 tablet (325 mg total) by mouth as needed for headaches For headache      albuterol (PROVENTIL HFA,VENTOLIN HFA) 90 mcg/act inhaler Inhale 2 puffs every 6 (six) hours as needed for wheezing 1 Inhaler 3    aspirin 81 mg chewable tablet Chew 81 mg daily      atorvastatin (LIPITOR) 80 mg tablet Take 1 tablet (80 mg total) by mouth daily 90 tablet 2    Blood Glucose Monitoring Suppl (ONETOUCH VERIO) w/Device KIT by Does not apply route 2 (two) times a day      citalopram (CeleXA) 40 mg tablet Take 1 tablet (40 mg total) by mouth daily 90 tablet 1    Empagliflozin (JARDIANCE) 10 MG TABS Take 1 tablet (10 mg total) by mouth every morning 90 tablet 1    ezetimibe (ZETIA) 10 mg tablet Take 1 tablet (10 mg total) by mouth daily 30 tablet 5    fluticasone-salmeterol (ADVAIR) 250-50 mcg/dose inhaler Inhale 1 puff every 12 (twelve) hours as needed (sob) 1 each 1    glucose blood (OneTouch Verio) test strip 1 each by Other route 2 (two) times a day      hydrOXYzine HCL (ATARAX) 25 mg tablet Take 1 tablet (25 mg total) by mouth 2 (two) times a day as needed for itching 180 tablet 1    Lancets (ONETOUCH ULTRASOFT) lancets by Other route 2 (two) times a day      levothyroxine 112 mcg tablet Take 1 tablet (112 mcg total) by mouth daily 90 tablet 1    losartan (COZAAR) 25 mg tablet TAKE 1 TABLET DAILY  90 tablet 3    magnesium oxide (MAG-OX) 400 mg Take 1 tablet (400 mg total) by mouth 2 (two) times a day 60 tablet 0    metFORMIN (GLUCOPHAGE) 500 mg tablet Take 0 5 tablets (250 mg total) by mouth daily 90 tablet 1    methocarbamol (ROBAXIN) 500 mg tablet Take 1 tablet (500 mg total) by mouth 3 (three) times a day as needed for muscle spasms 90 tablet 2    metoprolol succinate (TOPROL-XL) 25 mg 24 hr tablet Take 25 mg by mouth daily      omega-3-acid ethyl esters (LOVAZA) 1 g capsule Take 2 capsules (2 g total) by mouth 2 (two) times a day 120 capsule 3    pantoprazole (PROTONIX) 40 mg tablet Take 1 tablet (40 mg total) by mouth daily 90 tablet 1    triamcinolone (KENALOG) 0 1 % cream Apply topically 2 (two) times a day 80 g 1    zolpidem (AMBIEN) 10 mg tablet Take 1 tablet (10 mg total) by mouth daily at bedtime as needed for sleep 30 tablet 1    azithromycin (Zithromax) 250 mg tablet Take 2 tablets (500 mg total) by mouth daily for 1 day, THEN 1 tablet (250 mg total) daily for 4 days   6 tablet 0    pregabalin (LYRICA) 150 mg capsule Take 1 capsule (150 mg total) by mouth 3 (three) times a day 90 capsule 1     No current facility-administered medications for this visit  Allergies   Allergen Reactions    Augmentin [Amoxicillin-Pot Clavulanate] Nausea Only     PT stated she only allergic to medication AUGMENTIN       Review of Systems   HENT: Positive for postnasal drip  Respiratory: Positive for cough  Psychiatric/Behavioral: Positive for sleep disturbance  The patient is nervous/anxious  All other systems reviewed and are negative  I spent 25 minutes with the patient during this visit

## 2020-04-07 DIAGNOSIS — F51.01 PRIMARY INSOMNIA: ICD-10-CM

## 2020-04-07 RX ORDER — ZOLPIDEM TARTRATE 10 MG/1
10 TABLET ORAL
Qty: 30 TABLET | Refills: 0 | Status: SHIPPED | OUTPATIENT
Start: 2020-04-07 | End: 2020-05-13 | Stop reason: SDUPTHER

## 2020-04-09 DIAGNOSIS — E78.00 HYPERCHOLESTEROLEMIA: ICD-10-CM

## 2020-04-09 RX ORDER — OMEGA-3-ACID ETHYL ESTERS 1 G/1
2 CAPSULE, LIQUID FILLED ORAL 2 TIMES DAILY
Qty: 120 CAPSULE | Refills: 3 | Status: SHIPPED | OUTPATIENT
Start: 2020-04-09 | End: 2020-09-10 | Stop reason: SDUPTHER

## 2020-04-10 DIAGNOSIS — F32.89 OTHER DEPRESSION: ICD-10-CM

## 2020-04-10 RX ORDER — CITALOPRAM 10 MG/1
TABLET ORAL
Qty: 90 TABLET | Refills: 1 | OUTPATIENT
Start: 2020-04-10

## 2020-04-20 ENCOUNTER — TELEPHONE (OUTPATIENT)
Dept: ADMINISTRATIVE | Facility: OTHER | Age: 60
End: 2020-04-20

## 2020-04-21 ENCOUNTER — TELEMEDICINE (OUTPATIENT)
Dept: INTERNAL MEDICINE CLINIC | Facility: CLINIC | Age: 60
End: 2020-04-21
Payer: MEDICARE

## 2020-04-21 DIAGNOSIS — M79.7 FIBROMYALGIA: ICD-10-CM

## 2020-04-21 DIAGNOSIS — I20.8 CHRONIC STABLE ANGINA (HCC): ICD-10-CM

## 2020-04-21 DIAGNOSIS — E11.65 UNCONTROLLED TYPE 2 DIABETES MELLITUS WITH HYPERGLYCEMIA (HCC): ICD-10-CM

## 2020-04-21 DIAGNOSIS — Z12.31 ENCOUNTER FOR SCREENING MAMMOGRAM FOR MALIGNANT NEOPLASM OF BREAST: ICD-10-CM

## 2020-04-21 DIAGNOSIS — I10 BENIGN ESSENTIAL HTN: ICD-10-CM

## 2020-04-21 DIAGNOSIS — Z12.39 SCREENING FOR BREAST CANCER: ICD-10-CM

## 2020-04-21 DIAGNOSIS — M79.18 MYOFASCIAL PAIN SYNDROME: ICD-10-CM

## 2020-04-21 DIAGNOSIS — K58.9 IRRITABLE BOWEL SYNDROME WITHOUT DIARRHEA: ICD-10-CM

## 2020-04-21 DIAGNOSIS — E11.9 TYPE 2 DIABETES MELLITUS WITHOUT COMPLICATION, WITHOUT LONG-TERM CURRENT USE OF INSULIN (HCC): Primary | ICD-10-CM

## 2020-04-21 DIAGNOSIS — F32.89 OTHER DEPRESSION: ICD-10-CM

## 2020-04-21 PROCEDURE — 99443 PR PHYS/QHP TELEPHONE EVALUATION 21-30 MIN: CPT | Performed by: INTERNAL MEDICINE

## 2020-04-21 RX ORDER — METHOCARBAMOL 500 MG/1
500 TABLET, FILM COATED ORAL 3 TIMES DAILY PRN
Qty: 90 TABLET | Refills: 0 | Status: SHIPPED | OUTPATIENT
Start: 2020-04-21 | End: 2020-09-10 | Stop reason: ALTCHOICE

## 2020-04-21 RX ORDER — PREGABALIN 150 MG/1
150 CAPSULE ORAL 3 TIMES DAILY
Qty: 90 CAPSULE | Refills: 2 | Status: SHIPPED | OUTPATIENT
Start: 2020-04-21 | End: 2020-09-10 | Stop reason: SDUPTHER

## 2020-04-21 RX ORDER — CITALOPRAM 40 MG/1
40 TABLET ORAL DAILY
Qty: 90 TABLET | Refills: 1 | Status: SHIPPED | OUTPATIENT
Start: 2020-04-21 | End: 2020-07-15 | Stop reason: SDUPTHER

## 2020-05-01 ENCOUNTER — TELEPHONE (OUTPATIENT)
Dept: CARDIOLOGY CLINIC | Facility: CLINIC | Age: 60
End: 2020-05-01

## 2020-05-13 ENCOUNTER — OFFICE VISIT (OUTPATIENT)
Dept: CARDIOLOGY CLINIC | Facility: CLINIC | Age: 60
End: 2020-05-13
Payer: MEDICARE

## 2020-05-13 ENCOUNTER — TELEPHONE (OUTPATIENT)
Dept: CARDIOLOGY CLINIC | Facility: CLINIC | Age: 60
End: 2020-05-13

## 2020-05-13 VITALS
SYSTOLIC BLOOD PRESSURE: 122 MMHG | HEIGHT: 63 IN | TEMPERATURE: 97.7 F | RESPIRATION RATE: 16 BRPM | DIASTOLIC BLOOD PRESSURE: 74 MMHG | HEART RATE: 76 BPM | BODY MASS INDEX: 31.22 KG/M2 | WEIGHT: 176.2 LBS

## 2020-05-13 DIAGNOSIS — R06.02 SHORTNESS OF BREATH: ICD-10-CM

## 2020-05-13 DIAGNOSIS — F51.01 PRIMARY INSOMNIA: ICD-10-CM

## 2020-05-13 DIAGNOSIS — R06.00 DOE (DYSPNEA ON EXERTION): ICD-10-CM

## 2020-05-13 DIAGNOSIS — R00.2 PALPITATIONS: Primary | ICD-10-CM

## 2020-05-13 PROCEDURE — 1036F TOBACCO NON-USER: CPT | Performed by: INTERNAL MEDICINE

## 2020-05-13 PROCEDURE — 99214 OFFICE O/P EST MOD 30 MIN: CPT | Performed by: INTERNAL MEDICINE

## 2020-05-13 PROCEDURE — 3078F DIAST BP <80 MM HG: CPT | Performed by: INTERNAL MEDICINE

## 2020-05-13 PROCEDURE — 3008F BODY MASS INDEX DOCD: CPT | Performed by: INTERNAL MEDICINE

## 2020-05-13 PROCEDURE — 3074F SYST BP LT 130 MM HG: CPT | Performed by: INTERNAL MEDICINE

## 2020-05-13 RX ORDER — ZOLPIDEM TARTRATE 10 MG/1
10 TABLET ORAL
Qty: 30 TABLET | Refills: 0 | Status: SHIPPED | OUTPATIENT
Start: 2020-05-13 | End: 2020-06-14 | Stop reason: SDUPTHER

## 2020-05-23 DIAGNOSIS — K21.9 GASTROESOPHAGEAL REFLUX DISEASE, ESOPHAGITIS PRESENCE NOT SPECIFIED: ICD-10-CM

## 2020-05-23 RX ORDER — PANTOPRAZOLE SODIUM 40 MG/1
TABLET, DELAYED RELEASE ORAL
Qty: 90 TABLET | Refills: 1 | OUTPATIENT
Start: 2020-05-23

## 2020-05-26 DIAGNOSIS — E03.9 HYPOTHYROIDISM, UNSPECIFIED TYPE: ICD-10-CM

## 2020-05-26 RX ORDER — LEVOTHYROXINE SODIUM 112 UG/1
TABLET ORAL
Qty: 90 TABLET | Refills: 1 | OUTPATIENT
Start: 2020-05-26

## 2020-06-09 DIAGNOSIS — K21.9 GASTROESOPHAGEAL REFLUX DISEASE, ESOPHAGITIS PRESENCE NOT SPECIFIED: ICD-10-CM

## 2020-06-09 DIAGNOSIS — E78.00 HYPERCHOLESTEROLEMIA: ICD-10-CM

## 2020-06-09 DIAGNOSIS — E03.9 HYPOTHYROIDISM, UNSPECIFIED TYPE: ICD-10-CM

## 2020-06-09 DIAGNOSIS — R21 SKIN RASH: ICD-10-CM

## 2020-06-09 RX ORDER — LEVOTHYROXINE SODIUM 112 UG/1
112 TABLET ORAL DAILY
Qty: 90 TABLET | Refills: 1 | Status: SHIPPED | OUTPATIENT
Start: 2020-06-09 | End: 2020-12-21 | Stop reason: SDUPTHER

## 2020-06-09 RX ORDER — EZETIMIBE 10 MG/1
10 TABLET ORAL DAILY
Qty: 30 TABLET | Refills: 5 | Status: SHIPPED | OUTPATIENT
Start: 2020-06-09 | End: 2020-12-19

## 2020-06-09 RX ORDER — PANTOPRAZOLE SODIUM 40 MG/1
40 TABLET, DELAYED RELEASE ORAL DAILY
Qty: 90 TABLET | Refills: 1 | Status: SHIPPED | OUTPATIENT
Start: 2020-06-09 | End: 2020-12-23

## 2020-06-09 RX ORDER — HYDROXYZINE HYDROCHLORIDE 25 MG/1
25 TABLET, FILM COATED ORAL 2 TIMES DAILY PRN
Qty: 180 TABLET | Refills: 1 | Status: SHIPPED | OUTPATIENT
Start: 2020-06-09 | End: 2020-07-07 | Stop reason: ALTCHOICE

## 2020-06-11 ENCOUNTER — CLINICAL SUPPORT (OUTPATIENT)
Dept: CARDIOLOGY CLINIC | Facility: CLINIC | Age: 60
End: 2020-06-11
Payer: MEDICARE

## 2020-06-11 DIAGNOSIS — R00.2 PALPITATIONS: ICD-10-CM

## 2020-06-11 PROCEDURE — 0298T PR EXT ECG > 48HR TO 21 DAY REVIEW AND INTERPRETATN: CPT | Performed by: INTERNAL MEDICINE

## 2020-06-14 DIAGNOSIS — F51.01 PRIMARY INSOMNIA: ICD-10-CM

## 2020-06-15 RX ORDER — ZOLPIDEM TARTRATE 10 MG/1
10 TABLET ORAL
Qty: 30 TABLET | Refills: 0 | Status: SHIPPED | OUTPATIENT
Start: 2020-06-15 | End: 2020-07-15 | Stop reason: SDUPTHER

## 2020-06-28 ENCOUNTER — NURSE TRIAGE (OUTPATIENT)
Dept: OTHER | Facility: OTHER | Age: 60
End: 2020-06-28

## 2020-06-28 ENCOUNTER — HOSPITAL ENCOUNTER (INPATIENT)
Facility: HOSPITAL | Age: 60
LOS: 1 days | Discharge: HOME | DRG: 103 | End: 2020-06-30
Attending: EMERGENCY MEDICINE | Admitting: INTERNAL MEDICINE
Payer: MEDICARE

## 2020-06-28 ENCOUNTER — APPOINTMENT (OUTPATIENT)
Dept: CT IMAGING | Facility: HOSPITAL | Age: 60
DRG: 103 | End: 2020-06-28
Payer: MEDICARE

## 2020-06-28 DIAGNOSIS — R51.9 HEADACHE: Primary | ICD-10-CM

## 2020-06-28 LAB — GLUCOSE SERPL-MCNC: 128 MG/DL (ref 65–140)

## 2020-06-28 PROCEDURE — 96375 TX/PRO/DX INJ NEW DRUG ADDON: CPT

## 2020-06-28 PROCEDURE — 96374 THER/PROPH/DIAG INJ IV PUSH: CPT

## 2020-06-28 PROCEDURE — 99284 EMERGENCY DEPT VISIT MOD MDM: CPT

## 2020-06-28 PROCEDURE — 99285 EMERGENCY DEPT VISIT HI MDM: CPT | Performed by: EMERGENCY MEDICINE

## 2020-06-28 PROCEDURE — 82948 REAGENT STRIP/BLOOD GLUCOSE: CPT

## 2020-06-28 PROCEDURE — 99223 1ST HOSP IP/OBS HIGH 75: CPT | Performed by: INTERNAL MEDICINE

## 2020-06-28 PROCEDURE — 96361 HYDRATE IV INFUSION ADD-ON: CPT

## 2020-06-28 RX ORDER — KETOROLAC TROMETHAMINE 30 MG/ML
30 INJECTION, SOLUTION INTRAMUSCULAR; INTRAVENOUS ONCE
Status: COMPLETED | OUTPATIENT
Start: 2020-06-28 | End: 2020-06-28

## 2020-06-28 RX ORDER — LEVOTHYROXINE SODIUM 112 UG/1
112 TABLET ORAL DAILY
Status: DISCONTINUED | OUTPATIENT
Start: 2020-06-29 | End: 2020-06-30 | Stop reason: HOSPADM

## 2020-06-28 RX ORDER — METOCLOPRAMIDE HYDROCHLORIDE 5 MG/ML
10 INJECTION INTRAMUSCULAR; INTRAVENOUS ONCE
Status: COMPLETED | OUTPATIENT
Start: 2020-06-28 | End: 2020-06-28

## 2020-06-28 RX ORDER — HYDROMORPHONE HCL/PF 1 MG/ML
0.5 SYRINGE (ML) INJECTION ONCE
Status: COMPLETED | OUTPATIENT
Start: 2020-06-28 | End: 2020-06-28

## 2020-06-28 RX ORDER — PREGABALIN 75 MG/1
150 CAPSULE ORAL 3 TIMES DAILY
Status: DISCONTINUED | OUTPATIENT
Start: 2020-06-28 | End: 2020-06-30 | Stop reason: HOSPADM

## 2020-06-28 RX ORDER — AMOXICILLIN 250 MG
2 CAPSULE ORAL DAILY
COMMUNITY
End: 2022-03-02

## 2020-06-28 RX ORDER — ALBUTEROL SULFATE 90 UG/1
2 AEROSOL, METERED RESPIRATORY (INHALATION) EVERY 6 HOURS PRN
Status: DISCONTINUED | OUTPATIENT
Start: 2020-06-28 | End: 2020-06-30 | Stop reason: HOSPADM

## 2020-06-28 RX ORDER — ONDANSETRON 2 MG/ML
4 INJECTION INTRAMUSCULAR; INTRAVENOUS ONCE
Status: COMPLETED | OUTPATIENT
Start: 2020-06-28 | End: 2020-06-28

## 2020-06-28 RX ORDER — EZETIMIBE 10 MG/1
10 TABLET ORAL DAILY
Status: DISCONTINUED | OUTPATIENT
Start: 2020-06-29 | End: 2020-06-30 | Stop reason: HOSPADM

## 2020-06-28 RX ORDER — ASPIRIN 81 MG/1
81 TABLET, CHEWABLE ORAL DAILY
Status: DISCONTINUED | OUTPATIENT
Start: 2020-06-29 | End: 2020-06-30 | Stop reason: HOSPADM

## 2020-06-28 RX ORDER — LOSARTAN POTASSIUM 25 MG/1
25 TABLET ORAL DAILY
Status: DISCONTINUED | OUTPATIENT
Start: 2020-06-29 | End: 2020-06-30 | Stop reason: HOSPADM

## 2020-06-28 RX ORDER — FLUTICASONE FUROATE AND VILANTEROL 200; 25 UG/1; UG/1
1 POWDER RESPIRATORY (INHALATION)
Status: DISCONTINUED | OUTPATIENT
Start: 2020-06-29 | End: 2020-06-30 | Stop reason: HOSPADM

## 2020-06-28 RX ORDER — ZOLPIDEM TARTRATE 5 MG/1
10 TABLET ORAL
Status: DISCONTINUED | OUTPATIENT
Start: 2020-06-28 | End: 2020-06-30 | Stop reason: HOSPADM

## 2020-06-28 RX ORDER — HYDROXYZINE HYDROCHLORIDE 25 MG/1
25 TABLET, FILM COATED ORAL 2 TIMES DAILY PRN
Status: DISCONTINUED | OUTPATIENT
Start: 2020-06-28 | End: 2020-06-30 | Stop reason: HOSPADM

## 2020-06-28 RX ORDER — CITALOPRAM 20 MG/1
40 TABLET ORAL DAILY
Status: DISCONTINUED | OUTPATIENT
Start: 2020-06-29 | End: 2020-06-30 | Stop reason: HOSPADM

## 2020-06-28 RX ORDER — DIPHENHYDRAMINE HYDROCHLORIDE 50 MG/ML
25 INJECTION INTRAMUSCULAR; INTRAVENOUS ONCE
Status: COMPLETED | OUTPATIENT
Start: 2020-06-28 | End: 2020-06-28

## 2020-06-28 RX ORDER — ACETAMINOPHEN 325 MG/1
975 TABLET ORAL EVERY 6 HOURS PRN
Status: DISCONTINUED | OUTPATIENT
Start: 2020-06-28 | End: 2020-06-30 | Stop reason: HOSPADM

## 2020-06-28 RX ORDER — PANTOPRAZOLE SODIUM 40 MG/1
40 TABLET, DELAYED RELEASE ORAL
Status: DISCONTINUED | OUTPATIENT
Start: 2020-06-29 | End: 2020-06-30 | Stop reason: HOSPADM

## 2020-06-28 RX ORDER — METHOCARBAMOL 500 MG/1
500 TABLET, FILM COATED ORAL 3 TIMES DAILY PRN
Status: DISCONTINUED | OUTPATIENT
Start: 2020-06-28 | End: 2020-06-30 | Stop reason: HOSPADM

## 2020-06-28 RX ORDER — ATORVASTATIN CALCIUM 40 MG/1
80 TABLET, FILM COATED ORAL DAILY
Status: DISCONTINUED | OUTPATIENT
Start: 2020-06-29 | End: 2020-06-30 | Stop reason: HOSPADM

## 2020-06-28 RX ADMIN — SODIUM CHLORIDE 1000 ML: 0.9 INJECTION, SOLUTION INTRAVENOUS at 15:42

## 2020-06-28 RX ADMIN — DIPHENHYDRAMINE HYDROCHLORIDE 25 MG: 50 INJECTION INTRAMUSCULAR; INTRAVENOUS at 15:43

## 2020-06-28 RX ADMIN — METOCLOPRAMIDE HYDROCHLORIDE 10 MG: 5 INJECTION INTRAMUSCULAR; INTRAVENOUS at 15:42

## 2020-06-28 RX ADMIN — HYDROMORPHONE HYDROCHLORIDE 0.5 MG: 1 INJECTION, SOLUTION INTRAMUSCULAR; INTRAVENOUS; SUBCUTANEOUS at 17:56

## 2020-06-28 RX ADMIN — KETOROLAC TROMETHAMINE 30 MG: 30 INJECTION, SOLUTION INTRAMUSCULAR at 15:43

## 2020-06-28 RX ADMIN — PREGABALIN 150 MG: 75 CAPSULE ORAL at 21:26

## 2020-06-28 RX ADMIN — ONDANSETRON 4 MG: 2 INJECTION INTRAMUSCULAR; INTRAVENOUS at 15:44

## 2020-06-28 RX ADMIN — MAGNESIUM OXIDE 400 MG: 400 TABLET ORAL at 21:26

## 2020-06-28 NOTE — ASSESSMENT & PLAN NOTE
· MRI cervical spine11/2018: Scattered mild to moderate spondylosis without cord compression or cord signal abnormalities  There is nonspecific straightening of the cervical lordosis without subluxation    · Pain associated, poorly controlled .

## 2020-06-28 NOTE — ASSESSMENT & PLAN NOTE
· POA, 2 week h/o constant HA with associated photophobia  · Etiology: likely migraine vs TTH  Concerning for vertebral artery dissection, as pain began soon after neck massage Cannot r/o structural neurologic abnormalities contributing; while CT in 2018 without any acute abnormalities  Less likely temporal arteritis since pain is bilateral  Does not appear infectious, as patient is afebrile, with good active ROM of the neck with minimal pain    · Patient has h/o same 2 years ago  · ESR, CRP, CBC, CMP  · CTA head  · Will hold off migraine cocktail until CTA head results are back; if positive for dissection, will likely need transfer to another [facility] with specialty care

## 2020-06-28 NOTE — H&P
H&P- Claudetta Maria 1960, 63 y o  female MRN: [id number]    Unit/Bed#: S -01 Encounter: [id number]    Primary Care Provider: Devang Jay MD   Date and time admitted to hospital: 6/28/2020  3:21 PM        * Headache  Assessment & Plan  · POA, 2 week h/o constant HA with associated photophobia  · Etiology: likely migraine vs TTH  Concerning for vertebral artery dissection, as pain began soon after neck massage Cannot r/o structural neurologic abnormalities contributing; while CT in 2018 without any acute abnormalities  Less likely temporal arteritis since pain is bilateral  Does not appear infectious, as patient is afebrile, with good active ROM of the neck with minimal pain  · Patient has h/o same 2 years ago  · ESR, CRP, CBC, CMP  · CTA head  · Will hold off migraine cocktail until CTA head results are back; if positive for dissection, will likely need transfer to another [facility] facility with specialty care    Type 2 diabetes mellitus without complication, without long-term current use of insulin ([facility]  Assessment & Plan  Lab Results   Component Value Date    HGBA1C 6 9 (H) 12/03/2019       No results for input(s): POCGLU in the last 72 hours  Blood Sugar Average: Last 72 hrs:     · Hold home meds  · Will do SSI before meals and before bedtime  · Based on BG in next 24 hours, will either continue SSI or transition to basal-bolus regimen    Hypercholesterolemia  Assessment & Plan  · Continue lipitor and zetia    Hypothyroidism  Assessment & Plan  · Likely acquired  · Continue levothyroxine    Gastroesophageal reflux disease  Assessment & Plan  · Stable  · Continue protonix    Fibromyalgia  Assessment & Plan  · Continue lyrica    Depression with anxiety  Assessment & Plan  · Well controlled  · Continue celexa daily    Cervical herniated disc  Assessment & Plan  · MRI cervical spine11/2018: Scattered mild to moderate spondylosis without cord compression or cord signal abnormalities  There is nonspecific straightening of the cervical lordosis without subluxation  · Pain associated, poorly controlled    VTE Prophylaxis: Enoxaparin (Lovenox)  / sequential compression device   Code Status: level 1 code   POLST: POLST form is not discussed and not completed at this time  Anticipated Length of Stay:  Patient will be admitted on an Observation basis with an anticipated length of stay of  < 2 midnights  Justification for Hospital Stay: pain management    Chief Complaint:   headache    History of Present Illness:    Rhoda Morris is a 63 y o  female with PMHx of fibromyalgia, type 2 diabetes, chronic neck pain, MDD, HTN, CAD, HTN, who presents with 2 week history of a headache  She said the headache started soon after a neck massage  Presented for 2 days, worsened with associated photophobia for a total of 5 days  Went away for 3 days to a point when she could do her day-to-day activities  Then returned back suddenly without any specific triggers, has been consistent for the past week  She says the pain is everywhere, by her eyes, frontal area, temples, the top of her head, and the back  Pain is gnawing and aching  Pain is constant  Has tried tylenol, which doesn't help  Also tried methocarbamol which she takes for back pain and fibromyalgia, which also doesn't help  Takes ambien at night, and is able to sleep through the night  Had one episode of chills yesterday, and nausea with driving here  Had similar symptoms 2 years ago, was at [facility]  She states that they only treated er with tylenol  She was evaluated by neurology as well, but she says they did not get any imaging, or diagnose her with anything  She did not follow up with neurology  Review of Systems:    Review of Systems   Constitutional: Positive for chills and fatigue  Negative for fever  HENT: Negative for congestion, sinus pressure, sinus pain and sore throat  Eyes: Positive for photophobia and pain   Negative for discharge, itching and visual disturbance  Respiratory: Negative for cough, shortness of breath and wheezing  Cardiovascular: Negative for chest pain  Gastrointestinal: Positive for nausea  Negative for abdominal pain, constipation, diarrhea and vomiting  Endocrine: Negative for polydipsia and polyuria  Genitourinary: Negative for dysuria  Musculoskeletal: Positive for neck pain  Negative for neck stiffness  Skin: Negative for rash  Neurological: Positive for headaches  Negative for dizziness and light-headedness  Hematological: Does not bruise/bleed easily  Psychiatric/Behavioral: The patient is not nervous/anxious          Past Medical and Surgical History:     Past Medical History:   Diagnosis Date    Abnormal echocardiogram     Abnormal stress test     Anxiety     Asthma     Brachial neuritis     Depression     Disease of thyroid gland     Displacement of intervertebral disc of mid-cervical region     Fibromyalgia     Frequent headaches     GERD (gastroesophageal reflux disease)     Herniated nucleus pulposus     History of arthritis     History of asthma     History of cardiac disorder     History of chest pain     History of diverticulitis     History of fatigue     History of hearing loss     History of hemoptysis     History of herpes simplex infection     History of hiatal hernia     History of insect bite     INFECTED    History of memory loss     History of neck disorder     History of reflex sympathetic dystrophy     History of restless legs syndrome     History of shortness of breath     History of spinal stenosis     Hyperlipidemia     Hyperlipidemia     Hypertension     Skin rash     Somnolence, daytime     Spinal stenosis of cervical region        Past Surgical History:   Procedure Laterality Date    BACK SURGERY      BACK SURGERY      LOWER BACK SURGERY    CARDIAC CATHETERIZATION      HISTORY OF CORONARY ANGIOGRAPHY WITH CONCOMITANT LEFT HEART CATHETERIZATION    CORONARY ANGIOPLASTY WITH STENT PLACEMENT      EAR SURGERY      TONSILLECTOMY      TYMPANOPLASTY         Meds/Allergies:    Prior to Admission medications    Medication Sig Start Date End Date Taking? Authorizing Provider   acetaminophen (TYLENOL) 325 mg tablet Take 1 tablet (325 mg total) by mouth as needed for headaches For headache 3/31/20  Yes Yadira Reyes MD   aspirin 81 mg chewable tablet Chew 81 mg daily   Yes Historical Provider, MD   atorvastatin (LIPITOR) 80 mg tablet Take 1 tablet (80 mg total) by mouth daily 12/11/19  Yes JORGE A Antonio   Blood Glucose Monitoring Suppl (Aleah Rockwell) w/Device KIT by Does not apply route 2 (two) times a day 3/31/20  Yes Yadira Reyes MD   citalopram (CeleXA) 40 mg tablet Take 1 tablet (40 mg total) by mouth daily 4/21/20  Yes Yadira Reyes MD   Empagliflozin (Jardiance) 10 MG TABS Take 1 tablet (10 mg total) by mouth every morning 4/21/20  Yes Yadira Reyes MD   ezetimibe (ZETIA) 10 mg tablet Take 1 tablet (10 mg total) by mouth daily 6/9/20  Yes Yadira eRyes MD   fluticasone-salmeterol (ADVAIR) 250-50 mcg/dose inhaler Inhale 1 puff every 12 (twelve) hours as needed (sob) 3/16/18  Yes Dg Hudson MD   glucose blood (OneTouch Verio) test strip 1 each by Other route 2 (two) times a day 3/31/20  Yes Yadira Reyes MD   hydrOXYzine HCL (ATARAX) 25 mg tablet Take 1 tablet (25 mg total) by mouth 2 (two) times a day as needed for itching 6/9/20  Yes Yadira Reyes MD   Lancets (ONETOUCH ULTRASOFT) lancets by Other route 2 (two) times a day 3/31/20  Yes Yadira Reyes MD   levothyroxine 112 mcg tablet Take 1 tablet (112 mcg total) by mouth daily 6/9/20  Yes Yadira Reyes MD   losartan (COZAAR) 25 mg tablet TAKE 1 TABLET DAILY   10/7/19  Yes Juancarlos Lau DO   magnesium oxide (MAG-OX) 400 mg Take 1 tablet (400 mg total) by mouth 2 (two) times a day 10/28/18  Yes Eric Darling MD   metFORMIN (GLUCOPHAGE) 500 mg tablet Take 0 5 tablets (250 mg total) by mouth daily 3/31/20  Yes Bertha Webber MD   methocarbamol (ROBAXIN) 500 mg tablet Take 1 tablet (500 mg total) by mouth 3 (three) times a day as needed for muscle spasms 20  Yes Bertha Webber MD   metoprolol succinate (TOPROL-XL) 25 mg 24 hr tablet Take 25 mg by mouth daily   Yes Luca Irizarry MD   omega-3-acid ethyl esters (LOVAZA) 1 g capsule Take 2 capsules (2 g total) by mouth 2 (two) times a day 20  Yes Bertha Webber MD   pantoprazole (PROTONIX) 40 mg tablet Take 1 tablet (40 mg total) by mouth daily 20  Yes Bertha Webber MD   pregabalin (LYRICA) 150 mg capsule Take 1 capsule (150 mg total) by mouth 3 (three) times a day 20  Yes Bertha Webber MD   triamcinolone (KENALOG) 0 1 % cream Apply topically 2 (two) times a day 3/16/18  Yes Maritza Eng MD   zolpidem (Ambien) 10 mg tablet Take 1 tablet (10 mg total) by mouth daily at bedtime as needed for sleep 6/15/20  Yes Bertha Webber MD   albuterol (PROVENTIL HFA,VENTOLIN HFA) 90 mcg/act inhaler Inhale 2 puffs every 6 (six) hours as needed for wheezing 19   JORGE A Cummins     I have reviewed home medications with patient personally  Allergies:    Allergies   Allergen Reactions    Augmentin [Amoxicillin-Pot Clavulanate] Nausea Only     PT stated she only allergic to medication AUGMENTIN       Social History:     Marital Status: Single   Occupation: disability - back pain  Patient Pre-hospital Living Situation: lives at home with daughter  Patient Pre-hospital Level of Mobility: ambulates without assistance  Patient Pre-hospital Diet Restrictions: diabetic  Substance Use History:   Social History     Substance and Sexual Activity   Alcohol Use No     Social History     Tobacco Use   Smoking Status Former Smoker    Packs/day: 1 00    Types: Cigarettes    Last attempt to quit:     Years since quittin 4   Smokeless Tobacco Never Used     Social History     Substance and Sexual Activity   Drug Use Yes    Types: Marijuana    Comment: USES MARIJUANA occasionally       Family History:    Family History   Problem Relation Age of Onset    Coronary artery disease Mother         ATHEROMA    Heart disease Mother     Diabetes Mother     Hypertension Mother     No Known Problems Father         NO PERTINENT FAMILY HISTORY    Coronary artery disease Sister         ATHEROMA    Heart disease Sister     Stroke Sister     Diabetes Sister     Hypertension Sister        Physical Exam:     Vitals:   Blood Pressure: 165/76 (06/28/20 1854)  Pulse: 74 (06/28/20 1854)  Temperature: 97 6 °F (36 4 °C) (06/28/20 1854)  Temp Source: Oral (06/28/20 1854)  Respirations: 20 (06/28/20 1854)  Height: 5' 3" (160 cm) (06/28/20 1854)  Weight - Scale: 80 3 kg (177 lb) (06/28/20 1854)  SpO2: 99 % (06/28/20 1854)    Physical Exam   Constitutional: She is oriented to person, place, and time  She appears well-developed and well-nourished  No distress  HENT:   Head: Normocephalic and atraumatic  Mouth/Throat: Oropharynx is clear and moist    Eyes: Pupils are equal, round, and reactive to light  EOM are normal  Right eye exhibits no discharge  Left eye exhibits no discharge  No scleral icterus  Slightly injected conjunctiva  Slight pain with EOM   Neck: Normal range of motion  Neck supple  No thyromegaly present  Minimal pain with active flexion and extension of the neck, no pain with side bending or rotation of the neck  Full active ROM  Cardiovascular: Normal rate, regular rhythm, normal heart sounds and intact distal pulses  Exam reveals no gallop and no friction rub  No murmur heard  Pulmonary/Chest: Effort normal and breath sounds normal  No stridor  No respiratory distress  She has no wheezes  She has no rales  Abdominal: Soft  Bowel sounds are normal  She exhibits no distension  There is no tenderness  There is no rebound and no guarding  Musculoskeletal: Normal range of motion   She exhibits no edema  Tenderness to palpation of paravertebral muscles in the cervical spine   Neurological: She is alert and oriented to person, place, and time  No cranial nerve deficit  She exhibits normal muscle tone  Coordination normal    photophobia   Skin: Skin is warm  Capillary refill takes less than 2 seconds  She is not diaphoretic  Psychiatric: She has a normal mood and affect  Her behavior is normal    Nursing note and vitals reviewed  Additional Data:     Lab Results: I have personally reviewed pertinent reports  Invalid input(s): LABALBU        Imaging: I have personally reviewed pertinent reports  No results found  EKG, Pathology, and Other Studies Reviewed on Admission:   · EKG: no EKG records    Epic / Care Everywhere Records Reviewed: Yes     ** Please Note: This note has been constructed using a voice recognition system   ** social work

## 2020-06-28 NOTE — ED PROVIDER NOTES
History  Chief Complaint   Patient presents with    Headache     Pt presents to the ED with headache intermitent over the last 2 weeks  Pt reports taking OTC medications, excedrin, tylenol, motrin without relief  Patient presents to the emergency department for evaluation of increasing and intermittent frontal headache over the past 2 weeks  Pain lessened last week only to return in full force  Patient denies trauma fall injury  Patient admits to photophobia  Denies neck stiffness  Denies fever chills or rash  Denies chest pain sob or URI symptoms  Denies nausea vomiting diarrhea  Patient does not have a neurologist   Over-the-counter medications not being helped  Prior to Admission Medications   Prescriptions Last Dose Informant Patient Reported? Taking?    Blood Glucose Monitoring Suppl (Jose Miguel Leonardo) w/Device KIT 6/28/2020 Self No Yes   Sig: by Does not apply route 2 (two) times a day   Empagliflozin (Jardiance) 10 MG TABS 6/28/2020 Self No Yes   Sig: Take 1 tablet (10 mg total) by mouth every morning   Lancets (ONETOUCH ULTRASOFT) lancets 6/28/2020 Self No Yes   Sig: by Other route 2 (two) times a day   acetaminophen (TYLENOL) 325 mg tablet 6/28/2020 Self No Yes   Sig: Take 1 tablet (325 mg total) by mouth as needed for headaches For headache   albuterol (PROVENTIL HFA,VENTOLIN HFA) 90 mcg/act inhaler Unknown at Unknown time Self No No   Sig: Inhale 2 puffs every 6 (six) hours as needed for wheezing   aspirin 81 mg chewable tablet 6/28/2020 Self Yes Yes   Sig: Chew 81 mg daily   atorvastatin (LIPITOR) 80 mg tablet 6/28/2020 Self No Yes   Sig: Take 1 tablet (80 mg total) by mouth daily   citalopram (CeleXA) 40 mg tablet 6/28/2020 Self No Yes   Sig: Take 1 tablet (40 mg total) by mouth daily   ezetimibe (ZETIA) 10 mg tablet 6/28/2020  No Yes   Sig: Take 1 tablet (10 mg total) by mouth daily   fluticasone-salmeterol (ADVAIR) 250-50 mcg/dose inhaler 6/28/2020 Self No Yes   Sig: Inhale 1 puff every 12 (twelve) hours as needed (sob)   glucose blood (OneTouch Verio) test strip 2020 Self No Yes   Si each by Other route 2 (two) times a day   hydrOXYzine HCL (ATARAX) 25 mg tablet 2020  No Yes   Sig: Take 1 tablet (25 mg total) by mouth 2 (two) times a day as needed for itching   levothyroxine 112 mcg tablet 2020  No Yes   Sig: Take 1 tablet (112 mcg total) by mouth daily   losartan (COZAAR) 25 mg tablet 2020 Self No Yes   Sig: TAKE 1 TABLET DAILY     magnesium oxide (MAG-OX) 400 mg 2020 Self No Yes   Sig: Take 1 tablet (400 mg total) by mouth 2 (two) times a day   metFORMIN (GLUCOPHAGE) 500 mg tablet 2020 Self No Yes   Sig: Take 0 5 tablets (250 mg total) by mouth daily   methocarbamol (ROBAXIN) 500 mg tablet 2020 Self No Yes   Sig: Take 1 tablet (500 mg total) by mouth 3 (three) times a day as needed for muscle spasms   metoprolol succinate (TOPROL-XL) 25 mg 24 hr tablet 2020 Self Yes Yes   Sig: Take 25 mg by mouth daily   omega-3-acid ethyl esters (LOVAZA) 1 g capsule 2020 Self No Yes   Sig: Take 2 capsules (2 g total) by mouth 2 (two) times a day   pantoprazole (PROTONIX) 40 mg tablet 2020  No Yes   Sig: Take 1 tablet (40 mg total) by mouth daily   pregabalin (LYRICA) 150 mg capsule 2020 Self No Yes   Sig: Take 1 capsule (150 mg total) by mouth 3 (three) times a day   triamcinolone (KENALOG) 0 1 % cream 2020 Self No Yes   Sig: Apply topically 2 (two) times a day   zolpidem (Ambien) 10 mg tablet 2020  No Yes   Sig: Take 1 tablet (10 mg total) by mouth daily at bedtime as needed for sleep      Facility-Administered Medications: None       Past Medical History:   Diagnosis Date    Abnormal echocardiogram     Abnormal stress test     Anxiety     Asthma     Brachial neuritis     Depression     Disease of thyroid gland     Displacement of intervertebral disc of mid-cervical region     Fibromyalgia     Frequent headaches     GERD (gastroesophageal reflux disease)     Herniated nucleus pulposus     History of arthritis     History of asthma     History of cardiac disorder     History of chest pain     History of diverticulitis     History of fatigue     History of hearing loss     History of hemoptysis     History of herpes simplex infection     History of hiatal hernia     History of insect bite     INFECTED    History of memory loss     History of neck disorder     History of reflex sympathetic dystrophy     History of restless legs syndrome     History of shortness of breath     History of spinal stenosis     Hyperlipidemia     Hyperlipidemia     Hypertension     Skin rash     Somnolence, daytime     Spinal stenosis of cervical region        Past Surgical History:   Procedure Laterality Date    BACK SURGERY      BACK SURGERY      LOWER BACK SURGERY    CARDIAC CATHETERIZATION      HISTORY OF CORONARY ANGIOGRAPHY WITH CONCOMITANT LEFT HEART CATHETERIZATION    CORONARY ANGIOPLASTY WITH STENT PLACEMENT      EAR SURGERY      TONSILLECTOMY      TYMPANOPLASTY         Family History   Problem Relation Age of Onset    Coronary artery disease Mother         ATHEROMA    Heart disease Mother     Diabetes Mother     Hypertension Mother     No Known Problems Father         NO PERTINENT FAMILY HISTORY    Coronary artery disease Sister         ATHEROMA    Heart disease Sister     Stroke Sister     Diabetes Sister     Hypertension Sister      I have reviewed and agree with the history as documented      E-Cigarette/Vaping    E-Cigarette Use Never User      E-Cigarette/Vaping Substances    Nicotine No     THC No     CBD No     Flavoring No     Other No     Unknown No      Social History     Tobacco Use    Smoking status: Former Smoker     Packs/day: 1 00     Types: Cigarettes     Last attempt to quit: 2012     Years since quittin 4    Smokeless tobacco: Never Used   Substance Use Topics    Alcohol use: No    Drug use: Yes     Types: Marijuana     Comment: USES MARIJUANA occasionally       Review of Systems   Constitutional: Negative  Negative for activity change, appetite change, chills, diaphoresis, fatigue, fever and unexpected weight change  HENT: Negative  Negative for congestion, dental problem, drooling, ear discharge, ear pain, facial swelling, rhinorrhea, sinus pressure, sinus pain, sneezing, sore throat, tinnitus, trouble swallowing and voice change  Eyes: Positive for photophobia  Respiratory: Negative  Negative for cough, chest tightness, shortness of breath, wheezing and stridor  Cardiovascular: Negative  Negative for chest pain, palpitations and leg swelling  Gastrointestinal: Negative  Negative for abdominal distention, abdominal pain, blood in stool, constipation, diarrhea, nausea and vomiting  Endocrine: Negative  Genitourinary: Negative  Negative for difficulty urinating, dysuria, flank pain, frequency, hematuria, urgency, vaginal bleeding, vaginal discharge and vaginal pain  Musculoskeletal: Negative  Negative for back pain, neck pain and neck stiffness  Skin: Negative  Negative for rash and wound  Allergic/Immunologic: Negative  Neurological: Positive for headaches  Negative for dizziness, tremors, seizures, syncope, facial asymmetry, speech difficulty, weakness, light-headedness and numbness  Hematological: Negative  Does not bruise/bleed easily  Psychiatric/Behavioral: Negative  Negative for confusion  Physical Exam  Physical Exam   Constitutional: She is oriented to person, place, and time  She appears well-developed and well-nourished  She appears distressed  Nontoxic appearance  No respiratory distress  Patient appears uncomfortable sitting the stretcher engage in normal conversation simple questions appropriately  Moves all extremities spontaneously and to command   HENT:   Head: Normocephalic and atraumatic     Right Ear: External ear normal    Left Ear: External ear normal    Nose: Nose normal    Mouth/Throat: Oropharynx is clear and moist  No oropharyngeal exudate  No visible evidence of scalp facial trauma   Eyes: Pupils are equal, round, and reactive to light  Conjunctivae and EOM are normal    Neck: Normal range of motion  Neck supple  Cardiovascular: Normal rate, regular rhythm, normal heart sounds and intact distal pulses  Pulmonary/Chest: Effort normal and breath sounds normal  No stridor  No respiratory distress  She has no wheezes  She has no rales  She exhibits no tenderness  Abdominal: Soft  Bowel sounds are normal  She exhibits no distension and no mass  There is no tenderness  There is no rebound and no guarding  No hernia  Musculoskeletal: Normal range of motion  She exhibits no edema, tenderness or deformity  Neurological: She is alert and oriented to person, place, and time  She has normal reflexes  She displays normal reflexes  No cranial nerve deficit or sensory deficit  She exhibits normal muscle tone  Coordination normal    Skin: Skin is warm and dry  Capillary refill takes less than 2 seconds  No rash noted  No erythema  No pallor  Psychiatric: She has a normal mood and affect  Her behavior is normal  Judgment and thought content normal    Nursing note and vitals reviewed        Vital Signs  ED Triage Vitals [06/28/20 1510]   Temperature Pulse Respirations Blood Pressure SpO2   98 °F (36 7 °C) 69 16 146/84 96 %      Temp Source Heart Rate Source Patient Position - Orthostatic VS BP Location FiO2 (%)   Oral Monitor Sitting Left arm --      Pain Score       9           Vitals:    06/28/20 1510 06/28/20 1630   BP: 146/84    Pulse: 69 64   Patient Position - Orthostatic VS: Sitting          Visual Acuity      ED Medications  Medications   HYDROmorphone (DILAUDID) injection 0 5 mg (has no administration in time range)   metoclopramide (REGLAN) injection 10 mg (10 mg Intravenous Given 6/28/20 1542)   ketorolac (TORADOL) injection 30 mg (30 mg Intravenous Given 6/28/20 1543)   diphenhydrAMINE (BENADRYL) injection 25 mg (25 mg Intravenous Given 6/28/20 1543)   sodium chloride 0 9 % bolus 1,000 mL (0 mL Intravenous Stopped 6/28/20 1631)   ondansetron (ZOFRAN) injection 4 mg (4 mg Intravenous Given 6/28/20 1544)       Diagnostic Studies  Results Reviewed     None                 No orders to display              Procedures  ECG 12 Lead Documentation Only  Date/Time: 6/28/2020 3:42 PM  Performed by: Maya Sage MD  Authorized by: Maya Sage MD                ED Course  ED Course as of Jun 28 1751   Sun Jun 28, 2020   1738 Patient to be admitted to the hospitalist service for intractable head pain  Patient had minimal relief with migraine cocktail  Subsequently have given 0 5 mg of IV Dilaudid  1749 This case was discussed with Dr Camacho of the hospitalist service was accepted this patient to the hospitalist service  MDM      Disposition  Final diagnoses:   Headache     Time reflects when diagnosis was documented in both MDM as applicable and the Disposition within this note     Time User Action Codes Description Comment    6/28/2020  5:38 PM Faheem Thompson Add [R51] Headache       ED Disposition     ED Disposition Condition Date/Time Comment    Admit Stable [place] Jun 28, 2020  5:50 PM Case was discussed with Dr Pau Dalton  and the patient's admission status was agreed to be Admission Status: observation status to the service of Dr Buddy Simon    None         Patient's Medications   Discharge Prescriptions    No medications on file     No discharge procedures on file      PDMP Review       Value Time User    PDMP Reviewed  Yes 1/20/2020  5:19 PM Dave Bowie           ED Provider  Electronically Signed by           Maya Sage MD  06/28/20 1853

## 2020-06-29 ENCOUNTER — APPOINTMENT (OUTPATIENT)
Dept: CT IMAGING | Facility: HOSPITAL | Age: 60
DRG: 103 | End: 2020-06-29
Payer: MEDICARE

## 2020-06-29 LAB
ALBUMIN SERPL BCP-MCNC: 3.3 G/DL (ref 3.5–5)
ALBUMIN SERPL BCP-MCNC: 3.6 G/DL (ref 3.5–5)
ALP SERPL-CCNC: 76 U/L (ref 46–116)
ALP SERPL-CCNC: 84 U/L (ref 46–116)
ALT SERPL W P-5'-P-CCNC: 36 U/L (ref 12–78)
ALT SERPL W P-5'-P-CCNC: 40 U/L (ref 12–78)
ANION GAP SERPL CALCULATED.3IONS-SCNC: 7 MMOL/L (ref 4–13)
ANION GAP SERPL CALCULATED.3IONS-SCNC: 7 MMOL/L (ref 4–13)
APTT PPP: 23 SECONDS (ref 23–37)
AST SERPL W P-5'-P-CCNC: 23 U/L (ref 5–45)
AST SERPL W P-5'-P-CCNC: 29 U/L (ref 5–45)
BASOPHILS # BLD AUTO: 0.02 THOUSANDS/ΜL (ref 0–0.1)
BASOPHILS NFR BLD AUTO: 0 % (ref 0–1)
BILIRUB SERPL-MCNC: 0.38 MG/DL (ref 0.2–1)
BILIRUB SERPL-MCNC: 0.73 MG/DL (ref 0.2–1)
BUN SERPL-MCNC: 11 MG/DL (ref 5–25)
BUN SERPL-MCNC: 13 MG/DL (ref 5–25)
CALCIUM SERPL-MCNC: 8.5 MG/DL (ref 8.3–10.1)
CALCIUM SERPL-MCNC: 8.6 MG/DL (ref 8.3–10.1)
CHLORIDE SERPL-SCNC: 105 MMOL/L (ref 100–108)
CHLORIDE SERPL-SCNC: 106 MMOL/L (ref 100–108)
CO2 SERPL-SCNC: 24 MMOL/L (ref 21–32)
CO2 SERPL-SCNC: 29 MMOL/L (ref 21–32)
CREAT SERPL-MCNC: 0.93 MG/DL (ref 0.6–1.3)
CREAT SERPL-MCNC: 0.98 MG/DL (ref 0.6–1.3)
CRP SERPL QL: <3 MG/L
EOSINOPHIL # BLD AUTO: 0.11 THOUSAND/ΜL (ref 0–0.61)
EOSINOPHIL NFR BLD AUTO: 2 % (ref 0–6)
ERYTHROCYTE [DISTWIDTH] IN BLOOD BY AUTOMATED COUNT: 13 % (ref 11.6–15.1)
ERYTHROCYTE [SEDIMENTATION RATE] IN BLOOD: 1 MM/HOUR (ref 0–20)
GFR SERPL CREATININE-BSD FRML MDRD: 64 ML/MIN/1.73SQ M
GFR SERPL CREATININE-BSD FRML MDRD: 68 ML/MIN/1.73SQ M
GLUCOSE SERPL-MCNC: 117 MG/DL (ref 65–140)
GLUCOSE SERPL-MCNC: 125 MG/DL (ref 65–140)
GLUCOSE SERPL-MCNC: 126 MG/DL (ref 65–140)
GLUCOSE SERPL-MCNC: 141 MG/DL (ref 65–140)
GLUCOSE SERPL-MCNC: 143 MG/DL (ref 65–140)
GLUCOSE SERPL-MCNC: 146 MG/DL (ref 65–140)
HCT VFR BLD AUTO: 43.7 % (ref 34.8–46.1)
HGB BLD-MCNC: 14.5 G/DL (ref 11.5–15.4)
IMM GRANULOCYTES # BLD AUTO: 0.01 THOUSAND/UL (ref 0–0.2)
IMM GRANULOCYTES NFR BLD AUTO: 0 % (ref 0–2)
INR PPP: 1.06 (ref 0.84–1.19)
LYMPHOCYTES # BLD AUTO: 1.93 THOUSANDS/ΜL (ref 0.6–4.47)
LYMPHOCYTES NFR BLD AUTO: 38 % (ref 14–44)
MCH RBC QN AUTO: 30.7 PG (ref 26.8–34.3)
MCHC RBC AUTO-ENTMCNC: 33.2 G/DL (ref 31.4–37.4)
MCV RBC AUTO: 92 FL (ref 82–98)
MONOCYTES # BLD AUTO: 0.31 THOUSAND/ΜL (ref 0.17–1.22)
MONOCYTES NFR BLD AUTO: 6 % (ref 4–12)
NEUTROPHILS # BLD AUTO: 2.66 THOUSANDS/ΜL (ref 1.85–7.62)
NEUTS SEG NFR BLD AUTO: 54 % (ref 43–75)
NRBC BLD AUTO-RTO: 0 /100 WBCS
PLATELET # BLD AUTO: 211 THOUSANDS/UL (ref 149–390)
PMV BLD AUTO: 10.1 FL (ref 8.9–12.7)
POTASSIUM SERPL-SCNC: 3.8 MMOL/L (ref 3.5–5.3)
POTASSIUM SERPL-SCNC: 3.8 MMOL/L (ref 3.5–5.3)
PROT SERPL-MCNC: 6.9 G/DL (ref 6.4–8.2)
PROT SERPL-MCNC: 6.9 G/DL (ref 6.4–8.2)
PROTHROMBIN TIME: 13.2 SECONDS (ref 11.6–14.5)
RBC # BLD AUTO: 4.73 MILLION/UL (ref 3.81–5.12)
SODIUM SERPL-SCNC: 136 MMOL/L (ref 136–145)
SODIUM SERPL-SCNC: 142 MMOL/L (ref 136–145)
TSH SERPL DL<=0.05 MIU/L-ACNC: 0.76 UIU/ML (ref 0.36–3.74)
WBC # BLD AUTO: 5.04 THOUSAND/UL (ref 4.31–10.16)

## 2020-06-29 PROCEDURE — 85730 THROMBOPLASTIN TIME PARTIAL: CPT | Performed by: FAMILY MEDICINE

## 2020-06-29 PROCEDURE — 99232 SBSQ HOSP IP/OBS MODERATE 35: CPT | Performed by: INTERNAL MEDICINE

## 2020-06-29 PROCEDURE — 82948 REAGENT STRIP/BLOOD GLUCOSE: CPT

## 2020-06-29 PROCEDURE — 70496 CT ANGIOGRAPHY HEAD: CPT

## 2020-06-29 PROCEDURE — 80053 COMPREHEN METABOLIC PANEL: CPT | Performed by: FAMILY MEDICINE

## 2020-06-29 PROCEDURE — 85652 RBC SED RATE AUTOMATED: CPT | Performed by: FAMILY MEDICINE

## 2020-06-29 PROCEDURE — 85025 COMPLETE CBC W/AUTO DIFF WBC: CPT | Performed by: FAMILY MEDICINE

## 2020-06-29 PROCEDURE — 85610 PROTHROMBIN TIME: CPT | Performed by: FAMILY MEDICINE

## 2020-06-29 PROCEDURE — 70498 CT ANGIOGRAPHY NECK: CPT

## 2020-06-29 PROCEDURE — 84443 ASSAY THYROID STIM HORMONE: CPT | Performed by: INTERNAL MEDICINE

## 2020-06-29 PROCEDURE — 86140 C-REACTIVE PROTEIN: CPT | Performed by: FAMILY MEDICINE

## 2020-06-29 RX ORDER — MAGNESIUM SULFATE HEPTAHYDRATE 40 MG/ML
2 INJECTION, SOLUTION INTRAVENOUS ONCE
Status: COMPLETED | OUTPATIENT
Start: 2020-06-29 | End: 2020-06-29

## 2020-06-29 RX ORDER — KETOROLAC TROMETHAMINE 30 MG/ML
30 INJECTION, SOLUTION INTRAMUSCULAR; INTRAVENOUS EVERY 12 HOURS SCHEDULED
Status: DISCONTINUED | OUTPATIENT
Start: 2020-06-29 | End: 2020-06-30 | Stop reason: HOSPADM

## 2020-06-29 RX ORDER — KETOROLAC TROMETHAMINE 10 MG/1
10 TABLET, FILM COATED ORAL ONCE
Status: COMPLETED | OUTPATIENT
Start: 2020-06-29 | End: 2020-06-29

## 2020-06-29 RX ORDER — METOCLOPRAMIDE HYDROCHLORIDE 5 MG/ML
10 INJECTION INTRAMUSCULAR; INTRAVENOUS EVERY 8 HOURS SCHEDULED
Status: DISCONTINUED | OUTPATIENT
Start: 2020-06-29 | End: 2020-06-30 | Stop reason: HOSPADM

## 2020-06-29 RX ORDER — DIPHENHYDRAMINE HYDROCHLORIDE 50 MG/ML
25 INJECTION INTRAMUSCULAR; INTRAVENOUS EVERY 8 HOURS PRN
Status: DISCONTINUED | OUTPATIENT
Start: 2020-06-29 | End: 2020-06-30 | Stop reason: HOSPADM

## 2020-06-29 RX ORDER — SUMATRIPTAN 6 MG/.5ML
6 INJECTION, SOLUTION SUBCUTANEOUS ONCE
Status: CANCELLED | OUTPATIENT
Start: 2020-06-29 | End: 2020-06-29

## 2020-06-29 RX ORDER — MAGNESIUM SULFATE HEPTAHYDRATE 40 MG/ML
2 INJECTION, SOLUTION INTRAVENOUS ONCE
Status: CANCELLED | OUTPATIENT
Start: 2020-06-29 | End: 2020-06-29

## 2020-06-29 RX ORDER — DIPHENHYDRAMINE HCL 25 MG
25 TABLET ORAL ONCE
Status: COMPLETED | OUTPATIENT
Start: 2020-06-29 | End: 2020-06-29

## 2020-06-29 RX ORDER — METOCLOPRAMIDE 10 MG/1
10 TABLET ORAL ONCE
Status: COMPLETED | OUTPATIENT
Start: 2020-06-29 | End: 2020-06-29

## 2020-06-29 RX ADMIN — ENOXAPARIN SODIUM 40 MG: 40 INJECTION SUBCUTANEOUS at 09:15

## 2020-06-29 RX ADMIN — ATORVASTATIN CALCIUM 80 MG: 40 TABLET, FILM COATED ORAL at 09:16

## 2020-06-29 RX ADMIN — DIPHENHYDRAMINE HCL 25 MG: 25 TABLET, COATED ORAL at 14:45

## 2020-06-29 RX ADMIN — FLUTICASONE FUROATE AND VILANTEROL TRIFENATATE 1 PUFF: 200; 25 POWDER RESPIRATORY (INHALATION) at 09:17

## 2020-06-29 RX ADMIN — ACETAMINOPHEN 975 MG: 325 TABLET, FILM COATED ORAL at 12:24

## 2020-06-29 RX ADMIN — METOCLOPRAMIDE HYDROCHLORIDE 10 MG: 5 INJECTION INTRAMUSCULAR; INTRAVENOUS at 22:12

## 2020-06-29 RX ADMIN — IOHEXOL 85 ML: 350 INJECTION, SOLUTION INTRAVENOUS at 02:35

## 2020-06-29 RX ADMIN — ASPIRIN 81 MG 81 MG: 81 TABLET ORAL at 09:16

## 2020-06-29 RX ADMIN — ZOLPIDEM TARTRATE 10 MG: 5 TABLET, FILM COATED ORAL at 20:28

## 2020-06-29 RX ADMIN — PANTOPRAZOLE SODIUM 40 MG: 40 TABLET, DELAYED RELEASE ORAL at 09:16

## 2020-06-29 RX ADMIN — KETOROLAC TROMETHAMINE 10 MG: 10 TABLET, FILM COATED ORAL at 14:46

## 2020-06-29 RX ADMIN — METOCLOPRAMIDE 10 MG: 10 TABLET ORAL at 14:45

## 2020-06-29 RX ADMIN — LOSARTAN POTASSIUM 25 MG: 25 TABLET, FILM COATED ORAL at 09:16

## 2020-06-29 RX ADMIN — MAGNESIUM SULFATE IN WATER 2 G: 40 INJECTION, SOLUTION INTRAVENOUS at 17:13

## 2020-06-29 RX ADMIN — CITALOPRAM HYDROBROMIDE 40 MG: 20 TABLET ORAL at 09:16

## 2020-06-29 RX ADMIN — PREGABALIN 150 MG: 75 CAPSULE ORAL at 20:28

## 2020-06-29 RX ADMIN — ZOLPIDEM TARTRATE 10 MG: 5 TABLET, FILM COATED ORAL at 03:10

## 2020-06-29 RX ADMIN — EZETIMIBE 10 MG: 10 TABLET ORAL at 09:16

## 2020-06-29 RX ADMIN — KETOROLAC TROMETHAMINE 30 MG: 30 INJECTION, SOLUTION INTRAMUSCULAR at 20:28

## 2020-06-29 RX ADMIN — LEVOTHYROXINE SODIUM 112 MCG: 112 TABLET ORAL at 09:16

## 2020-06-29 RX ADMIN — PREGABALIN 150 MG: 75 CAPSULE ORAL at 17:13

## 2020-06-29 RX ADMIN — MAGNESIUM OXIDE 400 MG: 400 TABLET ORAL at 09:16

## 2020-06-29 RX ADMIN — PREGABALIN 150 MG: 75 CAPSULE ORAL at 09:16

## 2020-06-29 RX ADMIN — ACETAMINOPHEN 975 MG: 325 TABLET, FILM COATED ORAL at 03:04

## 2020-06-29 NOTE — DISCHARGE SUMMARY
Discharge- Jose Rising 1960, 63 y o  female MRN: [id number]    Unit/Bed#: S -01 Encounter: [id number]    Primary Care Provider: Rufus Jackson MD   Date and time admitted to hospital: 6/28/2020  3:21 PM        * Headache  Assessment & Plan  · POA, 2 week h/o constant HA with associated photophobia  · Etiology: likely migraine vs TTH  CTA negative for vertebral A dissection  Less likely temporal arteritis since pain is bilateral, and ESR and CRP wnl  Does not appear infectious, as patient is afebrile, with good active ROM of the neck with minimal pain  · Patient has h/o same 2 years ago  · Pain improved with migraine cocktail  · Will discharge with benadryl, reglan, tylenol and motrin  · Follow up with headache clinic    Type 2 diabetes mellitus without complication, without long-term current use of insulin [facility])  Assessment & Plan  Lab Results   Component Value Date    HGBA1C 6 9 (H) 12/03/2019       Recent Labs     06/29/20  1543 06/29/20  2108 06/30/20  0738 06/30/20  1057   POCGLU 143* 141* 107 136       Blood Sugar Average: Last 72 hrs:  (P) 128 [id number]   · Resume home meds    Hypercholesterolemia  Assessment & Plan  · Continue lipitor and zetia    Hypothyroidism  Assessment & Plan  · Likely acquired  · Stable; TSH wnl  · Continue levothyroxine    Gastroesophageal reflux disease  Assessment & Plan  · Stable  · Continue protonix    Fibromyalgia  Assessment & Plan  · Continue lyrica    Depression with anxiety  Assessment & Plan  · Well controlled  · Continue celexa daily    Cervical herniated disc  Assessment & Plan  · MRI cervical spine11/2018: Scattered mild to moderate spondylosis without cord compression or cord signal abnormalities  There is nonspecific straightening of the cervical lordosis without subluxation    · Pain associated, poorly controlled  · Recommend follow up with PCP      Discharging Resident Physician: Nate Perez DO  Attending: Ike Rob MD  PCP: Rufus Jackson MD  Admission Date: 6/28/2020  Discharge Date: 06/30/20    Disposition:     Home    Reason for Admission: new onset headache,     Consultations During Hospital Stay:  · none    Procedures Performed:     · none    Significant Findings / Test Results:     · none    Incidental Findings:   · none     Test Results Pending at Discharge (will require follow up):   · none     Outpatient Tests Requested:  · none    Complications:  none    Hospital Course:     Lashawn Sainz is a 63 y o  female patient with PMHx of cervical spondylolysis, fibromyalgia, diabetes, who originally presented to the hospital on 6/28/2020 due to a 2 week history of headache with associated photophobia and phonophobia  On admission, patient was afebrile, with other VSS, and no FND on exam  She was started on a migraine cocktail with good response  On further evaluation, patient noted that headaches started after a neck massage, and CTA was done to rule out vertebral artery dissection  CTA was negative for acute intracranial abnormalities  On labs, there was no leukocytosis, no electrolyte abnormalities, and ESR and CRP were wnl  Headaches suspected to be new onset migraines  Patient was discharged in stable condition with medication recommendations (benadryl, reglan, acetaminophen and tylenol), and referral to neurology/headache clinic  Condition at Discharge: stable     Discharge Day Visit / Exam:     Subjective:  Patient feels well  Headache improved with migraine cocktail  Has some photophobia, but denies fevers, chills, or vision changes  Has some neck pain, but this is chronic and improving      Vitals: Blood Pressure: 91/50 (06/30/20 0733)  Pulse: (!) 50 (06/30/20 0733)  Temperature: 97 8 °F (36 6 °C) (06/30/20 0733)  Temp Source: Oral (06/30/20 0733)  Respirations: 18 (06/29/20 2247)  Height: 5' 3" (160 cm) (06/28/20 1854)  Weight - Scale: 80 3 kg (177 lb) (06/28/20 1854)  SpO2: 93 % (06/30/20 0733)  Exam:   Physical Exam   Constitutional: She is oriented to person, place, and time  She appears well-developed and well-nourished  No distress  HENT:   Head: Normocephalic and atraumatic  Mouth/Throat: Oropharynx is clear and moist    Eyes: Conjunctivae are normal  Right eye exhibits no discharge  Left eye exhibits no discharge  Neck: Normal range of motion  Neck supple  Cardiovascular: Normal rate, regular rhythm, normal heart sounds and intact distal pulses  Exam reveals no gallop and no friction rub  No murmur heard  Pulmonary/Chest: Effort normal and breath sounds normal  No stridor  No respiratory distress  She has no wheezes  She has no rales  Abdominal: Soft  Bowel sounds are normal  She exhibits no distension  There is no tenderness  There is no rebound and no guarding  Musculoskeletal: She exhibits no edema  Neurological: She is alert and oriented to person, place, and time  No cranial nerve deficit or sensory deficit  She exhibits normal muscle tone  Skin: Skin is warm  Capillary refill takes less than 2 seconds  She is not diaphoretic  Psychiatric: She has a normal mood and affect  Her behavior is normal    Nursing note and vitals reviewed  Discussion with Family: Discussed with patient; offered to discuss with family, however, patient declined    Discharge instructions/Information to patient and family:   See after visit summary for information provided to patient and family  Provisions for Follow-Up Care:  See after visit summary for information related to follow-up care and any pertinent home health orders  Planned Readmission: none     Discharge Medications:  See after visit summary for reconciled discharge medications provided to patient and family        ** Please Note: This note has been constructed using a voice recognition system **

## 2020-06-29 NOTE — DISCHARGE INSTR - AVS FIRST PAGE
Dear Yusuf Montilla,     It was our pleasure to care for you here at [facility]  It is our hope that we were always able to exceed the expected standards for your care during your stay  You were hospitalized due to migraine  You were cared for on the fourth floor by Nicholas Garza DO under the service of [id number] [facility] MD Bessy with the [facility] who covers for your primary care physician (PCP), Vernon Nogueira MD, while you were hospitalized  If you have any questions or concerns related to this hospitalization, you may contact us at [phone]  For follow up as well as any medication refills, we recommend that you follow up with your primary care physician  A registered nurse will reach out to you by phone within a few days after your discharge to answer any additional questions that you may have after going home  However, at this time we provide for you here, the most important instructions / recommendations at discharge:     · Notable Medication Adjustments -   · Take the following for the next several days AS NEEDED for headaches (do NOT take if your headaches are controlled):   · Reglan 3 times daily for 7 days  · Benadryl 3 times daily (can get over the counter)  · Acetaminophen 1000mg every 6-8 hours   · In between doses of tylenol, take Motrin 400mg   · Testing Required after Discharge -   · none  · Important follow up information -   · Follow up with your PCP in 1-2 weeks  · Call Dr Ramesh Lewis office to schedule an appointment with the headache clinic  · Other Instructions -   · Please return to the ED for fevers (Temperature >100 4F), severe pain (especially not responding to pain medications), headache that occurs after a blow to the head, confusion, numbness on one side of your body  · Please review this entire after visit summary as additional general instructions including medication list, appointments, activity, diet, any pertinent wound care, and other additional recommendations from your care team that may be provided for you        Sincerely,     Alondra Polk, DO

## 2020-06-29 NOTE — ASSESSMENT & PLAN NOTE
Lab Results   Component Value Date    HGBA1C 6 9 (H) 12/03/2019       No results for input(s): POCGLU in the last 72 hours      Blood Sugar Average: Last 72 hrs:     · Hold home meds  · Will do SSI before meals and before bedtime  · Based on BG in next 24 hours, will either continue SSI or transition to basal-bolus regimen

## 2020-06-29 NOTE — ASSESSMENT & PLAN NOTE
· POA, 2 week h/o constant HA with associated photophobia  · Etiology: likely migraine vs TTH  Concerning for vertebral artery dissection, as pain began soon after neck massage- CTA negative  Less likely temporal arteritis since pain is bilateral, and ESR and CRP wnl  Does not appear infectious, as patient is afebrile, with good active ROM of the neck with minimal pain    · Patient has h/o same 2 years ago  · Patient still has significant pain in spite of migraine cocktail; will try IV magnesium sulfate and immitrex and monitor for response  · Will discharge with benadryl, reglan, magnesium-oxide  · Follow up with headache clinic

## 2020-06-29 NOTE — ASSESSMENT & PLAN NOTE
Lab Results   Component Value Date    HGBA1C 6 9 (H) 12/03/2019       Recent Labs     06/28/20 2056 06/29/20 0638 06/29/20  1045   POCGLU 128 117 125       Blood Sugar Average: Last 72 hrs:  (P) 628 [id number]   · Resume home meds

## 2020-06-29 NOTE — ASSESSMENT & PLAN NOTE
· MRI cervical spine11/2018: Scattered mild to moderate spondylosis without cord compression or cord signal abnormalities  There is nonspecific straightening of the cervical lordosis without subluxation    · Pain associated, poorly controlled  · Recommend follow up with PCP; may need PT referral vs pain management referral

## 2020-06-29 NOTE — NURSING NOTE
Multiple IV attempts from several nurses  Was able to get an IV in [facility] Unfortunately without blood return  Awaiting ED staff to arrive and attempt

## 2020-06-29 NOTE — DISCHARGE INSTRUCTIONS
Acute Headache, Ambulatory Care   GENERAL INFORMATION:   An acute headache  is pain or discomfort that starts suddenly and gets worse quickly  The cause of an acute headache may not be known  It may be triggered by stress, fatigue, hormones, food, or trauma  Common related symptoms include the following:   · Fever    · Sinus pressure    · Loss of memory    · Nausea or vomiting    · Problems with your vision, such as watery or red eyes, loss of vision, or pain in bright light    · Stiff neck    · Tenderness of the head and neck area    · Trouble staying awake, or being less alert than usual     · Weakness or less energy  Seek immediate care for the following symptoms:   · Severe pain    · A headache that occurs after a blow to the head, a fall, or other trauma     · Confusion or forgetfulness    · Numbness on one side of your face or body  Treatment for an acute headache  may include medicine to decrease pain  You may also need biofeedback or cognitive behavioral therapy  Ask your healthcare provider about these and other treatments for an acute headache  Manage my symptoms:   · Apply heat  on your head for 20 to 30 minutes every 2 hours for as many days as directed  Heat helps decrease pain and muscle spasms  You may alternate heat and ice  · Apply ice  on your head for 15 to 20 minutes every hour or as directed  Use an ice pack, or put crushed ice in a plastic bag  Cover it with a towel  Ice helps decrease pain  · Relax your muscles  Lie down in a comfortable position and close your eyes  Relax your muscles slowly  Start at your toes and work your way up your body  · Keep a record of your headaches  Write down when your headaches start and stop  Include your symptoms and what you were doing when the headache began  Record what you ate or drank for 24 hours before the headache started  Describe the pain and where it hurts  Keep track of what you did to treat your headache and whether it worked    Follow up with your healthcare provider as directed:  Bring your headache record with you when you see your healthcare provider  Write down your questions so you remember to ask them during your visits  CARE AGREEMENT:   You have the right to help plan your care  Learn about your health condition and how it may be treated  Discuss treatment options with your caregivers to decide what care you want to receive  You always have the right to refuse treatment  The above information is an  only  It is not intended as medical advice for individual conditions or treatments  Talk to your doctor, nurse or pharmacist before following any medical regimen to see if it is safe and effective for you  © 2014 [address] is for End User's use only and may not be sold, redistributed or otherwise used for commercial purposes  All illustrations and images included in CareNotes® are the copyrighted property of [employer]  or Declan Radha  Migraine Headache   WHAT YOU NEED TO KNOW:   A migraine is a severe headache  The pain can be so severe that it interferes with your daily activities  A migraine can last a few hours up to several days  The exact cause of migraines is not known  DISCHARGE INSTRUCTIONS:   Return to the emergency department if:   · You have a headache that seems different or much worse than your usual migraine headache  · You have a severe headache with a fever or a stiff neck  · You have new problems with speech, vision, balance, or movement  · You feel like you are going to faint, you become confused, or you have a seizure  Contact your healthcare provider or neurologist if:   · Your migraines interfere with your daily activities  · Your medicines or treatments stop working  · You have questions or concerns about your condition or care  Medicines: You may need any of the following   Take medicine as soon as you feel a migraine begin   · Prescription pain medicine  may be given  Do not wait until the pain is severe before you take your medicine  · Migraine medicines  are used to help prevent a migraine or stop it once it starts  · Antinausea medicine  may be given to calm your stomach and to help prevent vomiting  This medicine can also help relieve pain  · Take your medicine as directed  Contact your healthcare provider if you think your medicine is not helping or if you have side effects  Tell him or her if you are allergic to any medicine  Keep a list of the medicines, vitamins, and herbs you take  Include the amounts, and when and why you take them  Bring the list or the pill bottles to follow-up visits  Carry your medicine list with you in case of an emergency  Manage your symptoms:   · Rest in a dark, quiet room  This will help decrease your pain  Sleep may also help relieve the pain  · Apply ice to decrease pain  Use an ice pack, or put crushed ice in a plastic bag  Cover the ice pack with a towel and place it on your head  Apply ice for 15 to 20 minutes every hour  · Apply heat to decrease pain and muscle spasms  Use a small towel dampened with warm water or a heating pad, or sit in a warm bath  Apply heat on the area for 20 to 30 minutes every 2 hours  You may alternate heat and ice  · Keep a migraine record  Write down when your migraines start and stop  Include your symptoms and what you were doing when a migraine began  Record what you ate or drank for 24 hours before the migraine started  Keep track of what you did to treat your migraine and if it worked  Bring the migraine record with you to visits with your healthcare provider  Follow up with your healthcare provider or neurologist as directed:  Bring your migraine record with you  Write down your questions so you remember to ask them during your visits  Prevent another migraine:   · Do not smoke    Nicotine and other chemicals in cigarettes and cigars can trigger a migraine or make it worse  Ask your healthcare provider for information if you currently smoke and need help to quit  E-cigarettes or smokeless tobacco still contain nicotine  Talk to your healthcare provider before you use these products  · Do not drink alcohol  Alcohol can trigger a migraine  It can also keep medicines used to treat your migraines from working  · Get regular exercise  Exercise may help prevent migraines  Talk to your healthcare provider about the best exercise plan for you  Try to get at least 30 minutes of exercise on most days  · Manage stress  Stress may trigger a migraine  Learn new ways to relax, such as deep breathing  · Create a sleep schedule  Go to bed and get up at the same times each day  Do not watch television before bed  · Eat regular meals  Include healthy foods such as include fruit, vegetables, whole-grain breads, low-fat dairy products, beans, lean meat, and fish  Do not have food or drinks that trigger your migraines  © 2017 [address] Information is for End User's use only and may not be sold, redistributed or otherwise used for commercial purposes  All illustrations and images included in CareNotes® are the copyrighted property of [employer]  or Declan Garcia  The above information is an  only  It is not intended as medical advice for individual conditions or treatments  Talk to your doctor, nurse or pharmacist before following any medical regimen to see if it is safe and effective for you

## 2020-06-29 NOTE — PROGRESS NOTES
Progress Note - Stacey Edgar 1960, 63 y o  female MRN: [id number]    Unit/Bed#: S -01 Encounter: [id number]    Primary Care Provider: Navya Thomas MD   Date and time admitted to hospital: 6/28/2020  3:21 PM        * Headache  Assessment & Plan  · POA, 2 week h/o constant HA with associated photophobia  · Etiology: likely migraine vs TTH  Concerning for vertebral artery dissection, as pain began soon after neck massage- CTA negative  CTA does demonstrate 66% stenosis at the origin of the cervical ICA, <50% stenosis of L cervical ICA  No significant stenosis of [place]  Less likely temporal arteritis since pain is bilateral, and ESR and CRP wnl  Does not appear infectious, as patient is afebrile, with good active ROM of the neck with minimal pain    · Patient has h/o same 2 years ago  · Patient still has significant pain in spite of migraine cocktail; will try IV magnesium sulfate and immitrex and monitor for response  · Will discharge with benadryl, reglan, magnesium-oxide  · Follow up with headache clinic    Type 2 diabetes mellitus without complication, without long-term current use of insulin [facility])  Assessment & Plan  Lab Results   Component Value Date    HGBA1C 6 9 (H) 12/03/2019       Recent Labs     06/28/20 2056 06/29/20  0638 06/29/20  1045   POCGLU 128 117 125       Blood Sugar Average: Last 72 hrs:  (P) 013 [id number]   · Resume home meds    Hypercholesterolemia  Assessment & Plan  · Continue lipitor and zetia    Hypothyroidism  Assessment & Plan  · Likely acquired  · Stable; TSH wnl  · Continue levothyroxine    Gastroesophageal reflux disease  Assessment & Plan  · Stable  · Continue protonix    Fibromyalgia  Assessment & Plan  · Continue lyrica    Depression with anxiety  Assessment & Plan  · Well controlled  · Continue celexa daily    Cervical herniated disc  Assessment & Plan  · MRI cervical spine11/2018: Scattered mild to moderate spondylosis without cord compression or cord signal abnormalities  There is nonspecific straightening of the cervical lordosis without subluxation  · Pain associated, poorly controlled  · Recommend follow up with PCP; may need PT referral vs pain management referral          VTE Pharmacologic Prophylaxis:   Pharmacologic: Enoxaparin (Lovenox)  Mechanical VTE Prophylaxis in Place: Yes    Discussions with Specialists or Other Care Team Provider: discussed with RN, case management, attending    Education and Discussions with Family / Patient: discussed with patient; offered to discuss with family, but patient declined    Current Length of Stay: 0 day(s)    Current Patient Status: Observation     Discharge Plan / Estimated Discharge Date: within the next 24 hours    Code Status: Level 1 - Full Code      Subjective:   Patient still has headache with photophobia  Has not yet received migraine cocktail again since yesterday evening  Denies fevers, chills, vision changes  Denies chest pain, shortness of breath, abdominal pain, nausea, vomiting, diarrhea, constipation  Objective:     Vitals:   Temp (24hrs), Av 7 °F (36 5 °C), Min:97 6 °F (36 4 °C), Max:97 9 °F (36 6 °C)    Temp:  [97 6 °F (36 4 °C)-97 9 °F (36 6 °C)] 97 9 °F (36 6 °C)  HR:  [53-74] 56  Resp:  [16-20] 16  BP: ()/(65-76) 94/65  SpO2:  [93 %-99 %] 95 %  Body mass index is 31 35 kg/m²  Input and Output Summary (last 24 hours): Intake/Output Summary (Last 24 hours) at 2020 1525  Last data filed at 2020 1412  Gross per 24 hour   Intake 1150 ml   Output    Net 1150 ml       Physical Exam:     Physical Exam   Constitutional: She is oriented to person, place, and time  She appears well-developed and well-nourished  No distress  Lying in bed with the lights off  HENT:   Head: Normocephalic and atraumatic  Mouth/Throat: Oropharynx is clear and moist  No oropharyngeal exudate  Eyes: Pupils are equal, round, and reactive to light   Conjunctivae and EOM are normal  Right eye exhibits no discharge  Left eye exhibits no discharge  Some photophobia   Neck: Neck supple  Cardiovascular: Normal rate, regular rhythm, normal heart sounds and intact distal pulses  Exam reveals no gallop and no friction rub  No murmur heard  Pulmonary/Chest: Effort normal and breath sounds normal  No stridor  No respiratory distress  She has no wheezes  She has no rales  Abdominal: Soft  Bowel sounds are normal  She exhibits no distension  There is no tenderness  There is no rebound and no guarding  Musculoskeletal: She exhibits no edema  Neurological: She is alert and oriented to person, place, and time  No cranial nerve deficit or sensory deficit  She exhibits normal muscle tone  Coordination normal    Skin: Skin is warm  Capillary refill takes less than 2 seconds  She is not diaphoretic  Psychiatric: She has a normal mood and affect  Her behavior is normal    Nursing note and vitals reviewed  Additional Data:     Labs:    Results from last 7 days   Lab Units 06/29/20  1103   WBC Thousand/uL 5 04   HEMOGLOBIN g/dL 14 5   HEMATOCRIT % 43 7   PLATELETS Thousands/uL 211   NEUTROS PCT % 54   LYMPHS PCT % 38   MONOS PCT % 6   EOS PCT % 2     Results from last 7 days   Lab Units 06/29/20  1103   POTASSIUM mmol/L 3 8   CHLORIDE mmol/L 106   CO2 mmol/L 29   BUN mg/dL 11   CREATININE mg/dL 0 93   CALCIUM mg/dL 8 5   ALK PHOS U/L 76   ALT U/L 40   AST U/L 29     Results from last 7 days   Lab Units 06/29/20  0136   INR  1 06       * I Have Reviewed All Lab Data Listed Above  * Additional Pertinent Lab Tests Reviewed:  Bertram 66 Admission Reviewed    Imaging:    Imaging Reports Reviewed Today Include: CTA head  Imaging Personally Reviewed by Myself Includes:  none    Recent Cultures (last 7 days):           Last 24 Hours Medication List:     Current Facility-Administered Medications:  acetaminophen 975 mg Oral Q6H PRN Raul Court, DO   albuterol 2 puff Inhalation Q6H PRN Ronda Estevez, DO   aspirin 81 mg Oral Daily [facility], DO   atorvastatin 80 mg Oral Daily [facility], DO   citalopram 40 mg Oral Daily [facility], DO   enoxaparin 40 mg Subcutaneous Daily [facility], DO   ezetimibe 10 mg Oral Daily [facility], DO   fluticasone-vilanterol 1 puff Inhalation Daily [facility], DO   hydrOXYzine HCL 25 mg Oral BID PRN Ronda Del Torop, DO   insulin lispro 1-5 Units Subcutaneous TID AC [facility], DO   insulin lispro 1-5 Units Subcutaneous HS [facility], DO   levothyroxine 112 mcg Oral Daily [facility], DO   losartan 25 mg Oral Daily [facility], DO   magnesium oxide 400 mg Oral BID [facility], DO   methocarbamol 500 mg Oral TID PRN Ronda Estevez, DO   pantoprazole 40 mg Oral Daily Before Breakfast Ronda Estevez, DO   pregabalin 150 mg Oral TID Ronda Haryusra, DO   zolpidem 10 mg Oral HS PRN Ronda Estevez, DO        Today, Patient Was Seen By: Ronda Estevez DO    ** Please Note: This note has been constructed using a voice recognition system   **

## 2020-06-29 NOTE — QUICK NOTE
Upon follow up of STAT CTA imaging, nurse called and reported that imaging has not been performed yet due to inability to obtain labs and IV site  Contacted charge nurse and discussed this is a priority

## 2020-06-29 NOTE — UTILIZATION REVIEW
Initial Clinical Review    OBS 6/28 @ 1750 UPGRADED TO INPATIENT 6/29 @ 809 Claudine INTRACTABLE MIGRAINE >2MN    06/29/20 1622  Inpatient Admission Once     Comments: Patient needs to remain inpatient for intractable headaches; needs IV medications for pain control   Transfer Service: General Medicine       Question Answer Comment   Admitting Physician JOCE BARDALES    Level of Care Med Surg    Estimated length of stay More than 2 Midnights    Certification I certify that inpatient services are medically necessary for this patient for a duration of greater than two midnights  See H&P and MD Progress Notes for additional information about the patient's course of treatment  06/29/20 1622       ED Arrival Information     Expected Arrival Acuity Means of Arrival Escorted By Service Admission Type    6/28/2020 13:00 6/28/2020 14:51 Urgent Walk-In Family Member Hospitalist Urgent    Arrival Complaint    Severe Headache        Chief Complaint   Patient presents with    Headache     Pt presents to the ED with headache intermitent over the last 2 weeks  Pt reports taking OTC medications, excedrin, tylenol, motrin without relief  Assessment/Plan: 63 y o  female with PMHx of fibromyalgia, T2DM, chronic neck pain, MDD, HTN, CAD, HTN, who presents to ED with 2 week h/o a headache  She said the headache started soon after a neck massage  Presented for 2 days, worsened with a/w photophobia for a total of 5 days  Went away for 3 days to a point when she could do her day-to-day activities  Then returned back suddenly without any specific triggers, has been consistent for the past week  She says the pain is everywhere, by her eyes, frontal area, temples, the top of her head, and the back  Pain is gnawing and aching, constant  Has tried tylenol, which doesn't help  Also tried methocarbamol which she takes for back pain and fibromyalgia, which also doesn't help   Takes ambien at night, and is able to sleep through the night  Had one episode of chills yesterday, and nausea with driving here  Admit OBSERVATION with Headache migraine vs TTH  Concerning for vertebral artery dissection, as pain began soon after neck massage Cannot r/o structural neurologic abnormalities contributing  · ESR, CRP, CBC, CMP  · CTA head  · Will hold off migraine cocktail until CTA head results are back; if positive for dissection, will likely need transfer to another [facility] with specialty care      6/29 -- Patient still has significant pain in spite of migraine cocktail; will try IV magnesium sulfate and immitrex and monitor for response      ED Triage Vitals [06/28/20 1510]   Temperature Pulse Respirations Blood Pressure SpO2   98 °F (36 7 °C) 69 16 146/84 96 %      Temp Source Heart Rate Source Patient Position - Orthostatic VS BP Location FiO2 (%)   Oral Monitor Sitting Left arm --      Pain Score       9        Wt Readings from Last 1 Encounters:   06/28/20 80 3 kg (177 lb)     Additional Vital Signs:   Date/Time  Temp  Pulse  Resp  BP  MAP (mmHg)  SpO2  O2 Device   06/29/20 0704  97 9 °F (36 6 °C)  56  16  94/65  69  95 %  None (Room air)   06/28/20 2150  97 6 °F (36 4 °C)  54Abnormal   16  146/69  99  93 %  None (Room air)   06/28/20 1945              None (Room air)   06/28/20 1854  97 6 °F (36 4 °C)  74  20  165/76    99 %  None (Room air)   06/28/20 1834    53Abnormal   18  167/74    97 %  None (Room air)   06/28/20 1630    64  18      96 %     06/28/20 1510  98 °F (36 7 °C)  69  16  146/84  108  96 %  None (Room air)       Pertinent Labs/Diagnostic Test Results:   Results from last 7 days   Lab Units 06/29/20  0138   SODIUM mmol/L 136   POTASSIUM mmol/L 3 8   CHLORIDE mmol/L 105   CO2 mmol/L 24   ANION GAP mmol/L 7   BUN mg/dL 13   CREATININE mg/dL 0 98   EGFR ml/min/1 73sq m 64   CALCIUM mg/dL 8 6     Results from last 7 days   Lab Units 06/29/20  0138   AST U/L 23   ALT U/L 36   ALK PHOS U/L 84 TOTAL PROTEIN g/dL 6 9   ALBUMIN g/dL 3 3*   TOTAL BILIRUBIN mg/dL 0 38     Results from last 7 days   Lab Units 06/29/20  0638 06/28/20  2056   POC GLUCOSE mg/dl 117 128     Results from last 7 days   Lab Units 06/29/20  0138   GLUCOSE RANDOM mg/dL 146*     Results from last 7 days   Lab Units 06/29/20  0136   PROTIME seconds 13 2   INR  1 06   PTT seconds 23         ED Treatment:   Medication Administration from 06/28/2020 0953 to 06/28/2020 1858       Date/Time Order Dose Route Action     06/28/2020 1542 metoclopramide (REGLAN) injection 10 mg 10 mg Intravenous Given     06/28/2020 1543 ketorolac (TORADOL) injection 30 mg 30 mg Intravenous Given     06/28/2020 1542 sodium chloride 0 9 % bolus 1,000 mL 1,000 mL Intravenous New Bag     06/28/2020 1544 ondansetron (ZOFRAN) injection 4 mg 4 mg Intravenous Given     06/28/2020 1756 HYDROmorphone (DILAUDID) injection 0 5 mg 0 5 mg Intravenous Given     Past Medical History:   Diagnosis Date    Abnormal echocardiogram     Abnormal stress test     Anxiety     Asthma     Brachial neuritis     Depression     Disease of thyroid gland     Displacement of intervertebral disc of mid-cervical region     Fibromyalgia     Frequent headaches     GERD (gastroesophageal reflux disease)     Herniated nucleus pulposus     History of arthritis     History of asthma     History of cardiac disorder     History of chest pain     History of diverticulitis     History of fatigue     History of hearing loss     History of hemoptysis     History of herpes simplex infection     History of hiatal hernia     History of insect bite     INFECTED    History of memory loss     History of neck disorder     History of reflex sympathetic dystrophy     History of restless legs syndrome     History of shortness of breath     History of spinal stenosis     Hyperlipidemia     Hyperlipidemia     Hypertension     Skin rash     Somnolence, daytime     Spinal stenosis of cervical region      Present on Admission:   Cervical herniated disc   Depression with anxiety   Fibromyalgia   Gastroesophageal reflux disease   Hypothyroidism   Hypercholesterolemia   Type 2 diabetes mellitus without complication, without long-term current use of insulin ([facility])      Admitting Diagnosis: Headache [R51]  Headache [R51]  Age/Sex: 63 y o  female  Admission Orders:  Scheduled Medications:  Medications:  aspirin 81 mg Oral Daily   atorvastatin 80 mg Oral Daily   citalopram 40 mg Oral Daily   enoxaparin 40 mg Subcutaneous Daily   ezetimibe 10 mg Oral Daily   fluticasone-vilanterol 1 puff Inhalation Daily   insulin lispro 1-5 Units Subcutaneous TID AC   insulin lispro 1-5 Units Subcutaneous HS   levothyroxine 112 mcg Oral Daily   losartan 25 mg Oral Daily   magnesium oxide 400 mg Oral BID   pantoprazole 40 mg Oral Daily Before Breakfast   pregabalin 150 mg Oral TID        PRN Meds:  acetaminophen 975 mg Oral Q6H PRN 6/29 x1   albuterol 2 puff Inhalation Q6H PRN   hydrOXYzine HCL 25 mg Oral BID PRN   methocarbamol 500 mg Oral TID PRN   zolpidem 10 mg Oral HS PRN       Network Utilization Review Department  [email or web address]  ATTENTION: Please call with any questions or concerns to [phone] and carefully listen to the prompts so that you are directed to the right person  All voicemails are confidential   [place] all requests for admission clinical reviews, approved or denied determinations and any other requests to dedicated fax number below belonging to the campus where the patient is receiving treatment   List of dedicated fax numbers for the Facilities:  FACILITY NAME UR FAX NUMBER   ADMISSION DENIALS (Administrative/Medical Necessity) [phone]   [address] [place] [address] (Maternity/NICU/Pediatrics) [phone]   [place] [address] [place] [address] [facility] [phone]   Chelsy Kishore [facility] [phone] [address] [facility], [facility]  [phone] [address] [phone]

## 2020-06-29 NOTE — NURSING NOTE
Several attempts all evening have been made from nurses from our floor and 2nd floor for IV and blood work  Also several calls to ICU and ED but unfortunately they stated unable to come at this time  Upon calling, I mentioned the STAT CT and at this time still no available help  Charge made aware

## 2020-06-30 VITALS
HEIGHT: 63 IN | DIASTOLIC BLOOD PRESSURE: 50 MMHG | BODY MASS INDEX: 31.36 KG/M2 | TEMPERATURE: 97.8 F | RESPIRATION RATE: 18 BRPM | HEART RATE: 50 BPM | OXYGEN SATURATION: 93 % | SYSTOLIC BLOOD PRESSURE: 91 MMHG | WEIGHT: 177 LBS

## 2020-06-30 LAB
GLUCOSE SERPL-MCNC: 107 MG/DL (ref 65–140)
GLUCOSE SERPL-MCNC: 136 MG/DL (ref 65–140)

## 2020-06-30 PROCEDURE — 82948 REAGENT STRIP/BLOOD GLUCOSE: CPT

## 2020-06-30 PROCEDURE — 99239 HOSP IP/OBS DSCHRG MGMT >30: CPT | Performed by: HOSPITALIST

## 2020-06-30 RX ORDER — METOCLOPRAMIDE 5 MG/1
5 TABLET ORAL 3 TIMES DAILY PRN
Qty: 21 TABLET | Refills: 0 | Status: SHIPPED | OUTPATIENT
Start: 2020-06-30 | End: 2020-07-07 | Stop reason: ALTCHOICE

## 2020-06-30 RX ORDER — DOCUSATE SODIUM 100 MG/1
100 CAPSULE, LIQUID FILLED ORAL 2 TIMES DAILY
Status: DISCONTINUED | OUTPATIENT
Start: 2020-06-30 | End: 2020-06-30 | Stop reason: HOSPADM

## 2020-06-30 RX ADMIN — METOCLOPRAMIDE HYDROCHLORIDE 10 MG: 5 INJECTION INTRAMUSCULAR; INTRAVENOUS at 06:26

## 2020-06-30 RX ADMIN — ASPIRIN 81 MG 81 MG: 81 TABLET ORAL at 08:58

## 2020-06-30 RX ADMIN — PREGABALIN 150 MG: 75 CAPSULE ORAL at 08:58

## 2020-06-30 RX ADMIN — ATORVASTATIN CALCIUM 80 MG: 40 TABLET, FILM COATED ORAL at 08:57

## 2020-06-30 RX ADMIN — FLUTICASONE FUROATE AND VILANTEROL TRIFENATATE 1 PUFF: 200; 25 POWDER RESPIRATORY (INHALATION) at 09:04

## 2020-06-30 RX ADMIN — MAGNESIUM OXIDE 400 MG: 400 TABLET ORAL at 08:57

## 2020-06-30 RX ADMIN — ENOXAPARIN SODIUM 40 MG: 40 INJECTION SUBCUTANEOUS at 08:59

## 2020-06-30 RX ADMIN — KETOROLAC TROMETHAMINE 30 MG: 30 INJECTION, SOLUTION INTRAMUSCULAR at 09:01

## 2020-06-30 RX ADMIN — PANTOPRAZOLE SODIUM 40 MG: 40 TABLET, DELAYED RELEASE ORAL at 06:26

## 2020-06-30 RX ADMIN — LEVOTHYROXINE SODIUM 112 MCG: 112 TABLET ORAL at 08:57

## 2020-06-30 RX ADMIN — EZETIMIBE 10 MG: 10 TABLET ORAL at 08:57

## 2020-06-30 RX ADMIN — DOCUSATE SODIUM 100 MG: 100 CAPSULE, LIQUID FILLED ORAL at 09:07

## 2020-06-30 RX ADMIN — CITALOPRAM HYDROBROMIDE 40 MG: 20 TABLET ORAL at 08:57

## 2020-06-30 NOTE — ASSESSMENT & PLAN NOTE
· POA, 2 week h/o constant HA with associated photophobia  · Etiology: likely migraine vs TTH  CTA negative for vertebral A dissection  Less likely temporal arteritis since pain is bilateral, and ESR and CRP wnl  Does not appear infectious, as patient is afebrile, with good active ROM of the neck with minimal pain    · Patient has h/o same 2 years ago  · Pain improved with migraine cocktail  · Will discharge with benadryl, reglan, tylenol and motrin  · Follow up with headache clinic

## 2020-06-30 NOTE — ASSESSMENT & PLAN NOTE
· MRI cervical spine11/2018: Scattered mild to moderate spondylosis without cord compression or cord signal abnormalities  There is nonspecific straightening of the cervical lordosis without subluxation    · Pain associated, poorly controlled  · Recommend follow up with PCP

## 2020-06-30 NOTE — ASSESSMENT & PLAN NOTE
Lab Results   Component Value Date    HGBA1C 6 9 (H) 12/03/2019       Recent Labs     06/29/20  1543 06/29/20  2108 06/30/20  0738 06/30/20  1057   POCGLU 143* 141* 107 136       Blood Sugar Average: Last 72 hrs:  (P) 687 [id number]   · Resume home meds

## 2020-07-01 ENCOUNTER — TRANSITIONAL CARE MANAGEMENT (OUTPATIENT)
Dept: INTERNAL MEDICINE CLINIC | Age: 60
End: 2020-07-01

## 2020-07-02 ENCOUNTER — TELEPHONE (OUTPATIENT)
Dept: NEUROLOGY | Facility: CLINIC | Age: 60
End: 2020-07-02

## 2020-07-02 NOTE — TELEPHONE ENCOUNTER
Best contact number for [facility]:[phone]    Emergency Contact name and number:    Referring provider and telephone number:Mili Sanchez [facility]    Primary Care Provider Name and if affiliated with [facility]: Navya Thomas    Reason for Appointment/Dx:Headaches     Neurology Location patient would like to be seen:    Order received? Yes                                                Records Received? No    Have you ever seen another Neurologist?        No    Insurance Information    Insurance Name:Medicare A and B    ID/Policy #:    Secondary Insurance:    ID/Policy#: Workman's Comp/ Accident/ School  Information      Workman's Comp/Accident/School related?        No    If yes name of Insurance company:    Date of Injury:    Type of Injury:    [address] Name and Telephone Number:    Notes:Patricia Castillo pt pack sent                   Appointment date: 08/04/20 2:10pm Problem: Suicide, Risk for  Goal: #Suicidal thoughts, plans, and behaviors are monitored  Suicidal Thoughts  a) Passive thoughts about wanting to be dead with no plan, past history of suicide, or suicidal behavior  b) Active suicidal ideation involves an existing wish to die accompanied by a plan for how to carry out the death, or suicidal behavior.  Active suicidal ideation/suicidal behavior require intervention  Suicide Behaviors (including preparatory acts)  Acts or preparation toward making a suicide attempt, but before potential for harm has begun.  This includes behaviors such as assembling a method (e.g. buying a gun, collecting pills) or preparing for one's death by suicide (e.g. writing a suicide note, giving things away)  Outcome: Outcome Not Met, Continue to Monitor  Pt admits to having suicidal ideations this AM, however denied having a plan or carrying out these thoughts.  When asked what suicidal thoughts the pt is having, pt responded, \"I don't know, just having the same thoughts like yesterday.\"  Pt verbalized he would like to stay in ICU for another day, when asked why, he stated that he felt safe here.

## 2020-07-07 ENCOUNTER — OFFICE VISIT (OUTPATIENT)
Dept: INTERNAL MEDICINE CLINIC | Facility: CLINIC | Age: 60
End: 2020-07-07
Payer: MEDICARE

## 2020-07-07 ENCOUNTER — TELEPHONE (OUTPATIENT)
Dept: INTERNAL MEDICINE CLINIC | Facility: CLINIC | Age: 60
End: 2020-07-07

## 2020-07-07 VITALS
WEIGHT: 178.4 LBS | HEART RATE: 75 BPM | TEMPERATURE: 98.6 F | OXYGEN SATURATION: 98 % | BODY MASS INDEX: 30.46 KG/M2 | HEIGHT: 64 IN | DIASTOLIC BLOOD PRESSURE: 72 MMHG | SYSTOLIC BLOOD PRESSURE: 132 MMHG

## 2020-07-07 DIAGNOSIS — E11.9 TYPE 2 DIABETES MELLITUS WITHOUT COMPLICATION, WITHOUT LONG-TERM CURRENT USE OF INSULIN (HCC): ICD-10-CM

## 2020-07-07 DIAGNOSIS — M48.02 CERVICAL STENOSIS OF SPINAL CANAL: ICD-10-CM

## 2020-07-07 DIAGNOSIS — M62.838 NECK MUSCLE SPASM: ICD-10-CM

## 2020-07-07 DIAGNOSIS — Z11.4 SCREENING FOR HIV (HUMAN IMMUNODEFICIENCY VIRUS): Primary | ICD-10-CM

## 2020-07-07 DIAGNOSIS — F32.89 OTHER DEPRESSION: ICD-10-CM

## 2020-07-07 DIAGNOSIS — M50.20 CERVICAL HERNIATED DISC: ICD-10-CM

## 2020-07-07 DIAGNOSIS — Z12.4 CERVICAL CANCER SCREENING: ICD-10-CM

## 2020-07-07 PROCEDURE — 99495 TRANSJ CARE MGMT MOD F2F 14D: CPT | Performed by: INTERNAL MEDICINE

## 2020-07-07 RX ORDER — CYCLOBENZAPRINE HCL 10 MG
10 TABLET ORAL 3 TIMES DAILY PRN
Qty: 30 TABLET | Refills: 1 | Status: SHIPPED | OUTPATIENT
Start: 2020-07-07 | End: 2021-01-19 | Stop reason: SDUPTHER

## 2020-07-07 RX ORDER — MELOXICAM 15 MG/1
15 TABLET ORAL DAILY
Qty: 30 TABLET | Refills: 0 | Status: SHIPPED | OUTPATIENT
Start: 2020-07-07 | End: 2020-07-22 | Stop reason: ALTCHOICE

## 2020-07-07 RX ORDER — LIDOCAINE 50 MG/G
1 PATCH TOPICAL DAILY
Qty: 10 PATCH | Refills: 2 | Status: SHIPPED | OUTPATIENT
Start: 2020-07-07 | End: 2021-01-13 | Stop reason: ALTCHOICE

## 2020-07-07 NOTE — PROGRESS NOTES
Assessment/Plan:      Cervical stenosis of spinal canal  Severe cervical neck spasm with inflammation in the base of the neck with radiating pain along the posterior aspect of her scalp  Recommend anti-inflammatory, muscle relaxant  Patient is intolerant to steroids and will hence not used some  Will discontinue antihistamines              Diagnoses and all orders for this visit:    Screening for HIV (human immunodeficiency virus)    Cervical cancer screening  -     Ambulatory referral to Gynecology; Future    Type 2 diabetes mellitus without complication, without long-term current use of insulin ([facility] [address] )  -     Ambulatory referral to Ophthalmology; Future    Other depression    Cervical stenosis of spinal canal  -     cyclobenzaprine (FLEXERIL) 10 mg tablet; Take 1 tablet (10 mg total) by mouth 3 (three) times a day as needed for muscle spasms  -     meloxicam (MOBIC) 15 mg tablet; Take 1 tablet (15 mg total) by mouth daily  -     MRI cervical spine wo contrast; Future  -     lidocaine (LIDODERM) 5 %; Apply 1 patch topically daily Remove & Discard patch within 12 hours or as directed by MD    Neck muscle spasm  -     cyclobenzaprine (FLEXERIL) 10 mg tablet; Take 1 tablet (10 mg total) by mouth 3 (three) times a day as needed for muscle spasms  -     meloxicam (MOBIC) 15 mg tablet; Take 1 tablet (15 mg total) by mouth daily  -     MRI cervical spine wo contrast; Future  -     lidocaine (LIDODERM) 5 %; Apply 1 patch topically daily Remove & Discard patch within 12 hours or as directed by MD    Cervical herniated disc    Other orders  -     Cancel: HIV 1/2 Antigen/Antibody (4th Generation) w Reflex [facility]; Future          Subjective:   Chief Complaint   Patient presents with    Transition of Care Management     pt was admitted to [facility] 6/28/2020 and discharged on 6/20/2020 with headache  Pt currently has headace    Pt took reglan, APAP and benadryl at 1230 PM     PHQ9     Positive 17    Care Gap Request     pended orders for an eye exam and gyn referral    Diabetes     foot exam done today          Patient ID: Nasir Quijano is a 63 y o  female  She was recently admitted to the hospital for new onset of headache  She has a past medical history of severe cervical stenosis with multiple disc prolapses all the way from C1 through C7  She has a similar history that happened almost 3 years ago when she had severe cervical neck spasm however this seems much worse this time  There is no history of trauma however she did have a massage which done on her shoulders and neck immediately prior to the onset of headache    Headache    This is a new (started after a massage) problem  The current episode started 1 to 4 weeks ago  The problem occurs constantly  The problem has been unchanged  The pain is located in the vertex and bilateral region  The pain quality is similar to prior headaches  The quality of the pain is described as band-like  The pain is moderate  Associated symptoms include neck pain, phonophobia, photophobia and scalp tenderness  The symptoms are aggravated by noise and bright light  She has tried cold packs for the symptoms  The treatment provided mild relief  Her past medical history is significant for hypertension  The following portions of the patient's history were reviewed and updated as appropriate: past family history, past medical history, past social history, past surgical history and problem list     Review of Systems   Eyes: Positive for photophobia and visual disturbance  Musculoskeletal: Positive for neck pain  Neurological: Positive for headaches  All other systems reviewed and are negative          Past Medical History:   Diagnosis Date    Abnormal echocardiogram     Abnormal stress test     Anxiety     Asthma     Brachial neuritis     Depression     Disease of thyroid gland     Displacement of intervertebral disc of mid-cervical region     Fibromyalgia     Frequent headaches     GERD (gastroesophageal reflux disease)     Herniated nucleus pulposus     History of arthritis     History of asthma     History of cardiac disorder     History of chest pain     History of diverticulitis     History of fatigue     History of hearing loss     History of hemoptysis     History of herpes simplex infection     History of hiatal hernia     History of insect bite     INFECTED    History of memory loss     History of neck disorder     History of reflex sympathetic dystrophy     History of restless legs syndrome     History of shortness of breath     History of spinal stenosis     Hyperlipidemia     Hyperlipidemia     Hypertension     Skin rash     Somnolence, daytime     Spinal stenosis of cervical region          Current Outpatient Medications:     acetaminophen (TYLENOL) 325 mg tablet, Take 1 tablet (325 mg total) by mouth as needed for headaches For headache, Disp: , Rfl:     albuterol (PROVENTIL HFA,VENTOLIN HFA) 90 mcg/act inhaler, Inhale 2 puffs every 6 (six) hours as needed for wheezing, Disp: 1 Inhaler, Rfl: 3    aspirin 81 mg chewable tablet, Chew 81 mg daily, Disp: , Rfl:     atorvastatin (LIPITOR) 80 mg tablet, Take 1 tablet (80 mg total) by mouth daily, Disp: 90 tablet, Rfl: 2    Blood Glucose Monitoring Suppl (ONETOUCH VERIO) w/Device KIT, by Does not apply route 2 (two) times a day, Disp: , Rfl:     citalopram (CeleXA) 40 mg tablet, Take 1 tablet (40 mg total) by mouth daily, Disp: 90 tablet, Rfl: 1    Empagliflozin (Jardiance) 10 MG TABS, Take 1 tablet (10 mg total) by mouth every morning, Disp: 28 tablet, Rfl: 0    ezetimibe (ZETIA) 10 mg tablet, Take 1 tablet (10 mg total) by mouth daily, Disp: 30 tablet, Rfl: 5    fluticasone-salmeterol (ADVAIR) 250-50 mcg/dose inhaler, Inhale 1 puff every 12 (twelve) hours as needed (sob), Disp: 1 each, Rfl: 1    glucose blood (OneTouch Verio) test strip, 1 each by Other route 2 (two) times a day, Disp: , Rfl:     Lancets (ONETOUCH ULTRASOFT) lancets, by Other route 2 (two) times a day, Disp: , Rfl:     levothyroxine 112 mcg tablet, Take 1 tablet (112 mcg total) by mouth daily, Disp: 90 tablet, Rfl: 1    losartan (COZAAR) 25 mg tablet, TAKE 1 TABLET DAILY  , Disp: 90 tablet, Rfl: 3    magnesium oxide (MAG-OX) 400 mg, Take 1 tablet (400 mg total) by mouth 2 (two) times a day, Disp: 60 tablet, Rfl: 0    metFORMIN (GLUCOPHAGE) 500 mg tablet, Take 0 5 tablets (250 mg total) by mouth daily, Disp: 90 tablet, Rfl: 1    methocarbamol (ROBAXIN) 500 mg tablet, Take 1 tablet (500 mg total) by mouth 3 (three) times a day as needed for muscle spasms, Disp: 90 tablet, Rfl: 0    metoprolol succinate (TOPROL-XL) 25 mg 24 hr tablet, Take 25 mg by mouth daily, Disp: , Rfl:     omega-3-acid ethyl esters (LOVAZA) 1 g capsule, Take 2 capsules (2 g total) by mouth 2 (two) times a day, Disp: 120 capsule, Rfl: 3    pantoprazole (PROTONIX) 40 mg tablet, Take 1 tablet (40 mg total) by mouth daily, Disp: 90 tablet, Rfl: 1    pregabalin (LYRICA) 150 mg capsule, Take 1 capsule (150 mg total) by mouth 3 (three) times a day, Disp: 90 capsule, Rfl: 2    senna-docusate sodium (SENOKOT S) 8 6-50 mg per tablet, Take 2 tablets by mouth daily, Disp: , Rfl:     triamcinolone (KENALOG) 0 1 % cream, Apply topically 2 (two) times a day, Disp: 80 g, Rfl: 1    zolpidem (Ambien) 10 mg tablet, Take 1 tablet (10 mg total) by mouth daily at bedtime as needed for sleep, Disp: 30 tablet, Rfl: 0    cyclobenzaprine (FLEXERIL) 10 mg tablet, Take 1 tablet (10 mg total) by mouth 3 (three) times a day as needed for muscle spasms, Disp: 30 tablet, Rfl: 1    lidocaine (LIDODERM) 5 %, Apply 1 patch topically daily Remove & Discard patch within 12 hours or as directed by MD, Disp: 10 patch, Rfl: 2    meloxicam (MOBIC) 15 mg tablet, Take 1 tablet (15 mg total) by mouth daily, Disp: 30 tablet, Rfl: 0    Allergies   Allergen Reactions    Augmentin [Amoxicillin-Pot Clavulanate] Nausea Only     PT stated she only allergic to medication AUGMENTIN       Social History   Past Surgical History:   Procedure Laterality Date    BACK SURGERY      BACK SURGERY      LOWER BACK SURGERY    CARDIAC CATHETERIZATION      HISTORY OF CORONARY ANGIOGRAPHY WITH CONCOMITANT LEFT HEART CATHETERIZATION    CORONARY ANGIOPLASTY WITH STENT PLACEMENT      EAR SURGERY      TONSILLECTOMY      TYMPANOPLASTY       Family History   Problem Relation Age of Onset    Coronary artery disease Mother         ATHEROMA    Heart disease Mother     Diabetes Mother     Hypertension Mother     No Known Problems Father         NO PERTINENT FAMILY HISTORY    Coronary artery disease Sister         ATHEROMA    Heart disease Sister     Stroke Sister     Diabetes Sister     Hypertension Sister        Objective:  /72 (BP Location: Right arm, Patient Position: Sitting, Cuff Size: Adult)   Pulse 75   Temp 98 6 °F (37 °C) (Tympanic)   Ht 5' 3 98" (1 625 m) Comment: with shoes  Wt 80 9 kg (178 lb 6 4 oz) Comment: with shoes  SpO2 98% Comment: ra  BMI 30 65 kg/m²     Recent Results (from the past 1344 hour(s))   Fingerstick Glucose (POCT)    Collection Time: 06/28/20  8:56 PM   Result Value Ref Range    POC Glucose 128 65 - 140 mg/dl   Protime-INR    Collection Time: 06/29/20  1:36 AM   Result Value Ref Range    Protime 13 2 11 6 - 14 5 seconds    INR 1 06 0 84 - 1 19   APTT    Collection Time: 06/29/20  1:36 AM   Result Value Ref Range    PTT 23 23 - 37 seconds   Comprehensive metabolic panel    Collection Time: 06/29/20  1:38 AM   Result Value Ref Range    Sodium 136 136 - 145 mmol/L    Potassium 3 8 3 5 - 5 3 mmol/L    Chloride 105 100 - 108 mmol/L    CO2 24 21 - 32 mmol/L    ANION GAP 7 4 - 13 mmol/L    BUN 13 5 - 25 mg/dL    Creatinine 0 98 0 60 - 1 30 mg/dL    Glucose 146 (H) 65 - 140 mg/dL    Calcium 8 6 8 3 - 10 1 mg/dL    AST 23 5 - 45 U/L    ALT 36 12 - 78 U/L    Alkaline Phosphatase 84 46 - 116 U/L    Total Protein 6 9 6 4 - 8 2 g/dL    Albumin 3 3 (L) 3 5 - 5 0 g/dL    Total Bilirubin 0 38 0 20 - 1 00 mg/dL    eGFR 64 ml/min/1 73sq m   Fingerstick Glucose (POCT)    Collection Time: 06/29/20  6:38 AM   Result Value Ref Range    POC Glucose 117 65 - 140 mg/dl   Fingerstick Glucose (POCT)    Collection Time: 06/29/20 10:45 AM   Result Value Ref Range    POC Glucose 125 65 - 140 mg/dl   Comprehensive metabolic panel    Collection Time: 06/29/20 11:03 AM   Result Value Ref Range    Sodium 142 136 - 145 mmol/L    Potassium 3 8 3 5 - 5 3 mmol/L    Chloride 106 100 - 108 mmol/L    CO2 29 21 - 32 mmol/L    ANION GAP 7 4 - 13 mmol/L    BUN 11 5 - 25 mg/dL    Creatinine 0 93 0 60 - 1 30 mg/dL    Glucose 126 65 - 140 mg/dL    Calcium 8 5 8 3 - 10 1 mg/dL    AST 29 5 - 45 U/L    ALT 40 12 - 78 U/L    Alkaline Phosphatase 76 46 - 116 U/L    Total Protein 6 9 6 4 - 8 2 g/dL    Albumin 3 6 3 5 - 5 0 g/dL    Total Bilirubin 0 73 0 20 - 1 00 mg/dL    eGFR 68 ml/min/1 73sq m   CBC and differential    Collection Time: 06/29/20 11:03 AM   Result Value Ref Range    WBC 5 04 4 31 - 10 16 Thousand/uL    RBC 4 73 3 81 - 5 12 Million/uL    Hemoglobin 14 5 11 5 - 15 4 g/dL    Hematocrit 43 7 34 8 - 46 1 %    MCV 92 82 - 98 fL    MCH 30 7 26 8 - 34 3 pg    MCHC 33 2 31 4 - 37 4 g/dL    RDW 13 0 11 6 - 15 1 %    MPV 10 1 8 9 - 12 7 fL    Platelets 244 189 - 867 Thousands/uL    nRBC 0 /100 WBCs    Neutrophils Relative 54 43 - 75 %    Immat GRANS % 0 0 - 2 %    Lymphocytes Relative 38 14 - 44 %    Monocytes Relative 6 4 - 12 %    Eosinophils Relative 2 0 - 6 %    Basophils Relative 0 0 - 1 %    Neutrophils Absolute 2 66 1 85 - 7 62 Thousands/µL    Immature Grans Absolute 0 01 0 00 - 0 20 Thousand/uL    Lymphocytes Absolute 1 93 0 60 - 4 47 Thousands/µL    Monocytes Absolute 0 31 0 17 - 1 22 Thousand/µL    Eosinophils Absolute 0 11 0 00 - 0 61 Thousand/µL    Basophils Absolute 0 02 0 00 - 0 10 Thousands/µL Sedimentation rate, automated    Collection Time: 06/29/20 11:03 AM   Result Value Ref Range    Sed Rate 1 0 - 20 mm/hour   C-reactive protein    Collection Time: 06/29/20 11:03 AM   Result Value Ref Range    CRP <3 0 <3 0 mg/L   TSH, 3rd generation    Collection Time: 06/29/20 11:03 AM   Result Value Ref Range    TSH 3RD GENERATON 0 763 0 358 - 3 740 uIU/mL   Fingerstick Glucose (POCT)    Collection Time: 06/29/20  3:43 PM   Result Value Ref Range    POC Glucose 143 (H) 65 - 140 mg/dl   Fingerstick Glucose (POCT)    Collection Time: 06/29/20  9:08 PM   Result Value Ref Range    POC Glucose 141 (H) 65 - 140 mg/dl   Fingerstick Glucose (POCT)    Collection Time: 06/30/20  7:38 AM   Result Value Ref Range    POC Glucose 107 65 - 140 mg/dl   Fingerstick Glucose (POCT)    Collection Time: 06/30/20 10:57 AM   Result Value Ref Range    POC Glucose 136 65 - 140 mg/dl            Physical Exam   Constitutional: She is oriented to person, place, and time  She appears well-developed and well-nourished  She appears distressed  HENT:   Head: Normocephalic and atraumatic  Eyes: Pupils are equal, round, and reactive to light  EOM are normal    Neck: Normal range of motion  Neck supple  Cardiovascular: Normal rate and regular rhythm  Pulmonary/Chest: Effort normal and breath sounds normal    Abdominal: Soft  Bowel sounds are normal    Musculoskeletal: Normal range of motion  She exhibits no edema, tenderness or deformity  Neurological: She is alert and oriented to person, place, and time  She displays normal reflexes  No cranial nerve deficit or sensory deficit  She exhibits normal muscle tone  Coordination normal    Skin: Skin is warm and dry  Psychiatric: She has a normal mood and affect   Her behavior is normal  Judgment and thought content normal

## 2020-07-07 NOTE — PATIENT INSTRUCTIONS
Acute Neck Pain   WHAT YOU NEED TO KNOW:   Acute neck pain starts suddenly, increases quickly, and goes away in a few days  The pain may come and go, or be worse with certain movements  The pain may be only in your neck, or it may move to your arms, back, or shoulders  You may also have pain that starts in another body area and moves to your neck  DISCHARGE INSTRUCTIONS:   Return to the emergency department if:   · You have an injury that causes neck pain and shooting pain down your arms or legs  · Your neck pain suddenly becomes severe  · You have neck pain along with numbness, tingling, or weakness in your arms or legs  · You have a stiff neck, a headache, and a fever  Contact your healthcare provider if:   · You have new or worsening symptoms  · Your symptoms continue even after treatment  · You have questions or concerns about your condition or care  Medicines:   · NSAIDs , such as ibuprofen, help decrease swelling, pain, and fever  This medicine is available without a doctor's order  Ask your healthcare provider which medicine to take and how often to take it  Follow directions  NSAIDs can cause stomach bleeding or kidney problems if not taken correctly  If you take blood thinner medicine, always ask if NSAIDs are safe for you  · Acetaminophen  helps decrease pain and fever  Ask your healthcare provider how much to take and how often to take it  Follow directions  Acetaminophen can cause liver damage if not taken correctly  · Steroid medicine  may be used to reduce inflammation  This can help relieve pain caused by swelling  · Take your medicine as directed  Contact your healthcare provider if you think your medicine is not helping or if you have side effects  Tell him or her if you are allergic to any medicine  Keep a list of the medicines, vitamins, and herbs you take  Include the amounts, and when and why you take them  Bring the list or the pill bottles to follow-up visits  Carry your medicine list with you in case of an emergency  Manage or prevent acute neck pain:   · Rest your neck as directed  Do not make sudden movements, such as turning your head quickly  Your healthcare provider may recommend you wear a cervical collar for a short time  The collar will prevent you from moving your head  This will give your neck time to heal if an injury is causing your neck pain  Ask your healthcare provider when you can return to sports or other normal daily activities  · Apply heat as directed  Heat helps relieve pain and swelling  Use a heat wrap, or soak a small towel in warm water  Wring out the extra water  Apply the heat wrap or towel for 20 minutes every hour, or as directed  · Apply ice as directed  Ice helps relieve pain and swelling, and can help prevent tissue damage  Use an ice pack, or put ice in a bag  Cover the ice pack or back with a towel before you apply it to your neck  Apply the ice pack or ice for 15 minutes every hour, or as directed  Your healthcare provider can tell you how often to apply ice  · Do neck exercises as directed  Neck exercises help strengthen the muscles and increase range of motion  Your healthcare provider will tell you which exercises are right for you  He may give you instructions, or he may recommend that you work with a physical therapist  Your healthcare provider or therapist can make sure you are doing the exercises correctly  · Maintain good posture  Try to keep your head and shoulders lifted when you sit  If you work in front of a computer, make sure the monitor is at the right level  You should not need to look up down to see the screen  You should also not have to lean forward to be able to read what is on the screen  Make sure your keyboard, mouse, and other computer items are placed where you do not have to extend your shoulder to reach them  Get up often if you work in front of a computer or sit for long periods of time  Stretch or walk around to keep your neck muscles loose  Follow up with your healthcare provider as directed: Your healthcare provider may refer you to a specialist if your pain does not get better with treatment  Write down your questions so you remember to ask them during your visits  © 2017 [employer] Information is for End User's use only and may not be sold, redistributed or otherwise used for commercial purposes  All illustrations and images included in CareNotes® are the copyrighted property of [employer]  or Declan Garcai  The above information is an  only  It is not intended as medical advice for individual conditions or treatments  Talk to your doctor, nurse or pharmacist before following any medical regimen to see if it is safe and effective for you

## 2020-07-07 NOTE — ASSESSMENT & PLAN NOTE
Severe cervical neck spasm with inflammation in the base of the neck with radiating pain along the posterior aspect of her scalp  Recommend anti-inflammatory, muscle relaxant  Patient is intolerant to steroids and will hence not used some    Will discontinue antihistamines

## 2020-07-08 NOTE — PHYSICIAN ADVISOR
Current patient class: Inpatient  The patient is currently on Hospital Day: 3 at [facility]      The patient was admitted to the hospital at [id number] on 6/29/20 for the following diagnosis:  Headache [R51]  Headache [R51]       There is documentation in the medical record of an expected length of stay of at least 2 midnights  The patient is therefore expected to satisfy the 2 midnight benchmark and given the 2 midnight presumption is appropriate for INPATIENT ADMISSION  Given this expectation of a satisfying stay, CMS instructs us that the patient is most often appropriate for inpatient admission under part A provided medical necessity is documented in the chart  After review of the relevant documentation, labs, vital signs and test results, the patient is appropriate for INPATIENT ADMISSION  Admission to the hospital as an inpatient is a complex decision making process which requires the practitioner to consider the patients presenting complaint, history and physical examination and all relevant testing  With this in mind, in this case, the patient was deemed appropriate for INPATIENT ADMISSION  After review of the documentation and testing available at the time of the admission I concur with this clinical determination of medical necessity  Rationale is as follows: The patient is a 63 yrs old Female who presented to the ED at 6/28/2020  3:21 PM with a chief complaint of Headache (Pt presents to the ED with headache intermitent over the last 2 weeks  Pt reports taking OTC medications, excedrin, tylenol, motrin without relief  )  Patient came with distractible migraine and was put on a migraine cocktail but on day 2 of admission she still continued to have the headaches in spite of standard management    The patient was then put on IV magnesium as well as IV Toradol  given the above the patient did cross the 2 midnight benchmark as set by Medicare and is inpatient status appropriate based on medical necessity while receiving ongoing acute inpatient medical care      The patients vitals on arrival were ED Triage Vitals [06/28/20 1510]   Temperature Pulse Respirations Blood Pressure SpO2   98 °F (36 7 °C) 69 16 146/84 96 %      Temp Source Heart Rate Source Patient Position - Orthostatic VS BP Location FiO2 (%)   Oral Monitor Sitting Left arm --      Pain Score       9           Past Medical History:   Diagnosis Date    Abnormal echocardiogram     Abnormal stress test     Anxiety     Asthma     Brachial neuritis     Depression     Disease of thyroid gland     Displacement of intervertebral disc of mid-cervical region     Fibromyalgia     Frequent headaches     GERD (gastroesophageal reflux disease)     Herniated nucleus pulposus     History of arthritis     History of asthma     History of cardiac disorder     History of chest pain     History of diverticulitis     History of fatigue     History of hearing loss     History of hemoptysis     History of herpes simplex infection     History of hiatal hernia     History of insect bite     INFECTED    History of memory loss     History of neck disorder     History of reflex sympathetic dystrophy     History of restless legs syndrome     History of shortness of breath     History of spinal stenosis     Hyperlipidemia     Hyperlipidemia     Hypertension     Skin rash     Somnolence, daytime     Spinal stenosis of cervical region      Past Surgical History:   Procedure Laterality Date    BACK SURGERY      BACK SURGERY      LOWER BACK SURGERY    CARDIAC CATHETERIZATION      HISTORY OF CORONARY ANGIOGRAPHY WITH CONCOMITANT LEFT HEART CATHETERIZATION    CORONARY ANGIOPLASTY WITH STENT PLACEMENT      EAR SURGERY      TONSILLECTOMY      TYMPANOPLASTY             Consults have been placed to:   None    Vitals:    06/29/20 0704 06/29/20 1500 06/29/20 2247 06/30/20 0733   BP: 94/65 105/51 109/53 91/50   BP Location: Left arm Right arm Right arm Right arm   Pulse: 56 (!) 51 (!) 50 (!) 50   Resp: 16 18 18    Temp: 97 9 °F (36 6 °C) 97 6 °F (36 4 °C) 97 5 °F (36 4 °C) 97 8 °F (36 6 °C)   TempSrc: Oral Oral Oral Oral   SpO2: 95% 91% 94% 93%   Weight:       Height:           Most recent labs:    No results for input(s): WBC, HGB, HCT, PLT, K, NA, CALCIUM, BUN, CREATININE, LIPASE, AMYLASE, INR, TROPONINI, CKTOTAL, AST, ALT, ALKPHOS, BILITOT in the last 72 hours  Scheduled Meds:  Continuous Infusions:  No current facility-administered medications for this encounter  PRN Meds:      Surgical procedures (if appropriate):

## 2020-07-09 ENCOUNTER — TELEPHONE (OUTPATIENT)
Dept: INTERNAL MEDICINE CLINIC | Facility: CLINIC | Age: 60
End: 2020-07-09

## 2020-07-09 ENCOUNTER — HOSPITAL ENCOUNTER (OUTPATIENT)
Dept: RADIOLOGY | Facility: IMAGING CENTER | Age: 60
Discharge: HOME | End: 2020-07-09
Payer: MEDICARE

## 2020-07-09 ENCOUNTER — TELEPHONE (OUTPATIENT)
Dept: INTERNAL MEDICINE CLINIC | Age: 60
End: 2020-07-09

## 2020-07-09 DIAGNOSIS — M48.02 CERVICAL STENOSIS OF SPINAL CANAL: ICD-10-CM

## 2020-07-09 DIAGNOSIS — M62.838 NECK MUSCLE SPASM: ICD-10-CM

## 2020-07-09 PROCEDURE — 72141 MRI NECK SPINE W/O DYE: CPT

## 2020-07-09 NOTE — TELEPHONE ENCOUNTER
Patient called requesting RX for Mobic be changed to a different medication  Has taken this medication in the past and had a bad reaction  Patient did not  this medication from the pharmacy

## 2020-07-09 NOTE — TELEPHONE ENCOUNTER
Please determine exactly what his "bad reaction" is to update allergy list     Would recommend OTC motrin or aleve then (if pt can tolerate either - please verify as meloxicam was no on his allergy list)

## 2020-07-09 NOTE — TELEPHONE ENCOUNTER
Spoke to patient, she would like a referral to neurosurgery, and would also like a printed copy of the referral and the MRI report sent to her house   Please place order

## 2020-07-09 NOTE — TELEPHONE ENCOUNTER
----- Message from Karlos Centeno MD sent at 7/9/2020  1:45 PM EDT -----  Regarding: Neck pain  MRI shows worsening of Cervical spondylosis over time and NEW disc bulge at the C6/C7 level    I can put in for Neurosurgery eval if she would like       ----- Message -----  From: Interface, Radiology Results In  Sent: 7/9/2020   1:18 PM EDT  To: Karlos Centeno MD

## 2020-07-09 NOTE — TELEPHONE ENCOUNTER
Pt had to go to the ER due to diverticulits and was advised to stop Mobic because it may have been the cause  She is ok to try Aleve or Motrin

## 2020-07-10 DIAGNOSIS — M50.20 CERVICAL HERNIATED DISC: ICD-10-CM

## 2020-07-10 DIAGNOSIS — M54.12 CERVICAL RADICULOPATHY: Primary | ICD-10-CM

## 2020-07-10 NOTE — TELEPHONE ENCOUNTER
Discussed with Dr Kaelyn Escalante  Referral placed  MRI order is chart  Can someone please print and send to patient's house?

## 2020-07-14 ENCOUNTER — DOCUMENTATION (OUTPATIENT)
Dept: CARDIOLOGY CLINIC | Facility: CLINIC | Age: 60
End: 2020-07-14

## 2020-07-14 ENCOUNTER — TELEPHONE (OUTPATIENT)
Dept: CARDIOLOGY CLINIC | Facility: CLINIC | Age: 60
End: 2020-07-14

## 2020-07-14 NOTE — PROGRESS NOTES
Discussed results of Zio monitor with patient  One PAC noted at time of trigger, with all others revealing sinus rhythm  Patient states she still wakes up with heart pounding sensation  Recommended sleep study which she will discuss with her PCP

## 2020-07-15 DIAGNOSIS — F51.01 PRIMARY INSOMNIA: ICD-10-CM

## 2020-07-15 DIAGNOSIS — F32.89 OTHER DEPRESSION: ICD-10-CM

## 2020-07-16 RX ORDER — ZOLPIDEM TARTRATE 10 MG/1
10 TABLET ORAL
Qty: 30 TABLET | Refills: 0 | Status: SHIPPED | OUTPATIENT
Start: 2020-07-16 | End: 2020-08-17 | Stop reason: SDUPTHER

## 2020-07-16 RX ORDER — CITALOPRAM 40 MG/1
40 TABLET ORAL DAILY
Qty: 90 TABLET | Refills: 1 | Status: SHIPPED | OUTPATIENT
Start: 2020-07-16 | End: 2021-01-18

## 2020-07-20 ENCOUNTER — CLINICAL SUPPORT (OUTPATIENT)
Dept: INTERNAL MEDICINE CLINIC | Facility: CLINIC | Age: 60
End: 2020-07-20
Payer: MEDICARE

## 2020-07-20 DIAGNOSIS — E11.9 TYPE 2 DIABETES MELLITUS WITHOUT COMPLICATION, WITHOUT LONG-TERM CURRENT USE OF INSULIN (HCC): Primary | ICD-10-CM

## 2020-07-20 DIAGNOSIS — E78.5 DYSLIPIDEMIA: ICD-10-CM

## 2020-07-20 LAB
ANION GAP SERPL CALCULATED.3IONS-SCNC: 10 MMOL/L (ref 4–13)
BUN SERPL-MCNC: 14 MG/DL (ref 5–25)
CALCIUM SERPL-MCNC: 9.6 MG/DL (ref 8.3–10.1)
CHLORIDE SERPL-SCNC: 105 MMOL/L (ref 100–108)
CHOLEST SERPL-MCNC: 95 MG/DL (ref 50–200)
CO2 SERPL-SCNC: 23 MMOL/L (ref 21–32)
CREAT SERPL-MCNC: 0.84 MG/DL (ref 0.6–1.3)
EST. AVERAGE GLUCOSE BLD GHB EST-MCNC: 169 MG/DL
GFR SERPL CREATININE-BSD FRML MDRD: 77 ML/MIN/1.73SQ M
GLUCOSE P FAST SERPL-MCNC: 122 MG/DL (ref 65–99)
HBA1C MFR BLD: 7.5 %
HDLC SERPL-MCNC: 43 MG/DL
LDLC SERPL CALC-MCNC: 35 MG/DL (ref 0–100)
POTASSIUM SERPL-SCNC: 3.9 MMOL/L (ref 3.5–5.3)
SODIUM SERPL-SCNC: 138 MMOL/L (ref 136–145)
TRIGL SERPL-MCNC: 85 MG/DL

## 2020-07-20 PROCEDURE — 80061 LIPID PANEL: CPT | Performed by: NURSE PRACTITIONER

## 2020-07-20 PROCEDURE — 80048 BASIC METABOLIC PNL TOTAL CA: CPT | Performed by: NURSE PRACTITIONER

## 2020-07-20 PROCEDURE — 83036 HEMOGLOBIN GLYCOSYLATED A1C: CPT | Performed by: NURSE PRACTITIONER

## 2020-07-20 PROCEDURE — 36415 COLL VENOUS BLD VENIPUNCTURE: CPT

## 2020-07-22 ENCOUNTER — OFFICE VISIT (OUTPATIENT)
Dept: INTERNAL MEDICINE CLINIC | Facility: CLINIC | Age: 60
End: 2020-07-22
Payer: MEDICARE

## 2020-07-22 VITALS
DIASTOLIC BLOOD PRESSURE: 68 MMHG | TEMPERATURE: 98.5 F | WEIGHT: 177.6 LBS | OXYGEN SATURATION: 97 % | HEIGHT: 64 IN | SYSTOLIC BLOOD PRESSURE: 117 MMHG | HEART RATE: 77 BPM | BODY MASS INDEX: 30.32 KG/M2

## 2020-07-22 DIAGNOSIS — M48.02 CERVICAL STENOSIS OF SPINAL CANAL: ICD-10-CM

## 2020-07-22 DIAGNOSIS — Z00.00 MEDICARE ANNUAL WELLNESS VISIT, SUBSEQUENT: ICD-10-CM

## 2020-07-22 DIAGNOSIS — M62.838 NECK MUSCLE SPASM: ICD-10-CM

## 2020-07-22 DIAGNOSIS — F41.8 SITUATIONAL ANXIETY: ICD-10-CM

## 2020-07-22 DIAGNOSIS — Z01.419 WOMEN'S ANNUAL ROUTINE GYNECOLOGICAL EXAMINATION: ICD-10-CM

## 2020-07-22 DIAGNOSIS — Z12.39 SCREENING FOR BREAST CANCER: Primary | ICD-10-CM

## 2020-07-22 PROCEDURE — 3051F HG A1C>EQUAL 7.0%<8.0%: CPT | Performed by: INTERNAL MEDICINE

## 2020-07-22 PROCEDURE — 99214 OFFICE O/P EST MOD 30 MIN: CPT | Performed by: INTERNAL MEDICINE

## 2020-07-22 PROCEDURE — 1111F DSCHRG MED/CURRENT MED MERGE: CPT | Performed by: INTERNAL MEDICINE

## 2020-07-22 PROCEDURE — G0438 PPPS, INITIAL VISIT: HCPCS | Performed by: INTERNAL MEDICINE

## 2020-07-22 PROCEDURE — 3074F SYST BP LT 130 MM HG: CPT | Performed by: INTERNAL MEDICINE

## 2020-07-22 PROCEDURE — 3078F DIAST BP <80 MM HG: CPT | Performed by: INTERNAL MEDICINE

## 2020-07-22 PROCEDURE — 3008F BODY MASS INDEX DOCD: CPT | Performed by: INTERNAL MEDICINE

## 2020-07-22 RX ORDER — MELOXICAM 15 MG/1
15 TABLET ORAL DAILY
Qty: 30 TABLET | Refills: 0
Start: 2020-07-22 | End: 2020-10-07 | Stop reason: ALTCHOICE

## 2020-07-22 NOTE — PATIENT INSTRUCTIONS
Medicare Preventive Visit Patient Instructions  Thank you for completing your Welcome to Medicare Visit or Medicare Annual Wellness Visit today  Your next wellness visit will be due in one year (7/22/2021)  The screening/preventive services that you may require over the next 5-10 years are detailed below  Some tests may not apply to you based off risk factors and/or age  Screening tests ordered at today's visit but not completed yet may show as past due  Also, please note that scanned in results may not display below  Preventive Screenings:  Service Recommendations Previous Testing/Comments   Colorectal Cancer Screening  * Colonoscopy    * Fecal Occult Blood Test (FOBT)/Fecal Immunochemical Test (FIT)  * Fecal DNA/Cologuard Test  * Flexible Sigmoidoscopy Age: 54-65 years old   Colonoscopy: every 10 years (may be performed more frequently if at higher risk)  OR  FOBT/FIT: every 1 year  OR  Cologuard: every 3 years  OR  Sigmoidoscopy: every 5 years  Screening may be recommended earlier than age 48 if at higher risk for colorectal cancer  Also, an individualized decision between you and your healthcare provider will decide whether screening between the ages of 74-80 would be appropriate  Colonoscopy: 01/23/2017  FOBT/FIT: Not on file  Cologuard: Not on file  Sigmoidoscopy: Not on file    Screening Current     Breast Cancer Screening Age: 36 years old  Frequency: every 1-2 years  Not required if history of left and right mastectomy Mammogram: 02/25/2015       Cervical Cancer Screening Between the ages of 21-29, pap smear recommended once every 3 years  Between the ages of 33-67, can perform pap smear with HPV co-testing every 5 years     Recommendations may differ for women with a history of total hysterectomy, cervical cancer, or abnormal pap smears in past  Pap Smear: Not on file       Hepatitis C Screening Once for adults born between 1945 and 1965  More frequently in patients at high risk for Hepatitis C Hep C Antibody: 05/31/2018    Screening Current   Diabetes Screening 1-2 times per year if you're at risk for diabetes or have pre-diabetes Fasting glucose: 122 mg/dL   A1C: 7 5 %    Screening Not Indicated  History Diabetes   Cholesterol Screening Once every 5 years if you don't have a lipid disorder  May order more often based on risk factors  Lipid panel: 07/20/2020    Screening Not Indicated  History Lipid Disorder     Other Preventive Screenings Covered by Medicare:  1  Abdominal Aortic Aneurysm (AAA) Screening: covered once if your at risk  You're considered to be at risk if you have a family history of AAA  2  Lung Cancer Screening: covers low dose CT scan once per year if you meet all of the following conditions: (1) Age 50-69; (2) No signs or symptoms of lung cancer; (3) Current smoker or have quit smoking within the last 15 years; (4) You have a tobacco smoking history of at least 30 pack years (packs per day multiplied by number of years you smoked); (5) You get a written order from a healthcare provider  3  Glaucoma Screening: covered annually if you're considered high risk: (1) You have diabetes OR (2) Family history of glaucoma OR (3)  aged 48 and older OR (3)  American aged 72 and older  3  Osteoporosis Screening: covered every 2 years if you meet one of the following conditions: (1) You're estrogen deficient and at risk for osteoporosis based off medical history and other findings; (2) Have a vertebral abnormality; (3) On glucocorticoid therapy for more than 3 months; (4) Have primary hyperparathyroidism; (5) On osteoporosis medications and need to assess response to drug therapy  · Last bone density test (DXA Scan): Not on file  5  HIV Screening: covered annually if you're between the age of 12-76  Also covered annually if you are younger than 13 and older than 72 with risk factors for HIV infection   For pregnant patients, it is covered up to 3 times per pregnancy  Immunizations:  Immunization Recommendations   Influenza Vaccine Annual influenza vaccination during flu season is recommended for all persons aged >= 6 months who do not have contraindications   Pneumococcal Vaccine (Prevnar and Pneumovax)  * Prevnar = PCV13  * Pneumovax = PPSV23   Adults 25-60 years old: 1-3 doses may be recommended based on certain risk factors  Adults 72 years old: Prevnar (PCV13) vaccine recommended followed by Pneumovax (PPSV23) vaccine  If already received PPSV23 since turning 65, then PCV13 recommended at least one year after PPSV23 dose  Hepatitis B Vaccine 3 dose series if at intermediate or high risk (ex: diabetes, end stage renal disease, liver disease)   Tetanus (Td) Vaccine - COST NOT COVERED BY MEDICARE PART B Following completion of primary series, a booster dose should be given every 10 years to maintain immunity against tetanus  Td may also be given as tetanus wound prophylaxis  Tdap Vaccine - COST NOT COVERED BY MEDICARE PART B Recommended at least once for all adults  For pregnant patients, recommended with each pregnancy  Shingles Vaccine (Shingrix) - COST NOT COVERED BY MEDICARE PART B  2 shot series recommended in those aged 48 and above     Health Maintenance Due:      Topic Date Due    Cervical Cancer Screening  07/28/1982    MAMMOGRAM  02/25/2016    CRC Screening: Colonoscopy  01/23/2027    Hepatitis C Screening  Completed     Immunizations Due:      Topic Date Due    DTaP,Tdap,and Td Vaccines (1 - Tdap) 07[birth year]28/1972     Advance Directives   What are advance directives? Advance directives are legal documents that state your wishes and plans for medical care  These plans are made ahead of time in case you lose your ability to make decisions for yourself  Advance directives can apply to any medical decision, such as the treatments you want, and if you want to donate organs  What are the types of advance directives?   There are many types of advance directives, and each state has rules about how to use them  You may choose a combination of any of the following:  · Living will: This is a written record of the treatment you want  You can also choose which treatments you do not want, which to limit, and which to stop at a certain time  This includes surgery, medicine, IV fluid, and tube feedings  · Durable power of  for healthcare [place] [facility]): This is a written record that states who you want to make healthcare choices for you when you are unable to make them for yourself  This person, called a proxy, is usually a family member or a friend  You may choose more than 1 proxy  · Do not resuscitate (DNR) order:  A DNR order is used in case your heart stops beating or you stop breathing  It is a request not to have certain forms of treatment, such as CPR  A DNR order may be included in other types of advance directives  · Medical directive: This covers the care that you want if you are in a coma, near death, or unable to make decisions for yourself  You can list the treatments you want for each condition  Treatment may include pain medicine, surgery, blood transfusions, dialysis, IV or tube feedings, and a ventilator (breathing machine)  · Values history: This document has questions about your views, beliefs, and how you feel and think about life  This information can help others choose the care that you would choose  Why are advance directives important? An advance directive helps you control your care  Although spoken wishes may be used, it is better to have your wishes written down  Spoken wishes can be misunderstood, or not followed  Treatments may be given even if you do not want them  An advance directive may make it easier for your family to make difficult choices about your care  Cigarette Smoking and Your Health   Risks to your health if you smoke:  Nicotine and other chemicals found in tobacco damage every cell in your body   Even if you are a light smoker, you have an increased risk for cancer, heart disease, and lung disease  If you are pregnant or have diabetes, smoking increases your risk for complications  Benefits to your health if you stop smoking:   · You decrease respiratory symptoms such as coughing, wheezing, and shortness of breath  · You reduce your risk for cancers of the lung, mouth, throat, kidney, bladder, pancreas, stomach, and cervix  If you already have cancer, you increase the benefits of chemotherapy  You also reduce your risk for cancer returning or a second cancer from developing  · You reduce your risk for heart disease, blood clots, heart attack, and stroke  · You reduce your risk for lung infections, and diseases such as pneumonia, asthma, chronic bronchitis, and emphysema  · Your circulation improves  More oxygen can be delivered to your body  If you have diabetes, you lower your risk for complications, such as kidney, artery, and eye diseases  You also lower your risk for nerve damage  Nerve damage can lead to amputations, poor vision, and blindness  · You improve your body's ability to heal and to fight infections  For more information and support to stop smoking:   · [employer]  Phone: 3- 960 - 797-2714  Web Address: www [employer]  Weight Management   Why it is important to manage your weight:  Being overweight increases your risk of health conditions such as heart disease, high blood pressure, type 2 diabetes, and certain types of cancer  It can also increase your risk for osteoarthritis, sleep apnea, and other respiratory problems  Aim for a slow, steady weight loss  Even a small amount of weight loss can lower your risk of health problems  How to lose weight safely:  A safe and healthy way to lose weight is to eat fewer calories and get regular exercise  You can lose up about 1 pound a week by decreasing the number of calories you eat by 500 calories each day     Healthy meal plan for weight management: A healthy meal plan includes a variety of foods, contains fewer calories, and helps you stay healthy  A healthy meal plan includes the following:  · Eat whole-grain foods more often  A healthy meal plan should contain fiber  Fiber is the part of grains, fruits, and vegetables that is not broken down by your body  Whole-grain foods are healthy and provide extra fiber in your diet  Some examples of whole-grain foods are whole-wheat breads and pastas, oatmeal, brown rice, and bulgur  · Eat a variety of vegetables every day  Include dark, leafy greens such as spinach, kale, han greens, and mustard greens  Eat yellow and orange vegetables such as carrots, sweet potatoes, and winter squash  · Eat a variety of fruits every day  Choose fresh or canned fruit (canned in its own juice or light syrup) instead of juice  Fruit juice has very little or no fiber  · Eat low-fat dairy foods  Drink fat-free (skim) milk or 1% milk  Eat fat-free yogurt and low-fat cottage cheese  Try low-fat cheeses such as mozzarella and other reduced-fat cheeses  · Choose meat and other protein foods that are low in fat  Choose beans or other legumes such as split peas or lentils  Choose fish, skinless poultry (chicken or turkey), or lean cuts of red meat (beef or pork)  Before you cook meat or poultry, cut off any visible fat  · Use less fat and oil  Try baking foods instead of frying them  Add less fat, such as margarine, sour cream, regular salad dressing and mayonnaise to foods  Eat fewer high-fat foods  Some examples of high-fat foods include french fries, doughnuts, ice cream, and cakes  · Eat fewer sweets  Limit foods and drinks that are high in sugar  This includes candy, cookies, regular soda, and sweetened drinks  Exercise:  Exercise at least 30 minutes per day on most days of the week  Some examples of exercise include walking, biking, dancing, and swimming   You can also fit in more physical activity by taking the stairs instead of the elevator or parking farther away from stores  Ask your healthcare provider about the best exercise plan for you  © Copyright [employer] 2018 Information is for End User's use only and may not be sold, redistributed or otherwise used for commercial purposes   All illustrations and images included in CareNotes® are the copyrighted property of [employer]  or [address] [place] [address]

## 2020-07-22 NOTE — PROGRESS NOTES
Assessment and Plan:     Problem List Items Addressed This Visit     None           Preventive health issues were discussed with patient, and age appropriate screening tests were ordered as noted in patient's After Visit Summary  Personalized health advice and appropriate referrals for health education or preventive services given if needed, as noted in patient's After Visit Summary       History of Present Illness:     Patient presents for Medicare Annual Wellness visit    Patient Care Team:  Deon Walton MD as PCP - General (Internal Medicine)  DO Lucie Garcia DO     Problem List:     Patient Active Problem List   Diagnosis    Back pain    Benign essential HTN    Cervical herniated disc    Cervical radiculopathy    Coronary arteriosclerosis    Chronic stable angina (HCC)    Degenerative disc disease, cervical    Depression with anxiety    Fibromyalgia    Gastroesophageal reflux disease    Hypothyroidism    Hypercholesterolemia    Insomnia    Lumbar foraminal stenosis    Lumbar radiculopathy    Sacroiliitis (HCC)    Severe episode of recurrent major depressive disorder, without psychotic features (HCC)    Myofascial pain    Asthma    Brachial neuritis    Cervical spondylosis    Chronic low back pain    Dysthymic disorder    Irritable bowel syndrome without diarrhea    Pain in thoracic spine    Reflex sympathetic dystrophy    Cervical stenosis of spinal canal    Status post lumbar surgery    Chronic pain syndrome    Hearing problem of both ears    Generalized weakness    Type 2 diabetes mellitus without complication, without long-term current use of insulin (HCC)    Hearing loss    Headache      Past Medical and Surgical History:     Past Medical History:   Diagnosis Date    Abnormal echocardiogram     Abnormal stress test     Anxiety     Asthma     Brachial neuritis     Depression     Disease of thyroid gland     Displacement of intervertebral disc of mid-cervical region     Fibromyalgia     Frequent headaches     GERD (gastroesophageal reflux disease)     Herniated nucleus pulposus     History of arthritis     History of asthma     History of cardiac disorder     History of chest pain     History of diverticulitis     History of fatigue     History of hearing loss     History of hemoptysis     History of herpes simplex infection     History of hiatal hernia     History of insect bite     INFECTED    History of memory loss     History of neck disorder     History of reflex sympathetic dystrophy     History of restless legs syndrome     History of shortness of breath     History of spinal stenosis     Hyperlipidemia     Hyperlipidemia     Hypertension     Skin rash     Somnolence, daytime     Spinal stenosis of cervical region      Past Surgical History:   Procedure Laterality Date    BACK SURGERY      BACK SURGERY      LOWER BACK SURGERY    CARDIAC CATHETERIZATION      HISTORY OF CORONARY ANGIOGRAPHY WITH CONCOMITANT LEFT HEART CATHETERIZATION    CORONARY ANGIOPLASTY WITH STENT PLACEMENT      EAR SURGERY      TONSILLECTOMY      TYMPANOPLASTY        Family History:     Family History   Problem Relation Age of Onset    Coronary artery disease Mother         ATHEROMA    Heart disease Mother     Diabetes Mother     Hypertension Mother     No Known Problems Father         NO PERTINENT FAMILY HISTORY    Coronary artery disease Sister         ATHEROMA    Heart disease Sister     Stroke Sister     Diabetes Sister     Hypertension Sister       Social History:     E-Cigarette/Vaping    E-Cigarette Use Never User      E-Cigarette/Vaping Substances    Nicotine No     THC No     CBD No     Flavoring No     Other No     Unknown No      Social History     Socioeconomic History    Marital status: Single     Spouse name: None    Number of children: None    Years of education: None    Highest education level: None Occupational History    None   Social Needs    Financial resource strain: None    Food insecurity:     Worry: None     Inability: None    Transportation needs:     Medical: None     Non-medical: None   Tobacco Use    Smoking status: Current Every Day Smoker     Packs/day: 0 00     Types: Cigarettes    Smokeless tobacco: Never Used    Tobacco comment: 3-4 cigarettes per day   Substance and Sexual Activity    Alcohol use: Yes     Comment: occasional    Drug use: Not Currently     Types: Marijuana     Comment: USES MARIJUANA occasionally    Sexual activity: Not Currently   Lifestyle    Physical activity:     Days per week: None     Minutes per session: None    Stress: None   Relationships    Social connections:     Talks on phone: None     Gets together: None     Attends Yazdanism service: None     Active member of club or organization: None     Attends meetings of clubs or organizations: None     Relationship status: None    Intimate partner violence:     Fear of current or ex partner: None     Emotionally abused: None     Physically abused: None     Forced sexual activity: None   Other Topics Concern    None   Social History Narrative    None      Medications and Allergies:     Current Outpatient Medications   Medication Sig Dispense Refill    acetaminophen (TYLENOL) 325 mg tablet Take 1 tablet (325 mg total) by mouth as needed for headaches For headache      albuterol (PROVENTIL HFA,VENTOLIN HFA) 90 mcg/act inhaler Inhale 2 puffs every 6 (six) hours as needed for wheezing 1 Inhaler 3    aspirin 81 mg chewable tablet Chew 81 mg daily      atorvastatin (LIPITOR) 80 mg tablet Take 1 tablet (80 mg total) by mouth daily 90 tablet 2    Blood Glucose Monitoring Suppl (ONETOUCH VERIO) w/Device KIT by Does not apply route 2 (two) times a day      citalopram (CeleXA) 40 mg tablet Take 1 tablet (40 mg total) by mouth daily 90 tablet 1    cyclobenzaprine (FLEXERIL) 10 mg tablet Take 1 tablet (10 mg total) by mouth 3 (three) times a day as needed for muscle spasms 30 tablet 1    Empagliflozin (Jardiance) 10 MG TABS Take 1 tablet (10 mg total) by mouth every morning 28 tablet 0    ezetimibe (ZETIA) 10 mg tablet Take 1 tablet (10 mg total) by mouth daily 30 tablet 5    fluticasone-salmeterol (ADVAIR) 250-50 mcg/dose inhaler Inhale 1 puff every 12 (twelve) hours as needed (sob) 1 each 1    glucose blood (OneTouch Verio) test strip 1 each by Other route 2 (two) times a day      Lancets (ONETOUCH ULTRASOFT) lancets by Other route 2 (two) times a day      levothyroxine 112 mcg tablet Take 1 tablet (112 mcg total) by mouth daily 90 tablet 1    lidocaine (LIDODERM) 5 % Apply 1 patch topically daily Remove & Discard patch within 12 hours or as directed by MD 10 patch 2    losartan (COZAAR) 25 mg tablet TAKE 1 TABLET DAILY   90 tablet 3    magnesium oxide (MAG-OX) 400 mg Take 1 tablet (400 mg total) by mouth 2 (two) times a day 60 tablet 0    metFORMIN (GLUCOPHAGE) 500 mg tablet Take 0 5 tablets (250 mg total) by mouth daily 90 tablet 1    metoprolol succinate (TOPROL-XL) 25 mg 24 hr tablet Take 25 mg by mouth daily      omega-3-acid ethyl esters (LOVAZA) 1 g capsule Take 2 capsules (2 g total) by mouth 2 (two) times a day 120 capsule 3    pantoprazole (PROTONIX) 40 mg tablet Take 1 tablet (40 mg total) by mouth daily 90 tablet 1    pregabalin (LYRICA) 150 mg capsule Take 1 capsule (150 mg total) by mouth 3 (three) times a day 90 capsule 2    senna-docusate sodium (SENOKOT S) 8 6-50 mg per tablet Take 2 tablets by mouth daily      triamcinolone (KENALOG) 0 1 % cream Apply topically 2 (two) times a day 80 g 1    zolpidem (Ambien) 10 mg tablet Take 1 tablet (10 mg total) by mouth daily at bedtime as needed for sleep 30 tablet 0    meloxicam (MOBIC) 15 mg tablet Take 1 tablet (15 mg total) by mouth daily (Patient not taking: Reported on 7/22/2020) 30 tablet 0    methocarbamol (ROBAXIN) 500 mg tablet Take 1 tablet (500 mg total) by mouth 3 (three) times a day as needed for muscle spasms (Patient not taking: Reported on 7/22/2020) 90 tablet 0     No current facility-administered medications for this visit  Allergies   Allergen Reactions    Augmentin [Amoxicillin-Pot Clavulanate] Nausea Only     PT stated she only allergic to medication AUGMENTIN      Immunizations:     Immunization History   Administered Date(s) Administered    Influenza TIV (IM) 11/15/2011, 10/28/2013      Health Maintenance:         Topic Date Due    Cervical Cancer Screening  07/28/1982    MAMMOGRAM  02/25/2016    CRC Screening: Colonoscopy  01/23/2027    Hepatitis C Screening  Completed         Topic Date Due    DTaP,Tdap,and Td Vaccines (1 - Tdap) 07/28/1972      Medicare Health Risk Assessment:     /68 (BP Location: Right arm, Patient Position: Sitting, Cuff Size: Standard)   Pulse 77   Temp 98 5 °F (36 9 °C) (Temporal)   Ht 5' 4" (1 626 m)   Wt 80 6 kg (177 lb 9 6 oz)   SpO2 97%   BMI 30 48 kg/m²      Marty Epperson is here for her Initial Wellness visit  Health Risk Assessment:   Patient rates overall health as fair  Patient feels that their physical health rating is much worse  Eyesight was rated as same  Hearing was rated as same  Patient feels that their emotional and mental health rating is much worse  Pain experienced in the last 7 days has been a lot  Patient's pain rating has been 8/10  Patient states that she has experienced weight loss or gain in last 6 months  Depression Screening:   PHQ-2 Score: 5  PHQ-9 Score: 20      Fall Risk Screening: In the past year, patient has experienced: no history of falling in past year      Urinary Incontinence Screening:   Patient has not leaked urine accidently in the last six months  Home Safety:  Patient has trouble with stairs inside or outside of their home  Patient has working smoke alarms and has working carbon monoxide detector  Home safety hazards include: none  Nutrition:   Current diet is Diabetic  Medications:   Patient is currently taking over-the-counter supplements  OTC medications include: see medication list  Patient is able to manage medications  Activities of Daily Living (ADLs)/Instrumental Activities of Daily Living (IADLs):   Walk and transfer into and out of bed and chair?: Yes  Dress and groom yourself?: Yes    Bathe or shower yourself?: Yes    Feed yourself? Yes  Do your laundry/housekeeping?: Yes  Manage your money, pay your bills and track your expenses?: Yes  Make your own meals?: Yes    Do your own shopping?: Yes    Previous Hospitalizations:   Any hospitalizations or ED visits within the last 12 months?: Yes    How many hospitalizations have you had in the last year?: 1-2    Advance Care Planning:   Living will: No    Durable POA for healthcare: No    Advanced directive: No      PREVENTIVE SCREENINGS      Cardiovascular Screening:    General: Screening Not Indicated and History Lipid Disorder      Diabetes Screening:     General: Screening Not Indicated and History Diabetes      Colorectal Cancer Screening:     General: Screening Current      Lung Cancer Screening:     General: Screening Not Indicated      Hepatitis C Screening:    General: Screening Current    Other Counseling Topics:   Alcohol use counseling, car/seat belt/driving safety, skin self-exam, sunscreen and calcium and vitamin D intake and regular weightbearing exercise         Keron Prado MD

## 2020-07-22 NOTE — PROGRESS NOTES
Assessment/Plan:    Patient is extremely distraught because of difficult social situation  Was in tears today because she thinks her daughter may have her leave her home and that she may have to live out of her car  I offered to get in touch with  to see what we could do to help her  I spent 25 minutes directly with the patient during this visit  today in which greater than 50% of this time was spent in counseling/coordination of care regarding Diagnostic results, Prognosis, Risks and benefits of tx options, Intructions for management, Patient and family education, Importance of tx compliance, Risk factor reductions and Impressions    GERD  Continue with Protonix 40 mg PO, daily  Diet management  Stable  Hypothyroidism  Continue with levothyroxine 112 mcg PO, daily  Stable  T2DM  Continue with Metformin 250 mg PO, daily, and Jardiance 10 mg tabs, every morning  Diet management  Coronary arteriosclerosis  Continue with Lipitor 80 mg PO, daily  Diet management  Follow up with cardio  Benign HTN  Continue with Cozaar 25 mg  Diet management  Fibromyalgia  Continue with Lyrica 150 mg PO 3 times/day  Take acetaminophen 325 mg PO, PRN  Cervical radiculopathy  Continue with Flexeril 10 mg PO, 3 times/day  Continue with Lidoderm 5% 1 patch topical, daily  Ice pack on neck  BMI Counseling: Body mass index is 30 48 kg/m²  The BMI is above normal  Nutrition recommendations include encouraging healthy choices of fruits and vegetables and consuming healthier snacks  No pharmacotherapy was ordered  Diagnoses and all orders for this visit:    Situational anxiety    Cervical stenosis of spinal canal  -     meloxicam (MOBIC) 15 mg tablet; Take 1 tablet (15 mg total) by mouth daily    Neck muscle spasm  -     meloxicam (MOBIC) 15 mg tablet;  Take 1 tablet (15 mg total) by mouth daily          Chief Complaint   Patient presents with    Follow-up     3 m f/u visit for diabetes, review labs  Recommended a mammogram - patient states she has too much breast pain and would like an ultrasound  Recommended a diabetic eye exam and a GYN exam with pap test     Medicare Wellness Visit     Initial AWV     Subjective:   Chief Complaint   Patient presents with    Follow-up     3 m f/u visit for diabetes, review labs  Recommended a mammogram - patient states she has too much breast pain and would like an ultrasound  Recommended a diabetic eye exam and a GYN exam with pap test     Medicare Wellness Visit     Initial AWV        Patient ID: Isabel Pike is a 63 y o  female  Patient presents with 5/10 pain throughout her body that occurs daily, particularly in the cervical region  Have difficultly bending neck  Pain was managed with ice packs and Motrin, however no relief  Pain was exacerbated with strenuous activity and cold weather  Patient complains about headache/migraines for the past month  Has photophobia  Takes Reglan when headache was severe, and would ice pack her head  Patient mentions some difficulty in breathing  Have started smoking again 4/5 days ago, and have smoked 9 cigirattes in total    Patient mentions feeling unsafe in home environment due to exposure to [address]  Would like to look for relocation housing for herself and cat  The following portions of the patient's history were reviewed and updated as appropriate: past family history, past medical history, past social history, past surgical history and problem list     Review of Systems   Constitutional: Positive for appetite change and fatigue  Negative for activity change, chills, fever and unexpected weight change  Decreased appetite due to pain  HENT: Positive for congestion, hearing loss and sneezing  Negative for ear pain, facial swelling, mouth sores, nosebleeds and sore throat  Eyes: Positive for photophobia  Respiratory: Positive for chest tightness and shortness of breath  Cardiovascular: Negative for chest pain and palpitations  Gastrointestinal: Positive for diarrhea  Endocrine: Positive for heat intolerance  Hot flashes  Genitourinary: Negative  Musculoskeletal: Positive for arthralgias, back pain, myalgias, neck pain and neck stiffness  Skin: Negative  Neurological: Negative  Hematological: Negative  Psychiatric/Behavioral: Positive for sleep disturbance and suicidal ideas  The patient is not nervous/anxious and is not hyperactive            Past Medical History:   Diagnosis Date    Abnormal echocardiogram     Abnormal stress test     Anxiety     Asthma     Brachial neuritis     Depression     Disease of thyroid gland     Displacement of intervertebral disc of mid-cervical region     Fibromyalgia     Frequent headaches     GERD (gastroesophageal reflux disease)     Herniated nucleus pulposus     History of arthritis     History of asthma     History of cardiac disorder     History of chest pain     History of diverticulitis     History of fatigue     History of hearing loss     History of hemoptysis     History of herpes simplex infection     History of hiatal hernia     History of insect bite     INFECTED    History of memory loss     History of neck disorder     History of reflex sympathetic dystrophy     History of restless legs syndrome     History of shortness of breath     History of spinal stenosis     Hyperlipidemia     Hyperlipidemia     Hypertension     Skin rash     Somnolence, daytime     Spinal stenosis of cervical region          Current Outpatient Medications:     acetaminophen (TYLENOL) 325 mg tablet, Take 1 tablet (325 mg total) by mouth as needed for headaches For headache, Disp: , Rfl:     albuterol (PROVENTIL HFA,VENTOLIN HFA) 90 mcg/act inhaler, Inhale 2 puffs every 6 (six) hours as needed for wheezing, Disp: 1 Inhaler, Rfl: 3    aspirin 81 mg chewable tablet, Chew 81 mg daily, Disp: , Rfl:    atorvastatin (LIPITOR) 80 mg tablet, Take 1 tablet (80 mg total) by mouth daily, Disp: 90 tablet, Rfl: 2    Blood Glucose Monitoring Suppl (ONETOUCH VERIO) w/Device KIT, by Does not apply route 2 (two) times a day, Disp: , Rfl:     citalopram (CeleXA) 40 mg tablet, Take 1 tablet (40 mg total) by mouth daily, Disp: 90 tablet, Rfl: 1    cyclobenzaprine (FLEXERIL) 10 mg tablet, Take 1 tablet (10 mg total) by mouth 3 (three) times a day as needed for muscle spasms, Disp: 30 tablet, Rfl: 1    Empagliflozin (Jardiance) 10 MG TABS, Take 1 tablet (10 mg total) by mouth every morning, Disp: 28 tablet, Rfl: 0    ezetimibe (ZETIA) 10 mg tablet, Take 1 tablet (10 mg total) by mouth daily, Disp: 30 tablet, Rfl: 5    fluticasone-salmeterol (ADVAIR) 250-50 mcg/dose inhaler, Inhale 1 puff every 12 (twelve) hours as needed (sob), Disp: 1 each, Rfl: 1    glucose blood (OneTouch Verio) test strip, 1 each by Other route 2 (two) times a day, Disp: , Rfl:     Lancets (ONETOUCH ULTRASOFT) lancets, by Other route 2 (two) times a day, Disp: , Rfl:     levothyroxine 112 mcg tablet, Take 1 tablet (112 mcg total) by mouth daily, Disp: 90 tablet, Rfl: 1    lidocaine (LIDODERM) 5 %, Apply 1 patch topically daily Remove & Discard patch within 12 hours or as directed by MD, Disp: 10 patch, Rfl: 2    losartan (COZAAR) 25 mg tablet, TAKE 1 TABLET DAILY  , Disp: 90 tablet, Rfl: 3    magnesium oxide (MAG-OX) 400 mg, Take 1 tablet (400 mg total) by mouth 2 (two) times a day, Disp: 60 tablet, Rfl: 0    metFORMIN (GLUCOPHAGE) 500 mg tablet, Take 0 5 tablets (250 mg total) by mouth daily, Disp: 90 tablet, Rfl: 1    metoprolol succinate (TOPROL-XL) 25 mg 24 hr tablet, Take 25 mg by mouth daily, Disp: , Rfl:     omega-3-acid ethyl esters (LOVAZA) 1 g capsule, Take 2 capsules (2 g total) by mouth 2 (two) times a day, Disp: 120 capsule, Rfl: 3    pantoprazole (PROTONIX) 40 mg tablet, Take 1 tablet (40 mg total) by mouth daily, Disp: 90 tablet, Rfl: 1    pregabalin (LYRICA) 150 mg capsule, Take 1 capsule (150 mg total) by mouth 3 (three) times a day, Disp: 90 capsule, Rfl: 2    senna-docusate sodium (SENOKOT S) 8 6-50 mg per tablet, Take 2 tablets by mouth daily, Disp: , Rfl:     triamcinolone (KENALOG) 0 1 % cream, Apply topically 2 (two) times a day, Disp: 80 g, Rfl: 1    zolpidem (Ambien) 10 mg tablet, Take 1 tablet (10 mg total) by mouth daily at bedtime as needed for sleep, Disp: 30 tablet, Rfl: 0    meloxicam (MOBIC) 15 mg tablet, Take 1 tablet (15 mg total) by mouth daily (Patient not taking: Reported on 7/22/2020), Disp: 30 tablet, Rfl: 0    methocarbamol (ROBAXIN) 500 mg tablet, Take 1 tablet (500 mg total) by mouth 3 (three) times a day as needed for muscle spasms (Patient not taking: Reported on 7/22/2020), Disp: 90 tablet, Rfl: 0    Allergies   Allergen Reactions    Augmentin [Amoxicillin-Pot Clavulanate] Nausea Only     PT stated she only allergic to medication AUGMENTIN       Social History   Past Surgical History:   Procedure Laterality Date    BACK SURGERY      BACK SURGERY      LOWER BACK SURGERY    CARDIAC CATHETERIZATION      HISTORY OF CORONARY ANGIOGRAPHY WITH CONCOMITANT LEFT HEART CATHETERIZATION    CORONARY ANGIOPLASTY WITH STENT PLACEMENT      EAR SURGERY      TONSILLECTOMY      TYMPANOPLASTY       Family History   Problem Relation Age of Onset    Coronary artery disease Mother         ATHEROMA    Heart disease Mother     Diabetes Mother     Hypertension Mother     No Known Problems Father         NO PERTINENT FAMILY HISTORY    Coronary artery disease Sister         ATHEROMA    Heart disease Sister     Stroke Sister     Diabetes Sister     Hypertension Sister        Objective:  /68 (BP Location: Right arm, Patient Position: Sitting, Cuff Size: Standard)   Pulse 77   Temp 98 5 °F (36 9 °C) (Temporal)   Ht 5' 4" (1 626 m)   Wt 80 6 kg (177 lb 9 6 oz)   SpO2 97%   BMI 30 48 kg/m²     Recent Results (from the past 1344 hour(s))   Fingerstick Glucose (POCT)    Collection Time: 06/28/20  8:56 PM   Result Value Ref Range    POC Glucose 128 65 - 140 mg/dl   Protime-INR    Collection Time: 06/29/20  1:36 AM   Result Value Ref Range    Protime 13 2 11 6 - 14 5 seconds    INR 1 06 0 84 - 1 19   APTT    Collection Time: 06/29/20  1:36 AM   Result Value Ref Range    PTT 23 23 - 37 seconds   Comprehensive metabolic panel    Collection Time: 06/29/20  1:38 AM   Result Value Ref Range    Sodium 136 136 - 145 mmol/L    Potassium 3 8 3 5 - 5 3 mmol/L    Chloride 105 100 - 108 mmol/L    CO2 24 21 - 32 mmol/L    ANION GAP 7 4 - 13 mmol/L    BUN 13 5 - 25 mg/dL    Creatinine 0 98 0 60 - 1 30 mg/dL    Glucose 146 (H) 65 - 140 mg/dL    Calcium 8 6 8 3 - 10 1 mg/dL    AST 23 5 - 45 U/L    ALT 36 12 - 78 U/L    Alkaline Phosphatase 84 46 - 116 U/L    Total Protein 6 9 6 4 - 8 2 g/dL    Albumin 3 3 (L) 3 5 - 5 0 g/dL    Total Bilirubin 0 38 0 20 - 1 00 mg/dL    eGFR 64 ml/min/1 73sq m   Fingerstick Glucose (POCT)    Collection Time: 06/29/20  6:38 AM   Result Value Ref Range    POC Glucose 117 65 - 140 mg/dl   Fingerstick Glucose (POCT)    Collection Time: 06/29/20 10:45 AM   Result Value Ref Range    POC Glucose 125 65 - 140 mg/dl   Comprehensive metabolic panel    Collection Time: 06/29/20 11:03 AM   Result Value Ref Range    Sodium 142 136 - 145 mmol/L    Potassium 3 8 3 5 - 5 3 mmol/L    Chloride 106 100 - 108 mmol/L    CO2 29 21 - 32 mmol/L    ANION GAP 7 4 - 13 mmol/L    BUN 11 5 - 25 mg/dL    Creatinine 0 93 0 60 - 1 30 mg/dL    Glucose 126 65 - 140 mg/dL    Calcium 8 5 8 3 - 10 1 mg/dL    AST 29 5 - 45 U/L    ALT 40 12 - 78 U/L    Alkaline Phosphatase 76 46 - 116 U/L    Total Protein 6 9 6 4 - 8 2 g/dL    Albumin 3 6 3 5 - 5 0 g/dL    Total Bilirubin 0 73 0 20 - 1 00 mg/dL    eGFR 68 ml/min/1 73sq m   CBC and differential    Collection Time: 06/29/20 11:03 AM   Result Value Ref Range    WBC 5 04 4 31 - 10 16 Thousand/uL RBC 4 73 3 81 - 5 12 Million/uL    Hemoglobin 14 5 11 5 - 15 4 g/dL    Hematocrit 43 7 34 8 - 46 1 %    MCV 92 82 - 98 fL    MCH 30 7 26 8 - 34 3 pg    MCHC 33 2 31 4 - 37 4 g/dL    RDW 13 0 11 6 - 15 1 %    MPV 10 1 8 9 - 12 7 fL    Platelets 026 455 - 530 Thousands/uL    nRBC 0 /100 WBCs    Neutrophils Relative 54 43 - 75 %    Immat GRANS % 0 0 - 2 %    Lymphocytes Relative 38 14 - 44 %    Monocytes Relative 6 4 - 12 %    Eosinophils Relative 2 0 - 6 %    Basophils Relative 0 0 - 1 %    Neutrophils Absolute 2 66 1 85 - 7 62 Thousands/µL    Immature Grans Absolute 0 01 0 00 - 0 20 Thousand/uL    Lymphocytes Absolute 1 93 0 60 - 4 47 Thousands/µL    Monocytes Absolute 0 31 0 17 - 1 22 Thousand/µL    Eosinophils Absolute 0 11 0 00 - 0 61 Thousand/µL    Basophils Absolute 0 02 0 00 - 0 10 Thousands/µL   Sedimentation rate, automated    Collection Time: 06/29/20 11:03 AM   Result Value Ref Range    Sed Rate 1 0 - 20 mm/hour   C-reactive protein    Collection Time: 06/29/20 11:03 AM   Result Value Ref Range    CRP <3 0 <3 0 mg/L   TSH, 3rd generation    Collection Time: 06/29/20 11:03 AM   Result Value Ref Range    TSH 3RD GENERATON 0 763 0 358 - 3 740 uIU/mL   Fingerstick Glucose (POCT)    Collection Time: 06/29/20  3:43 PM   Result Value Ref Range    POC Glucose 143 (H) 65 - 140 mg/dl   Fingerstick Glucose (POCT)    Collection Time: 06/29/20  9:08 PM   Result Value Ref Range    POC Glucose 141 (H) 65 - 140 mg/dl   Fingerstick Glucose (POCT)    Collection Time: 06/30/20  7:38 AM   Result Value Ref Range    POC Glucose 107 65 - 140 mg/dl   Fingerstick Glucose (POCT)    Collection Time: 06/30/20 10:57 AM   Result Value Ref Range    POC Glucose 136 65 - 140 mg/dl   HEMOGLOBIN A1C W/ EAG ESTIMATION    Collection Time: 07/20/20 11:51 AM   Result Value Ref Range    Hemoglobin A1C 7 5 (H) Normal 3 8-5 6%; PreDiabetic 5 7-6 4%;  Diabetic >=6 5%; Glycemic control for adults with diabetes <7 0% %     mg/dl   Basic metabolic panel    Collection Time: 07/20/20 11:51 AM   Result Value Ref Range    Sodium 138 136 - 145 mmol/L    Potassium 3 9 3 5 - 5 3 mmol/L    Chloride 105 100 - 108 mmol/L    CO2 23 21 - 32 mmol/L    ANION GAP 10 4 - 13 mmol/L    BUN 14 5 - 25 mg/dL    Creatinine 0 84 0 60 - 1 30 mg/dL    Glucose, Fasting 122 (H) 65 - 99 mg/dL    Calcium 9 6 8 3 - 10 1 mg/dL    eGFR 77 ml/min/1 73sq m   Lipid Panel with Direct LDL reflex    Collection Time: 07/20/20 11:51 AM   Result Value Ref Range    Cholesterol 95 50 - 200 mg/dL    Triglycerides 85 <=150 mg/dL    HDL, Direct 43 >=40 mg/dL    LDL Calculated 35 0 - 100 mg/dL            Physical Exam   Constitutional: She is oriented to person, place, and time  She appears distressed  HENT:   Mouth/Throat: No oropharyngeal exudate  Eyes: No scleral icterus  Neck:   Neck pain  Cannot bend neck forward  Cardiovascular: Normal heart sounds  Pulmonary/Chest: She has wheezes  Abdominal: She exhibits no distension  There is no tenderness  Musculoskeletal: She exhibits tenderness  Neurological: She is alert and oriented to person, place, and time  Skin: No rash noted  No erythema  No pallor

## 2020-07-23 ENCOUNTER — PATIENT OUTREACH (OUTPATIENT)
Dept: CASE MANAGEMENT | Facility: OTHER | Age: 60
End: 2020-07-23

## 2020-07-23 DIAGNOSIS — Z09 FOLLOW UP: Primary | ICD-10-CM

## 2020-07-23 NOTE — PROGRESS NOTES
[facility] SW received referral from Dr Cathi English regarding patients housing situation  Order placed will attempt outreach

## 2020-07-27 ENCOUNTER — PATIENT OUTREACH (OUTPATIENT)
Dept: CASE MANAGEMENT | Facility: OTHER | Age: 60
End: 2020-07-27

## 2020-07-27 NOTE — PROGRESS NOTES
OP [facility] reached out to patient as per PCP referral  Patient has anxiety due to her living situation  Patient states that there are a lot of people currently residing at her home address, and she gets overwhelmed because she is concerned that she will get Covid  We also discussed the recent passing of her daughter's boyfriend and she feels that this makes her really overwhelmed  We discussed different housing opportunities available, and she said that she has put in an application to the [employer]  I also involved her that the [facility] ( in [place]) is currently accepting applications  Patient states that she will look into this when she finds time, and see if she might be able to apply  Patient did not feel that she had any other needs at this time  Will be sending patient my card in the mail and patient was encouraged to call if she thinks of anything else that she might need  No other needs at this time

## 2020-08-04 ENCOUNTER — TELEPHONE (OUTPATIENT)
Dept: NEUROLOGY | Facility: CLINIC | Age: 60
End: 2020-08-04

## 2020-08-05 ENCOUNTER — TELEPHONE (OUTPATIENT)
Dept: NEUROSURGERY | Facility: CLINIC | Age: 60
End: 2020-08-05

## 2020-08-05 NOTE — TELEPHONE ENCOUNTER
COVID Pre-Visit Screening     1  Is this a family member screening? n  2  Have you traveled outside of your state in the past 2 weeks? n  3  Do you presently have a fever or flu-like symptoms? n  4  Do you have symptoms of an upper respiratory infection like runny nose, sore throat, or cough? n  5  Are you suffering from new headache that you have not had in the past?  n  6  Do you have/have you experienced any new shortness of breath recently? n  7  Do you have any new diarrhea, nausea or vomiting? n  8  Have you been in contact with anyone who has been sick or diagnosed with COVID-19? n  9  Do you have any new loss of taste or smell? n  10    Are you able to wear a mask without a valve for the entire visit? y

## 2020-08-06 ENCOUNTER — OFFICE VISIT (OUTPATIENT)
Dept: NEUROSURGERY | Facility: CLINIC | Age: 60
End: 2020-08-06
Payer: MEDICARE

## 2020-08-06 VITALS
HEART RATE: 90 BPM | DIASTOLIC BLOOD PRESSURE: 80 MMHG | HEIGHT: 64 IN | SYSTOLIC BLOOD PRESSURE: 128 MMHG | BODY MASS INDEX: 30.22 KG/M2 | TEMPERATURE: 97.9 F | WEIGHT: 177 LBS | RESPIRATION RATE: 16 BRPM

## 2020-08-06 DIAGNOSIS — M50.20 CERVICAL HERNIATED DISC: ICD-10-CM

## 2020-08-06 DIAGNOSIS — M54.12 CERVICAL RADICULOPATHY: ICD-10-CM

## 2020-08-06 DIAGNOSIS — G89.4 CHRONIC PAIN SYNDROME: Primary | ICD-10-CM

## 2020-08-06 DIAGNOSIS — M54.2 CERVICALGIA: ICD-10-CM

## 2020-08-06 PROCEDURE — 3051F HG A1C>EQUAL 7.0%<8.0%: CPT | Performed by: NEUROLOGICAL SURGERY

## 2020-08-06 PROCEDURE — 99204 OFFICE O/P NEW MOD 45 MIN: CPT | Performed by: NEUROLOGICAL SURGERY

## 2020-08-06 NOTE — PROGRESS NOTES
Assessment/Plan:    No problem-specific Assessment & Plan notes found for this encounter  History, physical examination and diagnostic tests were reviewed and questions answered  Diagnosis, care plan and treatment options were discussed  The patient understand instructions and will follow up as directed  Patient with neck pain and bilateral ulnar distribution arm pain  Not consistent with imaging pathology recommend conservative options  Feel that she may have either a peripheral neuropathy and/or occipital neuralgia  She does also endorse a hx of fibromyalgia  IMAGING REVIEWED: MRI cervical shows diffuse degenerative disease but no severe stenosis corresponding to symptoms  Diagnoses and all orders for this visit:    Chronic pain syndrome    Cervicalgia    Cervical radiculopathy  -     Ambulatory referral to Neurosurgery    Cervical herniated disc  -     Ambulatory referral to Neurosurgery        I have spent 60 minutes with Patient  today in which greater than 50% of this time was spent in counseling/coordination of care regarding Diagnostic results, Prognosis, Risks and benefits of tx options, Intructions for management, Patient and family education, Importance of tx compliance, Risk factor reductions and Impressions  Subjective:      Patient ID: Tung Jensen is a 62 y o  female  Patient is a 62year old female with symptoms of scalp, neck and bilateral arm pain  Pain is severe and throbbing in quality  Pain distribution is in the back of the occiput, neck and bilateral arms  Pain is worse depending on the season as well as activity  Pain is improved in the beginning of summer late spring  The pain has been present for several years  Pain has associated distress  The patient underwent the following conservative treatments including chiropractor and injection therapies  Was seen by Dr Aburto and Dr Steven for consultation  Reportedly Dr Steven did not recommend surgical options        The following portions of the patient's history were reviewed and updated as appropriate:   She  has a past medical history of Abnormal echocardiogram, Abnormal stress test, Anxiety, Asthma, Brachial neuritis, Depression, Disease of thyroid gland, Displacement of intervertebral disc of mid-cervical region, Fibromyalgia, Frequent headaches, GERD (gastroesophageal reflux disease), Herniated nucleus pulposus, History of arthritis, History of asthma, History of cardiac disorder, History of chest pain, History of diverticulitis, History of fatigue, History of hearing loss, History of hemoptysis, History of herpes simplex infection, History of hiatal hernia, History of insect bite, History of memory loss, History of neck disorder, History of reflex sympathetic dystrophy, History of restless legs syndrome, History of shortness of breath, History of spinal stenosis, Hyperlipidemia, Hyperlipidemia, Hypertension, Skin rash, Somnolence, daytime, and Spinal stenosis of cervical region    She   Patient Active Problem List    Diagnosis Date Noted    Headache 06/28/2020    Type 2 diabetes mellitus without complication, without long-term current use of insulin ([facility] ) 05/01/2019    Hearing loss 05/01/2019    Generalized weakness 10/25/2018    Hearing problem of both ears 05/31/2018    Chronic pain syndrome 03/16/2018    Myofascial pain 03/12/2018    [facility]roiliitis [facility] ) 06/12/2017    Cervical herniated disc 05/08/2017    Cervical radiculopathy 05/08/2017    Degenerative disc disease, cervical 05/08/2017    Lumbar foraminal stenosis 05/08/2017    Lumbar radiculopathy 05/08/2017    Severe episode of recurrent major depressive disorder, without psychotic features ([facility] ) 04/18/2017    Chronic stable angina ([facility] ) 04/17/2017    Benign essential HTN 03/24/2017    Depression with anxiety 03/24/2017    Hypercholesterolemia 03/24/2017    Back pain 01/24/2017    Fibromyalgia 01/24/2017    Gastroesophageal reflux disease 01/24/2017  Chronic low back pain 03/14/2016    Irritable bowel syndrome without diarrhea 03/14/2016    Status post lumbar surgery 09/22/2015    Insomnia 04/17/2015    Coronary arteriosclerosis 07/18/2014    Hypothyroidism 07/08/2014    Reflex sympathetic dystrophy 09/09/2013    Dysthymic disorder 06/25/2013    Asthma 02/22/2013    Pain in thoracic spine 09/28/2012    Brachial neuritis 05/04/2012    Cervical stenosis of spinal canal 05/04/2012    Cervical spondylosis 04/04/2012     She  has a past surgical history that includes Back surgery; EAR SURGERY; Cardiac catheterization; Back surgery; Tonsillectomy; Tympanoplasty; and Coronary angioplasty with stent  Her family history includes Coronary artery disease in her mother and sister; Diabetes in her mother and sister; Heart disease in her mother and sister; Hypertension in her mother and sister; No Known Problems in her father; Stroke in her sister  She  reports that she has been smoking cigarettes  She has been smoking about 0 00 packs per day  She has never used smokeless tobacco  She reports current alcohol use  She reports previous drug use  Drug: Marijuana    Current Outpatient Medications   Medication Sig Dispense Refill    acetaminophen (TYLENOL) 325 mg tablet Take 1 tablet (325 mg total) by mouth as needed for headaches For headache      albuterol (PROVENTIL HFA,VENTOLIN HFA) 90 mcg/act inhaler Inhale 2 puffs every 6 (six) hours as needed for wheezing 1 Inhaler 3    aspirin 81 mg chewable tablet Chew 81 mg daily      atorvastatin (LIPITOR) 80 mg tablet Take 1 tablet (80 mg total) by mouth daily 90 tablet 2    Blood Glucose Monitoring Suppl (ONETOUCH VERIO) w/Device KIT by Does not apply route 2 (two) times a day      citalopram (CeleXA) 40 mg tablet Take 1 tablet (40 mg total) by mouth daily 90 tablet 1    cyclobenzaprine (FLEXERIL) 10 mg tablet Take 1 tablet (10 mg total) by mouth 3 (three) times a day as needed for muscle spasms 30 tablet 1  Empagliflozin (Jardiance) 10 MG TABS Take 1 tablet (10 mg total) by mouth every morning 28 tablet 0    ezetimibe (ZETIA) 10 mg tablet Take 1 tablet (10 mg total) by mouth daily 30 tablet 5    fluticasone-salmeterol (ADVAIR) 250-50 mcg/dose inhaler Inhale 1 puff every 12 (twelve) hours as needed (sob) 1 each 1    glucose blood (OneTouch Verio) test strip 1 each by Other route 2 (two) times a day      Lancets (ONETOUCH ULTRASOFT) lancets by Other route 2 (two) times a day      levothyroxine 112 mcg tablet Take 1 tablet (112 mcg total) by mouth daily 90 tablet 1    lidocaine (LIDODERM) 5 % Apply 1 patch topically daily Remove & Discard patch within 12 hours or as directed by MD 10 patch 2    losartan (COZAAR) 25 mg tablet TAKE 1 TABLET DAILY   90 tablet 3    magnesium oxide (MAG-OX) 400 mg Take 1 tablet (400 mg total) by mouth 2 (two) times a day 60 tablet 0    meloxicam (MOBIC) 15 mg tablet Take 1 tablet (15 mg total) by mouth daily 30 tablet 0    metFORMIN (GLUCOPHAGE) 500 mg tablet Take 0 5 tablets (250 mg total) by mouth daily 90 tablet 1    metoprolol succinate (TOPROL-XL) 25 mg 24 hr tablet Take 25 mg by mouth daily      omega-3-acid ethyl esters (LOVAZA) 1 g capsule Take 2 capsules (2 g total) by mouth 2 (two) times a day 120 capsule 3    pantoprazole (PROTONIX) 40 mg tablet Take 1 tablet (40 mg total) by mouth daily 90 tablet 1    pregabalin (LYRICA) 150 mg capsule Take 1 capsule (150 mg total) by mouth 3 (three) times a day 90 capsule 2    senna-docusate sodium (SENOKOT S) 8 6-50 mg per tablet Take 2 tablets by mouth daily      triamcinolone (KENALOG) 0 1 % cream Apply topically 2 (two) times a day 80 g 1    zolpidem (Ambien) 10 mg tablet Take 1 tablet (10 mg total) by mouth daily at bedtime as needed for sleep 30 tablet 0    methocarbamol (ROBAXIN) 500 mg tablet Take 1 tablet (500 mg total) by mouth 3 (three) times a day as needed for muscle spasms (Patient not taking: Reported on 7/22/2020) 90 tablet 0     No current facility-administered medications for this visit  Current Outpatient Medications on File Prior to Visit   Medication Sig    acetaminophen (TYLENOL) 325 mg tablet Take 1 tablet (325 mg total) by mouth as needed for headaches For headache    albuterol (PROVENTIL HFA,VENTOLIN HFA) 90 mcg/act inhaler Inhale 2 puffs every 6 (six) hours as needed for wheezing    aspirin 81 mg chewable tablet Chew 81 mg daily    atorvastatin (LIPITOR) 80 mg tablet Take 1 tablet (80 mg total) by mouth daily    Blood Glucose Monitoring Suppl (ONETOUCH VERIO) w/Device KIT by Does not apply route 2 (two) times a day    citalopram (CeleXA) 40 mg tablet Take 1 tablet (40 mg total) by mouth daily    cyclobenzaprine (FLEXERIL) 10 mg tablet Take 1 tablet (10 mg total) by mouth 3 (three) times a day as needed for muscle spasms    Empagliflozin (Jardiance) 10 MG TABS Take 1 tablet (10 mg total) by mouth every morning    ezetimibe (ZETIA) 10 mg tablet Take 1 tablet (10 mg total) by mouth daily    fluticasone-salmeterol (ADVAIR) 250-50 mcg/dose inhaler Inhale 1 puff every 12 (twelve) hours as needed (sob)    glucose blood (OneTouch Verio) test strip 1 each by Other route 2 (two) times a day    Lancets (ONETOUCH ULTRASOFT) lancets by Other route 2 (two) times a day    levothyroxine 112 mcg tablet Take 1 tablet (112 mcg total) by mouth daily    lidocaine (LIDODERM) 5 % Apply 1 patch topically daily Remove & Discard patch within 12 hours or as directed by MD    losartan (COZAAR) 25 mg tablet TAKE 1 TABLET DAILY      magnesium oxide (MAG-OX) 400 mg Take 1 tablet (400 mg total) by mouth 2 (two) times a day    meloxicam (MOBIC) 15 mg tablet Take 1 tablet (15 mg total) by mouth daily    metFORMIN (GLUCOPHAGE) 500 mg tablet Take 0 5 tablets (250 mg total) by mouth daily    metoprolol succinate (TOPROL-XL) 25 mg 24 hr tablet Take 25 mg by mouth daily    omega-3-acid ethyl esters (LOVAZA) 1 g capsule Take 2 capsules (2 g total) by mouth 2 (two) times a day    pantoprazole (PROTONIX) 40 mg tablet Take 1 tablet (40 mg total) by mouth daily    pregabalin (LYRICA) 150 mg capsule Take 1 capsule (150 mg total) by mouth 3 (three) times a day    senna-docusate sodium (SENOKOT S) 8 6-50 mg per tablet Take 2 tablets by mouth daily    triamcinolone (KENALOG) 0 1 % cream Apply topically 2 (two) times a day    zolpidem (Ambien) 10 mg tablet Take 1 tablet (10 mg total) by mouth daily at bedtime as needed for sleep    methocarbamol (ROBAXIN) 500 mg tablet Take 1 tablet (500 mg total) by mouth 3 (three) times a day as needed for muscle spasms (Patient not taking: Reported on 7/22/2020)     No current facility-administered medications on file prior to visit  She is allergic to augmentin [amoxicillin-pot clavulanate]       Review of Systems   HENT:        Hx of right ear surgery   Respiratory:        Asthma   Cardiovascular:        Stents, 2   Endocrine: Negative  Genitourinary: Negative  Musculoskeletal: Positive for neck pain (neck pain into shoulders and bilateral arm, worse on left )  Allergic/Immunologic: Negative  Neurological: Positive for weakness (bilateral arms, left is worse), numbness (at times  pinkies and tips of last 2 fingers) and headaches (migraines)  Objective:      /80 (BP Location: Left arm)   Pulse 90   Temp 97 9 °F (36 6 °C) (Tympanic)   Resp 16   Ht 5' 4" (1 626 m)   Wt 80 3 kg (177 lb)   BMI 30 38 kg/m²          Physical Exam   Constitutional: She is oriented to person, place, and time  She does not appear ill  No distress  HENT:   Head: Normocephalic and atraumatic  Eyes: Pupils are equal, round, and reactive to light  Conjunctivae are normal  Right eye exhibits no discharge  Left eye exhibits no discharge  No scleral icterus  Cardiovascular: Normal rate  Pulmonary/Chest: Effort normal  No respiratory distress  She has no wheezes     Musculoskeletal: Normal range of motion  Neurological: She is alert and oriented to person, place, and time  She has normal motor skills  A sensory deficit is present  No cranial nerve deficit     Psychiatric: Mood and thought content normal

## 2020-08-06 NOTE — LETTER
August 6, 2020     Arleen Loza, [address] [address] [address]    Patient: Lashawn Sainz   YOB: 1960   Date of Visit: 8/6/2020       Dear Dr Jax Kincaid: Thank you for referring Bennett Floyd to me for evaluation  Below are my notes for this consultation  If you have questions, please do not hesitate to call me  I look forward to following your patient along with you  Sincerely,        Nadine Lopez MD        CC: No Recipients  Nadine Lopez MD  8/6/2020  8:50 AM  Sign when Signing Visit  Assessment/Plan:    No problem-specific Assessment & Plan notes found for this encounter  History, physical examination and diagnostic tests were reviewed and questions answered  Diagnosis, care plan and treatment options were discussed  The patient understand instructions and will follow up as directed  Patient with neck pain and bilateral ulnar distribution arm pain  Not consistent with imaging pathology recommend conservative options  Feel that she may have either a peripheral neuropathy and/or occipital neuralgia  She does also endorse a hx of fibromyalgia  IMAGING REVIEWED: MRI cervical shows diffuse degenerative disease but no severe stenosis corresponding to symptoms  Diagnoses and all orders for this visit:    Chronic pain syndrome    Cervicalgia    Cervical radiculopathy  -     Ambulatory referral to Neurosurgery    Cervical herniated disc  -     Ambulatory referral to Neurosurgery        I have spent 60 minutes with Patient  today in which greater than 50% of this time was spent in counseling/coordination of care regarding Diagnostic results, Prognosis, Risks and benefits of tx options, Intructions for management, Patient and family education, Importance of tx compliance, Risk factor reductions and Impressions  Subjective:      Patient ID: Lashawn Sainz is a 62 y o  female      Patient is a 62year old female with symptoms of scalp, neck and bilateral arm pain  Pain is severe and throbbing in quality  Pain distribution is in the back of the occiput, neck and bilateral arms  Pain is worse depending on the season as well as activity  Pain is improved in the beginning of summer late spring  The pain has been present for several years  Pain has associated distress  The patient underwent the following conservative treatments including chiropractor and injection therapies  Was seen by Dr Aburto and Dr Steven for consultation  Reportedly Dr Steven did not recommend surgical options  The following portions of the patient's history were reviewed and updated as appropriate:   She  has a past medical history of Abnormal echocardiogram, Abnormal stress test, Anxiety, Asthma, Brachial neuritis, Depression, Disease of thyroid gland, Displacement of intervertebral disc of mid-cervical region, Fibromyalgia, Frequent headaches, GERD (gastroesophageal reflux disease), Herniated nucleus pulposus, History of arthritis, History of asthma, History of cardiac disorder, History of chest pain, History of diverticulitis, History of fatigue, History of hearing loss, History of hemoptysis, History of herpes simplex infection, History of hiatal hernia, History of insect bite, History of memory loss, History of neck disorder, History of reflex sympathetic dystrophy, History of restless legs syndrome, History of shortness of breath, History of spinal stenosis, Hyperlipidemia, Hyperlipidemia, Hypertension, Skin rash, Somnolence, daytime, and Spinal stenosis of cervical region    She   Patient Active Problem List    Diagnosis Date Noted    Headache 06/28/2020    Type 2 diabetes mellitus without complication, without long-term current use of insulin ([facility] [address] ) 05/01/2019    Hearing loss 05/01/2019    Generalized weakness 10/25/2018    Hearing problem of both ears 05/31/2018    Chronic pain syndrome 03/16/2018    Myofascial pain 03/12/2018    Sacroiliitis ([facility] [address] ) 06/12/2017    Cervical herniated disc 05/08/2017    Cervical radiculopathy 05/08/2017    Degenerative disc disease, cervical 05/08/2017    Lumbar foraminal stenosis 05/08/2017    Lumbar radiculopathy 05/08/2017    Severe episode of recurrent major depressive disorder, without psychotic features ([facility] [address] ) 04/18/2017    [facility] 04/17/2017    Benign essential HTN 03/24/2017    Depression with anxiety 03/24/2017    Hypercholesterolemia 03/24/2017    Back pain 01/24/2017    Fibromyalgia 01/24/2017    Gastroesophageal reflux disease 01/24/2017    Chronic low back pain 03/14/2016    Irritable bowel syndrome without diarrhea 03/14/2016    Status post lumbar surgery 09/22/2015    Insomnia 04/17/2015    Coronary arteriosclerosis 07/18/2014    Hypothyroidism 07/08/2014    Reflex sympathetic dystrophy 09/09/2013    Dysthymic disorder 06/25/2013    Asthma 02/22/2013    Pain in thoracic spine 09/28/2012    Brachial neuritis 05/04/2012    Cervical stenosis of spinal canal 05/04/2012    Cervical spondylosis 04/04/2012     She  has a past surgical history that includes Back surgery; EAR SURGERY; Cardiac catheterization; Back surgery; Tonsillectomy; Tympanoplasty; and Coronary angioplasty with stent  Her family history includes Coronary artery disease in her mother and sister; Diabetes in her mother and sister; Heart disease in her mother and sister; Hypertension in her mother and sister; No Known Problems in her father; Stroke in her sister  She  reports that she has been smoking cigarettes  She has been smoking about 0 00 packs per day  She has never used smokeless tobacco  She reports current alcohol use  She reports previous drug use  Drug: Marijuana    Current Outpatient Medications   Medication Sig Dispense Refill    acetaminophen (TYLENOL) 325 mg tablet Take 1 tablet (325 mg total) by mouth as needed for headaches For headache      albuterol (PROVENTIL HFA,VENTOLIN HFA) 90 mcg/act inhaler Inhale 2 puffs every 6 (six) hours as needed for wheezing 1 Inhaler 3    aspirin 81 mg chewable tablet Chew 81 mg daily      atorvastatin (LIPITOR) 80 mg tablet Take 1 tablet (80 mg total) by mouth daily 90 tablet 2    Blood Glucose Monitoring Suppl (ONETOUCH VERIO) w/Device KIT by Does not apply route 2 (two) times a day      citalopram (CeleXA) 40 mg tablet Take 1 tablet (40 mg total) by mouth daily 90 tablet 1    cyclobenzaprine (FLEXERIL) 10 mg tablet Take 1 tablet (10 mg total) by mouth 3 (three) times a day as needed for muscle spasms 30 tablet 1    Empagliflozin (Jardiance) 10 MG TABS Take 1 tablet (10 mg total) by mouth every morning 28 tablet 0    ezetimibe (ZETIA) 10 mg tablet Take 1 tablet (10 mg total) by mouth daily 30 tablet 5    fluticasone-salmeterol (ADVAIR) 250-50 mcg/dose inhaler Inhale 1 puff every 12 (twelve) hours as needed (sob) 1 each 1    glucose blood (OneTouch Verio) test strip 1 each by Other route 2 (two) times a day      Lancets (ONETOUCH ULTRASOFT) lancets by Other route 2 (two) times a day      levothyroxine 112 mcg tablet Take 1 tablet (112 mcg total) by mouth daily 90 tablet 1    lidocaine (LIDODERM) 5 % Apply 1 patch topically daily Remove & Discard patch within 12 hours or as directed by MD 10 patch 2    losartan (COZAAR) 25 mg tablet TAKE 1 TABLET DAILY   90 tablet 3    magnesium oxide (MAG-OX) 400 mg Take 1 tablet (400 mg total) by mouth 2 (two) times a day 60 tablet 0    meloxicam (MOBIC) 15 mg tablet Take 1 tablet (15 mg total) by mouth daily 30 tablet 0    metFORMIN (GLUCOPHAGE) 500 mg tablet Take 0 5 tablets (250 mg total) by mouth daily 90 tablet 1    metoprolol succinate (TOPROL-XL) 25 mg 24 hr tablet Take 25 mg by mouth daily      omega-3-acid ethyl esters (LOVAZA) 1 g capsule Take 2 capsules (2 g total) by mouth 2 (two) times a day 120 capsule 3    pantoprazole (PROTONIX) 40 mg tablet Take 1 tablet (40 mg total) by mouth daily 90 tablet 1    pregabalin (LYRICA) 150 mg capsule Take 1 capsule (150 mg total) by mouth 3 (three) times a day 90 capsule 2    senna-docusate sodium (SENOKOT S) 8 6-50 mg per tablet Take 2 tablets by mouth daily      triamcinolone (KENALOG) 0 1 % cream Apply topically 2 (two) times a day 80 g 1    zolpidem (Ambien) 10 mg tablet Take 1 tablet (10 mg total) by mouth daily at bedtime as needed for sleep 30 tablet 0    methocarbamol (ROBAXIN) 500 mg tablet Take 1 tablet (500 mg total) by mouth 3 (three) times a day as needed for muscle spasms (Patient not taking: Reported on 7/22/2020) 90 tablet 0     No current facility-administered medications for this visit        Current Outpatient Medications on File Prior to Visit   Medication Sig    acetaminophen (TYLENOL) 325 mg tablet Take 1 tablet (325 mg total) by mouth as needed for headaches For headache    albuterol (PROVENTIL HFA,VENTOLIN HFA) 90 mcg/act inhaler Inhale 2 puffs every 6 (six) hours as needed for wheezing    aspirin 81 mg chewable tablet Chew 81 mg daily    atorvastatin (LIPITOR) 80 mg tablet Take 1 tablet (80 mg total) by mouth daily    Blood Glucose Monitoring Suppl (ONETOUCH VERIO) w/Device KIT by Does not apply route 2 (two) times a day    citalopram (CeleXA) 40 mg tablet Take 1 tablet (40 mg total) by mouth daily    cyclobenzaprine (FLEXERIL) 10 mg tablet Take 1 tablet (10 mg total) by mouth 3 (three) times a day as needed for muscle spasms    Empagliflozin (Jardiance) 10 MG TABS Take 1 tablet (10 mg total) by mouth every morning    ezetimibe (ZETIA) 10 mg tablet Take 1 tablet (10 mg total) by mouth daily    fluticasone-salmeterol (ADVAIR) 250-50 mcg/dose inhaler Inhale 1 puff every 12 (twelve) hours as needed (sob)    glucose blood (OneTouch Verio) test strip 1 each by Other route 2 (two) times a day    Lancets (ONETOUCH ULTRASOFT) lancets by Other route 2 (two) times a day    levothyroxine 112 mcg tablet Take 1 tablet (112 mcg total) by mouth daily    lidocaine (LIDODERM) 5 % Apply 1 patch topically daily Remove & Discard patch within 12 hours or as directed by MD    losartan (COZAAR) 25 mg tablet TAKE 1 TABLET DAILY   magnesium oxide (MAG-OX) 400 mg Take 1 tablet (400 mg total) by mouth 2 (two) times a day    meloxicam (MOBIC) 15 mg tablet Take 1 tablet (15 mg total) by mouth daily    metFORMIN (GLUCOPHAGE) 500 mg tablet Take 0 5 tablets (250 mg total) by mouth daily    metoprolol succinate (TOPROL-XL) 25 mg 24 hr tablet Take 25 mg by mouth daily    omega-3-acid ethyl esters (LOVAZA) 1 g capsule Take 2 capsules (2 g total) by mouth 2 (two) times a day    pantoprazole (PROTONIX) 40 mg tablet Take 1 tablet (40 mg total) by mouth daily    pregabalin (LYRICA) 150 mg capsule Take 1 capsule (150 mg total) by mouth 3 (three) times a day    senna-docusate sodium (SENOKOT S) 8 6-50 mg per tablet Take 2 tablets by mouth daily    triamcinolone (KENALOG) 0 1 % cream Apply topically 2 (two) times a day    zolpidem (Ambien) 10 mg tablet Take 1 tablet (10 mg total) by mouth daily at bedtime as needed for sleep    methocarbamol (ROBAXIN) 500 mg tablet Take 1 tablet (500 mg total) by mouth 3 (three) times a day as needed for muscle spasms (Patient not taking: Reported on 7/22/2020)     No current facility-administered medications on file prior to visit  She is allergic to augmentin [amoxicillin-pot clavulanate]       Review of Systems   HENT:        Hx of right ear surgery   Respiratory:        Asthma   Cardiovascular:        Stents, 2   Endocrine: Negative  Genitourinary: Negative  Musculoskeletal: Positive for neck pain (neck pain into shoulders and bilateral arm, worse on left )  Allergic/Immunologic: Negative  Neurological: Positive for weakness (bilateral arms, left is worse), numbness (at times  pinkies and tips of last 2 fingers) and headaches (migraines)           Objective:      /80 (BP Location: Left arm)   Pulse 90   Temp 97 9 °F (36 6 °C) (Tympanic) Resp 16   Ht 5' 4" (1 626 m)   Wt 80 3 kg (177 lb)   BMI 30 38 kg/m²          Physical Exam   Constitutional: She is oriented to person, place, and time  She does not appear ill  No distress  HENT:   Head: Normocephalic and atraumatic  Eyes: Pupils are equal, round, and reactive to light  Conjunctivae are normal  Right eye exhibits no discharge  Left eye exhibits no discharge  No scleral icterus  Cardiovascular: Normal rate  Pulmonary/Chest: Effort normal  No respiratory distress  She has no wheezes  Musculoskeletal: Normal range of motion  Neurological: She is alert and oriented to person, place, and time  She has normal motor skills  A sensory deficit is present  No cranial nerve deficit     Psychiatric: Mood and thought content normal

## 2020-08-10 ENCOUNTER — PATIENT OUTREACH (OUTPATIENT)
Dept: INTERNAL MEDICINE CLINIC | Facility: CLINIC | Age: 60
End: 2020-08-10

## 2020-08-17 ENCOUNTER — PATIENT OUTREACH (OUTPATIENT)
Dept: INTERNAL MEDICINE CLINIC | Facility: CLINIC | Age: 60
End: 2020-08-17

## 2020-08-17 DIAGNOSIS — F51.01 PRIMARY INSOMNIA: ICD-10-CM

## 2020-08-17 RX ORDER — ZOLPIDEM TARTRATE 10 MG/1
10 TABLET ORAL
Qty: 30 TABLET | Refills: 0 | Status: SHIPPED | OUTPATIENT
Start: 2020-08-17 | End: 2020-09-17 | Stop reason: SDUPTHER

## 2020-08-17 NOTE — PROGRESS NOTES
Outpatient Care Management Note:  RE: Pt called mistakingly thinking it was the PCP practice  She wanted to request a refill on a medication  We discussed a previous outreach by   Pt has gotten her name on a housing list for [employer]  She denied any other concerns currently  Recommended a specific location for housing as well, in [place] that she was going to look into

## 2020-09-08 ENCOUNTER — TELEPHONE (OUTPATIENT)
Dept: INTERNAL MEDICINE CLINIC | Facility: CLINIC | Age: 60
End: 2020-09-08

## 2020-09-08 NOTE — TELEPHONE ENCOUNTER
Patient called on Rx line and left a message requesting a Rx for a smoking patch  I called back patient and requested patient to schedule an appointment  Explained that insurance will need documentation for the reason why she is going to use the smoking patch  Patient got upset and refused to schedule an appointment and hugged up the phone on me

## 2020-09-08 NOTE — TELEPHONE ENCOUNTER
We can call her back and say we can make it a tele visit  I need to ask a few questions to decide the dosage and management

## 2020-09-09 ENCOUNTER — CONSULT (OUTPATIENT)
Dept: NEUROLOGY | Facility: CLINIC | Age: 60
End: 2020-09-09
Payer: MEDICARE

## 2020-09-09 VITALS
TEMPERATURE: 98.4 F | HEART RATE: 88 BPM | HEIGHT: 64 IN | SYSTOLIC BLOOD PRESSURE: 124 MMHG | WEIGHT: 172 LBS | DIASTOLIC BLOOD PRESSURE: 78 MMHG | BODY MASS INDEX: 29.37 KG/M2

## 2020-09-09 DIAGNOSIS — G44.229 CHRONIC TENSION-TYPE HEADACHE, NOT INTRACTABLE: ICD-10-CM

## 2020-09-09 DIAGNOSIS — G43.709 CHRONIC MIGRAINE WITHOUT AURA WITHOUT STATUS MIGRAINOSUS, NOT INTRACTABLE: Primary | ICD-10-CM

## 2020-09-09 PROCEDURE — 99205 OFFICE O/P NEW HI 60 MIN: CPT | Performed by: PSYCHIATRY & NEUROLOGY

## 2020-09-09 RX ORDER — PROCHLORPERAZINE MALEATE 10 MG
10 TABLET ORAL EVERY 6 HOURS PRN
Qty: 12 TABLET | Refills: 6 | Status: SHIPPED | OUTPATIENT
Start: 2020-09-09 | End: 2021-05-26 | Stop reason: SDUPTHER

## 2020-09-09 RX ORDER — TOPIRAMATE 25 MG/1
TABLET ORAL
Qty: 120 TABLET | Refills: 6 | Status: SHIPPED | OUTPATIENT
Start: 2020-09-09 | End: 2020-12-02 | Stop reason: DRUGHIGH

## 2020-09-09 NOTE — PATIENT INSTRUCTIONS
Headache/migraine treatment:   - When you have a moderate to severe headache, you should seek rest, initiate relaxation and apply cold compresses to the head  Abortive medications (for immediate treatment of a headache): It is ok to take ibuprofen, acetaminophen or naproxen (Advil, Tylenol,  Aleve, Excedrin) if they help your headaches you should limit these to No more than 3 times a week to avoid medication overuse/rebound headaches  For your more moderate to severe migraines take this medication early   prochlorperazine/Compazine 10 mg as needed for migraine  This is technically a nausea medication but can be used for migraine or nausea  Try not to use more than 3 days per week      Over the counter preventive supplements for headaches/migraines   (to take every day to help prevent headaches - not to take at the time of headache):  [] Magnesium 400-500mg daily (If any diarrhea or upset stomach, decrease dose  as tolerated)  [] Riboflavin (Vitamin B2) 400mg daily - try online   (FYI B2 may make your urine bright/neon yellow)  AND/OR  [] Herbal medication: Petasites/Butterbur 150 mg daily - try online  (When choosing your Butterbur online or in the store, beware that there are some poor preps containing pyrrolizidine alkaloids (PAs) that can be harmful to the liver  Therefore, do not use butterbur products that are not certified and labeled as PA-free )    Prescription preventive medications for headaches/migraines   (to take every day to help prevent headaches - not to take at the time of headache):  [x] - Topiramate 25 mg nightly for 1 week, then increase to 25 mg in a m  And 25 mg in p m  For 1 week, then take 25 mg in a m  And 50 mg in p m  For 1 week, then take 50 mg in a m  and 50 mg in p m  And continue    - generally the common side effects improve as your body gets used to the medication    If we need to spread out a more gradual increase of the medication on a longer scale we can, just call if any questions or concerns  - if necessary, if the a m  dose is causing side effects we can always have you take the full dose at night instead      *Typically these types of medications take time untill you see the benefit, although some may see improvement in days, often it may take weeks, especially if the medication is being titrated up to a beneficial level  Please contact us if there are any concerns or questions regarding the medication  Lifestyle Recommendations:  [x] SLEEP - Maintain a regular sleep schedule: Adults need at least 7-8 hours of uninterrupted a night  Maintain good sleep hygiene:  Going to bed and waking up at consistent times, avoiding excessive daytime naps, avoiding caffeinated beverages in the evening, avoid excessive stimulation in the evening and generally using bed primarily for sleeping  One hour before bedtime would recommend turning lights down lower, decreasing your activity (may read quietly, listen to music at a low volume)  When you get into bed, should eliminate all technology (no texting, emailing, playing with your phone, iPad or tablet in bed)  [x] HYDRATION - Maintain good hydration  Drink  2L of fluid a day (4 typical small water bottles)  [x] DIET - Maintain good nutrition  In particular don't skip meals and try and eat healthy balanced meals regularly  [x] TRIGGERS - Look for other triggers and avoid them: Limit caffeine to 1-2 cups a day or less  Avoid dietary triggers that you have noticed bring on your headaches (this could include aged cheese, peanuts, MSG, aspartame and nitrates)  [x] EXERCISE - physical exercise as we all know is good for you in many ways, and not only is good for your heart, but also is beneficial for your mental health, cognitive health and  chronic pain/headaches  I would encourage at the least 5 days of physical exercise weekly for at least 30 minutes  Education and Follow-up  [x] Please call with any questions or concerns   Of course if any new concerning symptoms go to the emergency department    [x] Follow up 2-3 months with a PA and with Dr Morales Held next available

## 2020-09-09 NOTE — PROGRESS NOTES
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 education, Importance of tx compliance, Risk factor reductions and Impressions        Author:  Reema Aldana MD 9/9/2020 5:01 PM

## 2020-09-09 NOTE — PROGRESS NOTES
Review of Systems   Constitutional: Negative  Negative for appetite change and fever  HENT: Negative  Negative for hearing loss, tinnitus, trouble swallowing and voice change  Eyes: Negative  Negative for photophobia and pain  Respiratory: Negative  Negative for shortness of breath  Cardiovascular: Negative  Negative for palpitations  Gastrointestinal: Negative  Negative for nausea and vomiting  Endocrine: Negative  Negative for cold intolerance  Genitourinary: Negative  Negative for dysuria, frequency and urgency  Musculoskeletal: Positive for neck pain  Negative for myalgias  Skin: Negative  Negative for rash  Neurological: Positive for headaches  Negative for dizziness, tremors, seizures, syncope, facial asymmetry, speech difficulty, weakness, light-headedness and numbness  Hematological: Negative  Does not bruise/bleed easily  Psychiatric/Behavioral: Negative  Negative for confusion, hallucinations and sleep disturbance

## 2020-09-10 ENCOUNTER — OFFICE VISIT (OUTPATIENT)
Dept: INTERNAL MEDICINE CLINIC | Facility: CLINIC | Age: 60
End: 2020-09-10
Payer: MEDICARE

## 2020-09-10 VITALS
OXYGEN SATURATION: 96 % | SYSTOLIC BLOOD PRESSURE: 110 MMHG | TEMPERATURE: 97.9 F | HEIGHT: 64 IN | DIASTOLIC BLOOD PRESSURE: 66 MMHG | HEART RATE: 70 BPM | BODY MASS INDEX: 29.6 KG/M2 | WEIGHT: 173.4 LBS

## 2020-09-10 DIAGNOSIS — E78.00 HYPERCHOLESTEROLEMIA: ICD-10-CM

## 2020-09-10 DIAGNOSIS — M79.7 FIBROMYALGIA: ICD-10-CM

## 2020-09-10 DIAGNOSIS — R21 SKIN RASH: ICD-10-CM

## 2020-09-10 DIAGNOSIS — F17.210 CIGARETTE NICOTINE DEPENDENCE WITHOUT COMPLICATION: Primary | ICD-10-CM

## 2020-09-10 DIAGNOSIS — Z71.6 ENCOUNTER FOR SMOKING CESSATION COUNSELING: ICD-10-CM

## 2020-09-10 PROBLEM — E11.65 UNCONTROLLED TYPE 2 DIABETES MELLITUS WITH HYPERGLYCEMIA (HCC): Status: ACTIVE | Noted: 2020-09-10

## 2020-09-10 PROCEDURE — 99213 OFFICE O/P EST LOW 20 MIN: CPT | Performed by: NURSE PRACTITIONER

## 2020-09-10 RX ORDER — TRIAMCINOLONE ACETONIDE 1 MG/G
CREAM TOPICAL 2 TIMES DAILY
Qty: 80 G | Refills: 1 | Status: SHIPPED | OUTPATIENT
Start: 2020-09-10

## 2020-09-10 RX ORDER — NICOTINE 21 MG/24HR
1 PATCH, TRANSDERMAL 24 HOURS TRANSDERMAL EVERY 24 HOURS
Qty: 28 PATCH | Refills: 0 | Status: SHIPPED | OUTPATIENT
Start: 2020-09-10 | End: 2021-01-19 | Stop reason: ALTCHOICE

## 2020-09-10 RX ORDER — PREGABALIN 150 MG/1
150 CAPSULE ORAL 3 TIMES DAILY
Qty: 90 CAPSULE | Refills: 2 | Status: SHIPPED | OUTPATIENT
Start: 2020-09-10 | End: 2021-01-19 | Stop reason: SDUPTHER

## 2020-09-10 RX ORDER — OMEGA-3-ACID ETHYL ESTERS 1 G/1
2 CAPSULE, LIQUID FILLED ORAL 2 TIMES DAILY
Qty: 120 CAPSULE | Refills: 3 | Status: SHIPPED | OUTPATIENT
Start: 2020-09-10

## 2020-09-10 NOTE — ASSESSMENT & PLAN NOTE
Unlikely that these are spider bites  Concern for bed bugs but pt insists there is no way - it would be odd that no one else in her home has the same rash and she has had for 4 years     Cont triamcinolone as needed  Referral given to dermatology

## 2020-09-10 NOTE — ASSESSMENT & PLAN NOTE
Start nicoderm step 1 patches x 6 weeks, then step 2 for 2 weeks and step 3 for 2 weeks   Call for refills when needed

## 2020-09-10 NOTE — PATIENT INSTRUCTIONS
Start nicoderm patches  - 21 mg x 6 weeks then 14mg for 2 weeks then 7mg for 2 weeks - call office and specify what refills you need     Follow up with derm for second opinion

## 2020-09-10 NOTE — TELEPHONE ENCOUNTER
No call back  I called Isatu Arango and with reservation, she agreed to come to the office to address  She did not want to wait until next week for a Virtual appt with Dr Jane Cancer  She requested [facility] in [place]  I explained that Virtual appts are being reserved only for sick patients and those with COVID symptoms  She said that she does not feel that she should have to have "a visit" and feels that the state should not have to incur the cost of another visit

## 2020-09-10 NOTE — PROGRESS NOTES
Assessment/Plan:    Problem List Items Addressed This Visit        Musculoskeletal and Integument    Skin rash     Unlikely that these are spider bites  Concern for bed bugs but pt insists there is no way - it would be odd that no one else in her home has the same rash and she has had for 4 years  Cont triamcinolone as needed  Referral given to dermatology            Relevant Medications    triamcinolone (KENALOG) 0 1 % cream    Other Relevant Orders    Ambulatory referral to Dermatology       Other    Fibromyalgia    Relevant Medications    pregabalin (LYRICA) 150 mg capsule    Hypercholesterolemia    Relevant Medications    omega-3-acid ethyl esters (LOVAZA) 1 g capsule    Cigarette nicotine dependence without complication - Primary     Start nicoderm step 1 patches x 6 weeks, then step 2 for 2 weeks and step 3 for 2 weeks  Call for refills when needed         Relevant Medications    nicotine (NICODERM CQ) 21 mg/24 hr TD 24 hr patch      Other Visit Diagnoses     Encounter for smoking cessation counseling        Relevant Medications    nicotine (NICODERM CQ) 21 mg/24 hr TD 24 hr patch          M*Modal software was used to dictate this note  It may contain errors with dictating incorrect words or incorrect spelling  Please contact the provider directly with any questions  Subjective:      Patient ID: Ryann Horn is a 62 y o  female  HPI    Patient presents today to discuss quitting smoking  She started smoking as a teenage  She smoked for about 30-35 years approx a pack a day  She then quit for the past 7 years  She had success quitting with using the patch  About a month ago she started smoking again due to increased stress at home  She is interested in trying the patch again to quit       Skin rash - she is asking for a refill of the triamcinolone cream for her "spider bites" she tells me she has been having this rash for the last 4 years and is convinced it is spiders biting her because she thinks she can see "2 fang marks in the bites" she describes the rash as very itchy bites  She states she got 2 while she was in the hospital and tells me "spiders just love me" she also has tried sleeping with a mesh net over her bed without significant improvement  She lives in a private home with her daughter and no one else in the house has the same bites  She tells me she has seen a dermatologist in the past and was given prednisone but the rash came back  The following portions of the patient's history were reviewed and updated as appropriate: allergies, current medications, past family history, past medical history, past social history, past surgical history and problem list     Review of Systems   Constitutional: Negative for chills and fever  Skin: Positive for rash  Negative for color change, pallor and wound           Past Medical History:   Diagnosis Date    Abnormal echocardiogram     Abnormal stress test     Anxiety     Asthma     Brachial neuritis     Depression     Disease of thyroid gland     Displacement of intervertebral disc of mid-cervical region     Fibromyalgia     Frequent headaches     GERD (gastroesophageal reflux disease)     Herniated nucleus pulposus     History of arthritis     History of asthma     History of cardiac disorder     History of chest pain     History of diverticulitis     History of fatigue     History of hearing loss     History of hemoptysis     History of herpes simplex infection     History of hiatal hernia     History of insect bite     INFECTED    History of memory loss     History of neck disorder     History of reflex sympathetic dystrophy     History of restless legs syndrome     History of shortness of breath     History of spinal stenosis     Hyperlipidemia     Hyperlipidemia     Hypertension     Skin rash     Somnolence, daytime     Spinal stenosis of cervical region          Current Outpatient Medications:     acetaminophen (TYLENOL) 325 mg tablet, Take 1 tablet (325 mg total) by mouth as needed for headaches For headache, Disp: , Rfl:     albuterol (PROVENTIL HFA,VENTOLIN HFA) 90 mcg/act inhaler, Inhale 2 puffs every 6 (six) hours as needed for wheezing, Disp: 1 Inhaler, Rfl: 3    aspirin 81 mg chewable tablet, Chew 81 mg daily, Disp: , Rfl:     atorvastatin (LIPITOR) 80 mg tablet, Take 1 tablet (80 mg total) by mouth daily, Disp: 90 tablet, Rfl: 2    Blood Glucose Monitoring Suppl (ONETOUCH VERIO) w/Device KIT, by Does not apply route 2 (two) times a day, Disp: , Rfl:     citalopram (CeleXA) 40 mg tablet, Take 1 tablet (40 mg total) by mouth daily, Disp: 90 tablet, Rfl: 1    cyclobenzaprine (FLEXERIL) 10 mg tablet, Take 1 tablet (10 mg total) by mouth 3 (three) times a day as needed for muscle spasms, Disp: 30 tablet, Rfl: 1    Empagliflozin (Jardiance) 10 MG TABS, Take 1 tablet (10 mg total) by mouth every morning, Disp: 28 tablet, Rfl: 0    ezetimibe (ZETIA) 10 mg tablet, Take 1 tablet (10 mg total) by mouth daily, Disp: 30 tablet, Rfl: 5    fluticasone-salmeterol (ADVAIR) 250-50 mcg/dose inhaler, Inhale 1 puff every 12 (twelve) hours as needed (sob), Disp: 1 each, Rfl: 1    glucose blood (OneTouch Verio) test strip, 1 each by Other route 2 (two) times a day, Disp: , Rfl:     Lancets (ONETOUCH ULTRASOFT) lancets, by Other route 2 (two) times a day, Disp: , Rfl:     levothyroxine 112 mcg tablet, Take 1 tablet (112 mcg total) by mouth daily, Disp: 90 tablet, Rfl: 1    lidocaine (LIDODERM) 5 %, Apply 1 patch topically daily Remove & Discard patch within 12 hours or as directed by MD, Disp: 10 patch, Rfl: 2    losartan (COZAAR) 25 mg tablet, TAKE 1 TABLET DAILY  , Disp: 90 tablet, Rfl: 3    magnesium oxide (MAG-OX) 400 mg, Take 1 tablet (400 mg total) by mouth 2 (two) times a day, Disp: 60 tablet, Rfl: 0    metFORMIN (GLUCOPHAGE) 500 mg tablet, Take 0 5 tablets (250 mg total) by mouth daily, Disp: 90 tablet, Rfl: 1   metoprolol succinate (TOPROL-XL) 25 mg 24 hr tablet, Take 25 mg by mouth daily, Disp: , Rfl:     omega-3-acid ethyl esters (LOVAZA) 1 g capsule, Take 2 capsules (2 g total) by mouth 2 (two) times a day, Disp: 120 capsule, Rfl: 3    pantoprazole (PROTONIX) 40 mg tablet, Take 1 tablet (40 mg total) by mouth daily, Disp: 90 tablet, Rfl: 1    pregabalin (LYRICA) 150 mg capsule, Take 1 capsule (150 mg total) by mouth 3 (three) times a day, Disp: 90 capsule, Rfl: 2    prochlorperazine (COMPAZINE) 10 mg tablet, Take 1 tablet (10 mg total) by mouth every 6 (six) hours as needed for nausea or vomiting, Disp: 12 tablet, Rfl: 6    senna-docusate sodium (SENOKOT S) 8 6-50 mg per tablet, Take 2 tablets by mouth daily, Disp: , Rfl:     triamcinolone (KENALOG) 0 1 % cream, Apply topically 2 (two) times a day, Disp: 80 g, Rfl: 1    zolpidem (Ambien) 10 mg tablet, Take 1 tablet (10 mg total) by mouth daily at bedtime as needed for sleep, Disp: 30 tablet, Rfl: 0    meloxicam (MOBIC) 15 mg tablet, Take 1 tablet (15 mg total) by mouth daily (Patient not taking: Reported on 9/10/2020), Disp: 30 tablet, Rfl: 0    nicotine (NICODERM CQ) 21 mg/24 hr TD 24 hr patch, Place 1 patch on the skin every 24 hours, Disp: 28 patch, Rfl: 0    topiramate (TOPAMAX) 25 mg tablet, 1 tab PO QHS for 1 week, increase as tolerated to 1 tab BID for 1 week, then 1 tab QAM and 2 tabs QHS for 1 week and finish at 2 tabs BID   (Patient not taking: Reported on 9/10/2020), Disp: 120 tablet, Rfl: 6    Allergies   Allergen Reactions    Augmentin [Amoxicillin-Pot Clavulanate] Nausea Only     PT stated she only allergic to medication AUGMENTIN       Social History   Past Surgical History:   Procedure Laterality Date    BACK SURGERY      BACK SURGERY      LOWER BACK SURGERY    CARDIAC CATHETERIZATION      HISTORY OF CORONARY ANGIOGRAPHY WITH CONCOMITANT LEFT HEART CATHETERIZATION    CORONARY ANGIOPLASTY WITH STENT PLACEMENT      EAR SURGERY      TONSILLECTOMY      TYMPANOPLASTY       Family History   Problem Relation Age of Onset    Coronary artery disease Mother         ATHEROMA    Heart disease Mother     Diabetes Mother     Hypertension Mother     No Known Problems Father         NO PERTINENT FAMILY HISTORY    Coronary artery disease Sister         ATHEROMA    Heart disease Sister     Stroke Sister     Diabetes Sister     Hypertension Sister        Objective:  /66 (BP Location: Left arm, Patient Position: Sitting, Cuff Size: Large)   Pulse 70   Temp 97 9 °F (36 6 °C) (Temporal)   Ht 5' 4" (1 626 m)   Wt 78 7 kg (173 lb 6 4 oz) Comment: w sneakers  SpO2 96%   BMI 29 76 kg/m²      Physical Exam  Vitals signs and nursing note reviewed  Constitutional:       General: She is not in acute distress  Appearance: Normal appearance  She is not diaphoretic  HENT:      Head: Normocephalic and atraumatic  Comments: masked  Eyes:      Extraocular Movements: Extraocular movements intact  Conjunctiva/sclera: Conjunctivae normal       Pupils: Pupils are equal, round, and reactive to light  Cardiovascular:      Rate and Rhythm: Normal rate and regular rhythm  Heart sounds: Normal heart sounds  No murmur  Pulmonary:      Effort: Pulmonary effort is normal  No respiratory distress  Breath sounds: Normal breath sounds  No wheezing, rhonchi or rales  Skin:     Findings: Rash (small red lesions scattered on arms and legs ) present  Neurological:      Mental Status: She is alert  Mental status is at baseline     Psychiatric:         Mood and Affect: Mood normal          Behavior: Behavior normal

## 2020-09-17 DIAGNOSIS — F51.01 PRIMARY INSOMNIA: ICD-10-CM

## 2020-09-17 RX ORDER — ZOLPIDEM TARTRATE 10 MG/1
10 TABLET ORAL
Qty: 30 TABLET | Refills: 0 | Status: SHIPPED | OUTPATIENT
Start: 2020-09-17 | End: 2020-10-23 | Stop reason: SDUPTHER

## 2020-09-21 ENCOUNTER — TELEPHONE (OUTPATIENT)
Dept: NEUROLOGY | Facility: CLINIC | Age: 60
End: 2020-09-21

## 2020-09-21 DIAGNOSIS — E78.5 DYSLIPIDEMIA: ICD-10-CM

## 2020-09-21 RX ORDER — ATORVASTATIN CALCIUM 80 MG/1
TABLET, FILM COATED ORAL
Qty: 90 TABLET | Refills: 2 | OUTPATIENT
Start: 2020-09-21

## 2020-09-21 NOTE — TELEPHONE ENCOUNTER
Pt called and states that pharm did not write out instructions correctly for topiramate  she states that instructions were  written on a piece of paper from the pharm  she states that she started with 2 tabs at bedtime for 1 week then she increased to 3 tabs at hs   for the past week she took 3 tabs at hs   she is starting her 3rd week  she realized that instructions did not make sense  she states that she called the pharm and they realized that they printed out the wrong instructions  she states that they originally told her 2 tab pm for 1 week then if tolerated increased 1 tab per week  i reveiwed correct instrcutions with pt   she tolerated the 3 tabs at night  no side effects  has not noticed any difference in headaches yet  last dose she took was 3 tabs last evening      please advise how you would like pt to proceed  [phone] to leave a detailed message

## 2020-09-21 NOTE — TELEPHONE ENCOUNTER
After a week of 3 tabs, she can increase to 4 tabs  The 4 tabs of 25 mg = 100 mg  This can be taken all at once 100 mg at night or 50 mg in am and in pm  50 mg BID is preferred, but if for some reason she has side effects from the am dose can take it all at night  Once on this dose for 2 months we would say whether it has been effective or not

## 2020-10-07 ENCOUNTER — OFFICE VISIT (OUTPATIENT)
Dept: OBGYN CLINIC | Facility: CLINIC | Age: 60
End: 2020-10-07
Payer: MEDICARE

## 2020-10-07 VITALS
WEIGHT: 172 LBS | TEMPERATURE: 97.8 F | DIASTOLIC BLOOD PRESSURE: 78 MMHG | SYSTOLIC BLOOD PRESSURE: 118 MMHG | HEIGHT: 64 IN | BODY MASS INDEX: 29.37 KG/M2

## 2020-10-07 DIAGNOSIS — L72.3 SEBACEOUS CYST: Primary | ICD-10-CM

## 2020-10-07 PROCEDURE — 99202 OFFICE O/P NEW SF 15 MIN: CPT | Performed by: PHYSICIAN ASSISTANT

## 2020-10-12 DIAGNOSIS — I10 HTN (HYPERTENSION), BENIGN: ICD-10-CM

## 2020-10-12 RX ORDER — LOSARTAN POTASSIUM 25 MG/1
TABLET ORAL
Qty: 90 TABLET | Refills: 3 | Status: SHIPPED | OUTPATIENT
Start: 2020-10-12 | End: 2021-08-13

## 2020-10-13 DIAGNOSIS — E11.9 TYPE 2 DIABETES MELLITUS WITHOUT COMPLICATION, WITHOUT LONG-TERM CURRENT USE OF INSULIN (HCC): Primary | ICD-10-CM

## 2020-10-21 ENCOUNTER — TELEMEDICINE (OUTPATIENT)
Dept: INTERNAL MEDICINE CLINIC | Facility: CLINIC | Age: 60
End: 2020-10-21
Payer: MEDICARE

## 2020-10-21 DIAGNOSIS — J01.00 ACUTE NON-RECURRENT MAXILLARY SINUSITIS: Primary | ICD-10-CM

## 2020-10-21 PROCEDURE — 99213 OFFICE O/P EST LOW 20 MIN: CPT | Performed by: NURSE PRACTITIONER

## 2020-10-21 RX ORDER — DOXYCYCLINE HYCLATE 100 MG/1
100 CAPSULE ORAL EVERY 12 HOURS SCHEDULED
Qty: 14 CAPSULE | Refills: 0 | Status: SHIPPED | OUTPATIENT
Start: 2020-10-21 | End: 2020-10-28

## 2020-10-23 ENCOUNTER — TELEPHONE (OUTPATIENT)
Dept: INTERNAL MEDICINE CLINIC | Facility: CLINIC | Age: 60
End: 2020-10-23

## 2020-10-23 DIAGNOSIS — F51.01 PRIMARY INSOMNIA: ICD-10-CM

## 2020-10-23 DIAGNOSIS — E11.9 TYPE 2 DIABETES MELLITUS WITHOUT COMPLICATION, WITHOUT LONG-TERM CURRENT USE OF INSULIN (HCC): Primary | ICD-10-CM

## 2020-10-23 DIAGNOSIS — E03.9 HYPOTHYROIDISM, UNSPECIFIED TYPE: ICD-10-CM

## 2020-10-26 RX ORDER — ZOLPIDEM TARTRATE 10 MG/1
10 TABLET ORAL
Qty: 30 TABLET | Refills: 0 | Status: SHIPPED | OUTPATIENT
Start: 2020-10-26 | End: 2020-11-25 | Stop reason: SDUPTHER

## 2020-11-02 ENCOUNTER — TELEPHONE (OUTPATIENT)
Dept: INTERNAL MEDICINE CLINIC | Facility: CLINIC | Age: 60
End: 2020-11-02

## 2020-11-04 ENCOUNTER — TELEMEDICINE (OUTPATIENT)
Dept: INTERNAL MEDICINE CLINIC | Facility: CLINIC | Age: 60
End: 2020-11-04
Payer: MEDICARE

## 2020-11-04 ENCOUNTER — TELEPHONE (OUTPATIENT)
Dept: NEUROLOGY | Facility: CLINIC | Age: 60
End: 2020-11-04

## 2020-11-04 VITALS — WEIGHT: 172 LBS | BODY MASS INDEX: 29.37 KG/M2 | HEIGHT: 64 IN

## 2020-11-04 DIAGNOSIS — J01.00 ACUTE MAXILLARY SINUSITIS, RECURRENCE NOT SPECIFIED: Primary | ICD-10-CM

## 2020-11-04 PROBLEM — J32.0 MAXILLARY SINUSITIS: Status: ACTIVE | Noted: 2018-03-30

## 2020-11-04 PROCEDURE — 99213 OFFICE O/P EST LOW 20 MIN: CPT | Performed by: NURSE PRACTITIONER

## 2020-11-06 ENCOUNTER — TELEPHONE (OUTPATIENT)
Dept: INTERNAL MEDICINE CLINIC | Facility: CLINIC | Age: 60
End: 2020-11-06

## 2020-11-13 ENCOUNTER — TELEPHONE (OUTPATIENT)
Dept: INTERNAL MEDICINE CLINIC | Facility: CLINIC | Age: 60
End: 2020-11-13

## 2020-11-17 ENCOUNTER — TELEPHONE (OUTPATIENT)
Dept: INTERNAL MEDICINE CLINIC | Facility: CLINIC | Age: 60
End: 2020-11-17

## 2020-11-18 ENCOUNTER — TELEMEDICINE (OUTPATIENT)
Dept: NEUROLOGY | Facility: CLINIC | Age: 60
End: 2020-11-18
Payer: MEDICARE

## 2020-11-18 ENCOUNTER — TELEPHONE (OUTPATIENT)
Dept: NEUROLOGY | Facility: CLINIC | Age: 60
End: 2020-11-18

## 2020-11-18 DIAGNOSIS — G43.709 CHRONIC MIGRAINE WITHOUT AURA WITHOUT STATUS MIGRAINOSUS, NOT INTRACTABLE: Primary | ICD-10-CM

## 2020-11-18 PROCEDURE — 99443 PR PHYS/QHP TELEPHONE EVALUATION 21-30 MIN: CPT | Performed by: PHYSICIAN ASSISTANT

## 2020-11-18 RX ORDER — TOPIRAMATE 50 MG/1
TABLET, FILM COATED ORAL
Qty: 90 TABLET | Refills: 5 | Status: SHIPPED | OUTPATIENT
Start: 2020-11-18 | End: 2021-05-21

## 2020-11-18 NOTE — ASSESSMENT & PLAN NOTE
· Likely acquired  · Continue levothyroxine 12.3   5.42  )-----------( 159      ( 20 Nov 2020 11:52 )             40.2 12.3   5.42  )-----------( 159      ( 20 Nov 2020 11:52 )             40.2               PT/INR - ( 20 Nov 2020 11:52 )   PT: 14.2 sec;   INR: 1.19          PTT - ( 20 Nov 2020 11:52 )  PTT:35.9 sec 12.3   5.42  )-----------( 159      ( 20 Nov 2020 11:52 )             40.2       137  |  98  |  16  ----------------------------<  109<H>  4.3   |  28  |  1.15    Ca    9.8      20 Nov 2020 11:52    TPro  7.6  /  Alb  4.9  /  TBili  0.6  /  DBili  x   /  AST  23  /  ALT  28  /  AlkPhos  70  11-20      PT/INR - ( 20 Nov 2020 11:52 )   PT: 14.2 sec;   INR: 1.19          PTT - ( 20 Nov 2020 11:52 )  PTT:35.9 sec    CARDIAC MARKERS ( 20 Nov 2020 11:52 )  x     / x     / 118 U/L / x     / 1.4 ng/mL

## 2020-11-19 DIAGNOSIS — R21 SKIN RASH: ICD-10-CM

## 2020-11-19 DIAGNOSIS — E78.5 DYSLIPIDEMIA: ICD-10-CM

## 2020-11-23 DIAGNOSIS — I65.21 CAROTID STENOSIS, RIGHT: Primary | ICD-10-CM

## 2020-11-24 ENCOUNTER — LAB (OUTPATIENT)
Dept: LAB | Facility: IMAGING CENTER | Age: 60
End: 2020-11-24
Payer: MEDICARE

## 2020-11-24 DIAGNOSIS — E03.9 HYPOTHYROIDISM, UNSPECIFIED TYPE: ICD-10-CM

## 2020-11-24 LAB
CREAT UR-MCNC: 14.2 MG/DL
EST. AVERAGE GLUCOSE BLD GHB EST-MCNC: 148 MG/DL
HBA1C MFR BLD: 6.8 %
MICROALBUMIN UR-MCNC: <5 MG/L (ref 0–20)
MICROALBUMIN/CREAT 24H UR: <35 MG/G CREATININE (ref 0–30)
T4 FREE SERPL-MCNC: 1.23 NG/DL (ref 0.76–1.46)
TSH SERPL DL<=0.05 MIU/L-ACNC: 0.2 UIU/ML (ref 0.36–3.74)

## 2020-11-24 PROCEDURE — 83036 HEMOGLOBIN GLYCOSYLATED A1C: CPT | Performed by: INTERNAL MEDICINE

## 2020-11-24 PROCEDURE — 84439 ASSAY OF FREE THYROXINE: CPT

## 2020-11-24 PROCEDURE — 84443 ASSAY THYROID STIM HORMONE: CPT

## 2020-11-24 PROCEDURE — 82570 ASSAY OF URINE CREATININE: CPT | Performed by: INTERNAL MEDICINE

## 2020-11-24 PROCEDURE — 36415 COLL VENOUS BLD VENIPUNCTURE: CPT | Performed by: INTERNAL MEDICINE

## 2020-11-24 PROCEDURE — 82043 UR ALBUMIN QUANTITATIVE: CPT | Performed by: INTERNAL MEDICINE

## 2020-11-25 DIAGNOSIS — E78.5 DYSLIPIDEMIA: ICD-10-CM

## 2020-11-25 DIAGNOSIS — F51.01 PRIMARY INSOMNIA: ICD-10-CM

## 2020-11-25 RX ORDER — ATORVASTATIN CALCIUM 80 MG/1
80 TABLET, FILM COATED ORAL DAILY
Qty: 30 TABLET | Refills: 0 | Status: SHIPPED | OUTPATIENT
Start: 2020-11-25 | End: 2020-12-20

## 2020-11-25 RX ORDER — ZOLPIDEM TARTRATE 10 MG/1
10 TABLET ORAL
Qty: 30 TABLET | Refills: 0 | Status: SHIPPED | OUTPATIENT
Start: 2020-11-25 | End: 2020-12-23 | Stop reason: SDUPTHER

## 2020-11-25 RX ORDER — HYDROXYZINE HYDROCHLORIDE 25 MG/1
25 TABLET, FILM COATED ORAL 2 TIMES DAILY PRN
Qty: 60 TABLET | Refills: 0 | Status: SHIPPED | OUTPATIENT
Start: 2020-11-25 | End: 2020-12-20

## 2020-11-27 DIAGNOSIS — E78.5 DYSLIPIDEMIA: ICD-10-CM

## 2020-11-27 DIAGNOSIS — R21 SKIN RASH: ICD-10-CM

## 2020-11-27 RX ORDER — ATORVASTATIN CALCIUM 80 MG/1
80 TABLET, FILM COATED ORAL DAILY
Qty: 30 TABLET | Refills: 0 | OUTPATIENT
Start: 2020-11-27

## 2020-11-27 RX ORDER — ATORVASTATIN CALCIUM 80 MG/1
TABLET, FILM COATED ORAL
Qty: 90 TABLET | Refills: 2 | OUTPATIENT
Start: 2020-11-27

## 2020-11-27 RX ORDER — HYDROXYZINE HYDROCHLORIDE 25 MG/1
25 TABLET, FILM COATED ORAL 2 TIMES DAILY PRN
Qty: 60 TABLET | Refills: 0 | OUTPATIENT
Start: 2020-11-27

## 2020-12-01 ENCOUNTER — HOSPITAL ENCOUNTER (OUTPATIENT)
Dept: RADIOLOGY | Facility: IMAGING CENTER | Age: 60
Discharge: HOME | End: 2020-12-01
Payer: MEDICARE

## 2020-12-01 VITALS — HEIGHT: 64 IN | BODY MASS INDEX: 29.53 KG/M2 | WEIGHT: 173 LBS

## 2020-12-01 DIAGNOSIS — Z12.31 ENCOUNTER FOR SCREENING MAMMOGRAM FOR MALIGNANT NEOPLASM OF BREAST: ICD-10-CM

## 2020-12-01 DIAGNOSIS — Z12.39 SCREENING FOR BREAST CANCER: ICD-10-CM

## 2020-12-01 PROCEDURE — 77067 SCR MAMMO BI INCL CAD: CPT

## 2020-12-02 ENCOUNTER — TELEMEDICINE (OUTPATIENT)
Dept: INTERNAL MEDICINE CLINIC | Facility: CLINIC | Age: 60
End: 2020-12-02
Payer: MEDICARE

## 2020-12-02 ENCOUNTER — TELEPHONE (OUTPATIENT)
Dept: NEUROLOGY | Facility: CLINIC | Age: 60
End: 2020-12-02

## 2020-12-02 DIAGNOSIS — J01.00 ACUTE MAXILLARY SINUSITIS, RECURRENCE NOT SPECIFIED: Primary | ICD-10-CM

## 2020-12-02 PROCEDURE — 99214 OFFICE O/P EST MOD 30 MIN: CPT | Performed by: INTERNAL MEDICINE

## 2020-12-02 RX ORDER — DOXYCYCLINE HYCLATE 100 MG
100 TABLET ORAL 2 TIMES DAILY
Qty: 20 TABLET | Refills: 0 | Status: SHIPPED | OUTPATIENT
Start: 2020-12-02 | End: 2020-12-12

## 2020-12-03 DIAGNOSIS — I65.21 CAROTID STENOSIS, RIGHT: Primary | ICD-10-CM

## 2020-12-03 DIAGNOSIS — E11.65 UNCONTROLLED TYPE 2 DIABETES MELLITUS WITH HYPERGLYCEMIA (HCC): ICD-10-CM

## 2020-12-03 RX ORDER — EMPAGLIFLOZIN 10 MG/1
10 TABLET, FILM COATED ORAL EVERY MORNING
Qty: 30 TABLET | Refills: 0 | Status: SHIPPED | OUTPATIENT
Start: 2020-12-03 | End: 2020-12-08 | Stop reason: SDUPTHER

## 2020-12-07 ENCOUNTER — CONSULT (OUTPATIENT)
Dept: VASCULAR SURGERY | Facility: CLINIC | Age: 60
End: 2020-12-07
Payer: MEDICARE

## 2020-12-07 ENCOUNTER — TELEPHONE (OUTPATIENT)
Dept: INTERNAL MEDICINE CLINIC | Facility: CLINIC | Age: 60
End: 2020-12-07

## 2020-12-07 VITALS
BODY MASS INDEX: 29.19 KG/M2 | WEIGHT: 171 LBS | HEART RATE: 76 BPM | HEIGHT: 64 IN | DIASTOLIC BLOOD PRESSURE: 82 MMHG | SYSTOLIC BLOOD PRESSURE: 136 MMHG

## 2020-12-07 DIAGNOSIS — I65.21 CAROTID STENOSIS, ASYMPTOMATIC, RIGHT: Primary | Chronic | ICD-10-CM

## 2020-12-07 PROBLEM — F17.210 CIGARETTE NICOTINE DEPENDENCE: Chronic | Status: ACTIVE | Noted: 2020-09-10

## 2020-12-07 PROCEDURE — 99204 OFFICE O/P NEW MOD 45 MIN: CPT | Performed by: SURGERY

## 2020-12-08 ENCOUNTER — TELEMEDICINE (OUTPATIENT)
Dept: INTERNAL MEDICINE CLINIC | Facility: CLINIC | Age: 60
End: 2020-12-08
Payer: MEDICARE

## 2020-12-08 VITALS — HEIGHT: 64 IN | WEIGHT: 171 LBS | BODY MASS INDEX: 29.19 KG/M2

## 2020-12-08 DIAGNOSIS — Z23 NEED FOR INFLUENZA VACCINATION: ICD-10-CM

## 2020-12-08 DIAGNOSIS — J01.00 ACUTE MAXILLARY SINUSITIS, RECURRENCE NOT SPECIFIED: ICD-10-CM

## 2020-12-08 DIAGNOSIS — E11.65 UNCONTROLLED TYPE 2 DIABETES MELLITUS WITH HYPERGLYCEMIA (HCC): ICD-10-CM

## 2020-12-08 DIAGNOSIS — F51.01 PRIMARY INSOMNIA: ICD-10-CM

## 2020-12-08 DIAGNOSIS — E03.9 HYPOTHYROIDISM, UNSPECIFIED TYPE: ICD-10-CM

## 2020-12-08 DIAGNOSIS — E11.9 TYPE 2 DIABETES MELLITUS WITHOUT COMPLICATION, WITHOUT LONG-TERM CURRENT USE OF INSULIN (HCC): ICD-10-CM

## 2020-12-08 DIAGNOSIS — J32.0 CHRONIC MAXILLARY SINUSITIS: Primary | ICD-10-CM

## 2020-12-08 DIAGNOSIS — Z23 NEED FOR PNEUMOCOCCAL VACCINE: ICD-10-CM

## 2020-12-08 DIAGNOSIS — J32.1 CHRONIC FRONTAL SINUSITIS: ICD-10-CM

## 2020-12-08 PROCEDURE — 90732 PPSV23 VACC 2 YRS+ SUBQ/IM: CPT | Performed by: INTERNAL MEDICINE

## 2020-12-08 PROCEDURE — 90682 RIV4 VACC RECOMBINANT DNA IM: CPT | Performed by: INTERNAL MEDICINE

## 2020-12-08 PROCEDURE — 99214 OFFICE O/P EST MOD 30 MIN: CPT | Performed by: INTERNAL MEDICINE

## 2020-12-08 PROCEDURE — G0009 ADMIN PNEUMOCOCCAL VACCINE: HCPCS | Performed by: INTERNAL MEDICINE

## 2020-12-08 PROCEDURE — G0008 ADMIN INFLUENZA VIRUS VAC: HCPCS | Performed by: INTERNAL MEDICINE

## 2020-12-08 RX ORDER — AMOXICILLIN AND CLAVULANATE POTASSIUM 875; 125 MG/1; MG/1
1 TABLET, FILM COATED ORAL EVERY 12 HOURS SCHEDULED
Qty: 14 TABLET | Refills: 0 | Status: CANCELLED | OUTPATIENT
Start: 2020-12-08 | End: 2020-12-15

## 2020-12-08 RX ORDER — AZITHROMYCIN 250 MG/1
TABLET, FILM COATED ORAL
Qty: 6 TABLET | Refills: 0 | Status: SHIPPED | OUTPATIENT
Start: 2020-12-08 | End: 2020-12-13

## 2020-12-08 RX ORDER — EMPAGLIFLOZIN 10 MG/1
10 TABLET, FILM COATED ORAL EVERY MORNING
Qty: 28 TABLET | Refills: 0 | Status: SHIPPED | COMMUNITY
Start: 2020-12-08 | End: 2021-01-06 | Stop reason: SDUPTHER

## 2020-12-19 DIAGNOSIS — E78.00 HYPERCHOLESTEROLEMIA: ICD-10-CM

## 2020-12-19 RX ORDER — EZETIMIBE 10 MG/1
TABLET ORAL
Qty: 90 TABLET | Refills: 1 | Status: SHIPPED | OUTPATIENT
Start: 2020-12-19 | End: 2021-06-14

## 2020-12-20 DIAGNOSIS — R21 SKIN RASH: ICD-10-CM

## 2020-12-20 DIAGNOSIS — J45.909 ASTHMA, UNSPECIFIED ASTHMA SEVERITY, UNSPECIFIED WHETHER COMPLICATED, UNSPECIFIED WHETHER PERSISTENT: ICD-10-CM

## 2020-12-20 DIAGNOSIS — E78.5 DYSLIPIDEMIA: ICD-10-CM

## 2020-12-20 RX ORDER — HYDROXYZINE HYDROCHLORIDE 25 MG/1
TABLET, FILM COATED ORAL
Qty: 60 TABLET | Refills: 0 | Status: SHIPPED | OUTPATIENT
Start: 2020-12-20 | End: 2021-06-10 | Stop reason: SDUPTHER

## 2020-12-20 RX ORDER — ATORVASTATIN CALCIUM 80 MG/1
TABLET, FILM COATED ORAL
Qty: 30 TABLET | Refills: 0 | Status: SHIPPED | OUTPATIENT
Start: 2020-12-20 | End: 2020-12-29 | Stop reason: SDUPTHER

## 2020-12-21 DIAGNOSIS — E03.9 HYPOTHYROIDISM, UNSPECIFIED TYPE: ICD-10-CM

## 2020-12-21 RX ORDER — LEVOTHYROXINE SODIUM 112 UG/1
112 TABLET ORAL DAILY
Qty: 90 TABLET | Refills: 1 | Status: SHIPPED | OUTPATIENT
Start: 2020-12-21 | End: 2021-01-24 | Stop reason: SDUPTHER

## 2020-12-22 DIAGNOSIS — K21.9 GASTROESOPHAGEAL REFLUX DISEASE: ICD-10-CM

## 2020-12-23 DIAGNOSIS — F51.01 PRIMARY INSOMNIA: ICD-10-CM

## 2020-12-23 RX ORDER — PANTOPRAZOLE SODIUM 40 MG/1
TABLET, DELAYED RELEASE ORAL
Qty: 90 TABLET | Refills: 1 | Status: SHIPPED | OUTPATIENT
Start: 2020-12-23 | End: 2021-06-18

## 2020-12-23 RX ORDER — ZOLPIDEM TARTRATE 10 MG/1
10 TABLET ORAL
Qty: 30 TABLET | Refills: 0 | Status: SHIPPED | OUTPATIENT
Start: 2020-12-23 | End: 2021-01-25 | Stop reason: SDUPTHER

## 2020-12-29 DIAGNOSIS — E78.5 DYSLIPIDEMIA: ICD-10-CM

## 2020-12-29 RX ORDER — ATORVASTATIN CALCIUM 80 MG/1
80 TABLET, FILM COATED ORAL DAILY
Qty: 30 TABLET | Refills: 0 | Status: SHIPPED | OUTPATIENT
Start: 2020-12-29 | End: 2021-02-14

## 2020-12-30 ENCOUNTER — OFFICE VISIT (OUTPATIENT)
Dept: INTERNAL MEDICINE CLINIC | Facility: CLINIC | Age: 60
End: 2020-12-30
Payer: MEDICARE

## 2020-12-30 VITALS
OXYGEN SATURATION: 98 % | TEMPERATURE: 98.8 F | DIASTOLIC BLOOD PRESSURE: 70 MMHG | SYSTOLIC BLOOD PRESSURE: 112 MMHG | HEIGHT: 64 IN | WEIGHT: 164.8 LBS | BODY MASS INDEX: 28.13 KG/M2 | HEART RATE: 65 BPM

## 2020-12-30 DIAGNOSIS — R10.9 ABDOMINAL PAIN, UNSPECIFIED ABDOMINAL LOCATION: ICD-10-CM

## 2020-12-30 DIAGNOSIS — R19.7 DIARRHEA OF PRESUMED INFECTIOUS ORIGIN: Primary | ICD-10-CM

## 2020-12-30 DIAGNOSIS — R19.7 DIARRHEA OF PRESUMED INFECTIOUS ORIGIN: ICD-10-CM

## 2020-12-30 PROCEDURE — 99214 OFFICE O/P EST MOD 30 MIN: CPT | Performed by: INTERNAL MEDICINE

## 2020-12-30 RX ORDER — METRONIDAZOLE 250 MG/1
250 TABLET ORAL EVERY 8 HOURS SCHEDULED
Qty: 21 TABLET | Refills: 0 | Status: SHIPPED | OUTPATIENT
Start: 2020-12-30 | End: 2021-01-07 | Stop reason: SDUPTHER

## 2020-12-30 RX ORDER — METRONIDAZOLE 375 MG/1
375 CAPSULE ORAL 2 TIMES DAILY
Qty: 14 CAPSULE | Refills: 0 | Status: SHIPPED | OUTPATIENT
Start: 2020-12-30 | End: 2020-12-30

## 2021-01-06 DIAGNOSIS — E11.65 UNCONTROLLED TYPE 2 DIABETES MELLITUS WITH HYPERGLYCEMIA (HCC): ICD-10-CM

## 2021-01-06 DIAGNOSIS — R10.30 LOWER ABDOMINAL PAIN: Primary | ICD-10-CM

## 2021-01-06 RX ORDER — EMPAGLIFLOZIN 10 MG/1
10 TABLET, FILM COATED ORAL EVERY MORNING
Qty: 30 TABLET | Refills: 5 | Status: SHIPPED | OUTPATIENT
Start: 2021-01-06 | End: 2021-02-03 | Stop reason: SDUPTHER

## 2021-01-06 RX ORDER — LEVOFLOXACIN 500 MG/1
500 TABLET, FILM COATED ORAL EVERY 24 HOURS
Qty: 7 TABLET | Refills: 0 | Status: SHIPPED | OUTPATIENT
Start: 2021-01-06 | End: 2021-01-13

## 2021-01-07 DIAGNOSIS — R19.7 DIARRHEA OF PRESUMED INFECTIOUS ORIGIN: ICD-10-CM

## 2021-01-07 NOTE — TELEPHONE ENCOUNTER
Patient calling to see what she was supposed to do next about her Addominal pain, bloating and not eating      Patient was called by Mindi Moses yesterday to stop the ABX that she was taking, and waiting for a call back from Mindi Moses or Dr Theodora Messina on what she is to do next

## 2021-01-08 RX ORDER — METRONIDAZOLE 250 MG/1
250 TABLET ORAL EVERY 8 HOURS SCHEDULED
Qty: 21 TABLET | Refills: 0 | Status: SHIPPED | OUTPATIENT
Start: 2021-01-08 | End: 2021-01-15

## 2021-01-08 NOTE — TELEPHONE ENCOUNTER
I spoke to patient and relayed message that  Dr Sarah Alvarado will treat lower abdominal pain (diverticulitis) with Levaquin and Flagyl  Levaquin RX was sent 1/6/21  Patient states she was called yesterday and told to stop Flagyl due to negative c diff test and she threw the Flagyl away  She would like another RX sent to [facility] in [place]

## 2021-01-13 ENCOUNTER — OFFICE VISIT (OUTPATIENT)
Dept: INTERNAL MEDICINE CLINIC | Facility: CLINIC | Age: 61
End: 2021-01-13
Payer: COMMERCIAL

## 2021-01-13 VITALS
BODY MASS INDEX: 29.63 KG/M2 | DIASTOLIC BLOOD PRESSURE: 64 MMHG | WEIGHT: 161 LBS | OXYGEN SATURATION: 97 % | HEIGHT: 62 IN | TEMPERATURE: 96.4 F | HEART RATE: 81 BPM | SYSTOLIC BLOOD PRESSURE: 104 MMHG

## 2021-01-13 DIAGNOSIS — R10.30 LOWER ABDOMINAL PAIN: Primary | ICD-10-CM

## 2021-01-13 DIAGNOSIS — E11.9 TYPE 2 DIABETES MELLITUS WITHOUT COMPLICATION, WITHOUT LONG-TERM CURRENT USE OF INSULIN (HCC): ICD-10-CM

## 2021-01-13 DIAGNOSIS — E03.9 HYPOTHYROIDISM, UNSPECIFIED TYPE: ICD-10-CM

## 2021-01-13 DIAGNOSIS — I10 BENIGN ESSENTIAL HTN: ICD-10-CM

## 2021-01-13 DIAGNOSIS — F17.219 CIGARETTE NICOTINE DEPENDENCE WITH NICOTINE-INDUCED DISORDER: Chronic | ICD-10-CM

## 2021-01-13 PROCEDURE — 99214 OFFICE O/P EST MOD 30 MIN: CPT | Performed by: INTERNAL MEDICINE

## 2021-01-13 PROCEDURE — 3008F BODY MASS INDEX DOCD: CPT | Performed by: INTERNAL MEDICINE

## 2021-01-13 PROCEDURE — 3074F SYST BP LT 130 MM HG: CPT | Performed by: INTERNAL MEDICINE

## 2021-01-13 PROCEDURE — 3078F DIAST BP <80 MM HG: CPT | Performed by: INTERNAL MEDICINE

## 2021-01-13 NOTE — PROGRESS NOTES
Assessment/Plan:    I advised is follow-up for colonoscopy in the summer as scheduled  No change to any other medications at this time  Abdominal pain  Almost near complete resolution of symptoms of abdominal pain, bloating and diarrhea  Continue to monitor symptoms  Will need a follow up colonoscopy    Benign essential HTN  Well controlled, continue present plan    Carotid stenosis, asymptomatic, right  Needs follow up carotid ultrasound    Cigarette nicotine dependence  Continue to recommend cessation    Type 2 diabetes mellitus without complication, without long-term current use of insulin (HCC)    Lab Results   Component Value Date    HGBA1C 6 8 (H) 11/24/2020     Schedule routine follow up    I spent 25 minutes directly with the patient during this visit  today in which greater than 50% of this time was spent in counseling/coordination of care regarding Diagnostic results, Prognosis, Risks and benefits of tx options, Intructions for management, Patient and family education, Importance of tx compliance, Risk factor reductions and Impressions          Diagnoses and all orders for this visit:    Lower abdominal pain    Hypothyroidism, unspecified type    Type 2 diabetes mellitus without complication, without long-term current use of insulin (HCC)    Benign essential HTN    Cigarette nicotine dependence with nicotine-induced disorder    Other orders  -     Cancel: CBC and differential; Future          Subjective:   Chief Complaint   Patient presents with    Diverticulitis     Follow up visit  No recent labs or diagnostic testing  Refuses depression screening  She is refusing Tdap and "working on scheduling a PAP"  Patient ID: Daryle Meyer is a 62 y o  female  Abdominal Pain  This is a new problem  The current episode started 1 to 4 weeks ago  The problem has been gradually improving  The pain is located in the RLQ  The quality of the pain is aching and colicky   The abdominal pain radiates to the RLQ  Associated symptoms include arthralgias and headaches  Diverticulitis   Anxiety  Presents for follow-up visit  Symptoms include muscle tension and nervous/anxious behavior  Patient reports no chest pain or shortness of breath  Primary symptoms comment: Situational tension  The severity of symptoms is causing significant distress  (Diverticulitis)       The following portions of the patient's history were reviewed and updated as appropriate: past family history, past medical history, past social history, past surgical history and problem list     Review of Systems   Respiratory: Negative for shortness of breath  Cardiovascular: Negative for chest pain  Gastrointestinal: Positive for abdominal pain  Negative for blood in stool  Musculoskeletal: Positive for arthralgias, back pain and neck pain  Skin: Positive for rash  Allergic/Immunologic: Positive for environmental allergies  Neurological: Positive for headaches  Psychiatric/Behavioral: Positive for sleep disturbance  The patient is nervous/anxious  All other systems reviewed and are negative          Past Medical History:   Diagnosis Date    Abnormal echocardiogram     Abnormal stress test     Anxiety     Asthma     Brachial neuritis     Depression     Disease of thyroid gland     Displacement of intervertebral disc of mid-cervical region     Fibromyalgia     Frequent headaches     GERD (gastroesophageal reflux disease)     Herniated nucleus pulposus     History of arthritis     History of asthma     History of cardiac disorder     History of chest pain     History of diverticulitis     History of fatigue     History of hearing loss     History of hemoptysis     History of herpes simplex infection     History of hiatal hernia     History of insect bite     INFECTED    History of memory loss     History of neck disorder     History of reflex sympathetic dystrophy     History of restless legs syndrome     History of shortness of breath     History of spinal stenosis     Hyperlipidemia     Hyperlipidemia     Hypertension     Skin rash     Somnolence, daytime     Spinal stenosis of cervical region          Current Outpatient Medications:     acetaminophen (TYLENOL) 325 mg tablet, Take 1 tablet (325 mg total) by mouth as needed for headaches For headache, Disp: , Rfl:     albuterol (PROVENTIL HFA,VENTOLIN HFA) 90 mcg/act inhaler, Inhale 2 puffs every 6 (six) hours as needed for wheezing, Disp: 1 Inhaler, Rfl: 3    aspirin 81 mg chewable tablet, Chew 81 mg daily, Disp: , Rfl:     atorvastatin (LIPITOR) 80 mg tablet, Take 1 tablet (80 mg total) by mouth daily, Disp: 30 tablet, Rfl: 0    citalopram (CeleXA) 40 mg tablet, Take 1 tablet (40 mg total) by mouth daily, Disp: 90 tablet, Rfl: 1    cyclobenzaprine (FLEXERIL) 10 mg tablet, Take 1 tablet (10 mg total) by mouth 3 (three) times a day as needed for muscle spasms (Patient taking differently: Take 10 mg by mouth as needed for muscle spasms ), Disp: 30 tablet, Rfl: 1    Empagliflozin (Jardiance) 10 MG TABS, Take 1 tablet (10 mg total) by mouth every morning, Disp: 30 tablet, Rfl: 5    ezetimibe (ZETIA) 10 mg tablet, TAKE 1 TABLET BY MOUTH EVERY DAY, Disp: 90 tablet, Rfl: 1    fluticasone-salmeterol (ADVAIR) 250-50 mcg/dose inhaler, Inhale 1 puff every 12 (twelve) hours as needed (sob), Disp: 1 each, Rfl: 1    hydrOXYzine HCL (ATARAX) 25 mg tablet, TAKE 1 TABLET (25 MG TOTAL) BY MOUTH 2 TIMES A DAY AS NEEDED FOR ITCHING, Disp: 60 tablet, Rfl: 0    levothyroxine 112 mcg tablet, Take 1 tablet (112 mcg total) by mouth daily, Disp: 90 tablet, Rfl: 1    losartan (COZAAR) 25 mg tablet, TAKE 1 TABLET DAILY  , Disp: 90 tablet, Rfl: 3    magnesium oxide (MAG-OX) 400 mg, Take 1 tablet (400 mg total) by mouth 2 (two) times a day, Disp: 60 tablet, Rfl: 0    metoprolol succinate (TOPROL-XL) 25 mg 24 hr tablet, Take 25 mg by mouth daily, Disp: , Rfl:    metroNIDAZOLE (FLAGYL) 250 mg tablet, Take 1 tablet (250 mg total) by mouth every 8 (eight) hours for 7 days, Disp: 21 tablet, Rfl: 0    omega-3-acid ethyl esters (LOVAZA) 1 g capsule, Take 2 capsules (2 g total) by mouth 2 (two) times a day, Disp: 120 capsule, Rfl: 3    pantoprazole (PROTONIX) 40 mg tablet, TAKE 1 TABLET BY MOUTH EVERY DAY, Disp: 90 tablet, Rfl: 1    pregabalin (LYRICA) 150 mg capsule, Take 1 capsule (150 mg total) by mouth 3 (three) times a day (Patient taking differently: Take 150 mg by mouth 2 (two) times a day ), Disp: 90 capsule, Rfl: 2    prochlorperazine (COMPAZINE) 10 mg tablet, Take 1 tablet (10 mg total) by mouth every 6 (six) hours as needed for nausea or vomiting, Disp: 12 tablet, Rfl: 6    topiramate (TOPAMAX) 50 MG tablet, 1 tab in the am and 2 tabs at bedtime, Disp: 90 tablet, Rfl: 5    triamcinolone (KENALOG) 0 1 % cream, Apply topically 2 (two) times a day, Disp: 80 g, Rfl: 1    zolpidem (Ambien) 10 mg tablet, Take 1 tablet (10 mg total) by mouth daily at bedtime as needed for sleep, Disp: 30 tablet, Rfl: 0    Blood Glucose Monitoring Suppl (ONETOUCH VERIO) w/Device KIT, by Does not apply route 2 (two) times a day, Disp: , Rfl:     glucose blood (OneTouch Verio) test strip, 1 each by Other route 2 (two) times a day, Disp: , Rfl:     Lancets (ONETOUCH ULTRASOFT) lancets, by Other route 2 (two) times a day, Disp: , Rfl:     metFORMIN (GLUCOPHAGE) 500 mg tablet, Take 0 5 tablets (250 mg total) by mouth daily (Patient not taking: Reported on 1/13/2021), Disp: 90 tablet, Rfl: 1    nicotine (NICODERM CQ) 21 mg/24 hr TD 24 hr patch, Place 1 patch on the skin every 24 hours (Patient not taking: Reported on 12/30/2020), Disp: 28 patch, Rfl: 0    senna-docusate sodium (SENOKOT S) 8 6-50 mg per tablet, Take 2 tablets by mouth daily, Disp: , Rfl:     Allergies   Allergen Reactions    Augmentin [Amoxicillin-Pot Clavulanate] Nausea Only     PT stated she only allergic to medication AUGMENTIN    Meloxicam GI Intolerance     "brings on diverticulitis"       Social History   Past Surgical History:   Procedure Laterality Date    BACK SURGERY      BACK SURGERY      LOWER BACK SURGERY    CARDIAC CATHETERIZATION      HISTORY OF CORONARY ANGIOGRAPHY WITH CONCOMITANT LEFT HEART CATHETERIZATION    CORONARY ANGIOPLASTY WITH STENT PLACEMENT      EAR SURGERY      TONSILLECTOMY      TYMPANOPLASTY       Family History   Problem Relation Age of Onset    Coronary artery disease Mother         ATHEROMA    Heart disease Mother     Diabetes Mother     Hypertension Mother     No Known Problems Father         NO PERTINENT FAMILY HISTORY    Coronary artery disease Sister         ATHEROMA    Heart disease Sister     Stroke Sister     Diabetes Sister     Hypertension Sister     No Known Problems Maternal Grandmother     No Known Problems Maternal Grandfather     No Known Problems Paternal Grandmother     No Known Problems Paternal Grandfather     No Known Problems Daughter     Breast cancer Sister     No Known Problems Sister     No Known Problems Sister     No Known Problems Son     No Known Problems Maternal Aunt     No Known Problems Maternal Aunt     No Known Problems Paternal Aunt     No Known Problems Paternal Aunt     Brain cancer Paternal Uncle        Objective:  /64 (BP Location: Left arm, Patient Position: Sitting, Cuff Size: Large)   Pulse 81   Temp (!) 96 4 °F (35 8 °C) (Tympanic)   Ht 5' 2 32" (1 583 m)   Wt 73 kg (161 lb)   LMP  (LMP Unknown)   SpO2 97% Comment: Room Air  BMI 29 14 kg/m²     Recent Results (from the past 1344 hour(s))   Microalbumin / creatinine urine ratio    Collection Time: 11/24/20 11:34 AM   Result Value Ref Range    Creatinine, Ur 14 2 mg/dL    Microalbum  ,U,Random <5 0 0 0 - 20 0 mg/L    Microalb Creat Ratio <35 (H) 0 - 30 mg/g creatinine   Hemoglobin A1C    Collection Time: 11/24/20 11:34 AM   Result Value Ref Range    Hemoglobin A1C 6 8 (H) Normal 3 8-5 6%; PreDiabetic 5 7-6 4%; Diabetic >=6 5%; Glycemic control for adults with diabetes <7 0% %     mg/dl   T4, free    Collection Time: 11/24/20 11:34 AM   Result Value Ref Range    Free T4 1 23 0 76 - 1 46 ng/dL   TSH, 3rd generation with Free T4 reflex    Collection Time: 11/24/20 11:34 AM   Result Value Ref Range    TSH 3RD GENERATON 0 195 (L) 0 358 - 3 740 uIU/mL   Clostridium difficile Toxin/GDH W/Refl To PCR    Collection Time: 01/05/21 11:32 AM   Result Value Ref Range    C  Diff Toxin/GDH w/Reflx to PCR     Stool Enteric Bacterial Panel by PCR    Collection Time: 01/05/21 11:32 AM   Result Value Ref Range    JUDY/SHIG/CAMPY, CULTURE/SHIGA TOXIN, EIA W/RFL TO E COLI, CULTURE      Culture Result      SAL/SHIG/CAMPY, CULTURE/SHIGA TOXIN, EIA W/RFL TO E COLI, CULTURE              Physical Exam      Gen: NAD  Heent: atraumatic, normocephalic  Mouth: is moist  Neck: is supple, no JVD, no carotid bruits     Heart: is regular Normal s1, s2,  Lungs: are clear to auscultation  Abd: soft, non tender, NABS  Ext: no edema, distal pulses normal  Skin: no rashes, ulcers  Mood: normal affect  Neuro: AAOx3

## 2021-01-14 NOTE — ASSESSMENT & PLAN NOTE
Almost near complete resolution of symptoms of abdominal pain, bloating and diarrhea  Continue to monitor symptoms   Will need a follow up colonoscopy

## 2021-01-17 DIAGNOSIS — F32.89 OTHER DEPRESSION: ICD-10-CM

## 2021-01-18 RX ORDER — CITALOPRAM 40 MG/1
TABLET ORAL
Qty: 90 TABLET | Refills: 1 | Status: SHIPPED | OUTPATIENT
Start: 2021-01-18 | End: 2021-07-08 | Stop reason: SDUPTHER

## 2021-01-19 ENCOUNTER — TELEMEDICINE (OUTPATIENT)
Dept: INTERNAL MEDICINE CLINIC | Facility: CLINIC | Age: 61
End: 2021-01-19
Payer: COMMERCIAL

## 2021-01-19 VITALS — TEMPERATURE: 98 F

## 2021-01-19 DIAGNOSIS — R10.30 LOWER ABDOMINAL PAIN: Primary | ICD-10-CM

## 2021-01-19 DIAGNOSIS — M79.7 FIBROMYALGIA: ICD-10-CM

## 2021-01-19 DIAGNOSIS — M48.02 CERVICAL STENOSIS OF SPINAL CANAL: ICD-10-CM

## 2021-01-19 DIAGNOSIS — M62.838 NECK MUSCLE SPASM: ICD-10-CM

## 2021-01-19 PROCEDURE — 4004F PT TOBACCO SCREEN RCVD TLK: CPT | Performed by: INTERNAL MEDICINE

## 2021-01-19 PROCEDURE — 99214 OFFICE O/P EST MOD 30 MIN: CPT | Performed by: INTERNAL MEDICINE

## 2021-01-19 RX ORDER — LEVOFLOXACIN 500 MG/1
500 TABLET, FILM COATED ORAL EVERY 24 HOURS
Qty: 7 TABLET | Refills: 0 | Status: SHIPPED | OUTPATIENT
Start: 2021-01-19 | End: 2021-01-26

## 2021-01-19 RX ORDER — CYCLOBENZAPRINE HCL 10 MG
10 TABLET ORAL AS NEEDED
Start: 2021-01-19 | End: 2021-04-14 | Stop reason: SDUPTHER

## 2021-01-19 RX ORDER — METRONIDAZOLE 250 MG/1
250 TABLET ORAL EVERY 8 HOURS SCHEDULED
Qty: 21 TABLET | Refills: 0 | Status: SHIPPED | OUTPATIENT
Start: 2021-01-19 | End: 2021-01-26

## 2021-01-19 RX ORDER — PREGABALIN 150 MG/1
150 CAPSULE ORAL 2 TIMES DAILY
Qty: 60 CAPSULE | Refills: 2 | Status: SHIPPED | OUTPATIENT
Start: 2021-01-19 | End: 2021-04-05 | Stop reason: SDUPTHER

## 2021-01-19 NOTE — PROGRESS NOTES
Virtual Regular Visit      Assessment/Plan:    Problem List Items Addressed This Visit        Other    Fibromyalgia    Relevant Medications    pregabalin (LYRICA) 150 mg capsule    Cervical stenosis of spinal canal    Relevant Medications    cyclobenzaprine (FLEXERIL) 10 mg tablet    Abdominal pain - Primary    Relevant Medications    metroNIDAZOLE (FLAGYL) 250 mg tablet    levofloxacin (LEVAQUIN) 500 mg tablet    Other Relevant Orders    CT abdomen pelvis w contrast      Other Visit Diagnoses     Neck muscle spasm        Relevant Medications    cyclobenzaprine (FLEXERIL) 10 mg tablet               Reason for visit is   Chief Complaint   Patient presents with    Abdominal Pain     [facility] [phone] - Patient is reporting mid lower abdominal pain is "a little better"  The discomfort is worse at night and after eating  She reports abdominal distention  Pain improves after passing stool  She has no appetite  She  finished the antibiotics 1/14 and 1/15  She will schedule PAP and DM eye exam when feeling better  She is overdue for HIV screening and Tdap  Encounter provider Collette Hollow, MD    Provider located at [address] [address]      Recent Visits  Date Type Provider Dept   01/13/21 Office Visit Collette Hollow, MD [facility] [address]   Showing recent visits within past 7 days and meeting all other requirements     Today's Visits  Date Type Provider Dept   01/19/21 [facility] and meeting all other requirements     Future Appointments  No visits were found meeting these conditions  Showing future appointments within next 150 days and meeting all other requirements        The patient was identified by name and date of birth   Barry Brooks was informed that this is a telemedicine visit and that the visit is being conducted through [facility] and patient was informed that this is a secure, HIPAA-compliant platform  She agrees to proceed     My office door was closed  No one else was in the room  She acknowledged consent and understanding of privacy and security of the video platform  The patient has agreed to participate and understands they can discontinue the visit at any time  Patient is aware this is a billable service  Subjective  Naomie Motley is a 62 y o  female   Chief Complaint   Patient presents with    Abdominal Pain     [facility] [phone] - Patient is reporting mid lower abdominal pain is "a little better"  The discomfort is worse at night and after eating  She reports abdominal distention  Pain improves after passing stool  She has no appetite  She  finished the antibiotics 1/14 and 1/15  She will schedule PAP and DM eye exam when feeling better  She is overdue for HIV screening and Tdap  Abdominal Pain  This is a recurrent problem  The current episode started 1 to 4 weeks ago  The onset quality is gradual  The problem occurs intermittently  The problem has been waxing and waning  The pain is located in the RLQ  The pain is moderate  The quality of the pain is dull, a sensation of fullness and cramping  The abdominal pain does not radiate  Associated symptoms include diarrhea and headaches  The pain is aggravated by eating and bowel movement  Relieved by: Antibiotic treatment  She has tried antibiotics for the symptoms  The treatment provided moderate relief          Past Medical History:   Diagnosis Date    Abnormal echocardiogram     Abnormal stress test     Anxiety     Asthma     Brachial neuritis     Depression     Disease of thyroid gland     Displacement of intervertebral disc of mid-cervical region     Fibromyalgia     Frequent headaches     GERD (gastroesophageal reflux disease)     Herniated nucleus pulposus     History of arthritis     History of asthma     History of cardiac disorder     History of chest pain     History of diverticulitis     History of fatigue     History of hearing loss     History of hemoptysis     History of herpes simplex infection     History of hiatal hernia     History of insect bite     INFECTED    History of memory loss     History of neck disorder     History of reflex sympathetic dystrophy     History of restless legs syndrome     History of shortness of breath     History of spinal stenosis     Hyperlipidemia     Hyperlipidemia     Hypertension     Skin rash     Somnolence, daytime     Spinal stenosis of cervical region        Past Surgical History:   Procedure Laterality Date    BACK SURGERY      BACK SURGERY      LOWER BACK SURGERY    CARDIAC CATHETERIZATION      HISTORY OF CORONARY ANGIOGRAPHY WITH CONCOMITANT LEFT HEART CATHETERIZATION    CORONARY ANGIOPLASTY WITH STENT PLACEMENT      EAR SURGERY      TONSILLECTOMY      TYMPANOPLASTY         Current Outpatient Medications   Medication Sig Dispense Refill    acetaminophen (TYLENOL) 325 mg tablet Take 1 tablet (325 mg total) by mouth as needed for headaches For headache      albuterol (PROVENTIL HFA,VENTOLIN HFA) 90 mcg/act inhaler Inhale 2 puffs every 6 (six) hours as needed for wheezing 1 Inhaler 3    aspirin 81 mg chewable tablet Chew 81 mg daily      atorvastatin (LIPITOR) 80 mg tablet Take 1 tablet (80 mg total) by mouth daily 30 tablet 0    Blood Glucose Monitoring Suppl (ONETOUCH VERIO) w/Device KIT by Does not apply route 2 (two) times a day      citalopram (CeleXA) 40 mg tablet TAKE 1 TABLET BY MOUTH EVERY DAY 90 tablet 1    cyclobenzaprine (FLEXERIL) 10 mg tablet Take 1 tablet (10 mg total) by mouth as needed for muscle spasms      Empagliflozin (Jardiance) 10 MG TABS Take 1 tablet (10 mg total) by mouth every morning 30 tablet 5    ezetimibe (ZETIA) 10 mg tablet TAKE 1 TABLET BY MOUTH EVERY DAY 90 tablet 1    fluticasone-salmeterol (ADVAIR) 250-50 mcg/dose inhaler Inhale 1 puff every 12 (twelve) hours as needed (sob) 1 each 1    glucose blood (OneTouch Verio) test strip 1 each by Other route 2 (two) times a day      hydrOXYzine HCL (ATARAX) 25 mg tablet TAKE 1 TABLET (25 MG TOTAL) BY MOUTH 2 TIMES A DAY AS NEEDED FOR ITCHING 60 tablet 0    Lancets (ONETOUCH ULTRASOFT) lancets by Other route 2 (two) times a day      levofloxacin (LEVAQUIN) 500 mg tablet Take 1 tablet (500 mg total) by mouth every 24 hours for 7 days 7 tablet 0    levothyroxine 112 mcg tablet Take 1 tablet (112 mcg total) by mouth daily 90 tablet 1    losartan (COZAAR) 25 mg tablet TAKE 1 TABLET DAILY  90 tablet 3    magnesium oxide (MAG-OX) 400 mg Take 1 tablet (400 mg total) by mouth 2 (two) times a day 60 tablet 0    metoprolol succinate (TOPROL-XL) 25 mg 24 hr tablet Take 25 mg by mouth daily      metroNIDAZOLE (FLAGYL) 250 mg tablet Take 1 tablet (250 mg total) by mouth every 8 (eight) hours for 7 days 21 tablet 0    omega-3-acid ethyl esters (LOVAZA) 1 g capsule Take 2 capsules (2 g total) by mouth 2 (two) times a day 120 capsule 3    pantoprazole (PROTONIX) 40 mg tablet TAKE 1 TABLET BY MOUTH EVERY DAY 90 tablet 1    pregabalin (LYRICA) 150 mg capsule Take 1 capsule (150 mg total) by mouth 2 (two) times a day 60 capsule 2    prochlorperazine (COMPAZINE) 10 mg tablet Take 1 tablet (10 mg total) by mouth every 6 (six) hours as needed for nausea or vomiting 12 tablet 6    senna-docusate sodium (SENOKOT S) 8 6-50 mg per tablet Take 2 tablets by mouth daily      topiramate (TOPAMAX) 50 MG tablet 1 tab in the am and 2 tabs at bedtime 90 tablet 5    triamcinolone (KENALOG) 0 1 % cream Apply topically 2 (two) times a day 80 g 1    zolpidem (Ambien) 10 mg tablet Take 1 tablet (10 mg total) by mouth daily at bedtime as needed for sleep 30 tablet 0     No current facility-administered medications for this visit           Allergies Allergen Reactions    Augmentin [Amoxicillin-Pot Clavulanate] Nausea Only     PT stated she only allergic to medication AUGMENTIN    Meloxicam GI Intolerance     "brings on diverticulitis"       Review of Systems   Constitutional: Positive for chills  Gastrointestinal: Positive for abdominal pain and diarrhea  Allergic/Immunologic: Positive for environmental allergies  Neurological: Positive for headaches  Psychiatric/Behavioral: The patient is nervous/anxious  All other systems reviewed and are negative  Video Exam    Vitals:    01/19/21 1034   Temp: 98 °F (36 7 °C)   TempSrc: Oral       Physical Exam     Physical Exam   Constitutional: She is oriented to person, place, and time  She appears well-developed and well-nourished  No distress  HENT:   Head: Normocephalic and atraumatic  Neck: Normal range of motion  Neck supple  Pulmonary/Chest: Effort normal  No respiratory distress  Musculoskeletal: Normal range of motion  Neurological: She is alert and oriented to person, place, and time  Skin: She is not diaphoretic  Psychiatric: She has a normal mood and affect  Her behavior is normal  Judgment and thought content normal       I spent 25 minutes directly with the patient during this visit  today in which greater than 50% of this time was spent in counseling/coordination of care regarding Diagnostic results, Prognosis, Risks and benefits of tx options, Intructions for management, Patient and family education, Importance of tx compliance, Risk factor reductions and Impressions  I did not need to refer her for COVID evaluation P  I spent 25 minutes directly with the patient during this visit      74 Michelle Bucio acknowledges that she has consented to an online visit or consultation   She understands that the online visit is based solely on information provided by her, and that, in the absence of a face-to-face physical evaluation by the physician, the diagnosis she receives is both limited and provisional in terms of accuracy and completeness  This is not intended to replace a full medical face-to-face evaluation by the physician  Bette Cheng understands and accepts these terms

## 2021-01-23 LAB
ALBUMIN SERPL-MCNC: 4.6 G/DL (ref 3.6–5.1)
ALBUMIN/GLOB SERPL: 1.7 (CALC) (ref 1–2.5)
ALP SERPL-CCNC: 66 U/L (ref 37–153)
ALT SERPL-CCNC: 25 U/L (ref 6–29)
AST SERPL-CCNC: 26 U/L (ref 10–35)
BILIRUB SERPL-MCNC: 0.7 MG/DL (ref 0.2–1.2)
BUN SERPL-MCNC: 14 MG/DL (ref 7–25)
BUN/CREAT SERPL: NORMAL (CALC) (ref 6–22)
CALCIUM SERPL-MCNC: 9.9 MG/DL (ref 8.6–10.4)
CHLORIDE SERPL-SCNC: 106 MMOL/L (ref 98–110)
CO2 SERPL-SCNC: 24 MMOL/L (ref 20–32)
CREAT SERPL-MCNC: 0.85 MG/DL (ref 0.5–1.05)
EST. AVERAGE GLUCOSE BLD GHB EST-MCNC: 131 (CALC)
EST. AVERAGE GLUCOSE BLD GHB EST-SCNC: 7.3 (CALC)
GLOBULIN SER CALC-MCNC: 2.7 G/DL (CALC) (ref 1.9–3.7)
GLUCOSE SERPL-MCNC: 94 MG/DL (ref 65–99)
HBA1C MFR BLD: 6.2 % OF TOTAL HGB
POTASSIUM SERPL-SCNC: 4.2 MMOL/L (ref 3.5–5.3)
PROT SERPL-MCNC: 7.3 G/DL (ref 6.1–8.1)
SL AMB EGFR AFRICAN AMERICAN: 87 ML/MIN/1.73M2
SL AMB EGFR NON AFRICAN AMERICAN: 75 ML/MIN/1.73M2
SODIUM SERPL-SCNC: 140 MMOL/L (ref 135–146)
T4 FREE SERPL-MCNC: 0.4 NG/DL (ref 0.8–1.8)
TSH SERPL-ACNC: 43.01 MIU/L (ref 0.4–4.5)

## 2021-01-23 PROCEDURE — 3044F HG A1C LEVEL LT 7.0%: CPT | Performed by: INTERNAL MEDICINE

## 2021-01-24 DIAGNOSIS — E03.9 HYPOTHYROIDISM, UNSPECIFIED TYPE: ICD-10-CM

## 2021-01-24 RX ORDER — LEVOTHYROXINE SODIUM 0.12 MG/1
125 TABLET ORAL DAILY
Qty: 90 TABLET | Refills: 1 | Status: SHIPPED | OUTPATIENT
Start: 2021-01-24 | End: 2021-04-27 | Stop reason: SDUPTHER

## 2021-01-25 ENCOUNTER — HOSPITAL ENCOUNTER (OUTPATIENT)
Dept: RADIOLOGY | Age: 61
Discharge: HOME | End: 2021-01-25
Payer: COMMERCIAL

## 2021-01-25 ENCOUNTER — TELEPHONE (OUTPATIENT)
Dept: INTERNAL MEDICINE CLINIC | Age: 61
End: 2021-01-25

## 2021-01-25 DIAGNOSIS — F51.01 PRIMARY INSOMNIA: ICD-10-CM

## 2021-01-25 DIAGNOSIS — R10.30 LOWER ABDOMINAL PAIN: ICD-10-CM

## 2021-01-25 PROCEDURE — G1004 CDSM NDSC: HCPCS

## 2021-01-25 PROCEDURE — 74177 CT ABD & PELVIS W/CONTRAST: CPT

## 2021-01-25 RX ORDER — ZOLPIDEM TARTRATE 10 MG/1
10 TABLET ORAL
Qty: 30 TABLET | Refills: 0 | Status: SHIPPED | OUTPATIENT
Start: 2021-01-25 | End: 2021-03-02 | Stop reason: SDUPTHER

## 2021-01-25 RX ADMIN — IOHEXOL 100 ML: 350 INJECTION, SOLUTION INTRAVENOUS at 13:05

## 2021-01-25 NOTE — TELEPHONE ENCOUNTER
----- Message from Jacques Alvares MD sent at 1/24/2021  6:10 PM EST -----  Please let pt know that all blood work looks good  Only change is an increase in her thyroid meds to 125 mcg daily      TY  ----- Message -----  From: [facility] Lab Results In  Sent: 1/23/2021   1:01 AM EST  To: Jacques Alvares MD

## 2021-01-28 ENCOUNTER — HOSPITAL ENCOUNTER (OUTPATIENT)
Dept: MAMMOGRAPHY | Facility: CLINIC | Age: 61
Discharge: HOME | End: 2021-01-28
Payer: COMMERCIAL

## 2021-01-28 ENCOUNTER — TELEPHONE (OUTPATIENT)
Dept: MAMMOGRAPHY | Facility: CLINIC | Age: 61
End: 2021-01-28

## 2021-01-28 VITALS — HEIGHT: 62 IN | BODY MASS INDEX: 29.63 KG/M2 | WEIGHT: 161 LBS

## 2021-01-28 DIAGNOSIS — R92.8 ABNORMAL SCREENING MAMMOGRAM: ICD-10-CM

## 2021-01-28 PROCEDURE — 77065 DX MAMMO INCL CAD UNI: CPT

## 2021-01-28 PROCEDURE — 76642 ULTRASOUND BREAST LIMITED: CPT

## 2021-01-28 PROCEDURE — G0279 TOMOSYNTHESIS, MAMMO: HCPCS

## 2021-01-28 NOTE — PROGRESS NOTES
Spoke with patient after Dr Qasim Whitman recommendation for;      _____ RIGHT ___x___LEFT      __x___Ultrasound guided  ______Stereotactic  Breast biopsy  __x___Verbalized understanding  Blood thinners:  _____yes __x___no    Date stopped: _____n/a______    Biopsy teaching sheet given:  _______yes ___x___no    Reviewed procedure & preparation for  Procedure  Pt verbalized understanding  Pt has a hx of GERD, IBS, hypothyroidism, DM, asthma, HTN, carotid stenosis, coronary arteriosclerosis, cervical & lumbar radiculopathy, anxiety/depression, elevated chol, and fibromyalgia  Bianca Santana

## 2021-01-29 ENCOUNTER — TELEPHONE (OUTPATIENT)
Dept: INTERNAL MEDICINE CLINIC | Facility: CLINIC | Age: 61
End: 2021-01-29

## 2021-01-29 NOTE — TELEPHONE ENCOUNTER
Patient called for CT results  Found that the results are still not final from her CT on Monday  Called Radiology Reading room, they will send CT for read immediately  Please follow up with patient once results are received

## 2021-02-02 ENCOUNTER — TELEPHONE (OUTPATIENT)
Dept: OTHER | Facility: OTHER | Age: 61
End: 2021-02-02

## 2021-02-02 NOTE — TELEPHONE ENCOUNTER
Pt thinks she is having a flare of up diverticulitis  She would like CT scan reviewed as well  Pt would like stronger abx  Pt finished two courses of abx and she is still having pain in both sides, bloating  She is better than she was but she is uncomfortable    She is trying to avoid hospital      Pharmacy is [facility] in [facility], [place], operated [facility]

## 2021-02-03 DIAGNOSIS — R10.30 LOWER ABDOMINAL PAIN: Primary | ICD-10-CM

## 2021-02-03 DIAGNOSIS — E03.9 HYPOTHYROIDISM, UNSPECIFIED TYPE: ICD-10-CM

## 2021-02-03 DIAGNOSIS — E11.65 UNCONTROLLED TYPE 2 DIABETES MELLITUS WITH HYPERGLYCEMIA (HCC): ICD-10-CM

## 2021-02-03 RX ORDER — EMPAGLIFLOZIN 10 MG/1
10 TABLET, FILM COATED ORAL EVERY MORNING
Qty: 90 TABLET | Refills: 1 | Status: SHIPPED | OUTPATIENT
Start: 2021-02-03 | End: 2021-10-11 | Stop reason: SDUPTHER

## 2021-02-03 NOTE — TELEPHONE ENCOUNTER
Called patient discussed lab work  Bloating may be due to hypothyroidism as she has not been taking her medications for the last 2 weeks  She found the bottle and the back of her dresser, "Cat may have knocked it over" she said  I need her to get a urine test because of bladder thickening on CT scan  Order placed in chart  I also need her to repeat thyroid test in 6 weeks

## 2021-02-04 ENCOUNTER — CLINICAL SUPPORT (OUTPATIENT)
Dept: INTERNAL MEDICINE CLINIC | Facility: CLINIC | Age: 61
End: 2021-02-04

## 2021-02-04 DIAGNOSIS — R10.30 LOWER ABDOMINAL PAIN: Primary | ICD-10-CM

## 2021-02-04 LAB
BACTERIA UR QL AUTO: NORMAL /HPF
BILIRUB UR QL STRIP: NEGATIVE
CLARITY UR: CLEAR
COLOR UR: YELLOW
GLUCOSE UR STRIP-MCNC: ABNORMAL MG/DL
HGB UR QL STRIP.AUTO: NEGATIVE
HYALINE CASTS #/AREA URNS LPF: NORMAL /LPF
KETONES UR STRIP-MCNC: NEGATIVE MG/DL
LEUKOCYTE ESTERASE UR QL STRIP: ABNORMAL
NITRITE UR QL STRIP: NEGATIVE
NON-SQ EPI CELLS URNS QL MICRO: NORMAL /HPF
PH UR STRIP.AUTO: 6 [PH]
PROT UR STRIP-MCNC: NEGATIVE MG/DL
RBC #/AREA URNS AUTO: NORMAL /HPF
SP GR UR STRIP.AUTO: 1.01 (ref 1–1.03)
UROBILINOGEN UR QL STRIP.AUTO: 0.2 E.U./DL
WBC #/AREA URNS AUTO: NORMAL /HPF

## 2021-02-04 PROCEDURE — 81001 URINALYSIS AUTO W/SCOPE: CPT

## 2021-02-10 ENCOUNTER — HOSPITAL ENCOUNTER (OUTPATIENT)
Dept: ULTRASOUND IMAGING | Facility: CLINIC | Age: 61
Discharge: HOME | End: 2021-02-10

## 2021-02-10 DIAGNOSIS — E78.5 DYSLIPIDEMIA: ICD-10-CM

## 2021-02-10 RX ORDER — LIDOCAINE HYDROCHLORIDE 10 MG/ML
5 INJECTION, SOLUTION EPIDURAL; INFILTRATION; INTRACAUDAL; PERINEURAL ONCE
Status: DISCONTINUED | OUTPATIENT
Start: 2021-02-10 | End: 2021-02-13 | Stop reason: HOSPADM

## 2021-02-11 ENCOUNTER — TELEMEDICINE (OUTPATIENT)
Dept: INTERNAL MEDICINE CLINIC | Facility: CLINIC | Age: 61
End: 2021-02-11
Payer: COMMERCIAL

## 2021-02-11 VITALS — BODY MASS INDEX: 30 KG/M2 | HEIGHT: 62 IN | WEIGHT: 163 LBS

## 2021-02-11 DIAGNOSIS — Z03.818 ENCOUNTER FOR OBSERVATION FOR SUSPECTED EXPOSURE TO OTHER BIOLOGICAL AGENTS RULED OUT: ICD-10-CM

## 2021-02-11 DIAGNOSIS — Z03.818 ENCOUNTER FOR OBSERVATION FOR SUSPECTED EXPOSURE TO OTHER BIOLOGICAL AGENTS RULED OUT: Primary | ICD-10-CM

## 2021-02-11 PROCEDURE — U0005 INFEC AGEN DETEC AMPLI PROBE: HCPCS | Performed by: NURSE PRACTITIONER

## 2021-02-11 PROCEDURE — 99212 OFFICE O/P EST SF 10 MIN: CPT | Performed by: NURSE PRACTITIONER

## 2021-02-11 PROCEDURE — U0003 INFECTIOUS AGENT DETECTION BY NUCLEIC ACID (DNA OR RNA); SEVERE ACUTE RESPIRATORY SYNDROME CORONAVIRUS 2 (SARS-COV-2) (CORONAVIRUS DISEASE [COVID-19]), AMPLIFIED PROBE TECHNIQUE, MAKING USE OF HIGH THROUGHPUT TECHNOLOGIES AS DESCRIBED BY CMS-2020-01-R: HCPCS | Performed by: NURSE PRACTITIONER

## 2021-02-11 NOTE — PROGRESS NOTES
Virtual Regular Visit      Assessment/Plan:    Problem List Items Addressed This Visit     None               Reason for visit is   Chief Complaint   Patient presents with    Diarrhea     family is sick, one had a false positive, no one else was tested  She has been hanging out on the 3rd floor and double masks  She had a bout of diverticulities which has subsided  Started Sunday or Monday with diarrhea no other symptoms  no access to do vitals     Virtual Regular Visit        Encounter provider JORGE A Joseph    Provider located at [address] [place] [address] [address] [facility] [id number]      Recent Visits  No visits were found meeting these conditions  Showing recent visits within past 7 days and meeting all other requirements     Today's Visits  Date Type Provider Dept   02/11/21 Telemedicine JORGE A Joseph [facility]   Showing today's visits and meeting all other requirements     Future Appointments  No visits were found meeting these conditions  Showing future appointments within next 150 days and meeting all other requirements        The patient was identified by name and date of birth  Rosanna Light was informed that this is a telemedicine visit and that the visit is being conducted through {AMB CORONAVIRUS VISIT OTKLJ:09187}  {Telemedicine confidentiality :42670} {Telemedicine participants:47392}  She acknowledged consent and understanding of privacy and security of the video platform  The patient has agreed to participate and understands they can discontinue the visit at any time  Patient is aware this is a billable service  Subjective  Rosanna Light is a 62 y o  female ***         HPI     Past Medical History:   Diagnosis Date    Abnormal echocardiogram     Abnormal stress test     Anxiety     Asthma     Brachial neuritis     Depression     Disease of thyroid gland     Displacement of intervertebral disc of mid-cervical region     Fibromyalgia     Frequent headaches     GERD (gastroesophageal reflux disease)     Herniated nucleus pulposus     History of arthritis     History of asthma     History of cardiac disorder     History of chest pain     History of diverticulitis     History of fatigue     History of hearing loss     History of hemoptysis     History of herpes simplex infection     History of hiatal hernia     History of insect bite     INFECTED    History of memory loss     History of neck disorder     History of reflex sympathetic dystrophy     History of restless legs syndrome     History of shortness of breath     History of spinal stenosis     Hyperlipidemia     Hyperlipidemia     Hypertension     Skin rash     Somnolence, daytime     Spinal stenosis of cervical region        Past Surgical History:   Procedure Laterality Date    BACK SURGERY      BACK SURGERY      LOWER BACK SURGERY    CARDIAC CATHETERIZATION      HISTORY OF CORONARY ANGIOGRAPHY WITH CONCOMITANT LEFT HEART CATHETERIZATION    CORONARY ANGIOPLASTY WITH STENT PLACEMENT      EAR SURGERY      TONSILLECTOMY      TYMPANOPLASTY         Current Outpatient Medications   Medication Sig Dispense Refill    acetaminophen (TYLENOL) 325 mg tablet Take 1 tablet (325 mg total) by mouth as needed for headaches For headache      albuterol (PROVENTIL HFA,VENTOLIN HFA) 90 mcg/act inhaler Inhale 2 puffs every 6 (six) hours as needed for wheezing 1 Inhaler 3    aspirin 81 mg chewable tablet Chew 81 mg daily      atorvastatin (LIPITOR) 80 mg tablet Take 1 tablet (80 mg total) by mouth daily 30 tablet 0    Blood Glucose Monitoring Suppl (ONETOUCH VERIO) w/Device KIT by Does not apply route 2 (two) times a day      citalopram (CeleXA) 40 mg tablet TAKE 1 TABLET BY MOUTH EVERY DAY 90 tablet 1    cyclobenzaprine (FLEXERIL) 10 mg tablet Take 1 tablet (10 mg total) by mouth as needed for muscle spasms      Empagliflozin (Jardiance) 10 MG TABS Take 1 tablet (10 mg total) by mouth every morning 90 tablet 1    ezetimibe (ZETIA) 10 mg tablet TAKE 1 TABLET BY MOUTH EVERY DAY 90 tablet 1    fluticasone-salmeterol (ADVAIR) 250-50 mcg/dose inhaler Inhale 1 puff every 12 (twelve) hours as needed (sob) 1 each 1    glucose blood (OneTouch Verio) test strip 1 each by Other route 2 (two) times a day      hydrOXYzine HCL (ATARAX) 25 mg tablet TAKE 1 TABLET (25 MG TOTAL) BY MOUTH 2 TIMES A DAY AS NEEDED FOR ITCHING 60 tablet 0    Lancets (ONETOUCH ULTRASOFT) lancets by Other route 2 (two) times a day      levothyroxine 125 mcg tablet Take 1 tablet (125 mcg total) by mouth daily 90 tablet 1    losartan (COZAAR) 25 mg tablet TAKE 1 TABLET DAILY  90 tablet 3    magnesium oxide (MAG-OX) 400 mg Take 1 tablet (400 mg total) by mouth 2 (two) times a day 60 tablet 0    metoprolol succinate (TOPROL-XL) 25 mg 24 hr tablet Take 25 mg by mouth daily      omega-3-acid ethyl esters (LOVAZA) 1 g capsule Take 2 capsules (2 g total) by mouth 2 (two) times a day 120 capsule 3    pantoprazole (PROTONIX) 40 mg tablet TAKE 1 TABLET BY MOUTH EVERY DAY 90 tablet 1    pregabalin (LYRICA) 150 mg capsule Take 1 capsule (150 mg total) by mouth 2 (two) times a day 60 capsule 2    prochlorperazine (COMPAZINE) 10 mg tablet Take 1 tablet (10 mg total) by mouth every 6 (six) hours as needed for nausea or vomiting 12 tablet 6    topiramate (TOPAMAX) 50 MG tablet 1 tab in the am and 2 tabs at bedtime 90 tablet 5    triamcinolone (KENALOG) 0 1 % cream Apply topically 2 (two) times a day 80 g 1    zolpidem (Ambien) 10 mg tablet Take 1 tablet (10 mg total) by mouth daily at bedtime as needed for sleep 30 tablet 0    senna-docusate sodium (SENOKOT S) 8 6-50 mg per tablet Take 2 tablets by mouth daily       No current facility-administered medications for this visit        Facility-Administered Medications Ordered in Other Visits   Medication Dose Route Frequency Provider Last Rate Last Admin    lidocaine (PF) (XYLOCAINE-MPF) 1 % injection 5 mL  5 mL Infiltration Once Zion Martinez MD            Allergies   Allergen Reactions    Augmentin [Amoxicillin-Pot Clavulanate] Nausea Only     PT stated she only allergic to medication AUGMENTIN    Meloxicam GI Intolerance     "brings on diverticulitis"       Review of Systems    Video Exam    Vitals:    02/11/21 1144   Weight: 73 9 kg (163 lb)   Height: 5' 2" (1 575 m)       Physical Exam     {covid time spent:23831}      VIRTUAL VISIT DISCLAIMER    Miki Bianchi acknowledges that she has consented to an online visit or consultation  She understands that the online visit is based solely on information provided by her, and that, in the absence of a face-to-face physical evaluation by the physician, the diagnosis she receives is both limited and provisional in terms of accuracy and completeness  This is not intended to replace a full medical face-to-face evaluation by the physician  Miki Bianchi understands and accepts these terms

## 2021-02-11 NOTE — PROGRESS NOTES
COVID-19 Virtual Visit     Assessment/Plan:    Problem List Items Addressed This Visit     None      Visit Diagnoses     Encounter for observation for suspected exposure to other biological agents ruled out    -  Primary    Relevant Orders    Novel Coronavirus (Covid-19),PCR [facility] - Collected at [facility] or [facility]         Disposition:     I referred patient to one of our centralized sites for a COVID-19 swab  Isolate until results are back   [employer]  Avoid dairy  Plenty of rest and hydration    I have spent 10 minutes directly with the patient  Encounter provider Henrietta Libman, CRNP    Provider located at [address] [place] [address] [address] [facility] [address]    Recent Visits  No visits were found meeting these conditions  Showing recent visits within past 7 days and meeting all other requirements     Today's Visits  Date Type Provider Dept   02/11/21 Telemedicine Henrietta Libman, CRNP [facility]   Showing today's visits and meeting all other requirements     Future Appointments  No visits were found meeting these conditions  Showing future appointments within next 150 days and meeting all other requirements      This virtual check-in was done via [employer] and patient was informed that this is a secure, HIPAA-compliant platform  She agrees to proceed  Patient agrees to participate in a virtual check in via telephone or video visit instead of presenting to the office to address urgent/immediate medical needs  Patient is aware this is a billable service  After connecting through [facility], the patient was identified by name and date of birth  Tirso Koch was informed that this was a telemedicine visit and that the exam was being conducted confidentially over secure lines  My office door was closed  No one else was in the room   Tirso Koch acknowledged consent and understanding of privacy and security of the telemedicine visit  I informed the patient that I have reviewed her record in Epic and presented the opportunity for her to ask any questions regarding the visit today  The patient agreed to participate  Subjective:   Lavinia Ellis is a 62 y o  female who is concerned about COVID-19  Patient's symptoms include abdominal pain and diarrhea  Patient denies fever, chills, fatigue, congestion, rhinorrhea, sore throat, anosmia, loss of taste, cough, shortness of breath, chest tightness, nausea, vomiting, myalgias and headaches  Date of symptom onset: 2/7/2021    Exposure:   Contact with a person who is under investigation (PUI) for or who is positive for COVID-19 within the last 14 days?: Yes    Hospitalized recently for fever and/or lower respiratory symptoms?: No      Currently a healthcare worker that is involved in direct patient care?: No      Works in a special setting where the risk of COVID-19 transmission may be high? (this may include long-term care, correctional and nursing home facilities; homeless shelters; assisted-living facilities and group homes ): No      Resident in a special setting where the risk of COVID-19 transmission may be high? (this may include long-term care, correctional and nursing home facilities; homeless shelters; assisted-living facilities and group homes ): No      Patient lives on the 3rd floor of her children's home  She recently found out that her neighbor is positive for COVID  The neighbor was babysitting her grandson all last week who lives with the patient  The patients daughter was also babysitting the neighbors children  She reports that several people in her home are currently sick with "stomach bugs' headaches and diarrhea  She states one of the men tested positive for COVID but later said it was a "false positive" she states she doesn't believe him       Of note she was ill most of January with diverticulitis which she was treated for and symptoms resolved   Her pain at that time was LLQ and now her pain is periumbilical      No results found for: Lorrine Ria, SARSCORONAVI, [address]  Past Medical History:   Diagnosis Date    Abnormal echocardiogram     Abnormal stress test     Anxiety     Asthma     Brachial neuritis     Depression     Disease of thyroid gland     Displacement of intervertebral disc of mid-cervical region     Fibromyalgia     Frequent headaches     GERD (gastroesophageal reflux disease)     Herniated nucleus pulposus     History of arthritis     History of asthma     History of cardiac disorder     History of chest pain     History of diverticulitis     History of fatigue     History of hearing loss     History of hemoptysis     History of herpes simplex infection     History of hiatal hernia     History of insect bite     INFECTED    History of memory loss     History of neck disorder     History of reflex sympathetic dystrophy     History of restless legs syndrome     History of shortness of breath     History of spinal stenosis     Hyperlipidemia     Hyperlipidemia     Hypertension     Skin rash     Somnolence, daytime     Spinal stenosis of cervical region      Past Surgical History:   Procedure Laterality Date    BACK SURGERY      BACK SURGERY      LOWER BACK SURGERY    CARDIAC CATHETERIZATION      HISTORY OF CORONARY ANGIOGRAPHY WITH CONCOMITANT LEFT HEART CATHETERIZATION    CORONARY ANGIOPLASTY WITH STENT PLACEMENT      EAR SURGERY      TONSILLECTOMY      TYMPANOPLASTY       Current Outpatient Medications   Medication Sig Dispense Refill    acetaminophen (TYLENOL) 325 mg tablet Take 1 tablet (325 mg total) by mouth as needed for headaches For headache      albuterol (PROVENTIL HFA,VENTOLIN HFA) 90 mcg/act inhaler Inhale 2 puffs every 6 (six) hours as needed for wheezing 1 Inhaler 3    aspirin 81 mg chewable tablet Chew 81 mg daily      atorvastatin (LIPITOR) 80 mg tablet Take 1 tablet (80 mg total) by mouth daily 30 tablet 0    Blood Glucose Monitoring Suppl (Ray Kiser) w/Device KIT by Does not apply route 2 (two) times a day      citalopram (CeleXA) 40 mg tablet TAKE 1 TABLET BY MOUTH EVERY DAY 90 tablet 1    cyclobenzaprine (FLEXERIL) 10 mg tablet Take 1 tablet (10 mg total) by mouth as needed for muscle spasms      Empagliflozin (Jardiance) 10 MG TABS Take 1 tablet (10 mg total) by mouth every morning 90 tablet 1    ezetimibe (ZETIA) 10 mg tablet TAKE 1 TABLET BY MOUTH EVERY DAY 90 tablet 1    fluticasone-salmeterol (ADVAIR) 250-50 mcg/dose inhaler Inhale 1 puff every 12 (twelve) hours as needed (sob) 1 each 1    glucose blood (OneTouch Verio) test strip 1 each by Other route 2 (two) times a day      hydrOXYzine HCL (ATARAX) 25 mg tablet TAKE 1 TABLET (25 MG TOTAL) BY MOUTH 2 TIMES A DAY AS NEEDED FOR ITCHING 60 tablet 0    Lancets (ONETOUCH ULTRASOFT) lancets by Other route 2 (two) times a day      levothyroxine 125 mcg tablet Take 1 tablet (125 mcg total) by mouth daily 90 tablet 1    losartan (COZAAR) 25 mg tablet TAKE 1 TABLET DAILY   90 tablet 3    magnesium oxide (MAG-OX) 400 mg Take 1 tablet (400 mg total) by mouth 2 (two) times a day 60 tablet 0    metoprolol succinate (TOPROL-XL) 25 mg 24 hr tablet Take 25 mg by mouth daily      omega-3-acid ethyl esters (LOVAZA) 1 g capsule Take 2 capsules (2 g total) by mouth 2 (two) times a day 120 capsule 3    pantoprazole (PROTONIX) 40 mg tablet TAKE 1 TABLET BY MOUTH EVERY DAY 90 tablet 1    pregabalin (LYRICA) 150 mg capsule Take 1 capsule (150 mg total) by mouth 2 (two) times a day 60 capsule 2    prochlorperazine (COMPAZINE) 10 mg tablet Take 1 tablet (10 mg total) by mouth every 6 (six) hours as needed for nausea or vomiting 12 tablet 6    topiramate (TOPAMAX) 50 MG tablet 1 tab in the am and 2 tabs at bedtime 90 tablet 5    triamcinolone (KENALOG) 0 1 % cream Apply topically 2 (two) times a day 80 g 1    zolpidem (Ambien) 10 mg tablet Take 1 tablet (10 mg total) by mouth daily at bedtime as needed for sleep 30 tablet 0    senna-docusate sodium (SENOKOT S) 8 6-50 mg per tablet Take 2 tablets by mouth daily       No current facility-administered medications for this visit  Facility-Administered Medications Ordered in Other Visits   Medication Dose Route Frequency Provider Last Rate Last Admin    lidocaine (PF) (XYLOCAINE-MPF) 1 % injection 5 mL  5 mL Infiltration Once Alfonso Hernandez MD         Allergies   Allergen Reactions    Augmentin [Amoxicillin-Pot Clavulanate] Nausea Only     PT stated she only allergic to medication AUGMENTIN    Meloxicam GI Intolerance     "brings on diverticulitis"       Review of Systems   Constitutional: Negative for chills, fatigue and fever  HENT: Negative for congestion, rhinorrhea and sore throat  Respiratory: Negative for cough, chest tightness and shortness of breath  Gastrointestinal: Positive for abdominal pain and diarrhea  Negative for nausea and vomiting  Musculoskeletal: Negative for myalgias  Neurological: Negative for headaches  Objective:    Vitals:    02/11/21 1144   Weight: 73 9 kg (163 lb)   Height: 5' 2" (1 575 m)       Physical Exam  Constitutional:       General: She is not in acute distress  Appearance: Normal appearance  She is not diaphoretic  HENT:      Head: Normocephalic and atraumatic  Eyes:      Conjunctiva/sclera: Conjunctivae normal    Pulmonary:      Effort: Pulmonary effort is normal  No respiratory distress  Abdominal:      Tenderness: There is abdominal tenderness (periumbilical)  Comments: Pt palpated her own abdomen    Neurological:      Mental Status: She is alert and oriented to person, place, and time  Mental status is at baseline     Psychiatric:         Mood and Affect: Mood normal          Behavior: Behavior normal        VIRTUAL VISIT [facility] acknowledges that she has consented to an online visit or consultation  She understands that the online visit is based solely on information provided by her, and that, in the absence of a face-to-face physical evaluation by the physician, the diagnosis she receives is both limited and provisional in terms of accuracy and completeness  This is not intended to replace a full medical face-to-face evaluation by the physician  Naomie Motley understands and accepts these terms

## 2021-02-12 LAB — SARS-COV-2 RNA RESP QL NAA+PROBE: NEGATIVE

## 2021-02-14 RX ORDER — ATORVASTATIN CALCIUM 80 MG/1
TABLET, FILM COATED ORAL
Qty: 30 TABLET | Refills: 0 | Status: SHIPPED | OUTPATIENT
Start: 2021-02-14 | End: 2021-03-09

## 2021-02-23 ENCOUNTER — TELEMEDICINE (OUTPATIENT)
Dept: INTERNAL MEDICINE CLINIC | Facility: CLINIC | Age: 61
End: 2021-02-23
Payer: COMMERCIAL

## 2021-02-23 VITALS — BODY MASS INDEX: 30 KG/M2 | HEIGHT: 62 IN | WEIGHT: 163 LBS

## 2021-02-23 DIAGNOSIS — E11.9 TYPE 2 DIABETES MELLITUS WITHOUT COMPLICATION, WITHOUT LONG-TERM CURRENT USE OF INSULIN (HCC): Primary | ICD-10-CM

## 2021-02-23 DIAGNOSIS — F43.23 SITUATIONAL MIXED ANXIETY AND DEPRESSIVE DISORDER: ICD-10-CM

## 2021-02-23 PROCEDURE — 4004F PT TOBACCO SCREEN RCVD TLK: CPT | Performed by: INTERNAL MEDICINE

## 2021-02-23 PROCEDURE — 3008F BODY MASS INDEX DOCD: CPT | Performed by: INTERNAL MEDICINE

## 2021-02-23 PROCEDURE — 99213 OFFICE O/P EST LOW 20 MIN: CPT | Performed by: INTERNAL MEDICINE

## 2021-02-23 NOTE — PROGRESS NOTES
Virtual Regular Visit      Assessment/Plan:    Problem List Items Addressed This Visit        Endocrine    Type 2 diabetes mellitus without complication, without long-term current use of insulin ([facility] [address] ) - Primary       Lab Results   Component Value Date    HGBA1C 6 2 (H) 01/22/2021     Continuing with medications  Will monitor and follow-up  Other    Situational mixed anxiety and depressive disorder               Reason for visit is   Chief Complaint   Patient presents with    Depression     [phone] [facility]    Virtual Regular Visit        Encounter provider Danisha Leblanc MD    Provider located at [address] [address]      Recent Visits  No visits were found meeting these conditions  Showing recent visits within past 7 days and meeting all other requirements     Today's Visits  Date Type Provider Dept   02/23/21 Telemedicine Danisha Leblanc, [address] today's visits and meeting all other requirements     Future Appointments  No visits were found meeting these conditions  Showing future appointments within next 150 days and meeting all other requirements        The patient was identified by name and date of birth  [facility] was informed that this is a telemedicine visit and that the visit is being conducted through [facility] [place], [place] and patient was informed that this is a secure, HIPAA-compliant platform  She agrees to proceed     My office door was closed  No one else was in the room  and The patient was notified the following individuals were present in the room med student  She acknowledged consent and understanding of privacy and security of the video platform  The patient has agreed to participate and understands they can discontinue the visit at any time  Patient is aware this is a billable service       Subjective  [facility] 62 [facility]  [facility]    Chandan Henderson is a 62 y o female with a past medical history of type 2 diabetes, hypertension, asthma, GERD, depression/anxiety who presents today for a follow-up  Patient reports that she is not doing well because "covid is going through my house"  Patient's daughter and multiple grandsons recently tested positive for Covid  She lives with her family, but she is trying to isolate herself to stay safe  She is frustrated and upset that her family is not taking the correct precautions  Patient has not received the covid vaccine, but was made aware that she is elligible for the vaccine            Past Medical History:   Diagnosis Date    Abnormal echocardiogram     Abnormal stress test     Anxiety     Asthma     Brachial neuritis     Depression     Disease of thyroid gland     Displacement of intervertebral disc of mid-cervical region     Fibromyalgia     Frequent headaches     GERD (gastroesophageal reflux disease)     Herniated nucleus pulposus     History of arthritis     History of asthma     History of cardiac disorder     History of chest pain     History of diverticulitis     History of fatigue     History of hearing loss     History of hemoptysis     History of herpes simplex infection     History of hiatal hernia     History of insect bite     INFECTED    History of memory loss     History of neck disorder     History of reflex sympathetic dystrophy     History of restless legs syndrome     History of shortness of breath     History of spinal stenosis     Hyperlipidemia     Hyperlipidemia     Hypertension     Skin rash     Somnolence, daytime     Spinal stenosis of cervical region        Past Surgical History:   Procedure Laterality Date    BACK SURGERY      BACK SURGERY      LOWER BACK SURGERY    CARDIAC CATHETERIZATION      HISTORY OF CORONARY ANGIOGRAPHY WITH CONCOMITANT LEFT HEART CATHETERIZATION    CORONARY ANGIOPLASTY WITH STENT PLACEMENT  EAR SURGERY      TONSILLECTOMY      TYMPANOPLASTY         Current Outpatient Medications   Medication Sig Dispense Refill    acetaminophen (TYLENOL) 325 mg tablet Take 1 tablet (325 mg total) by mouth as needed for headaches For headache      albuterol (PROVENTIL HFA,VENTOLIN HFA) 90 mcg/act inhaler Inhale 2 puffs every 6 (six) hours as needed for wheezing 1 Inhaler 3    aspirin 81 mg chewable tablet Chew 81 mg daily      atorvastatin (LIPITOR) 80 mg tablet TAKE 1 TABLET BY MOUTH EVERY DAY 30 tablet 0    citalopram (CeleXA) 40 mg tablet TAKE 1 TABLET BY MOUTH EVERY DAY 90 tablet 1    cyclobenzaprine (FLEXERIL) 10 mg tablet Take 1 tablet (10 mg total) by mouth as needed for muscle spasms      Empagliflozin (Jardiance) 10 MG TABS Take 1 tablet (10 mg total) by mouth every morning 90 tablet 1    ezetimibe (ZETIA) 10 mg tablet TAKE 1 TABLET BY MOUTH EVERY DAY 90 tablet 1    fluticasone-salmeterol (ADVAIR) 250-50 mcg/dose inhaler Inhale 1 puff every 12 (twelve) hours as needed (sob) 1 each 1    hydrOXYzine HCL (ATARAX) 25 mg tablet TAKE 1 TABLET (25 MG TOTAL) BY MOUTH 2 TIMES A DAY AS NEEDED FOR ITCHING 60 tablet 0    levothyroxine 125 mcg tablet Take 1 tablet (125 mcg total) by mouth daily 90 tablet 1    losartan (COZAAR) 25 mg tablet TAKE 1 TABLET DAILY   90 tablet 3    magnesium oxide (MAG-OX) 400 mg Take 1 tablet (400 mg total) by mouth 2 (two) times a day 60 tablet 0    metoprolol succinate (TOPROL-XL) 25 mg 24 hr tablet Take 25 mg by mouth daily      omega-3-acid ethyl esters (LOVAZA) 1 g capsule Take 2 capsules (2 g total) by mouth 2 (two) times a day 120 capsule 3    pantoprazole (PROTONIX) 40 mg tablet TAKE 1 TABLET BY MOUTH EVERY DAY 90 tablet 1    pregabalin (LYRICA) 150 mg capsule Take 1 capsule (150 mg total) by mouth 2 (two) times a day 60 capsule 2    prochlorperazine (COMPAZINE) 10 mg tablet Take 1 tablet (10 mg total) by mouth every 6 (six) hours as needed for nausea or vomiting 12 tablet 6    topiramate (TOPAMAX) 50 MG tablet 1 tab in the am and 2 tabs at bedtime 90 tablet 5    triamcinolone (KENALOG) 0 1 % cream Apply topically 2 (two) times a day 80 g 1    zolpidem (Ambien) 10 mg tablet Take 1 tablet (10 mg total) by mouth daily at bedtime as needed for sleep 30 tablet 0    Blood Glucose Monitoring Suppl (ONETOUCH VERIO) w/Device KIT by Does not apply route 2 (two) times a day      glucose blood (OneTouch Verio) test strip 1 each by Other route 2 (two) times a day      Lancets (ONETOUCH ULTRASOFT) lancets by Other route 2 (two) times a day      senna-docusate sodium (SENOKOT S) 8 6-50 mg per tablet Take 2 tablets by mouth daily       No current facility-administered medications for this visit  Allergies   Allergen Reactions    Augmentin [Amoxicillin-Pot Clavulanate] Nausea Only     PT stated she only allergic to medication AUGMENTIN    Meloxicam GI Intolerance     "brings on diverticulitis"       Review of Systems   HENT: Positive for postnasal drip  Musculoskeletal: Positive for arthralgias  Allergic/Immunologic: Positive for environmental allergies  Psychiatric/Behavioral: Positive for agitation and sleep disturbance  The patient is nervous/anxious  All other systems reviewed and are negative  Video Exam    Vitals:    02/23/21 1016   Weight: 73 9 kg (163 lb)   Height: 5' 2" (1 575 m)       Physical Exam     Physical Exam   Constitutional: She is oriented to person, place, and time  She appears well-developed and well-nourished  Agitated and upset  HENT:   Head: Normocephalic and atraumatic  Neck: Normal range of motion  Neck supple  Pulmonary/Chest: Effort normal  No respiratory distress  Musculoskeletal: Normal range of motion  Neurological: She is alert and oriented to person, place, and time  Skin: She is not diaphoretic  Psychiatric: She has a normal mood and affect   Her behavior is normal  Judgment and thought content normal       I spent 25 minutes directly with the patient during this visit  today in which greater than 50% of this time was spent in counseling/coordination of care regarding Diagnostic results, Prognosis, Risks and benefits of tx options, Intructions for management, Patient and family education, Importance of tx compliance, Risk factor reductions and Impressions  I did not need to refer her for COVID evaluation P      I spent 25 minutes directly with the patient during this visit      [address] Michelle Bucio acknowledges that she has consented to an online visit or consultation  She understands that the online visit is based solely on information provided by her, and that, in the absence of a face-to-face physical evaluation by the physician, the diagnosis she receives is both limited and provisional in terms of accuracy and completeness  This is not intended to replace a full medical face-to-face evaluation by the physician  Lavinia Ellis understands and accepts these terms

## 2021-02-23 NOTE — ASSESSMENT & PLAN NOTE
Lab Results   Component Value Date    HGBA1C 6 2 (H) 01/22/2021     Continuing with medications  Will monitor and follow-up

## 2021-02-23 NOTE — ASSESSMENT & PLAN NOTE
Patient was reassured and advised to maintain social distancing to the best of her ability     also  Was advised to enlist for  Vaccination given her multiple comorbidities including asthma, heart disease, diabetes mellitus

## 2021-03-01 ENCOUNTER — TELEPHONE (OUTPATIENT)
Dept: INTERNAL MEDICINE CLINIC | Age: 61
End: 2021-03-01

## 2021-03-01 NOTE — TELEPHONE ENCOUNTER
Spoke to patient  Pt's last eye exam was at [address] 5/8/19  Pt stated they want money up front to schedule and they do not know how much it will cost       Provided and phone number for [facility] and instructed patient to call  Informed her to call office if there was any problems scheduling with [address]

## 2021-03-02 ENCOUNTER — HOSPITAL ENCOUNTER (OUTPATIENT)
Dept: MAMMOGRAPHY | Facility: CLINIC | Age: 61
Discharge: HOME | End: 2021-03-02
Payer: COMMERCIAL

## 2021-03-02 ENCOUNTER — HOSPITAL ENCOUNTER (OUTPATIENT)
Dept: ULTRASOUND IMAGING | Facility: CLINIC | Age: 61
Discharge: HOME | End: 2021-03-02
Payer: COMMERCIAL

## 2021-03-02 VITALS
SYSTOLIC BLOOD PRESSURE: 117 MMHG | HEIGHT: 62 IN | WEIGHT: 163 LBS | BODY MASS INDEX: 30 KG/M2 | DIASTOLIC BLOOD PRESSURE: 64 MMHG | HEART RATE: 61 BPM

## 2021-03-02 DIAGNOSIS — R92.8 ABNORMAL MAMMOGRAM: ICD-10-CM

## 2021-03-02 DIAGNOSIS — F51.01 PRIMARY INSOMNIA: ICD-10-CM

## 2021-03-02 DIAGNOSIS — R92.8 ABNORMAL ULTRASOUND OF BREAST: ICD-10-CM

## 2021-03-02 PROCEDURE — A4648 IMPLANTABLE TISSUE MARKER: HCPCS

## 2021-03-02 PROCEDURE — 88305 TISSUE EXAM BY PATHOLOGIST: CPT | Performed by: PATHOLOGY

## 2021-03-02 PROCEDURE — 19083 BX BREAST 1ST LESION US IMAG: CPT

## 2021-03-02 RX ORDER — LIDOCAINE HYDROCHLORIDE 10 MG/ML
5 INJECTION, SOLUTION EPIDURAL; INFILTRATION; INTRACAUDAL; PERINEURAL ONCE
Status: COMPLETED | OUTPATIENT
Start: 2021-03-02 | End: 2021-03-02

## 2021-03-02 RX ADMIN — LIDOCAINE HYDROCHLORIDE 5 ML: 10 INJECTION, SOLUTION EPIDURAL; INFILTRATION; INTRACAUDAL; PERINEURAL at 11:04

## 2021-03-02 NOTE — PROGRESS NOTES
Procedure type:    __x___ultrasound guided _____stereotactic    Breast:    __x___Left _____Right    Location: 5 oclock jayant    Needle: 12g Fredoe    # of passes: 3    Clip: Ultraclip Heart    Performed by: Dr Mesfin Horan held for 5 minutes by: Anita Velazquez Strips:    __x___yes _____no    Band aid:    __x___yes_____no    Tape and guaze:    _____yes __x___no    Tolerated procedure:    ___x__yes _____no

## 2021-03-02 NOTE — DISCHARGE INSTR - OTHER ORDERS
POST LARGE CORE BREAST BIOPSY PATIENT INFORMATION      1  Place an ice pack inside your bra over the top of the dressing every hour for 20 minutes (20 minutes on, 60 minutes off)  Do this until bedtime  2  Do not shower or bathe until the following morning  3  You may bathe your breast carefully with the steri-strips in place  Be careful    Not to loosen them  The steri-strips will fall off in 3-5 days  4  You may have mild discomfort, and you may have some bruising where the   Needle entered the skin  This should clear within 5-7 days  5  If you need medicine for discomfort, take acetaminophen products such as   Tylenol  You may also take Advil or Motrin products  6  Do not participate in strenuous activities such as-tennis, aerobics, skiing,  Weight lifting, etc  for 24 hours  Refrain from swimming/soaking for 72 hours  7  Wearing a bra for sleeping may be more comfortable for the first 24-48 hours  8  Watch for continued bleeding, pain or fever over 101; please call with any questions or concerns  For procedures done at the [facility] call:  Naomi Sykes RN at [facility] RN at [phone]                    *After 4 PM call the Interventional Radiology Department                    [phone] and ask to speak with the nurse on call  For procedures done at the [address] [place] [address] call:         Karina Bearden RN at   *After 4 PM call the Interventional Radiology Department   [phone] and ask to speak with the nurse on call  For procedures done at [address] [place] [address] call: The Radiology Nurse at [phone]  *After 4 PM call your physician, or go to the Emergency Department  9          The final results of your biopsy are usually available within one week

## 2021-03-03 RX ORDER — ZOLPIDEM TARTRATE 10 MG/1
10 TABLET ORAL
Qty: 30 TABLET | Refills: 0 | Status: SHIPPED | OUTPATIENT
Start: 2021-03-03 | End: 2021-04-02 | Stop reason: SDUPTHER

## 2021-03-03 NOTE — PROGRESS NOTES
Post procedure call completed    Bleeding: _____yes __x___no    Pain: _____yes __x____no    Redness/Swelling: ______yes __x____no    Band aid removed: __x___yes _____no    Steri-Strips intact: ___x___yes _____no

## 2021-03-04 ENCOUNTER — TELEPHONE (OUTPATIENT)
Dept: MAMMOGRAPHY | Facility: CLINIC | Age: 61
End: 2021-03-04

## 2021-03-09 DIAGNOSIS — E78.5 DYSLIPIDEMIA: ICD-10-CM

## 2021-03-09 RX ORDER — ATORVASTATIN CALCIUM 80 MG/1
TABLET, FILM COATED ORAL
Qty: 30 TABLET | Refills: 0 | Status: SHIPPED | OUTPATIENT
Start: 2021-03-09 | End: 2021-04-02

## 2021-04-02 DIAGNOSIS — F51.01 PRIMARY INSOMNIA: ICD-10-CM

## 2021-04-02 DIAGNOSIS — E78.5 DYSLIPIDEMIA: ICD-10-CM

## 2021-04-02 RX ORDER — ATORVASTATIN CALCIUM 80 MG/1
TABLET, FILM COATED ORAL
Qty: 90 TABLET | Refills: 0 | Status: SHIPPED | OUTPATIENT
Start: 2021-04-02 | End: 2021-06-29

## 2021-04-03 RX ORDER — ZOLPIDEM TARTRATE 10 MG/1
10 TABLET ORAL
Qty: 30 TABLET | Refills: 0 | Status: SHIPPED | OUTPATIENT
Start: 2021-04-03 | End: 2021-05-10 | Stop reason: SDUPTHER

## 2021-04-05 DIAGNOSIS — J45.909 ASTHMA, UNSPECIFIED ASTHMA SEVERITY, UNSPECIFIED WHETHER COMPLICATED, UNSPECIFIED WHETHER PERSISTENT: ICD-10-CM

## 2021-04-05 DIAGNOSIS — M79.7 FIBROMYALGIA: ICD-10-CM

## 2021-04-05 NOTE — TELEPHONE ENCOUNTER
Patient called stating she was originally on Lyrica TID but was too expensive with new insurance so she was changed to BID  Patient in a lot of pain and is requesting to have Lyrica back to 3 times a day  Please advise      LOV:  2/23/2021  NOV:  4/13/2021

## 2021-04-08 RX ORDER — ALBUTEROL SULFATE 90 UG/1
2 AEROSOL, METERED RESPIRATORY (INHALATION) EVERY 6 HOURS PRN
Qty: 1 INHALER | Refills: 3 | Status: SHIPPED | OUTPATIENT
Start: 2021-04-08 | End: 2021-11-09 | Stop reason: SDUPTHER

## 2021-04-08 RX ORDER — PREGABALIN 150 MG/1
150 CAPSULE ORAL 3 TIMES DAILY
Qty: 90 CAPSULE | Refills: 2 | Status: SHIPPED | OUTPATIENT
Start: 2021-04-08 | End: 2021-08-19 | Stop reason: SDUPTHER

## 2021-04-08 NOTE — TELEPHONE ENCOUNTER
Pt states she has been taking twice daily but will need to take 3 times a day  Adjusted rx       Per PDMP, last filled 03/24/2021:

## 2021-04-14 ENCOUNTER — TELEPHONE (OUTPATIENT)
Dept: INTERNAL MEDICINE CLINIC | Facility: CLINIC | Age: 61
End: 2021-04-14

## 2021-04-14 ENCOUNTER — OFFICE VISIT (OUTPATIENT)
Dept: INTERNAL MEDICINE CLINIC | Facility: CLINIC | Age: 61
End: 2021-04-14
Payer: COMMERCIAL

## 2021-04-14 VITALS
SYSTOLIC BLOOD PRESSURE: 102 MMHG | HEART RATE: 65 BPM | OXYGEN SATURATION: 98 % | BODY MASS INDEX: 25.3 KG/M2 | TEMPERATURE: 98.1 F | HEIGHT: 64 IN | WEIGHT: 148.2 LBS | DIASTOLIC BLOOD PRESSURE: 60 MMHG

## 2021-04-14 DIAGNOSIS — E78.00 HYPERCHOLESTEROLEMIA: ICD-10-CM

## 2021-04-14 DIAGNOSIS — M54.42 CHRONIC MIDLINE LOW BACK PAIN WITH BILATERAL SCIATICA: ICD-10-CM

## 2021-04-14 DIAGNOSIS — I10 BENIGN ESSENTIAL HTN: Primary | ICD-10-CM

## 2021-04-14 DIAGNOSIS — M54.41 CHRONIC MIDLINE LOW BACK PAIN WITH BILATERAL SCIATICA: ICD-10-CM

## 2021-04-14 DIAGNOSIS — F51.01 PRIMARY INSOMNIA: ICD-10-CM

## 2021-04-14 DIAGNOSIS — F43.23 SITUATIONAL MIXED ANXIETY AND DEPRESSIVE DISORDER: ICD-10-CM

## 2021-04-14 DIAGNOSIS — E11.9 TYPE 2 DIABETES MELLITUS WITHOUT COMPLICATION, WITHOUT LONG-TERM CURRENT USE OF INSULIN (HCC): ICD-10-CM

## 2021-04-14 DIAGNOSIS — E03.9 HYPOTHYROIDISM, UNSPECIFIED TYPE: ICD-10-CM

## 2021-04-14 DIAGNOSIS — G89.29 CHRONIC MIDLINE LOW BACK PAIN WITH BILATERAL SCIATICA: ICD-10-CM

## 2021-04-14 DIAGNOSIS — I20.8 CHRONIC STABLE ANGINA (HCC): ICD-10-CM

## 2021-04-14 DIAGNOSIS — M48.02 CERVICAL STENOSIS OF SPINAL CANAL: ICD-10-CM

## 2021-04-14 DIAGNOSIS — M62.838 NECK MUSCLE SPASM: ICD-10-CM

## 2021-04-14 PROBLEM — R63.4 RECENT WEIGHT LOSS: Status: ACTIVE | Noted: 2021-04-14

## 2021-04-14 PROCEDURE — 3008F BODY MASS INDEX DOCD: CPT | Performed by: INTERNAL MEDICINE

## 2021-04-14 PROCEDURE — 4004F PT TOBACCO SCREEN RCVD TLK: CPT | Performed by: INTERNAL MEDICINE

## 2021-04-14 PROCEDURE — 3074F SYST BP LT 130 MM HG: CPT | Performed by: INTERNAL MEDICINE

## 2021-04-14 PROCEDURE — 3078F DIAST BP <80 MM HG: CPT | Performed by: INTERNAL MEDICINE

## 2021-04-14 PROCEDURE — 99215 OFFICE O/P EST HI 40 MIN: CPT | Performed by: INTERNAL MEDICINE

## 2021-04-14 RX ORDER — CYCLOBENZAPRINE HCL 10 MG
10 TABLET ORAL 3 TIMES DAILY PRN
Qty: 30 TABLET | Refills: 0 | Status: SHIPPED | OUTPATIENT
Start: 2021-04-14 | End: 2022-03-02 | Stop reason: SDUPTHER

## 2021-04-14 NOTE — PATIENT INSTRUCTIONS
Flexeril was sent to pharmacy can take an additional Flexeril in addition to pregabalin for low back pain for now as needed  Get blood work prior to next visit

## 2021-04-14 NOTE — PROGRESS NOTES
Assessment/Plan:    Type 2 diabetes mellitus without complication, without long-term current use of insulin ([facility])    Lab Results   Component Value Date    HGBA1C 6 2 (H) 01/22/2021   Currenlty well controlled  Continue Jardian 10mg TID  Hypothyroidism  Most recent TSH 43  Continue Levothyroxine 125mg daily  Benign essential HTN  102/60 in the office today  Continue Losartan 25mg and metoprolol 25 mg daily  Due to recent weight loss, monitor BP closely for possible medication dose adjustments  Hypercholesterolemia  Lipid panel July 2020 was at goal    Total-95, TG-85, HDL-43, LDL-35  Repeat panel ordered  Recent weight loss   She has lost close to  20 lb in the last 3 months  She was previously on a thyroid medication which she had stopped taking since she lost her prescription  As of January she has been back on her levothyroxine however  do not have repeat blood work  And a T4 and T3 level  It is possible that the weight loss is secondary to situational stress and sub optimal living conditions  Diagnoses and all orders for this visit:    Benign essential HTN  -     Comprehensive metabolic panel; Future    Primary insomnia    Hypothyroidism, unspecified type  -     TSH, 3rd generation with Free T4 reflex; Future    Type 2 diabetes mellitus without complication, without long-term current use of insulin ([facility])  -     Hemoglobin A1C; Future    Situational mixed anxiety and depressive disorder    Hypercholesterolemia  -     Lipid Panel with Direct LDL reflex; Future    Chronic stable angina ([facility])    Cervical stenosis of spinal canal  -     cyclobenzaprine (FLEXERIL) 10 mg tablet; Take 1 tablet (10 mg total) by mouth 3 (three) times a day as needed for muscle spasms    Neck muscle spasm  -     cyclobenzaprine (FLEXERIL) 10 mg tablet;  Take 1 tablet (10 mg total) by mouth 3 (three) times a day as needed for muscle spasms    Chronic midline low back pain with bilateral sciatica Subjective:   Chief Complaint   Patient presents with    Follow-up     Follow up chronic conditions  Labs done 1/22/21  She will schedule PAP and DM eye exam   She will call the name and number provided by [facility]  Refuses depression questions  BMI follow up plan due 7/22/21  Patient ID: Raj Sorensen is a 62 y o  female  who presents for a followup of chronic conditions  Today her only complaint is pain in her lower back and left leg  She has also been under a lot of stress lately due to familial issues and currently living in her car  She has lost approximately 15 lbs since she was last seen 6 weeks ago  She also reports that she had a flair of diverticulitis in December  Lab work was reviewed with the patient  TSH was elevated to 43 in January 2021 with a free T4 of 0 4  Patient says that she spilled her Levothyroxine  and was unable to take her medication for several weeks before getting the blood work done, but since that time has resumed taking her medication as prescribed  HbA1c in [facility] was 6 2 decreased from 6 8 in November  CMP in January was also within normal limits  previous history of low back pain with radiculopathy and sciatica  Difficult social situation currently, she no longer lives at home with her kids and is sleeping in her car  Which is probably the reason for the aggravation  She is on Lyrica twice a day which was recently increased to 3 times a day however she would need to wait for at least a week or so before she can get the Lyrica  She is also on a muscle relaxant which she says does help with her low back pain  As well    The following portions of the patient's history were reviewed and updated as appropriate: allergies, current medications, past family history, past medical history, past social history, past surgical history and problem list     Review of Systems   Constitutional: Positive for fatigue and unexpected weight change  Negative for fever     HENT: Negative  Eyes: Negative  Respiratory: Negative for chest tightness and shortness of breath  Cardiovascular: Negative for chest pain and palpitations  Gastrointestinal: Negative for constipation and diarrhea  Endocrine: Negative  Musculoskeletal: Positive for back pain and myalgias  Pain of the left leg   Skin: Negative  Allergic/Immunologic: Negative  Neurological: Positive for headaches  Negative for dizziness  Hematological: Negative  Psychiatric/Behavioral: Positive for dysphoric mood and sleep disturbance  Negative for suicidal ideas  The patient is nervous/anxious  Objective:      /60   Pulse 65   Temp 98 1 °F (36 7 °C) (Tympanic)   Ht 5' 4" (1 626 m)   Wt 67 2 kg (148 lb 3 2 oz)   LMP  (LMP Unknown)   SpO2 98% Comment: Room Air  BMI 25 44 kg/m²          Physical Exam  Vitals signs and nursing note reviewed  Constitutional:       General: She is not in acute distress  Appearance: Normal appearance  Comments: Anxious appearing , tearful   HENT:      Head: Normocephalic and atraumatic  Nose: Nose normal       Mouth/Throat:      Mouth: Mucous membranes are moist    Eyes:      Pupils: Pupils are equal, round, and reactive to light  Cardiovascular:      Rate and Rhythm: Normal rate and regular rhythm  Pulses: Normal pulses  Heart sounds: Normal heart sounds  Pulmonary:      Effort: Pulmonary effort is normal       Breath sounds: Normal breath sounds  Skin:     General: Skin is warm  Neurological:      General: No focal deficit present  Mental Status: She is alert and oriented to person, place, and time  Psychiatric:         Behavior: Behavior normal          Thought Content:  Thought content normal

## 2021-04-14 NOTE — ASSESSMENT & PLAN NOTE
She has lost close to  20 lb in the last 3 months  She was previously on a thyroid medication which she had stopped taking since she lost her prescription  As of January she has been back on her levothyroxine however  do not have repeat blood work  And a T4 and T3 level  It is possible that the weight loss is secondary to situational stress and sub optimal living conditions

## 2021-04-14 NOTE — ASSESSMENT & PLAN NOTE
Lab Results   Component Value Date    HGBA1C 6 2 (H) 01/22/2021   Currenlty well controlled  Continue Jardian 10mg TID

## 2021-04-14 NOTE — ASSESSMENT & PLAN NOTE
102/60 in the office today  Continue Losartan 25mg and metoprolol 25 mg daily  Due to recent weight loss, monitor BP closely for possible medication dose adjustments

## 2021-04-14 NOTE — TELEPHONE ENCOUNTER
Patient left the office before checking out with myself or Mireya Farley for her to call back to schedule her next appointment in 3 months and also at that time to do the AWV as well with Dr Silvia Messer    Patient also is in need to have blood work done before this appointment

## 2021-04-14 NOTE — PROGRESS NOTES
Diabetic Foot Exam    Patient's shoes and socks removed  Right Foot/Ankle   Right Foot Inspection  Skin Exam: skin intact                            Sensory       Monofilament testing: intact      Left Foot/Ankle  Left Foot Inspection  Skin Exam: skin intact                                         Sensory       Monofilament: intact    Assign Risk Category:  No deformity present; No loss of protective sensation;  No weak pulses       Risk: 0

## 2021-04-15 ENCOUNTER — TELEPHONE (OUTPATIENT)
Dept: INTERNAL MEDICINE CLINIC | Facility: CLINIC | Age: 61
End: 2021-04-15

## 2021-04-15 NOTE — TELEPHONE ENCOUNTER
----- Message from Bertha Webber MD sent at 4/14/2021 11:38 AM EDT -----    Please have patient get blood work done in the next week or 2  She has lost considerable amount of weight so I need to see her thyroid function and also blood sugar to see if she should continue on the Jardiance       MA

## 2021-04-15 NOTE — TELEPHONE ENCOUNTER
Called the patient with the message  She will stop by the office to  the orders  I offered her a consultation with our nurse care coordinator and the  to assist her with resources that may be available to her  She stated that she is not interested in this service as she does not know how they would be able to give her assistance  She purchased a used [employer] and is planning to live in it  Her plan is to leave and move "out [facility]" as soon as she gets the [employer] back from the  and the bed is installed

## 2021-04-22 ENCOUNTER — TELEPHONE (OUTPATIENT)
Dept: INTERNAL MEDICINE CLINIC | Facility: CLINIC | Age: 61
End: 2021-04-22

## 2021-04-22 NOTE — TELEPHONE ENCOUNTER
I called the patient and she assured me that she has no intention of harming herself or any of her family  She said that they events that led up to this call are as follows  She has decided that she will no longer take the bad treatment from her children  Indu Small called the police on her daughter for drugs  Clementine Duran said the  told her that her daughter would be headed to [facility]  Ana Mkate Duran said that her daughter called crisis intervention reporting that she is "crazy" and fears that she will hurt herself or the daughter and grand son  The daughter is retaliating because of the police being called  Clementine Duran said that she is no longer laying back and taking it, she is going to fight back  Clementine Renee again assured me that she has no plan or intent to harm herself or anyone  "As a matter of fact, I don't even know where she is living "  Clementine Duran is still living in her car and plans to move into the [place] as soon as she gets it back from the   She promised to call back if anything changes or she has any needs

## 2021-04-22 NOTE — TELEPHONE ENCOUNTER
Received a phone call from Demarco from [address] [place] [address]  Per Demarco they were informed this patient is in crisis  They contacted patient who stated she was not and told them they could "contact her primary care doctor"  Informed Demarco I cannot release any information regarding this patient however we will follow up on our end with patient  Please contact patient and verify if she is in crisis or not

## 2021-04-27 DIAGNOSIS — E03.9 HYPOTHYROIDISM, UNSPECIFIED TYPE: ICD-10-CM

## 2021-04-27 LAB
ALBUMIN SERPL-MCNC: 4.2 G/DL (ref 3.6–5.1)
ALBUMIN/GLOB SERPL: 1.6 (CALC) (ref 1–2.5)
ALP SERPL-CCNC: 62 U/L (ref 37–153)
ALT SERPL-CCNC: 19 U/L (ref 6–29)
AST SERPL-CCNC: 18 U/L (ref 10–35)
BILIRUB SERPL-MCNC: 0.8 MG/DL (ref 0.2–1.2)
BUN SERPL-MCNC: 14 MG/DL (ref 7–25)
BUN/CREAT SERPL: ABNORMAL (CALC) (ref 6–22)
CALCIUM SERPL-MCNC: 9.1 MG/DL (ref 8.6–10.4)
CHLORIDE SERPL-SCNC: 112 MMOL/L (ref 98–110)
CHOLEST SERPL-MCNC: 94 MG/DL
CHOLEST/HDLC SERPL: 2.4 (CALC)
CO2 SERPL-SCNC: 24 MMOL/L (ref 20–32)
CREAT SERPL-MCNC: 0.75 MG/DL (ref 0.5–1.05)
GLOBULIN SER CALC-MCNC: 2.7 G/DL (CALC) (ref 1.9–3.7)
GLUCOSE SERPL-MCNC: 105 MG/DL (ref 65–99)
HBA1C MFR BLD: 5.8 % OF TOTAL HGB
HDLC SERPL-MCNC: 40 MG/DL
LDLC SERPL CALC-MCNC: 38 MG/DL (CALC)
NONHDLC SERPL-MCNC: 54 MG/DL (CALC)
POTASSIUM SERPL-SCNC: 3.9 MMOL/L (ref 3.5–5.3)
PROT SERPL-MCNC: 6.9 G/DL (ref 6.1–8.1)
SL AMB EGFR AFRICAN AMERICAN: 101 ML/MIN/1.73M2
SL AMB EGFR NON AFRICAN AMERICAN: 87 ML/MIN/1.73M2
SODIUM SERPL-SCNC: 142 MMOL/L (ref 135–146)
T4 FREE SERPL-MCNC: 1.6 NG/DL (ref 0.8–1.8)
TRIGL SERPL-MCNC: 81 MG/DL
TSH SERPL-ACNC: 0.03 MIU/L (ref 0.4–4.5)

## 2021-04-27 PROCEDURE — 3044F HG A1C LEVEL LT 7.0%: CPT | Performed by: INTERNAL MEDICINE

## 2021-04-27 RX ORDER — LEVOTHYROXINE SODIUM 112 UG/1
112 TABLET ORAL DAILY
Qty: 90 TABLET | Refills: 0 | Status: SHIPPED | OUTPATIENT
Start: 2021-04-27 | End: 2021-07-06 | Stop reason: SDUPTHER

## 2021-05-10 DIAGNOSIS — F51.01 PRIMARY INSOMNIA: ICD-10-CM

## 2021-05-10 RX ORDER — ZOLPIDEM TARTRATE 10 MG/1
10 TABLET ORAL
Qty: 30 TABLET | Refills: 0 | Status: SHIPPED | OUTPATIENT
Start: 2021-05-10 | End: 2021-06-10 | Stop reason: SDUPTHER

## 2021-05-18 ENCOUNTER — TELEPHONE (OUTPATIENT)
Dept: NEUROLOGY | Facility: CLINIC | Age: 61
End: 2021-05-18

## 2021-05-18 NOTE — TELEPHONE ENCOUNTER
4th-[id number]-Wbeace patient and left a message  Advised Dr Suzanna Jessica is no longer one of our providers  Advised the appointment, for 05/25/21 is no longer scheduled; and we need to reschedule w/another provider  Advised to callback ASAP so we can reschedule w/another provider

## 2021-05-18 NOTE — TELEPHONE ENCOUNTER
Called patient and I rescheduled [facility] appointment back over to Dr Olvera Done as she saw her in September of 2020   Patient is currently living out of her car and requested to do a virtual

## 2021-05-19 ENCOUNTER — TELEPHONE (OUTPATIENT)
Dept: INTERNAL MEDICINE CLINIC | Facility: CLINIC | Age: 61
End: 2021-05-19

## 2021-05-19 NOTE — TELEPHONE ENCOUNTER
Pt does not want to schedule diabetic eye exam right now because she found out a week and a half ago that her cat has breast cancer

## 2021-05-21 DIAGNOSIS — G43.709 CHRONIC MIGRAINE WITHOUT AURA WITHOUT STATUS MIGRAINOSUS, NOT INTRACTABLE: ICD-10-CM

## 2021-05-21 RX ORDER — TOPIRAMATE 50 MG/1
TABLET, FILM COATED ORAL
Qty: 270 TABLET | Refills: 1 | Status: SHIPPED | OUTPATIENT
Start: 2021-05-21 | End: 2022-05-10

## 2021-05-26 ENCOUNTER — TELEMEDICINE (OUTPATIENT)
Dept: NEUROLOGY | Facility: CLINIC | Age: 61
End: 2021-05-26
Payer: COMMERCIAL

## 2021-05-26 VITALS — HEIGHT: 63 IN | BODY MASS INDEX: 26.22 KG/M2 | WEIGHT: 148 LBS

## 2021-05-26 DIAGNOSIS — G43.709 CHRONIC MIGRAINE WITHOUT AURA WITHOUT STATUS MIGRAINOSUS, NOT INTRACTABLE: Primary | ICD-10-CM

## 2021-05-26 DIAGNOSIS — G44.229 CHRONIC TENSION-TYPE HEADACHE, NOT INTRACTABLE: ICD-10-CM

## 2021-05-26 PROCEDURE — 4004F PT TOBACCO SCREEN RCVD TLK: CPT | Performed by: PSYCHIATRY & NEUROLOGY

## 2021-05-26 PROCEDURE — 99214 OFFICE O/P EST MOD 30 MIN: CPT | Performed by: PSYCHIATRY & NEUROLOGY

## 2021-05-26 PROCEDURE — 3008F BODY MASS INDEX DOCD: CPT | Performed by: PSYCHIATRY & NEUROLOGY

## 2021-05-26 RX ORDER — PROCHLORPERAZINE MALEATE 10 MG
10 TABLET ORAL EVERY 6 HOURS PRN
Qty: 60 TABLET | Refills: 1 | Status: SHIPPED | OUTPATIENT
Start: 2021-05-26 | End: 2022-03-02 | Stop reason: SDUPTHER
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
## 2021-05-26 NOTE — PATIENT INSTRUCTIONS
Headache/migraine treatment:   Abortive medications (for immediate treatment of a headache): It is ok to take ibuprofen, acetaminophen or naproxen (Advil, Tylenol,  Aleve, Excedrin) if they help your headaches you should limit these to No more than 3 times a week to avoid medication overuse/rebound headaches       For your more moderate to severe migraines take this medication early   prochlorperazine/Compazine 10 mg as needed for migraine  This is technically a nausea medication but can be used for migraine or nausea  Try not to use more than 3 days per week     Prescription preventive medications for headaches/migraines   (to take every day to help prevent headaches - not to take at the time of headache):  [x]? - Topiramate continue 50 mg in am and in pm     *Typically these types of medications take time untill you see the benefit, although some may see improvement in days, often it may take weeks, especially if the medication is being titrated up to a beneficial level  Please contact us if there are any concerns or questions regarding the medication       Lifestyle Recommendations:  [x]? SLEEP - Maintain a regular sleep schedule: Adults need at least 7-8 hours of uninterrupted a night  Maintain good sleep hygiene:  Going to bed and waking up at consistent times, avoiding excessive daytime naps, avoiding caffeinated beverages in the evening, avoid excessive stimulation in the evening and generally using bed primarily for sleeping  One hour before bedtime would recommend turning lights down lower, decreasing your activity (may read quietly, listen to music at a low volume)  When you get into bed, should eliminate all technology (no texting, emailing, playing with your phone, iPad or tablet in bed)  [x]? HYDRATION - Maintain good hydration  Drink  2L of fluid a day (4 typical small water bottles)  [x]? DIET - Maintain good nutrition   In particular don't skip meals and try and eat healthy balanced meals regularly  [x]? TRIGGERS - Look for other triggers and avoid them: Limit caffeine to 1-2 cups a day or less  Avoid dietary triggers that you have noticed bring on your headaches (this could include aged cheese, peanuts, MSG, aspartame and nitrates)  [x]? EXERCISE - physical exercise as we all know is good for you in many ways, and not only is good for your heart, but also is beneficial for your mental health, cognitive health and  chronic pain/headaches  I would encourage at the least 5 days of physical exercise weekly for at least 30 minutes       Education and Follow-up  [x]? Please call with any questions or concerns  Of course if any new concerning symptoms go to the emergency department  [x]?  Follow up 6 months

## 2021-06-10 DIAGNOSIS — R21 SKIN RASH: ICD-10-CM

## 2021-06-10 DIAGNOSIS — F51.01 PRIMARY INSOMNIA: ICD-10-CM

## 2021-06-10 RX ORDER — ZOLPIDEM TARTRATE 10 MG/1
10 TABLET ORAL
Qty: 30 TABLET | Refills: 0 | Status: SHIPPED | OUTPATIENT
Start: 2021-06-10 | End: 2021-07-08 | Stop reason: SDUPTHER

## 2021-06-10 RX ORDER — HYDROXYZINE HYDROCHLORIDE 25 MG/1
25 TABLET, FILM COATED ORAL 2 TIMES DAILY PRN
Qty: 60 TABLET | Refills: 5 | Status: SHIPPED | OUTPATIENT
Start: 2021-06-10

## 2021-06-12 DIAGNOSIS — E78.00 HYPERCHOLESTEROLEMIA: ICD-10-CM

## 2021-06-14 RX ORDER — EZETIMIBE 10 MG/1
TABLET ORAL
Qty: 90 TABLET | Refills: 1 | Status: SHIPPED | OUTPATIENT
Start: 2021-06-14 | End: 2021-12-22 | Stop reason: SDUPTHER

## 2021-06-17 DIAGNOSIS — K21.9 GASTROESOPHAGEAL REFLUX DISEASE: ICD-10-CM

## 2021-06-18 RX ORDER — PANTOPRAZOLE SODIUM 40 MG/1
TABLET, DELAYED RELEASE ORAL
Qty: 90 TABLET | Refills: 1 | Status: SHIPPED | OUTPATIENT
Start: 2021-06-18 | End: 2021-07-06 | Stop reason: SDUPTHER

## 2021-06-23 ENCOUNTER — TELEPHONE (OUTPATIENT)
Dept: INTERNAL MEDICINE CLINIC | Facility: CLINIC | Age: 61
End: 2021-06-23

## 2021-06-23 DIAGNOSIS — B00.1 COLD SORE: ICD-10-CM

## 2021-06-23 DIAGNOSIS — B00.1 COLD SORE: Primary | ICD-10-CM

## 2021-06-23 RX ORDER — VALACYCLOVIR HYDROCHLORIDE 1 G/1
2000 TABLET, FILM COATED ORAL 2 TIMES DAILY
Qty: 4 TABLET | Refills: 0 | Status: SHIPPED | OUTPATIENT
Start: 2021-06-23 | End: 2021-06-23 | Stop reason: SDUPTHER

## 2021-06-23 RX ORDER — VALACYCLOVIR HYDROCHLORIDE 1 G/1
1000 TABLET, FILM COATED ORAL 3 TIMES DAILY
Qty: 15 TABLET | Refills: 0 | Status: SHIPPED | OUTPATIENT
Start: 2021-06-23 | End: 2022-05-10

## 2021-06-23 NOTE — TELEPHONE ENCOUNTER
Dr Meryle Kanaris prescribed the Valtrex for the patients upper lip "cold sore "  I called the patient back with the prescription directions  She is not happy with the 1 day treatment plan  I explained that this is the recommendation for Herpes Simplex

## 2021-06-23 NOTE — TELEPHONE ENCOUNTER
Informed patient that Dr Kar Jose sent a new RX for Valacyclovir to [facility]

## 2021-06-23 NOTE — TELEPHONE ENCOUNTER
Patient called looking to get an emergency prescription of the Valacyclovir (Valtrex) prescribed for her  She stated that she has gotten it in the past  She uses the [facility] pharmacy in [place]  Please advise

## 2021-06-29 DIAGNOSIS — E78.5 DYSLIPIDEMIA: ICD-10-CM

## 2021-06-29 RX ORDER — ATORVASTATIN CALCIUM 80 MG/1
TABLET, FILM COATED ORAL
Qty: 90 TABLET | Refills: 0 | Status: SHIPPED | OUTPATIENT
Start: 2021-06-29 | End: 2021-10-11 | Stop reason: SDUPTHER

## 2021-07-06 DIAGNOSIS — K21.9 GASTROESOPHAGEAL REFLUX DISEASE: ICD-10-CM

## 2021-07-06 DIAGNOSIS — E03.9 HYPOTHYROIDISM, UNSPECIFIED TYPE: ICD-10-CM

## 2021-07-06 RX ORDER — PANTOPRAZOLE SODIUM 40 MG/1
40 TABLET, DELAYED RELEASE ORAL DAILY
Qty: 90 TABLET | Refills: 1 | Status: SHIPPED | OUTPATIENT
Start: 2021-07-06 | End: 2021-12-22 | Stop reason: SDUPTHER

## 2021-07-06 RX ORDER — LEVOTHYROXINE SODIUM 112 UG/1
112 TABLET ORAL DAILY
Qty: 90 TABLET | Refills: 1 | Status: SHIPPED | OUTPATIENT
Start: 2021-07-06 | End: 2021-12-22 | Stop reason: SDUPTHER

## 2021-07-08 ENCOUNTER — TELEPHONE (OUTPATIENT)
Dept: INTERNAL MEDICINE CLINIC | Facility: CLINIC | Age: 61
End: 2021-07-08

## 2021-07-08 DIAGNOSIS — F32.89 OTHER DEPRESSION: ICD-10-CM

## 2021-07-08 DIAGNOSIS — F51.01 PRIMARY INSOMNIA: ICD-10-CM

## 2021-07-08 RX ORDER — CITALOPRAM 40 MG/1
40 TABLET ORAL DAILY
Qty: 90 TABLET | Refills: 1 | Status: SHIPPED | OUTPATIENT
Start: 2021-07-08 | End: 2021-12-22 | Stop reason: SDUPTHER

## 2021-07-08 RX ORDER — ZOLPIDEM TARTRATE 10 MG/1
10 TABLET ORAL
Qty: 30 TABLET | Refills: 0 | Status: SHIPPED | OUTPATIENT
Start: 2021-07-08 | End: 2021-08-19 | Stop reason: SDUPTHER

## 2021-07-08 NOTE — TELEPHONE ENCOUNTER
Patient called requesting a refill for citalopram (CeleXA) 40 mg tablet TAKE 1 TABLET BY MOUTH EVERY DAY, and zolpidem (Ambien) 10 mg tablet Take 1 tablet (10 mg total) by mouth daily at bedtime as needed for sleep [id number]

## 2021-07-20 ENCOUNTER — RA CDI HCC (OUTPATIENT)
Dept: OTHER | Facility: HOSPITAL | Age: 61
End: 2021-07-20

## 2021-07-21 ENCOUNTER — TELEPHONE (OUTPATIENT)
Dept: INTERNAL MEDICINE CLINIC | Facility: CLINIC | Age: 61
End: 2021-07-21

## 2021-07-21 NOTE — TELEPHONE ENCOUNTER
LM notifying patient there are outstanding labs that need to be done prior to pending appt 7/27/2021

## 2021-07-24 LAB
T4 FREE SERPL-MCNC: 1.6 NG/DL (ref 0.8–1.8)
TSH SERPL-ACNC: 0.04 MIU/L (ref 0.4–4.5)

## 2021-07-28 ENCOUNTER — OFFICE VISIT (OUTPATIENT)
Dept: INTERNAL MEDICINE CLINIC | Facility: CLINIC | Age: 61
End: 2021-07-28
Payer: COMMERCIAL

## 2021-07-28 VITALS
HEIGHT: 62 IN | TEMPERATURE: 98.7 F | OXYGEN SATURATION: 98 % | BODY MASS INDEX: 26.28 KG/M2 | HEART RATE: 66 BPM | SYSTOLIC BLOOD PRESSURE: 110 MMHG | DIASTOLIC BLOOD PRESSURE: 72 MMHG | WEIGHT: 142.8 LBS

## 2021-07-28 DIAGNOSIS — E11.9 TYPE 2 DIABETES MELLITUS WITHOUT COMPLICATION, WITHOUT LONG-TERM CURRENT USE OF INSULIN (HCC): Primary | ICD-10-CM

## 2021-07-28 DIAGNOSIS — Z00.00 MEDICARE ANNUAL WELLNESS VISIT, SUBSEQUENT: ICD-10-CM

## 2021-07-28 DIAGNOSIS — F17.219 CIGARETTE NICOTINE DEPENDENCE WITH NICOTINE-INDUCED DISORDER: Chronic | ICD-10-CM

## 2021-07-28 DIAGNOSIS — E03.9 HYPOTHYROIDISM, UNSPECIFIED TYPE: ICD-10-CM

## 2021-07-28 PROBLEM — E11.65 UNCONTROLLED TYPE 2 DIABETES MELLITUS WITH HYPERGLYCEMIA (HCC): Status: RESOLVED | Noted: 2020-09-10 | Resolved: 2021-07-28

## 2021-07-28 PROCEDURE — 3008F BODY MASS INDEX DOCD: CPT | Performed by: INTERNAL MEDICINE

## 2021-07-28 PROCEDURE — 3725F SCREEN DEPRESSION PERFORMED: CPT | Performed by: INTERNAL MEDICINE

## 2021-07-28 PROCEDURE — 4004F PT TOBACCO SCREEN RCVD TLK: CPT | Performed by: INTERNAL MEDICINE

## 2021-07-28 PROCEDURE — 3078F DIAST BP <80 MM HG: CPT | Performed by: INTERNAL MEDICINE

## 2021-07-28 PROCEDURE — G0439 PPPS, SUBSEQ VISIT: HCPCS | Performed by: INTERNAL MEDICINE

## 2021-07-28 PROCEDURE — 3074F SYST BP LT 130 MM HG: CPT | Performed by: INTERNAL MEDICINE

## 2021-07-28 PROCEDURE — 99213 OFFICE O/P EST LOW 20 MIN: CPT | Performed by: INTERNAL MEDICINE

## 2021-07-28 NOTE — PROGRESS NOTES
Assessment and Plan:     Problem List Items Addressed This Visit        Endocrine    Type 2 diabetes mellitus without complication, without long-term current use of insulin ([address] ) - Primary           Preventive health issues were discussed with patient, and age appropriate screening tests were ordered as noted in patient's After Visit Summary  Personalized health advice and appropriate referrals for health education or preventive services given if needed, as noted in patient's After Visit Summary       History of Present Illness:     Patient presents for Medicare Annual Wellness visit    Patient Care Team:  Wyatt Frazier MD as PCP - General (Internal Medicine)  DO Keila Quinones, DO Heaven Dee MD (Vascular Surgery)     Problem List:     Patient Active Problem List   Diagnosis    Back pain    Benign essential HTN    Cervical herniated disc    Cervical radiculopathy    Coronary arteriosclerosis    Chronic stable angina ([address] )    Degenerative disc disease, cervical    Situational mixed anxiety and depressive disorder    Fibromyalgia    Gastroesophageal reflux disease    Hypothyroidism    Hypercholesterolemia    Insomnia    Lumbar foraminal stenosis    Lumbar radiculopathy    Sacroiliitis (HCC)    Severe episode of recurrent major depressive disorder, without psychotic features (HCC)    Skin rash    Myofascial pain    Asthma    Brachial neuritis    Cervical spondylosis    Chronic low back pain    Dysthymic disorder    Irritable bowel syndrome without diarrhea    Pain in thoracic spine    Reflex sympathetic dystrophy    Cervical stenosis of spinal canal    Status post lumbar surgery    Chronic pain syndrome    Maxillary sinusitis    Hearing problem of both ears    Generalized weakness    Type 2 diabetes mellitus without complication, without long-term current use of insulin ([address] )    Hearing loss    Headache    Uncontrolled type 2 diabetes mellitus with hyperglycemia ([facility] [address]    Cigarette nicotine dependence    Carotid stenosis, asymptomatic, right    Diarrhea of presumed infectious origin    Abdominal pain    Recent weight loss      Past Medical and Surgical History:     Past Medical History:   Diagnosis Date    Abnormal echocardiogram     Abnormal stress test     Anxiety     Asthma     Brachial neuritis     Depression     Disease of thyroid gland     Displacement of intervertebral disc of mid-cervical region     Fibromyalgia     Frequent headaches     GERD (gastroesophageal reflux disease)     Herniated nucleus pulposus     History of arthritis     History of asthma     History of cardiac disorder     History of chest pain     History of diverticulitis     History of fatigue     History of hearing loss     History of hemoptysis     History of herpes simplex infection     History of hiatal hernia     History of insect bite     INFECTED    History of memory loss     History of neck disorder     History of reflex sympathetic dystrophy     History of restless legs syndrome     History of shortness of breath     History of spinal stenosis     Hyperlipidemia     Hyperlipidemia     Hypertension     Skin rash     Somnolence, daytime     Spinal stenosis of cervical region      Past Surgical History:   Procedure Laterality Date    BACK SURGERY      BACK SURGERY      LOWER BACK SURGERY    CARDIAC CATHETERIZATION      HISTORY OF CORONARY ANGIOGRAPHY WITH CONCOMITANT LEFT HEART CATHETERIZATION    CORONARY ANGIOPLASTY WITH STENT PLACEMENT      EAR SURGERY      TONSILLECTOMY      TYMPANOPLASTY      US GUIDED BREAST BIOPSY LEFT COMPLETE Left 3/2/2021      Family History:     Family History   Problem Relation Age of Onset    Coronary artery disease Mother         ATHEROMA    Heart disease Mother     Diabetes Mother     Hypertension Mother     No Known Problems Father         NO PERTINENT FAMILY HISTORY    Coronary artery disease Sister ATHEROMA    Heart disease Sister     Stroke Sister     Diabetes Sister     Hypertension Sister     No Known Problems Maternal Grandmother     No Known Problems Maternal Grandfather     No Known Problems Paternal Grandmother     No Known Problems Paternal Grandfather     No Known Problems Daughter     Breast cancer Sister     No Known Problems Sister     No Known Problems Sister     No Known Problems Son     No Known Problems Maternal Aunt     No Known Problems Maternal Aunt     No Known Problems Paternal Aunt     No Known Problems Paternal Aunt     Brain cancer Paternal Uncle       Social History:     Social History     Socioeconomic History    Marital status: Single     Spouse name: None    Number of children: None    Years of education: None    Highest education level: None   Occupational History    None   Tobacco Use    Smoking status: Current Every Day Smoker     Packs/day: 1 00     Types: Cigarettes    Smokeless tobacco: Never Used   Vaping Use    Vaping Use: Never used   Substance and Sexual Activity    Alcohol use: Not Currently     Comment: occasional    Drug use: Yes     Types: Marijuana    Sexual activity: Not Currently   Other Topics Concern    None   Social History Narrative    None     Social Determinants of Health     Financial Resource Strain:     Difficulty of Paying Living Expenses:    Food Insecurity:     Worried About Running Out of Food in the Last Year:     Ran Out of Food in the Last Year:    Transportation Needs:     Lack of Transportation (Medical):      Lack of Transportation (Non-Medical):    Physical Activity:     Days of Exercise per Week:     Minutes of Exercise per Session:    Stress:     Feeling of Stress :    Social Connections:     Frequency of Communication with Friends and Family:     Frequency of Social Gatherings with Friends and Family:     Attends Hinduism Services:     Active Member of Clubs or Organizations:     Attends Club or Organization Meetings:     Marital Status:    Intimate Partner Violence:     Fear of Current or Ex-Partner:     Emotionally Abused:     Physically Abused:     Sexually Abused:       Medications and Allergies:     Current Outpatient Medications   Medication Sig Dispense Refill    acetaminophen (TYLENOL) 325 mg tablet Take 1 tablet (325 mg total) by mouth as needed for headaches For headache      albuterol (PROVENTIL HFA,VENTOLIN HFA) 90 mcg/act inhaler Inhale 2 puffs every 6 (six) hours as needed for wheezing 1 Inhaler 3    aspirin 81 mg chewable tablet Chew 81 mg daily      atorvastatin (LIPITOR) 80 mg tablet TAKE 1 TABLET BY MOUTH EVERY DAY 90 tablet 0    Blood Glucose Monitoring Suppl (Martinez Moreno) w/Device KIT by Does not apply route 2 (two) times a day      citalopram (CeleXA) 40 mg tablet Take 1 tablet (40 mg total) by mouth daily 90 tablet 1    cyclobenzaprine (FLEXERIL) 10 mg tablet Take 1 tablet (10 mg total) by mouth 3 (three) times a day as needed for muscle spasms 30 tablet 0    Empagliflozin (Jardiance) 10 MG TABS Take 1 tablet (10 mg total) by mouth every morning 90 tablet 1    ezetimibe (ZETIA) 10 mg tablet TAKE 1 TABLET BY MOUTH EVERY DAY 90 tablet 1    fluticasone-salmeterol (ADVAIR) 250-50 mcg/dose inhaler Inhale 1 puff every 12 (twelve) hours as needed (sob) 1 each 1    glucose blood (OneTouch Verio) test strip 1 each by Other route 2 (two) times a day      hydrOXYzine HCL (ATARAX) 25 mg tablet Take 1 tablet (25 mg total) by mouth 2 (two) times a day as needed for itching 60 tablet 5    Lancets (ONETOUCH ULTRASOFT) lancets by Other route 2 (two) times a day      levothyroxine 112 mcg tablet Take 1 tablet (112 mcg total) by mouth daily 90 tablet 1    losartan (COZAAR) 25 mg tablet TAKE 1 TABLET DAILY   90 tablet 3    magnesium oxide (MAG-OX) 400 mg Take 1 tablet (400 mg total) by mouth 2 (two) times a day (Patient taking differently: Take 1,500 mg by mouth daily ) 60 tablet 0  metoprolol succinate (TOPROL-XL) 25 mg 24 hr tablet Take 25 mg by mouth daily      omega-3-acid ethyl esters (LOVAZA) 1 g capsule Take 2 capsules (2 g total) by mouth 2 (two) times a day 120 capsule 3    pantoprazole (PROTONIX) 40 mg tablet Take 1 tablet (40 mg total) by mouth daily 90 tablet 1    pregabalin (LYRICA) 150 mg capsule Take 1 capsule (150 mg total) by mouth 3 (three) times a day 90 capsule 2    prochlorperazine (COMPAZINE) 10 mg tablet Take 1 tablet (10 mg total) by mouth every 6 (six) hours as needed for nausea or vomiting Max 3 days per week, 6 month supply 60 tablet 1    topiramate (TOPAMAX) 50 MG tablet TAKE 1 TABLET IN THE MORNING AND 2 TABLETS AT BEDTIME 270 tablet 1    triamcinolone (KENALOG) 0 1 % cream Apply topically 2 (two) times a day 80 g 1    valACYclovir (VALTREX) 1,000 mg tablet Take 1 tablet (1,000 mg total) by mouth 3 (three) times a day for 5 days Do not need to complete course of 7 days  Okay  Medicine once sore settles 15 tablet 0    zolpidem (Ambien) 10 mg tablet Take 1 tablet (10 mg total) by mouth daily at bedtime as needed for sleep 30 tablet 0    senna-docusate sodium (SENOKOT S) 8 6-50 mg per tablet Take 2 tablets by mouth daily (Patient not taking: Reported on 7/28/2021)       No current facility-administered medications for this visit       Allergies   Allergen Reactions    Augmentin [Amoxicillin-Pot Clavulanate] Nausea Only     PT stated she only allergic to medication AUGMENTIN    Meloxicam GI Intolerance     "brings on diverticulitis"      Immunizations:     Immunization History   Administered Date(s) Administered    Influenza, recombinant, quadrivalent,injectable, preservative free 12/08/2020    Influenza, seasonal, injectable 11/15/2011, 10/28/2013    Pneumococcal Polysaccharide PPV23 12/08/2020      Health Maintenance:         Topic Date Due    HIV Screening  Never done    Cervical Cancer Screening  Never done    Breast Cancer Screening: Mammogram 01/28/2022    Colorectal Cancer Screening  08/11/2025    Hepatitis C Screening  Completed         Topic Date Due    COVID-19 Vaccine (1) Never done    DTaP,Tdap,and Td Vaccines (1 - Tdap) Never done    Influenza Vaccine (1) 09/01/2021      Medicare Health Risk Assessment:     Ht 5' 2" (1 575 m)   Wt 64 8 kg (142 lb 12 8 oz)   LMP  (LMP Unknown)   BMI 26 12 kg/m²      Naz Velazquez is here for her Subsequent Wellness visit  Last Medicare Wellness visit information reviewed, patient interviewed and updates made to the record today  Health Risk Assessment:   Patient rates overall health as good  Patient feels that their physical health rating is same  Patient is very dissatisfied with their life  Eyesight was rated as same  Hearing was rated as same  Patient feels that their emotional and mental health rating is much worse  Patients states they are sometimes angry  Patient states they are sometimes unusually tired/fatigued  Pain experienced in the last 7 days has been some  Patient's pain rating has been 4/10  Patient states that she has experienced no weight loss or gain in last 6 months  Depression Screening:   PHQ-2 Score: 0  PHQ-9 Score: 2      Fall Risk Screening: In the past year, patient has experienced: no history of falling in past year      Urinary Incontinence Screening:   Patient has not leaked urine accidently in the last six months  Home Safety:  Patient does not have trouble with stairs inside or outside of their home  Patient has working smoke alarms and has working carbon monoxide detector  Home safety hazards include: none  Nutrition:   Current diet is Limited junk food, Low Carb and Diabetic  Medications:   Patient is currently taking over-the-counter supplements  OTC medications include: see medication list  Patient is able to manage medications       Activities of Daily Living (ADLs)/Instrumental Activities of Daily Living (IADLs):   Walk and transfer into and out of bed and chair?: Yes  Dress and groom yourself?: Yes    Bathe or shower yourself?: Yes    Feed yourself? Yes  Do your laundry/housekeeping?: Yes  Manage your money, pay your bills and track your expenses?: Yes  Make your own meals?: Yes    Do your own shopping?: Yes    Previous Hospitalizations:   Any hospitalizations or ED visits within the last 12 months?: No      Advance Care Planning:   Living will: No    Advanced directive: No    Five wishes given: Yes      PREVENTIVE SCREENINGS      Cardiovascular Screening:    General: Screening Not Indicated and History Lipid Disorder      Diabetes Screening:     General: Screening Not Indicated and History Diabetes      Colorectal Cancer Screening:     General: Screening Current      Breast Cancer Screening:     General: Screening Current      Lung Cancer Screening:     General: Screening Not Indicated    Due for: Low Dose CT (LDCT)      Hepatitis C Screening:    General: Screening Current    Screening, Brief Intervention, and Referral to Treatment (SBIRT)    Screening  Typical number of drinks in a day: 0  Typical number of drinks in a week: 0  Interpretation: Low risk drinking behavior  Single Item Drug Screening:  How often have you used an illegal drug (including marijuana) or a prescription medication for non-medical reasons in the past year? daily or almost daily    Single Item Drug Screen Score: 4  Interpretation: POSITIVE screen for possible drug use disorder    Drug Abuse Screening Test (DAST-10):  1) Have you used drugs other than those required for medical reasons? No    Brief Intervention  Alcohol & drug use screenings were reviewed  No concerns regarding substance use disorder identified  Health risks of current substance use discussed  Drug use counseling given  Other Counseling Topics:   Car/seat belt/driving safety, skin self-exam, sunscreen and calcium and vitamin D intake and regular weightbearing exercise         Veto Wilson MD

## 2021-07-28 NOTE — ASSESSMENT & PLAN NOTE
Lab Results   Component Value Date    HGBA1C 5 8 (H) 04/26/2021   Continue present management  Reinforced need for dietary modification, exercise  No blood work will be ordered as patient will will relocate within a month or so  We wish her well

## 2021-07-28 NOTE — PROGRESS NOTES
Assessment/Plan:    BMI Counseling: Body mass index is 26 12 kg/m²  The BMI is above normal  Nutrition recommendations include reducing fast food intake and consuming healthier snacks  Exercise recommendations include exercising 3-5 times per week  Cigarette nicotine dependence  Use patches and continue to wean on use of cigarettes once you get situated    Type 2 diabetes mellitus without complication, without long-term current use of insulin (HCC)    Lab Results   Component Value Date    HGBA1C 5 8 (H) 04/26/2021   Continue present management  Reinforced need for dietary modification, exercise  No blood work will be ordered as patient will will relocate within a month or so  We wish her well  Hypothyroidism    Continue present dosage of thyroid   Supplementation   in office hemoglobin A1c was 6 2  Patient acknowledges poor eating habits due to personal situation and having to live out of her [place]  However she has now purchased a motor home and has lot more control over her personal situation  She will be moving to [place] so this will be my last visit with her  I have counseled her at length about diet and lifestyle modification and I wish her well with her move  Subjective:   Chief Complaint   Patient presents with    Medicare Wellness Visit     sub    Follow-up     no refills needed, recent bw 07/23/2021        Patient ID: Kin Roblero is a 62 y o  female  Diabetes  She presents for her follow-up diabetic visit  She has type 2 diabetes mellitus  There are no hypoglycemic complications  Diet in the last few months has been erratic due to personal situation  She has not had any complications of hypoglycemia from her diabetes          The following portions of the patient's history were reviewed and updated as appropriate: past family history, past medical history, past social history, past surgical history and problem list     Review of Systems      Past Medical History:   Diagnosis Date    Abnormal echocardiogram     Abnormal stress test     Anxiety     Asthma     Brachial neuritis     Depression     Disease of thyroid gland     Displacement of intervertebral disc of mid-cervical region     Fibromyalgia     Frequent headaches     GERD (gastroesophageal reflux disease)     Herniated nucleus pulposus     History of arthritis     History of asthma     History of cardiac disorder     History of chest pain     History of diverticulitis     History of fatigue     History of hearing loss     History of hemoptysis     History of herpes simplex infection     History of hiatal hernia     History of insect bite     INFECTED    History of memory loss     History of neck disorder     History of reflex sympathetic dystrophy     History of restless legs syndrome     History of shortness of breath     History of spinal stenosis     Hyperlipidemia     Hyperlipidemia     Hypertension     Skin rash     Somnolence, daytime     Spinal stenosis of cervical region          Current Outpatient Medications:     acetaminophen (TYLENOL) 325 mg tablet, Take 1 tablet (325 mg total) by mouth as needed for headaches For headache, Disp: , Rfl:     albuterol (PROVENTIL HFA,VENTOLIN HFA) 90 mcg/act inhaler, Inhale 2 puffs every 6 (six) hours as needed for wheezing, Disp: 1 Inhaler, Rfl: 3    aspirin 81 mg chewable tablet, Chew 81 mg daily, Disp: , Rfl:     atorvastatin (LIPITOR) 80 mg tablet, TAKE 1 TABLET BY MOUTH EVERY DAY, Disp: 90 tablet, Rfl: 0    Blood Glucose Monitoring Suppl (ONETOUCH VERIO) w/Device KIT, by Does not apply route 2 (two) times a day, Disp: , Rfl:     citalopram (CeleXA) 40 mg tablet, Take 1 tablet (40 mg total) by mouth daily, Disp: 90 tablet, Rfl: 1    cyclobenzaprine (FLEXERIL) 10 mg tablet, Take 1 tablet (10 mg total) by mouth 3 (three) times a day as needed for muscle spasms, Disp: 30 tablet, Rfl: 0    Empagliflozin (Jardiance) 10 MG TABS, Take 1 tablet (10 mg total) by mouth every morning, Disp: 90 tablet, Rfl: 1    ezetimibe (ZETIA) 10 mg tablet, TAKE 1 TABLET BY MOUTH EVERY DAY, Disp: 90 tablet, Rfl: 1    fluticasone-salmeterol (ADVAIR) 250-50 mcg/dose inhaler, Inhale 1 puff every 12 (twelve) hours as needed (sob), Disp: 1 each, Rfl: 1    glucose blood (OneTouch Verio) test strip, 1 each by Other route 2 (two) times a day, Disp: , Rfl:     hydrOXYzine HCL (ATARAX) 25 mg tablet, Take 1 tablet (25 mg total) by mouth 2 (two) times a day as needed for itching, Disp: 60 tablet, Rfl: 5    Lancets (ONETOUCH ULTRASOFT) lancets, by Other route 2 (two) times a day, Disp: , Rfl:     levothyroxine 112 mcg tablet, Take 1 tablet (112 mcg total) by mouth daily, Disp: 90 tablet, Rfl: 1    losartan (COZAAR) 25 mg tablet, TAKE 1 TABLET DAILY  , Disp: 90 tablet, Rfl: 3    magnesium oxide (MAG-OX) 400 mg, Take 1 tablet (400 mg total) by mouth 2 (two) times a day (Patient taking differently: Take 1,500 mg by mouth daily ), Disp: 60 tablet, Rfl: 0    metoprolol succinate (TOPROL-XL) 25 mg 24 hr tablet, Take 25 mg by mouth daily, Disp: , Rfl:     omega-3-acid ethyl esters (LOVAZA) 1 g capsule, Take 2 capsules (2 g total) by mouth 2 (two) times a day, Disp: 120 capsule, Rfl: 3    pantoprazole (PROTONIX) 40 mg tablet, Take 1 tablet (40 mg total) by mouth daily, Disp: 90 tablet, Rfl: 1    pregabalin (LYRICA) 150 mg capsule, Take 1 capsule (150 mg total) by mouth 3 (three) times a day, Disp: 90 capsule, Rfl: 2    prochlorperazine (COMPAZINE) 10 mg tablet, Take 1 tablet (10 mg total) by mouth every 6 (six) hours as needed for nausea or vomiting Max 3 days per week, 6 month supply, Disp: 60 tablet, Rfl: 1    topiramate (TOPAMAX) 50 MG tablet, TAKE 1 TABLET IN THE MORNING AND 2 TABLETS AT BEDTIME, Disp: 270 tablet, Rfl: 1    triamcinolone (KENALOG) 0 1 % cream, Apply topically 2 (two) times a day, Disp: 80 g, Rfl: 1    valACYclovir (VALTREX) 1,000 mg tablet, Take 1 tablet (1,000 mg total) by mouth 3 (three) times a day for 5 days Do not need to complete course of 7 days  Okay    Medicine once sore settles, Disp: 15 tablet, Rfl: 0    zolpidem (Ambien) 10 mg tablet, Take 1 tablet (10 mg total) by mouth daily at bedtime as needed for sleep, Disp: 30 tablet, Rfl: 0    senna-docusate sodium (SENOKOT S) 8 6-50 mg per tablet, Take 2 tablets by mouth daily (Patient not taking: Reported on 7/28/2021), Disp: , Rfl:     Allergies   Allergen Reactions    Augmentin [Amoxicillin-Pot Clavulanate] Nausea Only     PT stated she only allergic to medication AUGMENTIN    Meloxicam GI Intolerance     "brings on diverticulitis"       Social History   Past Surgical History:   Procedure Laterality Date    BACK SURGERY      BACK SURGERY      LOWER BACK SURGERY    CARDIAC CATHETERIZATION      HISTORY OF CORONARY ANGIOGRAPHY WITH CONCOMITANT LEFT HEART CATHETERIZATION    CORONARY ANGIOPLASTY WITH STENT PLACEMENT      EAR SURGERY      TONSILLECTOMY      TYMPANOPLASTY      US GUIDED BREAST BIOPSY LEFT COMPLETE Left 3/2/2021     Family History   Problem Relation Age of Onset    Coronary artery disease Mother         ATHEROMA    Heart disease Mother     Diabetes Mother     Hypertension Mother     No Known Problems Father         NO PERTINENT FAMILY HISTORY    Coronary artery disease Sister         ATHEROMA    Heart disease Sister     Stroke Sister     Diabetes Sister     Hypertension Sister     No Known Problems Maternal Grandmother     No Known Problems Maternal Grandfather     No Known Problems Paternal Grandmother     No Known Problems Paternal Grandfather     No Known Problems Daughter     Breast cancer Sister     No Known Problems Sister     No Known Problems Sister     No Known Problems Son     No Known Problems Maternal Aunt     No Known Problems Maternal Aunt     No Known Problems Paternal Aunt     No Known Problems Paternal Aunt     Brain cancer Paternal Uncle        Objective:  /72 (BP Location: Left arm, Patient Position: Sitting, Cuff Size: Adult)   Pulse 66   Temp 98 7 °F (37 1 °C) (Temporal)   Ht 5' 2" (1 575 m)   Wt 64 8 kg (142 lb 12 8 oz)   LMP  (LMP Unknown)   SpO2 98% Comment: ra  BMI 26 12 kg/m²     Recent Results (from the past 1344 hour(s))   TSH, 3rd generation with Free T4 reflex    Collection Time: 07/23/21  1:08 PM   Result Value Ref Range    TSH W/RFX TO FREE T4 0 04 (L) 0 40 - 4 50 mIU/L   T4, free    Collection Time: 07/23/21  1:08 PM   Result Value Ref Range    Free t4 1 6 0 8 - 1 8 ng/dL            Physical Exam      Gen: NAD  Heent: atraumatic, normocephalic  Mouth: is moist  Neck: is supple, no JVD, no carotid bruits     Heart: is regular Normal s1, s2,  Lungs: are clear to auscultation  Abd: soft, non tender, NABS  Ext: no edema, distal pulses normal  Skin: no rashes, ulcers  Mood: normal affect  Neuro: AAOx3

## 2021-07-29 NOTE — TELEPHONE ENCOUNTER
WELL CHILD EXAM: 9 Months    Chief Complaint   Patient presents with   • Well Infant Birth to 23 Months     No Concerns       SUBJECTIVE    Mayra Mcdaniels is a 10 month old female who presents for a  well child exam.  Patient presents with mom and dad.    CONCERNS RAISED TODAY:   none    SLEEP PATTERN: no concerns    DIET/GI:  Feeding: Formula Neosure: 3oz every 2-3 hours. Cereal.   Water supply at home: bottled.  Stooling frequency: 1-2 per day.     /HOMECARE: family member     FAMILY/HOME ENVIRONMENT:  Changes in home/work environment: No  Parents working outside the home: father  Toxic Exposure:  Tobacco Use: Not Asked         DEVELOPMENT:  Physical  [x]  YES    []  NO     []  UNKNOWN   Sits well?  [x]  YES    []  NO     []  UNKNOWN   Crawls?  [x]  YES    []  NO     []  UNKNOWN   Pulls to feet with support?  Cognitive  [x]  YES    []  NO     []  UNKNOWN    Plays peek-a-banda?  [x]  YES    []  NO     []  UNKNOWN    Object permanence?  [x]  YES    []  NO     []  UNKNOWN    Looks at pictures/books?  Communicative  [x]  YES    []  NO     []  UNKNOWN    Imitates sounds?  [x]  YES    []  NO     []  UNKNOWN    Points out objects?  Social-Emotional  [x]  YES    []  NO     []  UNKNOWN    Stranger anxiety?  [x]  YES    []  NO     []  UNKNOWN    Seeks parent for comfort?          PAST HISTORIES:  Allergies, Medications, Medical HX, Surgical HX, Family HX reviewed and updated.    RECENT HEALTH EVENTS:  Illnesses:None.  Hospitalizations: none  Injuries or Accidents:None.    REVIEW OF SYSTEMS:    All systems reviewed and negative except as documented in \"Concerns raised\".    OBJECTIVE    PHYSICAL EXAM:   VITAL SIGNS:   Visit Vitals  Pulse 120   Temp 97.6 °F (36.4 °C) (Axillary)   Resp 30   Ht 27.99\" (71.1 cm)   Wt 8.62 kg   HC 44.5 cm (17.52\")   BMI 17.05 kg/m²    56 %ile (Z= 0.15) based on WHO (Girls, 0-2 years) weight-for-age data using vitals from 7/29/2021.  GENERAL:  Well appearing female child.  Alert, active  Will work on prior [facility]  and consolable.  SKIN:  Warm, normal turgor.  No cyanosis.  No rash.  HEAD:  Normocephalic, atraumatic.    EYES:  Conjunctivae appear normal, noninjected, nonicteric, positive red reflex.  NOSE:  Appears normal without drainage.  EARS:  Normal external auditory canals. Tympanic membranes are transparent with normal landmarks.  THROAT:  Moist mucous membranes without lesions.  NECK:  Supple, no lymphadenopathy or masses.  HEART:  Regular rate and rhythm.  Normal S1, S2.  No murmurs, rubs, gallops.   LUNGS:  Clear to auscultation.  No wheezes, rales, rhonchi.  Normal work effort with breathing.  ABDOMEN:  Bowel sounds present. Soft, nontender.  No hepatomegaly.  No splenomegaly.  No masses.  GENITOURINARY: Jimbo stage 1. Normal female genitalia. Rectum/anus patent.  EXTREMITIES: Normal bilateral range of motion of upper and lower extremities. Peripheral pulses 2/4. Equal femoral pulses.  BACK: Spine straight.   NEUROLOGIC:  Normal muscle tone and symmetrical strength. Symmetrical facial motor function.         ASSESSMENT/PLAN      Problem List Items Addressed This Visit        Health Encounters    Encounter for routine child health examination without abnormal findings - Primary     Doing well, development and growth is appropriate for age.   -immunization administered today  -screening for anemia and lead ordered  -all parental concerns addressed today.            Other Visit Diagnoses     Need for vaccination        Relevant Orders    DTAP HEPB IPV COMBINED VACCINE IM (PEDIARIX) (Completed)    HIB VACC,PRP-T,IM(ACTHIB,HIBERIX) (Completed)    Need for lead screening        Relevant Orders    LEAD BLOOD/VENOUS (Completed)    Screening for deficiency anemia        Relevant Orders    CBC WITH DIFFERENTIAL (Completed)            Development: appropriate for age.   Immunizations per orders.  Risks, benefits, and side effects discussed. VIS for Immunizations given.  Anticipatory Guidance/Discussion Topics:  Nutrition  No  bottle use: Transition to sippy cup  Oral health: Pacifier user, Juice, fluoride supplementation  Accident prevention: Childproof home, Water safety  Name and vocalize objects  Analgesics/antipyretics  Sun exposure  Tobacco-free home  Fluoride supplementation discussed    FOLLOW UP and AVS    Return in about 3 months (around 10/29/2021) for 2 year [facility].  Return to clinic at 2 year of age.  Return to clinic sooner if needed.  Indications to call/return sooner were discussed.    Seen and discussed with attending physician, Dr. Isabel MD.     Larry Siddiqui,    Family Medicine, PGY-2  8/2/2021

## 2021-08-11 DIAGNOSIS — I10 HTN (HYPERTENSION), BENIGN: ICD-10-CM

## 2021-08-13 PROCEDURE — 4010F ACE/ARB THERAPY RXD/TAKEN: CPT | Performed by: INTERNAL MEDICINE

## 2021-08-13 RX ORDER — LOSARTAN POTASSIUM 25 MG/1
TABLET ORAL
Qty: 90 TABLET | Refills: 3 | Status: SHIPPED | OUTPATIENT
Start: 2021-08-13

## 2021-08-19 DIAGNOSIS — M79.7 FIBROMYALGIA: ICD-10-CM

## 2021-08-19 DIAGNOSIS — F51.01 PRIMARY INSOMNIA: ICD-10-CM

## 2021-08-20 RX ORDER — PREGABALIN 150 MG/1
150 CAPSULE ORAL 3 TIMES DAILY
Qty: 90 CAPSULE | Refills: 0 | Status: SHIPPED | OUTPATIENT
Start: 2021-08-20 | End: 2021-09-30 | Stop reason: SDUPTHER

## 2021-08-20 RX ORDER — ZOLPIDEM TARTRATE 10 MG/1
10 TABLET ORAL
Qty: 30 TABLET | Refills: 0 | Status: SHIPPED | OUTPATIENT
Start: 2021-08-20 | End: 2021-09-20 | Stop reason: SDUPTHER

## 2021-08-24 NOTE — TELEPHONE ENCOUNTER
Pt promises she will get her eye exam   It has just been one thing after another  Pt is going to put her cat down tomorrow, vehicle isn't inspected  She got taken over on a motor home purchase, she is taking care of sister  So many things are happening and she is not catching a break

## 2021-09-20 DIAGNOSIS — F51.01 PRIMARY INSOMNIA: ICD-10-CM

## 2021-09-21 RX ORDER — ZOLPIDEM TARTRATE 10 MG/1
10 TABLET ORAL
Qty: 30 TABLET | Refills: 0 | Status: SHIPPED | OUTPATIENT
Start: 2021-09-21 | End: 2021-10-21 | Stop reason: SDUPTHER

## 2021-09-30 DIAGNOSIS — M79.7 FIBROMYALGIA: ICD-10-CM

## 2021-09-30 RX ORDER — PREGABALIN 150 MG/1
150 CAPSULE ORAL 3 TIMES DAILY
Qty: 90 CAPSULE | Refills: 2 | Status: SHIPPED | OUTPATIENT
Start: 2021-09-30 | End: 2022-02-08 | Stop reason: SDUPTHER

## 2021-10-11 DIAGNOSIS — E11.65 UNCONTROLLED TYPE 2 DIABETES MELLITUS WITH HYPERGLYCEMIA (HCC): ICD-10-CM

## 2021-10-11 DIAGNOSIS — E78.5 DYSLIPIDEMIA: ICD-10-CM

## 2021-10-11 RX ORDER — EMPAGLIFLOZIN 10 MG/1
10 TABLET, FILM COATED ORAL EVERY MORNING
Qty: 90 TABLET | Refills: 1 | Status: SHIPPED | OUTPATIENT
Start: 2021-10-11 | End: 2022-04-11 | Stop reason: SDUPTHER

## 2021-10-14 RX ORDER — ATORVASTATIN CALCIUM 80 MG/1
80 TABLET, FILM COATED ORAL DAILY
Qty: 90 TABLET | Refills: 3 | Status: SHIPPED | OUTPATIENT
Start: 2021-10-14 | End: 2021-11-29 | Stop reason: SDUPTHER

## 2021-10-21 DIAGNOSIS — F51.01 PRIMARY INSOMNIA: ICD-10-CM

## 2021-10-21 RX ORDER — ZOLPIDEM TARTRATE 10 MG/1
10 TABLET ORAL
Qty: 30 TABLET | Refills: 0 | Status: SHIPPED | OUTPATIENT
Start: 2021-10-21 | End: 2021-11-22 | Stop reason: SDUPTHER

## 2021-10-26 ENCOUNTER — VBI (OUTPATIENT)
Dept: ADMINISTRATIVE | Facility: OTHER | Age: 61
End: 2021-10-26

## 2021-11-09 DIAGNOSIS — J45.909 ASTHMA, UNSPECIFIED ASTHMA SEVERITY, UNSPECIFIED WHETHER COMPLICATED, UNSPECIFIED WHETHER PERSISTENT: ICD-10-CM

## 2021-11-09 RX ORDER — ALBUTEROL SULFATE 90 UG/1
2 AEROSOL, METERED RESPIRATORY (INHALATION) EVERY 6 HOURS PRN
Qty: 18 G | Refills: 5 | Status: SHIPPED | OUTPATIENT
Start: 2021-11-09 | End: 2022-07-14 | Stop reason: SDUPTHER

## 2021-11-17 ENCOUNTER — RA CDI HCC (OUTPATIENT)
Dept: OTHER | Facility: HOSPITAL | Age: 61
End: 2021-11-17

## 2021-11-22 DIAGNOSIS — F51.01 PRIMARY INSOMNIA: ICD-10-CM

## 2021-11-22 RX ORDER — ZOLPIDEM TARTRATE 10 MG/1
10 TABLET ORAL
Qty: 30 TABLET | Refills: 0 | Status: SHIPPED | OUTPATIENT
Start: 2021-11-22 | End: 2021-12-22 | Stop reason: SDUPTHER

## 2021-11-26 ENCOUNTER — TELEPHONE (OUTPATIENT)
Dept: CARDIOLOGY CLINIC | Facility: CLINIC | Age: 61
End: 2021-11-26

## 2021-11-29 DIAGNOSIS — E78.5 DYSLIPIDEMIA: ICD-10-CM

## 2021-11-29 RX ORDER — ATORVASTATIN CALCIUM 80 MG/1
80 TABLET, FILM COATED ORAL DAILY
Qty: 90 TABLET | Refills: 3 | Status: SHIPPED | OUTPATIENT
Start: 2021-11-29

## 2021-12-22 DIAGNOSIS — E03.9 HYPOTHYROIDISM, UNSPECIFIED TYPE: ICD-10-CM

## 2021-12-22 DIAGNOSIS — K21.9 GASTROESOPHAGEAL REFLUX DISEASE: ICD-10-CM

## 2021-12-22 DIAGNOSIS — F51.01 PRIMARY INSOMNIA: ICD-10-CM

## 2021-12-22 DIAGNOSIS — I10 HTN (HYPERTENSION), BENIGN: ICD-10-CM

## 2021-12-22 DIAGNOSIS — F32.89 OTHER DEPRESSION: ICD-10-CM

## 2021-12-22 DIAGNOSIS — E78.00 HYPERCHOLESTEROLEMIA: ICD-10-CM

## 2021-12-22 RX ORDER — LOSARTAN POTASSIUM 25 MG/1
25 TABLET ORAL DAILY
Qty: 90 TABLET | Refills: 3 | Status: CANCELLED | OUTPATIENT
Start: 2021-12-22

## 2021-12-22 RX ORDER — LEVOTHYROXINE SODIUM 112 UG/1
112 TABLET ORAL DAILY
Qty: 90 TABLET | Refills: 1 | Status: SHIPPED | OUTPATIENT
Start: 2021-12-22 | End: 2022-04-11 | Stop reason: SDUPTHER

## 2021-12-22 RX ORDER — CITALOPRAM 40 MG/1
40 TABLET ORAL DAILY
Qty: 90 TABLET | Refills: 1 | Status: SHIPPED | OUTPATIENT
Start: 2021-12-22

## 2021-12-22 RX ORDER — EZETIMIBE 10 MG/1
10 TABLET ORAL DAILY
Qty: 90 TABLET | Refills: 1 | Status: SHIPPED | OUTPATIENT
Start: 2021-12-22 | End: 2022-04-11 | Stop reason: SDUPTHER

## 2021-12-22 RX ORDER — PANTOPRAZOLE SODIUM 40 MG/1
40 TABLET, DELAYED RELEASE ORAL DAILY
Qty: 90 TABLET | Refills: 1 | Status: SHIPPED | OUTPATIENT
Start: 2021-12-22 | End: 2022-04-19 | Stop reason: SDUPTHER

## 2021-12-22 RX ORDER — ZOLPIDEM TARTRATE 10 MG/1
10 TABLET ORAL
Qty: 30 TABLET | Refills: 0 | Status: SHIPPED | OUTPATIENT
Start: 2021-12-22 | End: 2022-01-24 | Stop reason: SDUPTHER

## 2022-01-24 DIAGNOSIS — F51.01 PRIMARY INSOMNIA: ICD-10-CM

## 2022-01-24 RX ORDER — ZOLPIDEM TARTRATE 10 MG/1
10 TABLET ORAL
Qty: 30 TABLET | Refills: 0 | Status: SHIPPED | OUTPATIENT
Start: 2022-01-24 | End: 2022-02-24 | Stop reason: SDUPTHER

## 2022-01-27 ENCOUNTER — TELEPHONE (OUTPATIENT)
Dept: INTERNAL MEDICINE CLINIC | Facility: OTHER | Age: 62
End: 2022-01-27

## 2022-01-27 NOTE — TELEPHONE ENCOUNTER
Pt is unable to schedule her diabetic eye exam at this time because she currently is without a vehicle

## 2022-02-08 DIAGNOSIS — M79.7 FIBROMYALGIA: ICD-10-CM

## 2022-02-08 RX ORDER — PREGABALIN 150 MG/1
150 CAPSULE ORAL 3 TIMES DAILY
Qty: 90 CAPSULE | Refills: 2 | Status: SHIPPED | OUTPATIENT
Start: 2022-02-08 | End: 2022-06-01 | Stop reason: SDUPTHER

## 2022-02-24 DIAGNOSIS — F51.01 PRIMARY INSOMNIA: ICD-10-CM

## 2022-02-24 RX ORDER — ZOLPIDEM TARTRATE 10 MG/1
10 TABLET ORAL
Qty: 30 TABLET | Refills: 0 | Status: SHIPPED | OUTPATIENT
Start: 2022-02-24 | End: 2022-03-29 | Stop reason: SDUPTHER

## 2022-03-02 ENCOUNTER — OFFICE VISIT (OUTPATIENT)
Dept: INTERNAL MEDICINE CLINIC | Facility: CLINIC | Age: 62
End: 2022-03-02
Payer: COMMERCIAL

## 2022-03-02 VITALS
TEMPERATURE: 97.6 F | HEART RATE: 74 BPM | HEIGHT: 64 IN | SYSTOLIC BLOOD PRESSURE: 136 MMHG | OXYGEN SATURATION: 96 % | WEIGHT: 153.2 LBS | BODY MASS INDEX: 26.15 KG/M2 | DIASTOLIC BLOOD PRESSURE: 70 MMHG

## 2022-03-02 DIAGNOSIS — E78.00 HYPERCHOLESTEROLEMIA: ICD-10-CM

## 2022-03-02 DIAGNOSIS — G43.709 CHRONIC MIGRAINE WITHOUT AURA WITHOUT STATUS MIGRAINOSUS, NOT INTRACTABLE: ICD-10-CM

## 2022-03-02 DIAGNOSIS — Z12.31 ENCOUNTER FOR SCREENING MAMMOGRAM FOR MALIGNANT NEOPLASM OF BREAST: ICD-10-CM

## 2022-03-02 DIAGNOSIS — F17.219 CIGARETTE NICOTINE DEPENDENCE WITH NICOTINE-INDUCED DISORDER: Chronic | ICD-10-CM

## 2022-03-02 DIAGNOSIS — M48.02 CERVICAL STENOSIS OF SPINAL CANAL: ICD-10-CM

## 2022-03-02 DIAGNOSIS — M62.838 NECK MUSCLE SPASM: ICD-10-CM

## 2022-03-02 DIAGNOSIS — I65.21 CAROTID STENOSIS, ASYMPTOMATIC, RIGHT: Chronic | ICD-10-CM

## 2022-03-02 DIAGNOSIS — I10 HTN (HYPERTENSION), BENIGN: ICD-10-CM

## 2022-03-02 DIAGNOSIS — K57.92 DIVERTICULITIS: ICD-10-CM

## 2022-03-02 DIAGNOSIS — E11.9 TYPE 2 DIABETES MELLITUS WITHOUT COMPLICATION, WITHOUT LONG-TERM CURRENT USE OF INSULIN (HCC): Primary | ICD-10-CM

## 2022-03-02 DIAGNOSIS — E03.9 HYPOTHYROIDISM, UNSPECIFIED TYPE: ICD-10-CM

## 2022-03-02 DIAGNOSIS — E11.65 UNCONTROLLED TYPE 2 DIABETES MELLITUS WITH HYPERGLYCEMIA (HCC): ICD-10-CM

## 2022-03-02 DIAGNOSIS — K21.9 GASTROESOPHAGEAL REFLUX DISEASE, UNSPECIFIED WHETHER ESOPHAGITIS PRESENT: ICD-10-CM

## 2022-03-02 PROCEDURE — 99214 OFFICE O/P EST MOD 30 MIN: CPT | Performed by: INTERNAL MEDICINE

## 2022-03-02 RX ORDER — CYCLOBENZAPRINE HCL 10 MG
10 TABLET ORAL 3 TIMES DAILY PRN
Qty: 30 TABLET | Refills: 0 | Status: SHIPPED | OUTPATIENT
Start: 2022-03-02

## 2022-03-02 RX ORDER — METRONIDAZOLE 500 MG/1
500 TABLET ORAL EVERY 8 HOURS SCHEDULED
Qty: 30 TABLET | Refills: 0 | Status: SHIPPED | OUTPATIENT
Start: 2022-03-02 | End: 2022-03-12

## 2022-03-02 RX ORDER — LEVOFLOXACIN 250 MG/1
250 TABLET ORAL EVERY 24 HOURS
Qty: 10 TABLET | Refills: 0 | Status: SHIPPED | OUTPATIENT
Start: 2022-03-02 | End: 2022-03-12

## 2022-03-02 RX ORDER — PROCHLORPERAZINE MALEATE 10 MG
10 TABLET ORAL EVERY 6 HOURS PRN
Qty: 60 TABLET | Refills: 1 | Status: SHIPPED | OUTPATIENT
Start: 2022-03-02

## 2022-03-02 NOTE — PROGRESS NOTES
Assessment/Plan:        Diverticulitis  Keep The follow-up appointment as scheduled  Counseled about dietary modification  Referral given for colonoscopy as last colonoscopy was almost 5 years ago  Antibiotics as prescribed    Degenerative disc disease, cervical  Complains of chronic neck issues especially after waking up in the morning, sometimes resulting in migraines  Requesting refill on Compazine and Flexeril for the same  Problem List Items Addressed This Visit        Digestive    Gastroesophageal reflux disease       Endocrine    Hypothyroidism    Relevant Orders    TSH, 3rd generation with Free T4 reflex    Type 2 diabetes mellitus without complication, without long-term current use of insulin (HCC) - Primary    Relevant Orders    Mammo screening bilateral w 3d & cad    CBC and differential    Comprehensive metabolic panel    Hemoglobin A1C    Lipid Panel with Direct LDL reflex    TSH, 3rd generation with Free T4 reflex    UA w Reflex to Microscopic w Reflex to Culture    Microalbumin / creatinine urine ratio       Cardiovascular and Mediastinum    Carotid stenosis, asymptomatic, right (Chronic)       Other    Cigarette nicotine dependence (Chronic)    Hypercholesterolemia    Cervical stenosis of spinal canal    Relevant Medications    cyclobenzaprine (FLEXERIL) 10 mg tablet    Diverticulitis     Keep The follow-up appointment as scheduled  Counseled about dietary modification  Referral given for colonoscopy as last colonoscopy was almost 5 years ago    Antibiotics as prescribed         Relevant Medications    levofloxacin (LEVAQUIN) 250 mg tablet    metroNIDAZOLE (FLAGYL) 500 mg tablet    Other Relevant Orders    Ambulatory referral for colonoscopy      Other Visit Diagnoses     Encounter for screening mammogram for malignant neoplasm of breast        Relevant Orders    Mammo screening bilateral w 3d & cad    HTN (hypertension), benign        Relevant Orders    Mammo screening bilateral w 3d & cad    CBC and differential    Comprehensive metabolic panel    Lipid Panel with Direct LDL reflex    TSH, 3rd generation with Free T4 reflex    UA w Reflex to Microscopic w Reflex to Culture    Microalbumin / creatinine urine ratio    Uncontrolled type 2 diabetes mellitus with hyperglycemia (HCC)        Neck muscle spasm        Relevant Medications    cyclobenzaprine (FLEXERIL) 10 mg tablet    Chronic migraine without aura without status migrainosus, not intractable        Relevant Medications    cyclobenzaprine (FLEXERIL) 10 mg tablet    prochlorperazine (COMPAZINE) 10 mg tablet            Subjective:   Chief Complaint   Patient presents with    Follow-up     pt having flare up of diverticulitis stomach pain started yesterday diarrehia started a couple of weeks ago   [address] ordered dm eye pt will have done in summer dm foot     BMI Counseling: Body mass index is 26 32 kg/m²  The BMI is above normal  Nutrition recommendations include reducing portion sizes  Additionally was recommended to avoid eating seeds and nuts which seem to aggravate her diverticulitis     Patient ID: Stacey Edgar is a 62 y o  female  63 yo lady with multiple medical issues and a difficult social situation here for complaints of abdominal pain  She has a PMH of DM, CAD, HTN, HLD , Hypothyroidism and GERD in addition to other severe anxiety issues and insomnia  She has a previous history of diverticulitis and now feels the same symptoms as she did in the past, this time it is on the right side  The following portions of the patient's history were reviewed and updated as appropriate: past family history, past medical history, past social history, past surgical history and problem list     Review of Systems   Gastrointestinal: Positive for abdominal pain  Musculoskeletal: Positive for arthralgias and neck pain  Allergic/Immunologic: Positive for environmental allergies     All other systems reviewed and are negative          Past Medical History:   Diagnosis Date    Abnormal echocardiogram     Abnormal stress test     Anxiety     Asthma     Brachial neuritis     Depression     Disease of thyroid gland     Displacement of intervertebral disc of mid-cervical region     Fibromyalgia     Frequent headaches     GERD (gastroesophageal reflux disease)     Herniated nucleus pulposus     History of arthritis     History of asthma     History of cardiac disorder     History of chest pain     History of diverticulitis     History of fatigue     History of hearing loss     History of hemoptysis     History of herpes simplex infection     History of hiatal hernia     History of insect bite     INFECTED    History of memory loss     History of neck disorder     History of reflex sympathetic dystrophy     History of restless legs syndrome     History of shortness of breath     History of spinal stenosis     Hyperlipidemia     Hyperlipidemia     Hypertension     Skin rash     Somnolence, daytime     Spinal stenosis of cervical region          Current Outpatient Medications:     acetaminophen (TYLENOL) 325 mg tablet, Take 1 tablet (325 mg total) by mouth as needed for headaches For headache, Disp: , Rfl:     albuterol (PROVENTIL HFA,VENTOLIN HFA) 90 mcg/act inhaler, Inhale 2 puffs every 6 (six) hours as needed for wheezing, Disp: 18 g, Rfl: 5    aspirin 81 mg chewable tablet, Chew 81 mg daily, Disp: , Rfl:     atorvastatin (LIPITOR) 80 mg tablet, Take 1 tablet (80 mg total) by mouth daily, Disp: 90 tablet, Rfl: 3    citalopram (CeleXA) 40 mg tablet, Take 1 tablet (40 mg total) by mouth daily, Disp: 90 tablet, Rfl: 1    cyclobenzaprine (FLEXERIL) 10 mg tablet, Take 1 tablet (10 mg total) by mouth 3 (three) times a day as needed for muscle spasms, Disp: 30 tablet, Rfl: 0    Empagliflozin (Jardiance) 10 MG TABS, Take 1 tablet (10 mg total) by mouth every morning, Disp: 90 tablet, Rfl: 1   ezetimibe (ZETIA) 10 mg tablet, Take 1 tablet (10 mg total) by mouth daily, Disp: 90 tablet, Rfl: 1    fluticasone-salmeterol (ADVAIR) 250-50 mcg/dose inhaler, Inhale 1 puff every 12 (twelve) hours as needed (sob), Disp: 1 each, Rfl: 1    hydrOXYzine HCL (ATARAX) 25 mg tablet, Take 1 tablet (25 mg total) by mouth 2 (two) times a day as needed for itching, Disp: 60 tablet, Rfl: 5    losartan (COZAAR) 25 mg tablet, TAKE 1 TABLET DAILY  , Disp: 90 tablet, Rfl: 3    magnesium oxide (MAG-OX) 400 mg, Take 1 tablet (400 mg total) by mouth 2 (two) times a day (Patient taking differently: Take 1,500 mg by mouth daily ), Disp: 60 tablet, Rfl: 0    metoprolol succinate (TOPROL-XL) 25 mg 24 hr tablet, Take 25 mg by mouth daily, Disp: , Rfl:     omega-3-acid ethyl esters (LOVAZA) 1 g capsule, Take 2 capsules (2 g total) by mouth 2 (two) times a day, Disp: 120 capsule, Rfl: 3    pantoprazole (PROTONIX) 40 mg tablet, Take 1 tablet (40 mg total) by mouth daily, Disp: 90 tablet, Rfl: 1    pregabalin (LYRICA) 150 mg capsule, Take 1 capsule (150 mg total) by mouth 3 (three) times a day, Disp: 90 capsule, Rfl: 2    prochlorperazine (COMPAZINE) 10 mg tablet, Take 1 tablet (10 mg total) by mouth every 6 (six) hours as needed for nausea or vomiting Max 3 days per week, 6 month supply, Disp: 60 tablet, Rfl: 1    topiramate (TOPAMAX) 50 MG tablet, TAKE 1 TABLET IN THE MORNING AND 2 TABLETS AT BEDTIME, Disp: 270 tablet, Rfl: 1    triamcinolone (KENALOG) 0 1 % cream, Apply topically 2 (two) times a day, Disp: 80 g, Rfl: 1    zolpidem (Ambien) 10 mg tablet, Take 1 tablet (10 mg total) by mouth daily at bedtime as needed for sleep, Disp: 30 tablet, Rfl: 0    Lancets (ONETOUCH ULTRASOFT) lancets, by Other route 2 (two) times a day (Patient not taking: Reported on 3/2/2022 ), Disp: , Rfl:     levofloxacin (LEVAQUIN) 250 mg tablet, Take 1 tablet (250 mg total) by mouth every 24 hours for 10 days, Disp: 10 tablet, Rfl: 0   levothyroxine 112 mcg tablet, Take 1 tablet (112 mcg total) by mouth daily, Disp: 90 tablet, Rfl: 1    metroNIDAZOLE (FLAGYL) 500 mg tablet, Take 1 tablet (500 mg total) by mouth every 8 (eight) hours for 10 days, Disp: 30 tablet, Rfl: 0    valACYclovir (VALTREX) 1,000 mg tablet, Take 1 tablet (1,000 mg total) by mouth 3 (three) times a day for 5 days Do not need to complete course of 7 days  Okay    Medicine once sore settles (Patient not taking: Reported on 3/2/2022 ), Disp: 15 tablet, Rfl: 0    Allergies   Allergen Reactions    Augmentin [Amoxicillin-Pot Clavulanate] Nausea Only     PT stated she only allergic to medication AUGMENTIN    Meloxicam GI Intolerance     "brings on diverticulitis"       Social History   Past Surgical History:   Procedure Laterality Date    BACK SURGERY      BACK SURGERY      LOWER BACK SURGERY    CARDIAC CATHETERIZATION      HISTORY OF CORONARY ANGIOGRAPHY WITH CONCOMITANT LEFT HEART CATHETERIZATION    CORONARY ANGIOPLASTY WITH STENT PLACEMENT      EAR SURGERY      TONSILLECTOMY      TYMPANOPLASTY      US GUIDED BREAST BIOPSY LEFT COMPLETE Left 3/2/2021     Family History   Problem Relation Age of Onset    Coronary artery disease Mother         ATHEROMA    Heart disease Mother     Diabetes Mother     Hypertension Mother     No Known Problems Father         NO PERTINENT FAMILY HISTORY    Coronary artery disease Sister         ATHEROMA    Heart disease Sister     Stroke Sister     Diabetes Sister     Hypertension Sister     No Known Problems Maternal Grandmother     No Known Problems Maternal Grandfather     No Known Problems Paternal Grandmother     No Known Problems Paternal Grandfather     No Known Problems Daughter     Breast cancer Sister     No Known Problems Sister     No Known Problems Sister     No Known Problems Son     No Known Problems Maternal Aunt     No Known Problems Maternal Aunt     No Known Problems Paternal Aunt     No Known Problems Paternal Aunt     Brain cancer Paternal Uncle        Objective:  /70 (BP Location: Left arm, Patient Position: Sitting, Cuff Size: Standard)   Pulse 74   Temp 97 6 °F (36 4 °C) (Tympanic)   Ht 5' 3 98" (1 625 m)   Wt 69 5 kg (153 lb 3 2 oz)   LMP  (LMP Unknown)   SpO2 96% Comment: room air  BMI 26 32 kg/m²     No results found for this or any previous visit (from the past 1344 hour(s))  Physical Exam      Gen: NAD  Heent: atraumatic, normocephalic  Mouth: is moist  Neck: is supple, no JVD, no carotid bruits  Heart: is regular Normal s1, s2,  Lungs: are clear to auscultation  Abd: soft, mild tenderness right lower quadrant    Ext: no edema, distal pulses normal  Skin: no rashes, ulcers  Mood: normal affect  Neuro: AAOx3

## 2022-03-02 NOTE — ASSESSMENT & PLAN NOTE
Keep The follow-up appointment as scheduled  Counseled about dietary modification  Referral given for colonoscopy as last colonoscopy was almost 5 years ago    Antibiotics as prescribed

## 2022-03-02 NOTE — ASSESSMENT & PLAN NOTE
Complains of chronic neck issues especially after waking up in the morning, sometimes resulting in migraines  Requesting refill on Compazine and Flexeril for the same

## 2022-03-02 NOTE — PROGRESS NOTES
Diabetic Foot Exam    Patient's shoes and socks removed  Right Foot/Ankle   Right Foot Inspection  Skin Exam: skin intact  Sensory   Vibration: intact      Vascular  The right DP pulse is 2+  Left Foot/Ankle  Left Foot Inspection  Skin Exam: skin intact  Sensory   Vibration: intact      Vascular  The left DP pulse is 2+       Assign Risk Category  No deformity present  No loss of protective sensation  No weak pulses  Risk: 0

## 2022-03-11 ENCOUNTER — TELEPHONE (OUTPATIENT)
Dept: INTERNAL MEDICINE CLINIC | Facility: CLINIC | Age: 62
End: 2022-03-11

## 2022-03-15 ENCOUNTER — OFFICE VISIT (OUTPATIENT)
Dept: INTERNAL MEDICINE CLINIC | Facility: CLINIC | Age: 62
End: 2022-03-15
Payer: COMMERCIAL

## 2022-03-15 VITALS
TEMPERATURE: 97.6 F | WEIGHT: 151.2 LBS | BODY MASS INDEX: 25.81 KG/M2 | HEART RATE: 74 BPM | DIASTOLIC BLOOD PRESSURE: 72 MMHG | RESPIRATION RATE: 14 BRPM | SYSTOLIC BLOOD PRESSURE: 136 MMHG | HEIGHT: 64 IN

## 2022-03-15 DIAGNOSIS — K57.92 DIVERTICULITIS: Primary | ICD-10-CM

## 2022-03-15 PROCEDURE — 4004F PT TOBACCO SCREEN RCVD TLK: CPT | Performed by: INTERNAL MEDICINE

## 2022-03-15 PROCEDURE — 3078F DIAST BP <80 MM HG: CPT | Performed by: INTERNAL MEDICINE

## 2022-03-15 PROCEDURE — 99213 OFFICE O/P EST LOW 20 MIN: CPT | Performed by: INTERNAL MEDICINE

## 2022-03-15 PROCEDURE — 3008F BODY MASS INDEX DOCD: CPT | Performed by: INTERNAL MEDICINE

## 2022-03-15 RX ORDER — METRONIDAZOLE 500 MG/1
500 TABLET ORAL EVERY 8 HOURS SCHEDULED
Qty: 21 TABLET | Refills: 0 | Status: SHIPPED | OUTPATIENT
Start: 2022-03-15 | End: 2022-03-22

## 2022-03-15 RX ORDER — LEVOFLOXACIN 500 MG/1
500 TABLET, FILM COATED ORAL EVERY 24 HOURS
Qty: 7 TABLET | Refills: 0 | Status: SHIPPED | OUTPATIENT
Start: 2022-03-15 | End: 2022-03-22

## 2022-03-15 NOTE — PATIENT INSTRUCTIONS
Diverticulitis   WHAT YOU NEED TO KNOW:   Diverticulitis is a condition that causes small pockets along your intestine called diverticula to become inflamed or infected  This is caused by hard bowel movements, food, or bacteria that get stuck in the pockets  DISCHARGE INSTRUCTIONS:   Return to the emergency department if:   · You have bowel movement or foul-smelling discharge leaking from your vagina or in your urine  · You have severe diarrhea  · You urinate less than usual or not at all  · You are not able to have a bowel movement  · You cannot stop vomiting  · You have severe abdominal pain, a fever, and your abdomen is larger than usual     · You have new or increased blood in your bowel movements  Call your doctor if:   · You have pain when you urinate  · Your symptoms get worse or do not go away  · You have questions or concerns about your condition or care  Medicines:   · Antibiotics  may be given to help treat a bacterial infection  · Prescription pain medicine  may be given  Ask your healthcare provider how to take this medicine safely  Some prescription pain medicines contain acetaminophen  Do not take other medicines that contain acetaminophen without talking to your healthcare provider  Too much acetaminophen may cause liver damage  Prescription pain medicine may cause constipation  Ask your healthcare provider how to prevent or treat constipation  · Take your medicine as directed  Contact your healthcare provider if you think your medicine is not helping or if you have side effects  Tell him or her if you are allergic to any medicine  Keep a list of the medicines, vitamins, and herbs you take  Include the amounts, and when and why you take them  Bring the list or the pill bottles to follow-up visits  Carry your medicine list with you in case of an emergency  Clear liquid diet:  A clear liquid diet includes any liquids that you can see through   Examples include water, ginger-jeramy, cranberry or apple juice, frozen fruit ice, or broth  Stay on a clear liquid diet until your symptoms are gone, or as directed  Follow up with your doctor as directed: You may need to return for a colonoscopy  When your symptoms are gone, you may need a low-fat, high-fiber diet to prevent diverticulitis from developing again  Your healthcare provider or dietitian can help you create meal plans  Write down your questions so you remember to ask them during your visits  © Copyright [employer] 2022 Information is for End User's use only and may not be sold, redistributed or otherwise used for commercial purposes  All illustrations and images included in CareNotes® are the copyrighted property of [employer]  or [facility] Jeri Kam   The above information is an  only  It is not intended as medical advice for individual conditions or treatments  Talk to your doctor, nurse or pharmacist before following any medical regimen to see if it is safe and effective for you

## 2022-03-15 NOTE — PROGRESS NOTES
Assessment/Plan:    Diverticulitis  Antibiotics as prescribed  Follow-up with GI for colonoscopy  Dietary modifications as discussed              Diagnoses and all orders for this visit:    Diverticulitis  -     levofloxacin (LEVAQUIN) 500 mg tablet; Take 1 tablet (500 mg total) by mouth every 24 hours for 7 days  -     metroNIDAZOLE (FLAGYL) 500 mg tablet; Take 1 tablet (500 mg total) by mouth every 8 (eight) hours for 7 days  -     Ambulatory referral for colonoscopy; Future          Subjective:   Chief Complaint   Patient presents with    Follow-up     f/u on divertictulitis    Health Screening     dm eye order 7/28 pt insurance doesn't cover they wanted money up front  pt not able to do at this time  Mammo order pt holding off until feeling better      Patient ID: Charlotte Carr is a 62 y o  female  Abdominal Pain  This is a recurrent problem  The current episode started 1 to 4 weeks ago  The onset quality is gradual  The problem occurs intermittently  The problem has been gradually improving  The pain is located in the generalized abdominal region  The pain is moderate  The quality of the pain is a sensation of fullness, dull, cramping and colicky  The abdominal pain does not radiate  Associated symptoms include constipation and diarrhea  Associated symptoms comments: bloating  The pain is aggravated by palpation and movement  The pain is relieved by passing flatus  She has tried antibiotics for the symptoms  The treatment provided mild relief  Prior diagnostic workup includes CT scan  Diverticulitis            The following portions of the patient's history were reviewed and updated as appropriate: past family history, past medical history, past social history, past surgical history and problem list     Review of Systems   Gastrointestinal: Positive for abdominal pain, constipation and diarrhea  Negative for blood in stool  Allergic/Immunologic: Positive for environmental allergies     All other systems reviewed and are negative          Past Medical History:   Diagnosis Date    Abnormal echocardiogram     Abnormal stress test     Anxiety     Asthma     Brachial neuritis     Depression     Disease of thyroid gland     Displacement of intervertebral disc of mid-cervical region     Fibromyalgia     Frequent headaches     GERD (gastroesophageal reflux disease)     Herniated nucleus pulposus     History of arthritis     History of asthma     History of cardiac disorder     History of chest pain     History of diverticulitis     History of fatigue     History of hearing loss     History of hemoptysis     History of herpes simplex infection     History of hiatal hernia     History of insect bite     INFECTED    History of memory loss     History of neck disorder     History of reflex sympathetic dystrophy     History of restless legs syndrome     History of shortness of breath     History of spinal stenosis     Hyperlipidemia     Hyperlipidemia     Hypertension     Skin rash     Somnolence, daytime     Spinal stenosis of cervical region          Current Outpatient Medications:     acetaminophen (TYLENOL) 325 mg tablet, Take 1 tablet (325 mg total) by mouth as needed for headaches For headache, Disp: , Rfl:     albuterol (PROVENTIL HFA,VENTOLIN HFA) 90 mcg/act inhaler, Inhale 2 puffs every 6 (six) hours as needed for wheezing, Disp: 18 g, Rfl: 5    aspirin 81 mg chewable tablet, Chew 81 mg daily, Disp: , Rfl:     atorvastatin (LIPITOR) 80 mg tablet, Take 1 tablet (80 mg total) by mouth daily, Disp: 90 tablet, Rfl: 3    citalopram (CeleXA) 40 mg tablet, Take 1 tablet (40 mg total) by mouth daily, Disp: 90 tablet, Rfl: 1    cyclobenzaprine (FLEXERIL) 10 mg tablet, Take 1 tablet (10 mg total) by mouth 3 (three) times a day as needed for muscle spasms, Disp: 30 tablet, Rfl: 0    Empagliflozin (Jardiance) 10 MG TABS, Take 1 tablet (10 mg total) by mouth every morning, Disp: 90 tablet, Rfl: 1    ezetimibe (ZETIA) 10 mg tablet, Take 1 tablet (10 mg total) by mouth daily, Disp: 90 tablet, Rfl: 1    fluticasone-salmeterol (ADVAIR) 250-50 mcg/dose inhaler, Inhale 1 puff every 12 (twelve) hours as needed (sob), Disp: 1 each, Rfl: 1    hydrOXYzine HCL (ATARAX) 25 mg tablet, Take 1 tablet (25 mg total) by mouth 2 (two) times a day as needed for itching, Disp: 60 tablet, Rfl: 5    levothyroxine 112 mcg tablet, Take 1 tablet (112 mcg total) by mouth daily, Disp: 90 tablet, Rfl: 1    losartan (COZAAR) 25 mg tablet, TAKE 1 TABLET DAILY  , Disp: 90 tablet, Rfl: 3    magnesium oxide (MAG-OX) 400 mg, Take 1 tablet (400 mg total) by mouth 2 (two) times a day (Patient taking differently: Take 1,500 mg by mouth daily ), Disp: 60 tablet, Rfl: 0    metoprolol succinate (TOPROL-XL) 25 mg 24 hr tablet, Take 25 mg by mouth daily, Disp: , Rfl:     omega-3-acid ethyl esters (LOVAZA) 1 g capsule, Take 2 capsules (2 g total) by mouth 2 (two) times a day, Disp: 120 capsule, Rfl: 3    pantoprazole (PROTONIX) 40 mg tablet, Take 1 tablet (40 mg total) by mouth daily, Disp: 90 tablet, Rfl: 1    pregabalin (LYRICA) 150 mg capsule, Take 1 capsule (150 mg total) by mouth 3 (three) times a day, Disp: 90 capsule, Rfl: 2    prochlorperazine (COMPAZINE) 10 mg tablet, Take 1 tablet (10 mg total) by mouth every 6 (six) hours as needed for nausea or vomiting Max 3 days per week, 6 month supply, Disp: 60 tablet, Rfl: 1    zolpidem (Ambien) 10 mg tablet, Take 1 tablet (10 mg total) by mouth daily at bedtime as needed for sleep, Disp: 30 tablet, Rfl: 0    Lancets (ONETOUCH ULTRASOFT) lancets, by Other route 2 (two) times a day (Patient not taking: Reported on 3/2/2022 ), Disp: , Rfl:     levofloxacin (LEVAQUIN) 500 mg tablet, Take 1 tablet (500 mg total) by mouth every 24 hours for 7 days, Disp: 7 tablet, Rfl: 0    metroNIDAZOLE (FLAGYL) 500 mg tablet, Take 1 tablet (500 mg total) by mouth every 8 (eight) hours for 7 days, Disp: 21 tablet, Rfl: 0    topiramate (TOPAMAX) 50 MG tablet, TAKE 1 TABLET IN THE MORNING AND 2 TABLETS AT BEDTIME (Patient not taking: Reported on 3/15/2022 ), Disp: 270 tablet, Rfl: 1    triamcinolone (KENALOG) 0 1 % cream, Apply topically 2 (two) times a day, Disp: 80 g, Rfl: 1    valACYclovir (VALTREX) 1,000 mg tablet, Take 1 tablet (1,000 mg total) by mouth 3 (three) times a day for 5 days Do not need to complete course of 7 days  Okay    Medicine once sore settles (Patient not taking: Reported on 3/2/2022 ), Disp: 15 tablet, Rfl: 0    Allergies   Allergen Reactions    Augmentin [Amoxicillin-Pot Clavulanate] Nausea Only     PT stated she only allergic to medication AUGMENTIN    Meloxicam GI Intolerance     "brings on diverticulitis"       Social History   Past Surgical History:   Procedure Laterality Date    BACK SURGERY      BACK SURGERY      LOWER BACK SURGERY    CARDIAC CATHETERIZATION      HISTORY OF CORONARY ANGIOGRAPHY WITH CONCOMITANT LEFT HEART CATHETERIZATION    CORONARY ANGIOPLASTY WITH STENT PLACEMENT      EAR SURGERY      TONSILLECTOMY      TYMPANOPLASTY      US GUIDED BREAST BIOPSY LEFT COMPLETE Left 3/2/2021     Family History   Problem Relation Age of Onset    Coronary artery disease Mother         ATHEROMA    Heart disease Mother     Diabetes Mother     Hypertension Mother     No Known Problems Father         NO PERTINENT FAMILY HISTORY    Coronary artery disease Sister         ATHEROMA    Heart disease Sister     Stroke Sister     Diabetes Sister     Hypertension Sister     No Known Problems Maternal Grandmother     No Known Problems Maternal Grandfather     No Known Problems Paternal Grandmother     No Known Problems Paternal Grandfather     No Known Problems Daughter     Breast cancer Sister     No Known Problems Sister     No Known Problems Sister     No Known Problems Son     No Known Problems Maternal Aunt     No Known Problems Maternal Aunt     No Known Problems Paternal Aunt     No Known Problems Paternal Aunt     Brain cancer Paternal Uncle        Objective:    Vitals:    03/15/22 1143   BP: 136/72   Pulse: 74   Resp: 14   Temp: 97 6 °F (36 4 °C)     LMP  (LMP Unknown)     No results found for this or any previous visit (from the past 1344 hour(s))  Physical Exam  Vitals reviewed  Constitutional:       Appearance: Normal appearance  Comments: Some discomfort due to abdominal distension   HENT:      Head: Normocephalic and atraumatic  Mouth/Throat:      Mouth: Mucous membranes are moist    Cardiovascular:      Rate and Rhythm: Normal rate and regular rhythm  Pulmonary:      Effort: Pulmonary effort is normal  No respiratory distress  Abdominal:      General: There is distension  Tenderness: There is abdominal tenderness  There is no guarding  Skin:     General: Skin is warm and dry  Neurological:      General: No focal deficit present  Mental Status: She is alert and oriented to person, place, and time     Psychiatric:         Mood and Affect: Mood normal

## 2022-03-29 DIAGNOSIS — F51.01 PRIMARY INSOMNIA: ICD-10-CM

## 2022-03-29 RX ORDER — ZOLPIDEM TARTRATE 10 MG/1
10 TABLET ORAL
Qty: 30 TABLET | Refills: 0 | Status: SHIPPED | OUTPATIENT
Start: 2022-03-29 | End: 2022-04-29 | Stop reason: SDUPTHER

## 2022-04-11 ENCOUNTER — TELEPHONE (OUTPATIENT)
Dept: INTERNAL MEDICINE CLINIC | Facility: CLINIC | Age: 62
End: 2022-04-11

## 2022-04-11 DIAGNOSIS — E11.65 UNCONTROLLED TYPE 2 DIABETES MELLITUS WITH HYPERGLYCEMIA (HCC): ICD-10-CM

## 2022-04-11 DIAGNOSIS — E78.00 HYPERCHOLESTEROLEMIA: ICD-10-CM

## 2022-04-11 DIAGNOSIS — E03.9 HYPOTHYROIDISM, UNSPECIFIED TYPE: ICD-10-CM

## 2022-04-11 RX ORDER — LEVOTHYROXINE SODIUM 112 UG/1
112 TABLET ORAL DAILY
Qty: 90 TABLET | Refills: 1 | Status: SHIPPED | OUTPATIENT
Start: 2022-04-11 | End: 2022-05-10 | Stop reason: SDUPTHER

## 2022-04-11 RX ORDER — EZETIMIBE 10 MG/1
10 TABLET ORAL DAILY
Qty: 90 TABLET | Refills: 1 | Status: SHIPPED | OUTPATIENT
Start: 2022-04-11

## 2022-04-11 NOTE — TELEPHONE ENCOUNTER
Pt would like Rx refill for Empagliflozin (Jardiance) 10 MG TABS, levothyroxine 112 mcg tablet and ezetimibe (ZETIA) 10 mg tablet      Please send to [facility] in [place]     LOV- 3 15 22  NOV- 4 26 22

## 2022-04-19 DIAGNOSIS — K21.9 GASTROESOPHAGEAL REFLUX DISEASE: ICD-10-CM

## 2022-04-19 RX ORDER — PANTOPRAZOLE SODIUM 40 MG/1
40 TABLET, DELAYED RELEASE ORAL DAILY
Qty: 90 TABLET | Refills: 1 | Status: SHIPPED | OUTPATIENT
Start: 2022-04-19

## 2022-04-29 DIAGNOSIS — F51.01 PRIMARY INSOMNIA: ICD-10-CM

## 2022-04-29 RX ORDER — ZOLPIDEM TARTRATE 10 MG/1
10 TABLET ORAL
Qty: 30 TABLET | Refills: 0 | Status: SHIPPED | OUTPATIENT
Start: 2022-04-29 | End: 2022-06-01 | Stop reason: SDUPTHER

## 2022-04-29 NOTE — TELEPHONE ENCOUNTER
Patient would like Rx refill for zolpidem (Ambien) 10 mg tablet      [facility] to [facility] [place] [facility] [address]   [address]- 3 15 22  NOV- 5 10 22

## 2022-05-03 ENCOUNTER — RA CDI HCC (OUTPATIENT)
Dept: OTHER | Facility: HOSPITAL | Age: 62
End: 2022-05-03

## 2022-05-03 NOTE — PROGRESS NOTES
[facility] 75  coding opportunities          Chart Reviewed number of suggestions sent to Provider: 2    [phone]  [id number]     Patients Insurance     Medicare Insurance: [facility] Advantage

## 2022-05-10 ENCOUNTER — OFFICE VISIT (OUTPATIENT)
Dept: INTERNAL MEDICINE CLINIC | Facility: CLINIC | Age: 62
End: 2022-05-10
Payer: COMMERCIAL

## 2022-05-10 VITALS
TEMPERATURE: 98 F | DIASTOLIC BLOOD PRESSURE: 66 MMHG | BODY MASS INDEX: 25.78 KG/M2 | HEIGHT: 64 IN | HEART RATE: 74 BPM | OXYGEN SATURATION: 96 % | WEIGHT: 151 LBS | SYSTOLIC BLOOD PRESSURE: 130 MMHG

## 2022-05-10 DIAGNOSIS — E11.9 TYPE 2 DIABETES MELLITUS WITHOUT COMPLICATION, WITHOUT LONG-TERM CURRENT USE OF INSULIN (HCC): ICD-10-CM

## 2022-05-10 DIAGNOSIS — E03.9 HYPOTHYROIDISM, UNSPECIFIED TYPE: Primary | ICD-10-CM

## 2022-05-10 DIAGNOSIS — R51.9 HEADACHE: ICD-10-CM

## 2022-05-10 DIAGNOSIS — F33.2 SEVERE EPISODE OF RECURRENT MAJOR DEPRESSIVE DISORDER, WITHOUT PSYCHOTIC FEATURES (HCC): ICD-10-CM

## 2022-05-10 DIAGNOSIS — I20.8 CHRONIC STABLE ANGINA (HCC): ICD-10-CM

## 2022-05-10 LAB
ALBUMIN SERPL-MCNC: 4.3 G/DL (ref 3.6–5.1)
ALBUMIN/CREAT UR: 6 MCG/MG CREAT
ALBUMIN/GLOB SERPL: 1.6 (CALC) (ref 1–2.5)
ALP SERPL-CCNC: 79 U/L (ref 37–153)
ALT SERPL-CCNC: 25 U/L (ref 6–29)
APPEARANCE UR: CLEAR
AST SERPL-CCNC: 20 U/L (ref 10–35)
BACTERIA UR QL AUTO: ABNORMAL /HPF
BASOPHILS # BLD AUTO: 28 CELLS/UL (ref 0–200)
BASOPHILS NFR BLD AUTO: 0.5 %
BILIRUB SERPL-MCNC: 0.5 MG/DL (ref 0.2–1.2)
BILIRUB UR QL STRIP: NEGATIVE
BUN SERPL-MCNC: 18 MG/DL (ref 7–25)
BUN/CREAT SERPL: ABNORMAL (CALC) (ref 6–22)
CALCIUM SERPL-MCNC: 9.4 MG/DL (ref 8.6–10.4)
CHLORIDE SERPL-SCNC: 107 MMOL/L (ref 98–110)
CHOLEST SERPL-MCNC: 105 MG/DL
CHOLEST/HDLC SERPL: 2.1 (CALC)
CO2 SERPL-SCNC: 28 MMOL/L (ref 20–32)
COLOR UR: YELLOW
CREAT SERPL-MCNC: 0.71 MG/DL (ref 0.5–0.99)
CREAT UR-MCNC: 52 MG/DL (ref 20–275)
EOSINOPHIL # BLD AUTO: 50 CELLS/UL (ref 15–500)
EOSINOPHIL NFR BLD AUTO: 0.9 %
ERYTHROCYTE [DISTWIDTH] IN BLOOD BY AUTOMATED COUNT: 12.2 % (ref 11–15)
GLOBULIN SER CALC-MCNC: 2.7 G/DL (CALC) (ref 1.9–3.7)
GLUCOSE SERPL-MCNC: 100 MG/DL (ref 65–99)
GLUCOSE UR QL STRIP: ABNORMAL
HBA1C MFR BLD: 5.9 % OF TOTAL HGB
HCT VFR BLD AUTO: 45.5 % (ref 35–45)
HDLC SERPL-MCNC: 49 MG/DL
HGB BLD-MCNC: 15.6 G/DL (ref 11.7–15.5)
HGB UR QL STRIP: NEGATIVE
HYALINE CASTS #/AREA URNS LPF: ABNORMAL /LPF
KETONES UR QL STRIP: NEGATIVE
LDLC SERPL CALC-MCNC: 43 MG/DL (CALC)
LEUKOCYTE ESTERASE UR QL STRIP: NEGATIVE
LYMPHOCYTES # BLD AUTO: 1755 CELLS/UL (ref 850–3900)
LYMPHOCYTES NFR BLD AUTO: 31.9 %
MCH RBC QN AUTO: 31.1 PG (ref 27–33)
MCHC RBC AUTO-ENTMCNC: 34.3 G/DL (ref 32–36)
MCV RBC AUTO: 90.6 FL (ref 80–100)
MICROALBUMIN UR-MCNC: 0.3 MG/DL
MONOCYTES # BLD AUTO: 308 CELLS/UL (ref 200–950)
MONOCYTES NFR BLD AUTO: 5.6 %
NEUTROPHILS # BLD AUTO: 3361 CELLS/UL (ref 1500–7800)
NEUTROPHILS NFR BLD AUTO: 61.1 %
NITRITE UR QL STRIP: NEGATIVE
NONHDLC SERPL-MCNC: 56 MG/DL (CALC)
PH UR STRIP: ABNORMAL [PH] (ref 5–8)
PLATELET # BLD AUTO: 212 THOUSAND/UL (ref 140–400)
PMV BLD REES-ECKER: 10.9 FL (ref 7.5–12.5)
POTASSIUM SERPL-SCNC: 4.7 MMOL/L (ref 3.5–5.3)
PROT SERPL-MCNC: 7 G/DL (ref 6.1–8.1)
PROT UR QL STRIP: NEGATIVE
RBC # BLD AUTO: 5.02 MILLION/UL (ref 3.8–5.1)
RBC #/AREA URNS HPF: ABNORMAL /HPF
SL AMB EGFR AFRICAN AMERICAN: 107 ML/MIN/1.73M2
SL AMB EGFR NON AFRICAN AMERICAN: 93 ML/MIN/1.73M2
SODIUM SERPL-SCNC: 141 MMOL/L (ref 135–146)
SP GR UR STRIP: 1.03 (ref 1–1.03)
SQUAMOUS #/AREA URNS HPF: ABNORMAL /HPF
T4 FREE SERPL-MCNC: 2 NG/DL (ref 0.8–1.8)
TRIGL SERPL-MCNC: 53 MG/DL
TSH SERPL-ACNC: 0.05 MIU/L (ref 0.4–4.5)
WBC # BLD AUTO: 5.5 THOUSAND/UL (ref 3.8–10.8)
WBC #/AREA URNS HPF: ABNORMAL /HPF

## 2022-05-10 PROCEDURE — 99214 OFFICE O/P EST MOD 30 MIN: CPT | Performed by: INTERNAL MEDICINE

## 2022-05-10 PROCEDURE — 3008F BODY MASS INDEX DOCD: CPT | Performed by: INTERNAL MEDICINE

## 2022-05-10 PROCEDURE — 3044F HG A1C LEVEL LT 7.0%: CPT | Performed by: INTERNAL MEDICINE

## 2022-05-10 PROCEDURE — 4004F PT TOBACCO SCREEN RCVD TLK: CPT | Performed by: INTERNAL MEDICINE

## 2022-05-10 PROCEDURE — 3061F NEG MICROALBUMINURIA REV: CPT | Performed by: INTERNAL MEDICINE

## 2022-05-10 RX ORDER — LEVOTHYROXINE SODIUM 0.1 MG/1
100 TABLET ORAL DAILY
Qty: 90 TABLET | Refills: 1 | Status: SHIPPED | OUTPATIENT
Start: 2022-05-10

## 2022-05-10 RX ORDER — LEVOTHYROXINE SODIUM 112 UG/1
112 TABLET ORAL DAILY
Qty: 90 TABLET | Refills: 1 | Status: SHIPPED | OUTPATIENT
Start: 2022-05-10

## 2022-05-10 NOTE — ASSESSMENT & PLAN NOTE
Mild elevation of free T4 at 2 0  Clinically patient does not have any symptoms of hyper thyroidism  She was advised to keep an eye on her heart rate blood pressure or excessive sweating and to let me know any of the symptoms happen so we can reduce her does  Will recheck her blood work at her next visit to look for her thyroxine levels

## 2022-05-10 NOTE — ASSESSMENT & PLAN NOTE
Cholesterol panel is excellent HDL is mildly reduce however I will  her to make better food choices and get more cardiovascular exercise she is happy now she has a new boyfriend so hopefully she will be doing more for walking

## 2022-05-10 NOTE — ASSESSMENT & PLAN NOTE
Mild elevation of systolic blood pressure at 130  Will continue to monitor she is leading a fairly active lifestyle  However she is under lot of stress so her systolic blood pressure may be situational   Will recheck at her next visit and increase losartan to 50 mg if needed

## 2022-05-10 NOTE — ASSESSMENT & PLAN NOTE
Lab Results   Component Value Date    HGBA1C 5 9 (H) 05/09/2022   Hemoglobin C was 5 9 which is within normal limits  It is a little higher than it was the last time however she does admit that her diet has not been the best due to situational reasons but promises to try and improve that    Urine sugar was still 3+ probably due to her diet choices which hopefully will continue to improve

## 2022-05-10 NOTE — PROGRESS NOTES
Assessment/Plan:    Hypothyroidism  Mild elevation of free T4 at 2 0  Clinically patient does not have any symptoms of hyper thyroidism  She was advised to keep an eye on her heart rate blood pressure or excessive sweating and to let me know any of the symptoms happen so we can reduce her does  Will recheck her blood work at her next visit to look for her thyroxine levels  Dysthymic disorder  Overall mood has improved  Type 2 diabetes mellitus without complication, without long-term current use of insulin (HCC)    Lab Results   Component Value Date    HGBA1C 5 9 (H) 05/09/2022   Hemoglobin C was 5 9 which is within normal limits  It is a little higher than it was the last time however she does admit that her diet has not been the best due to situational reasons but promises to try and improve that  Urine sugar was still 3+ probably due to her diet choices which hopefully will continue to improve    Hypercholesterolemia  Cholesterol panel is excellent HDL is mildly reduce however I will  her to make better food choices and get more cardiovascular exercise she is happy now she has a new boyfriend so hopefully she will be doing more for walking    Benign essential HTN  Mild elevation of systolic blood pressure at 130  Will continue to monitor she is leading a fairly active lifestyle  However she is under lot of stress so her systolic blood pressure may be situational   Will recheck at her next visit and increase losartan to 50 mg if needed  Yu Lea was seen today for follow-up  Diagnoses and all orders for this visit:    Hypothyroidism, unspecified type  -     TSH, 3rd generation with Free T4 reflex; Future  -     levothyroxine 112 mcg tablet; Take 1 tablet (112 mcg total) by mouth daily Take 112 mcg daily on Mondays Wednesdays and Fridays alternate with 100 mcg all other days  -     levothyroxine (Euthyrox) 100 mcg tablet;  Take 1 tablet (100 mcg total) by mouth daily Take 112 mcg on Monday with disease in Friday and 100 mcg all other days of the week    Type 2 diabetes mellitus without complication, without long-term current use of insulin ([facility] [address] )  -     Ambulatory Referral to Ophthalmology; Future    Headache  -     magnesium oxide (MAG-OX) 400 mg; Take 4 tablets (1,600 mg total) by mouth daily    Severe episode of recurrent major depressive disorder, without psychotic features ([facility] [address] )    [facility]          Diagnoses and all orders for this visit:    Hypothyroidism, unspecified type  -     TSH, 3rd generation with Free T4 reflex; Future  -     levothyroxine 112 mcg tablet; Take 1 tablet (112 mcg total) by mouth daily Take 112 mcg daily on Mondays Wednesdays and Fridays alternate with 100 mcg all other days  -     levothyroxine (Euthyrox) 100 mcg tablet; Take 1 tablet (100 mcg total) by mouth daily Take 112 mcg on Monday with disease in Friday and 100 mcg all other days of the week    Type 2 diabetes mellitus without complication, without long-term current use of insulin ([facility] [address] )  -     Ambulatory Referral to Ophthalmology; Future    Headache  -     magnesium oxide (MAG-OX) 400 mg; Take 4 tablets (1,600 mg total) by mouth daily    Severe episode of recurrent major depressive disorder, without psychotic features [facility] )    Chronic stable angina [facility])        Subjective:   Chief Complaint   Patient presents with    Follow-up     review labs 5/9/22  - hypothyroidism - pt refused mammogram       Patient ID: Eleno Dial is a 62 y o  female  HPI     Follow-up visit for multiple medical problems including hypertension, hypothyroidism, diabetes mellitus, nicotine dependence, gastroesophageal reflux disease, anxiety and depression  Recent flare-up of diverticulitis that needed to runs of antibiotics to improve  Patient does say that her abdominal symptoms have now improved    Has not been able to keep her diet under control lately due to situational problems but does admit that she will improve in the next few weeks we reviewed her Hemo globin A1c which was 5 9 which is still under 6  She will be moving her motor van to another place which will improve her situational stress hopefully  The following portions of the patient's history were reviewed and updated as appropriate: past family history, past medical history, past social history, past surgical history and problem list     Review of Systems   Musculoskeletal: Positive for arthralgias  Allergic/Immunologic: Positive for environmental allergies  Psychiatric/Behavioral: Positive for sleep disturbance  The patient is nervous/anxious  All other systems reviewed and are negative          Past Medical History:   Diagnosis Date    Abnormal echocardiogram     Abnormal stress test     Anxiety     Asthma     Brachial neuritis     Depression     Disease of thyroid gland     Displacement of intervertebral disc of mid-cervical region     Fibromyalgia     Frequent headaches     GERD (gastroesophageal reflux disease)     Herniated nucleus pulposus     History of arthritis     History of asthma     History of cardiac disorder     History of chest pain     History of diverticulitis     History of fatigue     History of hearing loss     History of hemoptysis     History of herpes simplex infection     History of hiatal hernia     History of insect bite     INFECTED    History of memory loss     History of neck disorder     History of reflex sympathetic dystrophy     History of restless legs syndrome     History of shortness of breath     History of spinal stenosis     Hyperlipidemia     Hyperlipidemia     Hypertension     Skin rash     Somnolence, daytime     Spinal stenosis of cervical region          Current Outpatient Medications:     acetaminophen (TYLENOL) 325 mg tablet, Take 1 tablet (325 mg total) by mouth as needed for headaches For headache, Disp: , Rfl:     albuterol (PROVENTIL HFA,VENTOLIN HFA) 90 mcg/act inhaler, Inhale 2 puffs every 6 (six) hours as needed for wheezing, Disp: 18 g, Rfl: 5    aspirin 81 mg chewable tablet, Chew 81 mg daily, Disp: , Rfl:     atorvastatin (LIPITOR) 80 mg tablet, Take 1 tablet (80 mg total) by mouth daily, Disp: 90 tablet, Rfl: 3    citalopram (CeleXA) 40 mg tablet, Take 1 tablet (40 mg total) by mouth daily, Disp: 90 tablet, Rfl: 1    cyclobenzaprine (FLEXERIL) 10 mg tablet, Take 1 tablet (10 mg total) by mouth 3 (three) times a day as needed for muscle spasms, Disp: 30 tablet, Rfl: 0    Empagliflozin (Jardiance) 10 MG TABS, Take 1 tablet (10 mg total) by mouth every morning, Disp: 90 tablet, Rfl: 1    ezetimibe (ZETIA) 10 mg tablet, Take 1 tablet (10 mg total) by mouth daily, Disp: 90 tablet, Rfl: 1    fluticasone-salmeterol (ADVAIR) 250-50 mcg/dose inhaler, Inhale 1 puff every 12 (twelve) hours as needed (sob), Disp: 1 each, Rfl: 1    hydrOXYzine HCL (ATARAX) 25 mg tablet, Take 1 tablet (25 mg total) by mouth 2 (two) times a day as needed for itching, Disp: 60 tablet, Rfl: 5    levothyroxine 112 mcg tablet, Take 1 tablet (112 mcg total) by mouth daily Take 112 mcg daily on Mondays Wednesdays and Fridays alternate with 100 mcg all other days, Disp: 90 tablet, Rfl: 1    losartan (COZAAR) 25 mg tablet, TAKE 1 TABLET DAILY  , Disp: 90 tablet, Rfl: 3    magnesium oxide (MAG-OX) 400 mg, Take 4 tablets (1,600 mg total) by mouth daily, Disp: , Rfl:     metoprolol succinate (TOPROL-XL) 25 mg 24 hr tablet, Take 25 mg by mouth daily, Disp: , Rfl:     omega-3-acid ethyl esters (LOVAZA) 1 g capsule, Take 2 capsules (2 g total) by mouth 2 (two) times a day, Disp: 120 capsule, Rfl: 3    pantoprazole (PROTONIX) 40 mg tablet, Take 1 tablet (40 mg total) by mouth daily, Disp: 90 tablet, Rfl: 1    pregabalin (LYRICA) 150 mg capsule, Take 1 capsule (150 mg total) by mouth 3 (three) times a day, Disp: 90 capsule, Rfl: 2    prochlorperazine (COMPAZINE) 10 mg tablet, Take 1 tablet (10 mg total) by mouth every 6 (six) hours as needed for nausea or vomiting Max 3 days per week, 6 month supply, Disp: 60 tablet, Rfl: 1    triamcinolone (KENALOG) 0 1 % cream, Apply topically 2 (two) times a day, Disp: 80 g, Rfl: 1    zolpidem (Ambien) 10 mg tablet, Take 1 tablet (10 mg total) by mouth daily at bedtime as needed for sleep, Disp: 30 tablet, Rfl: 0    Lancets (ONETOUCH ULTRASOFT) lancets, by Other route 2 (two) times a day (Patient not taking: Reported on 3/2/2022 ), Disp: , Rfl:     levothyroxine (Euthyrox) 100 mcg tablet, Take 1 tablet (100 mcg total) by mouth daily Take 112 mcg on Monday with disease in Friday and 100 mcg all other days of the week, Disp: 90 tablet, Rfl: 1    Allergies   Allergen Reactions    Augmentin [Amoxicillin-Pot Clavulanate] Nausea Only     PT stated she only allergic to medication AUGMENTIN    Meloxicam GI Intolerance     "brings on diverticulitis"       Social History   Past Surgical History:   Procedure Laterality Date    BACK SURGERY      BACK SURGERY      LOWER BACK SURGERY    CARDIAC CATHETERIZATION      HISTORY OF CORONARY ANGIOGRAPHY WITH CONCOMITANT LEFT HEART CATHETERIZATION    CORONARY ANGIOPLASTY WITH STENT PLACEMENT      EAR SURGERY      TONSILLECTOMY      TYMPANOPLASTY      US GUIDED BREAST BIOPSY LEFT COMPLETE Left 3/2/2021     Family History   Problem Relation Age of Onset    Coronary artery disease Mother         ATHEROMA    Heart disease Mother     Diabetes Mother     Hypertension Mother     No Known Problems Father         NO PERTINENT FAMILY HISTORY    Coronary artery disease Sister         ATHEROMA    Heart disease Sister     Stroke Sister     Diabetes Sister     Hypertension Sister     No Known Problems Maternal Grandmother     No Known Problems Maternal Grandfather     No Known Problems Paternal Grandmother     No Known Problems Paternal Grandfather     No Known Problems Daughter     Breast cancer Sister     No Known Problems Sister     No Known Problems Sister     No Known Problems Son     No Known Problems Maternal Aunt     No Known Problems Maternal Aunt     No Known Problems Paternal Aunt     No Known Problems Paternal Aunt     Brain cancer Paternal Uncle        Objective:  /66 (BP Location: Left arm, Patient Position: Sitting, Cuff Size: Standard)   Pulse 74   Temp 98 °F (36 7 °C) (Temporal)   Ht 5' 4 09" (1 628 m)   Wt 68 5 kg (151 lb)   LMP  (LMP Unknown)   SpO2 96%   BMI 25 84 kg/m²     Recent Results (from the past 1344 hour(s))   Lipid Panel with Direct LDL reflex    Collection Time: 05/09/22  1:46 PM   Result Value Ref Range    Total Cholesterol 105 <200 mg/dL    HDL 49 (L) > OR = 50 mg/dL    Triglycerides 53 <150 mg/dL    LDL Calculated 43 mg/dL (calc)    Chol HDLC Ratio 2 1 <5 0 (calc)    Non-HDL Cholesterol 56 <130 mg/dL (calc)   Microalbumin, Random Urine (W/Creatinine)    Collection Time: 05/09/22  1:46 PM   Result Value Ref Range    Creatinine, Urine 52 20 - 275 mg/dL    Microalbum  ,U,Random 0 3 See Note: mg/dL    Microalb/Creat Ratio 6 <30 mcg/mg creat   Comprehensive metabolic panel    Collection Time: 05/09/22  1:46 PM   Result Value Ref Range    Glucose, Random 100 (H) 65 - 99 mg/dL    BUN 18 7 - 25 mg/dL    Creatinine 0 71 0 50 - 0 99 mg/dL    eGFR Non  93 > OR = 60 mL/min/1 73m2    eGFR  107 > OR = 60 mL/min/1 73m2    SL AMB BUN/CREATININE RATIO NOT APPLICABLE 6 - 22 (calc)    Sodium 141 135 - 146 mmol/L    Potassium 4 7 3 5 - 5 3 mmol/L    Chloride 107 98 - 110 mmol/L    CO2 28 20 - 32 mmol/L    Calcium 9 4 8 6 - 10 4 mg/dL    Protein, Total 7 0 6 1 - 8 1 g/dL    Albumin 4 3 3 6 - 5 1 g/dL    Globulin 2 7 1 9 - 3 7 g/dL (calc)    Albumin/Globulin Ratio 1 6 1 0 - 2 5 (calc)    TOTAL BILIRUBIN 0 5 0 2 - 1 2 mg/dL    Alkaline Phosphatase 79 37 - 153 U/L    AST 20 10 - 35 U/L    ALT 25 6 - 29 U/L   UA w Reflex to Microscopic w Reflex to Culture Collection Time: 05/09/22  1:46 PM   Result Value Ref Range    Color UA YELLOW YELLOW    Urine Appearance CLEAR CLEAR    Specific Gravity 1 027 1 001 - 1 035    Ph > OR = 9 0  (A) 5 0 - 8 0    Glucose, Urine 3+ (A) NEGATIVE    Bilirubin, Urine NEGATIVE NEGATIVE    Ketone, Urine NEGATIVE NEGATIVE    Blood, Urine NEGATIVE NEGATIVE    Protein, Urine NEGATIVE NEGATIVE    SL AMB NITRITES URINE, QUAL   NEGATIVE NEGATIVE    Leukocyte Esterase NEGATIVE NEGATIVE    SL AMB WBC, URINE 0-5 < OR = 5 /HPF    RBC, Urine 0-2 < OR = 2 /HPF    Squamous Epithelial Cells 0-5 < OR = 5 /HPF    Bacteria, UA NONE SEEN NONE SEEN /HPF    Hyaline Casts NONE SEEN NONE SEEN /LPF   CBC and differential    Collection Time: 05/09/22  1:46 PM   Result Value Ref Range    White Blood Cell Count 5 5 3 8 - 10 8 Thousand/uL    Red Blood Cell Count 5 02 3 80 - 5 10 Million/uL    Hemoglobin 15 6 (H) 11 7 - 15 5 g/dL    HCT 45 5 (H) 35 0 - 45 0 %    MCV 90 6 80 0 - 100 0 fL    MCH 31 1 27 0 - 33 0 pg    MCHC 34 3 32 0 - 36 0 g/dL    RDW 12 2 11 0 - 15 0 %    Platelet Count 596 780 - 400 Thousand/uL    SL AMB MPV 10 9 7 5 - 12 5 fL    Neutrophils (Absolute) 3,361 1,500 - 7,800 cells/uL    Lymphocytes (Absolute) 1,755 850 - 3,900 cells/uL    Monocytes (Absolute) 308 200 - 950 cells/uL    Eosinophils (Absolute) 50 15 - 500 cells/uL    Basophils ABS 28 0 - 200 cells/uL    Neutrophils 61 1 %    Lymphocytes 31 9 %    Monocytes 5 6 %    Eosinophils 0 9 %    Basophils PCT 0 5 %   TSH, 3rd generation with Free T4 reflex    Collection Time: 05/09/22  1:46 PM   Result Value Ref Range    TSH W/RFX TO FREE T4 0 05 (L) 0 40 - 4 50 mIU/L   Hemoglobin A1c (w/out EAG)    Collection Time: 05/09/22  1:46 PM   Result Value Ref Range    Hemoglobin A1C 5 9 (H) <5 7 % of total Hgb   REFLEXIVE URINE CULTURE    Collection Time: 05/09/22  1:46 PM   Result Value Ref Range    Urine Culture, Comprehensive     T4, free    Collection Time: 05/09/22  1:46 PM   Result Value Ref Range Free t4 2 0 (H) 0 8 - 1 8 ng/dL            Physical Exam      Gen: NAD  Heent: atraumatic, normocephalic  Mouth: is moist  Neck: is supple, no JVD, no carotid bruits     Heart: is regular Normal s1, s2,  Lungs: are clear to auscultation  Abd: soft, non tender, NABS  Ext: no edema, distal pulses normal  Skin: no rashes, ulcers  Mood: normal affect  Neuro: AAOx3

## 2022-05-12 ENCOUNTER — VBI (OUTPATIENT)
Dept: ADMINISTRATIVE | Facility: OTHER | Age: 62
End: 2022-05-12

## 2022-06-01 ENCOUNTER — TELEPHONE (OUTPATIENT)
Dept: INTERNAL MEDICINE CLINIC | Facility: OTHER | Age: 62
End: 2022-06-01

## 2022-06-01 ENCOUNTER — TELEMEDICINE (OUTPATIENT)
Dept: INTERNAL MEDICINE CLINIC | Facility: OTHER | Age: 62
End: 2022-06-01
Payer: COMMERCIAL

## 2022-06-01 VITALS — HEIGHT: 64 IN | WEIGHT: 151 LBS | BODY MASS INDEX: 25.78 KG/M2

## 2022-06-01 DIAGNOSIS — F51.01 PRIMARY INSOMNIA: ICD-10-CM

## 2022-06-01 DIAGNOSIS — U07.1 COVID-19: Primary | ICD-10-CM

## 2022-06-01 DIAGNOSIS — M79.7 FIBROMYALGIA: ICD-10-CM

## 2022-06-01 PROCEDURE — 99214 OFFICE O/P EST MOD 30 MIN: CPT | Performed by: NURSE PRACTITIONER

## 2022-06-01 PROCEDURE — 4004F PT TOBACCO SCREEN RCVD TLK: CPT | Performed by: NURSE PRACTITIONER

## 2022-06-01 PROCEDURE — 3008F BODY MASS INDEX DOCD: CPT | Performed by: NURSE PRACTITIONER

## 2022-06-01 RX ORDER — ZOLPIDEM TARTRATE 10 MG/1
10 TABLET ORAL
Qty: 30 TABLET | Refills: 0 | Status: SHIPPED | OUTPATIENT
Start: 2022-06-01 | End: 2022-07-01 | Stop reason: SDUPTHER

## 2022-06-01 RX ORDER — ACETAMINOPHEN 160 MG
2000 TABLET,DISINTEGRATING ORAL DAILY
Qty: 30 CAPSULE | Refills: 0 | Status: SHIPPED | OUTPATIENT
Start: 2022-06-01

## 2022-06-01 RX ORDER — PREGABALIN 150 MG/1
150 CAPSULE ORAL 3 TIMES DAILY
Qty: 90 CAPSULE | Refills: 0 | Status: SHIPPED | OUTPATIENT
Start: 2022-06-01 | End: 2022-07-01 | Stop reason: SDUPTHER

## 2022-06-01 NOTE — ASSESSMENT & PLAN NOTE
- home test positive for COVID   - start Paxlovid x5 days  - start vitamin D3 2000 International Units daily  - discussed isolation guidelines  - suspect her abdominal symptoms are secondary to her COVID infection, CBC ordered for next week, advised to notify office for any worsening abdominal symptoms

## 2022-06-01 NOTE — PROGRESS NOTES
COVID-19 Outpatient Progress Note    Assessment/Plan:    Problem List Items Addressed This Visit        Other    Fibromyalgia    Relevant Medications    pregabalin (LYRICA) 150 mg capsule    Insomnia    Relevant Medications    zolpidem (Ambien) 10 mg tablet    COVID-19 - Primary     - home test positive for COVID   - start Paxlovid x5 days  - start vitamin D3 2000 International Units daily  - discussed isolation guidelines  - suspect her abdominal symptoms are secondary to her COVID infection, CBC ordered for next week, advised to notify office for any worsening abdominal symptoms           Relevant Medications    nirmatrelvir & ritonavir (Paxlovid) tablet therapy pack    Cholecalciferol (Vitamin D3) 50 MCG (2000 UT) capsule    Other Relevant Orders    CBC and differential         Disposition:     Patient has COVID-19 infection  Based off CDC guidelines, they were recommended to isolate for 5 days from the date of the positive test  If they remain asymptomatic, isolation may be ended followed by 5 days of wearing a mask when around othes to minimize risk of infecting others  If they have a fever, continue to stay home until fever resolves for at least 24 hours  Patient meets criteria for PAXLOVID and they have been counseled appropriately according to EUA documentation released by the FDA  After discussion, patient agrees to treatment  [address] is an investigational medicine used to treat mild-to-moderate COVID-19 in adults and children (15years of age and older weighing at least 80 pounds (40 kg)) with positive results of direct SARS-CoV-2 viral testing, and who are at high risk for progression to severe COVID-19, including hospitalization or death  PAXLOVID is investigational because it is still being studied  There is limited information about the safety and effectiveness of using PAXLOVID to treat people with mild-to-moderate COVID-19      The FDA has authorized the emergency use of PAXLOVID for the treatment of mild-tomoderate COVID-19 in adults and children (15years of age and older weighing at least 80 pounds (40 kg)) with a positive test for the virus that causes COVID-19, and who are at high risk for progression to severe COVID-19, including hospitalization or death, under an EUA  What should I tell my healthcare provider before I take PAXLOVID? Tell your healthcare provider if you:  - Have any allergies  - Have liver or kidney disease  - Are pregnant or plan to become pregnant  - Are breastfeeding a child  - Have any serious illnesses    Tell your healthcare provider about all the medicines you take, including prescription and over-the-counter medicines, vitamins, and herbal supplements  Some medicines may interact with PAXLOVID and may cause serious side effects  Keep a list of your medicines to show your healthcare provider and pharmacist when you get a new medicine  You can ask your healthcare provider or pharmacist for a list of medicines that interact with PAXLOVID  Do not start taking a new medicine without telling your healthcare provider  Your healthcare provider can tell you if it is safe to take PAXLOVID with other medicines  Tell your healthcare provider if you are taking combined hormonal contraceptive  PAXLOVID may affect how your birth control pills work  Females who are able to become pregnant should use another effective alternative form of contraception or an additional barrier method of contraception  Talk to your healthcare provider if you have any questions about contraceptive methods that might be right for you  How do I take PAXLOVID? PAXLOVID consists of 2 medicines: nirmatrelvir and ritonavir  - Take 2 pink tablets of nirmatrelvir with 1 white tablet of ritonavir by mouth 2 times each day (in the morning and in the evening) for 5 days  For each dose, take all 3 tablets at the same time  - If you have kidney disease, talk to your healthcare provider   You may need a different dose  - Swallow the tablets whole  Do not chew, break, or crush the tablets  - Take PAXLOVID with or without food  - Do not stop taking PAXLOVID without talking to your healthcare provider, even if you feel better  - If you miss a dose of PAXLOVID within 8 hours of the time it is usually taken, take it as soon as you remember  If you miss a dose by more than 8 hours, skip the missed dose and take the next dose at your regular time  Do not take 2 doses of PAXLOVID at the same time  - If you take too much PAXLOVID, call your healthcare provider or go to the nearest hospital emergency room right away  - If you are taking a ritonavir- or cobicistat-containing medicine to treat hepatitis C or Human Immunodeficiency Virus (HIV), you should continue to take your medicine as prescribed by your healthcare provider   - Talk to your healthcare provider if you do not feel better or if you feel worse after 5 days  Who should generally not take PAXLOVID? Do not take PAXLOVID if:  You are allergic to nirmatrelvir, ritonavir, or any of the ingredients in PAXLOVID  You are taking any of the following medicines:  - Alfuzosin  - Pethidine, piroxicam, propoxyphene  - Ranolazine  - Amiodarone, dronedarone, flecainide, propafenone, quinidine  - Colchicine  - Lurasidone, pimozide, clozapine  - Dihydroergotamine, ergotamine, methylergonovine  - Lovastatin, simvastatin  - Sildenafil (Revatio®) for pulmonary arterial hypertension (PAH)  - Triazolam, oral midazolam  - Apalutamide  - Carbamazepine, phenobarbital, phenytoin  - Rifampin  - St  Haris Wort (hypericum perforatum)    What are the important possible side effects of PAXLOVID? Possible side effects of PAXLOVID are:  - Liver Problems   Tell your healthcare provider right away if you have any of these signs and symptoms of liver problems: loss of appetite, yellowing of your skin and the whites of eyes (jaundice), dark-colored urine, pale colored stools and itchy skin, stomach area (abdominal) pain  - Resistance to HIV Medicines  If you have untreated HIV infection, PAXLOVID may lead to some HIV medicines not working as well in the future  - Other possible side effects include: altered sense of taste, diarrhea, high blood pressure, or muscle aches    These are not all the possible side effects of PAXLOVID  Not many people have taken PAXLOVID  Serious and unexpected side effects may happen  Sonido Marte is still being studied, so it is possible that all of the risks are not known at this time  What other treatment choices are there? Like [place] [facility] may allow for the emergency use of other medicines to treat people with COVID-19  Go to https://[employer]/ for information on the emergency use of other medicines that are authorized by FDA to treat people with COVID-19  Your healthcare provider may talk with you about clinical trials for which you may be eligible  It is your choice to be treated or not to be treated with PAXLOVID  Should you decide not to receive it or for your child not to receive it, it will not change your standard medical care  What if I am pregnant or breastfeeding? There is no experience treating pregnant women or breastfeeding mothers with PAXLOVID  For a mother and unborn baby, the benefit of taking PAXLOVID may be greater than the risk from the treatment  If you are pregnant, discuss your options and specific situation with your healthcare provider  It is recommended that you use effective barrier contraception or do not have sexual activity while taking PAXLOVID  If you are breastfeeding, discuss your options and specific situation with your healthcare provider  How do I report side effects with PAXLOVID? Contact your healthcare provider if you have any side effects that bother you or do not go away      Report side effects to FDA MedWatch at www fda gov/medwatch or call 5-954-VGZ9450 or you can report side effects to [facility]  at the contact information provided below  Website Fax number Telephone number   [employer] [phone]     How should I store [address]? Store PAXLOVID tablets at room temperature between 68°F to 77°F (20°C to 25°C)  Full fact sheet for patients, parents, and caregivers can be found at: Helen varghese    I have spent 20 minutes directly with the patient  Encounter provider JORGE A Maher    Provider located at [address] [place] [address]    Recent Visits  No visits were found meeting these conditions  Showing recent visits within past 7 days and meeting all other requirements  Today's Visits  Date Type Provider Dept   06/01/22 Telephone JORGE A Maher [facility]   06/01/22 [address], JORGE A [facility] - [facility]   Showing today's visits and meeting all other requirements  Future Appointments  No visits were found meeting these conditions  Showing future appointments within next 150 days and meeting all other requirements     This virtual check-in was done via [facility] and patient was informed that this is a secure, HIPAA-compliant platform  She agrees to proceed  Patient agrees to participate in a virtual check in via telephone or video visit instead of presenting to the office to address urgent/immediate medical needs  Patient is aware this is a billable service  After connecting through [facility], the patient was identified by name and date of birth  Pete Thurman was informed that this was a telemedicine visit and that the exam was being conducted confidentially over secure lines   My office door was closed  No one else was in the room  Theron Leal acknowledged consent and understanding of privacy and security of the telemedicine visit  I informed the patient that I have reviewed her record in Epic and presented the opportunity for her to ask any questions regarding the visit today  The patient agreed to participate  Verification of patient location:  Patient is located in the following state in which I hold an active license: [place]    Subjective:   Theron Leal is a 62 y o  female who has been screened for COVID-19  Patient's symptoms include fever (since resolved), fatigue, nasal congestion, rhinorrhea, sore throat (with coughing), cough (yellow phelgm), shortness of breath ("a little"), abdominal pain, myalgias and headache  Patient denies chills, chest tightness, nausea, vomiting and diarrhea (loose stool)  - Date of symptom onset: 5/28/2022  - Date of exposure: 5/25/2022  - Date of positive COVID-19 test: 6/1/2022  Type of test: Home antigen  Patient with typical symptoms of COVID-19 and they attest that they were positive on home rapid antigen testing  Image of positive result is not able to be uploaded into their chart  COVID-19 vaccination status: Not vaccinated    Mansi Chirinos has been staying home and has isolated themselves in her home  She is taking care to not share personal items and is cleaning all surfaces that are touched often, like counters, tabletops, and doorknobs using household cleaning sprays or wipes  She is wearing a mask when she leaves her room       Lab Results   Component Value Date    SARSCOV2 Negative 02/11/2021     Past Medical History:   Diagnosis Date    Abnormal echocardiogram     Abnormal stress test     Anxiety     Asthma     Brachial neuritis     Depression     Disease of thyroid gland     Displacement of intervertebral disc of mid-cervical region     Fibromyalgia     Frequent headaches     GERD (gastroesophageal reflux disease)     Herniated nucleus pulposus     History of arthritis     History of asthma     History of cardiac disorder     History of chest pain     History of diverticulitis     History of fatigue     History of hearing loss     History of hemoptysis     History of herpes simplex infection     History of hiatal hernia     History of insect bite     INFECTED    History of memory loss     History of neck disorder     History of reflex sympathetic dystrophy     History of restless legs syndrome     History of shortness of breath     History of spinal stenosis     Hyperlipidemia     Hyperlipidemia     Hypertension     Skin rash     Somnolence, daytime     Spinal stenosis of cervical region      Past Surgical History:   Procedure Laterality Date    BACK SURGERY      BACK SURGERY      LOWER BACK SURGERY    CARDIAC CATHETERIZATION      HISTORY OF CORONARY ANGIOGRAPHY WITH CONCOMITANT LEFT HEART CATHETERIZATION    CORONARY ANGIOPLASTY WITH STENT PLACEMENT      EAR SURGERY      TONSILLECTOMY      TYMPANOPLASTY      US GUIDED BREAST BIOPSY LEFT COMPLETE Left 3/2/2021     Current Outpatient Medications   Medication Sig Dispense Refill    acetaminophen (TYLENOL) 325 mg tablet Take 1 tablet (325 mg total) by mouth as needed for headaches For headache      albuterol (PROVENTIL HFA,VENTOLIN HFA) 90 mcg/act inhaler Inhale 2 puffs every 6 (six) hours as needed for wheezing 18 g 5    aspirin 81 mg chewable tablet Chew 81 mg daily      atorvastatin (LIPITOR) 80 mg tablet Take 1 tablet (80 mg total) by mouth daily 90 tablet 3    Cholecalciferol (Vitamin D3) 50 MCG (2000 UT) capsule Take 1 capsule (2,000 Units total) by mouth daily 30 capsule 0    citalopram (CeleXA) 40 mg tablet Take 1 tablet (40 mg total) by mouth daily 90 tablet 1    cyclobenzaprine (FLEXERIL) 10 mg tablet Take 1 tablet (10 mg total) by mouth 3 (three) times a day as needed for muscle spasms 30 tablet 0    Empagliflozin (Jardiance) 10 MG TABS Take 1 tablet (10 mg total) by mouth every morning 90 tablet 1    ezetimibe (ZETIA) 10 mg tablet Take 1 tablet (10 mg total) by mouth daily 90 tablet 1    hydrOXYzine HCL (ATARAX) 25 mg tablet Take 1 tablet (25 mg total) by mouth 2 (two) times a day as needed for itching 60 tablet 5    levothyroxine 112 mcg tablet Take 1 tablet (112 mcg total) by mouth daily Take 112 mcg daily on Mondays Wednesdays and Fridays alternate with 100 mcg all other days (Patient taking differently: Take 112 mcg by mouth daily) 90 tablet 1    losartan (COZAAR) 25 mg tablet TAKE 1 TABLET DAILY   90 tablet 3    magnesium oxide (MAG-OX) 400 mg Take 4 tablets (1,600 mg total) by mouth daily      metoprolol succinate (TOPROL-XL) 25 mg 24 hr tablet Take 25 mg by mouth daily      nirmatrelvir & ritonavir (Paxlovid) tablet therapy pack Take 3 tablets by mouth 2 (two) times a day for 5 days Take 2 nirmatrelvir tablets + 1 ritonavir tablet together per dose 30 tablet 0    omega-3-acid ethyl esters (LOVAZA) 1 g capsule Take 2 capsules (2 g total) by mouth 2 (two) times a day (Patient taking differently: Take 1 g by mouth daily 2000) 120 capsule 3    pantoprazole (PROTONIX) 40 mg tablet Take 1 tablet (40 mg total) by mouth daily 90 tablet 1    pregabalin (LYRICA) 150 mg capsule Take 1 capsule (150 mg total) by mouth 3 (three) times a day 90 capsule 0    prochlorperazine (COMPAZINE) 10 mg tablet Take 1 tablet (10 mg total) by mouth every 6 (six) hours as needed for nausea or vomiting Max 3 days per week, 6 month supply 60 tablet 1    triamcinolone (KENALOG) 0 1 % cream Apply topically 2 (two) times a day (Patient taking differently: Apply topically as needed) 80 g 1    zolpidem (Ambien) 10 mg tablet Take 1 tablet (10 mg total) by mouth daily at bedtime as needed for sleep 30 tablet 0    Lancets (ONETOUCH ULTRASOFT) lancets by Other route 2 (two) times a day (Patient not taking: No sig reported)      levothyroxine (Euthyrox) 100 mcg tablet Take 1 tablet (100 mcg total) by mouth daily Take 112 mcg on Monday with disease in Friday and 100 mcg all other days of the week (Patient not taking: Reported on 6/1/2022) 90 tablet 1     No current facility-administered medications for this visit  Allergies   Allergen Reactions    Augmentin [Amoxicillin-Pot Clavulanate] Nausea Only     PT stated she only allergic to medication AUGMENTIN    Meloxicam GI Intolerance     "brings on diverticulitis"       Review of Systems   Constitutional: Positive for fatigue and fever (since resolved)  Negative for chills  HENT: Positive for congestion, rhinorrhea and sore throat (with coughing)  Respiratory: Positive for cough (yellow phelgm) and shortness of breath ("a little")  Negative for chest tightness  Gastrointestinal: Positive for abdominal distention and abdominal pain  Negative for diarrhea (loose stool), nausea and vomiting  Musculoskeletal: Positive for myalgias  Neurological: Positive for headaches  Objective:    Vitals:    06/01/22 0925   Weight: 68 5 kg (151 lb)   Height: 5' 4" (1 626 m)       Physical Exam  Vitals reviewed  Constitutional:       General: She is not in acute distress  Appearance: Normal appearance  She is not diaphoretic  HENT:      Head: Normocephalic and atraumatic  Nose: Congestion present  Abdominal:      Tenderness: There is abdominal tenderness (lower abdomen, right side)  Neurological:      Mental Status: She is alert  Mental status is at baseline  Psychiatric:         Mood and Affect: Mood normal          Behavior: Behavior normal          VIRTUAL VISIT [facility] verbally agrees to participate in [employer]   Pt is aware that [employer] could be limited without vital signs or the ability to perform a full hands-on physical exam  Isaura Anderson understands she or the provider may request at any time to terminate the video visit and request the patient to seek care or treatment in person

## 2022-06-01 NOTE — TELEPHONE ENCOUNTER
Patient stated she would like her script to be sent over to [facility] on [address] [facility] [facility], [address] [facility]

## 2022-07-01 DIAGNOSIS — F51.01 PRIMARY INSOMNIA: ICD-10-CM

## 2022-07-01 DIAGNOSIS — M79.7 FIBROMYALGIA: ICD-10-CM

## 2022-07-01 RX ORDER — PREGABALIN 150 MG/1
150 CAPSULE ORAL 3 TIMES DAILY
Qty: 90 CAPSULE | Refills: 0 | Status: SHIPPED | OUTPATIENT
Start: 2022-07-01 | End: 2022-08-12 | Stop reason: SDUPTHER

## 2022-07-01 RX ORDER — ZOLPIDEM TARTRATE 10 MG/1
10 TABLET ORAL
Qty: 30 TABLET | Refills: 0 | Status: SHIPPED | OUTPATIENT
Start: 2022-07-01 | End: 2022-08-05 | Stop reason: SDUPTHER

## 2022-07-14 DIAGNOSIS — J45.909 ASTHMA, UNSPECIFIED ASTHMA SEVERITY, UNSPECIFIED WHETHER COMPLICATED, UNSPECIFIED WHETHER PERSISTENT: ICD-10-CM

## 2022-07-14 RX ORDER — ALBUTEROL SULFATE 90 UG/1
2 AEROSOL, METERED RESPIRATORY (INHALATION) EVERY 6 HOURS PRN
Qty: 18 G | Refills: 5 | Status: SHIPPED | OUTPATIENT
Start: 2022-07-14

## 2022-08-05 DIAGNOSIS — F51.01 PRIMARY INSOMNIA: ICD-10-CM

## 2022-08-05 RX ORDER — ZOLPIDEM TARTRATE 10 MG/1
10 TABLET ORAL
Qty: 30 TABLET | Refills: 0 | Status: SHIPPED | OUTPATIENT
Start: 2022-08-05 | End: 2022-09-08 | Stop reason: SDUPTHER

## 2022-08-12 DIAGNOSIS — M79.7 FIBROMYALGIA: ICD-10-CM

## 2022-08-12 RX ORDER — PREGABALIN 150 MG/1
150 CAPSULE ORAL 3 TIMES DAILY
Qty: 90 CAPSULE | Refills: 0 | Status: SHIPPED | OUTPATIENT
Start: 2022-08-12 | End: 2022-09-28 | Stop reason: SDUPTHER

## 2022-09-08 DIAGNOSIS — F51.01 PRIMARY INSOMNIA: ICD-10-CM

## 2022-09-08 RX ORDER — ZOLPIDEM TARTRATE 10 MG/1
10 TABLET ORAL
Qty: 30 TABLET | Refills: 0 | Status: SHIPPED | OUTPATIENT
Start: 2022-09-08 | End: 2022-10-07 | Stop reason: SDUPTHER

## 2022-09-20 ENCOUNTER — RA CDI HCC (OUTPATIENT)
Dept: OTHER | Facility: HOSPITAL | Age: 62
End: 2022-09-20

## 2022-09-20 NOTE — PROGRESS NOTES
[facility]  coding opportunities          Chart Reviewed number of suggestions sent to Provider: 2  [id number]  [id number]     Patients Insurance     Medicare Insurance: [facility] [employer]

## 2022-09-28 DIAGNOSIS — M79.7 FIBROMYALGIA: ICD-10-CM

## 2022-09-28 RX ORDER — PREGABALIN 150 MG/1
150 CAPSULE ORAL 3 TIMES DAILY
Qty: 90 CAPSULE | Refills: 0 | Status: SHIPPED | OUTPATIENT
Start: 2022-09-28

## 2022-09-28 NOTE — TELEPHONE ENCOUNTER
Rx refill for pregabalin (LYRICA) 150 mg capsule      Send to [facility], [place] - [address]- 5/10/22  NOV- ALEC to schedule

## 2022-09-28 NOTE — TELEPHONE ENCOUNTER
Patient is scheduled for 10/4/22 Speech Therapy    Patient not seen Patient is with other health care provider.      Re-attempt plan: per established plan of care    OBJECTIVE

## 2022-09-29 NOTE — TELEPHONE ENCOUNTER
On 10/22/18 [facility] office received a medical records request from [facility] law office which was faxed to [facility] on 10/22/18 
(1) Other Medications/None

## 2022-09-30 ENCOUNTER — TRANSCRIBE ORDERS (OUTPATIENT)
Dept: VASCULAR SURGERY | Facility: CLINIC | Age: 62
End: 2022-09-30

## 2022-09-30 DIAGNOSIS — E03.9 HYPOTHYROIDISM, UNSPECIFIED TYPE: ICD-10-CM

## 2022-09-30 DIAGNOSIS — I65.21 CAROTID STENOSIS, ASYMPTOMATIC, RIGHT: Primary | ICD-10-CM

## 2022-09-30 RX ORDER — LEVOTHYROXINE SODIUM 112 UG/1
112 TABLET ORAL DAILY
Qty: 10 TABLET | Refills: 0 | Status: SHIPPED | OUTPATIENT
Start: 2022-09-30 | End: 2022-10-14 | Stop reason: SDUPTHER

## 2022-09-30 NOTE — TELEPHONE ENCOUNTER
Patient states she is getting refill from [facility] [employer] [facility] but not for a few more days  Patient wanted to see if she can get about 10 tabs filled until then

## 2022-10-04 ENCOUNTER — OFFICE VISIT (OUTPATIENT)
Dept: INTERNAL MEDICINE CLINIC | Facility: CLINIC | Age: 62
End: 2022-10-04
Payer: COMMERCIAL

## 2022-10-04 VITALS
DIASTOLIC BLOOD PRESSURE: 72 MMHG | HEART RATE: 70 BPM | BODY MASS INDEX: 25 KG/M2 | OXYGEN SATURATION: 96 % | TEMPERATURE: 98.1 F | WEIGHT: 146.4 LBS | HEIGHT: 64 IN | SYSTOLIC BLOOD PRESSURE: 130 MMHG

## 2022-10-04 DIAGNOSIS — I10 HTN (HYPERTENSION), BENIGN: ICD-10-CM

## 2022-10-04 DIAGNOSIS — Z23 PNEUMOCOCCAL VACCINE ADMINISTERED: ICD-10-CM

## 2022-10-04 DIAGNOSIS — M62.838 NECK MUSCLE SPASM: ICD-10-CM

## 2022-10-04 DIAGNOSIS — Z23 NEED FOR VACCINATION AGAINST STREPTOCOCCUS PNEUMONIAE: ICD-10-CM

## 2022-10-04 DIAGNOSIS — Z00.00 MEDICARE ANNUAL WELLNESS VISIT, SUBSEQUENT: Primary | ICD-10-CM

## 2022-10-04 DIAGNOSIS — M48.02 CERVICAL STENOSIS OF SPINAL CANAL: ICD-10-CM

## 2022-10-04 DIAGNOSIS — Z12.4 CERVICAL CANCER SCREENING: ICD-10-CM

## 2022-10-04 DIAGNOSIS — Z11.4 SCREENING FOR HIV (HUMAN IMMUNODEFICIENCY VIRUS): ICD-10-CM

## 2022-10-04 PROCEDURE — G0439 PPPS, SUBSEQ VISIT: HCPCS | Performed by: INTERNAL MEDICINE

## 2022-10-04 PROCEDURE — 90677 PCV20 VACCINE IM: CPT

## 2022-10-04 PROCEDURE — 99213 OFFICE O/P EST LOW 20 MIN: CPT | Performed by: INTERNAL MEDICINE

## 2022-10-04 PROCEDURE — G0009 ADMIN PNEUMOCOCCAL VACCINE: HCPCS

## 2022-10-04 RX ORDER — CYCLOBENZAPRINE HCL 10 MG
10 TABLET ORAL 3 TIMES DAILY PRN
Qty: 30 TABLET | Refills: 0 | Status: SHIPPED | OUTPATIENT
Start: 2022-10-04

## 2022-10-04 RX ORDER — LOSARTAN POTASSIUM 25 MG/1
25 TABLET ORAL DAILY
Qty: 90 TABLET | Refills: 1 | Status: CANCELLED | OUTPATIENT
Start: 2022-10-04

## 2022-10-04 NOTE — PROGRESS NOTES
Assessment and Plan:     Problem List Items Addressed This Visit        Other    Cervical stenosis of spinal canal    Relevant Medications    cyclobenzaprine (FLEXERIL) 10 mg tablet      Other Visit Diagnoses     Medicare annual wellness visit, subsequent    -  Primary    Neck muscle spasm        Relevant Medications    cyclobenzaprine (FLEXERIL) 10 mg tablet    Pneumococcal vaccine administered        Relevant Orders    Pneumococcal Conjugate Vaccine 20-valent (Pcv20)    Cervical cancer screening        Relevant Orders    Ambulatory Referral to Gynecology    Screening for HIV (human immunodeficiency virus)        Relevant Orders    HIV 1/2 Antigen/Antibody (4th Generation) w Reflex [facility]        · Agreeable for PCV-20   · Patient does not want the flu shot today because last time it exacerbated her fibromyalgia and had malaise for 3 days  · She will get flu shot at the pharmacy in 2 weeks  · She will get COVID booster #3 at the pharmacy this month  · Hepatitis A/B vaccine next time  Pt has fear of needles  · Gynecology referral for PAP/co testing  · Will get L breast U/S done (due since 9/2021) and plan for mammogram in springtime (referral already provided)  · Planning to get colonoscopy soon for diverticulitis -- CRC screening due 2025  · A1c due in November 2022 -- labwork placed  · HIV screening test ordered; CBC and TSH due   · Not ready for smoking cessation  · BMI Counseling: Body mass index is 25 06 kg/m²  The BMI is above normal  Nutrition recommendations include reducing portion sizes, decreasing overall calorie intake and 3-5 servings of fruits/vegetables daily  Exercise recommendations include moderate aerobic physical activity for 150 minutes/week, exercising 3-5 times per week and strength training exercises  ·       Tobacco Cessation Counseling: Tobacco cessation counseling was provided   The patient is sincerely urged to quit consumption of tobacco  She is not ready to quit tobacco  Medication options discussed  Patient refused medication  Patient started smoking 35 years ago, quit for 7 and restarted 1 5 years ago  Now smoking 1 5 packs per years  She has had a lot of stressful family issues  Does not feel ready to quit smoking  She leaves a motor home -- homeless -- so she lives between her sisters house and a gentleman with [place] Brightly disease who she helps with  She has nicotine patch but not ready to use it  Preventive health issues were discussed with patient, and age appropriate screening tests were ordered as noted in patient's After Visit Summary  Personalized health advice and appropriate referrals for health education or preventive services given if needed, as noted in patient's After Visit Summary  History of Present Illness:     Patient presents for a Medicare Wellness Visit    Patient is a 65F with PMH fibromyalgia, IBS, DM2, hypothyroid, CAD, degenerative disc disease, MDD, chronic pain syndrome who presents for AWV  No complaints at this time  Agreeable for PCV-20 shot -- has received one pneumonia vaccine dose only in the past      Patient does not want to get mammogram done  She finds it extremely painful and tender breast  She also feels with cold weather -- fibromyalgia is worse  She will get mammogram done in the spring  Ultrasound in 6 months for the left breast     HPI   Patient Care Team:  Sharmaine Osei MD as PCP - General (Internal Medicine)  DO Jose Carlos Bruce MD (Vascular Surgery)     Review of Systems:     Review of Systems   Constitutional: Negative for chills, fatigue and fever  HENT: Negative for ear pain and sore throat  Eyes: Negative for pain and visual disturbance  Respiratory: Negative for cough and shortness of breath  Cardiovascular: Negative for chest pain and palpitations  Gastrointestinal: Negative for abdominal pain and vomiting  Endocrine: Positive for heat intolerance          Hot flashes   Genitourinary: Negative for dysuria and hematuria  Musculoskeletal: Positive for arthralgias, back pain, myalgias, neck pain and neck stiffness  Negative for gait problem and joint swelling  Skin: Negative for color change and rash  Neurological: Positive for headaches  Negative for seizures and syncope  Migraines   All other systems reviewed and are negative         Problem List:     Patient Active Problem List   Diagnosis    Back pain    Benign essential HTN    Cervical herniated disc    Cervical radiculopathy    Coronary arteriosclerosis    Chronic stable angina (HCC)    Degenerative disc disease, cervical    Situational mixed anxiety and depressive disorder    Fibromyalgia    Gastroesophageal reflux disease    Hypothyroidism    Hypercholesterolemia    Insomnia    Lumbar foraminal stenosis    Lumbar radiculopathy    Sacroiliitis (HCC)    Severe episode of recurrent major depressive disorder, without psychotic features (HCC)    Skin rash    Myofascial pain    Asthma    Brachial neuritis    Cervical spondylosis    Chronic low back pain    Dysthymic disorder    Irritable bowel syndrome without diarrhea    Pain in thoracic spine    Reflex sympathetic dystrophy    Cervical stenosis of spinal canal    Status post lumbar surgery    Chronic pain syndrome    Maxillary sinusitis    Hearing problem of both ears    Generalized weakness    Type 2 diabetes mellitus without complication, without long-term current use of insulin (HCC)    Hearing loss    Headache    Cigarette nicotine dependence    Carotid stenosis, asymptomatic, right    Diarrhea of presumed infectious origin    Abdominal pain    Recent weight loss    Diverticulitis    COVID-19      Past Medical and Surgical History:     Past Medical History:   Diagnosis Date    Abnormal echocardiogram     Abnormal stress test     Anxiety     Asthma     Brachial neuritis     Depression     Disease of thyroid gland     Displacement of intervertebral disc of mid-cervical region     Fibromyalgia     Frequent headaches     GERD (gastroesophageal reflux disease)     Herniated nucleus pulposus     History of arthritis     History of asthma     History of cardiac disorder     History of chest pain     History of diverticulitis     History of fatigue     History of hearing loss     History of hemoptysis     History of herpes simplex infection     History of hiatal hernia     History of insect bite     INFECTED    History of memory loss     History of neck disorder     History of reflex sympathetic dystrophy     History of restless legs syndrome     History of shortness of breath     History of spinal stenosis     Hyperlipidemia     Hyperlipidemia     Hypertension     Skin rash     Somnolence, daytime     Spinal stenosis of cervical region      Past Surgical History:   Procedure Laterality Date    BACK SURGERY      BACK SURGERY      LOWER BACK SURGERY    CARDIAC CATHETERIZATION      HISTORY OF CORONARY ANGIOGRAPHY WITH CONCOMITANT LEFT HEART CATHETERIZATION    CORONARY ANGIOPLASTY WITH STENT PLACEMENT      EAR SURGERY      TONSILLECTOMY      TYMPANOPLASTY      US GUIDED BREAST BIOPSY LEFT COMPLETE Left 3/2/2021      Family History:     Family History   Problem Relation Age of Onset    Coronary artery disease Mother         ATHEROMA    Heart disease Mother     Diabetes Mother     Hypertension Mother     No Known Problems Father         NO PERTINENT FAMILY HISTORY    Coronary artery disease Sister         ATHEROMA    Heart disease Sister     Stroke Sister     Diabetes Sister     Hypertension Sister     No Known Problems Maternal Grandmother     No Known Problems Maternal Grandfather     No Known Problems Paternal Grandmother     No Known Problems Paternal Grandfather     No Known Problems Daughter     Breast cancer Sister     No Known Problems Sister     No Known Problems Sister     No Known Problems Son     No Known Problems Maternal Aunt     No Known Problems Maternal Aunt     No Known Problems Paternal Aunt     No Known Problems Paternal Aunt     Brain cancer Paternal Uncle       Social History:     Social History     Socioeconomic History    Marital status: Single     Spouse name: None    Number of children: None    Years of education: None    Highest education level: None   Occupational History    None   Tobacco Use    Smoking status: Current Every Day Smoker     Packs/day: 1 00     Types: Cigarettes    Smokeless tobacco: Never Used   Vaping Use    Vaping Use: Never used   Substance and Sexual Activity    Alcohol use: Yes     Comment: occasional    Drug use: Yes     Types: Marijuana    Sexual activity: Not Currently   Other Topics Concern    None   Social History Narrative    None     Social Determinants of Health     Financial Resource Strain: Not on file   Food Insecurity: Not on file   Transportation Needs: Not on file   Physical Activity: Not on file   Stress: Not on file   Social Connections: Not on file   Intimate Partner Violence: Not on file   Housing Stability: Not on file      Medications and Allergies:     Current Outpatient Medications   Medication Sig Dispense Refill    acetaminophen (TYLENOL) 325 mg tablet Take 1 tablet (325 mg total) by mouth as needed for headaches For headache      albuterol (PROVENTIL HFA,VENTOLIN HFA) 90 mcg/act inhaler Inhale 2 puffs every 6 (six) hours as needed for wheezing 18 g 5    aspirin 81 mg chewable tablet Chew 81 mg daily      atorvastatin (LIPITOR) 80 mg tablet Take 1 tablet (80 mg total) by mouth daily 90 tablet 3    Cholecalciferol (Vitamin D3) 50 MCG (2000 UT) capsule Take 1 capsule (2,000 Units total) by mouth daily 30 capsule 0    citalopram (CeleXA) 40 mg tablet Take 1 tablet (40 mg total) by mouth daily 90 tablet 1    cyclobenzaprine (FLEXERIL) 10 mg tablet Take 1 tablet (10 mg total) by mouth 3 (three) times a day as needed for muscle spasms 30 tablet 0    Empagliflozin (Jardiance) 10 MG TABS Take 1 tablet (10 mg total) by mouth every morning 90 tablet 1    ezetimibe (ZETIA) 10 mg tablet Take 1 tablet (10 mg total) by mouth daily 90 tablet 1    hydrOXYzine HCL (ATARAX) 25 mg tablet Take 1 tablet (25 mg total) by mouth 2 (two) times a day as needed for itching 60 tablet 5    levothyroxine 112 mcg tablet Take 1 tablet (112 mcg total) by mouth daily Take 112 mcg daily on Mondays Wednesdays and Fridays alternate with 100 mcg all other days 10 tablet 0    losartan (COZAAR) 25 mg tablet TAKE 1 TABLET DAILY   90 tablet 3    magnesium oxide (MAG-OX) 400 mg Take 4 tablets (1,600 mg total) by mouth daily      omega-3-acid ethyl esters (LOVAZA) 1 g capsule Take 2 capsules (2 g total) by mouth 2 (two) times a day (Patient taking differently: Take 1 g by mouth daily 2000) 120 capsule 3    pantoprazole (PROTONIX) 40 mg tablet Take 1 tablet (40 mg total) by mouth daily 90 tablet 1    pregabalin (LYRICA) 150 mg capsule Take 1 capsule (150 mg total) by mouth 3 (three) times a day 90 capsule 0    prochlorperazine (COMPAZINE) 10 mg tablet Take 1 tablet (10 mg total) by mouth every 6 (six) hours as needed for nausea or vomiting Max 3 days per week, 6 month supply 60 tablet 1    triamcinolone (KENALOG) 0 1 % cream Apply topically 2 (two) times a day (Patient taking differently: Apply topically as needed) 80 g 1    zolpidem (Ambien) 10 mg tablet Take 1 tablet (10 mg total) by mouth daily at bedtime as needed for sleep 30 tablet 0    Lancets (ONETOUCH ULTRASOFT) lancets by Other route 2 (two) times a day (Patient not taking: No sig reported)      levothyroxine (Euthyrox) 100 mcg tablet Take 1 tablet (100 mcg total) by mouth daily Take 112 mcg on Monday with disease in Friday and 100 mcg all other days of the week (Patient not taking: No sig reported) 90 tablet 1    metoprolol succinate (TOPROL-XL) 25 mg 24 hr tablet Take 25 mg by mouth daily (Patient not taking: Reported on 10/4/2022)       No current facility-administered medications for this visit  Allergies   Allergen Reactions    Augmentin [Amoxicillin-Pot Clavulanate] Nausea Only     PT stated she only allergic to medication AUGMENTIN    Meloxicam GI Intolerance     "brings on diverticulitis"      Immunizations:     Immunization History   Administered Date(s) Administered    Influenza, recombinant, quadrivalent,injectable, preservative free 12/08/2020    Influenza, seasonal, injectable 11/15/2011, 10/28/2013    Pneumococcal Polysaccharide PPV23 12/08/2020      Health Maintenance:         Topic Date Due    HIV Screening  Never done    Cervical Cancer Screening  Never done    Breast Cancer Screening: Mammogram  01/28/2022    Colorectal Cancer Screening  08/11/2025    Hepatitis C Screening  Completed         Topic Date Due    COVID-19 Vaccine (1) Never done    Hepatitis A Vaccine (1 of 2 - Risk 2-dose series) Never done    Hepatitis B Vaccine (1 of 3 - Risk 3-dose series) Never done    Pneumococcal Vaccine: Pediatrics (0 to 5 Years) and At-Risk Patients (6 to 59 Years) (2 - PCV) 12/08/2021    Influenza Vaccine (1) 09/01/2022      Medicare Screening Tests and Risk Assessments:     Brianne Irving is here for her Subsequent Wellness visit  Health Risk Assessment:   Patient rates overall health as very good  Patient feels that their physical health rating is slightly better  Patient is satisfied with their life  Eyesight was rated as same  Hearing was rated as same  Patient feels that their emotional and mental health rating is much better  Patients states they are never, rarely angry  Patient states they are sometimes unusually tired/fatigued  Pain experienced in the last 7 days has been some  Patient's pain rating has been 3/10  Patient states that she has experienced no weight loss or gain in last 6 months  Depression Screening:   PHQ-9 Score: 8      Fall Risk Screening:    In the past year, patient has experienced: history of falling in past year    Injured during fall?: Yes    Feels unsteady when standing or walking?: No    Worried about falling?: No      Urinary Incontinence Screening:   Patient has not leaked urine accidently in the last six months  Home Safety:  Patient does not have trouble with stairs inside or outside of their home  Patient has no working smoke alarms and has no working carbon monoxide detector  Home safety hazards include: none  Medications:   Patient is currently taking over-the-counter supplements  OTC medications include: see medication list  Patient is able to manage medications  Activities of Daily Living (ADLs)/Instrumental Activities of Daily Living (IADLs):   Walk and transfer into and out of bed and chair?: Yes  Dress and groom yourself?: Yes    Bathe or shower yourself?: Yes    Feed yourself? Yes  Do your laundry/housekeeping?: Yes  Manage your money, pay your bills and track your expenses?: Yes  Make your own meals?: Yes    Do your own shopping?: Yes    Previous Hospitalizations:   Any hospitalizations or ED visits within the last 12 months?: No      Advance Care Planning:   Living will: No    Advanced directive: No    Five wishes given: Yes      PREVENTIVE SCREENINGS      Cardiovascular Screening:    General: Screening Not Indicated and History Lipid Disorder      Diabetes Screening:     General: Screening Not Indicated and History Diabetes      Colorectal Cancer Screening:     General: Screening Current      Breast Cancer Screening:     General: Risks and Benefits Discussed    Due for: Mammogram        Lung Cancer Screening:     General: Screening Not Indicated      Hepatitis C Screening:    General: Screening Current    Screening, Brief Intervention, and Referral to Treatment (SBIRT)    Screening  Typical number of drinks in a day: 1  Typical number of drinks in a week: 7  Interpretation: Low risk drinking behavior      Brief Intervention  Alcohol & drug use screenings were reviewed  No concerns regarding substance use disorder identified  Healthy alcohol use/limits discussed  No exam data present     Physical Exam:     /72 (BP Location: Left arm, Patient Position: Sitting, Cuff Size: Standard)   Pulse 70   Temp 98 1 °F (36 7 °C) (Temporal)   Ht 5' 4 09" (1 628 m)   Wt 66 4 kg (146 lb 6 4 oz)   LMP  (LMP Unknown)   SpO2 96%   BMI 25 06 kg/m²     Physical Exam  Vitals and nursing note reviewed  Constitutional:       General: She is not in acute distress  Appearance: She is well-developed  HENT:      Head: Normocephalic and atraumatic  Eyes:      Conjunctiva/sclera: Conjunctivae normal    Cardiovascular:      Rate and Rhythm: Normal rate and regular rhythm  Heart sounds: No murmur heard  Pulmonary:      Effort: Pulmonary effort is normal  No respiratory distress  Breath sounds: Normal breath sounds  Abdominal:      Palpations: Abdomen is soft  Tenderness: There is no abdominal tenderness  Musculoskeletal:      Cervical back: Neck supple  Right lower leg: No edema  Left lower leg: No edema  Comments: Some tenderness on DIP joints of b/l hands  MCP and PIP ok  Skin:     General: Skin is warm and dry  Findings: Erythema present  No lesion or rash  Comments: Dry skin on upper back -- some sun-related skin erythema  Neurological:      Mental Status: She is alert and oriented to person, place, and time  Sensory: No sensory deficit  Motor: No weakness        Gait: Gait normal           Narendra Estrella MD

## 2022-10-04 NOTE — PATIENT INSTRUCTIONS
You plan to get flu shot at the pharmacy in 2 weeks   COVID booster #3 at the pharmacy this month  Hepatitis A/B vaccine next time  Gynecology referral for PAP/co testing  L breast U/S done (due since 9/2021) and plan for mammogram in springtime  Do carotid ultrasound  Planning to get colonoscopy soon for diverticulitis -- CRC screening due 2025  HIV screening test ordered; CBC and TSH due         Medicare Preventive Visit Patient Instructions  Thank you for completing your Welcome to Medicare Visit or Medicare Annual Wellness Visit today  Your next wellness visit will be due in one year (10/5/2023)  The screening/preventive services that you may require over the next 5-10 years are detailed below  Some tests may not apply to you based off risk factors and/or age  Screening tests ordered at today's visit but not completed yet may show as past due  Also, please note that scanned in results may not display below  Preventive Screenings:  Service Recommendations Previous Testing/Comments   Colorectal Cancer Screening  * Colonoscopy    * Fecal Occult Blood Test (FOBT)/Fecal Immunochemical Test (FIT)  * Fecal DNA/Cologuard Test  * Flexible Sigmoidoscopy Age: 39-70 years old   Colonoscopy: every 10 years (may be performed more frequently if at higher risk)  OR  FOBT/FIT: every 1 year  OR  Cologuard: every 3 years  OR  Sigmoidoscopy: every 5 years  Screening may be recommended earlier than age 39 if at higher risk for colorectal cancer  Also, an individualized decision between you and your healthcare provider will decide whether screening between the ages of 74-80 would be appropriate   Colonoscopy: 08/11/2015  FOBT/FIT: Not on file  Cologuard: Not on file  Sigmoidoscopy: Not on file    Screening Current     Breast Cancer Screening Age: 36 years old  Frequency: every 1-2 years  Not required if history of left and right mastectomy Mammogram: 01/28/2021    Screening Current   Cervical Cancer Screening Between the ages of 21-29, pap smear recommended once every 3 years  Between the ages of 33-67, can perform pap smear with HPV co-testing every 5 years  Recommendations may differ for women with a history of total hysterectomy, cervical cancer, or abnormal pap smears in past  Pap Smear: Not on file        Hepatitis C Screening Once for adults born between 1945 and 1965  More frequently in patients at high risk for Hepatitis C Hep C Antibody: 05/31/2018    Screening Current   Diabetes Screening 1-2 times per year if you're at risk for diabetes or have pre-diabetes Fasting glucose: 122 mg/dL (7/20/2020)  A1C: 5 9 % of total Hgb (5/9/2022)  Screening Not Indicated  History Diabetes   Cholesterol Screening Once every 5 years if you don't have a lipid disorder  May order more often based on risk factors  Lipid panel: 05/09/2022    Screening Not Indicated  History Lipid Disorder     Other Preventive Screenings Covered by Medicare:  Abdominal Aortic Aneurysm (AAA) Screening: covered once if your at risk  You're considered to be at risk if you have a family history of AAA  Lung Cancer Screening: covers low dose CT scan once per year if you meet all of the following conditions: (1) Age 50-69; (2) No signs or symptoms of lung cancer; (3) Current smoker or have quit smoking within the last 15 years; (4) You have a tobacco smoking history of at least 20 pack years (packs per day multiplied by number of years you smoked); (5) You get a written order from a healthcare provider    Glaucoma Screening: covered annually if you're considered high risk: (1) You have diabetes OR (2) Family history of glaucoma OR (3)  aged 48 and older OR (3)  American aged 72 and older  Osteoporosis Screening: covered every 2 years if you meet one of the following conditions: (1) You're estrogen deficient and at risk for osteoporosis based off medical history and other findings; (2) Have a vertebral abnormality; (3) On glucocorticoid therapy for more than 3 months; (4) Have primary hyperparathyroidism; (5) On osteoporosis medications and need to assess response to drug therapy  Last bone density test (DXA Scan): Not on file  HIV Screening: covered annually if you're between the age of 12-76  Also covered annually if you are younger than 13 and older than 72 with risk factors for HIV infection  For pregnant patients, it is covered up to 3 times per pregnancy  Immunizations:  Immunization Recommendations   Influenza Vaccine Annual influenza vaccination during flu season is recommended for all persons aged >= 6 months who do not have contraindications   Pneumococcal Vaccine   * Pneumococcal conjugate vaccine = PCV13 (Prevnar 13), PCV15 (Vaxneuvance), PCV20 (Prevnar 20)  * Pneumococcal polysaccharide vaccine = PPSV23 (Pneumovax) Adults 25-60 years old: 1-3 doses may be recommended based on certain risk factors  Adults 72 years old: 1-2 doses may be recommended based off what pneumonia vaccine you previously received   Hepatitis B Vaccine 3 dose series if at intermediate or high risk (ex: diabetes, end stage renal disease, liver disease)   Tetanus (Td) Vaccine - COST NOT COVERED BY MEDICARE PART B Following completion of primary series, a booster dose should be given every 10 years to maintain immunity against tetanus  Td may also be given as tetanus wound prophylaxis  Tdap Vaccine - COST NOT COVERED BY MEDICARE PART B Recommended at least once for all adults  For pregnant patients, recommended with each pregnancy     Shingles Vaccine (Shingrix) - COST NOT COVERED BY MEDICARE PART B  2 shot series recommended in those aged 48 and above     Health Maintenance Due:      Topic Date Due    HIV Screening  Never done    Cervical Cancer Screening  Never done    Breast Cancer Screening: Mammogram  01/28/2022    Colorectal Cancer Screening  08/11/2025    Hepatitis C Screening  Completed     Immunizations Due:      Topic Date Due    COVID-19 Vaccine (1) Never done    Hepatitis A Vaccine (1 of 2 - Risk 2-dose series) Never done    Hepatitis B Vaccine (1 of 3 - Risk 3-dose series) Never done    Pneumococcal Vaccine: Pediatrics (0 to 5 Years) and At-Risk Patients (6 to 59 Years) (2 - PCV) 12/08/2021    Influenza Vaccine (1) 09/01/2022     Advance Directives   What are advance directives? Advance directives are legal documents that state your wishes and plans for medical care  These plans are made ahead of time in case you lose your ability to make decisions for yourself  Advance directives can apply to any medical decision, such as the treatments you want, and if you want to donate organs  What are the types of advance directives? There are many types of advance directives, and each state has rules about how to use them  You may choose a combination of any of the following:  Living will: This is a written record of the treatment you want  You can also choose which treatments you do not want, which to limit, and which to stop at a certain time  This includes surgery, medicine, IV fluid, and tube feedings  Durable power of  for healthcare [facility]): This is a written record that states who you want to make healthcare choices for you when you are unable to make them for yourself  This person, called a proxy, is usually a family member or a friend  You may choose more than 1 proxy  Do not resuscitate (DNR) order:  A DNR order is used in case your heart stops beating or you stop breathing  It is a request not to have certain forms of treatment, such as CPR  A DNR order may be included in other types of advance directives  Medical directive: This covers the care that you want if you are in a coma, near death, or unable to make decisions for yourself  You can list the treatments you want for each condition  Treatment may include pain medicine, surgery, blood transfusions, dialysis, IV or tube feedings, and a ventilator (breathing machine)  Values history:   This document has questions about your views, beliefs, and how you feel and think about life  This information can help others choose the care that you would choose  Why are advance directives important? An advance directive helps you control your care  Although spoken wishes may be used, it is better to have your wishes written down  Spoken wishes can be misunderstood, or not followed  Treatments may be given even if you do not want them  An advance directive may make it easier for your family to make difficult choices about your care  Cigarette Smoking and Your Health   Risks to your health if you smoke:  Nicotine and other chemicals found in tobacco damage every cell in your body  Even if you are a light smoker, you have an increased risk for cancer, heart disease, and lung disease  If you are pregnant or have diabetes, smoking increases your risk for complications  Benefits to your health if you stop smoking: You decrease respiratory symptoms such as coughing, wheezing, and shortness of breath  You reduce your risk for cancers of the lung, mouth, throat, kidney, bladder, pancreas, stomach, and cervix  If you already have cancer, you increase the benefits of chemotherapy  You also reduce your risk for cancer returning or a second cancer from developing  You reduce your risk for heart disease, blood clots, heart attack, and stroke  You reduce your risk for lung infections, and diseases such as pneumonia, asthma, chronic bronchitis, and emphysema  Your circulation improves  More oxygen can be delivered to your body  If you have diabetes, you lower your risk for complications, such as kidney, artery, and eye diseases  You also lower your risk for nerve damage  Nerve damage can lead to amputations, poor vision, and blindness  You improve your body's ability to heal and to fight infections  For more information and support to stop smoking:   [employer]  Phone: 1- [employer]  Web Address: www smokefree  gov  Weight Management   Why it is important to manage your weight:  Being overweight increases your risk of health conditions such as heart disease, high blood pressure, type 2 diabetes, and certain types of cancer  It can also increase your risk for osteoarthritis, sleep apnea, and other respiratory problems  Aim for a slow, steady weight loss  Even a small amount of weight loss can lower your risk of health problems  How to lose weight safely:  A safe and healthy way to lose weight is to eat fewer calories and get regular exercise  You can lose up about 1 pound a week by decreasing the number of calories you eat by 500 calories each day  Healthy meal plan for weight management:  A healthy meal plan includes a variety of foods, contains fewer calories, and helps you stay healthy  A healthy meal plan includes the following:  Eat whole-grain foods more often  A healthy meal plan should contain fiber  Fiber is the part of grains, fruits, and vegetables that is not broken down by your body  Whole-grain foods are healthy and provide extra fiber in your diet  Some examples of whole-grain foods are whole-wheat breads and pastas, oatmeal, brown rice, and bulgur  Eat a variety of vegetables every day  Include dark, leafy greens such as spinach, kale, han greens, and mustard greens  Eat yellow and orange vegetables such as carrots, sweet potatoes, and winter squash  Eat a variety of fruits every day  Choose fresh or canned fruit (canned in its own juice or light syrup) instead of juice  Fruit juice has very little or no fiber  Eat low-fat dairy foods  Drink fat-free (skim) milk or 1% milk  Eat fat-free yogurt and low-fat cottage cheese  Try low-fat cheeses such as mozzarella and other reduced-fat cheeses  Choose meat and other protein foods that are low in fat  Choose beans or other legumes such as split peas or lentils   Choose fish, skinless poultry (chicken or turkey), or lean cuts of red meat (beef or pork)  Before you cook meat or poultry, cut off any visible fat  Use less fat and oil  Try baking foods instead of frying them  Add less fat, such as margarine, sour cream, regular salad dressing and mayonnaise to foods  Eat fewer high-fat foods  Some examples of high-fat foods include french fries, doughnuts, ice cream, and cakes  Eat fewer sweets  Limit foods and drinks that are high in sugar  This includes candy, cookies, regular soda, and sweetened drinks  Exercise:  Exercise at least 30 minutes per day on most days of the week  Some examples of exercise include walking, biking, dancing, and swimming  You can also fit in more physical activity by taking the stairs instead of the elevator or parking farther away from stores  Ask your healthcare provider about the best exercise plan for you  © Copyright [employer] 2018 Information is for End User's use only and may not be sold, redistributed or otherwise used for commercial purposes   All illustrations and images included in CareNotes® are the copyrighted property of [employer]  or [address] [place] [address]

## 2022-10-07 DIAGNOSIS — F51.01 PRIMARY INSOMNIA: ICD-10-CM

## 2022-10-07 DIAGNOSIS — B00.1 COLD SORE: Primary | ICD-10-CM

## 2022-10-07 RX ORDER — VALACYCLOVIR HYDROCHLORIDE 1 G/1
1000 TABLET, FILM COATED ORAL 2 TIMES DAILY
Qty: 28 TABLET | Refills: 0 | Status: SHIPPED | OUTPATIENT
Start: 2022-10-07 | End: 2022-10-21

## 2022-10-07 RX ORDER — ZOLPIDEM TARTRATE 10 MG/1
10 TABLET ORAL
Qty: 30 TABLET | Refills: 0 | Status: SHIPPED | OUTPATIENT
Start: 2022-10-07

## 2022-10-09 DIAGNOSIS — I10 HTN (HYPERTENSION), BENIGN: ICD-10-CM

## 2022-10-10 RX ORDER — LOSARTAN POTASSIUM 25 MG/1
TABLET ORAL
Qty: 90 TABLET | Refills: 3 | Status: SHIPPED | OUTPATIENT
Start: 2022-10-10 | End: 2022-10-17 | Stop reason: SDUPTHER

## 2022-10-12 PROBLEM — R19.7 DIARRHEA OF PRESUMED INFECTIOUS ORIGIN: Status: RESOLVED | Noted: 2020-12-30 | Resolved: 2022-10-12

## 2022-10-14 DIAGNOSIS — M48.02 CERVICAL STENOSIS OF SPINAL CANAL: ICD-10-CM

## 2022-10-14 DIAGNOSIS — M62.838 NECK MUSCLE SPASM: ICD-10-CM

## 2022-10-14 DIAGNOSIS — F32.89 OTHER DEPRESSION: ICD-10-CM

## 2022-10-14 DIAGNOSIS — K21.9 GASTROESOPHAGEAL REFLUX DISEASE: ICD-10-CM

## 2022-10-14 DIAGNOSIS — E03.9 HYPOTHYROIDISM, UNSPECIFIED TYPE: ICD-10-CM

## 2022-10-14 DIAGNOSIS — E78.00 HYPERCHOLESTEROLEMIA: ICD-10-CM

## 2022-10-14 RX ORDER — CITALOPRAM 40 MG/1
40 TABLET ORAL DAILY
Qty: 90 TABLET | Refills: 1 | Status: SHIPPED | OUTPATIENT
Start: 2022-10-14

## 2022-10-14 RX ORDER — LEVOTHYROXINE SODIUM 112 UG/1
112 TABLET ORAL DAILY
Qty: 90 TABLET | Refills: 1 | Status: SHIPPED | OUTPATIENT
Start: 2022-10-14

## 2022-10-14 RX ORDER — EZETIMIBE 10 MG/1
10 TABLET ORAL DAILY
Qty: 90 TABLET | Refills: 1 | Status: SHIPPED | OUTPATIENT
Start: 2022-10-14

## 2022-10-14 RX ORDER — LEVOTHYROXINE SODIUM 0.1 MG/1
100 TABLET ORAL DAILY
Qty: 90 TABLET | Refills: 1 | Status: SHIPPED | OUTPATIENT
Start: 2022-10-14

## 2022-10-14 RX ORDER — PANTOPRAZOLE SODIUM 40 MG/1
40 TABLET, DELAYED RELEASE ORAL DAILY
Qty: 90 TABLET | Refills: 1 | Status: SHIPPED | OUTPATIENT
Start: 2022-10-14

## 2022-10-17 RX ORDER — LOSARTAN POTASSIUM 25 MG/1
25 TABLET ORAL DAILY
Qty: 90 TABLET | Refills: 3 | Status: SHIPPED | OUTPATIENT
Start: 2022-10-17

## 2022-10-17 NOTE — TELEPHONE ENCOUNTER
Fax received from Vidhi Parker for refill of losartan  rx recently filled at [facility] in [address] [address] [place]

## 2022-11-07 DIAGNOSIS — I10 HTN (HYPERTENSION), BENIGN: ICD-10-CM

## 2022-11-07 NOTE — TELEPHONE ENCOUNTER
Requested medication(s) are due for refill today: Yes  Patient has already received a courtesy refill: No  Other reason request has been forwarded to provider: failed     Has not been seen since 5/2020 and not scheduled until 1/2023

## 2022-11-08 DIAGNOSIS — M79.7 FIBROMYALGIA: ICD-10-CM

## 2022-11-08 DIAGNOSIS — E11.65 UNCONTROLLED TYPE 2 DIABETES MELLITUS WITH HYPERGLYCEMIA (HCC): ICD-10-CM

## 2022-11-08 DIAGNOSIS — F51.01 PRIMARY INSOMNIA: ICD-10-CM

## 2022-11-08 RX ORDER — ZOLPIDEM TARTRATE 10 MG/1
10 TABLET ORAL
Qty: 30 TABLET | Refills: 0 | Status: SHIPPED | OUTPATIENT
Start: 2022-11-08

## 2022-11-08 RX ORDER — PREGABALIN 150 MG/1
150 CAPSULE ORAL 3 TIMES DAILY
Qty: 90 CAPSULE | Refills: 0 | Status: SHIPPED | OUTPATIENT
Start: 2022-11-08

## 2022-11-10 RX ORDER — LOSARTAN POTASSIUM 25 MG/1
25 TABLET ORAL DAILY
Qty: 90 TABLET | Refills: 3 | Status: SHIPPED | OUTPATIENT
Start: 2022-11-10

## 2022-12-14 DIAGNOSIS — F51.01 PRIMARY INSOMNIA: ICD-10-CM

## 2022-12-14 RX ORDER — ZOLPIDEM TARTRATE 10 MG/1
10 TABLET ORAL
Qty: 30 TABLET | Refills: 0 | Status: SHIPPED | OUTPATIENT
Start: 2022-12-14

## 2023-01-03 DIAGNOSIS — M79.7 FIBROMYALGIA: ICD-10-CM

## 2023-01-03 RX ORDER — PREGABALIN 150 MG/1
150 CAPSULE ORAL 3 TIMES DAILY
Qty: 90 CAPSULE | Refills: 0 | Status: SHIPPED | OUTPATIENT
Start: 2023-01-03

## 2023-01-16 DIAGNOSIS — F51.01 PRIMARY INSOMNIA: ICD-10-CM

## 2023-01-16 RX ORDER — ZOLPIDEM TARTRATE 10 MG/1
10 TABLET ORAL
Qty: 30 TABLET | Refills: 0 | Status: SHIPPED | OUTPATIENT
Start: 2023-01-16

## 2023-01-31 ENCOUNTER — RA CDI HCC (OUTPATIENT)
Dept: OTHER | Facility: HOSPITAL | Age: 63
End: 2023-01-31

## 2023-01-31 NOTE — PROGRESS NOTES
[facility], [place] [facility]  coding opportunities          Chart Reviewed number of suggestions sent to Provider: 2  [phone]  [id number]     Patients Insurance     Medicare Insurance: [facility]

## 2023-02-20 DIAGNOSIS — M79.7 FIBROMYALGIA: ICD-10-CM

## 2023-02-20 DIAGNOSIS — F51.01 PRIMARY INSOMNIA: ICD-10-CM

## 2023-02-20 NOTE — TELEPHONE ENCOUNTER
Called patient l[facility] re  Another medication she needed refilled  Could not make out what she needed asked her  C/b  Lyrica submitted tough      Patient c/b needs [facility]

## 2023-02-22 RX ORDER — ZOLPIDEM TARTRATE 10 MG/1
10 TABLET ORAL
Qty: 30 TABLET | Refills: 0 | Status: SHIPPED | OUTPATIENT
Start: 2023-02-22

## 2023-02-22 RX ORDER — PREGABALIN 150 MG/1
150 CAPSULE ORAL 3 TIMES DAILY
Qty: 90 CAPSULE | Refills: 0 | Status: SHIPPED | OUTPATIENT
Start: 2023-02-22

## 2023-02-24 ENCOUNTER — OFFICE VISIT (OUTPATIENT)
Dept: INTERNAL MEDICINE CLINIC | Facility: OTHER | Age: 63
End: 2023-02-24

## 2023-02-24 VITALS
SYSTOLIC BLOOD PRESSURE: 134 MMHG | DIASTOLIC BLOOD PRESSURE: 60 MMHG | BODY MASS INDEX: 25.1 KG/M2 | HEIGHT: 64 IN | OXYGEN SATURATION: 97 % | HEART RATE: 61 BPM | TEMPERATURE: 98.7 F | WEIGHT: 147 LBS

## 2023-02-24 DIAGNOSIS — Z12.11 SCREEN FOR COLON CANCER: Primary | ICD-10-CM

## 2023-02-24 DIAGNOSIS — J01.10 ACUTE FRONTAL SINUSITIS, RECURRENCE NOT SPECIFIED: ICD-10-CM

## 2023-02-24 DIAGNOSIS — E11.9 TYPE 2 DIABETES MELLITUS WITHOUT COMPLICATION, WITHOUT LONG-TERM CURRENT USE OF INSULIN (HCC): ICD-10-CM

## 2023-02-24 DIAGNOSIS — L08.9 INFECTION OF INDEX FINGER: ICD-10-CM

## 2023-02-24 RX ORDER — DOXYCYCLINE HYCLATE 100 MG/1
100 CAPSULE ORAL 2 TIMES DAILY
Qty: 14 CAPSULE | Refills: 0 | Status: SHIPPED | OUTPATIENT
Start: 2023-02-24 | End: 2023-03-03

## 2023-02-24 NOTE — ASSESSMENT & PLAN NOTE
-patient given doxycycline 100 mg twice a day for 7 days   -continue with ice and OTC pain relievers as needed  -patient to call/ follow up in one week if finger does not improve  -keep area clean and dry

## 2023-02-24 NOTE — ASSESSMENT & PLAN NOTE
Lab Results   Component Value Date    HGBA1C 5 9 (H) 05/09/2022       -Patient requested and given lab slip for recheck for HgbA1C    -patient currently on Jardiance 10 mg every morning for her diabetes   -educated on continuing diabetic diet and checking blood sugars

## 2023-02-24 NOTE — PATIENT INSTRUCTIONS
- start doxycycline 100 mg twice daily for 7 days, take with food, please eat yogurt or take a probiotic while on antibiotics  - continue ice and OTC pain relievers as needed  -if finger does not improve in a week please call office or schedule and appointment to be seen back   -start Flonase

## 2023-02-24 NOTE — ASSESSMENT & PLAN NOTE
-doxycycline 100 mg twice daily for 7 days given  -patient can use Flonase, continue using claritin daily  -taking OTC pain relievers for any headaches

## 2023-02-24 NOTE — PROGRESS NOTES
Assessment/Plan:    Type 2 diabetes mellitus without complication, without long-term current use of insulin ([facility])    Lab Results   Component Value Date    HGBA1C 5 9 (H) 05/09/2022       -Patient requested and given lab slip for recheck for HgbA1C    -patient currently on Jardiance 10 mg every morning for her diabetes   -educated on continuing diabetic diet and checking blood sugars     Screen for colon cancer  -patient given a referral for colonoscopy for colon cancer screening  Acute frontal sinusitis  -doxycycline 100 mg twice daily for 7 days given  -patient can use Flonase, continue using claritin daily  -taking OTC pain relievers for any headaches       Infection of index finger  -patient given doxycycline 100 mg twice a day for 7 days   -continue with ice and OTC pain relievers as needed  -patient to call/ follow up in one week if finger does not improve  -keep area clean and dry            Diagnoses and all orders for this visit:    Screen for colon cancer  -     Ambulatory referral for colonoscopy; Future    Type 2 diabetes mellitus without complication, without long-term current use of insulin ([facility])  -     HEMOGLOBIN A1C W/ EAG ESTIMATION; Future    Acute frontal sinusitis, recurrence not specified  -     doxycycline hyclate (VIBRAMYCIN) 100 mg capsule; Take 1 capsule (100 mg total) by mouth 2 (two) times a day for 7 days    Infection of index finger  -     doxycycline hyclate (VIBRAMYCIN) 100 mg capsule; Take 1 capsule (100 mg total) by mouth 2 (two) times a day for 7 days        Subjective:      Patient ID: Jose Rodriguez is a 65 y o  female  Chief Complaint   Patient presents with   • lump     Lump on finger left index finger  Had a cut years ago while carrying a grocery bag  Noticed 3 weeks ago a lump on it  Lump has grown in size  The area bothers and hurts her  Taking tylenol otc     Patient asked for another slip to have her blood work done      • Asthma   • Hyperthyroidism       HPI    64 yo female presents to the office today for concerns of a growing painful lump on the inner left index finger of her left hand  She also is concerned that she has a sinus infection    Index finger infection- Patient reports 8-10 years she was carrying several grocery bags into her home and formed a "blood blister" that has never gone away  She states the area has opened several times in the past and has always closed and healed  Patient reports roughly 2-3 months ago patient was cleaning her sisters home when that inner left index finger area spilt open  She reports the area has since closed but did leave a scab that is still present today  Patient reports that 2-3 weeks ago her inner left index finger started forming a painful lump that continues to grow in size  She reports the area is very tender and constantly painful rating it a 6/10 pain  She reports she currently has trouble bending her finger at the proximal interphalangeal joint due to swelling and pain  She denies any known trauma but reports working with her hands a lot where she does bump and hits her fingers all the time  Patient reports that she has tried icing the area and taking tylenol as needed with minimal relief  Frontal Sinus infection- Patient reports 2-3 weeks ago she started experiencing frontal sinus pressure and pain that has not gone away  Patient reports her symptoms started with head congestion and a cough  The patient reports the cough has since gone away but is still experiencing mild congestion and sinus pressure and pain  She has experienced a migraine due to this  She reports she still has a runny nose that produces yellow nasal drainage  Patient reports she has taken claritin for a few days as well as an OTC "sinus pain/pressure pill"  She denies ever using Flonase         The following portions of the patient's history were reviewed and updated as appropriate: allergies, current medications, past family history, past medical history, past social history, past surgical history and problem list     Review of Systems   Constitutional: Negative for chills and fever  HENT: Positive for congestion ( head congestion ), sinus pressure ( frontal) and sinus pain (frontal)  Negative for ear pain  Respiratory: Negative for cough, shortness of breath and wheezing  Cardiovascular: Negative for chest pain and palpitations  Gastrointestinal: Negative for diarrhea, nausea and vomiting  Musculoskeletal: Positive for joint swelling ( first index finger)  Psychiatric/Behavioral: Positive for sleep disturbance ( chronic insomnia )           Past Medical History:   Diagnosis Date   • Abnormal echocardiogram    • Abnormal stress test    • Anxiety    • Asthma    • Brachial neuritis    • Depression    • Disease of thyroid gland    • Displacement of intervertebral disc of mid-cervical region    • Fibromyalgia    • Frequent headaches    • GERD (gastroesophageal reflux disease)    • Herniated nucleus pulposus    • History of arthritis    • History of asthma    • History of cardiac disorder    • History of chest pain    • History of diverticulitis    • History of fatigue    • History of hearing loss    • History of hemoptysis    • History of herpes simplex infection    • History of hiatal hernia    • History of insect bite     INFECTED   • History of memory loss    • History of neck disorder    • History of reflex sympathetic dystrophy    • History of restless legs syndrome    • History of shortness of breath    • History of spinal stenosis    • Hyperlipidemia    • Hyperlipidemia    • Hypertension    • Skin rash    • Somnolence, daytime    • Spinal stenosis of cervical region          Current Outpatient Medications:   •  acetaminophen (TYLENOL) 325 mg tablet, Take 1 tablet (325 mg total) by mouth as needed for headaches For headache, Disp: , Rfl:   •  albuterol (PROVENTIL HFA,VENTOLIN HFA) 90 mcg/act inhaler, Inhale 2 puffs every 6 (six) hours as needed for wheezing, Disp: 18 g, Rfl: 5  •  aspirin 81 mg chewable tablet, Chew 81 mg daily, Disp: , Rfl:   •  atorvastatin (LIPITOR) 80 mg tablet, Take 1 tablet (80 mg total) by mouth daily, Disp: 90 tablet, Rfl: 3  •  Cholecalciferol (Vitamin D3) 50 MCG (2000 UT) capsule, Take 1 capsule (2,000 Units total) by mouth daily, Disp: 30 capsule, Rfl: 0  •  citalopram (CeleXA) 40 mg tablet, Take 1 tablet (40 mg total) by mouth daily, Disp: 90 tablet, Rfl: 1  •  cyclobenzaprine (FLEXERIL) 10 mg tablet, Take 1 tablet (10 mg total) by mouth 3 (three) times a day as needed for muscle spasms, Disp: 30 tablet, Rfl: 0  •  doxycycline hyclate (VIBRAMYCIN) 100 mg capsule, Take 1 capsule (100 mg total) by mouth 2 (two) times a day for 7 days, Disp: 14 capsule, Rfl: 0  •  Empagliflozin (Jardiance) 10 MG TABS tablet, Take 1 tablet (10 mg total) by mouth every morning, Disp: 90 tablet, Rfl: 1  •  ezetimibe (ZETIA) 10 mg tablet, Take 1 tablet (10 mg total) by mouth daily, Disp: 90 tablet, Rfl: 1  •  hydrOXYzine HCL (ATARAX) 25 mg tablet, Take 1 tablet (25 mg total) by mouth 2 (two) times a day as needed for itching, Disp: 60 tablet, Rfl: 5  •  levothyroxine 112 mcg tablet, Take 1 tablet (112 mcg total) by mouth daily Take 112 mcg daily on Mondays Wednesdays and Fridays alternate with 100 mcg all other days, Disp: 90 tablet, Rfl: 1  •  losartan (COZAAR) 25 mg tablet, Take 1 tablet (25 mg total) by mouth daily, Disp: 90 tablet, Rfl: 3  •  magnesium oxide (MAG-OX) 400 mg, Take 4 tablets (1,600 mg total) by mouth daily, Disp: , Rfl:   •  metoprolol succinate (TOPROL-XL) 25 mg 24 hr tablet, Take 25 mg by mouth daily, Disp: , Rfl:   •  pantoprazole (PROTONIX) 40 mg tablet, Take 1 tablet (40 mg total) by mouth daily, Disp: 90 tablet, Rfl: 1  •  pregabalin (LYRICA) 150 mg capsule, Take 1 capsule (150 mg total) by mouth 3 (three) times a day, Disp: 90 capsule, Rfl: 0  •  prochlorperazine (COMPAZINE) 10 mg tablet, Take 1 tablet (10 mg total) by mouth every 6 (six) hours as needed for nausea or vomiting Max 3 days per week, 6 month supply, Disp: 60 tablet, Rfl: 1  •  triamcinolone (KENALOG) 0 1 % cream, Apply topically 2 (two) times a day (Patient taking differently: Apply topically as needed), Disp: 80 g, Rfl: 1  •  zolpidem (Ambien) 10 mg tablet, Take 1 tablet (10 mg total) by mouth daily at bedtime as needed for sleep, Disp: 30 tablet, Rfl: 0  •  Lancets (ONETOUCH ULTRASOFT) lancets, by Other route 2 (two) times a day (Patient not taking: Reported on 2/24/2023), Disp: , Rfl:   •  levothyroxine (Euthyrox) 100 mcg tablet, Take 1 tablet (100 mcg total) by mouth daily Take 112 mcg on Monday with disease in Friday and 100 mcg all other days of the week (Patient not taking: Reported on 2/24/2023), Disp: 90 tablet, Rfl: 1  •  valACYclovir (VALTREX) 1,000 mg tablet, Take 1 tablet (1,000 mg total) by mouth 2 (two) times a day for 14 days, Disp: 28 tablet, Rfl: 0    Allergies   Allergen Reactions   • Augmentin [Amoxicillin-Pot Clavulanate] Nausea Only     PT stated she only allergic to medication AUGMENTIN   • Meloxicam GI Intolerance     "brings on diverticulitis"       Social History   Past Surgical History:   Procedure Laterality Date   • BACK SURGERY     • BACK SURGERY      LOWER BACK SURGERY   • CARDIAC CATHETERIZATION      HISTORY OF CORONARY ANGIOGRAPHY WITH CONCOMITANT LEFT HEART CATHETERIZATION   • CORONARY ANGIOPLASTY WITH STENT PLACEMENT     • EAR SURGERY     • TONSILLECTOMY     • TYMPANOPLASTY     • US GUIDED BREAST BIOPSY LEFT COMPLETE Left 3/2/2021     Family History   Problem Relation Age of Onset   • Coronary artery disease Mother         ATHEROMA   • Heart disease Mother    • Diabetes Mother    • Hypertension Mother    • No Known Problems Father         NO PERTINENT FAMILY HISTORY   • Coronary artery disease Sister         ATHEROMA   • Heart disease Sister    • Stroke Sister    • Diabetes Sister    • Hypertension Sister    • No Known Problems Maternal Grandmother    • No Known Problems Maternal Grandfather    • No Known Problems Paternal Grandmother    • No Known Problems Paternal Grandfather    • No Known Problems Daughter    • Breast cancer Sister    • No Known Problems Sister    • No Known Problems Sister    • No Known Problems Son    • No Known Problems Maternal Aunt    • No Known Problems Maternal Aunt    • No Known Problems Paternal Aunt    • No Known Problems Paternal Aunt    • Brain cancer Paternal Uncle        Objective:  /60 (BP Location: Left arm, Patient Position: Sitting, Cuff Size: Adult)   Pulse 61   Temp 98 7 °F (37 1 °C) (Temporal)   Ht 5' 4" (1 626 m)   Wt 66 7 kg (147 lb)   LMP  (LMP Unknown)   SpO2 97%   BMI 25 23 kg/m²     No results found for this or any previous visit (from the past 1344 hour(s))  Physical Exam  Vitals and nursing note reviewed  Constitutional:       Appearance: Normal appearance  HENT:      Head: Normocephalic and atraumatic  Right Ear: Tympanic membrane, ear canal and external ear normal       Left Ear: Tympanic membrane, ear canal and external ear normal       Nose: Nose normal       Mouth/Throat:      Mouth: Mucous membranes are moist    Eyes:      Extraocular Movements: Extraocular movements intact  Conjunctiva/sclera: Conjunctivae normal       Pupils: Pupils are equal, round, and reactive to light  Cardiovascular:      Rate and Rhythm: Normal rate and regular rhythm  Pulses: Normal pulses  Heart sounds: Normal heart sounds  Pulmonary:      Effort: Pulmonary effort is normal       Breath sounds: Normal breath sounds  Abdominal:      General: Bowel sounds are normal    Musculoskeletal:         General: Swelling and tenderness present  Left hand: Swelling ( index finger ) and tenderness ( index finger ) present  Decreased range of motion ( index finger, bending motion is limited due to swelling )  Arms:    Skin:     General: Skin is warm and dry  Capillary Refill: Capillary refill takes less than 2 seconds  Neurological:      Mental Status: She is alert and oriented to person, place, and time  Psychiatric:         Mood and Affect: Mood normal          Behavior: Behavior normal          Thought Content:  Thought content normal          Judgment: Judgment normal

## 2023-03-21 ENCOUNTER — TELEPHONE (OUTPATIENT)
Dept: INTERNAL MEDICINE CLINIC | Facility: OTHER | Age: 63
End: 2023-03-21

## 2023-03-21 DIAGNOSIS — F51.01 PRIMARY INSOMNIA: ICD-10-CM

## 2023-03-21 DIAGNOSIS — G47.00 INSOMNIA, UNSPECIFIED TYPE: Primary | ICD-10-CM

## 2023-03-21 DIAGNOSIS — F43.23 SITUATIONAL MIXED ANXIETY AND DEPRESSIVE DISORDER: Primary | ICD-10-CM

## 2023-03-21 DIAGNOSIS — M62.838 NECK MUSCLE SPASM: ICD-10-CM

## 2023-03-21 DIAGNOSIS — M48.02 CERVICAL STENOSIS OF SPINAL CANAL: ICD-10-CM

## 2023-03-21 RX ORDER — CYCLOBENZAPRINE HCL 10 MG
10 TABLET ORAL 3 TIMES DAILY PRN
Qty: 30 TABLET | Refills: 0 | Status: SHIPPED | OUTPATIENT
Start: 2023-03-21

## 2023-03-21 RX ORDER — ZOLPIDEM TARTRATE 10 MG/1
10 TABLET ORAL
Qty: 30 TABLET | Refills: 0 | Status: SHIPPED | OUTPATIENT
Start: 2023-03-21

## 2023-03-31 ENCOUNTER — TELEPHONE (OUTPATIENT)
Dept: PSYCHIATRY | Facility: CLINIC | Age: 63
End: 2023-03-31

## 2023-03-31 NOTE — TELEPHONE ENCOUNTER
A message was left for patient to return call to intake dept   Pt will be added to wait list at that time

## 2023-04-14 NOTE — TELEPHONE ENCOUNTER
Called pt regarding referral pt has been added to med [facility] wait list

## 2023-04-25 PROBLEM — J01.10 ACUTE FRONTAL SINUSITIS: Status: RESOLVED | Noted: 2023-02-24 | Resolved: 2023-04-25

## 2023-04-25 PROBLEM — Z12.11 SCREEN FOR COLON CANCER: Status: RESOLVED | Noted: 2019-07-15 | Resolved: 2023-04-25

## 2023-04-26 DIAGNOSIS — R21 SKIN RASH: ICD-10-CM

## 2023-04-26 DIAGNOSIS — E78.00 HYPERCHOLESTEROLEMIA: ICD-10-CM

## 2023-04-26 DIAGNOSIS — F51.01 PRIMARY INSOMNIA: ICD-10-CM

## 2023-04-26 DIAGNOSIS — K21.9 GASTROESOPHAGEAL REFLUX DISEASE: ICD-10-CM

## 2023-04-26 DIAGNOSIS — F32.89 OTHER DEPRESSION: ICD-10-CM

## 2023-04-26 RX ORDER — HYDROXYZINE HYDROCHLORIDE 25 MG/1
25 TABLET, FILM COATED ORAL 2 TIMES DAILY PRN
Qty: 60 TABLET | Refills: 5 | Status: SHIPPED | OUTPATIENT
Start: 2023-04-26

## 2023-04-26 RX ORDER — EZETIMIBE 10 MG/1
10 TABLET ORAL DAILY
Qty: 90 TABLET | Refills: 1 | Status: SHIPPED | OUTPATIENT
Start: 2023-04-26

## 2023-04-26 RX ORDER — PANTOPRAZOLE SODIUM 40 MG/1
40 TABLET, DELAYED RELEASE ORAL DAILY
Qty: 90 TABLET | Refills: 1 | Status: SHIPPED | OUTPATIENT
Start: 2023-04-26

## 2023-04-26 RX ORDER — CITALOPRAM 40 MG/1
40 TABLET ORAL DAILY
Qty: 90 TABLET | Refills: 1 | Status: SHIPPED | OUTPATIENT
Start: 2023-04-26

## 2023-04-26 RX ORDER — ZOLPIDEM TARTRATE 10 MG/1
10 TABLET ORAL
Qty: 30 TABLET | Refills: 0 | Status: SHIPPED | OUTPATIENT
Start: 2023-04-26

## 2023-04-27 ENCOUNTER — TELEMEDICINE (OUTPATIENT)
Dept: NEUROLOGY | Facility: CLINIC | Age: 63
End: 2023-04-27

## 2023-04-27 DIAGNOSIS — H81.10 BPPV (BENIGN PAROXYSMAL POSITIONAL VERTIGO): ICD-10-CM

## 2023-04-27 DIAGNOSIS — G43.709 CHRONIC MIGRAINE WITHOUT AURA WITHOUT STATUS MIGRAINOSUS, NOT INTRACTABLE: Primary | ICD-10-CM

## 2023-04-27 DIAGNOSIS — G47.00 INSOMNIA: ICD-10-CM

## 2023-04-27 RX ORDER — ATOGEPANT 60 MG/1
TABLET ORAL
Qty: 30 TABLET | Refills: 11 | Status: SHIPPED | OUTPATIENT
Start: 2023-04-27

## 2023-04-27 NOTE — PROGRESS NOTES
Review of Systems   Constitutional: Negative for appetite change and fever  HENT: Negative  Negative for hearing loss, tinnitus, trouble swallowing and voice change  Eyes: Negative  Negative for photophobia and pain  Respiratory: Negative  Negative for shortness of breath  Cardiovascular: Negative  Negative for palpitations  Gastrointestinal: Negative  Negative for nausea and vomiting  Endocrine: Negative  Negative for cold intolerance  Genitourinary: Negative  Negative for dysuria, frequency and urgency  Musculoskeletal: Negative  Negative for myalgias and neck pain  Skin: Negative  Negative for rash  Neurological: Positive for headaches  Negative for dizziness, tremors, seizures, syncope, facial asymmetry, speech difficulty, weakness, light-headedness and numbness  Hematological: Negative  Does not bruise/bleed easily  Psychiatric/Behavioral: Positive for sleep disturbance  Negative for confusion and hallucinations
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
## 2023-04-27 NOTE — PATIENT INSTRUCTIONS
Epley Maneuver can fix vertigo if you feel safe to do so at home, otherwise I will put in a PT referral     I am placing a referral to the sleep medicine specialist team to further evaluate your sleep issues  Please call [phone] to schedule both the visit with the provider and I recommend you see one of the following providers:  Adrianna Small MD *  Dee Liu PA-C      Headache/migraine treatment:   Abortive medications (for immediate treatment of a headache): It is ok to take ibuprofen, acetaminophen or naproxen (Advil, Tylenol,  Aleve, Excedrin) if they help your headaches you should limit these to No more than 3 times a week to avoid medication overuse/rebound headaches  For your more moderate to severe migraines take this medication early  Prochlorperazine/Compazine 10 mg as needed for migraine  This is technically a nausea medication but can be used for migraine or nausea  Try not to use more than 3 days per week      Prescription preventive medications for headaches/migraines   (to take every day to help prevent headaches - not to take at the time of headache):  [x]      Atogepant (Qulipta) 60 MG TABS; 60 mg PO daily    *Typically these types of medications take time untill you see the benefit, although some may see improvement in days, often it may take weeks, especially if the medication is being titrated up to a beneficial level  Please contact us if there are any concerns or questions regarding the medication  Lifestyle Recommendations:  [x] SLEEP - Maintain a regular sleep schedule: Adults need at least 7-8 hours of uninterrupted a night  Maintain good sleep hygiene:  Going to bed and waking up at consistent times, avoiding excessive daytime naps, avoiding caffeinated beverages in the evening, avoid excessive stimulation in the evening and generally using bed primarily for sleeping    One hour before bedtime would recommend turning lights down lower, decreasing your activity (may read quietly, listen to music at a low volume)  When you get into bed, should eliminate all technology (no texting, emailing, playing with your phone, iPad or tablet in bed)  [x] HYDRATION - Maintain good hydration  Drink  2L of fluid a day (4 typical small water bottles)  [x] DIET - Maintain good nutrition  In particular don't skip meals and try and eat healthy balanced meals regularly  [x] TRIGGERS - Look for other triggers and avoid them: Limit caffeine to 1-2 cups a day or less  Avoid dietary triggers that you have noticed bring on your headaches (this could include aged cheese, peanuts, MSG, aspartame and nitrates)  [x] EXERCISE - physical exercise as we all know is good for you in many ways, and not only is good for your heart, but also is beneficial for your mental health, cognitive health and  chronic pain/headaches  I would encourage at the least 5 days of physical exercise weekly for at least 30 minutes  Education and Follow-up  [x] Please call with any questions or concerns  Of course if any new concerning symptoms go to the emergency department    [x] Follow up 3-4 months, sooner if needed

## 2023-05-01 LAB
LEFT EYE DIABETIC RETINOPATHY: NORMAL
RIGHT EYE DIABETIC RETINOPATHY: NORMAL

## 2023-05-02 ENCOUNTER — HOSPITAL ENCOUNTER (OUTPATIENT)
Dept: NON INVASIVE DIAGNOSTICS | Facility: CLINIC | Age: 63
Discharge: HOME | End: 2023-05-02

## 2023-05-02 DIAGNOSIS — I65.21 CAROTID STENOSIS, ASYMPTOMATIC, RIGHT: ICD-10-CM

## 2023-05-03 LAB — TSH SERPL-ACNC: 0.42 MIU/L (ref 0.4–4.5)

## 2023-05-17 ENCOUNTER — OFFICE VISIT (OUTPATIENT)
Dept: INTERNAL MEDICINE CLINIC | Facility: CLINIC | Age: 63
End: 2023-05-17

## 2023-05-17 VITALS
TEMPERATURE: 97.7 F | HEART RATE: 75 BPM | WEIGHT: 150.4 LBS | HEIGHT: 64 IN | OXYGEN SATURATION: 98 % | BODY MASS INDEX: 25.68 KG/M2 | DIASTOLIC BLOOD PRESSURE: 94 MMHG | SYSTOLIC BLOOD PRESSURE: 142 MMHG

## 2023-05-17 DIAGNOSIS — E78.00 HYPERCHOLESTEROLEMIA: ICD-10-CM
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
## 2023-05-17 DIAGNOSIS — E11.9 TYPE 2 DIABETES MELLITUS WITHOUT COMPLICATION, WITHOUT LONG-TERM CURRENT USE OF INSULIN (HCC): ICD-10-CM

## 2023-05-17 DIAGNOSIS — I10 HTN (HYPERTENSION), BENIGN: ICD-10-CM
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
## 2023-05-17 RX ORDER — METOPROLOL SUCCINATE 25 MG/1
25 TABLET, EXTENDED RELEASE ORAL DAILY
Qty: 90 TABLET | Refills: 1 | Status: SHIPPED | OUTPATIENT
Start: 2023-05-17

## 2023-05-17 RX ORDER — CEPHALEXIN 500 MG/1
500 CAPSULE ORAL EVERY 6 HOURS SCHEDULED
Qty: 40 CAPSULE | Refills: 0 | Status: SHIPPED | OUTPATIENT
Start: 2023-05-17 | End: 2023-05-27

## 2023-05-17 RX ORDER — LOSARTAN POTASSIUM 50 MG/1
50 TABLET ORAL DAILY
Qty: 90 TABLET | Refills: 1 | Status: SHIPPED | OUTPATIENT
Start: 2023-05-17

## 2023-05-17 NOTE — PROGRESS NOTES
Diabetic Foot Exam    Patient's shoes and socks removed  Right Foot/Ankle   Right Foot Inspection  Skin Exam: skin intact  Sensory   Monofilament testing: intact    Left Foot/Ankle  Left Foot Inspection  Skin Exam: skin intact       Sensory   Monofilament testing: intact    Assign Risk Category  No deformity present  No loss of protective sensation  No weak pulses  Risk: 0

## 2023-05-17 NOTE — PROGRESS NOTES
Name: Savana Zeng      : 1960      MRN: [id number]  Encounter Provider: Maria Ines Angel MD  Encounter Date: 2023   Encounter department: [facility]     1  Bug bite, subsequent encounter  Assessment & Plan:  Multiple bites over left buttocks     Orders:  -     cephalexin (KEFLEX) 500 mg capsule; Take 1 capsule (500 mg total) by mouth every 6 (six) hours for 10 days    2  HTN (hypertension), benign  -     losartan (COZAAR) 50 mg tablet; Take 1 tablet (50 mg total) by mouth daily  -     metoprolol succinate (TOPROL-XL) 25 mg 24 hr tablet; Take 1 tablet (25 mg total) by mouth daily  -     Comprehensive metabolic panel; Future    3  Type 2 diabetes mellitus without complication, without long-term current use of insulin (HCC)  -     Hemoglobin A1C; Future  -     Albumin / creatinine urine ratio    4  Hypercholesterolemia  -     Lipid Panel with Direct LDL reflex; Future    I have spent 45 minutes with Patient  today in which greater than 50% of this time was spent in counseling/coordination of care regarding Diagnostic results, Prognosis, Risks and benefits of tx options, Intructions for management, Patient and family education, Importance of tx compliance, Risk factor reductions and Impressions  Subjective     Chief Complaint   Patient presents with   • Follow-up     Review labs done    • Health Screening     Dm eye patient would not to have done right now facility would like payment up front and patient can't afford that  Ct scan for lung patient would like to hold off on that right now may do in future  Mammogram patient would not like to have done may do in future         HPI     Follow-up with her ongoing medical issues, diabetes mellitus, hypothyroidism, hypertension, nicotine dependence, gastroesophageal reflux disease        She has not been taking antihypertensives for a while however now has started to feel symptoms of anxiety where she gets anxious for no really good reason  \    She has multiple bites over her right leg and would like me to take a look at that as well  She had a right carotid artery sonogram wanted my opinion on that in terms of need for surgery    She has not had any blood work for diabetes done  Lab orders were in the chart however it was not done when she went in for her thyroid blood test      Review of Systems   Musculoskeletal: Positive for arthralgias  Skin: Positive for rash  Allergic/Immunologic: Positive for environmental allergies  All other systems reviewed and are negative        Past Medical History:   Diagnosis Date   • Abnormal echocardiogram    • Abnormal stress test    • Anxiety    • Asthma    • Brachial neuritis    • Depression    • Disease of thyroid gland    • Displacement of intervertebral disc of mid-cervical region    • Fibromyalgia    • Frequent headaches    • GERD (gastroesophageal reflux disease)    • Herniated nucleus pulposus    • History of arthritis    • History of asthma    • History of cardiac disorder    • History of chest pain    • History of diverticulitis    • History of fatigue    • History of hearing loss    • History of hemoptysis    • History of herpes simplex infection    • History of hiatal hernia    • History of insect bite     INFECTED   • History of memory loss    • History of neck disorder    • History of reflex sympathetic dystrophy    • History of restless legs syndrome    • History of shortness of breath    • History of spinal stenosis    • Hyperlipidemia    • Hyperlipidemia    • Hypertension    • Skin rash    • Somnolence, daytime    • Spinal stenosis of cervical region      Past Surgical History:   Procedure Laterality Date   • BACK SURGERY     • BACK SURGERY      LOWER BACK SURGERY   • CARDIAC CATHETERIZATION      HISTORY OF CORONARY ANGIOGRAPHY WITH CONCOMITANT LEFT HEART CATHETERIZATION   • CORONARY ANGIOPLASTY WITH STENT PLACEMENT     • EAR SURGERY     • TONSILLECTOMY     • TYMPANOPLASTY     • US GUIDED BREAST BIOPSY LEFT COMPLETE Left 3/2/2021     Family History   Problem Relation Age of Onset   • Coronary artery disease Mother         ATHEROMA   • Heart disease Mother    • Diabetes Mother    • Hypertension Mother    • No Known Problems Father         NO PERTINENT FAMILY HISTORY   • Coronary artery disease Sister         ATHEROMA   • Heart disease Sister    • Stroke Sister    • Diabetes Sister    • Hypertension Sister    • Parkinsonism Sister    • Breast cancer Sister    • No Known Problems Sister    • No Known Problems Sister    • No Known Problems Maternal Aunt    • No Known Problems Maternal Aunt    • No Known Problems Paternal Aunt    • No Known Problems Paternal Aunt    • Brain cancer Paternal Uncle    • No Known Problems Maternal Grandmother    • No Known Problems Maternal Grandfather    • No Known Problems Paternal Grandmother    • No Known Problems Paternal Grandfather    • No Known Problems Daughter    • No Known Problems Son      Social History     Socioeconomic History   • Marital status: Single     Spouse name: None   • Number of children: None   • Years of education: None   • Highest education level: None   Occupational History   • None   Tobacco Use   • Smoking status: Every Day     Packs/day: 1 00     Years: 51 00     Pack years: 51 00     Types: Cigarettes     Start date: 1972   • Smokeless tobacco: Never   Vaping Use   • Vaping Use: Never used   Substance and Sexual Activity   • Alcohol use: Not Currently     Comment: occasional   • Drug use: Yes     Types: Marijuana   • Sexual activity: Not Currently   Other Topics Concern   • None   Social History Narrative   • None     Social Determinants of Health     Financial Resource Strain: High Risk   • Difficulty of Paying Living Expenses: Hard   Food Insecurity: Not on file   Transportation Needs: No Transportation Needs   • Lack of Transportation (Medical):  No   • Lack of Transportation (Non-Medical): No   Physical Activity: Not on file   Stress: Not on file   Social Connections: Not on file   Intimate Partner Violence: Not on file   Housing Stability: Not on file     Current Outpatient Medications on File Prior to Visit   Medication Sig   • acetaminophen (TYLENOL) 325 mg tablet Take 1 tablet (325 mg total) by mouth as needed for headaches For headache   • albuterol (PROVENTIL HFA,VENTOLIN HFA) 90 mcg/act inhaler Inhale 2 puffs every 6 (six) hours as needed for wheezing   • aspirin 81 mg chewable tablet Chew 81 mg daily   • atorvastatin (LIPITOR) 80 mg tablet Take 1 tablet (80 mg total) by mouth daily   • citalopram (CeleXA) 40 mg tablet Take 1 tablet (40 mg total) by mouth daily   • cyclobenzaprine (FLEXERIL) 10 mg tablet Take 1 tablet (10 mg total) by mouth 3 (three) times a day as needed for muscle spasms   • Empagliflozin (Jardiance) 10 MG TABS tablet Take 1 tablet (10 mg total) by mouth every morning   • ezetimibe (ZETIA) 10 mg tablet Take 1 tablet (10 mg total) by mouth daily   • hydrOXYzine HCL (ATARAX) 25 mg tablet Take 1 tablet (25 mg total) by mouth 2 (two) times a day as needed for itching   • levothyroxine 112 mcg tablet Take 1 tablet (112 mcg total) by mouth daily Take 112 mcg daily on Mondays Wednesdays and Fridays alternate with 100 mcg all other days (Patient taking differently: Take 112 mcg by mouth daily)   • magnesium oxide (MAG-OX) 400 mg Take 4 tablets (1,600 mg total) by mouth daily   • pantoprazole (PROTONIX) 40 mg tablet Take 1 tablet (40 mg total) by mouth daily   • pregabalin (LYRICA) 150 mg capsule Take 1 capsule (150 mg total) by mouth 3 (three) times a day   • prochlorperazine (COMPAZINE) 10 mg tablet Take 1 tablet (10 mg total) by mouth every 6 (six) hours as needed for nausea or vomiting Max 3 days per week, 6 month supply   • triamcinolone (KENALOG) 0 1 % cream Apply topically 2 (two) times a day (Patient taking differently: Apply topically as needed)   • valACYclovir (VALTREX) "1,000 mg tablet Take 1 tablet (1,000 mg total) by mouth 2 (two) times a day for 14 days   • zolpidem (Ambien) 10 mg tablet Take 1 tablet (10 mg total) by mouth daily at bedtime as needed for sleep   • Atogepant (Qulipta) 60 MG TABS 60 mg PO daily (Patient not taking: Reported on 5/17/2023)     Allergies   Allergen Reactions   • Augmentin [Amoxicillin-Pot Clavulanate] Nausea Only     PT stated she only allergic to medication AUGMENTIN   • Meloxicam GI Intolerance     \"brings on diverticulitis\"     Immunization History   Administered Date(s) Administered   • COVID-19 Moderna Vac BIVALENT 12 Yr+ IM (BOOSTER ONLY) 0 5 ML 01/05/2023   • Influenza, recombinant, quadrivalent,injectable, preservative free 12/08/2020   • Influenza, seasonal, injectable 11/15/2011, 10/28/2013   • Pneumococcal Conjugate Vaccine 20-valent (Pcv20), Polysace 10/04/2022, 10/04/2022   • Pneumococcal Polysaccharide PPV23 12/08/2020       Objective     /94 (BP Location: Left arm, Patient Position: Sitting, Cuff Size: Standard)   Pulse 75   Temp 97 7 °F (36 5 °C) (Temporal)   Ht 5' 4\" (1 626 m)   Wt 68 2 kg (150 lb 6 4 oz)   LMP  (LMP Unknown)   SpO2 98%   BMI 25 82 kg/m²     Physical Exam     Gen: NAD  Heent: atraumatic, normocephalic  Mouth: is moist  Neck: is supple, no JVD, no carotid bruits  Heart: is regular Normal s1, s2,  Lungs: are clear to auscultation  Abd: soft, non tender, NABS  Ext: no edema, distal pulses normal  Skin: Multiple bites over back of right thigh    Mood: normal affect  Neuro: AAOx3  Arias Bhat MD  "

## 2023-05-18 LAB
CREAT UR-MCNC: 138 MG/DL
MICROALBUMIN UR-MCNC: 5.3 MG/L (ref 0–20)
MICROALBUMIN/CREAT 24H UR: 4 MG/G CREATININE (ref 0–30)

## 2023-05-31 DIAGNOSIS — F51.01 PRIMARY INSOMNIA: ICD-10-CM

## 2023-05-31 DIAGNOSIS — M79.7 FIBROMYALGIA: ICD-10-CM

## 2023-05-31 RX ORDER — ZOLPIDEM TARTRATE 10 MG/1
10 TABLET ORAL
Qty: 30 TABLET | Refills: 0 | Status: SHIPPED | OUTPATIENT
Start: 2023-05-31

## 2023-05-31 RX ORDER — PREGABALIN 150 MG/1
150 CAPSULE ORAL 3 TIMES DAILY
Qty: 90 CAPSULE | Refills: 0 | Status: SHIPPED | OUTPATIENT
Start: 2023-05-31

## 2023-06-02 ENCOUNTER — VBI (OUTPATIENT)
Dept: ADMINISTRATIVE | Facility: OTHER | Age: 63
End: 2023-06-02

## 2023-06-05 DIAGNOSIS — M62.838 NECK MUSCLE SPASM: ICD-10-CM

## 2023-06-05 DIAGNOSIS — M48.02 CERVICAL STENOSIS OF SPINAL CANAL: ICD-10-CM

## 2023-06-06 ENCOUNTER — OFFICE VISIT (OUTPATIENT)
Dept: CARDIOLOGY CLINIC | Facility: CLINIC | Age: 63
End: 2023-06-06
Payer: COMMERCIAL

## 2023-06-06 VITALS
WEIGHT: 152.6 LBS | SYSTOLIC BLOOD PRESSURE: 130 MMHG | BODY MASS INDEX: 26.19 KG/M2 | DIASTOLIC BLOOD PRESSURE: 78 MMHG | HEART RATE: 68 BPM

## 2023-06-06 DIAGNOSIS — E78.5 DYSLIPIDEMIA: ICD-10-CM

## 2023-06-06 DIAGNOSIS — I25.10 CORONARY ARTERY DISEASE INVOLVING NATIVE CORONARY ARTERY OF NATIVE HEART WITHOUT ANGINA PECTORIS: Primary | ICD-10-CM

## 2023-06-06 PROCEDURE — 99214 OFFICE O/P EST MOD 30 MIN: CPT | Performed by: INTERNAL MEDICINE

## 2023-06-06 PROCEDURE — 93000 ELECTROCARDIOGRAM COMPLETE: CPT | Performed by: INTERNAL MEDICINE

## 2023-06-06 RX ORDER — ATORVASTATIN CALCIUM 80 MG/1
80 TABLET, FILM COATED ORAL DAILY
Qty: 90 TABLET | Refills: 3 | Status: SHIPPED | OUTPATIENT
Start: 2023-06-06

## 2023-06-06 RX ORDER — CYCLOBENZAPRINE HCL 10 MG
10 TABLET ORAL 3 TIMES DAILY PRN
Qty: 30 TABLET | Refills: 0 | Status: SHIPPED | OUTPATIENT
Start: 2023-06-06

## 2023-06-06 NOTE — PROGRESS NOTES
Cardiology             Dk Hazard  1960  [id number]              Assessment/Plan:     CAD, prior PCI/UTE to proximal LAD July 2014, PCI/UTE to the OM 2 April 2017 without residual obstructive disease  Essential hypertension  Dyslipidemia  Early morning palpitations         No symptoms of angina, continue aspirin 81 mg daily  Blood pressure controlled, continue losartan, metoprolol  We will refill atorvastatin per patient request who states she has not been taking it regularly  Strongly suggested smoking cessation      Follow-up in 1 year           Interval History:      This is a 73-year-old female who I had initially seen in July 2014 secondary to symptoms of exertional throat discomfort, exertional shortness of breath, and several episodes of atypical chest discomfort  Her nuclear stress test was somewhat ambiguous, therefore I had requested a cardiac CT which she completed revealing LAD stenosis  Thereafter a cardiac catheterization on 7/18/14 revealed 90% proximal LAD stenosis, and she received a 2 5 x 28 mm drug-eluting stent for the lesion  She underwent a repeat cardiac catheterization due to stable symptoms, on 3/27/15  The patient's prior LAD stent was patent  She had 99% midcircumflex stenosis after the origin of the major OM branch, which was unchanged compared to her prior study from 2014  She also had 70% RCA stenosis with FFR performed within normal limits at 0 8 to indicating that the lesion was not slow critical   She underwent repeat cardiac catheterization on 4/27/17 secondary to recurrent exertional throat burning revealing patent LAD stent, with 100% chronic total occlusion of the OM 2  She underwent PCI/drug-eluting placement with 2 25 x 30 mm Resolute Integrity UTE with good results  She presents today after a 3-year  From a symptomatic standpoint she feels well without exertional chest pain, shortness of breath, dizziness, palpitations, lower extremity edema    She is smoking 2 packs/day            Vitals:  Vitals:    06/06/23 1127   BP: 130/78   BP Location: Right arm   Patient Position: Sitting   Cuff Size: Large   Pulse: 68   Weight: 69 2 kg (152 lb 9 6 oz)         Past Medical History:   Diagnosis Date   • Abnormal echocardiogram    • Abnormal stress test    • Anxiety    • Asthma    • Brachial neuritis    • Depression    • Disease of thyroid gland    • Displacement of intervertebral disc of mid-cervical region    • Fibromyalgia    • Frequent headaches    • GERD (gastroesophageal reflux disease)    • Herniated nucleus pulposus    • History of arthritis    • History of asthma    • History of cardiac disorder    • History of chest pain    • History of diverticulitis    • History of fatigue    • History of hearing loss    • History of hemoptysis    • History of herpes simplex infection    • History of hiatal hernia    • History of insect bite     INFECTED   • History of memory loss    • History of neck disorder    • History of reflex sympathetic dystrophy    • History of restless legs syndrome    • History of shortness of breath    • History of spinal stenosis    • Hyperlipidemia    • Hyperlipidemia    • Hypertension    • Skin rash    • Somnolence, daytime    • Spinal stenosis of cervical region      Social History     Socioeconomic History   • Marital status: Single     Spouse name: Not on file   • Number of children: Not on file   • Years of education: Not on file   • Highest education level: Not on file   Occupational History   • Not on file   Tobacco Use   • Smoking status: Every Day     Packs/day: 1 00     Years: 51 00     Total pack years: 51 00     Types: Cigarettes     Start date: 0   • Smokeless tobacco: Never   Vaping Use   • Vaping Use: Never used   Substance and Sexual Activity   • Alcohol use: Not Currently     Comment: occasional   • Drug use: Yes     Types: Marijuana   • Sexual activity: Not Currently   Other Topics Concern   • Not on file   Social History Narrative   • Not on file     Social Determinants of Health     Financial Resource Strain: High Risk (10/4/2022)    Overall Financial Resource Strain (CARDIA)    • Difficulty of Paying Living Expenses: Hard   Food Insecurity: Not on file   Transportation Needs: No Transportation Needs (10/4/2022)    PRAPARE - Transportation    • Lack of Transportation (Medical): No    • Lack of Transportation (Non-Medical):  No   Physical Activity: Not on file   Stress: Not on file   Social Connections: Not on file   Intimate Partner Violence: Not on file   Housing Stability: Not on file      Family History   Problem Relation Age of Onset   • Coronary artery disease Mother         ATHEROMA   • Heart disease Mother    • Diabetes Mother    • Hypertension Mother    • No Known Problems Father         NO PERTINENT FAMILY HISTORY   • Coronary artery disease Sister         ATHEROMA   • Heart disease Sister    • Stroke Sister    • Diabetes Sister    • Hypertension Sister    • Parkinsonism Sister    • Breast cancer Sister    • No Known Problems Sister    • No Known Problems Sister    • No Known Problems Maternal Aunt    • No Known Problems Maternal Aunt    • No Known Problems Paternal Aunt    • No Known Problems Paternal Aunt    • Brain cancer Paternal Uncle    • No Known Problems Maternal Grandmother    • No Known Problems Maternal Grandfather    • No Known Problems Paternal Grandmother    • No Known Problems Paternal Grandfather    • No Known Problems Daughter    • No Known Problems Son      Past Surgical History:   Procedure Laterality Date   • BACK SURGERY     • BACK SURGERY      LOWER BACK SURGERY   • CARDIAC CATHETERIZATION      HISTORY OF CORONARY ANGIOGRAPHY WITH CONCOMITANT LEFT HEART CATHETERIZATION   • CORONARY ANGIOPLASTY WITH STENT PLACEMENT     • EAR SURGERY     • TONSILLECTOMY     • TYMPANOPLASTY     • US GUIDED BREAST BIOPSY LEFT COMPLETE Left 3/2/2021       Current Outpatient Medications:   •  acetaminophen (TYLENOL) 325 mg tablet, Take 1 tablet (325 mg total) by mouth as needed for headaches For headache, Disp: , Rfl:   •  albuterol (PROVENTIL HFA,VENTOLIN HFA) 90 mcg/act inhaler, Inhale 2 puffs every 6 (six) hours as needed for wheezing, Disp: 18 g, Rfl: 5  •  aspirin 81 mg chewable tablet, Chew 81 mg daily, Disp: , Rfl:   •  atorvastatin (LIPITOR) 80 mg tablet, Take 1 tablet (80 mg total) by mouth daily, Disp: 90 tablet, Rfl: 3  •  citalopram (CeleXA) 40 mg tablet, Take 1 tablet (40 mg total) by mouth daily, Disp: 90 tablet, Rfl: 1  •  cyclobenzaprine (FLEXERIL) 10 mg tablet, Take 1 tablet (10 mg total) by mouth 3 (three) times a day as needed for muscle spasms, Disp: 30 tablet, Rfl: 0  •  Empagliflozin (Jardiance) 10 MG TABS tablet, Take 1 tablet (10 mg total) by mouth every morning, Disp: 90 tablet, Rfl: 1  •  ezetimibe (ZETIA) 10 mg tablet, Take 1 tablet (10 mg total) by mouth daily, Disp: 90 tablet, Rfl: 1  •  hydrOXYzine HCL (ATARAX) 25 mg tablet, Take 1 tablet (25 mg total) by mouth 2 (two) times a day as needed for itching, Disp: 60 tablet, Rfl: 5  •  levothyroxine 112 mcg tablet, Take 1 tablet (112 mcg total) by mouth daily Take 112 mcg daily on Mondays Wednesdays and Fridays alternate with 100 mcg all other days (Patient taking differently: Take 112 mcg by mouth daily), Disp: 90 tablet, Rfl: 1  •  losartan (COZAAR) 50 mg tablet, Take 1 tablet (50 mg total) by mouth daily, Disp: 90 tablet, Rfl: 1  •  magnesium oxide (MAG-OX) 400 mg, Take 4 tablets (1,600 mg total) by mouth daily, Disp: , Rfl:   •  metoprolol succinate (TOPROL-XL) 25 mg 24 hr tablet, Take 1 tablet (25 mg total) by mouth daily, Disp: 90 tablet, Rfl: 1  •  pantoprazole (PROTONIX) 40 mg tablet, Take 1 tablet (40 mg total) by mouth daily, Disp: 90 tablet, Rfl: 1  •  pregabalin (LYRICA) 150 mg capsule, Take 1 capsule (150 mg total) by mouth 3 (three) times a day, Disp: 90 capsule, Rfl: 0  •  prochlorperazine (COMPAZINE) 10 mg tablet, Take 1 tablet (10 mg total) by mouth every 6 (six) hours as needed for nausea or vomiting Max 3 days per week, 6 month supply, Disp: 60 tablet, Rfl: 1  •  triamcinolone (KENALOG) 0 1 % cream, Apply topically 2 (two) times a day (Patient taking differently: Apply topically as needed), Disp: 80 g, Rfl: 1  •  zolpidem (Ambien) 10 mg tablet, Take 1 tablet (10 mg total) by mouth daily at bedtime as needed for sleep, Disp: 30 tablet, Rfl: 0  •  Atogepant (Qulipta) 60 MG TABS, 60 mg PO daily, Disp: 30 tablet, Rfl: 11  •  valACYclovir (VALTREX) 1,000 mg tablet, Take 1 tablet (1,000 mg total) by mouth 2 (two) times a day for 14 days, Disp: 28 tablet, Rfl: 0        Review of Systems:  Review of Systems   Constitutional: Negative for activity change, fever and unexpected weight change  HENT: Negative for facial swelling, nosebleeds and voice change  Respiratory: Negative for chest tightness, shortness of breath and wheezing  Cardiovascular: Negative for chest pain, palpitations and leg swelling  Gastrointestinal: Negative for abdominal distention  Genitourinary: Negative for hematuria  Musculoskeletal: Negative for arthralgias  Skin: Negative for color change, pallor, rash and wound  Neurological: Negative for dizziness, seizures and syncope  Psychiatric/Behavioral: Negative for agitation  Physical Exam:  Physical Exam  Vitals reviewed  Constitutional:       Appearance: She is well-developed  HENT:      Head: Normocephalic and atraumatic  Cardiovascular:      Rate and Rhythm: Normal rate and regular rhythm  Heart sounds: Normal heart sounds  Pulmonary:      Effort: Pulmonary effort is normal       Breath sounds: Normal breath sounds  Abdominal:      Palpations: Abdomen is soft  Musculoskeletal:         General: Normal range of motion  Cervical back: Normal range of motion and neck supple  Skin:     General: Skin is warm and dry     Neurological:      Mental Status: She is alert and oriented to person, place, and time  Psychiatric:         Behavior: Behavior normal          Thought Content: Thought content normal          Judgment: Judgment normal          This note was completed in part utilizing M-Modal Fluency Direct Software  Grammatical errors, random word insertions, spelling mistakes, and incomplete sentences can be an occasional consequence of this system secondary to software limitations, ambient noise, and hardware issues  If you have any questions or concerns about the content, text, or information contained within the body of this dictation, please contact the provider for clarification

## 2023-06-23 DIAGNOSIS — E11.65 UNCONTROLLED TYPE 2 DIABETES MELLITUS WITH HYPERGLYCEMIA (HCC): ICD-10-CM

## 2023-07-07 DIAGNOSIS — F51.01 PRIMARY INSOMNIA: ICD-10-CM

## 2023-07-07 DIAGNOSIS — M79.7 FIBROMYALGIA: ICD-10-CM

## 2023-07-07 DIAGNOSIS — E11.65 UNCONTROLLED TYPE 2 DIABETES MELLITUS WITH HYPERGLYCEMIA (HCC): ICD-10-CM

## 2023-07-07 RX ORDER — ZOLPIDEM TARTRATE 10 MG/1
10 TABLET ORAL
Qty: 30 TABLET | Refills: 0 | Status: SHIPPED | OUTPATIENT
Start: 2023-07-07

## 2023-07-07 RX ORDER — PREGABALIN 150 MG/1
150 CAPSULE ORAL 3 TIMES DAILY
Qty: 90 CAPSULE | Refills: 0 | Status: SHIPPED | OUTPATIENT
Start: 2023-07-07

## 2023-07-18 DIAGNOSIS — E03.9 HYPOTHYROIDISM, UNSPECIFIED TYPE: ICD-10-CM

## 2023-07-18 RX ORDER — LEVOTHYROXINE SODIUM 112 UG/1
112 TABLET ORAL DAILY
Qty: 90 TABLET | Refills: 1 | OUTPATIENT
Start: 2023-07-18

## 2023-07-28 DIAGNOSIS — E03.9 HYPOTHYROIDISM, UNSPECIFIED TYPE: ICD-10-CM

## 2023-07-28 RX ORDER — LEVOTHYROXINE SODIUM 112 UG/1
112 TABLET ORAL DAILY
Qty: 90 TABLET | Refills: 1 | Status: SHIPPED | OUTPATIENT
Start: 2023-07-28

## 2023-08-02 ENCOUNTER — OFFICE VISIT (OUTPATIENT)
Dept: INTERNAL MEDICINE CLINIC | Age: 63
End: 2023-08-02
Payer: COMMERCIAL

## 2023-08-02 VITALS
WEIGHT: 158 LBS | SYSTOLIC BLOOD PRESSURE: 110 MMHG | DIASTOLIC BLOOD PRESSURE: 63 MMHG | HEART RATE: 87 BPM | TEMPERATURE: 98.5 F | OXYGEN SATURATION: 93 % | HEIGHT: 64 IN | BODY MASS INDEX: 26.98 KG/M2

## 2023-08-02 DIAGNOSIS — M46.1 SACROILIITIS (HCC): ICD-10-CM

## 2023-08-02 DIAGNOSIS — J45.901 EXACERBATION OF ALLERGIC ASTHMA: Primary | ICD-10-CM

## 2023-08-02 DIAGNOSIS — E11.9 TYPE 2 DIABETES MELLITUS WITHOUT COMPLICATION, WITHOUT LONG-TERM CURRENT USE OF INSULIN (HCC): ICD-10-CM

## 2023-08-02 DIAGNOSIS — R21 SKIN RASH: ICD-10-CM

## 2023-08-02 DIAGNOSIS — F17.219 CIGARETTE NICOTINE DEPENDENCE WITH NICOTINE-INDUCED DISORDER: Chronic | ICD-10-CM

## 2023-08-02 DIAGNOSIS — I20.8 CHRONIC STABLE ANGINA (HCC): ICD-10-CM

## 2023-08-02 PROCEDURE — 99214 OFFICE O/P EST MOD 30 MIN: CPT | Performed by: PHYSICIAN ASSISTANT

## 2023-08-02 RX ORDER — HYDROXYZINE HYDROCHLORIDE 25 MG/1
25 TABLET, FILM COATED ORAL 2 TIMES DAILY PRN
Qty: 180 TABLET | Refills: 2 | OUTPATIENT
Start: 2023-08-02

## 2023-08-02 RX ORDER — PREDNISONE 10 MG/1
TABLET ORAL
Qty: 20 TABLET | Refills: 0 | Status: SHIPPED | OUTPATIENT
Start: 2023-08-02

## 2023-08-02 NOTE — PROGRESS NOTES
Assessment/Plan:         Diagnoses and all orders for this visit:    Exacerbation of allergic asthma  Comments:  pred taper, need to start tonight, warned may affect sleep  take benadryl qhs   use albuterol prn    Orders:  -     predniSONE 10 mg tablet; 3 tabs daily for 3 days then 2 tablets daily x 3 days then 1 tablets daily x 3 days    Sacroiliitis (HCC)    Chronic stable angina (HCC)    Type 2 diabetes mellitus without complication, without long-term current use of insulin (HCC)  Comments:  discussed prednisone affect on BS  need to watch closely and avoid sweets while on this     Cigarette nicotine dependence with nicotine-induced disorder        Pt needs CT chest lung ca screen  Needs f/u with pcp - to schedule  Subjective:      Patient ID: Madan Edwards is a 59 y.o. female. 58 y/o female c/o wheezing and chest tightness since mowing grass yesterday  Pt states she didn't eat anything different or have exposure to anything else   She mows grass but usually collects it in bag as opposed to letting it blow out which she did yesterday and pt reports it was all over her  She has a hx of asthma and also tobacco use  Pt denies cp, throat or lip swelling, hives or rashes of skin    She took a mucinex dm and albuterol today to try to help her sx       The following portions of the patient's history were reviewed and updated as appropriate: allergies, current medications, past family history, past medical history, past social history, past surgical history and problem list.    Review of Systems   Constitutional: Negative for chills and fever. HENT: Negative for congestion and sore throat. Eyes: Positive for itching. Negative for discharge, redness and visual disturbance. Respiratory: Positive for cough, chest tightness and wheezing. Negative for shortness of breath. Cardiovascular: Negative for chest pain and leg swelling. Gastrointestinal: Negative for abdominal pain and constipation.    Skin: Negative for rash. Neurological: Negative for dizziness, weakness and headaches. Psychiatric/Behavioral: Positive for sleep disturbance. The patient is not nervous/anxious.           Past Medical History:   Diagnosis Date   • Abnormal echocardiogram    • Abnormal stress test    • Anxiety    • Asthma    • Brachial neuritis    • Depression    • Disease of thyroid gland    • Displacement of intervertebral disc of mid-cervical region    • Fibromyalgia    • Frequent headaches    • GERD (gastroesophageal reflux disease)    • Herniated nucleus pulposus    • History of arthritis    • History of asthma    • History of cardiac disorder    • History of chest pain    • History of diverticulitis    • History of fatigue    • History of hearing loss    • History of hemoptysis    • History of herpes simplex infection    • History of hiatal hernia    • History of insect bite     INFECTED   • History of memory loss    • History of neck disorder    • History of reflex sympathetic dystrophy    • History of restless legs syndrome    • History of shortness of breath    • History of spinal stenosis    • Hyperlipidemia    • Hyperlipidemia    • Hypertension    • Skin rash    • Somnolence, daytime    • Spinal stenosis of cervical region          Current Outpatient Medications:   •  acetaminophen (TYLENOL) 325 mg tablet, Take 1 tablet (325 mg total) by mouth as needed for headaches For headache, Disp: , Rfl:   •  albuterol (PROVENTIL HFA,VENTOLIN HFA) 90 mcg/act inhaler, Inhale 2 puffs every 6 (six) hours as needed for wheezing, Disp: 18 g, Rfl: 5  •  aspirin 81 mg chewable tablet, Chew 81 mg daily, Disp: , Rfl:   •  atorvastatin (LIPITOR) 80 mg tablet, Take 1 tablet (80 mg total) by mouth daily, Disp: 90 tablet, Rfl: 3  •  citalopram (CeleXA) 40 mg tablet, Take 1 tablet (40 mg total) by mouth daily, Disp: 90 tablet, Rfl: 1  •  cyclobenzaprine (FLEXERIL) 10 mg tablet, Take 1 tablet (10 mg total) by mouth 3 (three) times a day as needed for muscle spasms, Disp: 30 tablet, Rfl: 0  •  Empagliflozin (Jardiance) 10 MG TABS tablet, Take 1 tablet (10 mg total) by mouth every morning, Disp: 90 tablet, Rfl: 0  •  ezetimibe (ZETIA) 10 mg tablet, Take 1 tablet (10 mg total) by mouth daily, Disp: 90 tablet, Rfl: 1  •  hydrOXYzine HCL (ATARAX) 25 mg tablet, Take 1 tablet (25 mg total) by mouth 2 (two) times a day as needed for itching, Disp: 60 tablet, Rfl: 5  •  levothyroxine 112 mcg tablet, Take 1 tablet (112 mcg total) by mouth daily Take 112 mcg daily on Mondays Wednesdays and Fridays alternate with 100 mcg all other days, Disp: 90 tablet, Rfl: 1  •  losartan (COZAAR) 50 mg tablet, Take 1 tablet (50 mg total) by mouth daily, Disp: 90 tablet, Rfl: 1  •  magnesium oxide (MAG-OX) 400 mg, Take 4 tablets (1,600 mg total) by mouth daily, Disp: , Rfl:   •  metoprolol succinate (TOPROL-XL) 25 mg 24 hr tablet, Take 1 tablet (25 mg total) by mouth daily, Disp: 90 tablet, Rfl: 1  •  pantoprazole (PROTONIX) 40 mg tablet, Take 1 tablet (40 mg total) by mouth daily, Disp: 90 tablet, Rfl: 1  •  predniSONE 10 mg tablet, 3 tabs daily for 3 days then 2 tablets daily x 3 days then 1 tablets daily x 3 days, Disp: 20 tablet, Rfl: 0  •  pregabalin (LYRICA) 150 mg capsule, Take 1 capsule (150 mg total) by mouth 3 (three) times a day, Disp: 90 capsule, Rfl: 0  •  prochlorperazine (COMPAZINE) 10 mg tablet, Take 1 tablet (10 mg total) by mouth every 6 (six) hours as needed for nausea or vomiting Max 3 days per week, 6 month supply, Disp: 60 tablet, Rfl: 1  •  triamcinolone (KENALOG) 0.1 % cream, Apply topically 2 (two) times a day (Patient taking differently: Apply topically as needed), Disp: 80 g, Rfl: 1  •  zolpidem (Ambien) 10 mg tablet, Take 1 tablet (10 mg total) by mouth daily at bedtime as needed for sleep, Disp: 30 tablet, Rfl: 0  •  valACYclovir (VALTREX) 1,000 mg tablet, Take 1 tablet (1,000 mg total) by mouth 2 (two) times a day for 14 days, Disp: 28 tablet, Rfl: 0    Allergies Allergen Reactions   • Augmentin [Amoxicillin-Pot Clavulanate] Nausea Only     PT stated she only allergic to medication AUGMENTIN   • Meloxicam GI Intolerance     "brings on diverticulitis"       Social History   Past Surgical History:   Procedure Laterality Date   • BACK SURGERY     • BACK SURGERY      LOWER BACK SURGERY   • CARDIAC CATHETERIZATION      HISTORY OF CORONARY ANGIOGRAPHY WITH CONCOMITANT LEFT HEART CATHETERIZATION   • CORONARY ANGIOPLASTY WITH STENT PLACEMENT     • EAR SURGERY     • TONSILLECTOMY     • TYMPANOPLASTY     • US GUIDED BREAST BIOPSY LEFT COMPLETE Left 3/2/2021     Family History   Problem Relation Age of Onset   • Coronary artery disease Mother         ATHEROMA   • Heart disease Mother    • Diabetes Mother    • Hypertension Mother    • No Known Problems Father         NO PERTINENT FAMILY HISTORY   • Coronary artery disease Sister         ATHEROMA   • Heart disease Sister    • Stroke Sister    • Diabetes Sister    • Hypertension Sister    • Parkinsonism Sister    • Breast cancer Sister    • No Known Problems Sister    • No Known Problems Sister    • No Known Problems Maternal Aunt    • No Known Problems Maternal Aunt    • No Known Problems Paternal Aunt    • No Known Problems Paternal Aunt    • Brain cancer Paternal Uncle    • No Known Problems Maternal Grandmother    • No Known Problems Maternal Grandfather    • No Known Problems Paternal Grandmother    • No Known Problems Paternal Grandfather    • No Known Problems Daughter    • No Known Problems Son        Objective:  /63 (BP Location: Left arm, Patient Position: Sitting, Cuff Size: Standard)   Pulse 87   Temp 98.5 °F (36.9 °C) (Temporal)   Ht 5' 4" (1.626 m)   Wt 71.7 kg (158 lb)   LMP  (LMP Unknown)   SpO2 93%   BMI 27.12 kg/m²        Physical Exam  Vitals reviewed. Constitutional:       General: She is not in acute distress. Appearance: She is not toxic-appearing. HENT:      Head: Normocephalic and atraumatic. Nose: Nose normal. No congestion. Mouth/Throat:      Mouth: Mucous membranes are moist.      Pharynx: No oropharyngeal exudate. Eyes:      General:         Right eye: No discharge. Left eye: No discharge. Extraocular Movements: Extraocular movements intact. Conjunctiva/sclera: Conjunctivae normal.      Pupils: Pupils are equal, round, and reactive to light. Pulmonary:      Effort: Pulmonary effort is normal. No respiratory distress. Breath sounds: Wheezing (end expiratory wheeze b/l) present. No rales. Musculoskeletal:      Right lower leg: No edema. Left lower leg: No edema. Skin:     Findings: No erythema or rash. Neurological:      General: No focal deficit present. Mental Status: She is alert and oriented to person, place, and time.

## 2023-08-02 NOTE — PATIENT INSTRUCTIONS
Prednisone 30mg tonight (3 tablets)   Take Benadryl at bedtime    Tomorrow lunchtime   Prednisone (3 tablets) - with food    Friday lunchtime 3 tablets    Saturday lunchtime 2 tablets    Sunday lunchtime 2 tablets    Monday lunchtime 2 tablets     Tues lunchtime 1 tablet    Wed lunchtime 1 tablet    Thurs lunchtime 1 tablet

## 2023-08-09 DIAGNOSIS — F51.01 PRIMARY INSOMNIA: ICD-10-CM

## 2023-08-09 RX ORDER — ZOLPIDEM TARTRATE 10 MG/1
10 TABLET ORAL
Qty: 30 TABLET | Refills: 0 | Status: SHIPPED | OUTPATIENT
Start: 2023-08-09

## 2023-08-16 ENCOUNTER — TELEPHONE (OUTPATIENT)
Dept: NEUROLOGY | Facility: CLINIC | Age: 63
End: 2023-08-16

## 2023-08-16 NOTE — TELEPHONE ENCOUNTER
Called and spoke to patient to confirm their upcoming appointment with Dr. Vitaliy Dunaway. Informed patient about calling 15 minutes prior to their appointment to get checked in and going over chart.

## 2023-08-24 ENCOUNTER — TELEPHONE (OUTPATIENT)
Dept: NEUROLOGY | Facility: CLINIC | Age: 63
End: 2023-08-24

## 2023-08-24 ENCOUNTER — TELEMEDICINE (OUTPATIENT)
Dept: NEUROLOGY | Facility: CLINIC | Age: 63
End: 2023-08-24
Payer: COMMERCIAL

## 2023-08-24 DIAGNOSIS — G93.2 IIH (IDIOPATHIC INTRACRANIAL HYPERTENSION): ICD-10-CM

## 2023-08-24 DIAGNOSIS — Z01.00 DIABETIC EYE EXAM (HCC): Primary | ICD-10-CM

## 2023-08-24 DIAGNOSIS — G43.709 CHRONIC MIGRAINE WITHOUT AURA WITHOUT STATUS MIGRAINOSUS, NOT INTRACTABLE: Primary | ICD-10-CM

## 2023-08-24 DIAGNOSIS — E11.9 DIABETIC EYE EXAM (HCC): Primary | ICD-10-CM

## 2023-08-24 PROCEDURE — 99215 OFFICE O/P EST HI 40 MIN: CPT | Performed by: PSYCHIATRY & NEUROLOGY

## 2023-08-24 NOTE — PROGRESS NOTES
Review of Systems   Constitutional: Negative for appetite change and fever. HENT: Negative. Negative for hearing loss, tinnitus, trouble swallowing and voice change. Eyes: Negative. Negative for photophobia and pain. Respiratory: Negative. Negative for shortness of breath. Cardiovascular: Negative. Negative for palpitations. Gastrointestinal: Negative. Negative for nausea and vomiting. Endocrine: Negative. Negative for cold intolerance. Genitourinary: Negative. Negative for dysuria, frequency and urgency. Musculoskeletal: Negative. Negative for myalgias and neck pain. Skin: Negative. Negative for rash. Neurological: Positive for headaches. Negative for dizziness, tremors, seizures, syncope, facial asymmetry, speech difficulty, weakness, light-headedness and numbness. Hematological: Negative. Does not bruise/bleed easily. Psychiatric/Behavioral: Positive for sleep disturbance. Negative for confusion and hallucinations. All other systems reviewed and are negative.

## 2023-08-24 NOTE — PROGRESS NOTES
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 neuro exam   Psychiatric:  Normal behavior and appropriate affect        Neurological Examination:     Mental status/cognitive function:   Recent and remote memory appear intact on interview. Attention span and concentration as well as fund of knowledge appear appropriate for age. Normal language and spontaneous speech. Cranial Nerves:  VII-facial expression symmetric  Motor Exam: symmetric bulk throughout. no atrophy, fasciculations or abnormal movements noted. Coordination:  no apparent dysmetria, ataxia or tremor noted       Pertinent lab results:   See EMR for recent labs  4/26/21: CMP unremarkable with cl 112    A1c 5.8  Free T4 normal, TSH slightly low      07/20/2020:  BMP unremarkable  A1c 7.5  06/29/2020:  CMP and CBC unremarkable  TSH normal  CRp <3     Imaging:   I have personally reviewed imaging and radiology read   - CTA head and neck with without contrast 06/29/2020:1. No acute intracranial CT abnormality. 2.  Patent major vasculature of [place] without significant stenosis. Mild atherosclerotic disease of intracranial internal carotid arteries.   3.  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 Approximately 66% stenosis at the origin of right cervical ICA and less than 50% stenosis of left cervical ICA origin, measured by NASCET criteria. 4.  A 2-3 mm left P-comm origin infundibulum. - noncontrast head CT 10/25/2018:  No acute intracranial abnormality        Review of Systems:   ROS obtained by medical assistant and personally reviewed, but if any symptoms listed below say negative, does not mean patient has not had this symptom since last visit, please see HPI for details of symptoms discussed this visit. I recommended PCP follow up for non neurologic problems. Review of Systems   Constitutional: Negative for appetite change and fever. HENT: Negative. Negative for hearing loss, tinnitus, trouble swallowing and voice change. Eyes: Negative. Negative for photophobia and pain. Respiratory: Negative. Negative for shortness of breath. Cardiovascular: Negative. Negative for palpitations. Gastrointestinal: Negative. Negative for nausea and vomiting. Endocrine: Negative. Negative for cold intolerance. Genitourinary: Negative. Negative for dysuria, frequency and urgency. Musculoskeletal: Negative. Negative for myalgias and neck pain. Skin: Negative. Negative for rash. Neurological: Positive for headaches. Negative for dizziness, tremors, seizures, syncope, facial asymmetry, speech difficulty, weakness, light-headedness and numbness. Hematological: Negative. Does not bruise/bleed easily. Psychiatric/Behavioral: Positive for sleep disturbance. Negative for confusion and hallucinations. All other systems reviewed and are negative.       I have spent 34 minutes with Patient today in which greater than 50% of this time was spent in counseling/coordination of care regarding Diagnostic results, Prognosis, Risks and benefits of tx options, Instructions for management, Patient  education, Importance of tx compliance, Risk factor reductions, Impressions, Counseling / Coordination of care, Documenting in the medical record, Reviewing / ordering tests, medicine, procedures   and Obtaining or reviewing history  . I also spent 8 minutes non face to face for this patient the same day.            Visit Time  Total Visit Duration: 42

## 2023-08-24 NOTE — TELEPHONE ENCOUNTER
----- Message from Teo Silva MD sent at 8/24/2023 10:55 AM EDT -----  Could you please reach out and see if you could help her find an ophthalmologist for a diabetes evaluation and I am also eager to see what her eye exam shows as well and I do not need anything specific except for what her fundus looks like which is part of the normal exam

## 2023-08-28 NOTE — TELEPHONE ENCOUNTER
MSW obtained a listing of 1 in-network ophthalmology practice within a 20 mile radius of patient's home zip code ([id number]) on the [email or web address] website:    [facility]  [phone] [address], [address] [place] [address]  6.6 miles    MSW phoned [facility] at [phone] to verify that they accept [facility] plans. No answer. MSW left a message requesting callback. Awaiting same.

## 2023-08-29 NOTE — TELEPHONE ENCOUNTER
LATE ENTRY FROM 8/28/23:    MSW retrieved the following message from [facility]:    "Reza Safer, it's Acacia Mires from ophthalmology calling. I got your message about a patient who needs diabetic exam with fundus photos and yes, that's fine. We do that, we do accept [employer]. We have doctor Kirti here, he's got availability probably in the next two weeks. You can give me a call back tomorrow because our phones are ready to turn over for the day. So you can try to reach me tomorrow and we'll try to set up this appointment for this patient.  Thank you."

## 2023-08-30 ENCOUNTER — TELEPHONE (OUTPATIENT)
Dept: NEUROLOGY | Facility: CLINIC | Age: 63
End: 2023-08-30

## 2023-08-30 DIAGNOSIS — G43.709 CHRONIC MIGRAINE WITHOUT AURA WITHOUT STATUS MIGRAINOSUS, NOT INTRACTABLE: Primary | ICD-10-CM

## 2023-08-30 NOTE — TELEPHONE ENCOUNTER
MSW phoned patient at [phone] to make her aware of the one in-network provider within a 20 mile radius of [facility] for ophthalmology - [facility]. Patient took down the address and phone number. Patient is agreeable to a referral to this practice.     MSW will request a script for ophthalmology from Dr. Karie Ruvalcaba, then MSW will fax referral.

## 2023-08-30 NOTE — TELEPHONE ENCOUNTER
Bun/creat orders entered.       Left detailed message for pt per communication consent advising that she needs to have labs done before mri and that she can use any [facility] lab and they will be able to see orders in her chart

## 2023-08-30 NOTE — TELEPHONE ENCOUNTER
Happy to sign off on those orders although I had stated to her and put in patient instructions:  Labs already in the system pending from 5/17/2023 for prior to the MRI

## 2023-08-30 NOTE — TELEPHONE ENCOUNTER
Pt is having her MRI done on 9/5. Imaging called requsting labs for BUN/CREATION be done before imaging. Please place order and make pt aware. Thank you.

## 2023-08-30 NOTE — TELEPHONE ENCOUNTER
MSW phoned [facility] at [phone] to obtain the fax number for referral. No answer. MSW left a message requesting callback. Awaiting same.

## 2023-08-31 DIAGNOSIS — J45.909 ASTHMA, UNSPECIFIED ASTHMA SEVERITY, UNSPECIFIED WHETHER COMPLICATED, UNSPECIFIED WHETHER PERSISTENT: ICD-10-CM

## 2023-08-31 DIAGNOSIS — M48.02 CERVICAL STENOSIS OF SPINAL CANAL: ICD-10-CM

## 2023-08-31 DIAGNOSIS — M62.838 NECK MUSCLE SPASM: ICD-10-CM

## 2023-08-31 RX ORDER — ALBUTEROL SULFATE 90 UG/1
2 AEROSOL, METERED RESPIRATORY (INHALATION) EVERY 6 HOURS PRN
Qty: 18 G | Refills: 5 | Status: SHIPPED | OUTPATIENT
Start: 2023-08-31

## 2023-08-31 RX ORDER — CYCLOBENZAPRINE HCL 10 MG
10 TABLET ORAL 3 TIMES DAILY PRN
Qty: 30 TABLET | Refills: 0 | Status: SHIPPED | OUTPATIENT
Start: 2023-08-31

## 2023-09-01 NOTE — TELEPHONE ENCOUNTER
MSW phoned [facility] at [phone] to obtain the fax number for referral. No answer at the professionals/hospitals line or scheduling line. MSW left a messages at both locations requesting callback. Awaiting same.

## 2023-09-01 NOTE — TELEPHONE ENCOUNTER
MSW received a callback from [facility] at [facility] ([phone]). [facility] stated that she already had the referral and would be calling patient to schedule this date. [facility] stated that the records can be faxed to [phone], attn: [facility]. MSW faxed patient demographics, ins card, neuro note, and script to [phone]. Successful fax notice received.

## 2023-09-05 NOTE — TELEPHONE ENCOUNTER
[facility] called back stating patient didn't have lab work done and will most likely have to cancel scan.

## 2023-09-05 NOTE — TELEPHONE ENCOUNTER
MSW attempted to reach patient at [phone] to inquire if she is scheduled to see the ophthalmologist yet. No answer. MSW left a message requesting callback. Awaiting same.

## 2023-09-05 NOTE — TELEPHONE ENCOUNTER
Called [phone] and left a detailed message on pt's answering machine of all of the below and to call [facility] at [phone] to reschedule her MRI. Cb if any questions or concerns.

## 2023-09-06 DIAGNOSIS — B00.1 COLD SORE: ICD-10-CM

## 2023-09-06 RX ORDER — VALACYCLOVIR HYDROCHLORIDE 1 G/1
1000 TABLET, FILM COATED ORAL 2 TIMES DAILY
Qty: 28 TABLET | Refills: 0 | Status: SHIPPED | OUTPATIENT
Start: 2023-09-06 | End: 2023-09-20

## 2023-09-06 NOTE — TELEPHONE ENCOUNTER
We haven't filled this since 10/7/2022, please advise.      Last OV: 8/2/2023    Next OV: 10/10/2023

## 2023-09-06 NOTE — TELEPHONE ENCOUNTER
MSW phoned patient this date at [phone]. Patient stated that she does not yet have an ophthalmology appt. Patient stated that she did place a call to their office this AM, but had to leave a message. MSW advised that Betty Willis from [facility] was going to reach out to her this past Friday to schedule an appt. Patient could not recall if she received a call/message from the office as she has bad insomnia/brain fog. MSW will follow up next week to ensure that ophthalmology appt is scheduled. Patient was in agreement with this plan.

## 2023-09-09 LAB
ALBUMIN SERPL-MCNC: 4.6 G/DL (ref 3.6–5.1)
ALBUMIN/GLOB SERPL: 1.5 (CALC) (ref 1–2.5)
ALP SERPL-CCNC: 75 U/L (ref 37–153)
ALT SERPL-CCNC: 27 U/L (ref 6–29)
AST SERPL-CCNC: 21 U/L (ref 10–35)
BILIRUB SERPL-MCNC: 0.8 MG/DL (ref 0.2–1.2)
BUN SERPL-MCNC: 17 MG/DL (ref 7–25)
BUN/CREAT SERPL: ABNORMAL (CALC) (ref 6–22)
CALCIUM SERPL-MCNC: 10 MG/DL (ref 8.6–10.4)
CHLORIDE SERPL-SCNC: 105 MMOL/L (ref 98–110)
CHOLEST SERPL-MCNC: 150 MG/DL
CHOLEST/HDLC SERPL: 3.5 (CALC)
CO2 SERPL-SCNC: 24 MMOL/L (ref 20–32)
CREAT SERPL-MCNC: 0.71 MG/DL (ref 0.5–1.05)
GFR/BSA.PRED SERPLBLD CYS-BASED-ARV: 96 ML/MIN/1.73M2
GLOBULIN SER CALC-MCNC: 3 G/DL (CALC) (ref 1.9–3.7)
GLUCOSE SERPL-MCNC: 120 MG/DL (ref 65–99)
HBA1C MFR BLD: 6.2 % OF TOTAL HGB
HDLC SERPL-MCNC: 43 MG/DL
LDLC SERPL CALC-MCNC: 81 MG/DL (CALC)
NONHDLC SERPL-MCNC: 107 MG/DL (CALC)
POTASSIUM SERPL-SCNC: 4.5 MMOL/L (ref 3.5–5.3)
PROT SERPL-MCNC: 7.6 G/DL (ref 6.1–8.1)
SODIUM SERPL-SCNC: 140 MMOL/L (ref 135–146)
TRIGL SERPL-MCNC: 165 MG/DL

## 2023-09-11 DIAGNOSIS — F51.01 PRIMARY INSOMNIA: ICD-10-CM

## 2023-09-11 DIAGNOSIS — M79.7 FIBROMYALGIA: ICD-10-CM

## 2023-09-11 RX ORDER — ZOLPIDEM TARTRATE 10 MG/1
10 TABLET ORAL
Qty: 30 TABLET | Refills: 0 | Status: SHIPPED | OUTPATIENT
Start: 2023-09-11

## 2023-09-11 RX ORDER — PREGABALIN 150 MG/1
150 CAPSULE ORAL 3 TIMES DAILY
Qty: 90 CAPSULE | Refills: 0 | Status: SHIPPED | OUTPATIENT
Start: 2023-09-11

## 2023-09-11 NOTE — TELEPHONE ENCOUNTER
MSW phoned patient this date at [phone]. Patient advised that she has an ophthalmology appt scheduled on 9/26/23.

## 2023-09-12 DIAGNOSIS — K21.9 GASTROESOPHAGEAL REFLUX DISEASE: ICD-10-CM

## 2023-09-12 RX ORDER — PANTOPRAZOLE SODIUM 40 MG/1
40 TABLET, DELAYED RELEASE ORAL DAILY
Qty: 90 TABLET | Refills: 1 | Status: SHIPPED | OUTPATIENT
Start: 2023-09-12

## 2023-09-26 LAB
LEFT EYE DIABETIC RETINOPATHY: NORMAL
RIGHT EYE DIABETIC RETINOPATHY: NORMAL

## 2023-09-26 PROCEDURE — 2023F DILAT RTA XM W/O RTNOPTHY: CPT | Performed by: PSYCHIATRY & NEUROLOGY

## 2023-10-10 ENCOUNTER — OFFICE VISIT (OUTPATIENT)
Age: 63
End: 2023-10-10
Payer: COMMERCIAL

## 2023-10-10 VITALS
SYSTOLIC BLOOD PRESSURE: 120 MMHG | HEART RATE: 67 BPM | DIASTOLIC BLOOD PRESSURE: 70 MMHG | HEIGHT: 64 IN | OXYGEN SATURATION: 95 % | BODY MASS INDEX: 27.01 KG/M2 | TEMPERATURE: 97.7 F | WEIGHT: 158.2 LBS

## 2023-10-10 DIAGNOSIS — E03.9 ACQUIRED HYPOTHYROIDISM: ICD-10-CM

## 2023-10-10 DIAGNOSIS — Z01.419 ENCOUNTER FOR GYNECOLOGICAL EXAMINATION WITHOUT ABNORMAL FINDING: ICD-10-CM

## 2023-10-10 DIAGNOSIS — F33.2 SEVERE EPISODE OF RECURRENT MAJOR DEPRESSIVE DISORDER, WITHOUT PSYCHOTIC FEATURES (HCC): ICD-10-CM

## 2023-10-10 DIAGNOSIS — Z12.31 ENCOUNTER FOR SCREENING MAMMOGRAM FOR MALIGNANT NEOPLASM OF BREAST: ICD-10-CM
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
## 2023-10-10 DIAGNOSIS — F43.23 SITUATIONAL MIXED ANXIETY AND DEPRESSIVE DISORDER: ICD-10-CM
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
## 2023-10-10 DIAGNOSIS — E11.9 TYPE 2 DIABETES MELLITUS WITHOUT COMPLICATION, WITHOUT LONG-TERM CURRENT USE OF INSULIN (HCC): Primary | ICD-10-CM

## 2023-10-10 DIAGNOSIS — R21 SKIN RASH: ICD-10-CM

## 2023-10-10 DIAGNOSIS — F17.210 CIGARETTE NICOTINE DEPENDENCE WITHOUT COMPLICATION: Chronic | ICD-10-CM

## 2023-10-10 DIAGNOSIS — Z00.00 MEDICARE ANNUAL WELLNESS VISIT, SUBSEQUENT: ICD-10-CM

## 2023-10-10 DIAGNOSIS — Z01.411 ENCOUNTER FOR GYNECOLOGICAL EXAMINATION WITH ABNORMAL FINDING: ICD-10-CM

## 2023-10-10 PROCEDURE — 99214 OFFICE O/P EST MOD 30 MIN: CPT | Performed by: INTERNAL MEDICINE

## 2023-10-10 PROCEDURE — G0439 PPPS, SUBSEQ VISIT: HCPCS | Performed by: INTERNAL MEDICINE

## 2023-10-10 RX ORDER — CEPHALEXIN 500 MG/1
500 CAPSULE ORAL 3 TIMES DAILY
Qty: 30 CAPSULE | Refills: 0 | Status: SHIPPED | OUTPATIENT
Start: 2023-10-10 | End: 2023-10-20

## 2023-10-10 RX ORDER — TRIAMCINOLONE ACETONIDE 1 MG/G
CREAM TOPICAL AS NEEDED
Start: 2023-10-10

## 2023-10-10 NOTE — PROGRESS NOTES
I discussed with her that she is a candidate for lung cancer CT screening. The following Shared Decision-Making points were covered:  Benefits of screening were discussed, including the rates of reduction in death from lung cancer and other causes. Harms of screening were reviewed, including false positive tests, radiation exposure levels, risks of invasive procedures, risks of complications of screening, and risk of overdiagnosis. I counseled on the importance of adherence to annual lung cancer LDCT screening, impact of co-morbidities, and ability or willingness to undergo diagnosis and treatment. I counseled on the importance of maintaining abstinence as a former smoker or was counseled on the importance of smoking cessation if a current smoker    Review of Eligibility Criteria: She meets all of the criteria for Lung Cancer Screening. She is 59 y.o. She has 20 pack year tobacco history and is a current smoker or has quit within the past 15 years  She presents no signs or symptoms of lung cancer    After discussion, the patient decided to elect lung cancer screening.  Assessment and Plan:     Problem List Items Addressed This Visit        Endocrine    Hypothyroidism     Continue supplementation         Type 2 diabetes mellitus without complication, without long-term current use of insulin ([address]) - Primary       Lab Results   Component Value Date    HGBA1C 6.2 (H) 09/08/2023     Fair control on present meds         Relevant Orders    HEMOGLOBIN A1C W/ EAG ESTIMATION    Comprehensive metabolic panel       Musculoskeletal and Integument    Skin rash    Relevant Medications    triamcinolone (KENALOG) 0.1 % cream       Other    Cigarette nicotine dependence (Chronic)     CT lung CA screen         Relevant Orders    CT lung screening program    Situational mixed anxiety and depressive disorder     Fair control considering current situation         Relevant Orders    Ambulatory referral to [employer] Severe episode of recurrent major depressive disorder, without psychotic features ([address])    Relevant Orders    Ambulatory referral to Psych Services    Bug bites    Relevant Medications    cephalexin (KEFLEX) 500 mg capsule   Other Visit Diagnoses     Encounter for screening mammogram for malignant neoplasm of breast        Medicare annual wellness visit, subsequent        Encounter for gynecological examination with abnormal finding        Encounter for gynecological examination without abnormal finding        Relevant Orders    Ambulatory Referral to Gynecology        BMI Counseling: Body mass index is 26.94 kg/m². The BMI is above normal. Nutrition recommendations include encouraging healthy choices of fruits and vegetables. Exercise recommendations include moderate physical activity 150 minutes/week. Rationale for BMI follow-up plan is due to patient being overweight or obese. Depression Screening and Follow-up Plan: Their PHQ-9 score was 16. Tobacco Cessation Counseling: Tobacco cessation counseling was provided. Preventive health issues were discussed with patient, and age appropriate screening tests were ordered as noted in patient's After Visit Summary. Personalized health advice and appropriate referrals for health education or preventive services given if needed, as noted in patient's After Visit Summary. History of Present Illness:     Patient presents for a Medicare Wellness Visit    HPI     Patient is here today for her annual medical wellness and follow-up. She has a previous history of type 2 diabetes mellitus without the need for the use of insulin. A1c has been fairly stable to date and has always been under 6.5 for the last 2 years. She has been a smoker for several years and has a history of asthma secondary to that. Symptoms are well controlled with just the use of albuterol as needed only.   She has a previous history of 2 bouts of acute diverticulitis but for the last year has not had very many episodes of abdominal pain. She lives in a camper and has had to deal with bug bites several times because she has door for to the camper open since she does not have air conditioning. Her anxiety disorder seems to be fairly well controlled. She takes both Zetia and atorvastatin at 80 mg for her lipid control. She has chronic low back pain secondary to degenerative disc disease for which she takes pregabalin 150 mg capsule 3 times a day. Her GERD is managed with Protonix 40 mg daily. Hypertension is well controlled on metoprolol extended release 25, losartan 50 mg daily. She requests a referral to gynecology for yearly examination. She is also due for her mammography. Patient Care Team:  Angelita Fitzgerald MD as PCP - General (Internal Medicine)  DO Sabine Juárez MD (Vascular Surgery)  Rosita Vaughn MD (Neurology)     Review of Systems:     Review of Systems   Respiratory: Positive for shortness of breath. Musculoskeletal: Positive for arthralgias and back pain. Skin: Positive for rash. Allergic/Immunologic: Positive for environmental allergies. Psychiatric/Behavioral: Positive for sleep disturbance. All other systems reviewed and are negative.        Problem List:     Patient Active Problem List   Diagnosis   • Back pain   • Benign essential HTN   • Cervical herniated disc   • Cervical radiculopathy   • Coronary arteriosclerosis   • Chronic stable angina   • Degenerative disc disease, cervical   • Situational mixed anxiety and depressive disorder   • Fibromyalgia   • Gastroesophageal reflux disease   • Hypothyroidism   • Hypercholesterolemia   • Insomnia   • Lumbar foraminal stenosis   • Lumbar radiculopathy   • Sacroiliitis (HCC)   • Severe episode of recurrent major depressive disorder, without psychotic features (HCC)   • Skin rash   • Myofascial pain   • Asthma   • Brachial neuritis   • Cervical spondylosis   • Chronic low back pain   • Dysthymic disorder   • Irritable bowel syndrome without diarrhea   • Pain in thoracic spine   • Cervical stenosis of spinal canal   • Status post lumbar surgery   • Chronic pain syndrome   • Maxillary sinusitis   • Hearing problem of both ears   • Generalized weakness   • Type 2 diabetes mellitus without complication, without long-term current use of insulin (HCC)   • Hearing loss   • Headache   • Cigarette nicotine dependence   • Carotid stenosis, asymptomatic, right   • Abdominal pain   • Recent weight loss   • Diverticulitis   • COVID-19   • Infection of index finger   • Bug bites      Past Medical and Surgical History:     Past Medical History:   Diagnosis Date   • Abnormal echocardiogram    • Abnormal stress test    • Anxiety    • Asthma    • Brachial neuritis    • Depression    • Disease of thyroid gland    • Displacement of intervertebral disc of mid-cervical region    • Fibromyalgia    • Frequent headaches    • GERD (gastroesophageal reflux disease)    • Herniated nucleus pulposus    • History of arthritis    • History of asthma    • History of cardiac disorder    • History of chest pain    • History of diverticulitis    • History of fatigue    • History of hearing loss    • History of hemoptysis    • History of herpes simplex infection    • History of hiatal hernia    • History of insect bite     INFECTED   • History of memory loss    • History of neck disorder    • History of reflex sympathetic dystrophy    • History of restless legs syndrome    • History of shortness of breath    • History of spinal stenosis    • Hyperlipidemia    • Hyperlipidemia    • Hypertension    • Skin rash    • Somnolence, daytime    • Spinal stenosis of cervical region      Past Surgical History:   Procedure Laterality Date   • BACK SURGERY     • BACK SURGERY      LOWER BACK SURGERY   • CARDIAC CATHETERIZATION      HISTORY OF CORONARY ANGIOGRAPHY WITH CONCOMITANT LEFT HEART CATHETERIZATION   • CORONARY ANGIOPLASTY WITH STENT PLACEMENT     • EAR SURGERY     • TONSILLECTOMY     • TYMPANOPLASTY     • US GUIDED BREAST BIOPSY LEFT COMPLETE Left 3/2/2021      Family History:     Family History   Problem Relation Age of Onset   • Coronary artery disease Mother         ATHEROMA   • Heart disease Mother    • Diabetes Mother    • Hypertension Mother    • No Known Problems Father         NO PERTINENT FAMILY HISTORY   • Coronary artery disease Sister         ATHEROMA   • Heart disease Sister    • Stroke Sister    • Diabetes Sister    • Hypertension Sister    • Parkinsonism Sister    • Breast cancer Sister    • No Known Problems Sister    • No Known Problems Sister    • No Known Problems Maternal Aunt    • No Known Problems Maternal Aunt    • No Known Problems Paternal Aunt    • No Known Problems Paternal Aunt    • Brain cancer Paternal Uncle    • No Known Problems Maternal Grandmother    • No Known Problems Maternal Grandfather    • No Known Problems Paternal Grandmother    • No Known Problems Paternal Grandfather    • No Known Problems Daughter    • No Known Problems Son       Social History:     Social History     Socioeconomic History   • Marital status: Single     Spouse name: None   • Number of children: None   • Years of education: None   • Highest education level: None   Occupational History   • None   Tobacco Use   • Smoking status: Every Day     Packs/day: 1.00     Years: 51.00     Total pack years: 51.00     Types: Cigarettes     Start date: 1972   • Smokeless tobacco: Never   Vaping Use   • Vaping Use: Never used   Substance and Sexual Activity   • Alcohol use: Not Currently     Comment: occasional   • Drug use: Yes     Types: Marijuana   • Sexual activity: Not Currently   Other Topics Concern   • None   Social History Narrative   • None     Social Determinants of Health     Financial Resource Strain: Unknown (10/10/2023)    Overall Financial Resource Strain (CARDIA)    • Difficulty of Paying Living Expenses: Patient refused   Food Insecurity: Not on file   Transportation Needs: No Transportation Needs (10/10/2023)    PRAPARE - Transportation    • Lack of Transportation (Medical): No    • Lack of Transportation (Non-Medical):  No   Physical Activity: Not on file   Stress: Not on file   Social Connections: Not on file   Intimate Partner Violence: Not on file   Housing Stability: Not on file      Medications and Allergies:     Current Outpatient Medications   Medication Sig Dispense Refill   • acetaminophen (TYLENOL) 325 mg tablet Take 1 tablet (325 mg total) by mouth as needed for headaches For headache     • albuterol (PROVENTIL HFA,VENTOLIN HFA) 90 mcg/act inhaler Inhale 2 puffs every 6 (six) hours as needed for wheezing 18 g 5   • aspirin 81 mg chewable tablet Chew 81 mg daily     • atorvastatin (LIPITOR) 80 mg tablet Take 1 tablet (80 mg total) by mouth daily 90 tablet 3   • cephalexin (KEFLEX) 500 mg capsule Take 1 capsule (500 mg total) by mouth 3 (three) times a day for 10 days 30 capsule 0   • citalopram (CeleXA) 40 mg tablet Take 1 tablet (40 mg total) by mouth daily 90 tablet 1   • cyclobenzaprine (FLEXERIL) 10 mg tablet Take 1 tablet (10 mg total) by mouth 3 (three) times a day as needed for muscle spasms 30 tablet 0   • Empagliflozin (Jardiance) 10 MG TABS tablet Take 1 tablet (10 mg total) by mouth every morning 90 tablet 0   • ezetimibe (ZETIA) 10 mg tablet Take 1 tablet (10 mg total) by mouth daily 90 tablet 1   • hydrOXYzine HCL (ATARAX) 25 mg tablet Take 1 tablet (25 mg total) by mouth 2 (two) times a day as needed for itching 60 tablet 5   • levothyroxine 112 mcg tablet Take 1 tablet (112 mcg total) by mouth daily Take 112 mcg daily on Mondays Wednesdays and Fridays alternate with 100 mcg all other days 90 tablet 1   • losartan (COZAAR) 50 mg tablet Take 1 tablet (50 mg total) by mouth daily 90 tablet 1   • magnesium oxide (MAG-OX) 400 mg Take 4 tablets (1,600 mg total) by mouth daily     • metoprolol succinate (TOPROL-XL) 25 mg 24 hr tablet Take 1 tablet (25 mg total) by mouth daily 90 tablet 1   • pantoprazole (PROTONIX) 40 mg tablet Take 1 tablet (40 mg total) by mouth daily 90 tablet 1   • pregabalin (LYRICA) 150 mg capsule Take 1 capsule (150 mg total) by mouth 3 (three) times a day 90 capsule 0   • triamcinolone (KENALOG) 0.1 % cream Apply topically as needed for irritation     • valACYclovir (VALTREX) 1,000 mg tablet Take 1 tablet (1,000 mg total) by mouth 2 (two) times a day for 14 days 28 tablet 0   • zolpidem (Ambien) 10 mg tablet Take 1 tablet (10 mg total) by mouth daily at bedtime as needed for sleep 30 tablet 0     No current facility-administered medications for this visit. Allergies   Allergen Reactions   • Augmentin [Amoxicillin-Pot Clavulanate] Nausea Only     PT stated she only allergic to medication AUGMENTIN   • Meloxicam GI Intolerance     "brings on diverticulitis"      Immunizations:     Immunization History   Administered Date(s) Administered   • COVID-19 Moderna Vac BIVALENT 12 Yr+ IM 0.5 ML 01/05/2023   • Influenza, recombinant, quadrivalent,injectable, preservative free 12/08/2020   • Influenza, seasonal, injectable 11/15/2011, 10/28/2013   • Pneumococcal Conjugate Vaccine 20-valent (Pcv20), Polysace 10/04/2022, 10/04/2022   • Pneumococcal Polysaccharide PPV23 12/08/2020      Health Maintenance:         Topic Date Due   • HIV Screening  Never done   • Cervical Cancer Screening  Never done   • Lung Cancer Screening  10/26/2019   • Breast Cancer Screening: Mammogram  12/01/2021   • Colorectal Cancer Screening  08/11/2025   • Hepatitis C Screening  Completed         Topic Date Due   • Hepatitis A Vaccine (1 of 2 - Risk 2-dose series) Never done   • Hepatitis B Vaccine (1 of 3 - Risk 3-dose series) Never done   • Influenza Vaccine (1) 09/01/2023      Medicare Screening Tests and Risk Assessments:     Theron Guo is here for her Subsequent Wellness visit.  Last Medicare Wellness visit information reviewed, patient interviewed, no change since last AWV. Health Risk Assessment:   Patient rates overall health as very good. Patient feels that their physical health rating is same. Patient is satisfied with their life. Eyesight was rated as same. Hearing was rated as same. Patient feels that their emotional and mental health rating is slightly worse. Patients states they are never, rarely angry. Patient states they are often unusually tired/fatigued. Pain experienced in the last 7 days has been some. Patient's pain rating has been 7/10. Patient states that she has experienced no weight loss or gain in last 6 months. Depression Screening:   PHQ-9 Score: 16      Fall Risk Screening: In the past year, patient has experienced: no history of falling in past year      Urinary Incontinence Screening:   Patient has not leaked urine accidently in the last six months. Home Safety:  Patient does not have trouble with stairs inside or outside of their home. Patient has no working smoke alarms and has no working carbon monoxide detector. Home safety hazards include: none. Nutrition:   Current diet is Diabetic and Limited junk food. Medications:   Patient is currently taking over-the-counter supplements. OTC medications include: see medication list. Patient is able to manage medications. Activities of Daily Living (ADLs)/Instrumental Activities of Daily Living (IADLs):   Walk and transfer into and out of bed and chair?: Yes  Dress and groom yourself?: Yes    Bathe or shower yourself?: Yes    Feed yourself?  Yes  Do your laundry/housekeeping?: Yes  Manage your money, pay your bills and track your expenses?: Yes  Make your own meals?: Yes    Do your own shopping?: Yes    Previous Hospitalizations:   Any hospitalizations or ED visits within the last 12 months?: No      Advance Care Planning:   Living will: No    Advanced directive: No    ACP document given: Yes      Cognitive Screening:   Provider or family/friend/caregiver concerned regarding cognition?: No    PREVENTIVE SCREENINGS      Cardiovascular Screening:    General: Screening Not Indicated and History Lipid Disorder      Diabetes Screening:     General: Screening Not Indicated and History Diabetes      Colorectal Cancer Screening:     General: Screening Current      Breast Cancer Screening:     General: Patient Declines      Cervical Cancer Screening:    General: Risks and Benefits Discussed      Osteoporosis Screening:    General: Risks and Benefits Discussed      Lung Cancer Screening:     General: Risks and Benefits Discussed    Due for: Low Dose CT (LDCT)      Hepatitis C Screening:    General: Screening Current    Screening, Brief Intervention, and Referral to Treatment (SBIRT)    Screening  Typical number of drinks in a day: 0  Typical number of drinks in a week: 0  Interpretation: Low risk drinking behavior. Other Counseling Topics:   Car/seat belt/driving safety, skin self-exam, sunscreen and calcium and vitamin D intake and regular weightbearing exercise. No results found. Physical Exam:     /70 (BP Location: Left arm, Patient Position: Sitting, Cuff Size: Standard)   Pulse 67   Temp 97.7 °F (36.5 °C) (Temporal)   Ht 5' 4.25" (1.632 m)   Wt 71.8 kg (158 lb 3.2 oz)   LMP  (LMP Unknown)   SpO2 95%   BMI 26.94 kg/m²     Physical Exam     Gen: NAD  Heent: atraumatic, normocephalic  Mouth: is moist  Neck: is supple, no JVD, no carotid bruits.    Heart: is regular Normal s1, s2,  Lungs: are clear to auscultation  Abd: soft, non tender, NABS  Ext: no edema, distal pulses normal  Skin: multiple bites over right thigh and right buttock and right arm, erythematous, swollen and tender  Mood: normal affect  Neuro: AAOx3    Bonnie Turner MD

## 2023-10-10 NOTE — PATIENT INSTRUCTIONS
Medicare Preventive Visit Patient Instructions  Thank you for completing your Welcome to Medicare Visit or Medicare Annual Wellness Visit today. Your next wellness visit will be due in one year (10/10/2024). The screening/preventive services that you may require over the next 5-10 years are detailed below. Some tests may not apply to you based off risk factors and/or age. Screening tests ordered at today's visit but not completed yet may show as past due. Also, please note that scanned in results may not display below. Preventive Screenings:  Service Recommendations Previous Testing/Comments   Colorectal Cancer Screening  * Colonoscopy    * Fecal Occult Blood Test (FOBT)/Fecal Immunochemical Test (FIT)  * Fecal DNA/Cologuard Test  * Flexible Sigmoidoscopy Age: 43-73 years old   Colonoscopy: every 10 years (may be performed more frequently if at higher risk)  OR  FOBT/FIT: every 1 year  OR  Cologuard: every 3 years  OR  Sigmoidoscopy: every 5 years  Screening may be recommended earlier than age 39 if at higher risk for colorectal cancer. Also, an individualized decision between you and your healthcare provider will decide whether screening between the ages of 77-80 would be appropriate. Colonoscopy: 08/11/2015  FOBT/FIT: Not on file  Cologuard: Not on file  Sigmoidoscopy: Not on file    Screening Current     Breast Cancer Screening Age: 36 years old  Frequency: every 1-2 years  Not required if history of left and right mastectomy Mammogram: 12/01/2020        Cervical Cancer Screening Between the ages of 21-29, pap smear recommended once every 3 years. Between the ages of 32-69, can perform pap smear with HPV co-testing every 5 years.    Recommendations may differ for women with a history of total hysterectomy, cervical cancer, or abnormal pap smears in past. Pap Smear: Not on file        Hepatitis C Screening Once for adults born between 1945 and 1965  More frequently in patients at high risk for Hepatitis C Hep C Antibody: 05/31/2018    Screening Current   Diabetes Screening 1-2 times per year if you're at risk for diabetes or have pre-diabetes Fasting glucose: 122 mg/dL (7/20/2020)  A1C: 6.2 % of total Hgb (9/8/2023)  Screening Not Indicated  History Diabetes   Cholesterol Screening Once every 5 years if you don't have a lipid disorder. May order more often based on risk factors. Lipid panel: 09/08/2023    Screening Not Indicated  History Lipid Disorder     Other Preventive Screenings Covered by Medicare:  1. Abdominal Aortic Aneurysm (AAA) Screening: covered once if your at risk. You're considered to be at risk if you have a family history of AAA. 2. Lung Cancer Screening: covers low dose CT scan once per year if you meet all of the following conditions: (1) Age 48-67; (2) No signs or symptoms of lung cancer; (3) Current smoker or have quit smoking within the last 15 years; (4) You have a tobacco smoking history of at least 20 pack years (packs per day multiplied by number of years you smoked); (5) You get a written order from a healthcare provider. 3. Glaucoma Screening: covered annually if you're considered high risk: (1) You have diabetes OR (2) Family history of glaucoma OR (3)  aged 48 and older OR (3)  American aged 72 and older  3. Osteoporosis Screening: covered every 2 years if you meet one of the following conditions: (1) You're estrogen deficient and at risk for osteoporosis based off medical history and other findings; (2) Have a vertebral abnormality; (3) On glucocorticoid therapy for more than 3 months; (4) Have primary hyperparathyroidism; (5) On osteoporosis medications and need to assess response to drug therapy. · Last bone density test (DXA Scan): Not on file. 5. HIV Screening: covered annually if you're between the age of 14-79. Also covered annually if you are younger than 13 and older than 72 with risk factors for HIV infection.  For pregnant patients, it is covered up to 3 times per pregnancy. Immunizations:  Immunization Recommendations   Influenza Vaccine Annual influenza vaccination during flu season is recommended for all persons aged >= 6 months who do not have contraindications   Pneumococcal Vaccine   * Pneumococcal conjugate vaccine = PCV13 (Prevnar 13), PCV15 (Vaxneuvance), PCV20 (Prevnar 20)  * Pneumococcal polysaccharide vaccine = PPSV23 (Pneumovax) Adults 26-13 yo with certain risk factors or if 65+ yo  · If never received any pneumonia vaccine: recommend Prevnar 20 (PCV20)  · Give PCV20 if previously received 1 dose of PCV13 or PPSV23   Hepatitis B Vaccine 3 dose series if at intermediate or high risk (ex: diabetes, end stage renal disease, liver disease)   Respiratory syncytial virus (RSV) Vaccine - COVERED BY MEDICARE PART D  * RSVPreF3 (Arexvy) CDC recommends that adults 61years of age and older may receive a single dose of RSV vaccine using shared clinical decision-making (SCDM)   Tetanus (Td) Vaccine - COST NOT COVERED BY MEDICARE PART B Following completion of primary series, a booster dose should be given every 10 years to maintain immunity against tetanus. Td may also be given as tetanus wound prophylaxis. Tdap Vaccine - COST NOT COVERED BY MEDICARE PART B Recommended at least once for all adults. For pregnant patients, recommended with each pregnancy.    Shingles Vaccine (Shingrix) - COST NOT COVERED BY MEDICARE PART B  2 shot series recommended in those 23 years and older who have or will have weakened immune systems or those 50 years and older     Health Maintenance Due:      Topic Date Due   • HIV Screening  Never done   • Cervical Cancer Screening  Never done   • Lung Cancer Screening  10/26/2019   • Breast Cancer Screening: Mammogram  12/01/2021   • Colorectal Cancer Screening  08/11/2025   • Hepatitis C Screening  Completed     Immunizations Due:      Topic Date Due   • Hepatitis A Vaccine (1 of 2 - Risk 2-dose series) Never done   • Hepatitis B Vaccine (1 of 3 - Risk 3-dose series) Never done   • Influenza Vaccine (1) 09/01/2023     Advance Directives   What are advance directives? Advance directives are legal documents that state your wishes and plans for medical care. These plans are made ahead of time in case you lose your ability to make decisions for yourself. Advance directives can apply to any medical decision, such as the treatments you want, and if you want to donate organs. What are the types of advance directives? There are many types of advance directives, and each state has rules about how to use them. You may choose a combination of any of the following:  · Living will: This is a written record of the treatment you want. You can also choose which treatments you do not want, which to limit, and which to stop at a certain time. This includes surgery, medicine, IV fluid, and tube feedings. · Durable power of  for healthcare [facility]): This is a written record that states who you want to make healthcare choices for you when you are unable to make them for yourself. This person, called a proxy, is usually a family member or a friend. You may choose more than 1 proxy. · Do not resuscitate (DNR) order:  A DNR order is used in case your heart stops beating or you stop breathing. It is a request not to have certain forms of treatment, such as CPR. A DNR order may be included in other types of advance directives. · Medical directive: This covers the care that you want if you are in a coma, near death, or unable to make decisions for yourself. You can list the treatments you want for each condition. Treatment may include pain medicine, surgery, blood transfusions, dialysis, IV or tube feedings, and a ventilator (breathing machine). · Values history: This document has questions about your views, beliefs, and how you feel and think about life. This information can help others choose the care that you would choose.   Why are advance directives important? An advance directive helps you control your care. Although spoken wishes may be used, it is better to have your wishes written down. Spoken wishes can be misunderstood, or not followed. Treatments may be given even if you do not want them. An advance directive may make it easier for your family to make difficult choices about your care. Cigarette Smoking and Your Health   Risks to your health if you smoke:  Nicotine and other chemicals found in tobacco damage every cell in your body. Even if you are a light smoker, you have an increased risk for cancer, heart disease, and lung disease. If you are pregnant or have diabetes, smoking increases your risk for complications. Benefits to your health if you stop smoking:   · You decrease respiratory symptoms such as coughing, wheezing, and shortness of breath. · You reduce your risk for cancers of the lung, mouth, throat, kidney, bladder, pancreas, stomach, and cervix. If you already have cancer, you increase the benefits of chemotherapy. You also reduce your risk for cancer returning or a second cancer from developing. · You reduce your risk for heart disease, blood clots, heart attack, and stroke. · You reduce your risk for lung infections, and diseases such as pneumonia, asthma, chronic bronchitis, and emphysema. · Your circulation improves. More oxygen can be delivered to your body. If you have diabetes, you lower your risk for complications, such as kidney, artery, and eye diseases. You also lower your risk for nerve damage. Nerve damage can lead to amputations, poor vision, and blindness. · You improve your body's ability to heal and to fight infections. For more information and support to stop smoking:   · [employer]  Phone: 7- 320 - 360-1566  Web Address: www.[employer]. [employer]  Weight Management   Why it is important to manage your weight:  Being overweight increases your risk of health conditions such as heart disease, high blood pressure, type 2 diabetes, and certain types of cancer. It can also increase your risk for osteoarthritis, sleep apnea, and other respiratory problems. Aim for a slow, steady weight loss. Even a small amount of weight loss can lower your risk of health problems. How to lose weight safely:  A safe and healthy way to lose weight is to eat fewer calories and get regular exercise. You can lose up about 1 pound a week by decreasing the number of calories you eat by 500 calories each day. Healthy meal plan for weight management:  A healthy meal plan includes a variety of foods, contains fewer calories, and helps you stay healthy. A healthy meal plan includes the following:  · Eat whole-grain foods more often. A healthy meal plan should contain fiber. Fiber is the part of grains, fruits, and vegetables that is not broken down by your body. Whole-grain foods are healthy and provide extra fiber in your diet. Some examples of whole-grain foods are whole-wheat breads and pastas, oatmeal, brown rice, and bulgur. · Eat a variety of vegetables every day. Include dark, leafy greens such as spinach, kale, han greens, and mustard greens. Eat yellow and orange vegetables such as carrots, sweet potatoes, and winter squash. · Eat a variety of fruits every day. Choose fresh or canned fruit (canned in its own juice or light syrup) instead of juice. Fruit juice has very little or no fiber. · Eat low-fat dairy foods. Drink fat-free (skim) milk or 1% milk. Eat fat-free yogurt and low-fat cottage cheese. Try low-fat cheeses such as mozzarella and other reduced-fat cheeses. · Choose meat and other protein foods that are low in fat. Choose beans or other legumes such as split peas or lentils. Choose fish, skinless poultry (chicken or turkey), or lean cuts of red meat (beef or pork). Before you cook meat or poultry, cut off any visible fat. · Use less fat and oil. Try baking foods instead of frying them.  Add less fat, such as margarine, sour cream, regular salad dressing and mayonnaise to foods. Eat fewer high-fat foods. Some examples of high-fat foods include french fries, doughnuts, ice cream, and cakes. · Eat fewer sweets. Limit foods and drinks that are high in sugar. This includes candy, cookies, regular soda, and sweetened drinks. Exercise:  Exercise at least 30 minutes per day on most days of the week. Some examples of exercise include walking, biking, dancing, and swimming. You can also fit in more physical activity by taking the stairs instead of the elevator or parking farther away from stores. Ask your healthcare provider about the best exercise plan for you. © Copyright [address] 2018 Information is for End User's use only and may not be sold, redistributed or otherwise used for commercial purposes.  All illustrations and images included in CareNotes® are the copyrighted property of [employer] or [address] [place] [address]

## 2023-10-15 DIAGNOSIS — F51.01 PRIMARY INSOMNIA: ICD-10-CM

## 2023-10-16 ENCOUNTER — TELEPHONE (OUTPATIENT)
Dept: PSYCHIATRY | Facility: CLINIC | Age: 63
End: 2023-10-16

## 2023-10-17 ENCOUNTER — TELEPHONE (OUTPATIENT)
Age: 63
End: 2023-10-17

## 2023-10-17 DIAGNOSIS — M62.838 NECK MUSCLE SPASM: ICD-10-CM

## 2023-10-17 DIAGNOSIS — M48.02 CERVICAL STENOSIS OF SPINAL CANAL: ICD-10-CM

## 2023-10-17 RX ORDER — ZOLPIDEM TARTRATE 10 MG/1
10 TABLET ORAL
Qty: 30 TABLET | Refills: 0 | Status: SHIPPED | OUTPATIENT
Start: 2023-10-17

## 2023-10-17 RX ORDER — CYCLOBENZAPRINE HCL 10 MG
10 TABLET ORAL 3 TIMES DAILY PRN
Qty: 30 TABLET | Refills: 0 | Status: SHIPPED | OUTPATIENT
Start: 2023-10-17

## 2023-10-17 NOTE — TELEPHONE ENCOUNTER
Rx refill for cyclobenzaprine (FLEXERIL) 10 mg tablet .     Send to   [facility], [address] [place] [address]       Nov- 3/12/2024

## 2023-10-24 DIAGNOSIS — M79.7 FIBROMYALGIA: ICD-10-CM

## 2023-10-24 DIAGNOSIS — E78.00 HYPERCHOLESTEROLEMIA: ICD-10-CM

## 2023-10-24 DIAGNOSIS — K21.9 GASTROESOPHAGEAL REFLUX DISEASE: ICD-10-CM

## 2023-10-24 DIAGNOSIS — E11.65 UNCONTROLLED TYPE 2 DIABETES MELLITUS WITH HYPERGLYCEMIA (HCC): ICD-10-CM

## 2023-10-24 DIAGNOSIS — F32.89 OTHER DEPRESSION: ICD-10-CM

## 2023-10-24 DIAGNOSIS — R21 SKIN RASH: ICD-10-CM

## 2023-10-25 RX ORDER — HYDROXYZINE HYDROCHLORIDE 25 MG/1
25 TABLET, FILM COATED ORAL 2 TIMES DAILY PRN
Qty: 60 TABLET | Refills: 0 | Status: SHIPPED | OUTPATIENT
Start: 2023-10-25

## 2023-10-25 RX ORDER — EZETIMIBE 10 MG/1
10 TABLET ORAL DAILY
Qty: 90 TABLET | Refills: 1 | Status: SHIPPED | OUTPATIENT
Start: 2023-10-25

## 2023-10-25 RX ORDER — PREGABALIN 150 MG/1
150 CAPSULE ORAL 3 TIMES DAILY
Qty: 90 CAPSULE | Refills: 0 | Status: SHIPPED | OUTPATIENT
Start: 2023-10-25

## 2023-10-25 RX ORDER — PANTOPRAZOLE SODIUM 40 MG/1
40 TABLET, DELAYED RELEASE ORAL DAILY
Qty: 90 TABLET | Refills: 1 | Status: SHIPPED | OUTPATIENT
Start: 2023-10-25 | End: 2023-11-02 | Stop reason: SDUPTHER

## 2023-10-25 RX ORDER — CITALOPRAM 40 MG/1
40 TABLET ORAL DAILY
Qty: 90 TABLET | Refills: 1 | Status: SHIPPED | OUTPATIENT
Start: 2023-10-25

## 2023-11-02 DIAGNOSIS — K21.9 GASTROESOPHAGEAL REFLUX DISEASE: ICD-10-CM

## 2023-11-02 RX ORDER — PANTOPRAZOLE SODIUM 40 MG/1
40 TABLET, DELAYED RELEASE ORAL DAILY
Qty: 90 TABLET | Refills: 1 | Status: SHIPPED | OUTPATIENT
Start: 2023-11-02

## 2023-11-19 DIAGNOSIS — M62.838 NECK MUSCLE SPASM: ICD-10-CM

## 2023-11-19 DIAGNOSIS — M48.02 CERVICAL STENOSIS OF SPINAL CANAL: ICD-10-CM

## 2023-11-19 DIAGNOSIS — F51.01 PRIMARY INSOMNIA: ICD-10-CM

## 2023-11-20 RX ORDER — ZOLPIDEM TARTRATE 10 MG/1
10 TABLET ORAL
Qty: 30 TABLET | Refills: 0 | Status: SHIPPED | OUTPATIENT
Start: 2023-11-20

## 2023-11-20 RX ORDER — CYCLOBENZAPRINE HCL 10 MG
10 TABLET ORAL 3 TIMES DAILY PRN
Qty: 30 TABLET | Refills: 5 | Status: SHIPPED | OUTPATIENT
Start: 2023-11-20

## 2023-12-03 DIAGNOSIS — I10 HTN (HYPERTENSION), BENIGN: ICD-10-CM

## 2023-12-03 DIAGNOSIS — R21 SKIN RASH: ICD-10-CM

## 2023-12-04 RX ORDER — METOPROLOL SUCCINATE 25 MG/1
25 TABLET, EXTENDED RELEASE ORAL DAILY
Qty: 90 TABLET | Refills: 1 | Status: SHIPPED | OUTPATIENT
Start: 2023-12-04

## 2023-12-04 RX ORDER — LOSARTAN POTASSIUM 50 MG/1
50 TABLET ORAL DAILY
Qty: 90 TABLET | Refills: 1 | Status: SHIPPED | OUTPATIENT
Start: 2023-12-04

## 2023-12-04 RX ORDER — HYDROXYZINE HYDROCHLORIDE 25 MG/1
25 TABLET, FILM COATED ORAL 2 TIMES DAILY PRN
Qty: 180 TABLET | Refills: 1 | Status: SHIPPED | OUTPATIENT
Start: 2023-12-04

## 2023-12-07 ENCOUNTER — VBI (OUTPATIENT)
Dept: ADMINISTRATIVE | Facility: OTHER | Age: 63
End: 2023-12-07

## 2023-12-18 ENCOUNTER — OFFICE VISIT (OUTPATIENT)
Dept: SLEEP CENTER | Facility: CLINIC | Age: 63
End: 2023-12-18
Payer: COMMERCIAL

## 2023-12-18 VITALS
BODY MASS INDEX: 27.31 KG/M2 | HEART RATE: 72 BPM | RESPIRATION RATE: 16 BRPM | WEIGHT: 160 LBS | OXYGEN SATURATION: 98 % | DIASTOLIC BLOOD PRESSURE: 68 MMHG | HEIGHT: 64 IN | SYSTOLIC BLOOD PRESSURE: 118 MMHG

## 2023-12-18 DIAGNOSIS — G47.00 INSOMNIA: ICD-10-CM

## 2023-12-18 PROCEDURE — 99204 OFFICE O/P NEW MOD 45 MIN: CPT | Performed by: PSYCHIATRY & NEUROLOGY

## 2023-12-18 PROCEDURE — 99215 OFFICE O/P EST HI 40 MIN: CPT | Performed by: PSYCHIATRY & NEUROLOGY

## 2023-12-18 NOTE — PROGRESS NOTES
"Assessment/Plan:    1. Insomnia  -     Ambulatory referral to Sleep Medicine    We discussed that she has a very complicated history in her case of insomnia is very challenging.  She has had many stressors, including death/murder of family members which may contribute.  She also has depression and chronic pain which also may affect sleep as well.  Her sleep environment is also challenging, she lives in a small mobile home and in the winter she spends her entire day and night on her couch, not in bed.  We discussed that this may lead to negative sleep associations and also affect sleep.    She has been taking zolpidem 10 mg without great effect at recent, although she finds some benefit.  We discussed that dose is not completely ideal for women and she describes sleep eating.  She asked about changing to another sleep aid, I advised that at present I cannot recommend another medication, I need to understand her sleep patterns better.  I asked her to fill out sleep logs for a few weeks and then return to follow-up.    As she describes things, I cannot completely exclude a circadian rhythm disorder such as a regular sleep-wake as a contributing factor.  She describes sleeplessness lasting up to 36 hours on a somewhat regular basis, I cannot exclude bipolar disorder either, she notes that she is waiting to see psychiatry.    We reviewed safety measures important parasomnias.    I did not order sleep study today, while she describes sleepiness and the need to nap, it is not clear at present if this is due to her circadian rhythm problem.  Moreover she does not describe abnormal breathing in sleep.  A sleep test would be quite limited, a home sleep test would likely not provide useful information and it is also likely a test in the sleep lab would also be limited even with her current hypnotics/nighttime medication.      Subjective:      Patient ID: Isaura Brito is a 63 y.o. female.    HPI    Cc- insomnia \"Tired, can't " "sleep, can't do anything in the day\"    This is a 63-year-old female referred as a new patient by Dr. Lara Grady from neurology.  The patient was initially referred at a visit in April 2023.  In brief, the patient has a history of chronic migraine headaches and chronic tension type headaches.  At her visit in April, it was discussed that she used zolpidem for sleep, it was not fully effective.  The patient has a very strong family history of insomnia.  She is disabled and lives in a small mobile home.    She would like to sleep from 10 pm  to 7-8 AM but that does not happen  She notes she can go up to 30 hours without sleeping  Takes cyclobenzaprine at 6 pm   At 8-9 pm takes zolpidem 10 pm  No longer takes diphenhydramine      In the winter, she spends much of the day in the sofa, she will take her medication as noted above, uses her phone and then stops using her phone when \" I feel dope-y\".  She notes she sometimes does not fall asleep until 6-7 AM. Often goes 30 hours without sleeping.  Once she falls asleep, wakes in 2 hours, then up for a bit, sleeps in several stretches  On average sleeps 4-5 hours of sleep a night    She is rarely refreshed upon awakening and always feels sleepy during the day.  Gets sleepy using her phone.  Does not doze.  Takes naps most days, can nap for up to 6 hours    Has anxiety regarding sleep.  Does not look at the time when she cannot sleep      She denies snoring, witnessed apneas, or gasping in sleep.  She sleeps alone but does not think this occurs. Wakes with headaches.  Some dry mouth     Her sleep issues started at age 28 when her sister was murdered. Isaura was placed on diazepam. Ultimately  stopped and used marijuana for sleep for years.  That lost effect and then she switched to  OTC sleeping pills in 1998-9 such as melatonin or Nyquil.  Has been on zolpidem for 5-6 years,      On disability due to cervical spine disease  Lives alone in an old motor home- she finds that " "she gets frequent spider bites where she lives.  She thinks this contributes to her headaches    Motor home is small.  She has a sofa and bed.  In the winter, sleeps on the sofa where she spends most of the day.    Caffeine - none  No alcohol, she notes in the past she would sometimes try to drink alcohol for sleep but does not do so anymore.    No sleepwalking.  May eat in her sleep on zolpidem        No RLS    [place] Sleepiness Scale  Sitting and reading: Slight chance of dozing  Watching TV: Would never doze  Sitting, inactive in a public place (e.g. a theatre or a meeting): Would never doze  As a passenger in a car for an hour without a break: Would never doze  Lying down to rest in the afternoon when circumstances permit: Slight chance of dozing  Sitting and talking to someone: Would never doze  Sitting quietly after a lunch without alcohol: Would never doze  In a car, while stopped for a few minutes in traffic: Would never doze  Total score: 2      Review of Systems      Depression- controlled,  No plans for self-harm.  Has had thoughts of self-harm in the past ,not at present  Has had some anxiety, had some panic attacks      Objective:      Visit Vitals  /68   Pulse 72   Resp 16   Ht 5' 4\" (1.626 m)   Wt 72.6 kg (160 lb)   LMP  (LMP Unknown)   SpO2 98%   BMI 27.46 kg/m²   OB Status Postmenopausal   Smoking Status Every Day   BSA 1.78 m²             Physical Exam      14.5 in neck   Mallampati 2-3  Dry mouth  No tonsillar hypertrophy    I have spent a total time of 45 minutes on 12/18/23 in caring for this patient including Prognosis, Risks and benefits of tx options, Instructions for management, Patient and family education, Importance of tx compliance, Risk factor reductions, Impressions, Counseling / Coordination of care, Documenting in the medical record, Reviewing / ordering tests, medicine, procedures  , and Obtaining or reviewing history  .     "

## 2023-12-19 DIAGNOSIS — F51.01 PRIMARY INSOMNIA: ICD-10-CM

## 2023-12-20 RX ORDER — ZOLPIDEM TARTRATE 10 MG/1
10 TABLET ORAL
Qty: 30 TABLET | Refills: 0 | Status: SHIPPED | OUTPATIENT
Start: 2023-12-20

## 2024-01-14 DIAGNOSIS — M79.7 FIBROMYALGIA: ICD-10-CM

## 2024-01-15 RX ORDER — PREGABALIN 150 MG/1
150 CAPSULE ORAL 3 TIMES DAILY
Qty: 90 CAPSULE | Refills: 0 | Status: SHIPPED | OUTPATIENT
Start: 2024-01-15

## 2024-01-17 ENCOUNTER — TELEPHONE (OUTPATIENT)
Dept: NEUROLOGY | Facility: CLINIC | Age: 64
End: 2024-01-17

## 2024-01-17 NOTE — TELEPHONE ENCOUNTER
Called patient and left voicemail to confirm their upcoming appointment with Dr. Grady. Informed patient about arriving in the [place] location 15 minutes prior to appointment to get checked in and go over chart.

## 2024-01-23 ENCOUNTER — OFFICE VISIT (OUTPATIENT)
Dept: SLEEP CENTER | Facility: CLINIC | Age: 64
End: 2024-01-23
Payer: COMMERCIAL

## 2024-01-23 VITALS
OXYGEN SATURATION: 96 % | BODY MASS INDEX: 27.66 KG/M2 | HEART RATE: 81 BPM | DIASTOLIC BLOOD PRESSURE: 62 MMHG | HEIGHT: 64 IN | WEIGHT: 162 LBS | SYSTOLIC BLOOD PRESSURE: 129 MMHG

## 2024-01-23 DIAGNOSIS — F51.04 CHRONIC INSOMNIA: ICD-10-CM

## 2024-01-23 DIAGNOSIS — G47.23 IRREGULAR SLEEP-WAKE RHYTHM: Primary | ICD-10-CM

## 2024-01-23 PROCEDURE — 99215 OFFICE O/P EST HI 40 MIN: CPT | Performed by: PSYCHIATRY & NEUROLOGY

## 2024-01-23 NOTE — PROGRESS NOTES
Assessment/Plan:    1. Irregular sleep-wake rhythm    2. Chronic insomnia    We discussed that Isaura has a very complicated situation, social determinants of health seem to unfortunately affect her care.  It seems that in the daytime she is spending all of her time in 1 room and her home is not well lit and has very minimal sunlight exposure.  In analyzing the sleep log that she provided, she has what would best be classified as an irregular sleep-wake rhythm.  Isaura agrees that she does not have any established sleep pattern.    We discussed that for irregular sleep-wake rhythm, the best treatment would be time to melatonin in the evening, sunlight in the morning and daytime hours, and keeping a consistent schedule.  It does not seem this is feasible for her at present.    She is prescribed zolpidem 10 mg, she notes she takes this medication whenever she feels sleepy and interviewing sleep logs, that can be as early as 8 PM or as late as 4 AM.  Certainly it would not be ideal to use this medication at varying times throughout the day.  It does not seem this medication is very effective for her at present regardless.  We discussed that she tried many other hypnotics in the past including Lunesta, trazodone and others.  She already takes a very excessive amount of melatonin which I suggested is not ideal either as well as over-the-counter antihistamines.    We discussed that it is very important for her to see psychiatry, and looking through her chart, she was referred almost 1 year ago and still does not have an appointment.  I reached out to some of my contacts in the psychiatry department and fortunately, they were able to contact the patient after her appointment with me to expedite a visit.  As she has such a large degree of sleeplessness, I cannot exclude a component of bipolar disorder.    The patient seems somewhat disappointed and that I do not have an immediate solution for her sleeping issues.  I advised  "that it would take time to follow a regimented approach for things to improve.  I am not hopeful that there will be a single sleeping pill that will immediately solve her sleep issues.    I discussed the case in detail with Dr. Owen.  We decided that she will likely prescribe mirtazapine, and change to the patient's antidepressant regimen.  Potentially mirtazapine may be helpful regarding sleep.  For refractory insomnia, sometimes I will consider quetiapine but due to my concerns regarding the patient's mood disorder, I am not comfortable prescribing this without her seeing psychiatry    The patient endorsed suicidal thoughts in the past, did not have them at the day of her visit with me.,  She notes, she has had a plan for self-harm in the past as well, does not have plans for this today.  Certainly this is concerning and it is urgent for her to see psychiatry.  We discussed the importance of calling 911 or going to the psych ER if she has significant thoughts of self-harm.  Would be helpful to have social work also intervene, I we will ask if Dr. Owen can help coordinate through her office      Subjective:      Patient ID: Isaura Brito is a 63 y.o. female.    HPI    Isaura returns in a follow-up visit, I last saw her in December.  At that time she presented with insomnia symptoms.  She filled out a sleep log and brought that today for follow-up    In reviewing her sleep log, she has a lot of variability to her sleep schedule.  There are rare nights where she will sleep 7 hours overnight but has had only 2 days in the past 2 weeks of this.  May need days she does not sleep until 1 AM or even at 7 AM only for few hours at a time.    She notes that she sleeps only 2 hours a day with zolpidem.   Isaura describes she has lost concept of time.  \"I take it only when I feel tired\".  \"I don't have a schedule, I used to be a morning person\".   In a motor home until the winter, then will sleep in her bed as it is " "warmer,  At present in the winter sleeps on a mattress on top of a folded table.  Will sit on this mattress in the day    In the winter, there is not much light coming in.  She has curtains around the home that block light out.      She used to zolpidem at 9 pm.  Now can take zolpidem \" when I start yawning\".  This can be at varying times, according to her sleep log, it can be as late as 4 to 5 PM, tried flexeril and zolpidem together, helped briefly but now does not; takes melatonin up to 100 mg when \"I don't sleep for a couple of day\".  Other days takes 20-30 mg melatonin    Had been taking  diphenydramine 50 mg as well but stopped 2 weeks ago, can't too much as \"my body jerk         Does not have much depression.  She has had thoughts of self-harm in the past, does not have this thought today.  She notes that she has had a plan for self-harm in the past, she does not plan to act on this today.  When she does not sleep well, she is more prone to these thoughts.    [place] Sleepiness Scale  Sitting and reading: Would never doze  Watching TV: Would never doze  Sitting, inactive in a public place (e.g. a theatre or a meeting): Slight chance of dozing  As a passenger in a car for an hour without a break: Would never doze  Lying down to rest in the afternoon when circumstances permit: Slight chance of dozing  Sitting and talking to someone: Would never doze  Sitting quietly after a lunch without alcohol: Would never doze  In a car, while stopped for a few minutes in traffic: Would never doze  Total score: 2      Review of Systems    As above  Objective:      /62 (BP Location: Left arm, Patient Position: Sitting, Cuff Size: Standard)   Pulse 81   Ht 5' 4\" (1.626 m)   Wt 73.5 kg (162 lb)   LMP  (LMP Unknown)   SpO2 96%   BMI 27.81 kg/m²          Physical Exam    RRR  Lungs CTA b/l    I have spent a total time of 52 minutes on 01/25/24 in caring for this patient including Diagnostic results, Prognosis, Risks " and benefits of tx options, Instructions for management, Patient and family education, Importance of tx compliance, Risk factor reductions, Impressions, Counseling / Coordination of care, Documenting in the medical record, Reviewing / ordering tests, medicine, procedures  , Obtaining or reviewing history  , and Communicating with other healthcare professionals .

## 2024-01-23 NOTE — PATIENT INSTRUCTIONS
1) the key is to get a consistent daily routine and schedule  2) Light exposure in the morning and afternoon is very important - that is a cue to be awake  3) melatonin (or your sleeping pills) in the evening would be recommended, best to take at the same time everyday.  For example, I would recommend taking melatonin 5 mg at 9 pm.  4) as much as possible, easier when it is warm, try to establish a set area for sleep and a set area for relaxing.  If not able to sleep do not stay in bed  5) If feeling suicidal, please call 911 go to the ER  6) try to set an alarm before falling asleep to limit the amount you sleep to 8 hours

## 2024-01-23 NOTE — Clinical Note
Hello, I sent you a message in Tiger text to follow-up, please make note of the section regarding thoughts/plan for self-harm.  She did not endorse these on the day of the visit but has had them.  Can you have your  intervene?  Anything else you would recommend in this regard?  I got her into see psychiatry, they offered her an appointment today but she is instead scheduled in March per her request

## 2024-01-24 ENCOUNTER — TELEPHONE (OUTPATIENT)
Dept: PSYCHIATRY | Facility: CLINIC | Age: 64
End: 2024-01-24

## 2024-01-24 DIAGNOSIS — F51.01 PRIMARY INSOMNIA: ICD-10-CM

## 2024-01-24 RX ORDER — ZOLPIDEM TARTRATE 10 MG/1
10 TABLET ORAL
Qty: 30 TABLET | Refills: 0 | Status: CANCELLED | OUTPATIENT
Start: 2024-01-24

## 2024-01-24 RX ORDER — ZOLPIDEM TARTRATE 10 MG/1
10 TABLET ORAL
Qty: 30 TABLET | Refills: 0 | Status: SHIPPED | OUTPATIENT
Start: 2024-01-24

## 2024-01-24 NOTE — TELEPHONE ENCOUNTER
"Behavioral Health Outpatient Intake Questions    Referred By   : Referral - Message from Chief of Sleep Medicine had sent a message.     Please advise interviewee that they need to answer all questions truthfully to allow for best care, and any misrepresentations of information may affect their ability to be seen at this clinic   => Was this discussed? Yes     If Minor Child (under age 18)    Who is/are the legal guardian(s) of the child?     Is there a custody agreement? No     If \"YES\"- Custody orders must be obtained prior to scheduling the first appointment  In addition, Consent to Treatment must be signed by all legal guardians prior to scheduling the first appointment    If \"NO\"- Consent to Treatment must be signed by all legal guardians prior to scheduling the first appointment    Behavioral Health Outpatient Intake History -     Presenting Problem (in patient's own words):     Severe insomnia, creates suicidal ideation    Are there any communication barriers for this patient?     No                                               If yes, please describe barriers:  none  If there is a unique situation, please refer to Roc Golden/Ana M Matute for final determination.    Are you taking any psychiatric medications? Yes     If \"YES\" -What are they Celexa, Atarax, Ambien     If \"YES\" -Who prescribes? Jocelyne Rome, PCP    Has the Patient previously received outpatient Talk Therapy or Medication Management from [facility]  No        If \"YES\"- When, Where and with Whom?         If \"NO\" -Has Patient received these services elsewhere?       If \"YES\" -When, Where, and with Whom?    Has the Patient abused alcohol or other substances in the last 6 months ? No  No concerns of substance abuse are reported.     If \"YES\" -What substance, How much, How often?     If illegal substance: Refer to [facility] (for FRANCOISE) or [facility].   If Alcohol in excess of 10 drinks per week:  Refer to [facility] (for FRANCOISE) or " "[facility] Off[facility]    Legal History-     Is this treatment court ordered? No   If \"yes \"send to :  Talk Therapy : Send to Roc Golden/Ana M Matute for final determination   Med Management: Send to Dr Ghotra for final determination     Has the Patient been convicted of a felony?  No   If \"Yes\" send to -When, What?  Talk Therapy : Send to Roc Matute for final determination   Med Management: Send to Dr Ghotra for final determination     ACCEPTED as a patient Yes  If \"Yes\" Appointment Date: 3/18/2024 with Sushma Escalante    Referred Elsewhere?  No  If “Yes” - (Where? Ex: [facility], [facility], [facility], [facility], etc.)       Name of Insurance Co:[employer] Rep  Insurance ID#  Insurance Phone #  If ins is primary or secondary?Primary  If patient is a minor, parents information such as Name, D.O.B of guarantor.  "

## 2024-01-24 NOTE — TELEPHONE ENCOUNTER
Contacted pt after receiving a message from Intake Lead in regards to scheduling pt for psychiatry med mgmt. Pt was interested.

## 2024-01-25 ENCOUNTER — TELEPHONE (OUTPATIENT)
Dept: SLEEP CENTER | Facility: CLINIC | Age: 64
End: 2024-01-25

## 2024-01-25 NOTE — TELEPHONE ENCOUNTER
Patient left message stating Dr. Salazar was supposed to contact her PCP to discuss changing her sleep medication.  When she contacted her PCP's office, they said Dr. Salazar hasn't called. Asking when Dr. Salazar will be calling.      Returned call and advised patient I will forward her message to Dr. Salazar.

## 2024-01-25 NOTE — TELEPHONE ENCOUNTER
I called Isaura back, I let her know that I talked to Dr. Owen on the day of Isaura's  visit with me.  In addition, I have contacted psychiatry and they expedited her appointment.  She noted that they offered her an appointment today but she did not take it as she had not slept much recently and was concerned that she would miss the appointment.  I expressed the urgency of making sure she keeps the currently scheduled appointment in March with psychiatry

## 2024-01-26 ENCOUNTER — TELEPHONE (OUTPATIENT)
Dept: INTERNAL MEDICINE CLINIC | Facility: OTHER | Age: 64
End: 2024-01-26

## 2024-01-26 NOTE — TELEPHONE ENCOUNTER
"Pt continues to have insomnia.  Sxs are getting worse and pt is inappropriately self-mediating to try and sleep but that has been unsuccessful as well . Pt is concerned about accidentally overdosing but she does not know how much more she can take as it wears her down mentally and physically and per pt report makes her feel \"suicidal\". Pt denies feeling suicidal currently and instructed to call 911immediately if she feels that way.  She does report she has a PLAN.     Pt was not able to make the appt offered with psych due to not sleeping for 2 days.  Pt instructed to take whatever appt is offered to her. Pt verbally acknowledged understanding      Is there any way psych can get patient in earlier than March please help?  "

## 2024-01-28 DIAGNOSIS — G47.09 OTHER INSOMNIA: Primary | ICD-10-CM

## 2024-01-29 ENCOUNTER — VBI (OUTPATIENT)
Dept: ADMINISTRATIVE | Facility: OTHER | Age: 64
End: 2024-01-29

## 2024-01-31 ENCOUNTER — TELEPHONE (OUTPATIENT)
Dept: SLEEP CENTER | Facility: CLINIC | Age: 64
End: 2024-01-31

## 2024-01-31 DIAGNOSIS — E03.9 HYPOTHYROIDISM, UNSPECIFIED TYPE: ICD-10-CM

## 2024-01-31 RX ORDER — LEVOTHYROXINE SODIUM 112 UG/1
112 TABLET ORAL DAILY
Qty: 90 TABLET | Refills: 1 | Status: SHIPPED | OUTPATIENT
Start: 2024-01-31

## 2024-01-31 NOTE — TELEPHONE ENCOUNTER
I spoke with Isaura  She does not have thoughts of self-harm today she slept last night and when she sleeps well her mood is improved    She mentioned she does get some light in her home. I advised it would be best to get direct sunlight outdoors consistently, int he morning to establish a circadian rhythm  (If this is irregular sleep wake rhythm disorder, morning light would be the best treatment, in general hypnotics are less useful, especially as she uses these at inconsistent times)    When she does not sleep well, she get a ride to appointments, she knows not to drive.     Advised her to avoid OTC sleep meds as it is unsafe in combination with zolpidem    She is comfortable waiting until Friday for her psychiatry appointment. I advised if something changes to go to the walk-in clinic or call 911    She understood my recommendations

## 2024-01-31 NOTE — TELEPHONE ENCOUNTER
Is anyone able to see patient sooner?  Pt did admit to having a plan to hurt herself.  Two physicians that I am aware of recommend her to see psych.  Can anyone see her sooner?

## 2024-01-31 NOTE — TELEPHONE ENCOUNTER
Contacted pt after receiving new dates and times with a med mgmt provider.     Pt is scheduled for this Friday 2/2 at 9am with Mary Su.
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
## 2024-02-02 ENCOUNTER — OFFICE VISIT (OUTPATIENT)
Dept: PSYCHIATRY | Facility: CLINIC | Age: 64
End: 2024-02-02

## 2024-02-02 DIAGNOSIS — F41.1 GAD (GENERALIZED ANXIETY DISORDER): ICD-10-CM

## 2024-02-02 DIAGNOSIS — F43.10 PTSD (POST-TRAUMATIC STRESS DISORDER): ICD-10-CM

## 2024-02-02 DIAGNOSIS — G47.09 OTHER INSOMNIA: ICD-10-CM

## 2024-02-02 DIAGNOSIS — F33.2 SEVERE EPISODE OF RECURRENT MAJOR DEPRESSIVE DISORDER, WITHOUT PSYCHOTIC FEATURES (HCC): Primary | ICD-10-CM

## 2024-02-02 RX ORDER — QUETIAPINE FUMARATE 25 MG/1
25 TABLET, FILM COATED ORAL
Qty: 30 TABLET | Refills: 1 | Status: SHIPPED | OUTPATIENT
Start: 2024-02-02

## 2024-02-02 NOTE — PATIENT INSTRUCTIONS
Please schedule for 60 min    Please place on therapy wait list.  Would like virtual.  Please make this a priority

## 2024-02-21 PROBLEM — J32.0 MAXILLARY SINUSITIS: Status: RESOLVED | Noted: 2018-03-30 | Resolved: 2024-02-21

## 2024-02-26 DIAGNOSIS — F51.01 PRIMARY INSOMNIA: ICD-10-CM

## 2024-02-26 RX ORDER — ZOLPIDEM TARTRATE 10 MG/1
10 TABLET ORAL
Qty: 30 TABLET | Refills: 0 | Status: SHIPPED | OUTPATIENT
Start: 2024-02-26

## 2024-02-28 ENCOUNTER — TELEMEDICINE (OUTPATIENT)
Dept: INTERNAL MEDICINE CLINIC | Facility: OTHER | Age: 64
End: 2024-02-28
Payer: COMMERCIAL

## 2024-02-28 DIAGNOSIS — E11.9 TYPE 2 DIABETES MELLITUS WITHOUT COMPLICATION, WITHOUT LONG-TERM CURRENT USE OF INSULIN (HCC): ICD-10-CM

## 2024-02-28 DIAGNOSIS — J01.10 ACUTE NON-RECURRENT FRONTAL SINUSITIS: Primary | ICD-10-CM

## 2024-02-28 DIAGNOSIS — J45.909 ASTHMA, UNSPECIFIED ASTHMA SEVERITY, UNSPECIFIED WHETHER COMPLICATED, UNSPECIFIED WHETHER PERSISTENT: ICD-10-CM

## 2024-02-28 DIAGNOSIS — G44.229 CHRONIC TENSION-TYPE HEADACHE, NOT INTRACTABLE: ICD-10-CM

## 2024-02-28 DIAGNOSIS — R25.3 MUSCLE TWITCH: ICD-10-CM

## 2024-02-28 DIAGNOSIS — E03.9 ACQUIRED HYPOTHYROIDISM: ICD-10-CM

## 2024-02-28 PROCEDURE — 99214 OFFICE O/P EST MOD 30 MIN: CPT

## 2024-02-28 PROCEDURE — G2211 COMPLEX E/M VISIT ADD ON: HCPCS

## 2024-02-28 RX ORDER — AZITHROMYCIN 250 MG/1
TABLET, FILM COATED ORAL
Qty: 6 TABLET | Refills: 0 | Status: SHIPPED | OUTPATIENT
Start: 2024-02-28 | End: 2024-03-03

## 2024-02-28 RX ORDER — FLUTICASONE PROPIONATE AND SALMETEROL 250; 50 UG/1; UG/1
1 POWDER RESPIRATORY (INHALATION) 2 TIMES DAILY
Qty: 180 BLISTER | Refills: 1 | Status: SHIPPED | OUTPATIENT
Start: 2024-02-28 | End: 2024-08-26

## 2024-02-28 NOTE — ASSESSMENT & PLAN NOTE
Patient following with neurology for headaches  She is endorsing new symptoms of twitching of her arms and legs  Patient would like to rule out testing for ALS  Explained that there is no blood testing for ALS at this time  There is an MRI order in chart previously ordered by patient's neurologist  Advised that patient call neurologist to discuss symptoms as well as complete MRI order  Complete labs   Call if symptoms worsen

## 2024-02-28 NOTE — PATIENT INSTRUCTIONS
Flonase twice daily, second-generation antihistamine such as Zyrtec daily, decongestant daily for congestion  Advised increased rest, fluid intake  Discussed symptomatic care including salt water gargles for sore throat, steam showers for congestion, warm liquids   Patient can take Tylenol/ibuprofen for fevers and body aches  Discussed red flag symptoms including going to the ER with chest pain or shortness of breath  Discussed course of illness could be 1 to 2 weeks.   Return to the office for reevaluation if symptoms do not improve in 1-2 weeks

## 2024-02-28 NOTE — ASSESSMENT & PLAN NOTE
Patient notes that she has been having twitching of the extremities over the last several months  Patient is concerned for ALS.  She has no family history of ALS.  She denies any paralysis, weakness of the extremities  Discussed that there is no blood test to rule out ALS, nor does she fit the clinical presentation based on the symptoms she is describing  Also discussed that it is difficult for me to make decision based on the fact that this is a virtual visit, and patient is not in office to complete neurological exam  Discussed with patient that antipsychotic such as Seroquel can cause the symptoms, however she states that she has been having these present since prior to starting Seroquel  Will get updated blood work  Advised patient to follow-up with neurologist, patient already has MRI in chart that was ordered for chronic headaches  Continue to monitor symptoms, call if symptoms worsen

## 2024-02-28 NOTE — ASSESSMENT & PLAN NOTE
URI symptoms x 1.5 weeks  Will treat with azithromycin- take with food, take whole antibiotic course  Advised Flonase twice daily, second-generation antihistamine such as Zyrtec daily, decongestant daily  Advised increased rest, fluid intake  Discussed symptomatic care including salt water gargles for sore throat, steam showers for congestion, warm liquids   Patient can take Tylenol/ibuprofen for fevers and body aches  Discussed red flag symptoms including going to the ER with chest pain or shortness of breath  Discussed course of illness could be 1 to 2 weeks.   Return to the office for reevaluation if symptoms do not improve in 1-2 weeks

## 2024-02-28 NOTE — ASSESSMENT & PLAN NOTE
Patient notes that she was taking albuterol daily, Advair as needed  Discussed with patient appropriate uses of Advair versus albuterol.  Advair is a good daily controller medication, albuterol is to be used as needed for wheezing and shortness of breath  Will resend patient's Advair as her previous prescription has   Continue albuterol as needed

## 2024-02-28 NOTE — ASSESSMENT & PLAN NOTE
Last TSH normal, patient due for updated  Continue levothyroxine 112 mcg daily  Will reorder TSH today

## 2024-02-28 NOTE — PROGRESS NOTES
Virtual Regular Visit    Verification of patient location:    Patient is located at Home in the following state in which I hold an active license [place]      Assessment/Plan:    Problem List Items Addressed This Visit          Endocrine    Hypothyroidism     Last TSH normal, patient due for updated  Continue levothyroxine 112 mcg daily  Will reorder TSH today         Relevant Orders    TSH, 3rd generation with Free T4 reflex    Type 2 diabetes mellitus without complication, without long-term current use of insulin (HCC)       Lab Results   Component Value Date    HGBA1C 6.2 (H) 2023   Continue current regimen  Recommend healthy diet and exercise  Repeat A1c in chart         Relevant Orders    CBC and differential       Respiratory    Asthma     Patient notes that she was taking albuterol daily, Advair as needed  Discussed with patient appropriate uses of Advair versus albuterol.  Advair is a good daily controller medication, albuterol is to be used as needed for wheezing and shortness of breath  Will resend patient's Advair as her previous prescription has   Continue albuterol as needed         Relevant Medications    Fluticasone-Salmeterol (Advair) 250-50 mcg/dose inhaler    Acute non-recurrent frontal sinusitis - Primary     URI symptoms x 1.5 weeks  Will treat with azithromycin- take with food, take whole antibiotic course  Advised Flonase twice daily, second-generation antihistamine such as Zyrtec daily, decongestant daily  Advised increased rest, fluid intake  Discussed symptomatic care including salt water gargles for sore throat, steam showers for congestion, warm liquids   Patient can take Tylenol/ibuprofen for fevers and body aches  Discussed red flag symptoms including going to the ER with chest pain or shortness of breath  Discussed course of illness could be 1 to 2 weeks.   Return to the office for reevaluation if symptoms do not improve in 1-2 weeks          Relevant Medications    azithromycin  (ZITHROMAX) 250 mg tablet       Other    Headache     Patient following with neurology for headaches  She is endorsing new symptoms of twitching of her arms and legs  Patient would like to rule out testing for ALS  Explained that there is no blood testing for ALS at this time  There is an MRI order in chart previously ordered by patient's neurologist  Advised that patient call neurologist to discuss symptoms as well as complete MRI order  Complete labs   Call if symptoms worsen         Muscle twitch     Patient notes that she has been having twitching of the extremities over the last several months  Patient is concerned for ALS.  She has no family history of ALS.  She denies any paralysis, weakness of the extremities  Discussed that there is no blood test to rule out ALS, nor does she fit the clinical presentation based on the symptoms she is describing  Also discussed that it is difficult for me to make decision based on the fact that this is a virtual visit, and patient is not in office to complete neurological exam  Discussed with patient that antipsychotic such as Seroquel can cause the symptoms, however she states that she has been having these present since prior to starting Seroquel  Will get updated blood work  Advised patient to follow-up with neurologist, patient already has MRI in chart that was ordered for chronic headaches  Continue to monitor symptoms, call if symptoms worsen              Tobacco Cessation Counseling: Tobacco cessation counseling was provided. The patient is sincerely urged to quit consumption of tobacco. She is not ready to quit tobacco.         Reason for visit is   Chief Complaint   Patient presents with    Virtual Brief Visit     Patient had flu symptoms since last week and still have chest and sinus congestion with a productive cough.  She no longer has a fever.  She is taking cough drops, Robitussin and NyQuil.  COVID test was negative. She does not check her vitals at home.  She  c/o body twitching over the past several months and is getting worse.  Has twitching of legs, feet and hands that is worse on her left side.  She would like test to rule out ALS.    Virtual Regular Visit          Encounter provider Gunjan Yuen PA-C    Provider located at [facility]  [address]  [address]  [facility] [address]      Recent Visits  No visits were found meeting these conditions.  Showing recent visits within past 7 days and meeting all other requirements  Today's Visits  Date Type Provider Dept   02/28/24 Telemedicine Gunjan Yuen PA-C [facility]   Showing today's visits and meeting all other requirements  Future Appointments  No visits were found meeting these conditions.  Showing future appointments within next 150 days and meeting all other requirements       The patient was identified by name and date of birth. Isaura Brito was informed that this is a telemedicine visit and that the visit is being conducted through the [employer] platform. She agrees to proceed..  My office door was closed. No one else was in the room.  She acknowledged consent and understanding of privacy and security of the video platform. The patient has agreed to participate and understands they can discontinue the visit at any time.    Patient is aware this is a billable service.     Subjective  Isaura Brito is a 63 y.o. female  .      Patient is a 63-year-old female presenting the office via virtual visit for upper respiratory symptoms x 1 week  Reports that she is having chest and sinus congestion, productive cough  She did have a fever at onset of illness for first 3 days  Also noted sore throat and earache, as well as body aches   Patient is a smoker  COVID-negative at home  Treatments tried include Robitussin and NyQuil which have helped some    Patient is also reporting twitching of the  extremities  Notes this has been going on for several months and is getting worse  Patient expressed interest in a blood test to rule out ALS  Denies family history of ALS  Denies weakness or paralysis of extremities  Patient does have a neurologist who she follows with for chronic headaches    URI   This is a new problem. The current episode started 1 to 4 weeks ago. The problem has been unchanged. There has been no fever. Associated symptoms include coughing (productive), headaches, sinus pain and wheezing. Pertinent negatives include no abdominal pain, chest pain, congestion, nausea, rhinorrhea, sore throat or vomiting. She has tried decongestant for the symptoms. The treatment provided mild relief.        Past Medical History:   Diagnosis Date    Abnormal echocardiogram     Abnormal stress test     Anxiety     Arthritis 2020    Asthma     Brachial neuritis     Coronary artery disease     Depression     Diabetes mellitus (HCC)     Disease of thyroid gland     Displacement of intervertebral disc of mid-cervical region     Diverticulitis of colon     Fibromyalgia     Frequent headaches     GERD (gastroesophageal reflux disease)     Herniated nucleus pulposus     History of arthritis     History of asthma     History of cardiac disorder     History of chest pain     History of diverticulitis     History of fatigue     History of hearing loss     History of hemoptysis     History of herpes simplex infection     History of hiatal hernia     History of insect bite     INFECTED    History of memory loss     History of neck disorder     History of reflex sympathetic dystrophy     History of restless legs syndrome     History of shortness of breath     History of spinal stenosis     HL (hearing loss)     Hyperlipidemia     Hyperlipidemia     Hypertension     Skin rash     Somnolence, daytime     Spinal stenosis of cervical region        Past Surgical History:   Procedure Laterality Date    BACK SURGERY      BACK SURGERY       LOWER BACK SURGERY    CARDIAC CATHETERIZATION      HISTORY OF CORONARY ANGIOGRAPHY WITH CONCOMITANT LEFT HEART CATHETERIZATION    CORONARY ANGIOPLASTY WITH STENT PLACEMENT      EAR SURGERY      SPINE SURGERY      TONSILLECTOMY      TYMPANOPLASTY      US GUIDED BREAST BIOPSY LEFT COMPLETE Left 03/02/2021       Current Outpatient Medications   Medication Sig Dispense Refill    acetaminophen (TYLENOL) 325 mg tablet Take 1 tablet (325 mg total) by mouth as needed for headaches For headache      albuterol (PROVENTIL HFA,VENTOLIN HFA) 90 mcg/act inhaler Inhale 2 puffs every 6 (six) hours as needed for wheezing 18 g 5    aspirin 81 mg chewable tablet Chew 81 mg daily      atorvastatin (LIPITOR) 80 mg tablet Take 1 tablet (80 mg total) by mouth daily 90 tablet 3    azithromycin (ZITHROMAX) 250 mg tablet Take 2 tablets today then 1 tablet daily x 4 days 6 tablet 0    citalopram (CeleXA) 40 mg tablet Take 1 tablet (40 mg total) by mouth daily 90 tablet 1    cyclobenzaprine (FLEXERIL) 10 mg tablet Take 1 tablet (10 mg total) by mouth 3 (three) times a day as needed for muscle spasms 30 tablet 5    Empagliflozin (Jardiance) 10 MG TABS tablet Take 1 tablet (10 mg total) by mouth every morning 90 tablet 0    ezetimibe (ZETIA) 10 mg tablet Take 1 tablet (10 mg total) by mouth daily 90 tablet 1    Fluticasone-Salmeterol (Advair) 250-50 mcg/dose inhaler Inhale 1 puff 2 (two) times a day Rinse mouth after use. 180 blister 1    hydrOXYzine HCL (ATARAX) 25 mg tablet Take 1 tablet (25 mg total) by mouth 2 (two) times a day as needed for itching 180 tablet 1    levothyroxine 112 mcg tablet Take 1 tablet (112 mcg total) by mouth daily Take 112 mcg daily on Mondays Wednesdays and Fridays alternate with 100 mcg all other days 90 tablet 1    losartan (COZAAR) 50 mg tablet Take 1 tablet (50 mg total) by mouth daily 90 tablet 1    metoprolol succinate (TOPROL-XL) 25 mg 24 hr tablet Take 1 tablet (25 mg total) by mouth daily 90 tablet 1     "pantoprazole (PROTONIX) 40 mg tablet Take 1 tablet (40 mg total) by mouth daily 90 tablet 1    pregabalin (LYRICA) 150 mg capsule Take 1 capsule (150 mg total) by mouth 3 (three) times a day 90 capsule 0    QUEtiapine (SEROquel) 25 mg tablet Take 1 tablet (25 mg total) by mouth daily at bedtime 30 tablet 1    triamcinolone (KENALOG) 0.1 % cream Apply topically as needed for irritation      valACYclovir (VALTREX) 1,000 mg tablet Take 1 tablet (1,000 mg total) by mouth 2 (two) times a day for 14 days 28 tablet 0    zolpidem (Ambien) 10 mg tablet Take 1 tablet (10 mg total) by mouth daily at bedtime as needed for sleep 30 tablet 0     No current facility-administered medications for this visit.        Allergies   Allergen Reactions    Augmentin [Amoxicillin-Pot Clavulanate] Nausea Only     PT stated she only allergic to medication AUGMENTIN    Meloxicam GI Intolerance     \"brings on diverticulitis\"       Review of Systems   Constitutional:  Positive for fatigue. Negative for chills and fever.   HENT:  Positive for sinus pressure and sinus pain. Negative for congestion, postnasal drip, rhinorrhea, sore throat and trouble swallowing.    Eyes:  Negative for pain, discharge and redness.   Respiratory:  Positive for cough (productive) and wheezing. Negative for chest tightness and shortness of breath.    Cardiovascular:  Negative for chest pain and palpitations.   Gastrointestinal:  Negative for abdominal pain, nausea and vomiting.   Musculoskeletal:  Negative for arthralgias, gait problem and myalgias.   Neurological:  Positive for headaches. Negative for dizziness, light-headedness and numbness.       Video Exam    There were no vitals filed for this visit.    Physical Exam  Nursing note reviewed. Vitals reviewed: Virtual visit, patient unable to take vitals.  Constitutional:       General: She is not in acute distress.     Appearance: Normal appearance. She is not ill-appearing or diaphoretic.   HENT:      Head: " Normocephalic.      Right Ear: External ear normal.      Left Ear: External ear normal.      Mouth/Throat:      Mouth: Mucous membranes are moist.   Eyes:      General: No scleral icterus.        Right eye: No discharge.         Left eye: No discharge.      Conjunctiva/sclera: Conjunctivae normal.   Pulmonary:      Effort: Pulmonary effort is normal. No respiratory distress.      Comments: No cough present  Patient is not dyspneic through conversation  Skin:     Coloration: Skin is not pale.   Neurological:      General: No focal deficit present.      Mental Status: She is alert and oriented to person, place, and time. Mental status is at baseline.      Coordination: Coordination normal.   Psychiatric:         Mood and Affect: Mood normal.         Behavior: Behavior normal.          Visit Time  Total Visit Duration: 20 minutes

## 2024-02-28 NOTE — ASSESSMENT & PLAN NOTE
Lab Results   Component Value Date    HGBA1C 6.2 (H) 09/08/2023   Continue current regimen  Recommend healthy diet and exercise  Repeat A1c in chart

## 2024-03-04 DIAGNOSIS — M62.838 NECK MUSCLE SPASM: ICD-10-CM

## 2024-03-04 DIAGNOSIS — M48.02 CERVICAL STENOSIS OF SPINAL CANAL: ICD-10-CM

## 2024-03-04 RX ORDER — CYCLOBENZAPRINE HCL 10 MG
10 TABLET ORAL 3 TIMES DAILY PRN
Qty: 30 TABLET | Refills: 0 | Status: SHIPPED | OUTPATIENT
Start: 2024-03-04

## 2024-03-15 ENCOUNTER — OFFICE VISIT (OUTPATIENT)
Age: 64
End: 2024-03-15
Payer: COMMERCIAL

## 2024-03-15 ENCOUNTER — LAB (OUTPATIENT)
Dept: LAB | Facility: HOSPITAL | Age: 64
End: 2024-03-15
Payer: COMMERCIAL

## 2024-03-15 VITALS
HEART RATE: 85 BPM | SYSTOLIC BLOOD PRESSURE: 112 MMHG | OXYGEN SATURATION: 97 % | WEIGHT: 157.2 LBS | DIASTOLIC BLOOD PRESSURE: 78 MMHG | BODY MASS INDEX: 26.84 KG/M2 | HEIGHT: 64 IN | TEMPERATURE: 96.9 F

## 2024-03-15 DIAGNOSIS — E11.9 TYPE 2 DIABETES MELLITUS WITHOUT COMPLICATION, WITHOUT LONG-TERM CURRENT USE OF INSULIN (HCC): ICD-10-CM

## 2024-03-15 DIAGNOSIS — Z12.31 ENCOUNTER FOR SCREENING MAMMOGRAM FOR MALIGNANT NEOPLASM OF BREAST: Primary | ICD-10-CM

## 2024-03-15 DIAGNOSIS — M46.1 SACROILIITIS (HCC): ICD-10-CM

## 2024-03-15 DIAGNOSIS — K57.92 DIVERTICULITIS: ICD-10-CM

## 2024-03-15 DIAGNOSIS — K57.92 DIVERTICULITIS: Primary | ICD-10-CM

## 2024-03-15 LAB
ANION GAP SERPL CALCULATED.3IONS-SCNC: 8 MMOL/L (ref 4–13)
BASOPHILS # BLD AUTO: 0.03 THOUSANDS/ÂΜL (ref 0–0.1)
BASOPHILS NFR BLD AUTO: 0 % (ref 0–1)
BUN SERPL-MCNC: 21 MG/DL (ref 5–25)
CALCIUM SERPL-MCNC: 9.6 MG/DL (ref 8.4–10.2)
CHLORIDE SERPL-SCNC: 102 MMOL/L (ref 96–108)
CO2 SERPL-SCNC: 28 MMOL/L (ref 21–32)
CREAT SERPL-MCNC: 0.78 MG/DL (ref 0.6–1.3)
EOSINOPHIL # BLD AUTO: 0.08 THOUSAND/ÂΜL (ref 0–0.61)
EOSINOPHIL NFR BLD AUTO: 1 % (ref 0–6)
ERYTHROCYTE [DISTWIDTH] IN BLOOD BY AUTOMATED COUNT: 12.9 % (ref 11.6–15.1)
GFR SERPL CREATININE-BSD FRML MDRD: 81 ML/MIN/1.73SQ M
GLUCOSE SERPL-MCNC: 175 MG/DL (ref 65–140)
HCT VFR BLD AUTO: 45.2 % (ref 34.8–46.1)
HGB BLD-MCNC: 15.6 G/DL (ref 11.5–15.4)
IMM GRANULOCYTES # BLD AUTO: 0.01 THOUSAND/UL (ref 0–0.2)
IMM GRANULOCYTES NFR BLD AUTO: 0 % (ref 0–2)
LYMPHOCYTES # BLD AUTO: 2.27 THOUSANDS/ÂΜL (ref 0.6–4.47)
LYMPHOCYTES NFR BLD AUTO: 30 % (ref 14–44)
MCH RBC QN AUTO: 31.1 PG (ref 26.8–34.3)
MCHC RBC AUTO-ENTMCNC: 34.5 G/DL (ref 31.4–37.4)
MCV RBC AUTO: 90 FL (ref 82–98)
MONOCYTES # BLD AUTO: 0.44 THOUSAND/ÂΜL (ref 0.17–1.22)
MONOCYTES NFR BLD AUTO: 6 % (ref 4–12)
NEUTROPHILS # BLD AUTO: 4.65 THOUSANDS/ÂΜL (ref 1.85–7.62)
NEUTS SEG NFR BLD AUTO: 63 % (ref 43–75)
NRBC BLD AUTO-RTO: 0 /100 WBCS
PLATELET # BLD AUTO: 229 THOUSANDS/UL (ref 149–390)
PMV BLD AUTO: 9.8 FL (ref 8.9–12.7)
POTASSIUM SERPL-SCNC: 4.3 MMOL/L (ref 3.5–5.3)
RBC # BLD AUTO: 5.01 MILLION/UL (ref 3.81–5.12)
SODIUM SERPL-SCNC: 138 MMOL/L (ref 135–147)
WBC # BLD AUTO: 7.48 THOUSAND/UL (ref 4.31–10.16)

## 2024-03-15 PROCEDURE — 36415 COLL VENOUS BLD VENIPUNCTURE: CPT

## 2024-03-15 PROCEDURE — 99213 OFFICE O/P EST LOW 20 MIN: CPT | Performed by: NURSE PRACTITIONER

## 2024-03-15 PROCEDURE — 85025 COMPLETE CBC W/AUTO DIFF WBC: CPT

## 2024-03-15 PROCEDURE — G2211 COMPLEX E/M VISIT ADD ON: HCPCS | Performed by: NURSE PRACTITIONER

## 2024-03-15 PROCEDURE — 80048 BASIC METABOLIC PNL TOTAL CA: CPT

## 2024-03-15 RX ORDER — METRONIDAZOLE 500 MG/1
500 TABLET ORAL 4 TIMES DAILY
Qty: 28 TABLET | Refills: 0 | Status: SHIPPED | OUTPATIENT
Start: 2024-03-15 | End: 2024-03-22

## 2024-03-15 RX ORDER — LEVOFLOXACIN 750 MG/1
750 TABLET, FILM COATED ORAL EVERY 24 HOURS
Qty: 7 TABLET | Refills: 0 | Status: SHIPPED | OUTPATIENT
Start: 2024-03-15 | End: 2024-03-22

## 2024-03-15 NOTE — PROGRESS NOTES
Assessment/Plan:    Diverticulitis  -Recommend follow-up with GI for colonoscopy  -Recommend clear liquid diet  -Stat CT, CMP and CBC  -Treat with Flagyl and Levaquin  -discussed red flag warning signs               Diagnoses and all orders for this visit:    Encounter for screening mammogram for malignant neoplasm of breast  -     Mammo screening bilateral w 3d & cad; Future    Sacroiliitis (HCC)    Type 2 diabetes mellitus without complication, without long-term current use of insulin (HCC)    Diverticulitis  -     Ambulatory Referral to Gastroenterology; Future  -     CT abdomen pelvis w contrast; Future  -     levofloxacin (LEVAQUIN) 750 mg tablet; Take 1 tablet (750 mg total) by mouth every 24 hours for 7 days  -     metroNIDAZOLE (FLAGYL) 500 mg tablet; Take 1 tablet (500 mg total) by mouth 4 (four) times a day for 7 days  -     Basic metabolic panel; Future  -     CBC and differential; Future  -     Basic metabolic panel  -     CBC and differential          Subjective:      Patient ID: Isaura Brito is a 63 y.o. female.    Patient presents with abdominal pain and bloating.  has a history of diverticulitis  Started about a week ago   Last flare 2022   Decreased appetite   Reports bloating and abdominal  Denies fevers and chills     Denies blood in stool   She reports stools are not hard, but not loose      She reports symptoms are gradually getting worse     2015 was last colonoscopy             The following portions of the patient's history were reviewed and updated as appropriate: allergies, current medications, past family history, past medical history, past social history, past surgical history, and problem list.    Review of Systems   Constitutional:  Negative for activity change, appetite change, chills, diaphoresis and fever.   HENT:  Negative for congestion, ear discharge, ear pain, postnasal drip, rhinorrhea, sinus pressure, sinus pain and sore throat.    Eyes:  Negative for pain, discharge,  itching and visual disturbance.   Respiratory:  Negative for cough, chest tightness, shortness of breath and wheezing.    Cardiovascular:  Negative for chest pain, palpitations and leg swelling.   Gastrointestinal:  Positive for abdominal distention and abdominal pain. Negative for constipation, diarrhea, nausea and vomiting.   Endocrine: Negative for polydipsia, polyphagia and polyuria.   Genitourinary:  Negative for difficulty urinating, dysuria and urgency.   Musculoskeletal:  Negative for arthralgias, back pain and neck pain.   Skin:  Negative for rash and wound.   Neurological:  Negative for dizziness, weakness, numbness and headaches.         Past Medical History:   Diagnosis Date    Abnormal echocardiogram     Abnormal stress test     Anxiety     Arthritis 2020    Asthma     Brachial neuritis     Coronary artery disease     Depression     Diabetes mellitus (HCC)     Disease of thyroid gland     Displacement of intervertebral disc of mid-cervical region     Diverticulitis of colon     Fibromyalgia     Frequent headaches     GERD (gastroesophageal reflux disease)     Herniated nucleus pulposus     History of arthritis     History of asthma     History of cardiac disorder     History of chest pain     History of diverticulitis     History of fatigue     History of hearing loss     History of hemoptysis     History of herpes simplex infection     History of hiatal hernia     History of insect bite     INFECTED    History of memory loss     History of neck disorder     History of reflex sympathetic dystrophy     History of restless legs syndrome     History of shortness of breath     History of spinal stenosis     HL (hearing loss)     Hyperlipidemia     Hyperlipidemia     Hypertension     Skin rash     Somnolence, daytime     Spinal stenosis of cervical region          Current Outpatient Medications:     acetaminophen (TYLENOL) 325 mg tablet, Take 1 tablet (325 mg total) by mouth as needed for headaches For  headache, Disp: , Rfl:     albuterol (PROVENTIL HFA,VENTOLIN HFA) 90 mcg/act inhaler, Inhale 2 puffs every 6 (six) hours as needed for wheezing, Disp: 18 g, Rfl: 5    aspirin 81 mg chewable tablet, Chew 81 mg daily, Disp: , Rfl:     atorvastatin (LIPITOR) 80 mg tablet, Take 1 tablet (80 mg total) by mouth daily, Disp: 90 tablet, Rfl: 3    citalopram (CeleXA) 40 mg tablet, Take 1 tablet (40 mg total) by mouth daily, Disp: 90 tablet, Rfl: 1    cyclobenzaprine (FLEXERIL) 10 mg tablet, Take 1 tablet (10 mg total) by mouth 3 (three) times a day as needed for muscle spasms, Disp: 30 tablet, Rfl: 0    Empagliflozin (Jardiance) 10 MG TABS tablet, Take 1 tablet (10 mg total) by mouth every morning, Disp: 90 tablet, Rfl: 0    ezetimibe (ZETIA) 10 mg tablet, Take 1 tablet (10 mg total) by mouth daily, Disp: 90 tablet, Rfl: 1    hydrOXYzine HCL (ATARAX) 25 mg tablet, Take 1 tablet (25 mg total) by mouth 2 (two) times a day as needed for itching, Disp: 180 tablet, Rfl: 1    levofloxacin (LEVAQUIN) 750 mg tablet, Take 1 tablet (750 mg total) by mouth every 24 hours for 7 days, Disp: 7 tablet, Rfl: 0    levothyroxine 112 mcg tablet, Take 1 tablet (112 mcg total) by mouth daily Take 112 mcg daily on Mondays Wednesdays and Fridays alternate with 100 mcg all other days, Disp: 90 tablet, Rfl: 1    losartan (COZAAR) 50 mg tablet, Take 1 tablet (50 mg total) by mouth daily, Disp: 90 tablet, Rfl: 1    metoprolol succinate (TOPROL-XL) 25 mg 24 hr tablet, Take 1 tablet (25 mg total) by mouth daily, Disp: 90 tablet, Rfl: 1    metroNIDAZOLE (FLAGYL) 500 mg tablet, Take 1 tablet (500 mg total) by mouth 4 (four) times a day for 7 days, Disp: 28 tablet, Rfl: 0    pantoprazole (PROTONIX) 40 mg tablet, Take 1 tablet (40 mg total) by mouth daily, Disp: 90 tablet, Rfl: 1    pregabalin (LYRICA) 150 mg capsule, Take 1 capsule (150 mg total) by mouth 3 (three) times a day, Disp: 90 capsule, Rfl: 0    QUEtiapine (SEROquel) 25 mg tablet, Take 1 tablet  "(25 mg total) by mouth daily at bedtime, Disp: 30 tablet, Rfl: 1    triamcinolone (KENALOG) 0.1 % cream, Apply topically as needed for irritation, Disp: , Rfl:     valACYclovir (VALTREX) 1,000 mg tablet, Take 1 tablet (1,000 mg total) by mouth 2 (two) times a day for 14 days, Disp: 28 tablet, Rfl: 0    zolpidem (Ambien) 10 mg tablet, Take 1 tablet (10 mg total) by mouth daily at bedtime as needed for sleep, Disp: 30 tablet, Rfl: 0    Fluticasone-Salmeterol (Advair) 250-50 mcg/dose inhaler, Inhale 1 puff 2 (two) times a day Rinse mouth after use. (Patient not taking: Reported on 3/15/2024), Disp: 180 blister, Rfl: 1    Allergies   Allergen Reactions    Augmentin [Amoxicillin-Pot Clavulanate] Nausea Only     PT stated she only allergic to medication AUGMENTIN    Meloxicam GI Intolerance     \"brings on diverticulitis\"       Social History   Past Surgical History:   Procedure Laterality Date    BACK SURGERY      BACK SURGERY      LOWER BACK SURGERY    CARDIAC CATHETERIZATION      HISTORY OF CORONARY ANGIOGRAPHY WITH CONCOMITANT LEFT HEART CATHETERIZATION    CORONARY ANGIOPLASTY WITH STENT PLACEMENT      EAR SURGERY      SPINE SURGERY      TONSILLECTOMY      TYMPANOPLASTY      US GUIDED BREAST BIOPSY LEFT COMPLETE Left 03/02/2021     Family History   Problem Relation Age of Onset    Coronary artery disease Mother         ATHEROMA    Heart disease Mother     Diabetes Mother     Hypertension Mother     Asthma Mother     No Known Problems Father         NO PERTINENT FAMILY HISTORY    Coronary artery disease Sister         ATHEROMA    Heart disease Sister     Stroke Sister     Diabetes Sister     Hypertension Sister     Parkinsonism Sister     Breast cancer Sister     No Known Problems Sister     No Known Problems Sister     No Known Problems Maternal Aunt     No Known Problems Maternal Aunt     No Known Problems Paternal Aunt     No Known Problems Paternal Aunt     Brain cancer Paternal Uncle     No Known Problems Maternal " "Grandmother     No Known Problems Maternal Grandfather     No Known Problems Paternal Grandmother     No Known Problems Paternal Grandfather     No Known Problems Daughter     No Known Problems Son        Objective:  /78 (BP Location: Left arm, Patient Position: Sitting, Cuff Size: Standard)   Pulse 85   Temp (!) 96.9 °F (36.1 °C) (Temporal)   Ht 5' 3.54\" (1.614 m)   Wt 71.3 kg (157 lb 3.2 oz)   LMP  (LMP Unknown)   SpO2 97%   BMI 27.37 kg/m²     No results found for this or any previous visit (from the past 1344 hour(s)).         Physical Exam  Constitutional:       General: She is not in acute distress.     Appearance: She is well-developed. She is not diaphoretic.   HENT:      Head: Normocephalic and atraumatic.      Right Ear: External ear normal.      Left Ear: External ear normal.      Nose: Nose normal.      Mouth/Throat:      Mouth: Mucous membranes are moist.      Pharynx: No oropharyngeal exudate or posterior oropharyngeal erythema.   Eyes:      General:         Right eye: No discharge.         Left eye: No discharge.      Conjunctiva/sclera: Conjunctivae normal.      Pupils: Pupils are equal, round, and reactive to light.   Neck:      Thyroid: No thyromegaly.   Cardiovascular:      Rate and Rhythm: Normal rate and regular rhythm.      Pulses:           Dorsalis pedis pulses are 2+ on the right side and 2+ on the left side.        Posterior tibial pulses are 2+ on the right side and 2+ on the left side.      Heart sounds: Normal heart sounds. No murmur heard.     No friction rub. No gallop.   Pulmonary:      Effort: Pulmonary effort is normal. No respiratory distress.      Breath sounds: Normal breath sounds. No stridor. No wheezing or rales.   Abdominal:      General: Bowel sounds are normal. There is no distension.      Palpations: Abdomen is soft.      Tenderness: There is abdominal tenderness in the right lower quadrant and left lower quadrant.   Musculoskeletal:      Cervical back: Normal " range of motion and neck supple.   Feet:      Right foot:      Skin integrity: No ulcer, skin breakdown, erythema, warmth, callus or dry skin.      Left foot:      Skin integrity: No ulcer, skin breakdown, erythema, warmth, callus or dry skin.   Lymphadenopathy:      Cervical: No cervical adenopathy.   Skin:     General: Skin is warm and dry.      Findings: No erythema or rash.   Neurological:      Mental Status: She is alert and oriented to person, place, and time.   Psychiatric:         Behavior: Behavior normal.         Thought Content: Thought content normal.         Judgment: Judgment normal.         Diabetic Foot Exam    Patient's shoes and socks removed.    Right Foot/Ankle   Right Foot Inspection  Skin Exam: skin normal and skin intact. No dry skin, no warmth, no callus, no erythema, no maceration, no abnormal color, no pre-ulcer, no ulcer and no callus.     Sensory   Monofilament testing: intact    Vascular  Capillary refills: < 3 seconds  The right DP pulse is 2+. The right PT pulse is 2+.     Left Foot/Ankle  Left Foot Inspection  Skin Exam: skin normal and skin intact. No dry skin, no warmth, no erythema, no maceration, normal color, no pre-ulcer, no ulcer and no callus.     Sensory   Monofilament testing: intact    Vascular  Capillary refills: < 3 seconds  The left DP pulse is 2+. The left PT pulse is 2+.

## 2024-03-15 NOTE — ASSESSMENT & PLAN NOTE
-Recommend follow-up with GI for colonoscopy  -Recommend clear liquid diet  -Stat CT, CMP and CBC  -Treat with Flagyl and Levaquin  -discussed red flag warning signs

## 2024-03-16 ENCOUNTER — TELEPHONE (OUTPATIENT)
Dept: OTHER | Facility: OTHER | Age: 64
End: 2024-03-16

## 2024-03-16 ENCOUNTER — HOSPITAL ENCOUNTER (OUTPATIENT)
Dept: CT IMAGING | Facility: HOSPITAL | Age: 64
Discharge: HOME | End: 2024-03-16
Payer: COMMERCIAL

## 2024-03-16 DIAGNOSIS — R30.0 DYSURIA: Primary | ICD-10-CM

## 2024-03-16 DIAGNOSIS — K57.92 DIVERTICULITIS: ICD-10-CM

## 2024-03-16 PROCEDURE — 74177 CT ABD & PELVIS W/CONTRAST: CPT

## 2024-03-16 RX ADMIN — IOHEXOL 100 ML: 350 INJECTION, SOLUTION INTRAVENOUS at 09:49

## 2024-03-16 NOTE — TELEPHONE ENCOUNTER
Lab Result: CT Scan impression: no evidence of acute diverticulitis; diverticulitis with sigmoid colonic wall thickening sequela of chronic diverticular disease    Mild diffuse bladder wall thickening may be due to cystitis or sequela of chronic bladder obstruction    Date/Time Drawn: 3/16/24 10:05 am    Ordering Provider: JORGE A Love   Lab Personnel's Name: Sd        The following critical/stat result was read back to the lab as stated above and [employer] messaged to the on-call provider. The provider confirmed receipt of the message.

## 2024-03-18 NOTE — TELEPHONE ENCOUNTER
Left detailed message on patient machine. She is to call office back to schedule a appointment possible cystitis needs urine collection.

## 2024-03-18 NOTE — TELEPHONE ENCOUNTER
Please advise patient to stop taking antibiotic as there is no diverticulitis seen on imagining. She was noted to have bladder wall thickening which could indicate cystis, recommend in office evaluation and collection of urine sample.

## 2024-03-18 NOTE — TELEPHONE ENCOUNTER
Patient called back and the message was relayed to her about scheduling an appointment.  She is asking if all we need is a urine sample if we could put the order in so she can give the sample and then once the results come back, if it comes back with anything she could schedule an appointment at that time?

## 2024-03-18 NOTE — TELEPHONE ENCOUNTER
There is more to discuss about the thicken that was seen but I will place the order for the urine culture, and it can be discussed at follow up.

## 2024-03-22 DIAGNOSIS — F41.1 GAD (GENERALIZED ANXIETY DISORDER): ICD-10-CM

## 2024-03-22 DIAGNOSIS — F33.2 SEVERE EPISODE OF RECURRENT MAJOR DEPRESSIVE DISORDER, WITHOUT PSYCHOTIC FEATURES (HCC): ICD-10-CM

## 2024-03-22 DIAGNOSIS — F43.10 PTSD (POST-TRAUMATIC STRESS DISORDER): ICD-10-CM

## 2024-03-22 LAB
APPEARANCE UR: CLEAR
APPEARANCE UR: CLEAR
BACTERIA UR QL AUTO: ABNORMAL /HPF
BACTERIA UR QL AUTO: ABNORMAL /HPF
BASOPHILS # BLD AUTO: 29 CELLS/UL (ref 0–200)
BASOPHILS NFR BLD AUTO: 0.6 %
BILIRUB UR QL STRIP: NEGATIVE
BILIRUB UR QL STRIP: NEGATIVE
COLOR UR: YELLOW
COLOR UR: YELLOW
EOSINOPHIL # BLD AUTO: 59 CELLS/UL (ref 15–500)
EOSINOPHIL NFR BLD AUTO: 1.2 %
ERYTHROCYTE [DISTWIDTH] IN BLOOD BY AUTOMATED COUNT: 12.8 % (ref 11–15)
EST. AVERAGE GLUCOSE BLD GHB EST-MCNC: 166 MG/DL
EST. AVERAGE GLUCOSE BLD GHB EST-SCNC: 9.2 MMOL/L
GLUCOSE UR QL STRIP: ABNORMAL
GLUCOSE UR QL STRIP: ABNORMAL
HBA1C MFR BLD: 7.4 % OF TOTAL HGB
HCT VFR BLD AUTO: 47.1 % (ref 35–45)
HGB BLD-MCNC: 16.1 G/DL (ref 11.7–15.5)
HGB UR QL STRIP: NEGATIVE
HGB UR QL STRIP: NEGATIVE
HYALINE CASTS #/AREA URNS LPF: ABNORMAL /LPF
HYALINE CASTS #/AREA URNS LPF: ABNORMAL /LPF
KETONES UR QL STRIP: NEGATIVE
KETONES UR QL STRIP: NEGATIVE
LEUKOCYTE ESTERASE UR QL STRIP: NEGATIVE
LEUKOCYTE ESTERASE UR QL STRIP: NEGATIVE
LYMPHOCYTES # BLD AUTO: 1911 CELLS/UL (ref 850–3900)
LYMPHOCYTES NFR BLD AUTO: 39 %
MCH RBC QN AUTO: 30.4 PG (ref 27–33)
MCHC RBC AUTO-ENTMCNC: 34.2 G/DL (ref 32–36)
MCV RBC AUTO: 89 FL (ref 80–100)
MONOCYTES # BLD AUTO: 309 CELLS/UL (ref 200–950)
MONOCYTES NFR BLD AUTO: 6.3 %
NEUTROPHILS # BLD AUTO: 2592 CELLS/UL (ref 1500–7800)
NEUTROPHILS NFR BLD AUTO: 52.9 %
NITRITE UR QL STRIP: NEGATIVE
NITRITE UR QL STRIP: NEGATIVE
PH UR STRIP: 5.5 [PH] (ref 5–8)
PH UR STRIP: 5.5 [PH] (ref 5–8)
PLATELET # BLD AUTO: 228 THOUSAND/UL (ref 140–400)
PMV BLD REES-ECKER: 10.9 FL (ref 7.5–12.5)
PROT UR QL STRIP: NEGATIVE
PROT UR QL STRIP: NEGATIVE
RBC # BLD AUTO: 5.29 MILLION/UL (ref 3.8–5.1)
RBC #/AREA URNS HPF: ABNORMAL /HPF
RBC #/AREA URNS HPF: ABNORMAL /HPF
SP GR UR STRIP: 1.04 (ref 1–1.03)
SP GR UR STRIP: 1.04 (ref 1–1.03)
SQUAMOUS #/AREA URNS HPF: ABNORMAL /HPF
SQUAMOUS #/AREA URNS HPF: ABNORMAL /HPF
TSH SERPL-ACNC: 0.14 MIU/L (ref 0.4–4.5)
WBC # BLD AUTO: 4.9 THOUSAND/UL (ref 3.8–10.8)
WBC #/AREA URNS HPF: ABNORMAL /HPF
WBC #/AREA URNS HPF: ABNORMAL /HPF

## 2024-03-22 RX ORDER — QUETIAPINE FUMARATE 25 MG/1
25 TABLET, FILM COATED ORAL
Qty: 90 TABLET | Refills: 1 | Status: SHIPPED | OUTPATIENT
Start: 2024-03-22 | End: 2024-03-25 | Stop reason: SINTOL

## 2024-03-25 ENCOUNTER — TELEMEDICINE (OUTPATIENT)
Dept: PSYCHIATRY | Facility: CLINIC | Age: 64
End: 2024-03-25
Payer: COMMERCIAL

## 2024-03-25 ENCOUNTER — OFFICE VISIT (OUTPATIENT)
Age: 64
End: 2024-03-25
Payer: COMMERCIAL

## 2024-03-25 VITALS
HEART RATE: 82 BPM | HEIGHT: 64 IN | WEIGHT: 156.2 LBS | OXYGEN SATURATION: 96 % | DIASTOLIC BLOOD PRESSURE: 74 MMHG | TEMPERATURE: 97.9 F | BODY MASS INDEX: 26.67 KG/M2 | SYSTOLIC BLOOD PRESSURE: 130 MMHG

## 2024-03-25 DIAGNOSIS — F41.1 GAD (GENERALIZED ANXIETY DISORDER): ICD-10-CM

## 2024-03-25 DIAGNOSIS — N32.89 BLADDER WALL THICKENING: Primary | ICD-10-CM

## 2024-03-25 DIAGNOSIS — F33.2 SEVERE EPISODE OF RECURRENT MAJOR DEPRESSIVE DISORDER, WITHOUT PSYCHOTIC FEATURES (HCC): Primary | ICD-10-CM
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
## 2024-03-25 DIAGNOSIS — F43.10 PTSD (POST-TRAUMATIC STRESS DISORDER): ICD-10-CM

## 2024-03-25 DIAGNOSIS — E03.9 HYPOTHYROIDISM, UNSPECIFIED TYPE: ICD-10-CM

## 2024-03-25 DIAGNOSIS — E11.9 TYPE 2 DIABETES MELLITUS WITHOUT COMPLICATION, WITHOUT LONG-TERM CURRENT USE OF INSULIN (HCC): ICD-10-CM

## 2024-03-25 DIAGNOSIS — E78.00 HYPERCHOLESTEROLEMIA: ICD-10-CM

## 2024-03-25 DIAGNOSIS — I20.89 CHRONIC STABLE ANGINA: ICD-10-CM

## 2024-03-25 DIAGNOSIS — M79.7 FIBROMYALGIA: ICD-10-CM

## 2024-03-25 DIAGNOSIS — R79.89 ABNORMAL TSH: ICD-10-CM

## 2024-03-25 DIAGNOSIS — G47.09 OTHER INSOMNIA: ICD-10-CM

## 2024-03-25 PROCEDURE — 90833 PSYTX W PT W E/M 30 MIN: CPT | Performed by: NURSE PRACTITIONER

## 2024-03-25 PROCEDURE — 99214 OFFICE O/P EST MOD 30 MIN: CPT | Performed by: NURSE PRACTITIONER

## 2024-03-25 PROCEDURE — G2211 COMPLEX E/M VISIT ADD ON: HCPCS | Performed by: NURSE PRACTITIONER

## 2024-03-25 RX ORDER — MIRTAZAPINE 15 MG/1
15 TABLET, FILM COATED ORAL
Qty: 30 TABLET | Refills: 2 | Status: SHIPPED | OUTPATIENT
Start: 2024-03-25

## 2024-03-25 RX ORDER — CEPHALEXIN 500 MG/1
500 CAPSULE ORAL EVERY 6 HOURS SCHEDULED
Qty: 40 CAPSULE | Refills: 0 | Status: SHIPPED | OUTPATIENT
Start: 2024-03-25 | End: 2024-04-04

## 2024-03-25 RX ORDER — LEVOTHYROXINE SODIUM 0.1 MG/1
100 TABLET ORAL DAILY
Qty: 90 TABLET | Refills: 1 | Status: SHIPPED | OUTPATIENT
Start: 2024-03-25

## 2024-03-25 RX ORDER — PREGABALIN 150 MG/1
150 CAPSULE ORAL 3 TIMES DAILY
Qty: 90 CAPSULE | Refills: 0 | Status: SHIPPED | OUTPATIENT
Start: 2024-03-25

## 2024-03-25 NOTE — ASSESSMENT & PLAN NOTE
Lab Results   Component Value Date    HGBA1C 7.4 (H) 03/21/2024   Uncontrolled, patient reports missing a month worth of medication, recommend ongoing healthy diet and increased exercise continue with current regimen

## 2024-03-25 NOTE — PSYCH
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 the body of this dictation, please contact the provider for clarification.

## 2024-03-25 NOTE — PROGRESS NOTES
Assessment/Plan:    Bug bites  -treat with keflex   PRN Atarax   -And triamcinolone cream    Type 2 diabetes mellitus without complication, without long-term current use of insulin (Formerly [facility])    Lab Results   Component Value Date    HGBA1C 7.4 (H) 03/21/2024   Uncontrolled, patient reports missing a month worth of medication, recommend ongoing healthy diet and increased exercise continue with current regimen    Hypothyroidism  -TSH abnormal, reduce levothyroxine to 100 mcg tablet daily, repeat TSH in 6 weeks    Fibromyalgia  -refilled Lyrica     Hypercholesterolemia  Due for lipid panel   Continue current medications     Bladder wall thickening  -referral to urology   -UA and urine culture negative               Diagnoses and all orders for this visit:    Bladder wall thickening  -     Ambulatory Referral to Urology; Future    Bug bite, subsequent encounter  -     cephalexin (KEFLEX) 500 mg capsule; Take 1 capsule (500 mg total) by mouth every 6 (six) hours for 10 days    Chronic stable angina    Type 2 diabetes mellitus without complication, without long-term current use of insulin (Formerly [facility])    Hypothyroidism, unspecified type  -     levothyroxine 100 mcg tablet; Take 1 tablet (100 mcg total) by mouth daily Take 112 mcg daily on Mondays Wednesdays and Fridays alternate with 100 mcg all other days  -     TSH, 3rd generation with Free T4 reflex    Fibromyalgia  -     pregabalin (LYRICA) 150 mg capsule; Take 1 capsule (150 mg total) by mouth 3 (three) times a day    Abnormal TSH  -     TSH, 3rd generation with Free T4 reflex    Hypercholesterolemia  -     Lipid Panel with Direct LDL reflex; Future  -     Lipid Panel with Direct LDL reflex          Subjective:      Patient ID: Isaura Brito is a 63 y.o. female.    Patient presents today for follow up on CT scan and to review blood work.     Had CT on 3/16 to rule out diverticulitis:  IMPRESSION:  No evidence of acute diverticulitis     Diverticulosis with sigmoid colonic wall  "thickening sequela of chronic diverticular disease colonoscopy can be considered if patient not up-to-date with screening colonoscopy recommendation     Mild diffuse bladder wall thickening may be due to cystitis or sequela of chronic bladder obstruction correlate clinically  The study was marked in EPIC for immediate notification.    Urine analysis was performed, positive for glucose, urine culture showed no growth     Benign essential HTN-controlled on current medications        Gastroesophageal reflux disease-controlled with Protonix     Hypothyroidism- TSH 0.14       Hypercholesterolemia- due for lipid panel, takes zetia dn lipitor without side effects     Insomnia- Ambien as needed for sleep        Asthma- well controlled without recent execration     Type 2 diabetes mellitus - HBA1C 7.4, fasting glucose 175, she reports that she was off her medications for about a months, defers medication changes at this time     Psychiatric medications were changed Seroquel discontinued and she is feeling much better       Has follow up with gastro on Wednesday        She reports multiple spider bites to upper extremities, lower extremities and buttocks, she reports that she has been treated with Keflex previously for these \"bites\"           The following portions of the patient's history were reviewed and updated as appropriate: allergies, current medications, past family history, past medical history, past social history, past surgical history, and problem list.    Review of Systems   Constitutional:  Negative for activity change, appetite change, chills, diaphoresis and fever.   HENT:  Negative for congestion, ear discharge, ear pain, postnasal drip, rhinorrhea, sinus pressure, sinus pain and sore throat.    Eyes:  Negative for pain, discharge, itching and visual disturbance.   Respiratory:  Negative for cough, chest tightness, shortness of breath and wheezing.    Cardiovascular:  Negative for chest pain, palpitations and " leg swelling.   Gastrointestinal:  Negative for abdominal pain, constipation, diarrhea, nausea and vomiting.   Endocrine: Negative for polydipsia, polyphagia and polyuria.   Genitourinary:  Negative for difficulty urinating, dysuria and urgency.   Musculoskeletal:  Negative for arthralgias, back pain and neck pain.   Skin:  Positive for rash. Negative for wound.   Neurological:  Negative for dizziness, weakness, numbness and headaches.         Past Medical History:   Diagnosis Date    Abnormal echocardiogram     Abnormal stress test     Anxiety     Arthritis 2020    Asthma     Brachial neuritis     Coronary artery disease     Depression     Diabetes mellitus (HCC)     Disease of thyroid gland     Displacement of intervertebral disc of mid-cervical region     Diverticulitis of colon     Fibromyalgia     Frequent headaches     GERD (gastroesophageal reflux disease)     Herniated nucleus pulposus     History of arthritis     History of asthma     History of cardiac disorder     History of chest pain     History of diverticulitis     History of fatigue     History of hearing loss     History of hemoptysis     History of herpes simplex infection     History of hiatal hernia     History of insect bite     INFECTED    History of memory loss     History of neck disorder     History of reflex sympathetic dystrophy     History of restless legs syndrome     History of shortness of breath     History of spinal stenosis     HL (hearing loss)     Hyperlipidemia     Hyperlipidemia     Hypertension     Skin rash     Somnolence, daytime     Spinal stenosis of cervical region          Current Outpatient Medications:     acetaminophen (TYLENOL) 325 mg tablet, Take 1 tablet (325 mg total) by mouth as needed for headaches For headache, Disp: , Rfl:     albuterol (PROVENTIL HFA,VENTOLIN HFA) 90 mcg/act inhaler, Inhale 2 puffs every 6 (six) hours as needed for wheezing, Disp: 18 g, Rfl: 5    aspirin 81 mg chewable tablet, Chew 81 mg  daily, Disp: , Rfl:     atorvastatin (LIPITOR) 80 mg tablet, Take 1 tablet (80 mg total) by mouth daily, Disp: 90 tablet, Rfl: 3    cephalexin (KEFLEX) 500 mg capsule, Take 1 capsule (500 mg total) by mouth every 6 (six) hours for 10 days, Disp: 40 capsule, Rfl: 0    cyclobenzaprine (FLEXERIL) 10 mg tablet, Take 1 tablet (10 mg total) by mouth 3 (three) times a day as needed for muscle spasms, Disp: 30 tablet, Rfl: 0    Empagliflozin (Jardiance) 10 MG TABS tablet, Take 1 tablet (10 mg total) by mouth every morning, Disp: 90 tablet, Rfl: 0    ezetimibe (ZETIA) 10 mg tablet, Take 1 tablet (10 mg total) by mouth daily, Disp: 90 tablet, Rfl: 1    levothyroxine 100 mcg tablet, Take 1 tablet (100 mcg total) by mouth daily Take 112 mcg daily on Mondays Wednesdays and Fridays alternate with 100 mcg all other days, Disp: 90 tablet, Rfl: 1    losartan (COZAAR) 50 mg tablet, Take 1 tablet (50 mg total) by mouth daily, Disp: 90 tablet, Rfl: 1    metoprolol succinate (TOPROL-XL) 25 mg 24 hr tablet, Take 1 tablet (25 mg total) by mouth daily, Disp: 90 tablet, Rfl: 1    mirtazapine (REMERON) 15 mg tablet, Take 1 tablet (15 mg total) by mouth daily at bedtime, Disp: 30 tablet, Rfl: 2    pantoprazole (PROTONIX) 40 mg tablet, Take 1 tablet (40 mg total) by mouth daily, Disp: 90 tablet, Rfl: 1    pregabalin (LYRICA) 150 mg capsule, Take 1 capsule (150 mg total) by mouth 3 (three) times a day, Disp: 90 capsule, Rfl: 0    triamcinolone (KENALOG) 0.1 % cream, Apply topically as needed for irritation, Disp: , Rfl:     valACYclovir (VALTREX) 1,000 mg tablet, Take 1 tablet (1,000 mg total) by mouth 2 (two) times a day for 14 days, Disp: 28 tablet, Rfl: 0    zolpidem (Ambien) 10 mg tablet, Take 1 tablet (10 mg total) by mouth daily at bedtime as needed for sleep, Disp: 30 tablet, Rfl: 0    Fluticasone-Salmeterol (Advair) 250-50 mcg/dose inhaler, Inhale 1 puff 2 (two) times a day Rinse mouth after use. (Patient not taking: Reported on  "3/15/2024), Disp: 180 blister, Rfl: 1    hydrOXYzine HCL (ATARAX) 25 mg tablet, Take 1 tablet (25 mg total) by mouth 2 (two) times a day as needed for itching (Patient not taking: Reported on 3/25/2024), Disp: 180 tablet, Rfl: 1    Allergies   Allergen Reactions    Augmentin [Amoxicillin-Pot Clavulanate] Nausea Only     PT stated she only allergic to medication AUGMENTIN    Meloxicam GI Intolerance     \"brings on diverticulitis\"       Social History   Past Surgical History:   Procedure Laterality Date    BACK SURGERY      BACK SURGERY      LOWER BACK SURGERY    CARDIAC CATHETERIZATION      HISTORY OF CORONARY ANGIOGRAPHY WITH CONCOMITANT LEFT HEART CATHETERIZATION    CORONARY ANGIOPLASTY WITH STENT PLACEMENT      EAR SURGERY      SPINE SURGERY      TONSILLECTOMY      TYMPANOPLASTY      US GUIDED BREAST BIOPSY LEFT COMPLETE Left 03/02/2021     Family History   Problem Relation Age of Onset    Coronary artery disease Mother         ATHEROMA    Heart disease Mother     Diabetes Mother     Hypertension Mother     Asthma Mother     No Known Problems Father         NO PERTINENT FAMILY HISTORY    Coronary artery disease Sister         ATHEROMA    Heart disease Sister     Stroke Sister     Diabetes Sister     Hypertension Sister     Parkinsonism Sister     Breast cancer Sister     No Known Problems Sister     No Known Problems Sister     No Known Problems Maternal Aunt     No Known Problems Maternal Aunt     No Known Problems Paternal Aunt     No Known Problems Paternal Aunt     Brain cancer Paternal Uncle     No Known Problems Maternal Grandmother     No Known Problems Maternal Grandfather     No Known Problems Paternal Grandmother     No Known Problems Paternal Grandfather     No Known Problems Daughter     No Known Problems Son        Objective:  /74 (BP Location: Left arm, Patient Position: Sitting, Cuff Size: Standard)   Pulse 82   Temp 97.9 °F (36.6 °C) (Oral)   Ht 5' 4.02\" (1.626 m)   Wt 70.9 kg (156 lb 3.2 " oz)   LMP  (LMP Unknown)   SpO2 96%   BMI 26.80 kg/m²     Recent Results (from the past 1344 hour(s))   CBC and differential    Collection Time: 03/15/24  4:19 PM   Result Value Ref Range    WBC 7.48 4.31 - 10.16 Thousand/uL    RBC 5.01 3.81 - 5.12 Million/uL    Hemoglobin 15.6 (H) 11.5 - 15.4 g/dL    Hematocrit 45.2 34.8 - 46.1 %    MCV 90 82 - 98 fL    MCH 31.1 26.8 - 34.3 pg    MCHC 34.5 31.4 - 37.4 g/dL    RDW 12.9 11.6 - 15.1 %    MPV 9.8 8.9 - 12.7 fL    Platelets 229 149 - 390 Thousands/uL    nRBC 0 /100 WBCs    Neutrophils Relative 63 43 - 75 %    Immature Grans % 0 0 - 2 %    Lymphocytes Relative 30 14 - 44 %    Monocytes Relative 6 4 - 12 %    Eosinophils Relative 1 0 - 6 %    Basophils Relative 0 0 - 1 %    Neutrophils Absolute 4.65 1.85 - 7.62 Thousands/µL    Absolute Immature Grans 0.01 0.00 - 0.20 Thousand/uL    Absolute Lymphocytes 2.27 0.60 - 4.47 Thousands/µL    Absolute Monocytes 0.44 0.17 - 1.22 Thousand/µL    Eosinophils Absolute 0.08 0.00 - 0.61 Thousand/µL    Basophils Absolute 0.03 0.00 - 0.10 Thousands/µL   Basic metabolic panel    Collection Time: 03/15/24  4:19 PM   Result Value Ref Range    Sodium 138 135 - 147 mmol/L    Potassium 4.3 3.5 - 5.3 mmol/L    Chloride 102 96 - 108 mmol/L    CO2 28 21 - 32 mmol/L    ANION GAP 8 4 - 13 mmol/L    BUN 21 5 - 25 mg/dL    Creatinine 0.78 0.60 - 1.30 mg/dL    Glucose 175 (H) 65 - 140 mg/dL    Calcium 9.6 8.4 - 10.2 mg/dL    eGFR 81 ml/min/1.73sq m   Urinalysis with microscopic    Collection Time: 03/21/24 12:00 PM   Result Value Ref Range    Color UA YELLOW YELLOW    Urine Appearance CLEAR CLEAR    Specific Gravity 1.040 (H) 1.001 - 1.035    Ph 5.5 5.0 - 8.0    Glucose, Urine 3+ (A) NEGATIVE    Bilirubin, Urine NEGATIVE NEGATIVE    Ketone, Urine NEGATIVE NEGATIVE    Blood, Urine NEGATIVE NEGATIVE    Protein, Urine NEGATIVE NEGATIVE    Nitrites Urine NEGATIVE NEGATIVE    Leukocyte Esterase NEGATIVE NEGATIVE    SL AMB WBC, URINE NONE SEEN < OR = 5  /HPF    RBC, Urine NONE SEEN < OR = 2 /HPF    Squamous Epithelial Cells 0-5 < OR = 5 /HPF    Bacteria, UA NONE SEEN NONE SEEN /HPF    Hyaline Casts NONE SEEN NONE SEEN /LPF    Comment(s)     Urine culture    Collection Time: 03/21/24 12:00 PM   Result Value Ref Range    Urine Culture Result     CBC and differential    Collection Time: 03/21/24 12:06 PM   Result Value Ref Range    White Blood Cell Count 4.9 3.8 - 10.8 Thousand/uL    Red Blood Cell Count 5.29 (H) 3.80 - 5.10 Million/uL    Hemoglobin 16.1 (H) 11.7 - 15.5 g/dL    HCT 47.1 (H) 35.0 - 45.0 %    MCV 89.0 80.0 - 100.0 fL    MCH 30.4 27.0 - 33.0 pg    MCHC 34.2 32.0 - 36.0 g/dL    RDW 12.8 11.0 - 15.0 %    Platelet Count 228 140 - 400 Thousand/uL    SL AMB MPV 10.9 7.5 - 12.5 fL    Neutrophils (Absolute) 2,592 1,500 - 7,800 cells/uL    Lymphocytes (Absolute) 1,911 850 - 3,900 cells/uL    Monocytes (Absolute) 309 200 - 950 cells/uL    Eosinophils (Absolute) 59 15 - 500 cells/uL    Basophils ABS 29 0 - 200 cells/uL    Neutrophils 52.9 %    Lymphocytes 39.0 %    Monocytes 6.3 %    Eosinophils 1.2 %    Basophils PCT 0.6 %   TSH, 3rd generation    Collection Time: 03/21/24 12:06 PM   Result Value Ref Range    TSH 0.14 (L) 0.40 - 4.50 mIU/L   Hemoglobin A1C With EAG    Collection Time: 03/21/24 12:06 PM   Result Value Ref Range    Hemoglobin A1C 7.4 (H) <5.7 % of total Hgb    Estimated Average Glucose 166 mg/dL    Estimated Average Glucose (mmol/L) 9.2 mmol/L            Physical Exam  Constitutional:       General: She is not in acute distress.     Appearance: She is well-developed. She is not diaphoretic.   HENT:      Head: Normocephalic and atraumatic.      Right Ear: External ear normal.      Left Ear: External ear normal.      Nose: Nose normal.      Mouth/Throat:      Mouth: Mucous membranes are moist.      Pharynx: No oropharyngeal exudate or posterior oropharyngeal erythema.   Eyes:      General:         Right eye: No discharge.         Left eye: No  discharge.      Conjunctiva/sclera: Conjunctivae normal.      Pupils: Pupils are equal, round, and reactive to light.   Neck:      Thyroid: No thyromegaly.   Cardiovascular:      Rate and Rhythm: Normal rate and regular rhythm.      Heart sounds: Normal heart sounds. No murmur heard.     No friction rub. No gallop.   Pulmonary:      Effort: Pulmonary effort is normal. No respiratory distress.      Breath sounds: Normal breath sounds. No stridor. No wheezing or rales.   Abdominal:      General: Bowel sounds are normal. There is no distension.      Palpations: Abdomen is soft.      Tenderness: There is no abdominal tenderness.   Musculoskeletal:      Cervical back: Normal range of motion and neck supple.   Lymphadenopathy:      Cervical: No cervical adenopathy.   Skin:     General: Skin is warm and dry.      Findings: No erythema or rash.   Neurological:      Mental Status: She is alert and oriented to person, place, and time.   Psychiatric:         Behavior: Behavior normal.         Thought Content: Thought content normal.         Judgment: Judgment normal.

## 2024-03-26 ENCOUNTER — OFFICE VISIT (OUTPATIENT)
Dept: SLEEP CENTER | Facility: CLINIC | Age: 64
End: 2024-03-26
Payer: COMMERCIAL

## 2024-03-26 VITALS
HEIGHT: 64 IN | DIASTOLIC BLOOD PRESSURE: 70 MMHG | WEIGHT: 156 LBS | SYSTOLIC BLOOD PRESSURE: 110 MMHG | BODY MASS INDEX: 26.63 KG/M2

## 2024-03-26 DIAGNOSIS — G47.09 OTHER INSOMNIA: Primary | ICD-10-CM

## 2024-03-26 DIAGNOSIS — G47.23 IRREGULAR SLEEP-WAKE RHYTHM: ICD-10-CM

## 2024-03-26 DIAGNOSIS — E11.65 UNCONTROLLED TYPE 2 DIABETES MELLITUS WITH HYPERGLYCEMIA (HCC): ICD-10-CM

## 2024-03-26 DIAGNOSIS — F51.01 PRIMARY INSOMNIA: ICD-10-CM

## 2024-03-26 PROCEDURE — G2211 COMPLEX E/M VISIT ADD ON: HCPCS | Performed by: PSYCHIATRY & NEUROLOGY

## 2024-03-26 PROCEDURE — 99214 OFFICE O/P EST MOD 30 MIN: CPT | Performed by: PSYCHIATRY & NEUROLOGY

## 2024-03-26 RX ORDER — ZOLPIDEM TARTRATE 10 MG/1
10 TABLET ORAL
Qty: 30 TABLET | Refills: 0 | Status: SHIPPED | OUTPATIENT
Start: 2024-03-26

## 2024-03-26 NOTE — PROGRESS NOTES
Progress Note - Sleep Medicine  Isaura Brito 63 y.o. female MRN: [id number]    Impression & Plan:   Isaura Brito presents today for follow-up of chronic sleep-onset insomnia and irregular sleep-wake rhythm disorder. PMH of PTSD, NIDDM, HLD, migraines without aura, tension-type headaches, muscle twitches, cervical spine disease, fibromyalgia, asthma, hypothyroidism, diverticulitis, right hip pain, tobacco use disorder, and history of COVID (no residual symptoms or worsening of her sleep). Last seen on 1/23/2024. No prior sleep study. Since last visit, she was started on Seroquel 25 mg daily for insomnia which caused worsening of fibromyalgia, constipation, and liver pain. Seroquel did briefly help her sleep. Discontinued Seroquel on 3/16/2024 and started on Mirtazapine on 3/25/2024 by psychiatrist (7.5 mg qhs for a few days, then increase to 15 mg qhs). No improvement in insomnia and sleep pattern since her last visit. Unclear etiology - could be multifactorial due to primary psychophysiological insomnia in the setting of poor sleep hygiene, irregular sleep-wake rhythm disorder, chronic pain, tobacco use, mood disorder (PTSD), and/or a genetic predisposition to insomnia or irregular sleep. Recommend to continue Mirtazapine as prescribed by psychiatry and to monitor if this has a positive impact on her sleep. Continue Ambien 10 mg qhs and melatonin 5 mg qhs. Prior ineffective hypnotics include Lunesta, trazodone, Doxepin, and Valium. If mirtazapine is ineffective would consider transition from Ambien to temazepam in conjunction with psychiatry. Stressed importance of regular exercise and light exposure. Will consider referral to CBT-I at her follow-up appointment. Recommend tobacco cessation. Recommend to continue close follow-up with psychiatry. Staffed and seen with Dr. Salazar on 3/26/2024. Follow-up in 3 months.     Chronic sleep-onset insomnia  -No prior sleep study  -Since last visit, she was started on  Seroquel 25 mg daily for insomnia which caused worsening of fibromyalgia, constipation, and liver pain. Seroquel did briefly help her sleep. Discontinued Seroquel on 3/16/2024 and started on Mirtazapine on 3/25/2024 by psychiatrist (7.5 mg qhs for a few days, then increase to 15 mg qhs)  -No improvement in insomnia and sleep pattern since her last visit  -Unclear etiology - could be multifactorial due to primary psychophysiological insomnia in the setting of poor sleep hygiene, irregular sleep-wake rhythm disorder, chronic pain, tobacco use, mood disorder (PTSD), and/or a genetic predisposition to insomnia or irregular sleep  -Recommend to continue Mirtazapine as prescribed by psychiatry and to monitor if this has a positive impact on her sleep. Continue Ambien 10 mg qhs and melatonin 5 mg qhs  -Prior ineffective hypnotics include Lunesta, trazodone, Doxepin, and Valium  -If mirtazapine is ineffective would consider transition from Ambien to temazepam in conjunction with psychiatry  -Stressed importance of regular exercise and light exposure  -Will consider referral to CBT-I at her follow-up appointment  -Recommend tobacco cessation  -Recommend to continue close follow-up with psychiatry    Staffed and seen with Dr. Salazar on 3/26/2024    Follow-up in 3 months  ______________________________________________________________________    HPI:    Isaura Brito presents today for follow-up of chronic insomnia and irregular sleep-wake rhythm disorder. PMH of NIDDM, HLD, migraines without aura, tension-type headaches, muscle twitches, cervical spine disease, fibromyalgia, asthma, hypothyroidism, diverticulitis, right hip pain, tobacco use disorder, and history of COVID (no residual symptoms or worsening of her sleep). Last seen on 1/23/2024. Accompanied in the clinic today by her brother-in-law. No prior sleep study. Since last visit, she was started on Seroquel 25 mg daily for insomnia which caused worsening of  "fibromyalgia, constipation, and liver pain. Seroquel did briefly help her sleep. Discontinued Seroquel on 3/16/2024 and started on Mirtazapine on 3/25/2024 by psychiatrist (7.5 mg qhs for a few days, then increase to 15 mg qhs). Denies any recent change in weight. Denies any significant change in PMH, PSH, medications, or allergies.    On evaluation, patient is sitting in chair and in no acute distress. Sleep schedule and symptoms as below. No improvement in insomnia and sleep pattern since her last visit. Slept ~12 hours last night (first night she used Mirtazapine 7.5 mg qhs); however, this was also in the setting of not sleeping for a few days. She has switched to sleeping in her bed which is a cool and dark area. Notes she can go \"days without sleeping\" despite being active during the day. Notes she will get out of bed and niles on her couch when she cannot sleep. Also during times of minimal sleep, she notes she smokes more. Recently has been cleaning out her brother-in-laws home. She enjoys walking a trail near her home - however this can be limited when it is cold and the cold worsens her fibromyalgia. Notes hypervigilance around the police stemming for a prior family situation. Currently compliant with Mirtazapine 7.5 mg qhs, Ambien 10 mg qhs (denies parasomnias), melatonin 5 mg qhs (if does not fall asleep will take an additional 5-10 mg of melatonin), cyclobenzaprine 10 mg qPM, and Lyrica 150 mg TID. History of prior being on Lunesta, trazodone, Doxepin, and Valium for sleep. History of medications working for a \"bit\" and then no longer working. No longer taking NyQuil or Benadryl. Denies HI or SI. Denies SOB, CLUP, cough, wheezing, chest pain, palpitations, or peripheral edema. Brother-in-law ( of her older sister - passed at age 72 due to heart disease) says his wife had similar sleep issues (could not fall or stay asleep and would be \"awake for days\" depsite taking NyQuil and Ambien). Patient " notes her father was highly function with only ~2-4 hours of sleep per day. She believes her younger sister (who was murdered at 27) had similar sleep issues and became dependent on alcohol in order to sleep.    Bed time: 10:00 PM  Sleep latency: Hours, lays in bed   Nocturnal awakenings: yes, ~2-3 times per night sometimes unknown and sometimes due to right hip pain (able to fall back asleep sometimes)  Naps: denies   Total sleep time: Uncertain     RLS: denies  Snoring: denies  Witnessed apnea: denies  Choking/gasping for air: denies  Morning headaches: denies  Morning dry mouth: denies  Morning headaches: denies  Cataplexy: denies  Sleep paralysis: denies    Caffeine: denies  Alcohol: denies  Tobacco: yes, ~1 PPD (more when not sleeping)  Marijuana: yes, nightly     Driving while drowsy: denies    [place]: 0  [place] of 2 on 1/23/2024    Social history updates:  Social History     Tobacco Use   Smoking Status Every Day    Current packs/day: 1.00    Average packs/day: 1 pack/day for 52.2 years (52.2 ttl pk-yrs)    Types: Cigarettes    Start date: 1/1/1972   Smokeless Tobacco Never     Social History     Socioeconomic History    Marital status: Single     Spouse name: Not on file    Number of children: Not on file    Years of education: Not on file    Highest education level: Not on file   Occupational History    Not on file   Tobacco Use    Smoking status: Every Day     Current packs/day: 1.00     Average packs/day: 1 pack/day for 52.2 years (52.2 ttl pk-yrs)     Types: Cigarettes     Start date: 1/1/1972    Smokeless tobacco: Never   Vaping Use    Vaping status: Never Used   Substance and Sexual Activity    Alcohol use: Not Currently     Comment: occasional    Drug use: Yes     Frequency: 7.0 times per week     Types: Marijuana    Sexual activity: Not Currently     Birth control/protection: Post-menopausal   Other Topics Concern    Not on file   Social History Narrative    Not on file     Social Determinants of  "Health     Financial Resource Strain: Patient Declined (10/10/2023)    Overall Financial Resource Strain (CARDIA)     Difficulty of Paying Living Expenses: Patient declined   Food Insecurity: Not on file   Transportation Needs: No Transportation Needs (10/10/2023)    PRAPARE - Transportation     Lack of Transportation (Medical): No     Lack of Transportation (Non-Medical): No   Physical Activity: Not on file   Stress: Not on file   Social Connections: Not on file   Intimate Partner Violence: Not on file   Housing Stability: Not on file     PhysicalExamination:  Visit Vitals  /70   Ht 5' 4\" (1.626 m)   Wt 70.8 kg (156 lb)   LMP  (LMP Unknown)   BMI 26.78 kg/m²   OB Status Postmenopausal   Smoking Status Every Day   BSA 1.76 m²     Physical Exam:  General: Sitting in chair, awake, and alert. No acute distress  HEENT: Nares patent, no craniofacial abnormalities. Moist mucous membranes  NECK: Trachea midline, no accessory muscle use, and no stridor   CARDIAC: Regular rate and rhythm  PULM: CTA bilaterally no wheezing, rhonchi or rales. No conversational dyspnea  EXT: No peripheral edema    NEURO: No focal neurologic deficits, moving all extremities appropriately    Diagnostic Data:  Labs:  I personally reviewed the most recent laboratory data pertinent to today's visit  Orders Only on 03/21/2024   Component Date Value    White Blood Cell Count 03/21/2024 4.9     Red Blood Cell Count 03/21/2024 5.29 (H)     Hemoglobin 03/21/2024 16.1 (H)     HCT 03/21/2024 47.1 (H)     MCV 03/21/2024 89.0     MCH 03/21/2024 30.4     MCHC 03/21/2024 34.2     RDW 03/21/2024 12.8     Platelet Count 03/21/2024 228     SL AMB MPV 03/21/2024 10.9     Neutrophils (Absolute) 03/21/2024 2,592     Lymphocytes (Absolute) 03/21/2024 1,911     Monocytes (Absolute) 03/21/2024 309     Eosinophils (Absolute) 03/21/2024 59     Basophils ABS 03/21/2024 29     Neutrophils 03/21/2024 52.9     Lymphocytes 03/21/2024 39.0     Monocytes 03/21/2024 6.3  "    Eosinophils 03/21/2024 1.2     Basophils PCT 03/21/2024 0.6     TSH 03/21/2024 0.14 (L)     Hemoglobin A1C 03/21/2024 7.4 (H)     Estimated Average Glucose 03/21/2024 166     Estimated Average Glucos* 03/21/2024 9.2    Orders Only on 03/21/2024   Component Date Value    Color UA 03/21/2024 YELLOW     Urine Appearance 03/21/2024 CLEAR     Specific Gravity 03/21/2024 1.040 (H)     Ph 03/21/2024 5.5     Glucose, Urine 03/21/2024 3+ (A)     Bilirubin, Urine 03/21/2024 NEGATIVE     Ketone, Urine 03/21/2024 NEGATIVE     Blood, Urine 03/21/2024 NEGATIVE     Protein, Urine 03/21/2024 NEGATIVE     Nitrites Urine 03/21/2024 NEGATIVE     Leukocyte Esterase 03/21/2024 NEGATIVE     SL AMB WBC, URINE 03/21/2024 NONE SEEN     RBC, Urine 03/21/2024 NONE SEEN     Squamous Epithelial Cells 03/21/2024 0-5     Bacteria, UA 03/21/2024 NONE SEEN     Hyaline Casts 03/21/2024 NONE SEEN     Comment(s) 03/21/2024      Urine Culture Result 03/21/2024     Lab on 03/15/2024   Component Date Value    WBC 03/15/2024 7.48     RBC 03/15/2024 5.01     Hemoglobin 03/15/2024 15.6 (H)     Hematocrit 03/15/2024 45.2     MCV 03/15/2024 90     MCH 03/15/2024 31.1     MCHC 03/15/2024 34.5     RDW 03/15/2024 12.9     MPV 03/15/2024 9.8     Platelets 03/15/2024 229     nRBC 03/15/2024 0     Neutrophils Relative 03/15/2024 63     Immature Grans % 03/15/2024 0     Lymphocytes Relative 03/15/2024 30     Monocytes Relative 03/15/2024 6     Eosinophils Relative 03/15/2024 1     Basophils Relative 03/15/2024 0     Neutrophils Absolute 03/15/2024 4.65     Absolute Immature Grans 03/15/2024 0.01     Absolute Lymphocytes 03/15/2024 2.27     Absolute Monocytes 03/15/2024 0.44     Eosinophils Absolute 03/15/2024 0.08     Basophils Absolute 03/15/2024 0.03     Sodium 03/15/2024 138     Potassium 03/15/2024 4.3     Chloride 03/15/2024 102     CO2 03/15/2024 28     ANION GAP 03/15/2024 8     BUN 03/15/2024 21     Creatinine 03/15/2024 0.78     Glucose 03/15/2024 175  "(H)     Calcium 03/15/2024 9.6     eGFR 03/15/2024 81      I have personally reviewed pertinent lab results.  Lab Results   Component Value Date    WBC 4.9 03/21/2024    HGB 16.1 (H) 03/21/2024    HCT 47.1 (H) 03/21/2024    MCV 89.0 03/21/2024     03/21/2024     Lab Results   Component Value Date    GLUCOSE 100 03/06/2015    CALCIUM 9.6 03/15/2024     03/06/2015    K 4.3 03/15/2024    CO2 28 03/15/2024     03/15/2024    BUN 21 03/15/2024    CREATININE 0.78 03/15/2024     No results found for: \"IGE\"  Lab Results   Component Value Date    ALT 27 09/08/2023    AST 21 09/08/2023    ALKPHOS 75 09/08/2023     No results found for: \"IRON\", \"TIBC\", \"FERRITIN\"  Lab Results   Component Value Date    RPLJPEHQ00 351 03/16/2018     Lab Results   Component Value Date    FOLATE 19.8 (H) 03/16/2018     Sleep studies:  No prior sleep study    David Roman DO  [facility] Sleep Medicine Fellow  "

## 2024-03-26 NOTE — PROGRESS NOTES
"Historian- patient and her brother in law  He describes Isaura's sister had severe insomnia, required medications and didn't sleep well for over 30 years.     Seroquel helped her sleep  for 2 weeks but had muscle pain, but she felt her \"liver hurt\" on her right side.   Had constipation from this as well     Isaura was helping her brother in law to get ready for yard sales. Would spend 8 hours a this house.  Would get in her motor home and sleep for only a few hours.      Mood- not bad lately      Has had leg twitches, better now with movement.  Had not had an urge to walk    Some nights does not sleep so only up a few hours     Now sleeping in bed   Gets light exposure in the AM     Has not tried temazepam  Lunesta- bad mood    "

## 2024-04-14 DIAGNOSIS — M62.838 NECK MUSCLE SPASM: ICD-10-CM

## 2024-04-14 DIAGNOSIS — M48.02 CERVICAL STENOSIS OF SPINAL CANAL: ICD-10-CM

## 2024-04-15 RX ORDER — CYCLOBENZAPRINE HCL 10 MG
10 TABLET ORAL 3 TIMES DAILY PRN
Qty: 30 TABLET | Refills: 5 | Status: SHIPPED | OUTPATIENT
Start: 2024-04-15

## 2024-04-16 DIAGNOSIS — G47.09 OTHER INSOMNIA: ICD-10-CM

## 2024-04-16 DIAGNOSIS — F43.10 PTSD (POST-TRAUMATIC STRESS DISORDER): ICD-10-CM

## 2024-04-16 DIAGNOSIS — F33.2 SEVERE EPISODE OF RECURRENT MAJOR DEPRESSIVE DISORDER, WITHOUT PSYCHOTIC FEATURES (HCC): ICD-10-CM

## 2024-04-16 DIAGNOSIS — F41.1 GAD (GENERALIZED ANXIETY DISORDER): ICD-10-CM

## 2024-04-17 RX ORDER — MIRTAZAPINE 15 MG/1
15 TABLET, FILM COATED ORAL
Qty: 90 TABLET | Refills: 1 | Status: SHIPPED | OUTPATIENT
Start: 2024-04-17

## 2024-04-25 ENCOUNTER — OFFICE VISIT (OUTPATIENT)
Dept: FAMILY MEDICINE CLINIC | Facility: CLINIC | Age: 64
End: 2024-04-25
Payer: COMMERCIAL

## 2024-04-25 VITALS
SYSTOLIC BLOOD PRESSURE: 126 MMHG | RESPIRATION RATE: 14 BRPM | DIASTOLIC BLOOD PRESSURE: 80 MMHG | OXYGEN SATURATION: 99 % | HEART RATE: 70 BPM | HEIGHT: 64 IN | TEMPERATURE: 98.2 F | BODY MASS INDEX: 26.78 KG/M2

## 2024-04-25 DIAGNOSIS — R14.0 BLOATING: ICD-10-CM

## 2024-04-25 DIAGNOSIS — E11.8 TYPE II DIABETES MELLITUS WITH MANIFESTATIONS (HCC): ICD-10-CM

## 2024-04-25 DIAGNOSIS — Z12.4 SCREENING FOR CERVICAL CANCER: ICD-10-CM

## 2024-04-25 DIAGNOSIS — R21 RASH: Primary | ICD-10-CM

## 2024-04-25 DIAGNOSIS — F17.210 CIGARETTE SMOKER: ICD-10-CM

## 2024-04-25 DIAGNOSIS — E78.5 HYPERLIPIDEMIA ASSOCIATED WITH TYPE 2 DIABETES MELLITUS  (HCC): ICD-10-CM

## 2024-04-25 DIAGNOSIS — E11.69 HYPERLIPIDEMIA ASSOCIATED WITH TYPE 2 DIABETES MELLITUS  (HCC): ICD-10-CM

## 2024-04-25 DIAGNOSIS — R10.84 GENERALIZED ABDOMINAL PAIN: ICD-10-CM

## 2024-04-25 DIAGNOSIS — E04.1 THYROID NODULE: ICD-10-CM

## 2024-04-25 DIAGNOSIS — M81.8 IDIOPATHIC OSTEOPOROSIS: ICD-10-CM

## 2024-04-25 DIAGNOSIS — Z11.4 SCREENING FOR HIV (HUMAN IMMUNODEFICIENCY VIRUS): ICD-10-CM

## 2024-04-25 DIAGNOSIS — Z12.31 SCREENING MAMMOGRAM FOR BREAST CANCER: ICD-10-CM

## 2024-04-25 PROCEDURE — 99214 OFFICE O/P EST MOD 30 MIN: CPT | Performed by: INTERNAL MEDICINE

## 2024-04-25 PROCEDURE — 99204 OFFICE O/P NEW MOD 45 MIN: CPT | Performed by: INTERNAL MEDICINE

## 2024-04-25 RX ORDER — DOXEPIN HYDROCHLORIDE 10 MG/1
10 CAPSULE ORAL
Qty: 30 CAPSULE | Refills: 1 | Status: SHIPPED | OUTPATIENT
Start: 2024-04-25 | End: 2024-05-02

## 2024-04-25 NOTE — PATIENT INSTRUCTIONS

## 2024-04-25 NOTE — PROGRESS NOTES
Name: Isaura Brito      : 1960      MRN: [id number]  Encounter Provider: Sreedhar Franco MD  Encounter Date: 2024   Encounter department: [facility]    Assessment & Plan     1. Rash  Comments:  it looks Due to Anxiety; ??  Dermatology consult  Try; Doxepin 10 mg HS  RTC in 1mo w Blood work  Orders:  -     doxepin (SINEquan) 10 mg capsule; Take 1 capsule (10 mg total) by mouth daily at bedtime  -     Ambulatory Referral to Dermatology; Future    2. Screening for HIV (human immunodeficiency virus)  -     HIV 1/2 AG/AB w Reflex [facility] for 2 yr old and above; Future    3. Screening for cervical cancer  -     Ambulatory referral to Obstetrics / Gynecology; Future    4. Bloating  -     Ambulatory referral to Gastroenterology; Future; Expected date: 2024  -     Vitamin D 25 hydroxy; Future; Expected date: 2024  -     Vitamin B12; Future  -     UA (URINE) with reflex to Scope; Future  -     Magnesium; Future  -     Vitamin D 25 hydroxy  -     Vitamin B12  -     UA (URINE) with reflex to Scope  -     Magnesium    5. Generalized abdominal pain  -     Ambulatory referral to Gastroenterology; Future; Expected date: 2024  -     Vitamin D 25 hydroxy; Future; Expected date: 2024  -     Vitamin B12; Future  -     UA (URINE) with reflex to Scope; Future  -     Magnesium; Future  -     Vitamin D 25 hydroxy  -     Vitamin B12  -     UA (URINE) with reflex to Scope  -     Magnesium    6. Type II diabetes mellitus with manifestations (HCC)  -     Echo complete w/ contrast if indicated; Future; Expected date: 2024  -     UA (URINE) with reflex to Scope; Future  -     Iron Panel (Includes Ferritin, Iron Sat%, Iron, and TIBC); Future; Expected date: 2024  -     Vitamin B12; Future  -     Vitamin D 25 hydroxy; Future; Expected date: 2024  -     UA (URINE) with reflex to Scope  -     Vitamin B12  -     Vitamin D 25 hydroxy  -     Comprehensive  metabolic panel; Future; Expected date: 04/25/2024  -     CBC and differential; Future; Expected date: 04/25/2024  -     Lipid Panel with Direct LDL reflex; Future; Expected date: 04/25/2024  -     TSH, 3rd generation with Free T4 reflex; Future; Expected date: 04/25/2024  -     Comprehensive metabolic panel  -     CBC and differential  -     Lipid Panel with Direct LDL reflex  -     TSH, 3rd generation with Free T4 reflex  -     Vitamin D 25 hydroxy; Future; Expected date: 04/25/2024  -     Vitamin B12; Future  -     UA (URINE) with reflex to Scope; Future  -     Magnesium; Future  -     Vitamin D 25 hydroxy  -     Vitamin B12  -     UA (URINE) with reflex to Scope  -     Magnesium    7. Cigarette smoker  -     Echo complete w/ contrast if indicated; Future; Expected date: 04/25/2024  -     CT lung screening program; Future; Expected date: 04/25/2024    8. Hyperlipidemia associated with type 2 diabetes mellitus  (HCC)  -     Echo complete w/ contrast if indicated; Future; Expected date: 04/25/2024    9. Thyroid nodule  -     TSH, 3rd generation; Future; Expected date: 04/25/2024  -     T4, free; Future; Expected date: 04/25/2024  -     US thyroid; Future; Expected date: 04/25/2024  -     Thyroid Antibodies Panel; Future  -     TSH, 3rd generation  -     T4, free    10. Idiopathic osteoporosis  -     Vitamin B12; Future  -     Vitamin D 25 hydroxy; Future; Expected date: 04/25/2024  -     Vitamin B12  -     Vitamin D 25 hydroxy  -     DXA bone density spine hip and pelvis; Future; Expected date: 04/25/2024  -     Vitamin D 25 hydroxy; Future; Expected date: 04/25/2024  -     Vitamin B12; Future  -     UA (URINE) with reflex to Scope; Future  -     Magnesium; Future  -     Vitamin D 25 hydroxy  -     Vitamin B12  -     UA (URINE) with reflex to Scope  -     Magnesium    11. Screening mammogram for breast cancer  -     Mammo screening bilateral w 3d & cad; Future    Life style mod  Pt Refused all screening /preventive  tests  Pt refused dermatology consult  RTC in 1 mo w Blood work       Subjective      63 Y O lady is here for First time in My Office, Complete H/P discussed w Pt in detail, her main Concern is the Itchy spots on her body for last few years,...      Review of Systems   Constitutional:  Negative for chills, fatigue and fever.   HENT:  Negative for congestion, facial swelling, sore throat, trouble swallowing and voice change.    Eyes:  Negative for pain, discharge and visual disturbance.   Respiratory:  Negative for cough, shortness of breath and wheezing.    Cardiovascular:  Negative for chest pain, palpitations and leg swelling.   Gastrointestinal:  Negative for abdominal pain, blood in stool, constipation, diarrhea and nausea.   Endocrine: Negative for polydipsia, polyphagia and polyuria.   Genitourinary:  Negative for difficulty urinating, hematuria and urgency.   Musculoskeletal:  Negative for arthralgias and myalgias.   Skin:  Positive for rash.   Neurological:  Negative for dizziness, tremors, weakness and headaches.   Hematological:  Negative for adenopathy. Does not bruise/bleed easily.   Psychiatric/Behavioral:  Negative for dysphoric mood, sleep disturbance and suicidal ideas.        Current Outpatient Medications on File Prior to Visit   Medication Sig    acetaminophen (TYLENOL) 325 mg tablet Take 1 tablet (325 mg total) by mouth as needed for headaches For headache    albuterol (PROVENTIL HFA,VENTOLIN HFA) 90 mcg/act inhaler Inhale 2 puffs every 6 (six) hours as needed for wheezing    aspirin 81 mg chewable tablet Chew 81 mg daily    atorvastatin (LIPITOR) 80 mg tablet Take 1 tablet (80 mg total) by mouth daily    cyclobenzaprine (FLEXERIL) 10 mg tablet Take 1 tablet (10 mg total) by mouth 3 (three) times a day as needed for muscle spasms    Empagliflozin (Jardiance) 10 MG TABS tablet Take 1 tablet (10 mg total) by mouth every morning    ezetimibe (ZETIA) 10 mg tablet Take 1 tablet (10 mg total) by mouth  "daily    levothyroxine 100 mcg tablet Take 1 tablet (100 mcg total) by mouth daily Take 112 mcg daily on Mondays Wednesdays and Fridays alternate with 100 mcg all other days    losartan (COZAAR) 50 mg tablet Take 1 tablet (50 mg total) by mouth daily    metoprolol succinate (TOPROL-XL) 25 mg 24 hr tablet Take 1 tablet (25 mg total) by mouth daily    mirtazapine (REMERON) 15 mg tablet TAKE 1 TABLET BY MOUTH DAILY AT BEDTIME    pantoprazole (PROTONIX) 40 mg tablet Take 1 tablet (40 mg total) by mouth daily    pregabalin (LYRICA) 150 mg capsule Take 1 capsule (150 mg total) by mouth 3 (three) times a day    triamcinolone (KENALOG) 0.1 % cream Apply topically as needed for irritation    zolpidem (Ambien) 10 mg tablet Take 1 tablet (10 mg total) by mouth daily at bedtime as needed for sleep    Fluticasone-Salmeterol (Advair) 250-50 mcg/dose inhaler Inhale 1 puff 2 (two) times a day Rinse mouth after use. (Patient not taking: Reported on 3/15/2024)    hydrOXYzine HCL (ATARAX) 25 mg tablet Take 1 tablet (25 mg total) by mouth 2 (two) times a day as needed for itching (Patient not taking: Reported on 3/25/2024)    valACYclovir (VALTREX) 1,000 mg tablet Take 1 tablet (1,000 mg total) by mouth 2 (two) times a day for 14 days       Objective     /80 (BP Location: Left arm, Patient Position: Sitting, Cuff Size: Standard)   Pulse 70   Temp 98.2 °F (36.8 °C) (Tympanic)   Resp 14   Ht 5' 4\" (1.626 m)   LMP  (LMP Unknown)   SpO2 99%   BMI 26.78 kg/m²     Physical Exam  Constitutional:       General: She is not in acute distress.  HENT:      Head: Normocephalic.      Mouth/Throat:      Pharynx: No oropharyngeal exudate.   Eyes:      General: No scleral icterus.     Conjunctiva/sclera: Conjunctivae normal.      Pupils: Pupils are equal, round, and reactive to light.   Neck:      Thyroid: No thyromegaly.   Cardiovascular:      Rate and Rhythm: Normal rate and regular rhythm.      Heart sounds: Murmur heard.   Pulmonary: "      Effort: Pulmonary effort is normal. No respiratory distress.      Breath sounds: Normal breath sounds. No wheezing or rales.   Abdominal:      General: Bowel sounds are normal. There is no distension.      Palpations: Abdomen is soft.      Tenderness: There is no abdominal tenderness. There is no guarding or rebound.   Musculoskeletal:         General: No tenderness.      Cervical back: Neck supple.   Lymphadenopathy:      Cervical: No cervical adenopathy.   Skin:     Coloration: Skin is not pale.      Findings: Lesion and rash present.   Neurological:      Mental Status: She is alert and oriented to person, place, and time.      Sensory: No sensory deficit.      Motor: No weakness.       Sreedhar Franco MD

## 2024-04-28 DIAGNOSIS — K21.9 GASTROESOPHAGEAL REFLUX DISEASE: ICD-10-CM

## 2024-04-28 DIAGNOSIS — M79.7 FIBROMYALGIA: ICD-10-CM

## 2024-04-28 DIAGNOSIS — F51.01 PRIMARY INSOMNIA: ICD-10-CM

## 2024-04-28 PROBLEM — J01.10 ACUTE NON-RECURRENT FRONTAL SINUSITIS: Status: RESOLVED | Noted: 2023-02-24 | Resolved: 2024-04-28

## 2024-04-28 RX ORDER — PANTOPRAZOLE SODIUM 40 MG/1
40 TABLET, DELAYED RELEASE ORAL DAILY
Qty: 90 TABLET | Refills: 0 | Status: SHIPPED | OUTPATIENT
Start: 2024-04-28

## 2024-04-29 RX ORDER — ZOLPIDEM TARTRATE 10 MG/1
10 TABLET ORAL
Qty: 30 TABLET | Refills: 0 | Status: SHIPPED | OUTPATIENT
Start: 2024-04-29 | End: 2024-04-30 | Stop reason: SDUPTHER

## 2024-04-29 RX ORDER — PREGABALIN 150 MG/1
150 CAPSULE ORAL 3 TIMES DAILY
Qty: 90 CAPSULE | Refills: 0 | Status: SHIPPED | OUTPATIENT
Start: 2024-04-29

## 2024-04-30 DIAGNOSIS — F51.01 PRIMARY INSOMNIA: ICD-10-CM

## 2024-05-01 DIAGNOSIS — R21 RASH: ICD-10-CM

## 2024-05-01 RX ORDER — ZOLPIDEM TARTRATE 10 MG/1
10 TABLET ORAL
Qty: 30 TABLET | Refills: 0 | Status: SHIPPED | OUTPATIENT
Start: 2024-05-01

## 2024-05-02 ENCOUNTER — TELEPHONE (OUTPATIENT)
Age: 64
End: 2024-05-02

## 2024-05-02 RX ORDER — DOXEPIN HYDROCHLORIDE 10 MG/1
10 CAPSULE ORAL
Qty: 90 CAPSULE | Refills: 1 | Status: SHIPPED | OUTPATIENT
Start: 2024-05-02

## 2024-05-02 NOTE — TELEPHONE ENCOUNTER
Pt calling back stating she was talking w/ someone at the [facility] office who was giving her directions to the office. Pt stating she is still lost and wants to speak w/ them again. I informed pt that her appt was for 9 am and she will need to reschedule. Per pt they informed her at the office that the doctor is waiting and will squeeze her in. I tried to call the office several times without success. At one point call transferred into [facility] office and Andres answered but he is new and couldn't help. I tried to help pt and mapquest her directions from where she was and gave her the directions.

## 2024-05-02 NOTE — TELEPHONE ENCOUNTER
Patient contacted office for directions to facility. Call transferred to office to further assist.

## 2024-05-03 ENCOUNTER — OFFICE VISIT (OUTPATIENT)
Dept: GASTROENTEROLOGY | Facility: MEDICAL CENTER | Age: 64
End: 2024-05-03
Payer: COMMERCIAL

## 2024-05-03 VITALS
WEIGHT: 158 LBS | BODY MASS INDEX: 26.98 KG/M2 | DIASTOLIC BLOOD PRESSURE: 68 MMHG | OXYGEN SATURATION: 96 % | HEIGHT: 64 IN | TEMPERATURE: 96 F | HEART RATE: 64 BPM | SYSTOLIC BLOOD PRESSURE: 134 MMHG

## 2024-05-03 DIAGNOSIS — K59.00 CONSTIPATION, UNSPECIFIED CONSTIPATION TYPE: ICD-10-CM

## 2024-05-03 DIAGNOSIS — R14.0 BLOATING: Primary | ICD-10-CM

## 2024-05-03 PROCEDURE — 99203 OFFICE O/P NEW LOW 30 MIN: CPT | Performed by: STUDENT IN AN ORGANIZED HEALTH CARE EDUCATION/TRAINING PROGRAM

## 2024-05-03 NOTE — PROGRESS NOTES
[facility] - Outpatient Consultation  Isaura Brito 63 y.o. female MRN: [id number]  Encounter: [id number]      Assessment and Plan:    1. Bloating    2. Constipation, unspecified constipation type        63 y.o. female w/ hx of CAD, hypertension, GERD, IBS-C, diabetes who is referred to GI for chronic bloating.    Chronic bloating is most likely related to a functional disorder such as IBS-C, but differential includes etiologies such as H.pylori, celiac disease, and even SIBO/IMO. Reassuringly, recent CT does not show evidence of malignancy or ascites.    We discussed interventions such as peppermint tea and a low FODMAP diet. If work-up is unrevealing, we can consider a trial of Linzess.    - Labs and stool studies as below  - Peppermint tea and low FODMAP diet  - Lactulose breath test  - Colonoscopy due in 2025    Orders Placed This Encounter   Procedures    H. pylori antigen, stool    Calprotectin,Fecal    Tissue transglutaminase, IgA    IgA    C-reactive protein     ______________________________________________________________________    History of Present Illness:    Isaura Brito is a 63 y.o. female w/ hx of CAD, hypertension, GERD, IBS-C, diabetes who is referred to GI for chronic bloating.    Patient reports significant abdominal bloating and discomfort starting 3 months ago.  She often wakes up with this bloating which used to be relieved with defecation.  This is a significant hindrance to quality of life at this point.  CT 3/2024 was unremarkable.    She had new diarrhea 4 years ago which has been attributed to IBS, but this transformed to constipation about 1 year ago.  She now uses OTC stool softeners.  She had negative stool infectious studies 1/2021.    She last had EGD/colonoscopy 8/2015 which showed an irregular Z-line, left-sided diverticulosis, and melanosis coli; 10-year follow-up recommended.      Review of Systems:  As per HPI. Otherwise negative.      Historical  Information   Past Medical History:   Diagnosis Date    Abnormal echocardiogram     Abnormal stress test     Anxiety     Arthritis 2020    Asthma     Brachial neuritis     Coronary artery disease     Depression     Diabetes mellitus (HCC)     Disease of thyroid gland     Displacement of intervertebral disc of mid-cervical region     Diverticulitis of colon     Fibromyalgia     Frequent headaches     GERD (gastroesophageal reflux disease)     Herniated nucleus pulposus     History of arthritis     History of asthma     History of cardiac disorder     History of chest pain     History of diverticulitis     History of fatigue     History of hearing loss     History of hemoptysis     History of herpes simplex infection     History of hiatal hernia     History of insect bite     INFECTED    History of memory loss     History of neck disorder     History of reflex sympathetic dystrophy     History of restless legs syndrome     History of shortness of breath     History of spinal stenosis     HL (hearing loss)     Hyperlipidemia     Hyperlipidemia     Hypertension     Skin rash     Somnolence, daytime     Spinal stenosis of cervical region      Past Surgical History:   Procedure Laterality Date    BACK SURGERY      BACK SURGERY      LOWER BACK SURGERY    CARDIAC CATHETERIZATION      HISTORY OF CORONARY ANGIOGRAPHY WITH CONCOMITANT LEFT HEART CATHETERIZATION    CORONARY ANGIOPLASTY WITH STENT PLACEMENT      EAR SURGERY      SPINE SURGERY      TONSILLECTOMY      TYMPANOPLASTY      US GUIDED BREAST BIOPSY LEFT COMPLETE Left 03/02/2021     Social History   Social History     Substance and Sexual Activity   Alcohol Use Not Currently    Comment: occasional     Social History     Substance and Sexual Activity   Drug Use Yes    Frequency: 7.0 times per week    Types: Marijuana     Social History     Tobacco Use   Smoking Status Every Day    Current packs/day: 1.00    Average packs/day: 1 pack/day for 52.3 years (52.3 ttl pk-yrs)     Types: Cigarettes    Start date: 1/1/1972   Smokeless Tobacco Never     Family History   Problem Relation Age of Onset    Coronary artery disease Mother         ATHEROMA    Heart disease Mother     Diabetes Mother     Hypertension Mother     Asthma Mother     No Known Problems Father         NO PERTINENT FAMILY HISTORY    Coronary artery disease Sister         ATHEROMA    Heart disease Sister     Stroke Sister     Diabetes Sister     Hypertension Sister     Parkinsonism Sister     Breast cancer Sister     No Known Problems Sister     No Known Problems Sister     No Known Problems Maternal Aunt     No Known Problems Maternal Aunt     No Known Problems Paternal Aunt     No Known Problems Paternal Aunt     Brain cancer Paternal Uncle     No Known Problems Maternal Grandmother     No Known Problems Maternal Grandfather     No Known Problems Paternal Grandmother     No Known Problems Paternal Grandfather     No Known Problems Daughter     No Known Problems Son        Meds/Allergies       Current Outpatient Medications:     acetaminophen (TYLENOL) 325 mg tablet    albuterol (PROVENTIL HFA,VENTOLIN HFA) 90 mcg/act inhaler    aspirin 81 mg chewable tablet    atorvastatin (LIPITOR) 80 mg tablet    cyclobenzaprine (FLEXERIL) 10 mg tablet    Empagliflozin (Jardiance) 10 MG TABS tablet    ezetimibe (ZETIA) 10 mg tablet    levothyroxine 100 mcg tablet    losartan (COZAAR) 50 mg tablet    metoprolol succinate (TOPROL-XL) 25 mg 24 hr tablet    mirtazapine (REMERON) 15 mg tablet    pantoprazole (PROTONIX) 40 mg tablet    pregabalin (LYRICA) 150 mg capsule    triamcinolone (KENALOG) 0.1 % cream    zolpidem (Ambien) 10 mg tablet    doxepin (SINEquan) 10 mg capsule    Fluticasone-Salmeterol (Advair) 250-50 mcg/dose inhaler    hydrOXYzine HCL (ATARAX) 25 mg tablet    valACYclovir (VALTREX) 1,000 mg tablet    Allergies   Allergen Reactions    Augmentin [Amoxicillin-Pot Clavulanate] Nausea Only     PT stated she only allergic to  "medication AUGMENTIN    Meloxicam GI Intolerance     \"brings on diverticulitis\"           Objective     Blood pressure 134/68, pulse 64, temperature (!) 96 °F (35.6 °C), height 5' 4\" (1.626 m), weight 71.7 kg (158 lb), SpO2 96%. Body mass index is 27.12 kg/m².        Physical Exam:      General: No acute distress  Abdomen: Soft, non-specific tenderness in upper abdomen, distended, normoactive bowel sounds  Neuro: Awake, alert, oriented x 3    Lab Results:   Lab Results   Component Value Date/Time    WBC 4.9 03/21/2024 12:06 PM    WBC 7.48 03/15/2024 04:19 PM    WBC 5.60 03/06/2015 10:00 AM    HGB 16.1 (H) 03/21/2024 12:06 PM    HGB 15.6 (H) 03/15/2024 04:19 PM    HGB 13.2 03/06/2015 10:00 AM     03/21/2024 12:06 PM     03/15/2024 04:19 PM     03/06/2015 10:00 AM    SODIUM 138 03/15/2024 04:19 PM    SODIUM 140 09/08/2023 12:06 PM    K 4.3 03/15/2024 04:19 PM    K 4.5 09/08/2023 12:06 PM     03/15/2024 04:19 PM     09/08/2023 12:06 PM    CO2 28 03/15/2024 04:19 PM    CO2 24 09/08/2023 12:06 PM    BUN 21 03/15/2024 04:19 PM    BUN 17 09/08/2023 12:06 PM    CREATININE 0.78 03/15/2024 04:19 PM    CREATININE 0.77 03/06/2015 10:00 AM    AST 21 09/08/2023 12:06 PM    ALT 27 09/08/2023 12:06 PM    ALKPHOS 75 09/08/2023 12:06 PM    TBILI 0.8 09/08/2023 12:06 PM    ALB 4.6 09/08/2023 12:06 PM    ALB 3.6 06/29/2020 11:03 AM    INR 1.06 06/29/2020 01:36 AM    INR 0.94 03/06/2015 10:00 AM    LIPASE 97 10/23/2018 10:21 PM       "

## 2024-05-07 LAB
CRP SERPL-MCNC: <3 MG/L
IGA SERPL-MCNC: 259 MG/DL (ref 70–320)
TTG IGA SER-ACNC: <1 U/ML

## 2024-05-08 ENCOUNTER — TELEPHONE (OUTPATIENT)
Age: 64
End: 2024-05-08

## 2024-05-08 NOTE — TELEPHONE ENCOUNTER
Pt. Unable to complete SIBO test due to her being diabetic, the instructions and food restriction were difficult for her to comply with, she said she tried test the twice and was unable to complete it

## 2024-05-14 LAB — CALPROTECTIN STL-MCNT: 44 MCG/G

## 2024-05-20 ENCOUNTER — OFFICE VISIT (OUTPATIENT)
Dept: GASTROENTEROLOGY | Facility: MEDICAL CENTER | Age: 64
End: 2024-05-20
Payer: COMMERCIAL

## 2024-05-20 ENCOUNTER — TELEPHONE (OUTPATIENT)
Age: 64
End: 2024-05-20

## 2024-05-20 VITALS
DIASTOLIC BLOOD PRESSURE: 70 MMHG | HEIGHT: 64 IN | WEIGHT: 159.3 LBS | BODY MASS INDEX: 27.2 KG/M2 | HEART RATE: 68 BPM | SYSTOLIC BLOOD PRESSURE: 130 MMHG | TEMPERATURE: 98.1 F

## 2024-05-20 DIAGNOSIS — K58.0 IRRITABLE BOWEL SYNDROME WITH DIARRHEA: ICD-10-CM

## 2024-05-20 DIAGNOSIS — R14.0 BLOATING: Primary | ICD-10-CM

## 2024-05-20 PROCEDURE — 99214 OFFICE O/P EST MOD 30 MIN: CPT | Performed by: STUDENT IN AN ORGANIZED HEALTH CARE EDUCATION/TRAINING PROGRAM

## 2024-05-20 NOTE — TELEPHONE ENCOUNTER
----- Message from Lyly GARDUNO sent at 5/20/2024  4:16 PM EDT -----  Regarding: FW: PA for rifaximin  Prior auth for xifaxan. Thank you  ----- Message -----  From: Chinmay Barnes MD  Sent: 5/20/2024  12:22 PM EDT  To: [facility]  Subject: PA for rifaximin                                 Please get PA for rifaximin x 2 weeks.    [place]

## 2024-05-20 NOTE — TELEPHONE ENCOUNTER
PA for Xifaxan    Submitted via    [x]CMM-KEY  [id number]       Turnaround time for your insurance to make a decision on your Prior Authorization can take 7-21 business days.

## 2024-05-20 NOTE — PROGRESS NOTES
[facility] - Outpatient Consultation  Isaura Brito 63 y.o. female MRN: [id number]  Encounter: [id number]      Assessment and Plan:    1. Bloating    2. Irritable bowel syndrome with diarrhea        63 y.o. female w/ hx of CAD, hypertension, GERD, IBS-C, diabetes who presents for follow-up of chronic bloating.    Chronic bloating is most likely related to IBS-C and/or SIBO/IMO. She has had otherwise negative work-up including celiac serologies, inflammatory markers, H.pylori stool Ag, and CT. No alarm features to warrant repeat endoscopic evaluation at this time.    We discussed options, and patient has decided to opt for an empiric course of rifaximin. We can try medications such as Linzess in the future if necessary.    - Empiric course of rifaximin x 2 weeks  - Colonoscopy due in 2025    RTC in 3 months    No orders of the defined types were placed in this encounter.    ______________________________________________________________________    History of Present Illness:    Isaura Brito is a 63 y.o. female w/ hx of CAD, hypertension, GERD, IBS-C, diabetes who presents for follow-up of chronic bloating.    Patient reports significant abdominal bloating and discomfort starting 3 months ago.  She often wakes up with this bloating which used to be relieved with defecation.  This is a significant hindrance to quality of life at this point and seems to be worsening.  CT 3/2024 was unremarkable. Work-up has included negative celiac serologies, negative H.pylori, neg CRP, and normal FCP. She was not able to complete lactulose breath testing due to her diabetes.    She had new diarrhea 4 years ago which has been attributed to IBS, but this transformed to constipation about 1 year ago.  She now occasionally OTC stool softeners if she is constipated.  She had negative stool infectious studies 1/2021.    Last EGD/colonoscopy 8/2015 showed an irregular Z-line, left-sided diverticulosis, and  melanosis coli; 10-year follow-up recommended.      Review of Systems:  As per HPI. Otherwise negative.      Historical Information   Past Medical History:   Diagnosis Date    Abnormal echocardiogram     Abnormal stress test     Anxiety     Arthritis 2020    Asthma     Brachial neuritis     Coronary artery disease     Depression     Diabetes mellitus (HCC)     Disease of thyroid gland     Displacement of intervertebral disc of mid-cervical region     Diverticulitis of colon     Fibromyalgia     Frequent headaches     GERD (gastroesophageal reflux disease)     Herniated nucleus pulposus     History of arthritis     History of asthma     History of cardiac disorder     History of chest pain     History of diverticulitis     History of fatigue     History of hearing loss     History of hemoptysis     History of herpes simplex infection     History of hiatal hernia     History of insect bite     INFECTED    History of memory loss     History of neck disorder     History of reflex sympathetic dystrophy     History of restless legs syndrome     History of shortness of breath     History of spinal stenosis     HL (hearing loss)     Hyperlipidemia     Hyperlipidemia     Hypertension     Skin rash     Somnolence, daytime     Spinal stenosis of cervical region      Past Surgical History:   Procedure Laterality Date    BACK SURGERY      BACK SURGERY      LOWER BACK SURGERY    CARDIAC CATHETERIZATION      HISTORY OF CORONARY ANGIOGRAPHY WITH CONCOMITANT LEFT HEART CATHETERIZATION    CORONARY ANGIOPLASTY WITH STENT PLACEMENT      EAR SURGERY      SPINE SURGERY      TONSILLECTOMY      TYMPANOPLASTY      US GUIDED BREAST BIOPSY LEFT COMPLETE Left 03/02/2021     Social History   Social History     Substance and Sexual Activity   Alcohol Use Not Currently    Comment: occasional     Social History     Substance and Sexual Activity   Drug Use Yes    Frequency: 7.0 times per week    Types: Marijuana     Social History     Tobacco Use    Smoking Status Every Day    Current packs/day: 1.00    Average packs/day: 1 pack/day for 52.4 years (52.4 ttl pk-yrs)    Types: Cigarettes    Start date: 1/1/1972   Smokeless Tobacco Never     Family History   Problem Relation Age of Onset    Coronary artery disease Mother         ATHEROMA    Heart disease Mother     Diabetes Mother     Hypertension Mother     Asthma Mother     No Known Problems Father         NO PERTINENT FAMILY HISTORY    Coronary artery disease Sister         ATHEROMA    Heart disease Sister     Stroke Sister     Diabetes Sister     Hypertension Sister     Parkinsonism Sister     Breast cancer Sister     No Known Problems Sister     No Known Problems Sister     No Known Problems Maternal Aunt     No Known Problems Maternal Aunt     No Known Problems Paternal Aunt     No Known Problems Paternal Aunt     Brain cancer Paternal Uncle     No Known Problems Maternal Grandmother     No Known Problems Maternal Grandfather     No Known Problems Paternal Grandmother     No Known Problems Paternal Grandfather     No Known Problems Daughter     No Known Problems Son        Meds/Allergies       Current Outpatient Medications:     acetaminophen (TYLENOL) 325 mg tablet    albuterol (PROVENTIL HFA,VENTOLIN HFA) 90 mcg/act inhaler    aspirin 81 mg chewable tablet    atorvastatin (LIPITOR) 80 mg tablet    cyclobenzaprine (FLEXERIL) 10 mg tablet    Empagliflozin (Jardiance) 10 MG TABS tablet    ezetimibe (ZETIA) 10 mg tablet    levothyroxine 100 mcg tablet    losartan (COZAAR) 50 mg tablet    metoprolol succinate (TOPROL-XL) 25 mg 24 hr tablet    mirtazapine (REMERON) 15 mg tablet    pantoprazole (PROTONIX) 40 mg tablet    pregabalin (LYRICA) 150 mg capsule    rifaximin (XIFAXAN) 550 mg tablet    triamcinolone (KENALOG) 0.1 % cream    zolpidem (Ambien) 10 mg tablet    doxepin (SINEquan) 10 mg capsule    Fluticasone-Salmeterol (Advair) 250-50 mcg/dose inhaler    hydrOXYzine HCL (ATARAX) 25 mg tablet     "valACYclovir (VALTREX) 1,000 mg tablet    Allergies   Allergen Reactions    Augmentin [Amoxicillin-Pot Clavulanate] Nausea Only     PT stated she only allergic to medication AUGMENTIN    Meloxicam GI Intolerance     \"brings on diverticulitis\"           Objective     Blood pressure 130/70, pulse 68, temperature 98.1 °F (36.7 °C), temperature source Tympanic, height 5' 4\" (1.626 m), weight 72.3 kg (159 lb 4.8 oz). Body mass index is 27.34 kg/m².        Physical Exam:      General: No acute distress  Abdomen: Soft, non-specific tenderness in upper abdomen, distended, normoactive bowel sounds  Neuro: Awake, alert, oriented x 3    Lab Results:   Lab Results   Component Value Date/Time    WBC 4.9 03/21/2024 12:06 PM    WBC 7.48 03/15/2024 04:19 PM    WBC 5.60 03/06/2015 10:00 AM    HGB 16.1 (H) 03/21/2024 12:06 PM    HGB 15.6 (H) 03/15/2024 04:19 PM    HGB 13.2 03/06/2015 10:00 AM     03/21/2024 12:06 PM     03/15/2024 04:19 PM     03/06/2015 10:00 AM    SODIUM 138 03/15/2024 04:19 PM    SODIUM 140 09/08/2023 12:06 PM    K 4.3 03/15/2024 04:19 PM    K 4.5 09/08/2023 12:06 PM     03/15/2024 04:19 PM     09/08/2023 12:06 PM    CO2 28 03/15/2024 04:19 PM    CO2 24 09/08/2023 12:06 PM    BUN 21 03/15/2024 04:19 PM    BUN 17 09/08/2023 12:06 PM    CREATININE 0.78 03/15/2024 04:19 PM    CREATININE 0.77 03/06/2015 10:00 AM    AST 21 09/08/2023 12:06 PM    ALT 27 09/08/2023 12:06 PM    ALKPHOS 75 09/08/2023 12:06 PM    TBILI 0.8 09/08/2023 12:06 PM    ALB 4.6 09/08/2023 12:06 PM    ALB 3.6 06/29/2020 11:03 AM    INR 1.06 06/29/2020 01:36 AM    INR 0.94 03/06/2015 10:00 AM    LIPASE 97 10/23/2018 10:21 PM       "

## 2024-05-22 ENCOUNTER — OFFICE VISIT (OUTPATIENT)
Age: 64
End: 2024-05-22
Payer: COMMERCIAL

## 2024-05-22 VITALS
HEIGHT: 64 IN | TEMPERATURE: 97.5 F | DIASTOLIC BLOOD PRESSURE: 90 MMHG | SYSTOLIC BLOOD PRESSURE: 156 MMHG | HEART RATE: 64 BPM | BODY MASS INDEX: 27.01 KG/M2 | WEIGHT: 158.2 LBS | OXYGEN SATURATION: 96 %

## 2024-05-22 DIAGNOSIS — E11.9 TYPE 2 DIABETES MELLITUS WITHOUT COMPLICATION, WITHOUT LONG-TERM CURRENT USE OF INSULIN (HCC): ICD-10-CM

## 2024-05-22 DIAGNOSIS — K44.9 HIATAL HERNIA: Primary | ICD-10-CM

## 2024-05-22 DIAGNOSIS — E03.9 HYPOTHYROIDISM, UNSPECIFIED TYPE: ICD-10-CM

## 2024-05-22 PROCEDURE — G2211 COMPLEX E/M VISIT ADD ON: HCPCS | Performed by: INTERNAL MEDICINE

## 2024-05-22 PROCEDURE — 99214 OFFICE O/P EST MOD 30 MIN: CPT | Performed by: INTERNAL MEDICINE

## 2024-05-22 RX ORDER — LEVOTHYROXINE SODIUM 112 UG/1
112 TABLET ORAL DAILY
COMMUNITY

## 2024-05-22 RX ORDER — METOCLOPRAMIDE 10 MG/1
10 TABLET ORAL 4 TIMES DAILY
Qty: 30 TABLET | Refills: 2 | Status: SHIPPED | OUTPATIENT
Start: 2024-05-22

## 2024-05-22 NOTE — ASSESSMENT & PLAN NOTE
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  Recommend referral to GI for upper endoscopy and possible need for further intervention

## 2024-05-22 NOTE — TELEPHONE ENCOUNTER
Routing to GI office  Xifaxan script was discontinued by a different provider      PA for Xifaxan Approved     Date(s) approved until 12/31/2024    Case #    Patient advised by        ADVISING GI OFFICE OF MED BEING DISCONTINUED  [] MyChart Message  [] Phone call   []LMOM  []L/M to call office as no active Communication consent on file  []Unable to leave detailed message as VM not approved on Communication consent       Pharmacy advised by    [x]Fax  []Phone call    Approval letter scanned into Media Yes

## 2024-05-22 NOTE — PROGRESS NOTES
Diabetic Foot Exam    Patient's shoes and socks removed.    Right Foot/Ankle   Right Foot Inspection  Skin Exam: skin intact.     Sensory   Monofilament testing: intact    Vascular  The right DP pulse is 1+.     Left Foot/Ankle  Left Foot Inspection  Skin Exam: skin intact.     Sensory   Monofilament testing: intact    Vascular  The left DP pulse is 1+.     Assign Risk Category  No deformity present  No loss of protective sensation  No weak pulses  Risk: 0

## 2024-05-22 NOTE — PROGRESS NOTES
Ambulatory Visit  Name: Isaura Brito      : 1960      MRN: [id number]  Encounter Provider: Sarah Owen MD  Encounter Date: 2024   Encounter department: [facility] [place]          Assessment & Plan   1. Hiatal hernia  Assessment & Plan:  SPECT significant reflux and possibly incompetent lower esophageal sphincter with belly distention and pain after eating.  Recommend referral to GI for upper endoscopy and possible need for further intervention  Orders:  -     metoclopramide (REGLAN) 10 mg tablet; Take 1 tablet (10 mg total) by mouth 4 (four) times a day  2. Type 2 diabetes mellitus without complication, without long-term current use of insulin (HCC)  -     Albumin / creatinine urine ratio  -     metoclopramide (REGLAN) 10 mg tablet; Take 1 tablet (10 mg total) by mouth 4 (four) times a day  3. Hypothyroidism, unspecified type  Assessment & Plan:  Alternate 100 mcg with 112 mcg every other day         History of Present Illness       Chief Complaint   Patient presents with   • Follow-up     Review labs done    • Health Screening     Diabetic foot exam patient will call to schedule mammogram   C/O pain in the epigastric region and fullness.  Complains of swelling in the epigastric region after she eats and pain.  Also complains of reflux symptoms.  Abdominal Pain  This is a new problem. The current episode started more than 1 month ago. The problem occurs constantly. The problem has been waxing and waning. The pain is located in the epigastric region. The pain is moderate. The quality of the pain is a sensation of fullness. The abdominal pain radiates to the epigastric region. The pain is aggravated by eating. The pain is relieved by Belching, certain positions and sitting up. Treatments tried: pain meds. The treatment provided mild relief. Her past medical history is significant for GERD. Diabetes mellitus   Heartburn  She complains of abdominal pain. This is a  recurrent problem.           Review of Systems   Gastrointestinal:  Positive for abdominal pain.     Past Medical History:   Diagnosis Date   • Abnormal echocardiogram    • Abnormal stress test    • Anxiety    • Arthritis 2020   • Asthma    • Brachial neuritis    • Coronary artery disease    • Depression    • Diabetes mellitus (HCC)    • Disease of thyroid gland    • Displacement of intervertebral disc of mid-cervical region    • Diverticulitis of colon    • Fibromyalgia    • Frequent headaches    • GERD (gastroesophageal reflux disease)    • Herniated nucleus pulposus    • History of arthritis    • History of asthma    • History of cardiac disorder    • History of chest pain    • History of diverticulitis    • History of fatigue    • History of hearing loss    • History of hemoptysis    • History of herpes simplex infection    • History of hiatal hernia    • History of insect bite     INFECTED   • History of memory loss    • History of neck disorder    • History of reflex sympathetic dystrophy    • History of restless legs syndrome    • History of shortness of breath    • History of spinal stenosis    • HL (hearing loss)    • Hyperlipidemia    • Hyperlipidemia    • Hypertension    • Skin rash    • Somnolence, daytime    • Spinal stenosis of cervical region      Past Surgical History:   Procedure Laterality Date   • BACK SURGERY     • BACK SURGERY      LOWER BACK SURGERY   • CARDIAC CATHETERIZATION      HISTORY OF CORONARY ANGIOGRAPHY WITH CONCOMITANT LEFT HEART CATHETERIZATION   • CORONARY ANGIOPLASTY WITH STENT PLACEMENT     • EAR SURGERY     • SPINE SURGERY     • TONSILLECTOMY     • TYMPANOPLASTY     • US GUIDED BREAST BIOPSY LEFT COMPLETE Left 03/02/2021     Family History   Problem Relation Age of Onset   • Coronary artery disease Mother         ATHEROMA   • Heart disease Mother    • Diabetes Mother    • Hypertension Mother    • Asthma Mother    • No Known Problems Father         NO PERTINENT FAMILY HISTORY   •  Coronary artery disease Sister         ATHEROMA   • Heart disease Sister    • Stroke Sister    • Diabetes Sister    • Hypertension Sister    • Parkinsonism Sister    • Breast cancer Sister    • No Known Problems Sister    • No Known Problems Sister    • No Known Problems Maternal Aunt    • No Known Problems Maternal Aunt    • No Known Problems Paternal Aunt    • No Known Problems Paternal Aunt    • Brain cancer Paternal Uncle    • No Known Problems Maternal Grandmother    • No Known Problems Maternal Grandfather    • No Known Problems Paternal Grandmother    • No Known Problems Paternal Grandfather    • No Known Problems Daughter    • No Known Problems Son      Social History     Tobacco Use   • Smoking status: Every Day     Current packs/day: 1.00     Average packs/day: 1 pack/day for 52.4 years (52.4 ttl pk-yrs)     Types: Cigarettes     Start date: 1/1/1972   • Smokeless tobacco: Never   Vaping Use   • Vaping status: Never Used   Substance and Sexual Activity   • Alcohol use: Not Currently     Comment: occasional   • Drug use: Yes     Frequency: 7.0 times per week     Types: Marijuana   • Sexual activity: Not Currently     Birth control/protection: Post-menopausal     Current Outpatient Medications on File Prior to Visit   Medication Sig   • acetaminophen (TYLENOL) 325 mg tablet Take 1 tablet (325 mg total) by mouth as needed for headaches For headache   • albuterol (PROVENTIL HFA,VENTOLIN HFA) 90 mcg/act inhaler Inhale 2 puffs every 6 (six) hours as needed for wheezing   • aspirin 81 mg chewable tablet Chew 81 mg daily   • atorvastatin (LIPITOR) 80 mg tablet Take 1 tablet (80 mg total) by mouth daily   • cyclobenzaprine (FLEXERIL) 10 mg tablet Take 1 tablet (10 mg total) by mouth 3 (three) times a day as needed for muscle spasms   • Empagliflozin (Jardiance) 10 MG TABS tablet Take 1 tablet (10 mg total) by mouth every morning   • ezetimibe (ZETIA) 10 mg tablet Take 1 tablet (10 mg total) by mouth daily   •  "levothyroxine 112 mcg tablet Take 112 mcg by mouth daily   • losartan (COZAAR) 50 mg tablet Take 1 tablet (50 mg total) by mouth daily   • metoprolol succinate (TOPROL-XL) 25 mg 24 hr tablet Take 1 tablet (25 mg total) by mouth daily   • mirtazapine (REMERON) 15 mg tablet TAKE 1 TABLET BY MOUTH DAILY AT BEDTIME   • pantoprazole (PROTONIX) 40 mg tablet Take 1 tablet (40 mg total) by mouth daily   • pregabalin (LYRICA) 150 mg capsule Take 1 capsule (150 mg total) by mouth 3 (three) times a day   • triamcinolone (KENALOG) 0.1 % cream Apply topically as needed for irritation   • valACYclovir (VALTREX) 1,000 mg tablet Take 1 tablet (1,000 mg total) by mouth 2 (two) times a day for 14 days   • zolpidem (Ambien) 10 mg tablet Take 1 tablet (10 mg total) by mouth daily at bedtime as needed for sleep   • doxepin (SINEquan) 10 mg capsule TAKE 1 CAPSULE (10 MG TOTAL) BY MOUTH DAILY AT BEDTIME (Patient not taking: Reported on 5/3/2024)   • hydrOXYzine HCL (ATARAX) 25 mg tablet Take 1 tablet (25 mg total) by mouth 2 (two) times a day as needed for itching (Patient not taking: Reported on 3/25/2024)   • levothyroxine 100 mcg tablet Take 1 tablet (100 mcg total) by mouth daily Take 112 mcg daily on Mondays Wednesdays and Fridays alternate with 100 mcg all other days (Patient not taking: Reported on 5/22/2024)   • [DISCONTINUED] Fluticasone-Salmeterol (Advair) 250-50 mcg/dose inhaler Inhale 1 puff 2 (two) times a day Rinse mouth after use.   • [DISCONTINUED] rifaximin (XIFAXAN) 550 mg tablet Take 1 tablet (550 mg total) by mouth every 8 (eight) hours for 14 days (Patient not taking: Reported on 5/22/2024)     Allergies   Allergen Reactions   • Augmentin [Amoxicillin-Pot Clavulanate] Nausea Only     PT stated she only allergic to medication AUGMENTIN   • Meloxicam GI Intolerance     \"brings on diverticulitis\"     Immunization History   Administered Date(s) Administered   • COVID-19 MODERNA VACC 0.5 ML IM 07/20/2022, 08/22/2022   • " "COVID-19 Moderna Vac BIVALENT 12 Yr+ IM 0.5 ML 01/05/2023   • Influenza, recombinant, quadrivalent,injectable, preservative free 12/08/2020   • Influenza, seasonal, injectable 11/15/2011, 10/28/2013   • Pneumococcal Conjugate Vaccine 20-valent (Pcv20), Polysace 10/04/2022, 10/04/2022   • Pneumococcal Polysaccharide PPV23 12/08/2020     Objective     /90 (BP Location: Left arm, Patient Position: Sitting, Cuff Size: Standard)   Pulse 64   Temp 97.5 °F (36.4 °C) (Temporal)   Ht 5' 4\" (1.626 m)   Wt 71.8 kg (158 lb 3.2 oz)   LMP  (LMP Unknown)   SpO2 96%   BMI 27.15 kg/m²     Physical Exam    Gen: NAD  Heent: atraumatic, normocephalic  Mouth: is moist  Neck: is supple, no JVD, no carotid bruits.   Heart: is regular Normal s1, s2,  Lungs: are clear to auscultation  Abd: Distended in the epigastric region mildly tender  Ext: no edema, distal pulses normal  Skin: no rashes, ulcers  Mood: normal affect  Neuro: AAOx3   Administrative Statements   I have spent a total time of 35 minutes on 05/22/24 In caring for this patient including Risk factor reductions, Impressions, Counseling / Coordination of care, Documenting in the medical record, Reviewing / ordering tests, medicine, procedures  , Obtaining or reviewing history  , and Communicating with other healthcare professionals .  "

## 2024-05-22 NOTE — TELEPHONE ENCOUNTER
PA for Xifaxan Approved     Date(s) approved until 12/31/2024    Case #    Patient advised by          [x] [employer] Message  [] Phone call   []LMOM  []L/M to call office as no active Communication consent on file  []Unable to leave detailed message as VM not approved on Communication consent       Pharmacy advised by    [x]Fax  []Phone call    Approval letter scanned into Media Yes

## 2024-05-23 LAB
ALBUMIN/CREAT UR: NORMAL MG/G CREAT
CREAT UR-MCNC: 73 MG/DL (ref 20–275)
MICROALBUMIN UR-MCNC: <0.2 MG/DL

## 2024-05-23 NOTE — PSYCH
No Call No Show  No Charge      Isaura Brito did not attend today's (05/28/24) appointment and did not inform clerical staff of their potential absence on days prior to the evaluation.  Link was sent to patient via text message to phone number  on file and provider waited online until 11:15 AM prior to disconnecting. Treatment team will attempt outreach and reschedule the patient accordingly. If the patient cannot be contacted directly, a HIPPA compliant voicemail will be left.     Treatment Plan not completed within required time limits due to: Isaura Brito no show appointment on 05/28/24

## 2024-05-28 ENCOUNTER — TELEMEDICINE (OUTPATIENT)
Dept: PSYCHIATRY | Facility: CLINIC | Age: 64
End: 2024-05-28

## 2024-05-28 ENCOUNTER — TELEPHONE (OUTPATIENT)
Age: 64
End: 2024-05-28

## 2024-05-28 ENCOUNTER — TELEPHONE (OUTPATIENT)
Dept: PSYCHIATRY | Facility: CLINIC | Age: 64
End: 2024-05-28

## 2024-05-28 DIAGNOSIS — K44.9 HIATAL HERNIA WITH GERD: Primary | ICD-10-CM

## 2024-05-28 DIAGNOSIS — Z91.199 NO-SHOW FOR APPOINTMENT: Primary | ICD-10-CM

## 2024-05-28 DIAGNOSIS — K21.9 HIATAL HERNIA WITH GERD: Primary | ICD-10-CM

## 2024-05-28 NOTE — TELEPHONE ENCOUNTER
Patient is calling today to request a referral for hernia surgery, sha states she spoke to Dr Owen last week and was told that she would get her in contact with a surgeon, please review and advise

## 2024-05-28 NOTE — TELEPHONE ENCOUNTER
Patient called and lvm stating she was on for her appt today and waited 15-20 minutes and noone arrived for her appt, patient would like to reschedule, writer called patient back and lvm for her to call back at her earliest convenience

## 2024-05-28 NOTE — TELEPHONE ENCOUNTER
Patient LVM returning office phone call.    Writer called and spoke with patient. Patient is now scheduled tomorrow (5/29 @3:30)  Writer asked if they would like the appointment to be in person or to try virtual another way and/or be given [employer] Help Desk Number. Patient stated they would like to have the appointment virtually via text again and bad connection was not on her end and did not want it to count against her. Writer stated provider would be made aware of the request and situation.
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
## 2024-05-28 NOTE — TELEPHONE ENCOUNTER
Please tell patient that I have referred her to Dr. Daron Mehta for her hiatal hernia.  Please give her the number that she can reach his office.

## 2024-05-29 ENCOUNTER — TELEMEDICINE (OUTPATIENT)
Dept: PSYCHIATRY | Facility: CLINIC | Age: 64
End: 2024-05-29
Payer: COMMERCIAL

## 2024-05-29 DIAGNOSIS — G47.09 OTHER INSOMNIA: Primary | ICD-10-CM

## 2024-05-29 DIAGNOSIS — F41.1 GAD (GENERALIZED ANXIETY DISORDER): ICD-10-CM

## 2024-05-29 DIAGNOSIS — F32.89 OTHER DEPRESSION: ICD-10-CM

## 2024-05-29 PROCEDURE — 99215 OFFICE O/P EST HI 40 MIN: CPT | Performed by: NURSE PRACTITIONER

## 2024-05-29 RX ORDER — FAMOTIDINE 20 MG/1
20 TABLET, FILM COATED ORAL 2 TIMES DAILY
COMMUNITY

## 2024-06-03 ENCOUNTER — TELEPHONE (OUTPATIENT)
Dept: PSYCHIATRY | Facility: CLINIC | Age: 64
End: 2024-06-03

## 2024-06-03 ENCOUNTER — TELEPHONE (OUTPATIENT)
Dept: HEMATOLOGY ONCOLOGY | Facility: CLINIC | Age: 64
End: 2024-06-03

## 2024-06-03 ENCOUNTER — TELEPHONE (OUTPATIENT)
Age: 64
End: 2024-06-03

## 2024-06-03 ENCOUNTER — TELEPHONE (OUTPATIENT)
Dept: SLEEP CENTER | Facility: CLINIC | Age: 64
End: 2024-06-03

## 2024-06-03 DIAGNOSIS — F51.01 PRIMARY INSOMNIA: ICD-10-CM

## 2024-06-03 NOTE — TELEPHONE ENCOUNTER
I called Isaura in response to a referral that was received for patient to establish care with Thoracic Surgery.     Outreach was made to complete patient's intake questionnaire .    I left a voicemail explaining the reason for my call and advised patient to call [facility] at [phone].  Another attempt will be made to contact patient.

## 2024-06-03 NOTE — TELEPHONE ENCOUNTER
Patient left VM in regard to sending a message to provider. Patient stated that since cutting down on medication Remeron, patient currently has no stomach issues. Stomach issues were side effects of medication.   Writer will forward message to provider.

## 2024-06-03 NOTE — TELEPHONE ENCOUNTER
Patient called today and wanted to leave a message for Dr. Salazar.    Patient would like to let Dr. Salazar to know patient is stopping the medication Remeron,the Psych medication due to extreme muscle fatigue and pain. She mention that Dr. Salazar spoke to her psych doctor Mary Su, about taking Lamictal however patient is concerned about the side affects (daniel allison syndrome). Patient stated she will be stopping the Remeron completely on Wednesday 6/5/24. Patient is also out of her sleeping medication and would like refill.     If someone could please reach out to patient.

## 2024-06-03 NOTE — TELEPHONE ENCOUNTER
Pt calling to leave message for Dr. Owen. Stated the GI issues she was experiencing were a result of the side effects from the Remeron she was taking; after 2 days of stopping the medication her symptoms were gone. Stated she will be completely off medication as of this Wednesday.

## 2024-06-04 DIAGNOSIS — I10 HTN (HYPERTENSION), BENIGN: ICD-10-CM

## 2024-06-04 DIAGNOSIS — F51.01 PRIMARY INSOMNIA: ICD-10-CM

## 2024-06-04 DIAGNOSIS — E02 SUBCLINICAL IODINE-DEFICIENCY HYPOTHYROIDISM: Primary | ICD-10-CM

## 2024-06-04 RX ORDER — ZOLPIDEM TARTRATE 10 MG/1
10 TABLET ORAL
Qty: 30 TABLET | Refills: 1 | Status: CANCELLED | OUTPATIENT
Start: 2024-06-04

## 2024-06-04 RX ORDER — LOSARTAN POTASSIUM 50 MG/1
50 TABLET ORAL DAILY
Qty: 90 TABLET | Refills: 1 | Status: SHIPPED | OUTPATIENT
Start: 2024-06-04

## 2024-06-04 RX ORDER — METOPROLOL SUCCINATE 25 MG/1
25 TABLET, EXTENDED RELEASE ORAL DAILY
Qty: 90 TABLET | Refills: 1 | Status: SHIPPED | OUTPATIENT
Start: 2024-06-04

## 2024-06-04 RX ORDER — ZOLPIDEM TARTRATE 10 MG/1
10 TABLET ORAL
Qty: 30 TABLET | Refills: 1 | Status: SHIPPED | OUTPATIENT
Start: 2024-06-04

## 2024-06-04 NOTE — TELEPHONE ENCOUNTER
Per Dr. Salazar:   Rx for zolpidem was provided but has since been cancelled due to patient being prescribed this medication by PCP.      Call placed to patient, advised to contact PCP Dr. Owen to request refill, as this provider who has been prescribing this medication.  Patient states she has already spoken to Dr. Owen's staff and medication has been refilled.    Dr. Owen notified regarding request for refill.

## 2024-06-04 NOTE — TELEPHONE ENCOUNTER
Reason for call:   [x] Refill   [] Prior Auth  [] Other:     Office:   [x] PCP/Provider -     Sarah Owen MD  PCP - General  [] Specialty/Provider -     Medication:     losartan (COZAAR) 50 mg tablet- one daily # 90     metoprolol succinate (TOPROL-XL) 25 mg 24 hr tablet - one daily # 90    zolpidem (Ambien) 10 mg tablet - one at bedtime # 30      Pharmacy: [facility]/pharmacy #[id number] [address]      Does the patient have enough for 3 days?   [] Yes   [x] No - Send as HP to POD

## 2024-06-05 ENCOUNTER — PATIENT MESSAGE (OUTPATIENT)
Dept: GASTROENTEROLOGY | Facility: MEDICAL CENTER | Age: 64
End: 2024-06-05

## 2024-06-05 ENCOUNTER — TELEPHONE (OUTPATIENT)
Dept: HEMATOLOGY ONCOLOGY | Facility: CLINIC | Age: 64
End: 2024-06-05

## 2024-06-05 DIAGNOSIS — F51.01 PRIMARY INSOMNIA: ICD-10-CM

## 2024-06-05 RX ORDER — LEVOTHYROXINE SODIUM 112 UG/1
112 TABLET ORAL DAILY
Qty: 30 TABLET | Refills: 5 | Status: SHIPPED | OUTPATIENT
Start: 2024-06-05

## 2024-06-05 RX ORDER — ZOLPIDEM TARTRATE 10 MG/1
10 TABLET ORAL
Qty: 30 TABLET | Refills: 0 | Status: CANCELLED | OUTPATIENT
Start: 2024-06-05

## 2024-06-05 NOTE — TELEPHONE ENCOUNTER
Thank you for the feedback.  I had referred Isaura to [facility] for her sleep disorder and would prefer if Dr. Salazar would pleasetake over the prescription for her Ambien now and moving forward, if that is okay?

## 2024-06-05 NOTE — TELEPHONE ENCOUNTER
I called Isaura in response to a referral that was received for patient to establish care with [facility].     Outreach was made to complete patient's intake questionnaire .    Patient declined scheduling. The referral has been closed.

## 2024-06-06 NOTE — TELEPHONE ENCOUNTER
"Takes melatonin 10 mg at 6 pm  Zolpidem 10 mg at 9 pm     When she does not sleep well, she takes a very high amount of melatonin, for example she can take as much as 150 mg- she is concerned this may not be safe.  I advised that is a very high dose and probably not good to take.  Definitely not with zolpidem.    She still has a \"stash\" of zolpidem at home, about 18 pills left, she tries to avoid taking these.  She notes she never takes more than 20 mg of zolpidem.  I explained several times that that dose would not be recommended.    As we left things, I will have my office reach out to her to schedule her for a follow-up visit in several weeks.  Will also try to touch base with Dr. Owen and also Mary Su, perhaps in a three-way call if that was at all possible.     "

## 2024-06-09 DIAGNOSIS — E78.5 DYSLIPIDEMIA: ICD-10-CM

## 2024-06-10 NOTE — TELEPHONE ENCOUNTER
Refill must be reviewed and completed by the office or provider. The refill is unable to be approved or denied by the medication management team.      Last seen 06.2023, appointment 06.2024 - Please review to see if the refill is appropriate.

## 2024-06-12 RX ORDER — ATORVASTATIN CALCIUM 80 MG/1
80 TABLET, FILM COATED ORAL DAILY
Qty: 90 TABLET | Refills: 3 | Status: SHIPPED | OUTPATIENT
Start: 2024-06-12

## 2024-06-27 DIAGNOSIS — E02 SUBCLINICAL IODINE-DEFICIENCY HYPOTHYROIDISM: ICD-10-CM

## 2024-06-27 RX ORDER — LEVOTHYROXINE SODIUM 112 UG/1
112 TABLET ORAL DAILY
Qty: 90 TABLET | Refills: 1 | Status: SHIPPED | OUTPATIENT
Start: 2024-06-27

## 2024-07-01 DIAGNOSIS — M79.7 FIBROMYALGIA: ICD-10-CM

## 2024-07-02 ENCOUNTER — OFFICE VISIT (OUTPATIENT)
Dept: SLEEP CENTER | Facility: CLINIC | Age: 64
End: 2024-07-02
Payer: COMMERCIAL

## 2024-07-02 VITALS
SYSTOLIC BLOOD PRESSURE: 130 MMHG | BODY MASS INDEX: 26.12 KG/M2 | WEIGHT: 153 LBS | HEIGHT: 64 IN | DIASTOLIC BLOOD PRESSURE: 70 MMHG

## 2024-07-02 DIAGNOSIS — G47.23 IRREGULAR SLEEP-WAKE RHYTHM: ICD-10-CM

## 2024-07-02 DIAGNOSIS — F51.04 CHRONIC INSOMNIA: Primary | ICD-10-CM

## 2024-07-02 DIAGNOSIS — F41.1 GAD (GENERALIZED ANXIETY DISORDER): ICD-10-CM

## 2024-07-02 PROCEDURE — G2211 COMPLEX E/M VISIT ADD ON: HCPCS | Performed by: PSYCHIATRY & NEUROLOGY

## 2024-07-02 PROCEDURE — 99214 OFFICE O/P EST MOD 30 MIN: CPT | Performed by: PSYCHIATRY & NEUROLOGY

## 2024-07-02 RX ORDER — PREGABALIN 150 MG/1
150 CAPSULE ORAL 3 TIMES DAILY
Qty: 90 CAPSULE | Refills: 0 | Status: SHIPPED | OUTPATIENT
Start: 2024-07-02

## 2024-07-02 NOTE — Clinical Note
Gelacio Alejo, want to see if you could see this patient.  She is not an ideal candidate but has a lack of insight and I think would benefit from cognitive restructuring or other aspects.  Sleep restriction wouldn't be good for her so would avoid that but other aspects could be helpful

## 2024-07-02 NOTE — PATIENT INSTRUCTIONS
-I would recommend seeing [facility] to see if they can do actigraphy    -Please follow up with Mary to discuss other medications    I will also try to get you in to see our sleep therapist, Sapna Soto        Nursing Support:  When: Monday through Friday 7A-5PM except holidays  Where: Our direct line is [phone].    If you are having a true emergency please call 911.  In the event that the line is busy or it is after hours please leave a voice message and we will return your call.  Please speak clearly, leaving your full name, birth date, best number to reach you and the reason for your call.   Medication refills: We will need the name of the medication, the dosage, the ordering provider, whether you get a 30 or 90 day refill, and the pharmacy name and address.  Medications will be ordered by the provider only.  Nurses cannot call in prescriptions.  Please allow 7 days for medication refills.  Physician requested updates: If your provider requested that you call with an update after starting medication, please be ready to provide us the medication and dosage, what time you take your medication, the time you attempt to fall asleep, time you fall asleep, when you wake up, and what time you get out of bed.  Sleep Study Results: We will contact you with sleep study results and/or next steps after the physician has reviewed your testing.

## 2024-07-02 NOTE — PROGRESS NOTES
Assessment/Plan:    1. Chronic insomnia  Assessment & Plan:  -complicated case  -zolpidem lost effectiveness and she had misuse of this so would avoid if possible  -PTSD//anxiety likely contribute in some way  -great that she has a consistent sleep schedule but suspect she is going to bed too early - discussed this in detail with her but she did not agree with my rationale.   -will refer to [facility]BT-[facility], even if she is not candidate for full program, perhaps some aspects as such as cognitive restructuring, stimulus control, sleep hygiene, and relaxation could be helpful  -discussed it would be ideal to use actigraphy to understand her sleep patterns, we do not offer this here.  Advised she could contact [facility] to see if she could have a second opinion, as I know they have actigraphy  -can't rule out a component of sleep state misperception  -advised it is not safe to drink alcohol for sleep, especially in the quantity she has    Orders:  -     Ambulatory referral to Psych Services; Future  2. Irregular sleep-wake rhythm  -     Ambulatory referral to Psych Services; Future  3. CARLOS MANUEL (generalized anxiety disorder)      I discussed the case with Mary Su from psychiatry.  She mentioned that in her opinion, Zyprexa could be helpful and worth trying but the patient was not open to this in the past.  She also mention consideration for medications that are SNRIs.    Subjective:      Patient ID: Isaura Brito is a 63 y.o. female.    HPI  This is a 63-year-old female with a history of severe insomnia who returns in a follow-up visit.  I last saw her in March 2024.  We discussed previously that quetiapine was helpful for his sleep but she had side effects (hand pain and worsened fibromyalgia) and did not want to stay on that medication.  She is been prescribed zolpidem chronically since least 2017.  At previous visits we discussed the importance of daily regular light therapy at a consistent time.  Is also  "followed with Mary Su from psychiatry, who I had referred her to.  The patient was diagnosed with PTSD, CARLOS MANUEL, MDD by psychiatry.  They had recommended lamotrigine but the patient did not want to pursue that.  Previously the patient had trials of Lunesta, trazodone, doxepin and Valium.  She had been taking mirtazapine which she stopped due to concerns about bloating in her stomach, muscle fatigue and pain.  Psychiatry obtained a history that she would take zolpidem from her neighbor as well and doses higher than the 10 mg dose that was prescribed.  The patient also explained to me that she would do this as well or take melatonin doses as high as 150 mg when not sleeping well.    Mirtazapine did help her sleep for a few weeks     She describes sleeping less than 5 hours a night.  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  This has kept her on a schedule, she is active in the day    When she gets home at 530-6 PM.  Takes 130 mg melatonin \"as it takes hours to kick in\".   Was prescribed doxepin 10 mg by her PCP for spider bites- takes at 7 pm.  Drinking vodka- up to 8 shots to try to help fall asleep  On the sofa relaxing at 730-8 pm.  Laying there and watching her phone until she gets sleepy.  Hopes to fall asleep at 9 pm to get up at 7 am.  Estimates total of 3-4 hours up 7.5 hours a night.  Feels awake when she wakes in the middle of the night.     She is rarely fresh upon awakening, does not feel sleepy during the day.  She does not take naps and does not doze.    Takes Lyrica 150 mg three times a day, last dose at bedtime.    Smokes marijuana-     Denies depression  No anxiety             [place] Sleepiness Scale  Sitting and reading: (P) Would never doze  Watching TV: (P) Would never doze  Sitting, inactive in a public place (e.g. a theatre or a meeting): (P) Would never doze  As a passenger in a car for an hour without a break: (P) Would never doze  Lying down to rest in the afternoon when " "circumstances permit: (P) Would never doze  Sitting and talking to someone: (P) Would never doze  Sitting quietly after a lunch without alcohol: (P) Would never doze  In a car, while stopped for a few minutes in traffic: (P) Would never doze  Total score: (P) 0      Review of Systems  (As above unless noted)    Objective:      /70 (BP Location: Right arm, Patient Position: Sitting, Cuff Size: Standard)   Ht 5' 4\" (1.626 m)   Wt 69.4 kg (153 lb)   LMP  (LMP Unknown)   BMI 26.26 kg/m²          Physical Exam    "

## 2024-07-04 PROBLEM — F43.10 PTSD (POST-TRAUMATIC STRESS DISORDER): Status: ACTIVE | Noted: 2024-07-04

## 2024-07-04 PROBLEM — F41.1 GAD (GENERALIZED ANXIETY DISORDER): Status: ACTIVE | Noted: 2024-07-04

## 2024-07-04 NOTE — ASSESSMENT & PLAN NOTE
-complicated case  -zolpidem lost effectiveness and she had misuse of this so would avoid if possible  -PTSD//anxiety likely contribute in some way  -great that she has a consistent sleep schedule but suspect she is going to bed too early - discussed this in detail with her but she did not agree with my rationale.   -will refer to CBT-I, even if she is not candidate for full program, perhaps some aspects as such as cognitive restructuring, stimulus control, sleep hygiene, and relaxation could be helpful  -discussed it would be ideal to use actigraphy to understand her sleep patterns, we do not offer this here.  Advised she could contact [facility] to see if she could have a second opinion, as I know they have actigraphy  -can't rule out a component of sleep state misperception  -advised it is not safe to drink alcohol for sleep, especially in the quantity she has

## 2024-07-18 ENCOUNTER — RA CDI HCC (OUTPATIENT)
Dept: OTHER | Facility: HOSPITAL | Age: 64
End: 2024-07-18

## 2024-07-18 PROBLEM — U07.1 COVID-19: Status: RESOLVED | Noted: 2022-06-01 | Resolved: 2024-07-18

## 2024-07-23 ENCOUNTER — OFFICE VISIT (OUTPATIENT)
Age: 64
End: 2024-07-23
Payer: COMMERCIAL

## 2024-07-23 VITALS
TEMPERATURE: 97.9 F | HEIGHT: 64 IN | WEIGHT: 148.4 LBS | DIASTOLIC BLOOD PRESSURE: 74 MMHG | HEART RATE: 75 BPM | OXYGEN SATURATION: 96 % | BODY MASS INDEX: 25.33 KG/M2 | SYSTOLIC BLOOD PRESSURE: 130 MMHG

## 2024-07-23 DIAGNOSIS — I10 HTN (HYPERTENSION), BENIGN: ICD-10-CM

## 2024-07-23 DIAGNOSIS — B00.1 COLD SORE: ICD-10-CM

## 2024-07-23 DIAGNOSIS — T14.8XXA BITES: Primary | ICD-10-CM

## 2024-07-23 DIAGNOSIS — M79.18 MYOFASCIAL PAIN: ICD-10-CM

## 2024-07-23 DIAGNOSIS — F51.04 CHRONIC INSOMNIA: ICD-10-CM

## 2024-07-23 PROBLEM — F51.01 PRIMARY INSOMNIA: Status: ACTIVE | Noted: 2024-07-23

## 2024-07-23 PROBLEM — H66.91: Status: ACTIVE | Noted: 2024-07-23

## 2024-07-23 PROCEDURE — 99214 OFFICE O/P EST MOD 30 MIN: CPT | Performed by: INTERNAL MEDICINE

## 2024-07-23 PROCEDURE — G2211 COMPLEX E/M VISIT ADD ON: HCPCS | Performed by: INTERNAL MEDICINE

## 2024-07-23 RX ORDER — VALACYCLOVIR HYDROCHLORIDE 1 G/1
1000 TABLET, FILM COATED ORAL 2 TIMES DAILY
Qty: 28 TABLET | Refills: 0 | Status: SHIPPED | OUTPATIENT
Start: 2024-07-23 | End: 2024-08-06

## 2024-07-23 RX ORDER — CEPHALEXIN 500 MG/1
500 CAPSULE ORAL 3 TIMES DAILY
Qty: 30 CAPSULE | Refills: 0 | Status: SHIPPED | OUTPATIENT
Start: 2024-07-23 | End: 2024-08-02

## 2024-07-23 RX ORDER — DOXEPIN HYDROCHLORIDE 10 MG/1
CAPSULE ORAL
COMMUNITY
Start: 2024-06-14 | End: 2024-07-23

## 2024-07-23 RX ORDER — METOPROLOL SUCCINATE 25 MG/1
25 TABLET, EXTENDED RELEASE ORAL DAILY
Qty: 90 TABLET | Refills: 1 | Status: SHIPPED | OUTPATIENT
Start: 2024-07-23

## 2024-07-23 RX ORDER — DOXEPIN HYDROCHLORIDE 25 MG/1
25 CAPSULE ORAL
Qty: 30 CAPSULE | Refills: 2 | Status: SHIPPED | OUTPATIENT
Start: 2024-07-23

## 2024-07-23 RX ORDER — METHOCARBAMOL 500 MG/1
500 TABLET, FILM COATED ORAL 3 TIMES DAILY PRN
Qty: 20 TABLET | Refills: 0 | Status: SHIPPED | OUTPATIENT
Start: 2024-07-23

## 2024-07-23 NOTE — PATIENT INSTRUCTIONS
Restart metoprolol 25 mg daily.  Blood pressure was slightly elevated today this medication is important for your heart.  You may find that when she started the metoprolol you will sleep better because your pressure will be better controlled.  If however you wish to take the doxepin you could try the doxepin at 25 mg at bedtime.  We will need to check an EKG at your next visit to see the effects of it on your heart.    Keflex has been prescribed for your rash along with your ear infection as well.  Try and avoid picking your bites because they get secondarily infected    Valtrex has been prescribed for your cold sore    Work to be done prior to next visit

## 2024-07-23 NOTE — PROGRESS NOTES
Ambulatory Visit  Name: Isaura Brito      : 1960      MRN: [id number]  Encounter Provider: Sarah Owen MD  Encounter Date: 2024   Encounter department: [facility] [place]    Assessment & Plan   1. Bites  -     cephalexin (KEFLEX) 500 mg capsule; Take 1 capsule (500 mg total) by mouth 3 (three) times a day for 10 days  2. Cold sore  -     valACYclovir (VALTREX) 1,000 mg tablet; Take 1 tablet (1,000 mg total) by mouth 2 (two) times a day for 14 days  3. Myofascial pain  -     methocarbamol (ROBAXIN) 500 mg tablet; Take 1 tablet (500 mg total) by mouth 3 (three) times a day as needed for muscle spasms  4. HTN (hypertension), benign  -     metoprolol succinate (TOPROL-XL) 25 mg 24 hr tablet; Take 1 tablet (25 mg total) by mouth daily  5. Chronic insomnia  -     doxepin (SINEquan) 25 mg capsule; Take 1 capsule (25 mg total) by mouth daily at bedtime           Chief Complaint   Patient presents with   • Earache     Draining since last week   • Insect Bite     Patient states provider is aware      #1.Multiple inflamed papules diffuse throughout her body.C/O insect bites.    #2. Rash over face above her upper lip. Chronic and recurrent     #3.  Chronic musculoskeletal pain, neck and back pain, poor sleeping conditions    #4.  Chronic insomnia          Review of Systems   HENT:  Positive for ear discharge.    Gastrointestinal:  Negative for abdominal pain.        Resolved   Skin:  Positive for rash.        #1.Multiple inflamed papules diffuse throughout her body.C/O insect bites.    #2. Rash over face above her upper lip. Chronic and recurrent    Psychiatric/Behavioral:  Positive for sleep disturbance.    All other systems reviewed and are negative.    Past Medical History:   Diagnosis Date   • Abnormal echocardiogram    • Abnormal stress test    • Anxiety    • Arthritis 2020   • Asthma    • Brachial neuritis    • Coronary artery disease    • Depression    • Diabetes  mellitus (HCC)    • Disease of thyroid gland    • Displacement of intervertebral disc of mid-cervical region    • Diverticulitis of colon    • Fibromyalgia    • Frequent headaches    • GERD (gastroesophageal reflux disease)    • Herniated nucleus pulposus    • History of arthritis    • History of asthma    • History of cardiac disorder    • History of chest pain    • History of diverticulitis    • History of fatigue    • History of hearing loss    • History of hemoptysis    • History of herpes simplex infection    • History of hiatal hernia    • History of insect bite     INFECTED   • History of memory loss    • History of neck disorder    • History of reflex sympathetic dystrophy    • History of restless legs syndrome    • History of shortness of breath    • History of spinal stenosis    • HL (hearing loss)    • Hyperlipidemia    • Hyperlipidemia    • Hypertension    • Skin rash    • Somnolence, daytime    • Spinal stenosis of cervical region      Past Surgical History:   Procedure Laterality Date   • BACK SURGERY     • BACK SURGERY      LOWER BACK SURGERY   • CARDIAC CATHETERIZATION      HISTORY OF CORONARY ANGIOGRAPHY WITH CONCOMITANT LEFT HEART CATHETERIZATION   • CORONARY ANGIOPLASTY WITH STENT PLACEMENT     • EAR SURGERY     • SPINE SURGERY     • TONSILLECTOMY     • TYMPANOPLASTY     • US GUIDED BREAST BIOPSY LEFT COMPLETE Left 03/02/2021     Family History   Problem Relation Age of Onset   • Coronary artery disease Mother         ATHEROMA   • Heart disease Mother    • Diabetes Mother    • Hypertension Mother    • Asthma Mother    • No Known Problems Father         NO PERTINENT FAMILY HISTORY   • Coronary artery disease Sister         ATHEROMA   • Heart disease Sister    • Stroke Sister    • Diabetes Sister    • Hypertension Sister    • Parkinsonism Sister    • Breast cancer Sister    • No Known Problems Sister    • No Known Problems Sister    • No Known Problems Maternal Aunt    • No Known Problems Maternal  Aunt    • No Known Problems Paternal Aunt    • No Known Problems Paternal Aunt    • Brain cancer Paternal Uncle    • No Known Problems Maternal Grandmother    • No Known Problems Maternal Grandfather    • No Known Problems Paternal Grandmother    • No Known Problems Paternal Grandfather    • No Known Problems Daughter    • No Known Problems Son      Social History     Tobacco Use   • Smoking status: Every Day     Current packs/day: 1.00     Average packs/day: 1 pack/day for 52.6 years (52.6 ttl pk-yrs)     Types: Cigarettes     Start date: 1/1/1972   • Smokeless tobacco: Never   Vaping Use   • Vaping status: Never Used   Substance and Sexual Activity   • Alcohol use: Yes     Comment: occasional   • Drug use: Yes     Frequency: 7.0 times per week     Types: Marijuana   • Sexual activity: Not Currently     Birth control/protection: Post-menopausal     Current Outpatient Medications on File Prior to Visit   Medication Sig   • acetaminophen (TYLENOL) 325 mg tablet Take 1 tablet (325 mg total) by mouth as needed for headaches For headache   • albuterol (PROVENTIL HFA,VENTOLIN HFA) 90 mcg/act inhaler Inhale 2 puffs every 6 (six) hours as needed for wheezing   • aspirin 81 mg chewable tablet Chew 81 mg daily   • atorvastatin (LIPITOR) 80 mg tablet TAKE 1 TABLET BY MOUTH EVERY DAY   • cyclobenzaprine (FLEXERIL) 10 mg tablet Take 1 tablet (10 mg total) by mouth 3 (three) times a day as needed for muscle spasms   • Empagliflozin (Jardiance) 10 MG TABS tablet Take 1 tablet (10 mg total) by mouth every morning   • ezetimibe (ZETIA) 10 mg tablet Take 1 tablet (10 mg total) by mouth daily   • famotidine (PEPCID) 20 mg tablet Take 20 mg by mouth 2 (two) times a day   • levothyroxine 100 mcg tablet Take 1 tablet (100 mcg total) by mouth daily Take 112 mcg daily on Mondays Wednesdays and Fridays alternate with 100 mcg all other days   • levothyroxine 112 mcg tablet TAKE 1 TABLET BY MOUTH EVERY DAY   • losartan (COZAAR) 50 mg tablet  "Take 1 tablet (50 mg total) by mouth daily   • pantoprazole (PROTONIX) 40 mg tablet Take 1 tablet (40 mg total) by mouth daily   • pregabalin (LYRICA) 150 mg capsule Take 1 capsule (150 mg total) by mouth 3 (three) times a day   • triamcinolone (KENALOG) 0.1 % cream Apply topically as needed for irritation   • hydrOXYzine HCL (ATARAX) 25 mg tablet Take 1 tablet (25 mg total) by mouth 2 (two) times a day as needed for itching (Patient not taking: Reported on 3/25/2024)     Allergies   Allergen Reactions   • Augmentin [Amoxicillin-Pot Clavulanate] Nausea Only     PT stated she only allergic to medication AUGMENTIN   • Meloxicam GI Intolerance     \"brings on diverticulitis\"     Immunization History   Administered Date(s) Administered   • COVID-19 MODERNA VACC 0.5 ML IM 07/20/2022, 08/22/2022   • COVID-19 Moderna Vac BIVALENT 12 Yr+ IM 0.5 ML 01/05/2023   • Influenza, recombinant, quadrivalent,injectable, preservative free 12/08/2020   • Influenza, seasonal, injectable 11/15/2011, 10/28/2013   • Pneumococcal Conjugate Vaccine 20-valent (Pcv20), Polysace 10/04/2022, 10/04/2022   • Pneumococcal Polysaccharide PPV23 12/08/2020     Objective     /74 (BP Location: Left arm, Patient Position: Sitting, Cuff Size: Adult)   Pulse 75   Temp 97.9 °F (36.6 °C) (Temporal)   Ht 5' 4\" (1.626 m)   Wt 67.3 kg (148 lb 6.4 oz)   LMP  (LMP Unknown)   SpO2 96% Comment: ra  BMI 25.47 kg/m²     Physical Exam  Vitals and nursing note reviewed.   Constitutional:       Appearance: Normal appearance.   HENT:      Head: Normocephalic and atraumatic.      Mouth/Throat:      Pharynx: Oropharynx is clear.   Neck:      Vascular: No carotid bruit.   Cardiovascular:      Rate and Rhythm: Normal rate and regular rhythm.      Pulses: Normal pulses.      Heart sounds: Normal heart sounds.   Pulmonary:      Effort: Pulmonary effort is normal.      Breath sounds: Normal breath sounds.   Musculoskeletal:         General: Normal range of motion. "      Cervical back: Normal range of motion.   Skin:     General: Skin is warm and dry.      Findings: Lesion and rash present. Rash is crusting, nodular and papular.      Comments: Diffuse inflamed papules over arms and back with excoriations over back   Neurological:      General: No focal deficit present.      Mental Status: She is alert and oriented to person, place, and time. Mental status is at baseline.   Psychiatric:         Mood and Affect: Mood normal.         Behavior: Behavior normal.         Thought Content: Thought content normal.

## 2024-07-27 DIAGNOSIS — K21.9 GASTROESOPHAGEAL REFLUX DISEASE: ICD-10-CM

## 2024-07-28 RX ORDER — PANTOPRAZOLE SODIUM 40 MG/1
40 TABLET, DELAYED RELEASE ORAL DAILY
Qty: 100 TABLET | Refills: 1 | Status: SHIPPED | OUTPATIENT
Start: 2024-07-28

## 2024-08-27 ENCOUNTER — VBI (OUTPATIENT)
Dept: ADMINISTRATIVE | Facility: OTHER | Age: 64
End: 2024-08-27

## 2024-08-27 NOTE — TELEPHONE ENCOUNTER
08/27/24 12:04 PM     Chart reviewed for Mammogram ; nothing is submitted to the patient's insurance at this time.     Coleen Hsieh   [facility]
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
## 2024-09-03 ENCOUNTER — TELEMEDICINE (OUTPATIENT)
Dept: PSYCHIATRY | Facility: CLINIC | Age: 64
End: 2024-09-03

## 2024-09-03 DIAGNOSIS — M79.7 FIBROMYALGIA: ICD-10-CM

## 2024-09-03 DIAGNOSIS — F51.01 PRIMARY INSOMNIA: Primary | ICD-10-CM

## 2024-09-05 DIAGNOSIS — J45.909 ASTHMA, UNSPECIFIED ASTHMA SEVERITY, UNSPECIFIED WHETHER COMPLICATED, UNSPECIFIED WHETHER PERSISTENT: ICD-10-CM

## 2024-09-05 RX ORDER — ALBUTEROL SULFATE 90 UG/1
2 AEROSOL, METERED RESPIRATORY (INHALATION) EVERY 6 HOURS PRN
Qty: 18 G | Refills: 3 | Status: SHIPPED | OUTPATIENT
Start: 2024-09-05

## 2024-09-05 RX ORDER — PREGABALIN 150 MG/1
150 CAPSULE ORAL 3 TIMES DAILY
Qty: 90 CAPSULE | Refills: 1 | Status: SHIPPED | OUTPATIENT
Start: 2024-09-05

## 2024-09-19 DIAGNOSIS — E11.65 UNCONTROLLED TYPE 2 DIABETES MELLITUS WITH HYPERGLYCEMIA (HCC): ICD-10-CM

## 2024-09-19 RX ORDER — EMPAGLIFLOZIN 10 MG/1
10 TABLET, FILM COATED ORAL EVERY MORNING
Qty: 30 TABLET | Refills: 0 | Status: SHIPPED | OUTPATIENT
Start: 2024-09-19

## 2024-09-19 NOTE — TELEPHONE ENCOUNTER
Patient needs updated blood work. Please place orders. A courtesy refill was provided.     A1C is needed

## 2024-10-07 ENCOUNTER — OFFICE VISIT (OUTPATIENT)
Dept: FAMILY MEDICINE CLINIC | Facility: CLINIC | Age: 64
End: 2024-10-07
Payer: COMMERCIAL

## 2024-10-07 VITALS
TEMPERATURE: 97.8 F | WEIGHT: 154.8 LBS | DIASTOLIC BLOOD PRESSURE: 80 MMHG | HEIGHT: 64 IN | OXYGEN SATURATION: 95 % | BODY MASS INDEX: 26.43 KG/M2 | HEART RATE: 71 BPM | SYSTOLIC BLOOD PRESSURE: 132 MMHG

## 2024-10-07 DIAGNOSIS — L08.9 SKIN INFECTION: ICD-10-CM

## 2024-10-07 DIAGNOSIS — I10 BENIGN ESSENTIAL HTN: ICD-10-CM

## 2024-10-07 DIAGNOSIS — E03.9 HYPOTHYROIDISM, UNSPECIFIED TYPE: ICD-10-CM

## 2024-10-07 DIAGNOSIS — M79.7 FIBROMYALGIA: ICD-10-CM

## 2024-10-07 DIAGNOSIS — I25.10 CORONARY ARTERIOSCLEROSIS: ICD-10-CM

## 2024-10-07 DIAGNOSIS — E11.9 TYPE 2 DIABETES MELLITUS WITHOUT COMPLICATION, WITHOUT LONG-TERM CURRENT USE OF INSULIN (HCC): ICD-10-CM

## 2024-10-07 DIAGNOSIS — Z72.0 TOBACCO ABUSE: ICD-10-CM

## 2024-10-07 DIAGNOSIS — J01.90 ACUTE SINUSITIS, RECURRENCE NOT SPECIFIED, UNSPECIFIED LOCATION: Primary | ICD-10-CM

## 2024-10-07 DIAGNOSIS — E78.00 HYPERCHOLESTEROLEMIA: ICD-10-CM

## 2024-10-07 PROCEDURE — G2211 COMPLEX E/M VISIT ADD ON: HCPCS | Performed by: NURSE PRACTITIONER

## 2024-10-07 PROCEDURE — 99214 OFFICE O/P EST MOD 30 MIN: CPT | Performed by: NURSE PRACTITIONER

## 2024-10-07 RX ORDER — AZITHROMYCIN 250 MG/1
TABLET, FILM COATED ORAL
Qty: 6 TABLET | Refills: 0 | Status: SHIPPED | OUTPATIENT
Start: 2024-10-07 | End: 2024-10-12

## 2024-10-07 RX ORDER — MUPIROCIN 20 MG/G
OINTMENT TOPICAL 3 TIMES DAILY
Qty: 30 G | Refills: 0 | Status: SHIPPED | OUTPATIENT
Start: 2024-10-07

## 2024-10-10 DIAGNOSIS — M79.7 FIBROMYALGIA: ICD-10-CM

## 2024-10-11 RX ORDER — PREGABALIN 150 MG/1
150 CAPSULE ORAL 3 TIMES DAILY
Qty: 90 CAPSULE | Refills: 0 | Status: SHIPPED | OUTPATIENT
Start: 2024-10-11

## 2024-10-14 NOTE — ASSESSMENT & PLAN NOTE
Lab Results   Component Value Date    HGBA1C 7.4 (H) 03/21/2024   Appears to be poorly controlled  Overdue for lab work-orders placed today  Briefly reviewed importance of good glycemic control: Risks of poor control  Will continue Jardiance 10 for now  Complete lab work prior to follow-up in 4 weeks  Will likely need to make medication adjustments    Orders:    Comprehensive metabolic panel; Future    Hemoglobin A1C With EAG; Future    Comprehensive metabolic panel    Hemoglobin A1C With EAG

## 2024-10-14 NOTE — ASSESSMENT & PLAN NOTE
Overdue for lab work  Appears overcorrected  Last TSH in March: 0.14  Reports taking levothyroxine: 112 Monday/Wednesday/Friday; 100 remaining day will check TSH today and dose adjust as results indicate  Orders:    TSH, 3rd generation with Free T4 reflex; Future    TSH, 3rd generation with Free T4 reflex

## 2024-10-14 NOTE — ASSESSMENT & PLAN NOTE
Denies any cardiovascular symptoms   Managed by cardiology  Next follow-up scheduled for December  ASA; Jardiance  Orders:    CBC and differential; Future    CBC and differential

## 2024-10-14 NOTE — ASSESSMENT & PLAN NOTE
/80 today  Continue losartan 50; metoprolol 25 daily  Recheck BP in 1 month    Orders:    CBC and differential; Future    Lipid panel; Future    Comprehensive metabolic panel; Future    CBC and differential    Lipid panel    Comprehensive metabolic panel

## 2024-10-14 NOTE — PROGRESS NOTES
Ambulatory Visit  Name: Isaura Brito      : 1960      MRN: [id number]  Encounter Provider: JORGE A Alfaro  Encounter Date: 10/7/2024   Encounter department: [facility]    Assessment & Plan  Benign essential HTN  /80 today  Continue losartan 50; metoprolol 25 daily  Recheck BP in 1 month    Orders:    CBC and differential; Future    Lipid panel; Future    Comprehensive metabolic panel; Future    CBC and differential    Lipid panel    Comprehensive metabolic panel    Coronary arteriosclerosis  Denies any cardiovascular symptoms   Managed by cardiology  Next follow-up scheduled for December  ASA; Jardiance  Orders:    CBC and differential; Future    CBC and differential    Hypothyroidism, unspecified type  Overdue for lab work  Appears overcorrected  Last TSH in March: 0.14  Reports taking levothyroxine: 112 Monday/Wednesday/Friday; 100 remaining day will check TSH today and dose adjust as results indicate  Orders:    TSH, 3rd generation with Free T4 reflex; Future    TSH, 3rd generation with Free T4 reflex    Fibromyalgia  Managed by pain management  Reports current treatment: Lyrica 150 3 times daily       Type 2 diabetes mellitus without complication, without long-term current use of insulin ([facility])    Lab Results   Component Value Date    HGBA1C 7.4 (H) 2024   Appears to be poorly controlled  Overdue for lab work-orders placed today  Briefly reviewed importance of good glycemic control: Risks of poor control  Will continue Jardiance 10 for now  Complete lab work prior to follow-up in 4 weeks  Will likely need to make medication adjustments    Orders:    Comprehensive metabolic panel; Future    Hemoglobin A1C With EAG; Future    Comprehensive metabolic panel    Hemoglobin A1C With EAG    Tobacco abuse  Strongly encourage cessation  Patient declines at this time       Acute sinusitis, recurrence not specified, unspecified location  Acute sinusitis; abx as ordered;  advised on supportive care; f/u guidance given should sx not improve    Orders:    azithromycin (Zithromax) 250 mg tablet; Take 2 tablets (500 mg total) by mouth daily for 1 day, THEN 1 tablet (250 mg total) daily for 4 days.    Skin infection  Patient reports repeated spider bites  She does have a few scattered lesions that appear to be possible insect bites on her lower extremities and arms  No lesions look overtly infected today  Record review notes this has been an ongoing concern  Trial topical mupirocin today  Discussed dermatology referral (note has been given in the past)  Discussed getting an  evaluation, as she states she has spiders in her motor home  We will reassess at 4-week follow-up  Orders:    mupirocin (BACTROBAN) 2 % ointment; Apply topically 3 (three) times a day    Hypercholesterolemia  Overdue for lab work  Fasting orders placed  Continue Lipitor and Zetia at this time (he reports tolerating without issue)  We will review lab work in 4 weeks-dose adjust if results indicate           Tobacco Cessation Counseling: Tobacco cessation counseling was provided. The patient is sincerely urged to quit consumption of tobacco. She is not ready to quit tobacco.       Note: Patient overdue for preventative care/Medicare wellness    History of Present Illness     Patient presents today to establish primary care in our office.  Transitioning from a another [facility].  She has recently relocated to [place].  Currently living in in her camper.   Presents today with concern of possible sinus infection.  Symptoms started several days ago.  Sinus pressure, congestion, cough.  From a primary care standpoint, prior PCP managed for hypertension, diabetes, hypothyroidism  Her current specialists: Cardiology, psychiatry, sleep medicine  She is overdue for lab work  She will be due for her Medicare wellness exam after the 10th of this month /80 today          Review of Systems  "  Constitutional: Negative.    HENT:  Positive for congestion, postnasal drip and sinus pressure.    Respiratory:  Positive for cough.    Cardiovascular: Negative.    Neurological: Negative.    Psychiatric/Behavioral: Negative.             Objective     /80 (BP Location: Left arm, Patient Position: Sitting, Cuff Size: Adult)   Pulse 71   Temp 97.8 °F (36.6 °C)   Ht 5' 4\" (1.626 m)   Wt 70.2 kg (154 lb 12.8 oz)   LMP  (LMP Unknown)   SpO2 95%   BMI 26.57 kg/m²     Physical Exam  Vitals and nursing note reviewed.   Constitutional:       General: She is not in acute distress.     Appearance: Normal appearance. She is well-developed. She is not ill-appearing.   HENT:      Right Ear: Tympanic membrane and ear canal normal.      Left Ear: Tympanic membrane and ear canal normal.      Nose: Congestion and rhinorrhea present. Rhinorrhea is purulent.      Right Sinus: Maxillary sinus tenderness and frontal sinus tenderness present.      Left Sinus: Maxillary sinus tenderness and frontal sinus tenderness present.      Mouth/Throat:      Mouth: Mucous membranes are moist.   Cardiovascular:      Rate and Rhythm: Normal rate and regular rhythm.   Pulmonary:      Effort: Pulmonary effort is normal. No respiratory distress.      Breath sounds: Normal breath sounds and air entry.   Lymphadenopathy:      Cervical: No cervical adenopathy.   Skin:     General: Skin is warm and dry.   Neurological:      General: No focal deficit present.      Mental Status: She is alert and oriented to person, place, and time.   Psychiatric:         Attention and Perception: Attention normal.         Mood and Affect: Mood normal.         Behavior: Behavior normal.         "

## 2024-10-14 NOTE — ASSESSMENT & PLAN NOTE
Overdue for lab work  Fasting orders placed  Continue Lipitor and Zetia at this time (he reports tolerating without issue)  We will review lab work in 4 weeks-dose adjust if results indicate

## 2024-10-17 ENCOUNTER — TELEPHONE (OUTPATIENT)
Dept: CARDIOLOGY CLINIC | Facility: CLINIC | Age: 64
End: 2024-10-17

## 2024-10-28 DIAGNOSIS — E11.65 UNCONTROLLED TYPE 2 DIABETES MELLITUS WITH HYPERGLYCEMIA (HCC): ICD-10-CM

## 2024-10-28 NOTE — TELEPHONE ENCOUNTER
Reason for call:   [x] Refill   [] Prior Auth  [] Other:     Office:   [x] PCP/Provider - [facility]   [] Specialty/Provider -     Medication:   ~ Empagliflozin (Jardiance) 10 MG TABS tablet - TAKE 1 TABLET (10 MG TOTAL) BY MOUTH EVERY MORNING     Pharmacy:   [facility]pharm[facility] #[id number] - [place], [place] - [address]     Does the patient have enough for 3 days?   [x] Yes   [] No - Send as HP to POD

## 2024-10-31 LAB
ALBUMIN SERPL-MCNC: 4.6 G/DL (ref 3.6–5.1)
ALBUMIN/GLOB SERPL: 1.6 (CALC) (ref 1–2.5)
ALP SERPL-CCNC: 86 U/L (ref 37–153)
ALT SERPL-CCNC: 17 U/L (ref 6–29)
AST SERPL-CCNC: 20 U/L (ref 10–35)
BASOPHILS # BLD AUTO: 39 CELLS/UL (ref 0–200)
BASOPHILS NFR BLD AUTO: 0.7 %
BILIRUB SERPL-MCNC: 0.7 MG/DL (ref 0.2–1.2)
BUN SERPL-MCNC: 20 MG/DL (ref 7–25)
BUN/CREAT SERPL: ABNORMAL (CALC) (ref 6–22)
CALCIUM SERPL-MCNC: 9.6 MG/DL (ref 8.6–10.4)
CHLORIDE SERPL-SCNC: 104 MMOL/L (ref 98–110)
CHOLEST SERPL-MCNC: 160 MG/DL
CHOLEST/HDLC SERPL: 3.2 (CALC)
CO2 SERPL-SCNC: 27 MMOL/L (ref 20–32)
CREAT SERPL-MCNC: 0.85 MG/DL (ref 0.5–1.05)
EOSINOPHIL # BLD AUTO: 94 CELLS/UL (ref 15–500)
EOSINOPHIL NFR BLD AUTO: 1.7 %
ERYTHROCYTE [DISTWIDTH] IN BLOOD BY AUTOMATED COUNT: 12.7 % (ref 11–15)
EST. AVERAGE GLUCOSE BLD GHB EST-MCNC: 140 MG/DL
EST. AVERAGE GLUCOSE BLD GHB EST-SCNC: 7.7 MMOL/L
GFR/BSA.PRED SERPLBLD CYS-BASED-ARV: 77 ML/MIN/1.73M2
GLOBULIN SER CALC-MCNC: 2.9 G/DL (CALC) (ref 1.9–3.7)
GLUCOSE SERPL-MCNC: 110 MG/DL (ref 65–99)
HBA1C MFR BLD: 6.5 % OF TOTAL HGB
HCT VFR BLD AUTO: 46.6 % (ref 35–45)
HDLC SERPL-MCNC: 50 MG/DL
HGB BLD-MCNC: 15.8 G/DL (ref 11.7–15.5)
LDLC SERPL CALC-MCNC: 83 MG/DL (CALC)
LYMPHOCYTES # BLD AUTO: 2332 CELLS/UL (ref 850–3900)
LYMPHOCYTES NFR BLD AUTO: 42.4 %
MCH RBC QN AUTO: 31.3 PG (ref 27–33)
MCHC RBC AUTO-ENTMCNC: 33.9 G/DL (ref 32–36)
MCV RBC AUTO: 92.5 FL (ref 80–100)
MONOCYTES # BLD AUTO: 314 CELLS/UL (ref 200–950)
MONOCYTES NFR BLD AUTO: 5.7 %
NEUTROPHILS # BLD AUTO: 2723 CELLS/UL (ref 1500–7800)
NEUTROPHILS NFR BLD AUTO: 49.5 %
NONHDLC SERPL-MCNC: 110 MG/DL (CALC)
PLATELET # BLD AUTO: 204 THOUSAND/UL (ref 140–400)
PMV BLD REES-ECKER: 11.2 FL (ref 7.5–12.5)
POTASSIUM SERPL-SCNC: 3.9 MMOL/L (ref 3.5–5.3)
PROT SERPL-MCNC: 7.5 G/DL (ref 6.1–8.1)
RBC # BLD AUTO: 5.04 MILLION/UL (ref 3.8–5.1)
SODIUM SERPL-SCNC: 139 MMOL/L (ref 135–146)
TRIGL SERPL-MCNC: 174 MG/DL
TSH SERPL-ACNC: 2.28 MIU/L (ref 0.4–4.5)
WBC # BLD AUTO: 5.5 THOUSAND/UL (ref 3.8–10.8)

## 2024-11-04 ENCOUNTER — OFFICE VISIT (OUTPATIENT)
Dept: FAMILY MEDICINE CLINIC | Facility: CLINIC | Age: 64
End: 2024-11-04
Payer: COMMERCIAL

## 2024-11-04 VITALS
OXYGEN SATURATION: 97 % | HEART RATE: 75 BPM | TEMPERATURE: 97.4 F | SYSTOLIC BLOOD PRESSURE: 130 MMHG | WEIGHT: 156 LBS | DIASTOLIC BLOOD PRESSURE: 82 MMHG | BODY MASS INDEX: 26.63 KG/M2 | HEIGHT: 64 IN

## 2024-11-04 DIAGNOSIS — E04.1 THYROID NODULE: ICD-10-CM

## 2024-11-04 DIAGNOSIS — Z00.00 MEDICARE ANNUAL WELLNESS VISIT, SUBSEQUENT: Primary | ICD-10-CM

## 2024-11-04 DIAGNOSIS — E03.9 HYPOTHYROIDISM, UNSPECIFIED TYPE: ICD-10-CM

## 2024-11-04 DIAGNOSIS — I25.10 CORONARY ARTERIOSCLEROSIS: ICD-10-CM

## 2024-11-04 DIAGNOSIS — Z72.0 TOBACCO ABUSE: ICD-10-CM

## 2024-11-04 DIAGNOSIS — M79.7 FIBROMYALGIA: ICD-10-CM

## 2024-11-04 DIAGNOSIS — I10 BENIGN ESSENTIAL HTN: ICD-10-CM

## 2024-11-04 DIAGNOSIS — Z91.09 ENVIRONMENTAL ALLERGIES: ICD-10-CM

## 2024-11-04 DIAGNOSIS — Z13.820 SCREENING FOR OSTEOPOROSIS: ICD-10-CM

## 2024-11-04 DIAGNOSIS — E11.9 TYPE 2 DIABETES MELLITUS WITHOUT COMPLICATION, WITHOUT LONG-TERM CURRENT USE OF INSULIN (HCC): ICD-10-CM

## 2024-11-04 DIAGNOSIS — Z78.0 POST-MENOPAUSAL: ICD-10-CM

## 2024-11-04 DIAGNOSIS — E78.00 HYPERCHOLESTEROLEMIA: ICD-10-CM

## 2024-11-04 DIAGNOSIS — Z12.31 ENCOUNTER FOR SCREENING MAMMOGRAM FOR MALIGNANT NEOPLASM OF BREAST: ICD-10-CM

## 2024-11-04 DIAGNOSIS — R22.32 MASS OF FINGER OF LEFT HAND: ICD-10-CM

## 2024-11-04 DIAGNOSIS — Z87.891 PERSONAL HISTORY OF NICOTINE DEPENDENCE: ICD-10-CM

## 2024-11-04 DIAGNOSIS — F51.04 CHRONIC INSOMNIA: ICD-10-CM

## 2024-11-04 DIAGNOSIS — I65.21 CAROTID STENOSIS, ASYMPTOMATIC, RIGHT: Chronic | ICD-10-CM

## 2024-11-04 DIAGNOSIS — J45.909 ASTHMA, UNSPECIFIED ASTHMA SEVERITY, UNSPECIFIED WHETHER COMPLICATED, UNSPECIFIED WHETHER PERSISTENT: ICD-10-CM

## 2024-11-04 PROBLEM — F51.01 PRIMARY INSOMNIA: Status: RESOLVED | Noted: 2024-07-23 | Resolved: 2024-11-04

## 2024-11-04 PROCEDURE — 99214 OFFICE O/P EST MOD 30 MIN: CPT | Performed by: NURSE PRACTITIONER

## 2024-11-04 PROCEDURE — G2211 COMPLEX E/M VISIT ADD ON: HCPCS | Performed by: NURSE PRACTITIONER

## 2024-11-04 PROCEDURE — G0439 PPPS, SUBSEQ VISIT: HCPCS | Performed by: NURSE PRACTITIONER

## 2024-11-04 RX ORDER — FLUTICASONE PROPIONATE 50 MCG
1 SPRAY, SUSPENSION (ML) NASAL DAILY
Qty: 9.9 ML | Refills: 0 | Status: SHIPPED | OUTPATIENT
Start: 2024-11-04

## 2024-11-04 NOTE — PATIENT INSTRUCTIONS
Medicare Preventive Visit Patient Instructions  Thank you for completing your Welcome to Medicare Visit or Medicare Annual Wellness Visit today. Your next wellness visit will be due in one year (11/5/2025).  The screening/preventive services that you may require over the next 5-10 years are detailed below. Some tests may not apply to you based off risk factors and/or age. Screening tests ordered at today's visit but not completed yet may show as past due. Also, please note that scanned in results may not display below.  Preventive Screenings:  Service Recommendations Previous Testing/Comments   Colorectal Cancer Screening  * Colonoscopy    * Fecal Occult Blood Test (FOBT)/Fecal Immunochemical Test (FIT)  * Fecal DNA/Cologuard Test  * Flexible Sigmoidoscopy Age: 45-75 years old   Colonoscopy: every 10 years (may be performed more frequently if at higher risk)  OR  FOBT/FIT: every 1 year  OR  Cologuard: every 3 years  OR  Sigmoidoscopy: every 5 years  Screening may be recommended earlier than age 45 if at higher risk for colorectal cancer. Also, an individualized decision between you and your healthcare provider will decide whether screening between the ages of 76-85 would be appropriate. Colonoscopy: 08/11/2015  FOBT/FIT: Not on file  Cologuard: Not on file  Sigmoidoscopy: Not on file    Screening Current     Breast Cancer Screening Age: 40+ years old  Frequency: every 1-2 years  Not required if history of left and right mastectomy Mammogram: 12/01/2020        Cervical Cancer Screening Between the ages of 21-29, pap smear recommended once every 3 years.   Between the ages of 30-65, can perform pap smear with HPV co-testing every 5 years.   Recommendations may differ for women with a history of total hysterectomy, cervical cancer, or abnormal pap smears in past. Pap Smear: Not on file        Hepatitis C Screening Once for adults born between 1945 and 1965  More frequently in patients at high risk for Hepatitis C Hep C  Antibody: 05/31/2018    Screening Current   Diabetes Screening 1-2 times per year if you're at risk for diabetes or have pre-diabetes Fasting glucose: 122 mg/dL (7/20/2020)  A1C: 6.5 % of total Hgb (10/30/2024)  Screening Not Indicated  History Diabetes   Cholesterol Screening Once every 5 years if you don't have a lipid disorder. May order more often based on risk factors. Lipid panel: 10/30/2024    Screening Not Indicated  History Lipid Disorder     Other Preventive Screenings Covered by Medicare:  Abdominal Aortic Aneurysm (AAA) Screening: covered once if your at risk. You're considered to be at risk if you have a family history of AAA.  Lung Cancer Screening: covers low dose CT scan once per year if you meet all of the following conditions: (1) Age 55-77; (2) No signs or symptoms of lung cancer; (3) Current smoker or have quit smoking within the last 15 years; (4) You have a tobacco smoking history of at least 20 pack years (packs per day multiplied by number of years you smoked); (5) You get a written order from a healthcare provider.  Glaucoma Screening: covered annually if you're considered high risk: (1) You have diabetes OR (2) Family history of glaucoma OR (3)  aged 50 and older OR (4)  American aged 65 and older  Osteoporosis Screening: covered every 2 years if you meet one of the following conditions: (1) You're estrogen deficient and at risk for osteoporosis based off medical history and other findings; (2) Have a vertebral abnormality; (3) On glucocorticoid therapy for more than 3 months; (4) Have primary hyperparathyroidism; (5) On osteoporosis medications and need to assess response to drug therapy.   Last bone density test (DXA Scan): Not on file.  HIV Screening: covered annually if you're between the age of 15-65. Also covered annually if you are younger than 15 and older than 65 with risk factors for HIV infection. For pregnant patients, it is covered up to 3 times per  pregnancy.    Immunizations:  Immunization Recommendations   Influenza Vaccine Annual influenza vaccination during flu season is recommended for all persons aged >= 6 months who do not have contraindications   Pneumococcal Vaccine   * Pneumococcal conjugate vaccine = PCV13 (Prevnar 13), PCV15 (Vaxneuvance), PCV20 (Prevnar 20)  * Pneumococcal polysaccharide vaccine = PPSV23 (Pneumovax) Adults 19-65 yo with certain risk factors or if 65+ yo  If never received any pneumonia vaccine: recommend Prevnar 20 (PCV20)  Give PCV20 if previously received 1 dose of PCV13 or PPSV23   Hepatitis B Vaccine 3 dose series if at intermediate or high risk (ex: diabetes, end stage renal disease, liver disease)   Respiratory syncytial virus (RSV) Vaccine - COVERED BY MEDICARE PART D  * RSVPreF3 (Arexvy) CDC recommends that adults 60 years of age and older may receive a single dose of RSV vaccine using shared clinical decision-making (SCDM)   Tetanus (Td) Vaccine - COST NOT COVERED BY MEDICARE PART B Following completion of primary series, a booster dose should be given every 10 years to maintain immunity against tetanus. Td may also be given as tetanus wound prophylaxis.   Tdap Vaccine - COST NOT COVERED BY MEDICARE PART B Recommended at least once for all adults. For pregnant patients, recommended with each pregnancy.   Shingles Vaccine (Shingrix) - COST NOT COVERED BY MEDICARE PART B  2 shot series recommended in those 19 years and older who have or will have weakened immune systems or those 50 years and older     Health Maintenance Due:      Topic Date Due   • HIV Screening  Never done   • Cervical Cancer Screening  Never done   • Lung Cancer Screening  10/26/2019   • Breast Cancer Screening: Mammogram  12/01/2021   • Colorectal Cancer Screening  08/11/2025   • Hepatitis C Screening  Completed     Immunizations Due:      Topic Date Due   • Hepatitis A Vaccine (1 of 2 - Risk 2-dose series) Never done   • Influenza Vaccine (1) 09/01/2024    • COVID-19 Vaccine (4 - 2023-24 season) 09/01/2024     Advance Directives   What are advance directives?  Advance directives are legal documents that state your wishes and plans for medical care. These plans are made ahead of time in case you lose your ability to make decisions for yourself. Advance directives can apply to any medical decision, such as the treatments you want, and if you want to donate organs.   What are the types of advance directives?  There are many types of advance directives, and each state has rules about how to use them. You may choose a combination of any of the following:  Living will:  This is a written record of the treatment you want. You can also choose which treatments you do not want, which to limit, and which to stop at a certain time. This includes surgery, medicine, IV fluid, and tube feedings.   Durable power of  for healthcare (DPAHC):  This is a written record that states who you want to make healthcare choices for you when you are unable to make them for yourself. This person, called a proxy, is usually a family member or a friend. You may choose more than 1 proxy.  Do not resuscitate (DNR) order:  A DNR order is used in case your heart stops beating or you stop breathing. It is a request not to have certain forms of treatment, such as CPR. A DNR order may be included in other types of advance directives.  Medical directive:  This covers the care that you want if you are in a coma, near death, or unable to make decisions for yourself. You can list the treatments you want for each condition. Treatment may include pain medicine, surgery, blood transfusions, dialysis, IV or tube feedings, and a ventilator (breathing machine).  Values history:  This document has questions about your views, beliefs, and how you feel and think about life. This information can help others choose the care that you would choose.  Why are advance directives important?  An advance directive  helps you control your care. Although spoken wishes may be used, it is better to have your wishes written down. Spoken wishes can be misunderstood, or not followed. Treatments may be given even if you do not want them. An advance directive may make it easier for your family to make difficult choices about your care.   Cigarette Smoking and Your Health   Risks to your health if you smoke:  Nicotine and other chemicals found in tobacco damage every cell in your body. Even if you are a light smoker, you have an increased risk for cancer, heart disease, and lung disease. If you are pregnant or have diabetes, smoking increases your risk for complications.   Benefits to your health if you stop smoking:   You decrease respiratory symptoms such as coughing, wheezing, and shortness of breath.   You reduce your risk for cancers of the lung, mouth, throat, kidney, bladder, pancreas, stomach, and cervix. If you already have cancer, you increase the benefits of chemotherapy. You also reduce your risk for cancer returning or a second cancer from developing.   You reduce your risk for heart disease, blood clots, heart attack, and stroke.   You reduce your risk for lung infections, and diseases such as pneumonia, asthma, chronic bronchitis, and emphysema.  Your circulation improves. More oxygen can be delivered to your body. If you have diabetes, you lower your risk for complications, such as kidney, artery, and eye diseases. You also lower your risk for nerve damage. Nerve damage can lead to amputations, poor vision, and blindness.  You improve your body's ability to heal and to fight infections.  For more information and support to stop smoking:   [employer]  Phone: 8- 680 - 807-9474  Web Address: www.[employer]  Weight Management   Why it is important to manage your weight:  Being overweight increases your risk of health conditions such as heart disease, high blood pressure, type 2 diabetes, and certain types of cancer. It  can also increase your risk for osteoarthritis, sleep apnea, and other respiratory problems. Aim for a slow, steady weight loss. Even a small amount of weight loss can lower your risk of health problems.  How to lose weight safely:  A safe and healthy way to lose weight is to eat fewer calories and get regular exercise. You can lose up about 1 pound a week by decreasing the number of calories you eat by 500 calories each day.   Healthy meal plan for weight management:  A healthy meal plan includes a variety of foods, contains fewer calories, and helps you stay healthy. A healthy meal plan includes the following:  Eat whole-grain foods more often.  A healthy meal plan should contain fiber. Fiber is the part of grains, fruits, and vegetables that is not broken down by your body. Whole-grain foods are healthy and provide extra fiber in your diet. Some examples of whole-grain foods are whole-wheat breads and pastas, oatmeal, brown rice, and bulgur.  Eat a variety of vegetables every day.  Include dark, leafy greens such as spinach, kale, han greens, and mustard greens. Eat yellow and orange vegetables such as carrots, sweet potatoes, and winter squash.   Eat a variety of fruits every day.  Choose fresh or canned fruit (canned in its own juice or light syrup) instead of juice. Fruit juice has very little or no fiber.  Eat low-fat dairy foods.  Drink fat-free (skim) milk or 1% milk. Eat fat-free yogurt and low-fat cottage cheese. Try low-fat cheeses such as mozzarella and other reduced-fat cheeses.  Choose meat and other protein foods that are low in fat.  Choose beans or other legumes such as split peas or lentils. Choose fish, skinless poultry (chicken or turkey), or lean cuts of red meat (beef or pork). Before you cook meat or poultry, cut off any visible fat.   Use less fat and oil.  Try baking foods instead of frying them. Add less fat, such as margarine, sour cream, regular salad dressing and mayonnaise to  "foods. Eat fewer high-fat foods. Some examples of high-fat foods include french fries, doughnuts, ice cream, and cakes.  Eat fewer sweets.  Limit foods and drinks that are high in sugar. This includes candy, cookies, regular soda, and sweetened drinks.  Exercise:  Exercise at least 30 minutes per day on most days of the week. Some examples of exercise include walking, biking, dancing, and swimming. You can also fit in more physical activity by taking the stairs instead of the elevator or parking farther away from stores. Ask your healthcare provider about the best exercise plan for you.   Alcohol Use and Your Health    Drinking too much can harm your health.  Excessive alcohol use leads to about 88,000 death in the [place] each year, and shortens the life of those who diet by almost 30 years.  Further, excessive drinking cost the economy $249 billion in 2010.  Most excessive drinkers are not alcohol dependent.    Excessive alcohol use has immediate effects that increase the risk of many harmful health conditions.  These are most often the result of binge drinking.  Over time, excessive alcohol use can lead to the development of chronic diseases and other series health problems.    What is considered a \"drink\"?        Excessive alcohol use includes:  Binge Drinking: For women, 4 or more drinks consumed on one occasion. For men, 5 or more drinks consumed on one occasion.  Heavy Drinking: For women, 8 or more drinks per week. For men, 15 or more drinks per week  Any alcohol used by pregnant women  Any alcohol used by those under the age of 21 years    If you choose to drink, do so in moderation:  Do not drink at all if you are under the age of 21, or if you are or may be pregnant, or have health problems that could be made worse by drinking.  For women, up to 1 drink per day  For men, up to 2 drinks a day    No one should begin drinking or drink more frequently based on potential health benefits    Short-Term " Health Risks:  Injuries: motor vehicle crashes, falls, drownings, burns  Violence: homicide, suicide, sexual assault, intimate partner violence  Alcohol poisoning  Reproductive health: risky sexual behaviors, unintended prengnacy, sexually transmitted diseases, miscarriage, stillbirth, fetal alcohol syndrome    Long-Term Health Risks:  Chronic diseases: high blood pressure, heart disease, stroke, liver disease, digestive problems  Cancers: breast, mouth and throat, liver, colon  Learning and memory problems: dementia, poor school performance  Mental health: depression, anxiety, insomnia  Social problems: lost productivity, family problems, unemployment  Alcohol dependence    For support and more information:  Substance Abuse and Mental Health Services Administration  [address]  [place] [address]  Web Address: http://www.samhsa.gov    Alcoholics Anonymous        Web Address: http://www.aa.org    https://www.cdc.gov/alcohol/fact-sheets/alcohol-use.htm     © Copyright [employer] 2018 Information is for End User's use only and may not be sold, redistributed or otherwise used for commercial purposes. All illustrations and images included in CareNotes® are the copyrighted property of [employer] or [employer]

## 2024-11-04 NOTE — PROGRESS NOTES
Ambulatory Visit  Name: Isaura Brito      : 1960      MRN: [id number]  Encounter Provider: JORGE A Alfaro  Encounter Date: 2024   Encounter department: [facility]    Assessment & Plan  Medicare annual wellness visit, subsequent         Environmental allergies    Orders:    fluticasone (FLONASE) 50 mcg/act nasal spray; 1 spray into each nostril daily    Tobacco abuse    Orders:    CT lung screening program; Future    DXA bone density spine hip and pelvis; Future    Personal history of nicotine dependence  Encourage cessation  Check Lung screen CT  Orders:    CT lung screening program; Future    Type 2 diabetes mellitus without complication, without long-term current use of insulin (HCC)    Lab Results   Component Value Date    HGBA1C 6.5 (H) 10/30/2024   Reports off Jardiance for last week or so  Ran out of medication  Rx sent today  Discount card given - pt to apply on line    Orders:    Empagliflozin (Jardiance) 10 MG TABS tablet; Take 1 tablet (10 mg total) by mouth every morning    Thyroid nodule  US pending in system from previous provider       Post-menopausal    Orders:    DXA bone density spine hip and pelvis; Future    Encounter for screening mammogram for malignant neoplasm of breast    Orders:    Mammo screening bilateral w 3d and cad; Future    Screening for osteoporosis    Orders:    DXA bone density spine hip and pelvis; Future    Chronic insomnia  Has been following with Sleep medicine  Last seen July  Reports taking Doxepin 25 and OTC Melatonin in high doses at HS  She was advised against this today  Pt requesting second opinion for sleep options  Referral placed - pt may choose provider  Orders:    Ambulatory Referral to Sleep Medicine; Future    Mass of finger of left hand  Small palpable mass left first finger  Check US  Possible refer to hand specialist  Orders:    US superficial lump (non extremity); Future    Benign essential HTN  BP stable  132/80  today  Continue losartan 50; metoprolol 25 daily  F/U 6 month  Return to care sooner with problems/concerns            Carotid stenosis, asymptomatic, right  Over due for cardiology follow up    Last scan 2023         Coronary arteriosclerosis  Denies any cardiovascular symptoms   Managed by cardiology  Next follow-up scheduled for December  ASA; Jardiance         Asthma, unspecified asthma severity, unspecified whether complicated, unspecified whether persistent  Appears stable  PRN Albuterol           Hypothyroidism, unspecified type  TSH stable: 2.2  Continue current tx:  levothyroxine: 112 Monday/Wednesday/Friday; 100 remaining days         Fibromyalgia  Managed by pain management  Reports current treatment: Lyrica 150 3 times daily            Hypercholesterolemia  Recent labs reviewed  Stable  Total 160;  LDL 83           Depression Screening and Follow-up Plan: Patient's depression screening was positive with a PHQ-9 score of 13. Continue regular follow-up with their mental health provider who is managing their mental health condition(s). No SI/HI  Continue with Psychiatry - Mary JULES  ER precautions for acute mental health changes    Lung Cancer Screening Shared Decision Making: I discussed with her that she is a candidate for lung cancer CT screening.     The following Shared Decision-Making points were covered:  Benefits of screening were discussed, including the rates of reduction in death from lung cancer and other causes.  Harms of screening were reviewed, including false positive tests, radiation exposure levels, risks of invasive procedures, risks of complications of screening, and risk of overdiagnosis.  I counseled on the importance of adherence to annual lung cancer LDCT screening, impact of co-morbidities, and ability or willingness to undergo diagnosis and treatment.  I counseled on the importance of maintaining abstinence as a former smoker or was counseled on the importance of smoking  cessation if a current smoker    Review of Eligibility Criteria: She meets all of the criteria for Lung Cancer Screening.   - She is 64 y.o.   - She has 20 pack year tobacco history and is a current smoker or has quit within the past 15 years  - She presents no signs or symptoms of lung cancer    After discussion, the patient decided to elect lung cancer screening.      Preventive health issues were discussed with patient, and age appropriate screening tests were ordered as noted in patient's After Visit Summary. Personalized health advice and appropriate referrals for health education or preventive services given if needed, as noted in patient's After Visit Summary.    History of Present Illness     Medicare wellness       Patient Care Team:  JORGE A Alfaro as PCP - General (Family Medicine)  DO Jeffy Wylie MD (Vascular Surgery)  Lara Grady MD (Neurology)    Review of Systems   Constitutional: Negative.    Respiratory: Negative.     Cardiovascular: Negative.    Gastrointestinal: Negative.    Neurological: Negative.    Psychiatric/Behavioral:  Positive for sleep disturbance. Negative for self-injury and suicidal ideas. The patient is not nervous/anxious.      Medical History Reviewed by provider this encounter:  Tobacco  Allergies  Meds  Problems  Med Hx  Surg Hx  Fam Hx       Annual Wellness Visit Questionnaire   Isaura is here for her Subsequent Wellness visit. Last Medicare Wellness visit information reviewed, patient interviewed, no change since last AWV.     Health Risk Assessment:   Patient rates overall health as good. Patient feels that their physical health rating is same. Patient is very satisfied with their life. Eyesight was rated as same. Hearing was rated as same. Patient feels that their emotional and mental health rating is much better. Patients states they are never, rarely angry. Patient states they are always unusually tired/fatigued. Pain experienced in  the last 7 days has been some. Patient's pain rating has been 4/10. Patient states that she has experienced weight loss or gain in last 6 months. Have been much less active.    Depression Screening:   PHQ-9 Score: 13      Fall Risk Screening:   In the past year, patient has experienced: no history of falling in past year      Urinary Incontinence Screening:   Patient has not leaked urine accidently in the last six months.     Home Safety:  Patient does not have trouble with stairs inside or outside of their home. Patient has working smoke alarms and has working carbon monoxide detector. Home safety hazards include: none.     Nutrition:   Current diet is Diabetic.     Medications:   Patient is currently taking over-the-counter supplements. OTC medications include: see medication list. Patient is able to manage medications.     Activities of Daily Living (ADLs)/Instrumental Activities of Daily Living (IADLs):   Walk and transfer into and out of bed and chair?: Yes  Dress and groom yourself?: Yes    Bathe or shower yourself?: Yes    Feed yourself? Yes  Do your laundry/housekeeping?: Yes  Manage your money, pay your bills and track your expenses?: Yes  Make your own meals?: Yes    Do your own shopping?: Yes    Previous Hospitalizations:   Any hospitalizations or ED visits within the last 12 months?: No      Advance Care Planning:   Living will: No    Durable POA for healthcare: No    Advanced directive: No      PREVENTIVE SCREENINGS      Cardiovascular Screening:    General: Screening Not Indicated and History Lipid Disorder      Diabetes Screening:     General: Screening Not Indicated and History Diabetes      Colorectal Cancer Screening:     General: Screening Current      Breast Cancer Screening:     General: Risks and Benefits Discussed    Due for: Mammogram        Cervical Cancer Screening:      Due for: Cervical Pap Smear      Osteoporosis Screening:    General: Screening Not Indicated and History Osteoporosis     Due for: Bone Density Ultrasound      Abdominal Aortic Aneurysm (AAA) Screening:        General: Screening Not Indicated      Lung Cancer Screening:     General: Risks and Benefits Discussed    Due for: Low Dose CT (LDCT)      Hepatitis C Screening:    General: Screening Current      Preventive Screening Comments: Lab work reviewed    Screening, Brief Intervention, and Referral to Treatment (SBIRT)    Screening  Typical number of drinks in a day: 1  Typical number of drinks in a week: 2  Interpretation: Low risk drinking behavior.    AUDIT-C Screenin) How often did you have a drink containing alcohol in the past year? 2 to 3 times a week  2) How many drinks did you have on a typical day when you were drinking in the past year? 0  3) How often did you have 6 or more drinks on one occasion in the past year? never    AUDIT-C Score: 3  Interpretation: Score 3-12 (female): POSITIVE screen for alcohol misuse    AUDIT Screenin) How often during the last year have you found that you were not able to stop drinking once you had started? 0 - never  5) How often during the last year have you failed to do what was normally expected from you because of drinking? 0 - never  6) How often during the last year have you needed a first drink in the morning to get yourself going after a heavy drinking session? 0 - never  7) How often during the last year have you had a feeling of guilt or remorse after drinking? 0 - never  8) How often during the last year have you been unable to remember what happened the night before because you had been drinking? 0 - never  9) Have you or someone else been injured as a result of your drinking? 0 - no  10) Has a relative or friend or a doctor or another health worker been concerned about your drinking or suggested you cut down? 0 - no    AUDIT Score: 3  Interpretation: Low risk alcohol consumption    Single Item Drug Screening:  How often have you used an illegal drug (including marijuana)  "or a prescription medication for non-medical reasons in the past year? daily or almost daily  What drug(s) or prescription medication(s)? marijuana    Single Item Drug Screen Score: 4  Interpretation: POSITIVE screen for possible drug use disorder    Drug Abuse Screening Test (DAST-10):  1) Have you used drugs other than those required for medical reasons? No  2) Do you abuse more than one drug at a time? No  3) Are you always able to stop using drugs when you want to? Yes  4) Have you had \"blackouts\" or \"flashbacks\" as a result of drug use? No  5) Do you ever feel bad or guilty about your drug use? No  6) Does your spouse (or parents) ever complain about your involvement with drugs? No  7) Have you neglected your family because of your use of drugs? No  8) Have you engaged in illegal activities in order to obtain drugs? No  9) Have you ever experienced withdrawal symptoms (felt sick) when you stopped taking drugs? No  10) Have you had medical problems as a result of your drug use (e.g., memory loss, hepatitis, convulsions, bleeding, etc.)? No    DAST-10 Score: 0  Interpretation: No problems reported    SDOH Risk Assessment  Social determinants of health (SDOH) risk assesment tool was completed. The tool at a minimum covered housing stability, food insecurity, transportation needs, and utility difficulty. Patient had at risk responses for the following SDOH domains: housing stability.     Other Counseling Topics:   Car/seat belt/driving safety, skin self-exam, sunscreen and regular weightbearing exercise and calcium and vitamin D intake.     Social Determinants of Health     Financial Resource Strain: Patient Declined (10/10/2023)    Overall Financial Resource Strain (CARDIA)     Difficulty of Paying Living Expenses: Patient declined   Food Insecurity: No Food Insecurity (10/30/2024)    Hunger Vital Sign     Worried About Running Out of Food in the Last Year: Never true     Ran Out of Food in the Last Year: Never " "true   Transportation Needs: No Transportation Needs (10/30/2024)    PRAPARE - Transportation     Lack of Transportation (Medical): No     Lack of Transportation (Non-Medical): No   Housing Stability: High Risk (10/30/2024)    Housing Stability Vital Sign     Unable to Pay for Housing in the Last Year: No     Number of Times Moved in the Last Year: 2     Homeless in the Last Year: Patient declined   Utilities: Not At Risk (10/30/2024)    [facility] Utilities     Threatened with loss of utilities: No     No results found.    Objective     /82 (BP Location: Left arm, Patient Position: Sitting, Cuff Size: Standard)   Pulse 75   Temp (!) 97.4 °F (36.3 °C) (Temporal)   Ht 5' 4\" (1.626 m)   Wt 70.8 kg (156 lb)   LMP  (LMP Unknown)   SpO2 97%   BMI 26.78 kg/m²     Physical Exam  Vitals and nursing note reviewed.   Constitutional:       General: She is not in acute distress.     Appearance: Normal appearance. She is well-developed. She is not ill-appearing.   Cardiovascular:      Rate and Rhythm: Normal rate and regular rhythm.   Pulmonary:      Effort: Pulmonary effort is normal. No respiratory distress.      Breath sounds: Normal breath sounds and air entry.   Neurological:      Mental Status: She is alert and oriented to person, place, and time.   Psychiatric:         Attention and Perception: Attention normal.         Mood and Affect: Mood normal.         Behavior: Behavior normal.         "

## 2024-11-04 NOTE — ASSESSMENT & PLAN NOTE
Lab Results   Component Value Date    HGBA1C 6.5 (H) 10/30/2024   Reports off Jardiance for last week or so  Ran out of medication  Rx sent today  Discount card given - pt to apply on line    Orders:    Empagliflozin (Jardiance) 10 MG TABS tablet; Take 1 tablet (10 mg total) by mouth every morning

## 2024-11-05 ENCOUNTER — TELEPHONE (OUTPATIENT)
Dept: FAMILY MEDICINE CLINIC | Facility: CLINIC | Age: 64
End: 2024-11-05

## 2024-11-05 DIAGNOSIS — E11.9 TYPE 2 DIABETES MELLITUS WITHOUT COMPLICATION, WITHOUT LONG-TERM CURRENT USE OF INSULIN (HCC): ICD-10-CM

## 2024-11-05 NOTE — ASSESSMENT & PLAN NOTE
Has been following with Sleep medicine  Last seen July  Reports taking Doxepin 25 and OTC Melatonin in high doses at HS  She was advised against this today  Pt requesting second opinion for sleep options  Referral placed - pt may choose provider  Orders:    Ambulatory Referral to Sleep Medicine; Future

## 2024-11-05 NOTE — ASSESSMENT & PLAN NOTE
BP stable  132/80 today  Continue losartan 50; metoprolol 25 daily  F/U 6 month  Return to care sooner with problems/concerns

## 2024-11-05 NOTE — ASSESSMENT & PLAN NOTE
TSH stable: 2.2  Continue current tx:  levothyroxine: 112 Monday/Wednesday/Friday; 100 remaining days

## 2024-11-05 NOTE — ASSESSMENT & PLAN NOTE
Denies any cardiovascular symptoms   Managed by cardiology  Next follow-up scheduled for December  ASA; Jardiance

## 2024-11-05 NOTE — TELEPHONE ENCOUNTER
Pt is asking for a paper script for Jardiance as discussed in appt.     Please call when script is ready [phone]

## 2024-11-06 DIAGNOSIS — E11.9 TYPE 2 DIABETES MELLITUS WITHOUT COMPLICATION, WITHOUT LONG-TERM CURRENT USE OF INSULIN (HCC): ICD-10-CM

## 2024-11-28 DIAGNOSIS — F51.04 CHRONIC INSOMNIA: ICD-10-CM

## 2024-11-28 DIAGNOSIS — E11.9 TYPE 2 DIABETES MELLITUS WITHOUT COMPLICATION, WITHOUT LONG-TERM CURRENT USE OF INSULIN (HCC): ICD-10-CM

## 2024-11-28 DIAGNOSIS — I10 HTN (HYPERTENSION), BENIGN: ICD-10-CM

## 2024-11-28 DIAGNOSIS — M79.7 FIBROMYALGIA: ICD-10-CM

## 2024-11-28 RX ORDER — DOXEPIN HYDROCHLORIDE 25 MG/1
25 CAPSULE ORAL
Qty: 30 CAPSULE | Refills: 0 | Status: CANCELLED | OUTPATIENT
Start: 2024-11-28

## 2024-11-28 RX ORDER — PREGABALIN 150 MG/1
150 CAPSULE ORAL 3 TIMES DAILY
Qty: 90 CAPSULE | Refills: 0 | Status: CANCELLED | OUTPATIENT
Start: 2024-11-28

## 2024-11-29 RX ORDER — LOSARTAN POTASSIUM 50 MG/1
50 TABLET ORAL DAILY
Qty: 90 TABLET | Refills: 1 | Status: SHIPPED | OUTPATIENT
Start: 2024-11-29

## 2024-11-29 NOTE — TELEPHONE ENCOUNTER
Last office visit 7/23  Next Office Visit not schedule. Sent patient a my chart message to schedule a follow up appointment for continued medication refills.

## 2024-12-02 ENCOUNTER — VBI (OUTPATIENT)
Dept: ADMINISTRATIVE | Facility: OTHER | Age: 64
End: 2024-12-02

## 2024-12-02 DIAGNOSIS — M79.7 FIBROMYALGIA: ICD-10-CM

## 2024-12-02 DIAGNOSIS — M62.838 NECK MUSCLE SPASM: ICD-10-CM

## 2024-12-02 DIAGNOSIS — F51.04 CHRONIC INSOMNIA: ICD-10-CM

## 2024-12-02 DIAGNOSIS — M48.02 CERVICAL STENOSIS OF SPINAL CANAL: ICD-10-CM

## 2024-12-02 NOTE — TELEPHONE ENCOUNTER
12/02/24 4:57 PM     Chart reviewed for blood pressure reading was/were submitted to the patient's insurance.     Coleen Hsieh   [facility]

## 2024-12-02 NOTE — TELEPHONE ENCOUNTER
12/02/24 4:53 PM     Chart reviewed for Hemoglobin A1c and Microalbumin Creatinine Urine Ratio OR Albumin Creatinine Urine Ratio  was/were submitted to the patient's insurance.     Coleen Hsieh   [facility]

## 2024-12-04 ENCOUNTER — PATIENT MESSAGE (OUTPATIENT)
Dept: FAMILY MEDICINE CLINIC | Facility: CLINIC | Age: 64
End: 2024-12-04

## 2024-12-04 RX ORDER — DOXEPIN HYDROCHLORIDE 25 MG/1
25 CAPSULE ORAL
Qty: 30 CAPSULE | Refills: 2 | OUTPATIENT
Start: 2024-12-04

## 2024-12-04 RX ORDER — PREGABALIN 150 MG/1
150 CAPSULE ORAL 3 TIMES DAILY
Qty: 90 CAPSULE | Refills: 0 | OUTPATIENT
Start: 2024-12-04

## 2024-12-04 NOTE — TELEPHONE ENCOUNTER
Pls call pt. She has recently established with our practice. At her last visit, she  reported following with pain management for her fibromyalgia - and that her lyrica was managed by them. I believe she sees a specialist out of our network. She also follows with sleep medicine for her insomnia - pls advise she call them for sleep med refills. If she is requesting I now manage these, she will need follow up appt, as we would need to have a further discussion regarding theses medications

## 2024-12-09 DIAGNOSIS — F51.04 CHRONIC INSOMNIA: ICD-10-CM

## 2024-12-09 DIAGNOSIS — M79.7 FIBROMYALGIA: ICD-10-CM

## 2024-12-09 NOTE — TELEPHONE ENCOUNTER
I spoke with patient.  She has been receiving both Doxepin and Lyrica from previous PCP. Will send message to provider in office to have refilled.  Requests meds to go to [facility] in [place].

## 2024-12-09 NOTE — TELEPHONE ENCOUNTER
I spoke with patient, she has been getting these medications from previous PCP.  Recently established with our office.  I spoke with Dr. Eugene regarding this, he agrees to refill today. Forwarding to provider in office today.

## 2024-12-09 NOTE — TELEPHONE ENCOUNTER
Patient called back to enquire why her prescriptions have not been filled.  Relayed message from [place].  Patient states that these medications have always been prescribed by her family doctor and states that she is very frustrated that she was not contacted by phone about this issue.  She has one day of Lyrica left and is requesting that this be refilled today.  Patient states that she is very unhappy that this has not been addressed yet and is considering leaving the practice.  Please call back to patient today to address.

## 2024-12-09 NOTE — TELEPHONE ENCOUNTER
Patient contacted the office back returning phone call to MA. Attempted to warm transfer, unsuccessful. Please contact patient back.

## 2024-12-10 RX ORDER — PREGABALIN 150 MG/1
150 CAPSULE ORAL 3 TIMES DAILY
Qty: 90 CAPSULE | Refills: 0 | Status: SHIPPED | OUTPATIENT
Start: 2024-12-10

## 2024-12-10 RX ORDER — DOXEPIN HYDROCHLORIDE 25 MG/1
25 CAPSULE ORAL
Qty: 30 CAPSULE | Refills: 2 | Status: SHIPPED | OUTPATIENT
Start: 2024-12-10

## 2024-12-11 ENCOUNTER — TELEPHONE (OUTPATIENT)
Dept: CARDIOLOGY CLINIC | Facility: CLINIC | Age: 64
End: 2024-12-11

## 2024-12-20 DIAGNOSIS — E03.9 HYPOTHYROIDISM, UNSPECIFIED TYPE: ICD-10-CM

## 2024-12-20 DIAGNOSIS — I10 HTN (HYPERTENSION), BENIGN: ICD-10-CM

## 2024-12-20 RX ORDER — METOPROLOL SUCCINATE 25 MG/1
25 TABLET, EXTENDED RELEASE ORAL DAILY
Qty: 90 TABLET | Refills: 1 | Status: SHIPPED | OUTPATIENT
Start: 2024-12-20

## 2024-12-20 RX ORDER — LEVOTHYROXINE SODIUM 100 UG/1
100 TABLET ORAL DAILY
Qty: 90 TABLET | Refills: 1 | Status: SHIPPED | OUTPATIENT
Start: 2024-12-20

## 2024-12-20 NOTE — TELEPHONE ENCOUNTER
Reason for call:   [x] Refill   [] Prior Auth  [] Other:     Office:   [x] PCP/Provider - [facility]   [] Specialty/Provider -     levothyroxine 100 mcg tablet    Take 1 tablet (100 mcg total) by mouth daily Take 112 mcg daily on Mondays Wednesdays and Fridays alternate with 100 mcg all other days,   90    metoprolol succinate (TOPROL-XL) 25 mg 24 hr tablet   25 mg, Daily   90    Pharmacy: [id number]    Does the patient have enough for 3 days?   [x] Yes   [] No - Send as HP to POD

## 2024-12-26 ENCOUNTER — APPOINTMENT (EMERGENCY)
Dept: RADIOLOGY | Facility: HOSPITAL | Age: 64
End: 2024-12-26
Payer: COMMERCIAL

## 2024-12-26 ENCOUNTER — TELEPHONE (OUTPATIENT)
Age: 64
End: 2024-12-26

## 2024-12-26 ENCOUNTER — HOSPITAL ENCOUNTER (EMERGENCY)
Facility: HOSPITAL | Age: 64
Discharge: HOME | End: 2024-12-26
Attending: EMERGENCY MEDICINE
Payer: COMMERCIAL

## 2024-12-26 VITALS
SYSTOLIC BLOOD PRESSURE: 116 MMHG | DIASTOLIC BLOOD PRESSURE: 81 MMHG | WEIGHT: 161.6 LBS | OXYGEN SATURATION: 95 % | TEMPERATURE: 97.8 F | BODY MASS INDEX: 27.74 KG/M2 | HEART RATE: 68 BPM | RESPIRATION RATE: 16 BRPM

## 2024-12-26 DIAGNOSIS — M79.89 FINGER SWELLING: Primary | ICD-10-CM

## 2024-12-26 PROCEDURE — 99283 EMERGENCY DEPT VISIT LOW MDM: CPT

## 2024-12-26 PROCEDURE — 99284 EMERGENCY DEPT VISIT MOD MDM: CPT

## 2024-12-26 PROCEDURE — 73130 X-RAY EXAM OF HAND: CPT

## 2024-12-26 NOTE — DISCHARGE INSTRUCTIONS
Patient advised to follow-up PCP for today's ED visit.  Patient advised to follow-up with orthopedic hand surgery ambulatory referral placed.  Patient advised to schedule outpatient ultrasound for further evaluation.  Patient advised to return to ED with any worsening symptoms explained on discharge.    Patient was advised to return to the ED with any worsening symptoms that were explained on discharge including but not limited to chest pain, shortness of breath, irretractable vomiting or diarrhea, vision loss, loss of function, loss of sensation, syncope, hemoptysis, hematochezia, hematemesis, melena, decreased oral intake, feeling ill.

## 2024-12-26 NOTE — Clinical Note
Isaura Brito was seen and treated in our emergency department on 12/26/2024.                Diagnosis: Finger swelling    Isaura  may return to work on return date.    She may return on this date: 12/27/2024         If you have any questions or concerns, please don't hesitate to call.      Larry Verduzco PA-C    ______________________________           _______________          _______________  Hospital Representative                              Date                                Time

## 2024-12-26 NOTE — TELEPHONE ENCOUNTER
Patient requesting her US Finger to be schedule.  Patient sent via [facility] her US order to schedule along with Central Scheduling number.

## 2024-12-26 NOTE — ED PROVIDER NOTES
Time reflects when diagnosis was documented in both MDM as applicable and the Disposition within this note       Time User Action Codes Description Comment    12/26/2024  2:59 PM Larry Verduzco Add [M79.89] Finger swelling           ED Disposition       ED Disposition   Discharge    Condition   Stable    Date/Time   Thu Dec 26, 2024  2:59 PM    Comment   Isaura Brito discharge to home/self care.                   Assessment & Plan       Medical Decision Making  64-year-old female presented ED with a chief complaint of finger swelling.  Initially started a month ago.  Endorses increased swelling.  She denies any injury to finger.  Stated around 10 years ago did have an injury where she has a hematoma that has been consistent since that time.  Cardiopulmonary dam is benign.  Vitals are unremarkable.  On examination of the finger swelling noted between the PIP and DIP of the left index finger.  There is a cystic mass noted between this area.  X-ray showing no acute osseous abnormalities.  There does seem to be a cystic-like structure noted on x-ray.  Patient still having range of motion of the finger she does have flexion at the DIP and PIP the flexion is slightly decreased at the DIP due to swelling.  Patient stated that she has been having increasing pain in this area.  I did reach out to orthopedic hand surgery who stated that the injury does not seem infectious as I do agree with no overlying signs of cellulitis or systemic like symptoms.  Advised patient can continue with outpatient ultrasound and follow-up with hand surgery.  Patient was given strict return to ED protocol with any worsening symptoms.  Disposition was explained with follow-ups.    Patient understood diagnosis and treatment plan and had no further questions.  Patient was discharged in stable condition.  Patient was advised to follow-up with her PCP in 1 to 2 days.  Patient was advised to return to the ED with any worsening symptoms that were  "explained on discharge including but not limited to chest pain, shortness of breath, irretractable vomiting or diarrhea, vision loss, loss of function, loss of sensation, syncope, hemoptysis, hematochezia, hematemesis, melena, decreased oral intake, feeling ill.     Ddx-fracture, cyst, abscess, inclusion cyst, flexor tenosynovitis, hematoma    Portions of the record may have been created with voice recognition software. Occasional wrong word or \"sound a like\" substitutions may have occurred due to the inherent limitations of voice recognition software. Read the chart carefully and recognize, using context, where substitutions have occurred.      Amount and/or Complexity of Data Reviewed  Radiology: ordered and independent interpretation performed.     Details: See MDM  Discussion of management or test interpretation with external provider(s): Orthopedic hand surgery-vies to proceed with outpatient ultrasound and follow-up with hand surgery outpatient.    Risk  OTC drugs.  Risk Details: Risk of worsening symptoms along with signs and symptoms worsening symptoms were thoroughly explained on discharge.  Risk of incomplete follow-up was discussed.  Patient had full understanding of all risks had no further questions and was discharged in stable condition.              Medications - No data to display    ED Risk Strat Scores                          SBIRT 22yo+      Flowsheet Row Most Recent Value   Initial Alcohol Screen: US AUDIT-C     1. How often do you have a drink containing alcohol? 0 Filed at: 12/26/2024 1347   2. How many drinks containing alcohol do you have on a typical day you are drinking?  0 Filed at: 12/26/2024 1347   3b. FEMALE Any Age, or MALE 65+: How often do you have 4 or more drinks on one occassion? 0 Filed at: 12/26/2024 1347   Audit-C Score 0 Filed at: 12/26/2024 1347   JARROD: How many times in the past year have you...    Used an illegal drug or used a prescription medication for non-medical " "reasons? Never Filed at: 12/26/2024 5731                            History of Present Illness       Chief Complaint   Patient presents with    Finger Swelling     Pt presents to ED with swelling to left index finger. No redness. Pt reports no injury and reports pain. Pt reports taking pain medication at home (pt states \"it was oxy something\"). Pt reports taking right before arrival.          Past Medical History:   Diagnosis Date    Abnormal echocardiogram     Abnormal stress test     Anxiety     Arthritis 2020    Asthma     Brachial neuritis     Coronary artery disease     Depression     Diabetes mellitus (HCC)     Disease of thyroid gland     Displacement of intervertebral disc of mid-cervical region     Diverticulitis of colon     Fibromyalgia     Frequent headaches     GERD (gastroesophageal reflux disease)     Herniated nucleus pulposus     History of arthritis     History of asthma     History of cardiac disorder     History of chest pain     History of diverticulitis     History of fatigue     History of hearing loss     History of hemoptysis     History of herpes simplex infection     History of hiatal hernia     History of insect bite     INFECTED    History of memory loss     History of neck disorder     History of reflex sympathetic dystrophy     History of restless legs syndrome     History of shortness of breath     History of spinal stenosis     HL (hearing loss)     Hyperlipidemia     Hyperlipidemia     Hypertension     Primary insomnia 07/23/2024    Skin rash     Somnolence, daytime     Spinal stenosis of cervical region       Past Surgical History:   Procedure Laterality Date    BACK SURGERY      BACK SURGERY      LOWER BACK SURGERY    CARDIAC CATHETERIZATION      HISTORY OF CORONARY ANGIOGRAPHY WITH CONCOMITANT LEFT HEART CATHETERIZATION    CORONARY ANGIOPLASTY WITH STENT PLACEMENT      EAR SURGERY      SPINE SURGERY      TONSILLECTOMY      TYMPANOPLASTY      US GUIDED BREAST BIOPSY LEFT COMPLETE " Left 03/02/2021      Family History   Problem Relation Age of Onset    Coronary artery disease Mother         ATHEROMA    Heart disease Mother     Diabetes Mother     Hypertension Mother     Asthma Mother     No Known Problems Father         NO PERTINENT FAMILY HISTORY    Coronary artery disease Sister         ATHEROMA    Heart disease Sister     Stroke Sister     Diabetes Sister     Hypertension Sister     Parkinsonism Sister     Breast cancer Sister     No Known Problems Sister     No Known Problems Sister     No Known Problems Maternal Aunt     No Known Problems Maternal Aunt     No Known Problems Paternal Aunt     No Known Problems Paternal Aunt     Brain cancer Paternal Uncle     No Known Problems Maternal Grandmother     No Known Problems Maternal Grandfather     No Known Problems Paternal Grandmother     No Known Problems Paternal Grandfather     No Known Problems Daughter     No Known Problems Son       Social History     Tobacco Use    Smoking status: Every Day     Current packs/day: 1.00     Average packs/day: 1 pack/day for 53.0 years (53.0 ttl pk-yrs)     Types: Cigarettes     Start date: 1/1/1972    Smokeless tobacco: Never   Vaping Use    Vaping status: Never Used   Substance Use Topics    Alcohol use: Yes     Comment: occasional    Drug use: Yes     Frequency: 7.0 times per week     Types: Marijuana      E-Cigarette/Vaping    E-Cigarette Use Never User       E-Cigarette/Vaping Substances    Nicotine No     THC No     CBD No     Flavoring No     Other No     Unknown No       I have reviewed and agree with the history as documented.     64-year-old female presenting ED with a chief complaint of finger pain and swelling.  Significant past medical history of coronary artery disease, diabetes.  Patient stating that over the past month she has noticed worsening swelling to her left index finger.  She stated that around 10 years ago she had a injury to the finger in which she required a hematoma which has  been present since this injury 10 years ago.  She stated that she usually gets swelling to the finger and she is able to massage the swelling away.  Stated this time is very firm and she has been unable to do this.  She denies any chills fevers diaphoresis.  Denies any nausea vomiting diarrhea.  She states that she does have significant amount of pain and took main medication before coming to the ED.  She does endorse pain with flexion but states that she is still able to flex the finger.  She denies any loss sensation.Patient denies any chest pain, shortness of breath, vomiting, diarrhea, chills, diaphoresis, fevers, loss of consciousness, syncope, urinary and bowel changes, abdominal pain, visual symptoms, vision loss, loss of function, loss of sensation, decreased oral intake, hemoptysis, hematochezia, hematemesis, melena, confusion.         Review of Systems   Constitutional:  Negative for chills, diaphoresis, fatigue and fever.   HENT:  Negative for congestion, ear discharge, ear pain, postnasal drip, rhinorrhea, sinus pressure, sinus pain and sore throat.    Eyes:  Negative for photophobia and visual disturbance.   Respiratory:  Negative for cough, chest tightness and shortness of breath.    Cardiovascular:  Negative for chest pain and palpitations.   Gastrointestinal:  Negative for abdominal distention, abdominal pain, constipation, diarrhea, nausea and vomiting.   Genitourinary:  Negative for difficulty urinating, dysuria, flank pain, frequency and hematuria.   Musculoskeletal:  Positive for arthralgias and joint swelling. Negative for back pain, myalgias, neck pain and neck stiffness.   Skin:  Negative for rash and wound.   Neurological:  Negative for dizziness, tremors, syncope, facial asymmetry, weakness, light-headedness, numbness and headaches.           Objective       ED Triage Vitals   Temperature Pulse Blood Pressure Respirations SpO2 Patient Position - Orthostatic VS   12/26/24 1239 12/26/24 1238  12/26/24 1238 12/26/24 1238 12/26/24 1238 12/26/24 1238   97.8 °F (36.6 °C) 68 116/81 16 95 % Sitting      Temp Source Heart Rate Source BP Location FiO2 (%) Pain Score    12/26/24 1239 12/26/24 1238 12/26/24 1238 -- 12/26/24 1234    Oral Monitor Right arm  10 - Worst Possible Pain      Vitals      Date and Time Temp Pulse SpO2 Resp BP Pain Score FACES Pain Rating User   12/26/24 1239 97.8 °F (36.6 °C) -- -- -- -- -- -- GG   12/26/24 1238 -- 68 95 % 16 116/81 10 - Worst Possible Pain -- GG   12/26/24 1234 -- -- -- -- -- 10 - Worst Possible Pain -- GG            Physical Exam  Constitutional:       General: She is not in acute distress.     Appearance: Normal appearance. She is not ill-appearing, toxic-appearing or diaphoretic.   HENT:      Head: Normocephalic.      Right Ear: External ear normal.      Left Ear: External ear normal.      Nose: Nose normal.      Mouth/Throat:      Mouth: Mucous membranes are moist.      Pharynx: Oropharynx is clear.   Eyes:      General:         Right eye: No discharge.         Left eye: No discharge.      Conjunctiva/sclera: Conjunctivae normal.   Cardiovascular:      Rate and Rhythm: Normal rate and regular rhythm.      Pulses: Normal pulses.   Pulmonary:      Effort: Pulmonary effort is normal. No respiratory distress.      Breath sounds: Normal breath sounds. No stridor. No wheezing, rhonchi or rales.   Chest:      Chest wall: No tenderness.   Abdominal:      General: Bowel sounds are normal.      Palpations: Abdomen is soft.      Tenderness: There is no abdominal tenderness. There is no right CVA tenderness or left CVA tenderness.   Musculoskeletal:         General: Swelling and tenderness present. No deformity or signs of injury. Normal range of motion.      Cervical back: Neck supple.      Comments: Swelling between the DIP and PIP of the left index finger.  There is a small hematoma which has been present for 10 years.  There is also a cystic-like nodule over the palmar side  of the index finger between DIP and PIP.  Patient able to flex finger.  She does have tenderness to palpation in this area.  No loss of sensation.  Cap refill intact.  Will x-ray to further evaluate.   Skin:     General: Skin is warm and dry.      Capillary Refill: Capillary refill takes less than 2 seconds.      Findings: No rash.   Neurological:      Mental Status: She is alert and oriented to person, place, and time.      Sensory: No sensory deficit.   Psychiatric:         Mood and Affect: Mood normal.         Results Reviewed       None            XR hand 3+ views LEFT   Final Interpretation by Georgi Jones MD (12/26 1619)      No acute osseous abnormality. Mild soft tissue swelling along the index finger.         Computerized Assisted Algorithm (CAA) may have been used to analyze all applicable images.         Workstation performed: [id number]             Procedures    ED Medication and Procedure Management   Prior to Admission Medications   Prescriptions Last Dose Informant Patient Reported? Taking?   Empagliflozin (Jardiance) 10 MG TABS tablet   No No   Sig: Take 1 tablet (10 mg total) by mouth every morning   acetaminophen (TYLENOL) 325 mg tablet  Self No No   Sig: Take 1 tablet (325 mg total) by mouth as needed for headaches For headache   albuterol (PROVENTIL HFA,VENTOLIN HFA) 90 mcg/act inhaler   No No   Sig: Inhale 2 puffs every 6 (six) hours as needed for wheezing   aspirin 81 mg chewable tablet  Self Yes No   Sig: Chew 81 mg daily   atorvastatin (LIPITOR) 80 mg tablet  Self No No   Sig: TAKE 1 TABLET BY MOUTH EVERY DAY   cyclobenzaprine (FLEXERIL) 10 mg tablet  Self No No   Sig: Take 1 tablet (10 mg total) by mouth 3 (three) times a day as needed for muscle spasms   doxepin (SINEquan) 25 mg capsule   No No   Sig: Take 1 capsule (25 mg total) by mouth daily at bedtime   ezetimibe (ZETIA) 10 mg tablet  Self No No   Sig: Take 1 tablet (10 mg total) by mouth daily   fluticasone (FLONASE) 50 mcg/act  nasal spray   No No   Si spray into each nostril daily   levothyroxine 100 mcg tablet   No No   Sig: Take 1 tablet (100 mcg total) by mouth daily Take 112 mcg daily on  and  alternate with 100 mcg all other days   levothyroxine 112 mcg tablet  Self No No   Sig: TAKE 1 TABLET BY MOUTH EVERY DAY   losartan (COZAAR) 50 mg tablet   No No   Sig: TAKE 1 TABLET BY MOUTH EVERY DAY   methocarbamol (ROBAXIN) 500 mg tablet   No No   Sig: Take 1 tablet (500 mg total) by mouth 3 (three) times a day as needed for muscle spasms   metoprolol succinate (TOPROL-XL) 25 mg 24 hr tablet   No No   Sig: Take 1 tablet (25 mg total) by mouth daily   mupirocin (BACTROBAN) 2 % ointment   No No   Sig: Apply topically 3 (three) times a day   pantoprazole (PROTONIX) 40 mg tablet   No No   Sig: TAKE 1 TABLET BY MOUTH EVERY DAY   pregabalin (LYRICA) 150 mg capsule   No No   Sig: Take 1 capsule (150 mg total) by mouth 3 (three) times a day   triamcinolone (KENALOG) 0.1 % cream  Self No No   Sig: Apply topically as needed for irritation   Patient not taking: Reported on 2024      Facility-Administered Medications: None     Discharge Medication List as of 2024  3:01 PM        CONTINUE these medications which have NOT CHANGED    Details   acetaminophen (TYLENOL) 325 mg tablet Take 1 tablet (325 mg total) by mouth as needed for headaches For headache, Starting Tue 3/31/2020, No Print      albuterol (PROVENTIL HFA,VENTOLIN HFA) 90 mcg/act inhaler Inhale 2 puffs every 6 (six) hours as needed for wheezing, Starting Thu 2024, Normal      aspirin 81 mg chewable tablet Chew 81 mg daily, Historical Med      atorvastatin (LIPITOR) 80 mg tablet TAKE 1 TABLET BY MOUTH EVERY DAY, Starting 2024, Normal      cyclobenzaprine (FLEXERIL) 10 mg tablet Take 1 tablet (10 mg total) by mouth 3 (three) times a day as needed for muscle spasms, Starting Mon 4/15/2024, Normal      doxepin (SINEquan) 25 mg capsule Take 1  capsule (25 mg total) by mouth daily at bedtime, Starting Tue 12/10/2024, Normal      Empagliflozin (Jardiance) 10 MG TABS tablet Take 1 tablet (10 mg total) by mouth every morning, Starting Thu 11/28/2024, Normal      ezetimibe (ZETIA) 10 mg tablet Take 1 tablet (10 mg total) by mouth daily, Starting Wed 10/25/2023, Normal      fluticasone (FLONASE) 50 mcg/act nasal spray 1 spray into each nostril daily, Starting Mon 11/4/2024, Normal      !! levothyroxine 100 mcg tablet Take 1 tablet (100 mcg total) by mouth daily Take 112 mcg daily on Mondays Wednesdays and Fridays alternate with 100 mcg all other days, Starting Fri 12/20/2024, Normal      !! levothyroxine 112 mcg tablet TAKE 1 TABLET BY MOUTH EVERY DAY, Starting Thu 6/27/2024, Normal      losartan (COZAAR) 50 mg tablet TAKE 1 TABLET BY MOUTH EVERY DAY, Starting Fri 11/29/2024, Normal      methocarbamol (ROBAXIN) 500 mg tablet Take 1 tablet (500 mg total) by mouth 3 (three) times a day as needed for muscle spasms, Starting Tue 7/23/2024, Normal      metoprolol succinate (TOPROL-XL) 25 mg 24 hr tablet Take 1 tablet (25 mg total) by mouth daily, Starting Fri 12/20/2024, Normal      mupirocin (BACTROBAN) 2 % ointment Apply topically 3 (three) times a day, Starting Mon 10/7/2024, Normal      pantoprazole (PROTONIX) 40 mg tablet TAKE 1 TABLET BY MOUTH EVERY DAY, Starting Sun 7/28/2024, Normal      pregabalin (LYRICA) 150 mg capsule Take 1 capsule (150 mg total) by mouth 3 (three) times a day, Starting Tue 12/10/2024, Normal      triamcinolone (KENALOG) 0.1 % cream Apply topically as needed for irritation, Starting Tue 10/10/2023, No Print       !! - Potential duplicate medications found. Please discuss with provider.          ED SEPSIS DOCUMENTATION   Time reflects when diagnosis was documented in both MDM as applicable and the Disposition within this note       Time User Action Codes Description Comment    12/26/2024  2:59 PM Larry Verduzco Add [M79.89] Finger  swelling                  Larry Verduzco PA-C  12/27/24 0913

## 2024-12-27 ENCOUNTER — HOSPITAL ENCOUNTER (OUTPATIENT)
Dept: ULTRASOUND IMAGING | Facility: HOSPITAL | Age: 64
Discharge: HOME | End: 2024-12-27
Payer: COMMERCIAL

## 2024-12-27 DIAGNOSIS — R22.32 LOCALIZED SWELLING, MASS AND LUMP, LEFT UPPER LIMB: ICD-10-CM

## 2024-12-27 PROCEDURE — 76882 US LMTD JT/FCL EVL NVASC XTR: CPT

## 2024-12-28 DIAGNOSIS — E02 SUBCLINICAL IODINE-DEFICIENCY HYPOTHYROIDISM: ICD-10-CM

## 2024-12-30 RX ORDER — LEVOTHYROXINE SODIUM 112 UG/1
112 TABLET ORAL DAILY
Qty: 90 TABLET | Refills: 0 | Status: SHIPPED | OUTPATIENT
Start: 2024-12-30

## 2024-12-31 ENCOUNTER — RESULTS FOLLOW-UP (OUTPATIENT)
Dept: FAMILY MEDICINE CLINIC | Facility: CLINIC | Age: 64
End: 2024-12-31

## 2025-01-02 ENCOUNTER — OFFICE VISIT (OUTPATIENT)
Dept: OBGYN CLINIC | Facility: CLINIC | Age: 65
End: 2025-01-02
Payer: COMMERCIAL

## 2025-01-02 VITALS — HEIGHT: 64 IN | BODY MASS INDEX: 27.31 KG/M2 | WEIGHT: 160 LBS

## 2025-01-02 DIAGNOSIS — Z01.818 PREOP TESTING: Primary | ICD-10-CM

## 2025-01-02 DIAGNOSIS — R22.32 MASS OF FINGER OF LEFT HAND: ICD-10-CM

## 2025-01-02 PROCEDURE — 99204 OFFICE O/P NEW MOD 45 MIN: CPT | Performed by: STUDENT IN AN ORGANIZED HEALTH CARE EDUCATION/TRAINING PROGRAM

## 2025-01-02 RX ORDER — CHLORHEXIDINE GLUCONATE ORAL RINSE 1.2 MG/ML
15 SOLUTION DENTAL ONCE
OUTPATIENT
Start: 2025-01-02 | End: 2025-01-02

## 2025-01-02 NOTE — H&P (VIEW-ONLY)
ORTHOPAEDIC HAND, WRIST, AND ELBOW OFFICE  VISIT      ASSESSMENT/PLAN:      Diagnoses and all orders for this visit:    Preop testing  -     EKG 12 lead; Future  -     CBC and differential; Future  -     Basic metabolic panel; Future  -     Ambulatory referral to Cardiology; Future  -     CBC and differential  -     Basic metabolic panel    Mass of finger of left hand  -     Ambulatory Referral to Orthopedic Surgery  -     Case request operating room: EXCISION LEFT INDEX FINGER MASS; Standing  -     Case request operating room: EXCISION LEFT INDEX FINGER MASS    Other orders  -     Diet NPO; Sips with meds; Standing  -     Nursing Communication Warmimg Interventions Implemented; Standing  -     Nursing Communication CHG bath, have staff wash entire body (neck down) per pre-op bathing protocol. Routine, evening prior to, and day of surgery.; Standing  -     Nursing Communication Swab both nares with Povidone-Iodine solution, EXCLUDE if patient has shellfish/Iodine allergy, and replace with nasal alcohol swabstick. Routine, day of surgery, on call to OR; Standing  -     chlorhexidine (PERIDEX) 0.12 % oral rinse 15 mL  -     Void on call to OR; Standing  -     Insert peripheral IV; Standing  -     ceFAZolin (ANCEF) 2,000 mg in sodium chloride 0.9 % 50 mL IVPB          64 y.o. female with left finger mass, possibly hemangioma that bled based on history  Treatment options and expected outcomes were discussed.  The patient verbalized understanding of exam findings and treatment plan.   The patient was given the opportunity to ask questions.  Questions were answered to the patient's satisfaction.  The patient decided to move forward with surgical intervention with excision of mass at her left index finger. Will send mass to pathology.   Risks and benefits of surgical excision discussed to include but not limited to infection, need for repeat surgery, amputation.  Consent obtained  Will obtain cardiology clearance due to  history of stents      Follow Up:  After surgery       To Do Next Visit:        Discussions:  The anatomy and physiology of the diagnosis(es) was(were) discussed with the patient today in the office. Treatment options and recommendations were discussed, as well as expected future outcomes.  and Standard Consent: The risks and benefits of the procedure were explained to the patient, which include, but are not limited to: Bleeding, infection, recurrence, pain, scar, damage to tendons, damage to nerves, and damage to blood vessels, failure to give desired results and complications related to anesthesia.  These risks, along with alternative conservative treatment options, and postoperative protocols were voiced back and understood by the patient.  All questions were answered to the patient's satisfaction.  The patient agrees to comply with a standard postoperative protocol, and is willing to proceed.  Education was provided via written and auditory forms.  There were no barriers to learning. Written handouts regarding wound care, incision and scar care, and general preoperative information was provided to the patient.  Prior to surgery, the patient may be requested to stop all anti-inflammatory medications.  Prophylactic aspirin, Plavix, and Coumadin may be allowed to be continued.  Medications including vitamin E., ginkgo, and fish oil are requested to be stopped approximately one week prior to surgery.  Hypertensive medications and beta blockers, if taken, should be continued.      Jose Payne MD  Attending, Orthopaedic Surgery  Hand, Wrist, and Elbow Surgery  [facility]    ______________________________________________________________________________________________    CHIEF COMPLAINT:  Chief Complaint   Patient presents with   • Left Index Finger - Cyst     Patient has a lump on her finger MSK on 12/27/24       SUBJECTIVE:  Patient is a 64 y.o. RHD female who presents today for  evaluation and treatment of her left index finger due to swelling and pain. Her symptoms started about 1.5 months ago after without traumatic event. She started with a popped blood vessel some 10 yrs ago after carrying grocery bags. She states that she has noticed intermittent swelling in her index finger and would massage it to reduce it. It is painful with gripping activities.  (+) smoking  Diabetic, A1c from 10/30/24 was 6.5  Sees a cardiologist, Hx of stents     Occupation: does not work    I have personally reviewed all the relevant PMH, PSH, SH, FH, Medications and allergies      PAST MEDICAL HISTORY:  Past Medical History:   Diagnosis Date   • Abnormal echocardiogram    • Abnormal stress test    • Anxiety    • Arthritis 2020   • Asthma    • Brachial neuritis    • Coronary artery disease    • Depression    • Diabetes mellitus (HCC)    • Disease of thyroid gland    • Displacement of intervertebral disc of mid-cervical region    • Diverticulitis of colon    • Fibromyalgia    • Frequent headaches    • GERD (gastroesophageal reflux disease)    • Herniated nucleus pulposus    • History of arthritis    • History of asthma    • History of cardiac disorder    • History of chest pain    • History of diverticulitis    • History of fatigue    • History of hearing loss    • History of hemoptysis    • History of herpes simplex infection    • History of hiatal hernia    • History of insect bite     INFECTED   • History of memory loss    • History of neck disorder    • History of reflex sympathetic dystrophy    • History of restless legs syndrome    • History of shortness of breath    • History of spinal stenosis    • HL (hearing loss)    • Hyperlipidemia    • Hyperlipidemia    • Hypertension    • Primary insomnia 07/23/2024   • Skin rash    • Somnolence, daytime    • Spinal stenosis of cervical region        PAST SURGICAL HISTORY:  Past Surgical History:   Procedure Laterality Date   • BACK SURGERY     • BACK SURGERY       LOWER BACK SURGERY   • CARDIAC CATHETERIZATION      HISTORY OF CORONARY ANGIOGRAPHY WITH CONCOMITANT LEFT HEART CATHETERIZATION   • CORONARY ANGIOPLASTY WITH STENT PLACEMENT     • EAR SURGERY     • SPINE SURGERY     • TONSILLECTOMY     • TYMPANOPLASTY     • US GUIDED BREAST BIOPSY LEFT COMPLETE Left 03/02/2021       FAMILY HISTORY:  Family History   Problem Relation Age of Onset   • Coronary artery disease Mother         ATHEROMA   • Heart disease Mother    • Diabetes Mother    • Hypertension Mother    • Asthma Mother    • No Known Problems Father         NO PERTINENT FAMILY HISTORY   • Coronary artery disease Sister         ATHEROMA   • Heart disease Sister    • Stroke Sister    • Diabetes Sister    • Hypertension Sister    • Parkinsonism Sister    • Breast cancer Sister    • No Known Problems Sister    • No Known Problems Sister    • No Known Problems Maternal Aunt    • No Known Problems Maternal Aunt    • No Known Problems Paternal Aunt    • No Known Problems Paternal Aunt    • Brain cancer Paternal Uncle    • No Known Problems Maternal Grandmother    • No Known Problems Maternal Grandfather    • No Known Problems Paternal Grandmother    • No Known Problems Paternal Grandfather    • No Known Problems Daughter    • No Known Problems Son        SOCIAL HISTORY:  Social History     Tobacco Use   • Smoking status: Every Day     Current packs/day: 1.00     Average packs/day: 1 pack/day for 53.0 years (53.0 ttl pk-yrs)     Types: Cigarettes     Start date: 1/1/1972   • Smokeless tobacco: Never   Vaping Use   • Vaping status: Never Used   Substance Use Topics   • Alcohol use: Yes     Comment: occasional   • Drug use: Yes     Frequency: 7.0 times per week     Types: Marijuana       MEDICATIONS:    Current Outpatient Medications:   •  acetaminophen (TYLENOL) 325 mg tablet, Take 1 tablet (325 mg total) by mouth as needed for headaches For headache, Disp: , Rfl:   •  albuterol (PROVENTIL HFA,VENTOLIN HFA) 90 mcg/act inhaler,  Inhale 2 puffs every 6 (six) hours as needed for wheezing, Disp: 18 g, Rfl: 3  •  aspirin 81 mg chewable tablet, Chew 81 mg daily, Disp: , Rfl:   •  atorvastatin (LIPITOR) 80 mg tablet, TAKE 1 TABLET BY MOUTH EVERY DAY, Disp: 90 tablet, Rfl: 3  •  cyclobenzaprine (FLEXERIL) 10 mg tablet, Take 1 tablet (10 mg total) by mouth 3 (three) times a day as needed for muscle spasms, Disp: 30 tablet, Rfl: 5  •  doxepin (SINEquan) 25 mg capsule, Take 1 capsule (25 mg total) by mouth daily at bedtime, Disp: 30 capsule, Rfl: 2  •  Empagliflozin (Jardiance) 10 MG TABS tablet, Take 1 tablet (10 mg total) by mouth every morning, Disp: 30 tablet, Rfl: 5  •  ezetimibe (ZETIA) 10 mg tablet, Take 1 tablet (10 mg total) by mouth daily, Disp: 90 tablet, Rfl: 1  •  fluticasone (FLONASE) 50 mcg/act nasal spray, 1 spray into each nostril daily, Disp: 9.9 mL, Rfl: 0  •  levothyroxine 100 mcg tablet, Take 1 tablet (100 mcg total) by mouth daily Take 112 mcg daily on Mondays Wednesdays and Fridays alternate with 100 mcg all other days, Disp: 90 tablet, Rfl: 1  •  levothyroxine 112 mcg tablet, TAKE 1 TABLET BY MOUTH EVERY DAY, Disp: 90 tablet, Rfl: 0  •  losartan (COZAAR) 50 mg tablet, TAKE 1 TABLET BY MOUTH EVERY DAY, Disp: 90 tablet, Rfl: 1  •  methocarbamol (ROBAXIN) 500 mg tablet, Take 1 tablet (500 mg total) by mouth 3 (three) times a day as needed for muscle spasms, Disp: 20 tablet, Rfl: 0  •  metoprolol succinate (TOPROL-XL) 25 mg 24 hr tablet, Take 1 tablet (25 mg total) by mouth daily, Disp: 90 tablet, Rfl: 1  •  mupirocin (BACTROBAN) 2 % ointment, Apply topically 3 (three) times a day, Disp: 30 g, Rfl: 0  •  pantoprazole (PROTONIX) 40 mg tablet, TAKE 1 TABLET BY MOUTH EVERY DAY, Disp: 100 tablet, Rfl: 1  •  pregabalin (LYRICA) 150 mg capsule, Take 1 capsule (150 mg total) by mouth 3 (three) times a day, Disp: 90 capsule, Rfl: 0  •  triamcinolone (KENALOG) 0.1 % cream, Apply topically as needed for irritation, Disp: , Rfl:  "    ALLERGIES:  Allergies   Allergen Reactions   • Augmentin [Amoxicillin-Pot Clavulanate] Nausea Only     PT stated she only allergic to medication AUGMENTIN   • Meloxicam GI Intolerance     \"brings on diverticulitis\"           REVIEW OF SYSTEMS:  Musculoskeletal:        As noted in HPI.   All other systems reviewed and are negative.    VITALS:  There were no vitals filed for this visit.    LABS:  HgA1c:   Lab Results   Component Value Date    HGBA1C 6.5 (H) 10/30/2024     BMP:   Lab Results   Component Value Date    GLUCOSE 100 03/06/2015    CALCIUM 9.6 10/30/2024     03/06/2015    K 3.9 10/30/2024    CO2 27 10/30/2024     10/30/2024    BUN 20 10/30/2024    CREATININE 0.85 10/30/2024       _____________________________________________________  PHYSICAL EXAMINATION:  General: Well developed and well nourished, alert & oriented x 3, appears comfortable  Psychiatric: Normal  HEENT: Normocephalic, Atraumatic Trachea Midline, No torticollis  Pulmonary: No audible wheezing or respiratory distress   Abdomen/GI: Non tender, non distended   Cardiovascular: No pitting edema, 2+ radial pulse   Skin: No Erythema, No Lacerations, No Fluctuation, No Ulcerations  Neurovascular: Sensation Intact to the Median, Ulnar, Radial Nerve, Motor Intact to the Median, Ulnar, Radial Nerve, and Pulses Intact  Musculoskeletal: Normal, except as noted in detailed exam and in HPI.      MUSCULOSKELETAL EXAMINATION:  Left Hand: index finger:  Mass 1.6cm wide x 1.2cm long, x 0.6cm high overlying barlow aspect of middle phalanx  (+) tenderness  2pt intact to 5mm radial and ulnar aspect of her digit  FDP and FDS intact. No locking or triggering  Small area of hyperpigmentation over ulnar aspect of DIP flexion crease with area that lacks most superficial layer of skin.    ___________________________________________________  STUDIES REVIEWED:  Xrays of the left hand, taken 12/26/24, were reviewed and independently interpreted in PACS by " Dr. Payne and demonstrate : No osseous abnormality, no significant or appreciable degenerative changes otherwise unremarkable          PROCEDURES PERFORMED:  Procedures  No Procedures performed today    _____________________________________________________      Scribe Attestation    I,:  Mukul Lopez am acting as a scribe while in the presence of the attending physician.:       I,:  Jose Payne MD personally performed the services described in this documentation    as scribed in my presence.:

## 2025-01-02 NOTE — PROGRESS NOTES
ORTHOPAEDIC HAND, WRIST, AND ELBOW OFFICE  VISIT      ASSESSMENT/PLAN:      Diagnoses and all orders for this visit:    Preop testing  -     EKG 12 lead; Future  -     CBC and differential; Future  -     Basic metabolic panel; Future  -     Ambulatory referral to Cardiology; Future  -     CBC and differential  -     Basic metabolic panel    Mass of finger of left hand  -     Ambulatory Referral to Orthopedic Surgery  -     Case request operating room: EXCISION LEFT INDEX FINGER MASS; Standing  -     Case request operating room: EXCISION LEFT INDEX FINGER MASS    Other orders  -     Diet NPO; Sips with meds; Standing  -     Nursing Communication Warmimg Interventions Implemented; Standing  -     Nursing Communication CHG bath, have staff wash entire body (neck down) per pre-op bathing protocol. Routine, evening prior to, and day of surgery.; Standing  -     Nursing Communication Swab both nares with Povidone-Iodine solution, EXCLUDE if patient has shellfish/Iodine allergy, and replace with nasal alcohol swabstick. Routine, day of surgery, on call to OR; Standing  -     chlorhexidine (PERIDEX) 0.12 % oral rinse 15 mL  -     Void on call to OR; Standing  -     Insert peripheral IV; Standing  -     ceFAZolin (ANCEF) 2,000 mg in sodium chloride 0.9 % 50 mL IVPB          64 y.o. female with left finger mass, possibly hemangioma that bled based on history  Treatment options and expected outcomes were discussed.  The patient verbalized understanding of exam findings and treatment plan.   The patient was given the opportunity to ask questions.  Questions were answered to the patient's satisfaction.  The patient decided to move forward with surgical intervention with excision of mass at her left index finger. Will send mass to pathology.   Risks and benefits of surgical excision discussed to include but not limited to infection, need for repeat surgery, amputation.  Consent obtained  Will obtain cardiology clearance due to  history of stents      Follow Up:  After surgery       To Do Next Visit:        Discussions:  The anatomy and physiology of the diagnosis(es) was(were) discussed with the patient today in the office. Treatment options and recommendations were discussed, as well as expected future outcomes.  and Standard Consent: The risks and benefits of the procedure were explained to the patient, which include, but are not limited to: Bleeding, infection, recurrence, pain, scar, damage to tendons, damage to nerves, and damage to blood vessels, failure to give desired results and complications related to anesthesia.  These risks, along with alternative conservative treatment options, and postoperative protocols were voiced back and understood by the patient.  All questions were answered to the patient's satisfaction.  The patient agrees to comply with a standard postoperative protocol, and is willing to proceed.  Education was provided via written and auditory forms.  There were no barriers to learning. Written handouts regarding wound care, incision and scar care, and general preoperative information was provided to the patient.  Prior to surgery, the patient may be requested to stop all anti-inflammatory medications.  Prophylactic aspirin, Plavix, and Coumadin may be allowed to be continued.  Medications including vitamin E., ginkgo, and fish oil are requested to be stopped approximately one week prior to surgery.  Hypertensive medications and beta blockers, if taken, should be continued.      Jose Payne MD  Attending, Orthopaedic Surgery  Hand, Wrist, and Elbow Surgery  [facility]    ______________________________________________________________________________________________    CHIEF COMPLAINT:  Chief Complaint   Patient presents with   • Left Index Finger - Cyst     Patient has a lump on her finger MSK on 12/27/24       SUBJECTIVE:  Patient is a 64 y.o. RHD female who presents today for  evaluation and treatment of her left index finger due to swelling and pain. Her symptoms started about 1.5 months ago after without traumatic event. She started with a popped blood vessel some 10 yrs ago after carrying grocery bags. She states that she has noticed intermittent swelling in her index finger and would massage it to reduce it. It is painful with gripping activities.  (+) smoking  Diabetic, A1c from 10/30/24 was 6.5  Sees a cardiologist, Hx of stents     Occupation: does not work    I have personally reviewed all the relevant PMH, PSH, SH, FH, Medications and allergies      PAST MEDICAL HISTORY:  Past Medical History:   Diagnosis Date   • Abnormal echocardiogram    • Abnormal stress test    • Anxiety    • Arthritis 2020   • Asthma    • Brachial neuritis    • Coronary artery disease    • Depression    • Diabetes mellitus (HCC)    • Disease of thyroid gland    • Displacement of intervertebral disc of mid-cervical region    • Diverticulitis of colon    • Fibromyalgia    • Frequent headaches    • GERD (gastroesophageal reflux disease)    • Herniated nucleus pulposus    • History of arthritis    • History of asthma    • History of cardiac disorder    • History of chest pain    • History of diverticulitis    • History of fatigue    • History of hearing loss    • History of hemoptysis    • History of herpes simplex infection    • History of hiatal hernia    • History of insect bite     INFECTED   • History of memory loss    • History of neck disorder    • History of reflex sympathetic dystrophy    • History of restless legs syndrome    • History of shortness of breath    • History of spinal stenosis    • HL (hearing loss)    • Hyperlipidemia    • Hyperlipidemia    • Hypertension    • Primary insomnia 07/23/2024   • Skin rash    • Somnolence, daytime    • Spinal stenosis of cervical region        PAST SURGICAL HISTORY:  Past Surgical History:   Procedure Laterality Date   • BACK SURGERY     • BACK SURGERY       LOWER BACK SURGERY   • CARDIAC CATHETERIZATION      HISTORY OF CORONARY ANGIOGRAPHY WITH CONCOMITANT LEFT HEART CATHETERIZATION   • CORONARY ANGIOPLASTY WITH STENT PLACEMENT     • EAR SURGERY     • SPINE SURGERY     • TONSILLECTOMY     • TYMPANOPLASTY     • US GUIDED BREAST BIOPSY LEFT COMPLETE Left 03/02/2021       FAMILY HISTORY:  Family History   Problem Relation Age of Onset   • Coronary artery disease Mother         ATHEROMA   • Heart disease Mother    • Diabetes Mother    • Hypertension Mother    • Asthma Mother    • No Known Problems Father         NO PERTINENT FAMILY HISTORY   • Coronary artery disease Sister         ATHEROMA   • Heart disease Sister    • Stroke Sister    • Diabetes Sister    • Hypertension Sister    • Parkinsonism Sister    • Breast cancer Sister    • No Known Problems Sister    • No Known Problems Sister    • No Known Problems Maternal Aunt    • No Known Problems Maternal Aunt    • No Known Problems Paternal Aunt    • No Known Problems Paternal Aunt    • Brain cancer Paternal Uncle    • No Known Problems Maternal Grandmother    • No Known Problems Maternal Grandfather    • No Known Problems Paternal Grandmother    • No Known Problems Paternal Grandfather    • No Known Problems Daughter    • No Known Problems Son        SOCIAL HISTORY:  Social History     Tobacco Use   • Smoking status: Every Day     Current packs/day: 1.00     Average packs/day: 1 pack/day for 53.0 years (53.0 ttl pk-yrs)     Types: Cigarettes     Start date: 1/1/1972   • Smokeless tobacco: Never   Vaping Use   • Vaping status: Never Used   Substance Use Topics   • Alcohol use: Yes     Comment: occasional   • Drug use: Yes     Frequency: 7.0 times per week     Types: Marijuana       MEDICATIONS:    Current Outpatient Medications:   •  acetaminophen (TYLENOL) 325 mg tablet, Take 1 tablet (325 mg total) by mouth as needed for headaches For headache, Disp: , Rfl:   •  albuterol (PROVENTIL HFA,VENTOLIN HFA) 90 mcg/act inhaler,  Inhale 2 puffs every 6 (six) hours as needed for wheezing, Disp: 18 g, Rfl: 3  •  aspirin 81 mg chewable tablet, Chew 81 mg daily, Disp: , Rfl:   •  atorvastatin (LIPITOR) 80 mg tablet, TAKE 1 TABLET BY MOUTH EVERY DAY, Disp: 90 tablet, Rfl: 3  •  cyclobenzaprine (FLEXERIL) 10 mg tablet, Take 1 tablet (10 mg total) by mouth 3 (three) times a day as needed for muscle spasms, Disp: 30 tablet, Rfl: 5  •  doxepin (SINEquan) 25 mg capsule, Take 1 capsule (25 mg total) by mouth daily at bedtime, Disp: 30 capsule, Rfl: 2  •  Empagliflozin (Jardiance) 10 MG TABS tablet, Take 1 tablet (10 mg total) by mouth every morning, Disp: 30 tablet, Rfl: 5  •  ezetimibe (ZETIA) 10 mg tablet, Take 1 tablet (10 mg total) by mouth daily, Disp: 90 tablet, Rfl: 1  •  fluticasone (FLONASE) 50 mcg/act nasal spray, 1 spray into each nostril daily, Disp: 9.9 mL, Rfl: 0  •  levothyroxine 100 mcg tablet, Take 1 tablet (100 mcg total) by mouth daily Take 112 mcg daily on Mondays Wednesdays and Fridays alternate with 100 mcg all other days, Disp: 90 tablet, Rfl: 1  •  levothyroxine 112 mcg tablet, TAKE 1 TABLET BY MOUTH EVERY DAY, Disp: 90 tablet, Rfl: 0  •  losartan (COZAAR) 50 mg tablet, TAKE 1 TABLET BY MOUTH EVERY DAY, Disp: 90 tablet, Rfl: 1  •  methocarbamol (ROBAXIN) 500 mg tablet, Take 1 tablet (500 mg total) by mouth 3 (three) times a day as needed for muscle spasms, Disp: 20 tablet, Rfl: 0  •  metoprolol succinate (TOPROL-XL) 25 mg 24 hr tablet, Take 1 tablet (25 mg total) by mouth daily, Disp: 90 tablet, Rfl: 1  •  mupirocin (BACTROBAN) 2 % ointment, Apply topically 3 (three) times a day, Disp: 30 g, Rfl: 0  •  pantoprazole (PROTONIX) 40 mg tablet, TAKE 1 TABLET BY MOUTH EVERY DAY, Disp: 100 tablet, Rfl: 1  •  pregabalin (LYRICA) 150 mg capsule, Take 1 capsule (150 mg total) by mouth 3 (three) times a day, Disp: 90 capsule, Rfl: 0  •  triamcinolone (KENALOG) 0.1 % cream, Apply topically as needed for irritation, Disp: , Rfl:  "    ALLERGIES:  Allergies   Allergen Reactions   • Augmentin [Amoxicillin-Pot Clavulanate] Nausea Only     PT stated she only allergic to medication AUGMENTIN   • Meloxicam GI Intolerance     \"brings on diverticulitis\"           REVIEW OF SYSTEMS:  Musculoskeletal:        As noted in HPI.   All other systems reviewed and are negative.    VITALS:  There were no vitals filed for this visit.    LABS:  HgA1c:   Lab Results   Component Value Date    HGBA1C 6.5 (H) 10/30/2024     BMP:   Lab Results   Component Value Date    GLUCOSE 100 03/06/2015    CALCIUM 9.6 10/30/2024     03/06/2015    K 3.9 10/30/2024    CO2 27 10/30/2024     10/30/2024    BUN 20 10/30/2024    CREATININE 0.85 10/30/2024       _____________________________________________________  PHYSICAL EXAMINATION:  General: Well developed and well nourished, alert & oriented x 3, appears comfortable  Psychiatric: Normal  HEENT: Normocephalic, Atraumatic Trachea Midline, No torticollis  Pulmonary: No audible wheezing or respiratory distress   Abdomen/GI: Non tender, non distended   Cardiovascular: No pitting edema, 2+ radial pulse   Skin: No Erythema, No Lacerations, No Fluctuation, No Ulcerations  Neurovascular: Sensation Intact to the Median, Ulnar, Radial Nerve, Motor Intact to the Median, Ulnar, Radial Nerve, and Pulses Intact  Musculoskeletal: Normal, except as noted in detailed exam and in HPI.      MUSCULOSKELETAL EXAMINATION:  Left Hand: index finger:  Mass 1.6cm wide x 1.2cm long, x 0.6cm high overlying barlow aspect of middle phalanx  (+) tenderness  2pt intact to 5mm radial and ulnar aspect of her digit  FDP and FDS intact. No locking or triggering  Small area of hyperpigmentation over ulnar aspect of DIP flexion crease with area that lacks most superficial layer of skin.    ___________________________________________________  STUDIES REVIEWED:  Xrays of the left hand, taken 12/26/24, were reviewed and independently interpreted in PACS by " Dr. Payne and demonstrate : No osseous abnormality, no significant or appreciable degenerative changes otherwise unremarkable          PROCEDURES PERFORMED:  Procedures  No Procedures performed today    _____________________________________________________      Scribe Attestation    I,:  Mukul Lopez am acting as a scribe while in the presence of the attending physician.:       I,:  Jose Payne MD personally performed the services described in this documentation    as scribed in my presence.:

## 2025-01-06 ENCOUNTER — RESULTS FOLLOW-UP (OUTPATIENT)
Dept: FAMILY MEDICINE CLINIC | Facility: CLINIC | Age: 65
End: 2025-01-06

## 2025-01-06 ENCOUNTER — OFFICE VISIT (OUTPATIENT)
Dept: FAMILY MEDICINE CLINIC | Facility: CLINIC | Age: 65
End: 2025-01-06
Payer: COMMERCIAL

## 2025-01-06 ENCOUNTER — APPOINTMENT (OUTPATIENT)
Dept: RADIOLOGY | Facility: CLINIC | Age: 65
End: 2025-01-06
Payer: COMMERCIAL

## 2025-01-06 VITALS
DIASTOLIC BLOOD PRESSURE: 80 MMHG | BODY MASS INDEX: 27.49 KG/M2 | WEIGHT: 161 LBS | SYSTOLIC BLOOD PRESSURE: 119 MMHG | TEMPERATURE: 98.4 F | HEIGHT: 64 IN | HEART RATE: 65 BPM | OXYGEN SATURATION: 98 %

## 2025-01-06 DIAGNOSIS — M79.7 FIBROMYALGIA: ICD-10-CM

## 2025-01-06 DIAGNOSIS — G89.29 CHRONIC RIGHT HIP PAIN: ICD-10-CM

## 2025-01-06 DIAGNOSIS — M25.512 CHRONIC LEFT SHOULDER PAIN: ICD-10-CM

## 2025-01-06 DIAGNOSIS — Z79.4 TYPE 2 DIABETES MELLITUS WITHOUT COMPLICATION, WITH LONG-TERM CURRENT USE OF INSULIN (HCC): ICD-10-CM

## 2025-01-06 DIAGNOSIS — F51.04 CHRONIC INSOMNIA: Primary | ICD-10-CM

## 2025-01-06 DIAGNOSIS — M25.551 CHRONIC RIGHT HIP PAIN: ICD-10-CM

## 2025-01-06 DIAGNOSIS — E11.9 TYPE 2 DIABETES MELLITUS WITHOUT COMPLICATION, WITH LONG-TERM CURRENT USE OF INSULIN (HCC): ICD-10-CM

## 2025-01-06 DIAGNOSIS — G89.29 CHRONIC LEFT SHOULDER PAIN: ICD-10-CM

## 2025-01-06 PROCEDURE — G2211 COMPLEX E/M VISIT ADD ON: HCPCS | Performed by: NURSE PRACTITIONER

## 2025-01-06 PROCEDURE — 99214 OFFICE O/P EST MOD 30 MIN: CPT | Performed by: NURSE PRACTITIONER

## 2025-01-06 PROCEDURE — 73502 X-RAY EXAM HIP UNI 2-3 VIEWS: CPT

## 2025-01-06 PROCEDURE — 73030 X-RAY EXAM OF SHOULDER: CPT

## 2025-01-10 DIAGNOSIS — L08.9 SKIN INFECTION: ICD-10-CM

## 2025-01-10 DIAGNOSIS — M79.7 FIBROMYALGIA: ICD-10-CM

## 2025-01-10 RX ORDER — MUPIROCIN 20 MG/G
OINTMENT TOPICAL 3 TIMES DAILY
Qty: 30 G | Refills: 0 | Status: SHIPPED | OUTPATIENT
Start: 2025-01-10

## 2025-01-12 RX ORDER — PREGABALIN 150 MG/1
150 CAPSULE ORAL 3 TIMES DAILY
Qty: 90 CAPSULE | Refills: 0 | Status: SHIPPED | OUTPATIENT
Start: 2025-01-12

## 2025-01-13 NOTE — TELEPHONE ENCOUNTER
Please notify patient that I refilled her pregabalin prescription for 1 month.  She will need to follow-up with Pam before any further refills can be authorized.

## 2025-01-15 ENCOUNTER — ANESTHESIA EVENT (OUTPATIENT)
Dept: ANESTHESIOLOGY | Facility: HOSPITAL | Age: 65
End: 2025-01-15

## 2025-01-15 ENCOUNTER — ANESTHESIA (OUTPATIENT)
Dept: ANESTHESIOLOGY | Facility: HOSPITAL | Age: 65
End: 2025-01-15

## 2025-01-16 ENCOUNTER — OFFICE VISIT (OUTPATIENT)
Dept: CARDIOLOGY CLINIC | Facility: CLINIC | Age: 65
End: 2025-01-16
Payer: COMMERCIAL

## 2025-01-16 VITALS
HEIGHT: 64 IN | WEIGHT: 165.8 LBS | SYSTOLIC BLOOD PRESSURE: 118 MMHG | BODY MASS INDEX: 28.31 KG/M2 | OXYGEN SATURATION: 97 % | HEART RATE: 63 BPM | DIASTOLIC BLOOD PRESSURE: 70 MMHG

## 2025-01-16 DIAGNOSIS — E11.69 HYPERLIPIDEMIA ASSOCIATED WITH TYPE 2 DIABETES MELLITUS  (HCC): ICD-10-CM

## 2025-01-16 DIAGNOSIS — I10 BENIGN ESSENTIAL HTN: ICD-10-CM

## 2025-01-16 DIAGNOSIS — E78.5 HYPERLIPIDEMIA ASSOCIATED WITH TYPE 2 DIABETES MELLITUS  (HCC): ICD-10-CM

## 2025-01-16 DIAGNOSIS — Z01.818 PREOP EXAMINATION: Primary | ICD-10-CM

## 2025-01-16 DIAGNOSIS — E11.8 TYPE II DIABETES MELLITUS WITH MANIFESTATIONS (HCC): ICD-10-CM

## 2025-01-16 DIAGNOSIS — I20.89 CHRONIC STABLE ANGINA (HCC): ICD-10-CM

## 2025-01-16 DIAGNOSIS — I25.10 CORONARY ARTERY DISEASE INVOLVING NATIVE CORONARY ARTERY OF NATIVE HEART WITHOUT ANGINA PECTORIS: ICD-10-CM

## 2025-01-16 PROCEDURE — 93000 ELECTROCARDIOGRAM COMPLETE: CPT | Performed by: INTERNAL MEDICINE

## 2025-01-16 PROCEDURE — 99214 OFFICE O/P EST MOD 30 MIN: CPT | Performed by: INTERNAL MEDICINE

## 2025-01-16 RX ORDER — ROSUVASTATIN CALCIUM 40 MG/1
40 TABLET, COATED ORAL DAILY
Qty: 90 TABLET | Refills: 3 | Status: SHIPPED | OUTPATIENT
Start: 2025-01-16

## 2025-01-16 NOTE — PROGRESS NOTES
Name: Isaura Brito      : 1960      MRN: [id number]  Encounter Provider: JORGE A Alfaro  Encounter Date: 2025   Encounter department: [facility]  :  Assessment & Plan  Chronic insomnia  Discussed good sleep hygiene  Will continue Doxepin 25 PRN  Advise to alternate with Melatonin - not together  Advise to avoid alcohol  Advise follow up with sleep Med       Type 2 diabetes mellitus without complication, with long-term current use of insulin ([facility])    Lab Results   Component Value Date    HGBA1C 6.5 (H) 10/30/2024   Reports has been working on diet change; increasing exercise as able  Continue Jardiance  Recheck A1c prior to next follow-up    Orders:    Comprehensive metabolic panel; Future    Hemoglobin A1C With EAG; Future    Chronic left shoulder pain  On and off for months  No known trauma or injury  Differentials reviewed  Plan today: Check x-ray  Referral for comprehensive physical therapy evaluation    Orders:    Ambulatory Referral to Physical Therapy; Future    XR shoulder 2+ vw left; Future    Chronic right hip pain    Orders:    Ambulatory Referral to Physical Therapy; Future    Fibromyalgia  Reports well-managed on Lyrica 150mg,  3 times daily  Was managed by prior PCP  Will continue same treatment at this time  PM ED with no red flags  Advise to avoid alcohol             Depression Screening and Follow-up Plan:   Patient assessed for underlying major depression. Brief counseling provided and recommend additional follow-up/re-evaluation next office visit. No SI/HI  Lung Cancer Screening Shared Decision Making: I discussed with her that she is a candidate for lung cancer CT screening.     The following Shared Decision-Making points were covered:  Benefits of screening were discussed, including the rates of reduction in death from lung cancer and other causes.  Harms of screening were reviewed, including false positive tests, radiation exposure levels, risks of  "invasive procedures, risks of complications of screening, and risk of overdiagnosis.  I counseled on the importance of adherence to annual lung cancer LDCT screening, impact of co-morbidities, and ability or willingness to undergo diagnosis and treatment.  I counseled on the importance of maintaining abstinence as a former smoker or was counseled on the importance of smoking cessation if a current smoker    Review of Eligibility Criteria: She meets all of the criteria for Lung Cancer Screening.   - She is 64 y.o.   - She has 20 pack year tobacco history and is a current smoker or has quit within the past 15 years  - She presents no signs or symptoms of lung cancer    After discussion, the patient decided to elect lung cancer screening.      History of Present Illness     Presents today to discuss doxepin  Patient reports has been taking for sleep for several years  Uses several times per week  Additionally uses melatonin-50 mg as needed  Reports no longer using alcohol for sleep      Review of Systems   Constitutional: Negative.    Respiratory: Negative.     Cardiovascular: Negative.    Gastrointestinal: Negative.    Neurological: Negative.    Psychiatric/Behavioral:  Positive for sleep disturbance (chronic). Negative for dysphoric mood, self-injury and suicidal ideas. The patient is not nervous/anxious.        Objective   /80 (BP Location: Left arm, Patient Position: Sitting, Cuff Size: Adult)   Pulse 65   Temp 98.4 °F (36.9 °C) (Temporal)   Ht 5' 4\" (1.626 m)   Wt 73 kg (161 lb)   LMP  (LMP Unknown)   SpO2 98%   BMI 27.64 kg/m²      Physical Exam  Vitals and nursing note reviewed.   Constitutional:       General: She is not in acute distress.     Appearance: Normal appearance. She is well-developed. She is not ill-appearing.   Cardiovascular:      Rate and Rhythm: Normal rate and regular rhythm.   Pulmonary:      Effort: Pulmonary effort is normal. No respiratory distress.      Breath sounds: Normal " breath sounds and air entry.   Skin:     General: Skin is warm and dry.   Neurological:      General: No focal deficit present.      Mental Status: She is alert and oriented to person, place, and time.   Psychiatric:         Attention and Perception: Attention normal.         Mood and Affect: Mood normal.         Behavior: Behavior normal.         Thought Content: Thought content normal.         Judgment: Judgment normal.

## 2025-01-16 NOTE — ASSESSMENT & PLAN NOTE
Discussed good sleep hygiene  Will continue Doxepin 25 PRN  Advise to alternate with Melatonin - not together  Advise to avoid alcohol  Advise follow up with sleep Med

## 2025-01-16 NOTE — PROGRESS NOTES
Cardiology             Isaura Brito  1960  [id number]              Assessment/Plan:     CAD, prior PCI/UTE to proximal LAD July 2014, PCI/UTE to the OM 2 April 2017 without residual obstructive disease  Essential hypertension  Dyslipidemia  Preoperative risk stratification         No symptoms of angina, continue aspirin  Lipid panel reviewed from October 2024, suboptimal LDL cholesterol around 80/dL, will change atorvastatin to rosuvastatin for better control.  Continue ezetimibe  Blood pressure controlled, continue metoprolol  Normal cardiac examination today.  EKG reveals sinus rhythm with PVCs, otherwise normal.  Suspect low cardiac risk for left index finger surgery  Strongly suggested avoidance of tobacco products, heart healthy diet, exercise        Follow-up in 1 year           Interval History:      This is a 64-year-old female who I had initially seen in July 2014 secondary to symptoms of exertional throat discomfort, exertional shortness of breath, and several episodes of atypical chest discomfort. Her nuclear stress test was somewhat ambiguous, therefore I had requested a cardiac CT which she completed revealing LAD stenosis. Thereafter a cardiac catheterization on 7/18/14 revealed 90% proximal LAD stenosis, and she received a 2.5 x 28 mm drug-eluting stent for the lesion.  She underwent a repeat cardiac catheterization due to stable symptoms, on 3/27/15. The patient's prior LAD stent was patent. She had 99% midcircumflex stenosis after the origin of the major OM branch, which was unchanged compared to her prior study from 2014. She also had 70% RCA stenosis with FFR performed within normal limits at 0.8 to indicating that the lesion was not slow critical.  She underwent repeat cardiac catheterization on 4/27/17 secondary to recurrent exertional throat burning revealing patent LAD stent, with 100% chronic total occlusion of the OM 2. She underwent PCI/drug-eluting placement with 2.25 x 30 mm  "Resolute Integrity UTE with good results.    She presents today for follow-up with no complaints.  She denies chest pain, shortness of breath, dizziness, palpitations, lower extremity edema.  She is scheduled for left index finger mass surgery in the near future.        Vitals:  Vitals:    01/16/25 1021   BP: 118/70   Pulse: 63   SpO2: 97%   Weight: 75.2 kg (165 lb 12.8 oz)   Height: 5' 4.02\" (1.626 m)           Past Medical History:   Diagnosis Date   • Abnormal echocardiogram    • Abnormal stress test    • Anxiety    • Arthritis 2020   • Asthma    • Brachial neuritis    • Coronary artery disease    • Depression    • Diabetes mellitus (HCC)    • Disease of thyroid gland    • Displacement of intervertebral disc of mid-cervical region    • Diverticulitis of colon    • Fibromyalgia    • Frequent headaches    • GERD (gastroesophageal reflux disease)    • Herniated nucleus pulposus    • History of arthritis    • History of asthma    • History of cardiac disorder    • History of chest pain    • History of diverticulitis    • History of fatigue    • History of hearing loss    • History of hemoptysis    • History of herpes simplex infection    • History of hiatal hernia    • History of insect bite     INFECTED   • History of memory loss    • History of neck disorder    • History of reflex sympathetic dystrophy    • History of restless legs syndrome    • History of shortness of breath    • History of spinal stenosis    • HL (hearing loss)    • Hyperlipidemia    • Hyperlipidemia    • Hypertension    • Primary insomnia 07/23/2024   • Skin rash    • Somnolence, daytime    • Spinal stenosis of cervical region      Social History     Socioeconomic History   • Marital status: Single     Spouse name: Not on file   • Number of children: Not on file   • Years of education: Not on file   • Highest education level: Not on file   Occupational History   • Not on file   Tobacco Use   • Smoking status: Every Day     Current packs/day: " 1.00     Average packs/day: 1 pack/day for 53.0 years (53.0 ttl pk-yrs)     Types: Cigarettes     Start date: 1/1/1972   • Smokeless tobacco: Never   Vaping Use   • Vaping status: Never Used   Substance and Sexual Activity   • Alcohol use: Yes     Comment: occasional   • Drug use: Yes     Frequency: 7.0 times per week     Types: Marijuana   • Sexual activity: Not Currently     Birth control/protection: Post-menopausal   Other Topics Concern   • Not on file   Social History Narrative   • Not on file     Social Drivers of Health     Financial Resource Strain: Patient Declined (10/10/2023)    Overall Financial Resource Strain (CARDIA)    • Difficulty of Paying Living Expenses: Patient declined   Food Insecurity: No Food Insecurity (10/30/2024)    Hunger Vital Sign    • Worried About Running Out of Food in the Last Year: Never true    • Ran Out of Food in the Last Year: Never true   Transportation Needs: No Transportation Needs (10/30/2024)    PRAPARE - Transportation    • Lack of Transportation (Medical): No    • Lack of Transportation (Non-Medical): No   Physical Activity: Not on file   Stress: Not on file   Social Connections: Not on file   Intimate Partner Violence: Not on file   Housing Stability: High Risk (10/30/2024)    Housing Stability Vital Sign    • Unable to Pay for Housing in the Last Year: No    • Number of Times Moved in the Last Year: 2    • Homeless in the Last Year: Patient declined      Family History   Problem Relation Age of Onset   • Coronary artery disease Mother         ATHEROMA   • Heart disease Mother    • Diabetes Mother    • Hypertension Mother    • Asthma Mother    • No Known Problems Father         NO PERTINENT FAMILY HISTORY   • Coronary artery disease Sister         ATHEROMA   • Heart disease Sister    • Stroke Sister    • Diabetes Sister    • Hypertension Sister    • Parkinsonism Sister    • Breast cancer Sister    • No Known Problems Sister    • No Known Problems Sister    • No Known  Problems Maternal Aunt    • No Known Problems Maternal Aunt    • No Known Problems Paternal Aunt    • No Known Problems Paternal Aunt    • Brain cancer Paternal Uncle    • No Known Problems Maternal Grandmother    • No Known Problems Maternal Grandfather    • No Known Problems Paternal Grandmother    • No Known Problems Paternal Grandfather    • No Known Problems Daughter    • No Known Problems Son      Past Surgical History:   Procedure Laterality Date   • BACK SURGERY     • BACK SURGERY      LOWER BACK SURGERY   • CARDIAC CATHETERIZATION      HISTORY OF CORONARY ANGIOGRAPHY WITH CONCOMITANT LEFT HEART CATHETERIZATION   • CORONARY ANGIOPLASTY WITH STENT PLACEMENT     • EAR SURGERY     • SPINE SURGERY     • TONSILLECTOMY     • TYMPANOPLASTY     • US GUIDED BREAST BIOPSY LEFT COMPLETE Left 03/02/2021       Current Outpatient Medications:   •  acetaminophen (TYLENOL) 325 mg tablet, Take 1 tablet (325 mg total) by mouth as needed for headaches For headache, Disp: , Rfl:   •  albuterol (PROVENTIL HFA,VENTOLIN HFA) 90 mcg/act inhaler, Inhale 2 puffs every 6 (six) hours as needed for wheezing, Disp: 18 g, Rfl: 3  •  aspirin 81 mg chewable tablet, Chew 81 mg daily, Disp: , Rfl:   •  cyclobenzaprine (FLEXERIL) 10 mg tablet, Take 1 tablet (10 mg total) by mouth 3 (three) times a day as needed for muscle spasms, Disp: 30 tablet, Rfl: 5  •  doxepin (SINEquan) 25 mg capsule, Take 1 capsule (25 mg total) by mouth daily at bedtime, Disp: 30 capsule, Rfl: 2  •  Empagliflozin (Jardiance) 10 MG TABS tablet, Take 1 tablet (10 mg total) by mouth every morning, Disp: 30 tablet, Rfl: 5  •  ezetimibe (ZETIA) 10 mg tablet, Take 1 tablet (10 mg total) by mouth daily, Disp: 90 tablet, Rfl: 1  •  fluticasone (FLONASE) 50 mcg/act nasal spray, 1 spray into each nostril daily, Disp: 9.9 mL, Rfl: 0  •  levothyroxine 100 mcg tablet, Take 1 tablet (100 mcg total) by mouth daily Take 112 mcg daily on Mondays Wednesdays and Fridays alternate with  100 mcg all other days, Disp: 90 tablet, Rfl: 1  •  levothyroxine 112 mcg tablet, TAKE 1 TABLET BY MOUTH EVERY DAY, Disp: 90 tablet, Rfl: 0  •  losartan (COZAAR) 50 mg tablet, TAKE 1 TABLET BY MOUTH EVERY DAY, Disp: 90 tablet, Rfl: 1  •  methocarbamol (ROBAXIN) 500 mg tablet, Take 1 tablet (500 mg total) by mouth 3 (three) times a day as needed for muscle spasms, Disp: 20 tablet, Rfl: 0  •  metoprolol succinate (TOPROL-XL) 25 mg 24 hr tablet, Take 1 tablet (25 mg total) by mouth daily, Disp: 90 tablet, Rfl: 1  •  mupirocin (BACTROBAN) 2 % ointment, Apply topically 3 (three) times a day, Disp: 30 g, Rfl: 0  •  pantoprazole (PROTONIX) 40 mg tablet, TAKE 1 TABLET BY MOUTH EVERY DAY, Disp: 100 tablet, Rfl: 1  •  pregabalin (LYRICA) 150 mg capsule, Take 1 capsule (150 mg total) by mouth 3 (three) times a day, Disp: 90 capsule, Rfl: 0  •  rosuvastatin (CRESTOR) 40 MG tablet, Take 1 tablet (40 mg total) by mouth daily, Disp: 90 tablet, Rfl: 3  •  triamcinolone (KENALOG) 0.1 % cream, Apply topically as needed for irritation, Disp: , Rfl:         Review of Systems:  Review of Systems   Constitutional:  Negative for activity change, fever and unexpected weight change.   HENT:  Negative for facial swelling, nosebleeds and voice change.    Respiratory:  Negative for chest tightness, shortness of breath and wheezing.    Cardiovascular:  Negative for chest pain, palpitations and leg swelling.   Gastrointestinal:  Negative for abdominal distention.   Genitourinary:  Negative for hematuria.   Musculoskeletal:  Negative for arthralgias.   Skin:  Negative for color change, pallor, rash and wound.   Neurological:  Negative for dizziness, seizures, syncope and light-headedness.   Psychiatric/Behavioral:  Negative for agitation.          Physical Exam:  Physical Exam  Vitals reviewed.   Constitutional:       Appearance: She is well-developed.   HENT:      Head: Normocephalic and atraumatic.   Cardiovascular:      Rate and Rhythm: Normal  rate and regular rhythm.      Heart sounds: Normal heart sounds.   Pulmonary:      Effort: Pulmonary effort is normal.      Breath sounds: Normal breath sounds.   Abdominal:      Palpations: Abdomen is soft.   Musculoskeletal:         General: Normal range of motion.      Cervical back: Normal range of motion and neck supple.      Right lower leg: No edema.      Left lower leg: No edema.   Skin:     General: Skin is warm and dry.   Neurological:      Mental Status: She is alert and oriented to person, place, and time.   Psychiatric:         Behavior: Behavior normal.         Thought Content: Thought content normal.         Judgment: Judgment normal.         This note was completed in part utilizing M-Modal Fluency Direct Software.  Grammatical errors, random word insertions, spelling mistakes, and incomplete sentences can be an occasional consequence of this system secondary to software limitations, ambient noise, and hardware issues.  If you have any questions or concerns about the content, text, or information contained within the body of this dictation, please contact the provider for clarification.

## 2025-01-16 NOTE — ASSESSMENT & PLAN NOTE
Reports well-managed on Lyrica 150mg,  3 times daily  Was managed by prior PCP  Will continue same treatment at this time  PM ED with no red flags  Advise to avoid alcohol

## 2025-01-17 ENCOUNTER — VBI (OUTPATIENT)
Dept: ADMINISTRATIVE | Facility: OTHER | Age: 65
End: 2025-01-17

## 2025-01-17 NOTE — TELEPHONE ENCOUNTER
01/17/25 2:02 PM     Chart reviewed for Mammogram ; nothing is submitted to the patient's insurance at this time.     Coleen Hsieh   [facility]

## 2025-01-21 RX ORDER — FOLIC ACID/MULTIVIT,IRON,MINER 0.4MG-18MG
TABLET ORAL
COMMUNITY

## 2025-01-21 RX ORDER — BIOTIN 1000 MCG
TABLET,CHEWABLE ORAL
COMMUNITY

## 2025-01-21 NOTE — PRE-PROCEDURE INSTRUCTIONS
Pre-Surgery Instructions:   Medication Instructions    acetaminophen (TYLENOL) 325 mg tablet Uses PRN- OK to take day of surgery    albuterol (PROVENTIL HFA,VENTOLIN HFA) 90 mcg/act inhaler Uses PRN- OK to take day of surgery    aspirin 81 mg chewable tablet Take as prescribed    Biotin 1000 MCG CHEW Stop taking 7 days prior to surgery.    cyclobenzaprine (FLEXERIL) 10 mg tablet Uses PRN- OK to take day of surgery    doxepin (SINEquan) 25 mg capsule Take night before surgery    Empagliflozin (Jardiance) 10 MG TABS tablet Stop taking 4 days prior to surgery. Last dose 1/25/25    ezetimibe (ZETIA) 10 mg tablet Take night before surgery    fluticasone (FLONASE) 50 mcg/act nasal spray Uses PRN- OK to take day of surgery    levothyroxine 100 mcg tablet Take as prescribed    levothyroxine 112 mcg tablet Take day of surgery.    losartan (COZAAR) 50 mg tablet Hold day of surgery.    Magnesium Oxide (MAG-CAPS PO) Stop taking 7 days prior to surgery.    methocarbamol (ROBAXIN) 500 mg tablet Uses PRN- OK to take day of surgery    metoprolol succinate (TOPROL-XL) 25 mg 24 hr tablet Take day of surgery.    mupirocin (BACTROBAN) 2 % ointment Hold day of surgery.    Omega-3 Fatty Acids (Omega-3 Fish Oil) 1200 MG CAPS Stop taking 7 days prior to surgery.    pantoprazole (PROTONIX) 40 mg tablet Take day of surgery.    pregabalin (LYRICA) 150 mg capsule Take day of surgery.    rosuvastatin (CRESTOR) 40 MG tablet Take night before surgery    triamcinolone (KENALOG) 0.1 % cream Hold day of surgery.    Medication instructions for day surgery reviewed. Please use only a sip of water to take your instructed medications. Avoid all over the counter vitamins, supplements and NSAIDS for one week prior to surgery per anesthesia guidelines. Tylenol is ok to take as needed.     You will receive a call one business day prior to surgery with an arrival time and hospital directions. If your surgery is scheduled on a Monday, the hospital will be  calling you on the Friday prior to your surgery. If you have not heard from anyone by 8pm, please call the hospital supervisor through the hospital  at [phone]. ([place] [phone] or [place] [phone]).    Do not eat or drink anything after midnight the night before your surgery, including candy, mints, lifesavers, or chewing gum. Do not drink alcohol 24hrs before your surgery. Try not to smoke at least 24hrs before your surgery.       Follow the pre surgery showering instructions as listed in the “My Surgical Experience Booklet” or otherwise provided by your surgeon's office. Do not use a blade to shave the surgical area 1 week before surgery. It is okay to use a clean electric clippers up to 24 hours before surgery. Do not apply any lotions, creams, including makeup, cologne, deodorant, or perfumes after showering on the day of your surgery. Do not use dry shampoo, hair spray, hair gel, or any type of hair products.     No contact lenses, eye make-up, or artificial eyelashes. Remove nail polish, including gel polish, and any artificial, gel, or acrylic nails if possible. Remove all jewelry including rings and body piercing jewelry.     Wear causal clothing that is easy to take on and off. Consider your type of surgery.    Keep any valuables, jewelry, piercings at home. Please bring any specially ordered equipment (sling, braces) if indicated.    Arrange for a responsible person to drive you to and from the hospital on the day of your surgery. Please confirm the visitor policy for the day of your procedure when you receive your phone call with an arrival time.     Call the surgeon's office with any new illnesses, exposures, or additional questions prior to surgery.    Please reference your “My Surgical Experience Booklet” for additional information to prepare for your upcoming surgery.

## 2025-01-22 ENCOUNTER — TELEPHONE (OUTPATIENT)
Dept: PSYCHIATRY | Facility: CLINIC | Age: 65
End: 2025-01-22

## 2025-01-22 NOTE — TELEPHONE ENCOUNTER
Called and left message for patient to inform appt on 3/5 was canceled. Writer did not inform patient provider has left the clinic. Asked for a return call for further assistance. Please transfer to support services to schedule Medication Management  WHIT.

## 2025-01-23 DIAGNOSIS — Z91.09 ENVIRONMENTAL ALLERGIES: ICD-10-CM

## 2025-01-24 ENCOUNTER — RESULTS FOLLOW-UP (OUTPATIENT)
Dept: FAMILY MEDICINE CLINIC | Facility: CLINIC | Age: 65
End: 2025-01-24

## 2025-01-24 ENCOUNTER — APPOINTMENT (OUTPATIENT)
Dept: LAB | Facility: CLINIC | Age: 65
End: 2025-01-24
Payer: COMMERCIAL

## 2025-01-24 DIAGNOSIS — N18.30 TYPE 2 DIABETES MELLITUS WITH STAGE 3 CHRONIC KIDNEY DISEASE, UNSPECIFIED WHETHER LONG TERM INSULIN USE, UNSPECIFIED WHETHER STAGE 3A OR 3B CKD (HCC): Primary | ICD-10-CM

## 2025-01-24 DIAGNOSIS — K21.9 GASTROESOPHAGEAL REFLUX DISEASE: ICD-10-CM

## 2025-01-24 DIAGNOSIS — Z01.818 PREOP EXAMINATION: ICD-10-CM

## 2025-01-24 DIAGNOSIS — E11.22 TYPE 2 DIABETES MELLITUS WITH STAGE 3 CHRONIC KIDNEY DISEASE, UNSPECIFIED WHETHER LONG TERM INSULIN USE, UNSPECIFIED WHETHER STAGE 3A OR 3B CKD (HCC): Primary | ICD-10-CM

## 2025-01-24 LAB
ALBUMIN SERPL BCG-MCNC: 4.4 G/DL (ref 3.5–5)
ALP SERPL-CCNC: 66 U/L (ref 34–104)
ALT SERPL W P-5'-P-CCNC: 18 U/L (ref 7–52)
ANION GAP SERPL CALCULATED.3IONS-SCNC: 7 MMOL/L (ref 4–13)
AST SERPL W P-5'-P-CCNC: 16 U/L (ref 13–39)
BASOPHILS # BLD AUTO: 0.03 THOUSANDS/ΜL (ref 0–0.1)
BASOPHILS NFR BLD AUTO: 1 % (ref 0–1)
BILIRUB SERPL-MCNC: 0.83 MG/DL (ref 0.2–1)
BUN SERPL-MCNC: 15 MG/DL (ref 5–25)
CALCIUM SERPL-MCNC: 9.2 MG/DL (ref 8.4–10.2)
CHLORIDE SERPL-SCNC: 105 MMOL/L (ref 96–108)
CO2 SERPL-SCNC: 26 MMOL/L (ref 21–32)
CREAT SERPL-MCNC: 0.85 MG/DL (ref 0.6–1.3)
EOSINOPHIL # BLD AUTO: 0.05 THOUSAND/ΜL (ref 0–0.61)
EOSINOPHIL NFR BLD AUTO: 1 % (ref 0–6)
ERYTHROCYTE [DISTWIDTH] IN BLOOD BY AUTOMATED COUNT: 13.4 % (ref 11.6–15.1)
EST. AVERAGE GLUCOSE BLD GHB EST-MCNC: 148 MG/DL
GFR SERPL CREATININE-BSD FRML MDRD: 73 ML/MIN/1.73SQ M
GLUCOSE SERPL-MCNC: 137 MG/DL (ref 65–140)
HBA1C MFR BLD: 6.8 %
HCT VFR BLD AUTO: 48.3 % (ref 34.8–46.1)
HGB BLD-MCNC: 15.9 G/DL (ref 11.5–15.4)
IMM GRANULOCYTES # BLD AUTO: 0.03 THOUSAND/UL (ref 0–0.2)
IMM GRANULOCYTES NFR BLD AUTO: 1 % (ref 0–2)
LYMPHOCYTES # BLD AUTO: 2.1 THOUSANDS/ΜL (ref 0.6–4.47)
LYMPHOCYTES NFR BLD AUTO: 34 % (ref 14–44)
MCH RBC QN AUTO: 30.8 PG (ref 26.8–34.3)
MCHC RBC AUTO-ENTMCNC: 32.9 G/DL (ref 31.4–37.4)
MCV RBC AUTO: 93 FL (ref 82–98)
MONOCYTES # BLD AUTO: 0.43 THOUSAND/ΜL (ref 0.17–1.22)
MONOCYTES NFR BLD AUTO: 7 % (ref 4–12)
NEUTROPHILS # BLD AUTO: 3.6 THOUSANDS/ΜL (ref 1.85–7.62)
NEUTS SEG NFR BLD AUTO: 56 % (ref 43–75)
NRBC BLD AUTO-RTO: 0 /100 WBCS
PLATELET # BLD AUTO: 214 THOUSANDS/UL (ref 149–390)
PMV BLD AUTO: 11.3 FL (ref 8.9–12.7)
POTASSIUM SERPL-SCNC: 4.1 MMOL/L (ref 3.5–5.3)
PROT SERPL-MCNC: 7.4 G/DL (ref 6.4–8.4)
RBC # BLD AUTO: 5.17 MILLION/UL (ref 3.81–5.12)
SODIUM SERPL-SCNC: 138 MMOL/L (ref 135–147)
WBC # BLD AUTO: 6.24 THOUSAND/UL (ref 4.31–10.16)

## 2025-01-24 PROCEDURE — 85025 COMPLETE CBC W/AUTO DIFF WBC: CPT

## 2025-01-24 PROCEDURE — 36415 COLL VENOUS BLD VENIPUNCTURE: CPT

## 2025-01-24 PROCEDURE — 80053 COMPREHEN METABOLIC PANEL: CPT

## 2025-01-24 PROCEDURE — 83036 HEMOGLOBIN GLYCOSYLATED A1C: CPT

## 2025-01-24 RX ORDER — FLUTICASONE PROPIONATE 50 MCG
SPRAY, SUSPENSION (ML) NASAL
Qty: 24 ML | Refills: 1 | Status: SHIPPED | OUTPATIENT
Start: 2025-01-24

## 2025-01-24 RX ORDER — PANTOPRAZOLE SODIUM 40 MG/1
40 TABLET, DELAYED RELEASE ORAL DAILY
Qty: 90 TABLET | Refills: 1 | Status: SHIPPED | OUTPATIENT
Start: 2025-01-24

## 2025-01-29 ENCOUNTER — TELEPHONE (OUTPATIENT)
Dept: PSYCHIATRY | Facility: CLINIC | Age: 65
End: 2025-01-29

## 2025-01-29 NOTE — TELEPHONE ENCOUNTER
Called and left message for patient regarding appt on 3/5/24 that was canceled. Writer did not inform patient provider is leaving the clinic. Asked for a return call for further assistance. Please transfer to support services to schedule Medication Management  WHIT.

## 2025-01-30 ENCOUNTER — HOSPITAL ENCOUNTER (OUTPATIENT)
Facility: AMBULARY SURGERY CENTER | Age: 65
Setting detail: OUTPATIENT SURGERY
Discharge: HOME | End: 2025-01-30
Attending: STUDENT IN AN ORGANIZED HEALTH CARE EDUCATION/TRAINING PROGRAM | Admitting: STUDENT IN AN ORGANIZED HEALTH CARE EDUCATION/TRAINING PROGRAM
Payer: COMMERCIAL

## 2025-01-30 ENCOUNTER — ANESTHESIA (OUTPATIENT)
Dept: PERIOP | Facility: AMBULARY SURGERY CENTER | Age: 65
End: 2025-01-30
Payer: COMMERCIAL

## 2025-01-30 ENCOUNTER — ANESTHESIA EVENT (OUTPATIENT)
Dept: PERIOP | Facility: AMBULARY SURGERY CENTER | Age: 65
End: 2025-01-30
Payer: COMMERCIAL

## 2025-01-30 VITALS
DIASTOLIC BLOOD PRESSURE: 58 MMHG | OXYGEN SATURATION: 96 % | TEMPERATURE: 97 F | HEART RATE: 50 BPM | RESPIRATION RATE: 20 BRPM | SYSTOLIC BLOOD PRESSURE: 138 MMHG

## 2025-01-30 DIAGNOSIS — R22.32 MASS OF FINGER OF LEFT HAND: ICD-10-CM

## 2025-01-30 LAB
GLUCOSE SERPL-MCNC: 148 MG/DL (ref 65–140)
GLUCOSE SERPL-MCNC: 151 MG/DL (ref 65–140)

## 2025-01-30 PROCEDURE — 11421 EXC H-F-NK-SP B9+MARG 0.6-1: CPT | Performed by: PHYSICIAN ASSISTANT

## 2025-01-30 PROCEDURE — 11421 EXC H-F-NK-SP B9+MARG 0.6-1: CPT | Performed by: STUDENT IN AN ORGANIZED HEALTH CARE EDUCATION/TRAINING PROGRAM

## 2025-01-30 PROCEDURE — 88305 TISSUE EXAM BY PATHOLOGIST: CPT | Performed by: PATHOLOGY

## 2025-01-30 PROCEDURE — 82948 REAGENT STRIP/BLOOD GLUCOSE: CPT

## 2025-01-30 RX ORDER — ONDANSETRON 2 MG/ML
4 INJECTION INTRAMUSCULAR; INTRAVENOUS ONCE AS NEEDED
Status: DISCONTINUED | OUTPATIENT
Start: 2025-01-30 | End: 2025-01-30 | Stop reason: HOSPADM

## 2025-01-30 RX ORDER — LIDOCAINE HYDROCHLORIDE 10 MG/ML
INJECTION, SOLUTION EPIDURAL; INFILTRATION; INTRACAUDAL; PERINEURAL AS NEEDED
Status: DISCONTINUED | OUTPATIENT
Start: 2025-01-30 | End: 2025-01-30

## 2025-01-30 RX ORDER — PROPOFOL 10 MG/ML
INJECTION, EMULSION INTRAVENOUS AS NEEDED
Status: DISCONTINUED | OUTPATIENT
Start: 2025-01-30 | End: 2025-01-30

## 2025-01-30 RX ORDER — HYDROCODONE BITARTRATE AND ACETAMINOPHEN 5; 325 MG/1; MG/1
1 TABLET ORAL EVERY 6 HOURS PRN
Qty: 5 TABLET | Refills: 0 | Status: SHIPPED | OUTPATIENT
Start: 2025-01-30 | End: 2025-02-09

## 2025-01-30 RX ORDER — ONDANSETRON 2 MG/ML
INJECTION INTRAMUSCULAR; INTRAVENOUS AS NEEDED
Status: DISCONTINUED | OUTPATIENT
Start: 2025-01-30 | End: 2025-01-30

## 2025-01-30 RX ORDER — SODIUM CHLORIDE, SODIUM LACTATE, POTASSIUM CHLORIDE, CALCIUM CHLORIDE 600; 310; 30; 20 MG/100ML; MG/100ML; MG/100ML; MG/100ML
INJECTION, SOLUTION INTRAVENOUS CONTINUOUS PRN
Status: DISCONTINUED | OUTPATIENT
Start: 2025-01-30 | End: 2025-01-30

## 2025-01-30 RX ORDER — FENTANYL CITRATE/PF 50 MCG/ML
50 SYRINGE (ML) INJECTION
Status: DISCONTINUED | OUTPATIENT
Start: 2025-01-30 | End: 2025-01-30 | Stop reason: DRUGHIGH

## 2025-01-30 RX ORDER — FENTANYL CITRATE 50 UG/ML
INJECTION, SOLUTION INTRAMUSCULAR; INTRAVENOUS AS NEEDED
Status: DISCONTINUED | OUTPATIENT
Start: 2025-01-30 | End: 2025-01-30

## 2025-01-30 RX ORDER — FENTANYL CITRATE/PF 50 MCG/ML
25 SYRINGE (ML) INJECTION
Refills: 0 | Status: DISCONTINUED | OUTPATIENT
Start: 2025-01-30 | End: 2025-01-30 | Stop reason: HOSPADM

## 2025-01-30 RX ORDER — CHLORHEXIDINE GLUCONATE ORAL RINSE 1.2 MG/ML
15 SOLUTION DENTAL ONCE
Status: COMPLETED | OUTPATIENT
Start: 2025-01-30 | End: 2025-01-30

## 2025-01-30 RX ORDER — CEFAZOLIN SODIUM 2 G/50ML
2000 SOLUTION INTRAVENOUS ONCE
Status: COMPLETED | OUTPATIENT
Start: 2025-01-30 | End: 2025-01-30

## 2025-01-30 RX ORDER — PROPOFOL 10 MG/ML
INJECTION, EMULSION INTRAVENOUS CONTINUOUS PRN
Status: DISCONTINUED | OUTPATIENT
Start: 2025-01-30 | End: 2025-01-30

## 2025-01-30 RX ORDER — MIDAZOLAM HYDROCHLORIDE 2 MG/2ML
INJECTION, SOLUTION INTRAMUSCULAR; INTRAVENOUS AS NEEDED
Status: DISCONTINUED | OUTPATIENT
Start: 2025-01-30 | End: 2025-01-30

## 2025-01-30 RX ORDER — ONDANSETRON 2 MG/ML
4 INJECTION INTRAMUSCULAR; INTRAVENOUS ONCE AS NEEDED
Status: DISCONTINUED | OUTPATIENT
Start: 2025-01-30 | End: 2025-01-30 | Stop reason: SDUPTHER

## 2025-01-30 RX ORDER — METOCLOPRAMIDE HYDROCHLORIDE 5 MG/ML
10 INJECTION INTRAMUSCULAR; INTRAVENOUS ONCE AS NEEDED
Status: DISCONTINUED | OUTPATIENT
Start: 2025-01-30 | End: 2025-01-30 | Stop reason: HOSPADM

## 2025-01-30 RX ORDER — MAGNESIUM HYDROXIDE 1200 MG/15ML
LIQUID ORAL AS NEEDED
Status: DISCONTINUED | OUTPATIENT
Start: 2025-01-30 | End: 2025-01-30 | Stop reason: HOSPADM

## 2025-01-30 RX ADMIN — ONDANSETRON 4 MG: 2 INJECTION, SOLUTION INTRAMUSCULAR; INTRAVENOUS at 08:27

## 2025-01-30 RX ADMIN — CEFAZOLIN SODIUM 2000 MG: 2 SOLUTION INTRAVENOUS at 08:08

## 2025-01-30 RX ADMIN — SODIUM CHLORIDE, SODIUM LACTATE, POTASSIUM CHLORIDE, AND CALCIUM CHLORIDE: .6; .31; .03; .02 INJECTION, SOLUTION INTRAVENOUS at 08:06

## 2025-01-30 RX ADMIN — LIDOCAINE HYDROCHLORIDE 40 MG: 10 INJECTION, SOLUTION EPIDURAL; INFILTRATION; INTRACAUDAL; PERINEURAL at 08:12

## 2025-01-30 RX ADMIN — PROPOFOL 40 MG: 10 INJECTION, EMULSION INTRAVENOUS at 08:12

## 2025-01-30 RX ADMIN — FENTANYL CITRATE 50 MCG: 50 INJECTION INTRAMUSCULAR; INTRAVENOUS at 08:10

## 2025-01-30 RX ADMIN — CHLORHEXIDINE GLUCONATE 15 ML: 1.2 SOLUTION ORAL at 07:54

## 2025-01-30 RX ADMIN — MIDAZOLAM 2 MG: 1 INJECTION INTRAMUSCULAR; INTRAVENOUS at 08:07

## 2025-01-30 RX ADMIN — PROPOFOL 80 MCG/KG/MIN: 10 INJECTION, EMULSION INTRAVENOUS at 08:13

## 2025-01-30 NOTE — OP NOTE
OPERATIVE REPORT  PATIENT NAME: Isaura Brito    :  1960  MRN: [id number]  Pt Location: [facility] OR [facility] 06    SURGERY DATE: 2025     Surgeons and Role:     * Jose Payne MD - Primary     * Jone Sanderson PA-C - Assisting    Physician Assistant need: A physician assistant was required during the procedure for retraction, tissue handling, dissection, and suturing. No qualified resident was available.    Pre-Op Diagnosis Codes:      * Mass of finger of left hand [R22.32]    Post-Op Diagnosis Codes:     * Mass of finger of left hand [R22.32]    Procedure(s) (LRB):  EXCISION LEFT INDEX FINGER MASS (Left)    Specimen(s):  ID Type Source Tests Collected by Time Destination   1 : LEFT INDEX FINGER MASS Tissue Soft Tissue, Other TISSUE EXAM Jose Payne MD 2025 0826        Estimated Blood Loss:   Minimal    Drains:  None    Implants:  * No implants in log *    Anesthesia Type:   IV Sedation with Anesthesia    Operative Indications:  Patient is a 64 y.o. female evaluated in clinic with symptoms and clinical exam consistent with Left index finger mass.  We discussed treatment options with patient including conservative management and surgical management. Discussed nonoperative management with observation.  We discussed risk of surgery including pain, bleeding, stiffness, damage to neurovascular structures, infection, need for therapy, recurrence of mass.  We discussed low, but real possibility of malignant process and need for further surgical management. Patient elected for surgical management with mass excision.  Informed consent was obtained.     Operative Findings:  Left index finger mass that was able to be marginally excised.       Complications:   None     Procedure and Technique:  Patient was identified in the preoperative holding area.  Surgical sites were marked with patient participation. Patient was taken back to the operating theater.  Anesthesia was induced. Extremity was prepped  and draped in typical fashion.  Formal time-out was performed confirming site, patient, and procedure. All present were in agreement. A 50-50 mixture of 1% lidocaine without epinephrine and 0.25% bupivacaine without epinephrine was used for digital block.        Mass was identified over ulnar aspect of digit at the level of DIP flexion crease.  Desiree type incision was made.  Full-thickness skin flaps were developed.  Care was taken to protect the ulnar digital nerve.  Mass was excised marginally through skin. Mass was located in subcutaneous layer.  There were 2 pieces of mass.  They both had a firm dark red appearance.  First piece was found slightly distal and measured 2 mm x 2 mm x 2 mm.  A second slightly larger mass measured 6 mm x 6 mm x 4 mm high. Mass was sent for pathologic examination. Wound was inspected. There was no evidence of remaining mass.  Wound was irrigated.  Gloves and instruments were changed.  Skin was closed with 4-0 chromic in simple interrupted fashion.  Wounds were dressed with Xeroform, 4x4s, keely, and finger sock.   Tourniquet was deflated.  Patient was taken to PACU in stable condition.     I was present for the entire procedure     Postoperative Plan:  Patient may range digits as tolerated.  We will limit weightbearing to a couple of pounds for the first 2 weeks.  We will see patient back at the 2-week postoperative toño.      Patient Disposition:  Recovery        SIGNATURE: Jose Payne MD  DATE: January 30, 2025  TIME: 8:32 AM

## 2025-01-30 NOTE — ANESTHESIA POSTPROCEDURE EVALUATION
Post-Op Assessment Note    CV Status:  Stable    Pain management: adequate       Mental Status:  Alert and awake   Hydration Status:  Euvolemic   PONV Controlled:  Controlled   Airway Patency:  Patent     Post Op Vitals Reviewed: Yes    No anethesia notable event occurred.    Staff: CRNA           Last Filed PACU Vitals:  Vitals Value Taken Time   Temp     Pulse 61 01/30/25 0836   /53 01/30/25 0837   Resp 15 01/30/25 0836   SpO2 96 % 01/30/25 0836   Vitals shown include unfiled device data.

## 2025-01-30 NOTE — DISCHARGE INSTR - AVS FIRST PAGE
Post Operative Instructions    You have had surgery on your arm today, please read and follow the information below:  Elevate your hand above your elbow during the next 24-48 hours to help with swelling.  Place your hand and arm over your head with motion at your shoulder three times a day.  Do not apply any cream/ointment/oil to your incisions including antibiotics (I.e.Neosporin)  Do not use peroxide to clean your wound   Do not soak your hands in standing water (dishwater, tubs, Jacuzzi's, pools, etc.) until given permission (typically 2-3 weeks after injury)    Call the office if you notice any:  Increased numbness or tingling of your hand or fingers that is not relieved with elevation.  Increasing pain that is not controlled with medication.  Difficulty chewing, breathing, swallowing.  Chest pains or shortness of breath.  Fever over 101.4 degrees.    Bandage: Please DO NOT remove your surgical bandage. This will be removed at either your postop visit or your 1st visit with your hand therapist.    Motion: Move fingers into a fist 5 times a day, DO NOT move any splinted fingers.    Weight bearing status: Avoid heavy lifting (>2 pounds) with the extremity that was operated on until follow up appointment. Normal activities of daily living are OK.    Ice: Ice for 10 minutes every hour as needed for swelling x 24 hours.    Sling: No sling necessary.    Pain medication:   Norco/Hydrocodone one tab every 6 hours AS NEEDED for pain  *   You may take Tylenol (acetaminophen) in addition to your pain medication. This is over the counter. Please follow the instructions on the bottle. BE AWARE - your pain medication (hydrocodone/oxycodone) may already include acetaminophen in it. If it does, you must include this in your daily amount and make sure not take more than 3000 mg per day.   *   You may take an antiinflammatory medication (i.e. ibuprofen/naproxen) in addition to your pain medication. These are found over the  counter, but higher prescription doses are available if needed. Please follow the dosing instructions on the bottle and it is recommended you take these with food. DO NOT take these medications if you are on a blood thinner, have history of gastrointestinal bleeding, chronic kidney disease, or if you have ever been told by a physician not to. You CAN take this with Tylenol (acetaminophen), but should only take ONE antiinflammatory at a time.    Antibiotics:  None     Therapy: No therapy needed at this time.    Follow-up Appointment: 10-14 days.        Please call the office if you have any questions or concerns regarding your post-operative care.

## 2025-01-30 NOTE — ANESTHESIA PREPROCEDURE EVALUATION
Procedure:  EXCISION LEFT INDEX FINGER MASS (Left: Hand)    Relevant Problems   CARDIO   (+) Benign essential HTN   (+) Carotid stenosis, asymptomatic, right   (+) Chronic stable angina (HCC)   (+) Coronary arteriosclerosis   (+) Hyperlipidemia associated with type 2 diabetes mellitus  (HCC)      ENDO   (+) Hypothyroidism   (+) Type II diabetes mellitus with manifestations (HCC)      GI/HEPATIC   (+) Gastroesophageal reflux disease   (+) Hiatal hernia      MUSCULOSKELETAL   (+) Back pain   (+) Cervical spondylosis   (+) Chronic low back pain   (+) Degenerative disc disease, cervical   (+) Fibromyalgia   (+) Hiatal hernia   (+) Pain in thoracic spine      NEURO/PSYCH   (+) Chronic low back pain   (+) Chronic pain syndrome   (+) Chronic stable angina (HCC)   (+) Dysthymic disorder   (+) Fibromyalgia   (+) CARLOS MANUEL (generalized anxiety disorder)   (+) Headache   (+) PTSD (post-traumatic stress disorder)   (+) Severe episode of recurrent major depressive disorder, without psychotic features (HCC)   (+) Situational mixed anxiety and depressive disorder      PULMONARY   (+) Asthma        Physical Exam    Airway    Mallampati score: II  TM Distance: >3 FB  Neck ROM: full     Dental       Cardiovascular      Pulmonary      Other Findings  post-pubertal.      Anesthesia Plan  ASA Score- 2     Anesthesia Type- IV sedation with anesthesia with ASA Monitors.         Additional Monitors:     Airway Plan:            Plan Factors-    Chart reviewed.                      Induction- intravenous.    Postoperative Plan-         Informed Consent- Anesthetic plan and risks discussed with patient.  I personally reviewed this patient with the CRNA. Discussed and agreed on the Anesthesia Plan with the CRNA..      NPO Status:  No vitals data found for the desired time range.

## 2025-01-31 ENCOUNTER — RA CDI HCC (OUTPATIENT)
Dept: OTHER | Facility: HOSPITAL | Age: 65
End: 2025-01-31

## 2025-02-03 NOTE — ANESTHESIA POSTPROCEDURE EVALUATION
Post-Op Assessment Note    CV Status:  Stable    Pain management: adequate       Mental Status:  Alert and awake   Hydration Status:  Euvolemic   PONV Controlled:  Controlled   Airway Patency:  Patent     Post Op Vitals Reviewed: Yes    No anethesia notable event occurred.    Staff: CRNA           Last Filed PACU Vitals:  Vitals Value Taken Time   Temp     Pulse 61 01/30/25 0836   /53 01/30/25 0837   Resp 15 01/30/25 0836   SpO2 96 % 01/30/25 0836   Vitals shown include unfiled device data.       Post-Op Assessment Note            No anethesia notable event occurred.    Staff: Anesthesiologist           Last Filed PACU Vitals:  Vitals Value Taken Time   Temp 97.4 °F (36.3 °C) 01/30/25 0900   Pulse 53 01/30/25 0900   /61 01/30/25 0900   Resp 16 01/30/25 0900   SpO2 93 % 01/30/25 0900       Modified Sandro:     Vitals Value Taken Time   Activity 2 01/30/25 0900   Respiration 2 01/30/25 0900   Circulation 2 01/30/25 0900   Consciousness 2 01/30/25 0900   Oxygen Saturation 2 01/30/25 0900     Modified Sandro Score: 10

## 2025-02-04 PROCEDURE — 88305 TISSUE EXAM BY PATHOLOGIST: CPT | Performed by: PATHOLOGY

## 2025-02-05 ENCOUNTER — TELEPHONE (OUTPATIENT)
Dept: FAMILY MEDICINE CLINIC | Facility: CLINIC | Age: 65
End: 2025-02-05

## 2025-02-05 ENCOUNTER — OFFICE VISIT (OUTPATIENT)
Dept: FAMILY MEDICINE CLINIC | Facility: CLINIC | Age: 65
End: 2025-02-05
Payer: COMMERCIAL

## 2025-02-05 VITALS
OXYGEN SATURATION: 97 % | TEMPERATURE: 98 F | SYSTOLIC BLOOD PRESSURE: 124 MMHG | HEART RATE: 60 BPM | WEIGHT: 163 LBS | DIASTOLIC BLOOD PRESSURE: 80 MMHG | HEIGHT: 64 IN | BODY MASS INDEX: 27.83 KG/M2

## 2025-02-05 DIAGNOSIS — K57.92 DIVERTICULITIS: ICD-10-CM

## 2025-02-05 DIAGNOSIS — K44.9 HIATAL HERNIA: ICD-10-CM

## 2025-02-05 DIAGNOSIS — E11.9 TYPE 2 DIABETES MELLITUS WITHOUT COMPLICATION, WITH LONG-TERM CURRENT USE OF INSULIN (HCC): ICD-10-CM

## 2025-02-05 DIAGNOSIS — I10 BENIGN ESSENTIAL HTN: Primary | ICD-10-CM

## 2025-02-05 DIAGNOSIS — D58.2 ELEVATED HEMOGLOBIN (HCC): ICD-10-CM

## 2025-02-05 DIAGNOSIS — J45.909 ASTHMA, UNSPECIFIED ASTHMA SEVERITY, UNSPECIFIED WHETHER COMPLICATED, UNSPECIFIED WHETHER PERSISTENT: Primary | ICD-10-CM

## 2025-02-05 DIAGNOSIS — Z12.11 SCREEN FOR COLON CANCER: ICD-10-CM

## 2025-02-05 DIAGNOSIS — Z79.4 TYPE 2 DIABETES MELLITUS WITHOUT COMPLICATION, WITH LONG-TERM CURRENT USE OF INSULIN (HCC): ICD-10-CM

## 2025-02-05 DIAGNOSIS — K21.9 GASTROESOPHAGEAL REFLUX DISEASE, UNSPECIFIED WHETHER ESOPHAGITIS PRESENT: ICD-10-CM

## 2025-02-05 PROCEDURE — G2211 COMPLEX E/M VISIT ADD ON: HCPCS | Performed by: NURSE PRACTITIONER

## 2025-02-05 PROCEDURE — 99213 OFFICE O/P EST LOW 20 MIN: CPT | Performed by: NURSE PRACTITIONER

## 2025-02-05 NOTE — ASSESSMENT & PLAN NOTE
Noting increased reflux symptoms despite pantoprazole 40; famotidine 20.  Advised to monitor for and avoid food triggers    Due for repeat colonoscopy this year  Discussed likely EGD as well  Consult GI today for evaluation  (History GERD; hiatal hernia)    Orders:    Ambulatory Referral to Gastroenterology; Future

## 2025-02-05 NOTE — ASSESSMENT & PLAN NOTE
Patient requesting albuterol versus Proventil  Reports albuterol offers better symptom control    Orders:    Albuterol Sulfate 108 (90 Base) MCG/ACT AEPB; Inhale 2 puffs every 6 (six) hours as needed (wheeze, SOB)

## 2025-02-05 NOTE — TELEPHONE ENCOUNTER
Today at check out, pt was requesting lab orders be placed in her chart as she will need them in 3 months. Pt stated that she goes to [facility] for labs. Please advise.

## 2025-02-05 NOTE — PROGRESS NOTES
Name: Isaura Brito      : 1960      MRN: [id number]  Encounter Provider: JORGE A Alfaro  Encounter Date: 2025   Encounter department: [facility]  :  Assessment & Plan  Asthma, unspecified asthma severity, unspecified whether complicated, unspecified whether persistent  Patient requesting albuterol versus Proventil  Reports albuterol offers better symptom control    Orders:    Albuterol Sulfate 108 (90 Base) MCG/ACT AEPB; Inhale 2 puffs every 6 (six) hours as needed (wheeze, SOB)    Hiatal hernia    Orders:    Ambulatory Referral to Gastroenterology; Future    Gastroesophageal reflux disease, unspecified whether esophagitis present  Noting increased reflux symptoms despite pantoprazole 40; famotidine 20.  Advised to monitor for and avoid food triggers    Due for repeat colonoscopy this year  Discussed likely EGD as well  Consult GI today for evaluation  (History GERD; hiatal hernia)    Orders:    Ambulatory Referral to Gastroenterology; Future    Diverticulitis    Orders:    Ambulatory Referral to Gastroenterology; Future    Screen for colon cancer    Orders:    Ambulatory Referral to Gastroenterology; Future          Depression Screening and Follow-up Plan:   Patient assessed for underlying major depression. Brief counseling provided and recommend additional follow-up/re-evaluation next office visit. Feels well at this time  No SI/HI  Lung Cancer Screening Shared Decision Making: I discussed with her that she is a candidate for lung cancer CT screening.     The following Shared Decision-Making points were covered:  Benefits of screening were discussed, including the rates of reduction in death from lung cancer and other causes.  Harms of screening were reviewed, including false positive tests, radiation exposure levels, risks of invasive procedures, risks of complications of screening, and risk of overdiagnosis.  I counseled on the importance of adherence to annual lung  "cancer LDCT screening, impact of co-morbidities, and ability or willingness to undergo diagnosis and treatment.  I counseled on the importance of maintaining abstinence as a former smoker or was counseled on the importance of smoking cessation if a current smoker    Review of Eligibility Criteria: She meets all of the criteria for Lung Cancer Screening.   - She is 64 y.o.   - She has 20 pack year tobacco history and is a current smoker or has quit within the past 15 years  - She presents no signs or symptoms of lung cancer    After discussion, the patient decided to elect lung cancer screening.    Screening CT scan pending in system    History of Present Illness   Routine follow-up        Review of Systems   Constitutional: Negative.    Respiratory: Negative.     Cardiovascular: Negative.    Gastrointestinal: Negative.    Skin:         Finger healing-recent surgery to remove mass  Orthopedic follow-up 2/11     Psychiatric/Behavioral: Negative.  Negative for self-injury and suicidal ideas.        Objective   /80 (BP Location: Left arm, Patient Position: Sitting, Cuff Size: Adult)   Pulse 60   Temp 98 °F (36.7 °C)   Ht 5' 4.25\" (1.632 m)   Wt 73.9 kg (163 lb)   LMP  (LMP Unknown)   SpO2 97%   BMI 27.76 kg/m²      Physical Exam  Vitals and nursing note reviewed.   Constitutional:       General: She is not in acute distress.     Appearance: Normal appearance. She is well-developed. She is not ill-appearing.   Pulmonary:      Effort: Pulmonary effort is normal. No respiratory distress.   Neurological:      Mental Status: She is alert and oriented to person, place, and time.   Psychiatric:         Attention and Perception: Attention normal.         Mood and Affect: Mood normal.         Behavior: Behavior normal.         "

## 2025-02-10 ENCOUNTER — TELEPHONE (OUTPATIENT)
Age: 65
End: 2025-02-10

## 2025-02-10 NOTE — TELEPHONE ENCOUNTER
Received transfer call from pt. She states she does not have the post op bandage on anymore. She took it off on the second day because it was bothering her. She had it wrapped in a puffs plus with lotion tissue and has been applying neosporin on it the last few days. Advised that we don't recommend any creams or lotions. She states its swollen, tender, and red, no fever. She will send a picture through [employer] and does have a follow up appt tomorrow. I did try to offer her an appt this afternoon but she preferred to send a picture.

## 2025-02-10 NOTE — TELEPHONE ENCOUNTER
Caller: Patient     Doctor/Office: Dr Payne    Call regarding :  Patient called had surgery 1/30 and post op scheduled 2/11, has concerns of infection. She stated having swelling and stitches may have fallen out.     Call was transferred to: Ortho Pod Nurse

## 2025-02-10 NOTE — TELEPHONE ENCOUNTER
Caller: Patient     Doctor: Dr. Payne    Reason for call: Patient called back, submitted photo in [employer].     Call back#: [phone]

## 2025-02-11 ENCOUNTER — OFFICE VISIT (OUTPATIENT)
Dept: OBGYN CLINIC | Facility: CLINIC | Age: 65
End: 2025-02-11

## 2025-02-11 VITALS — BODY MASS INDEX: 27.83 KG/M2 | HEIGHT: 64 IN | WEIGHT: 163 LBS

## 2025-02-11 DIAGNOSIS — Z47.89 AFTERCARE FOLLOWING SURGERY OF THE MUSCULOSKELETAL SYSTEM: Primary | ICD-10-CM

## 2025-02-11 DIAGNOSIS — R22.32 MASS OF FINGER OF LEFT HAND: ICD-10-CM

## 2025-02-11 PROCEDURE — 99024 POSTOP FOLLOW-UP VISIT: CPT | Performed by: STUDENT IN AN ORGANIZED HEALTH CARE EDUCATION/TRAINING PROGRAM

## 2025-02-11 NOTE — PROGRESS NOTES
Assessment/Plan:  Patient ID: 64 y.o. female   Surgery: Excision Left Index Finger Mass - Left  Date of Surgery: 1/30/2025    Patient is doing well s/p surgery.   Encouraged to work on her motion as this will help with the swelling.  Patient declined therapy today. Advised if she is having trouble with motion 2 weeks from now she may call to have therapy referral given.  She may return to activity as tolerated.   She may follow up as needed.    Follow Up:  PRN    To Do Next Visit:  Re-evaluation of current issue      CHIEF COMPLAINT:  Chief Complaint   Patient presents with    Left Index Finger - Cyst     Finger, thinks its infected         SUBJECTIVE:  Patient is a 64 y.o. year old female who presents for follow up after Excision Left Index Finger Mass - Left. Today patient states she is having pain within her left index finger.  Patient states she is noticing swelling within her finger and is afraid it is infected.  Patient states she did have a fever about 3 days ago that only lasted 1 day.  Patient states she is not taking anything for pain control.  Patient states she is noticing her finger being extremely sensitive.        PHYSICAL EXAMINATION:  General: Well developed and well nourished, alert and oriented x 3, appears comfortable  Psychiatric: Normal    MUSCULOSKELETAL EXAMINATION:  Incision: Clean, dry, intact  Surgery Site: normal, no evidence of infection   Range of Motion: Limited due to pain and Limited due to stiffness  Neurovascular status: Neuro intact, good cap refill   Full digit extension   4 cm pulp to palm distance to index finger      STUDIES REVIEWED:  No new studies to review       PROCEDURES PERFORMED:  Procedures  No Procedures performed today    Scribe Attestation      I,:  Rudolph Perez am acting as a scribe while in the presence of the attending physician.:       I,:  Jose Payne MD personally performed the services described in this documentation    as scribed in my presence.:

## 2025-02-13 ENCOUNTER — CONSULT (OUTPATIENT)
Dept: DERMATOLOGY | Facility: CLINIC | Age: 65
End: 2025-02-13
Payer: COMMERCIAL

## 2025-02-13 ENCOUNTER — TELEPHONE (OUTPATIENT)
Dept: PSYCHIATRY | Facility: CLINIC | Age: 65
End: 2025-02-13

## 2025-02-13 VITALS — BODY MASS INDEX: 28 KG/M2 | WEIGHT: 164 LBS | TEMPERATURE: 98.4 F | HEIGHT: 64 IN

## 2025-02-13 DIAGNOSIS — R21 RASH: ICD-10-CM

## 2025-02-13 DIAGNOSIS — L81.4 SOLAR LENTIGO: ICD-10-CM

## 2025-02-13 DIAGNOSIS — L85.3 XEROSIS OF SKIN: ICD-10-CM

## 2025-02-13 DIAGNOSIS — D22.9 MULTIPLE BENIGN MELANOCYTIC NEVI: Primary | ICD-10-CM

## 2025-02-13 DIAGNOSIS — D18.01 CHERRY ANGIOMA: ICD-10-CM

## 2025-02-13 PROCEDURE — 99203 OFFICE O/P NEW LOW 30 MIN: CPT | Performed by: DERMATOLOGY

## 2025-02-13 NOTE — PATIENT INSTRUCTIONS
"MELANOCYTIC NEVI (\"Moles\")    Assessment and Plan:  Based on a thorough discussion of this condition and the management approach to it (including a comprehensive discussion of the known risks, side effects and potential benefits of treatment), the patient (family) agrees to implement the following specific plan:  When outside we recommend using a wide brim hat, sunglasses, long sleeve and pants, sunscreen with SPF 30+ with reapplication every 2 hours, or SPF specific clothing   Benign, reassured  Annual skin check     Melanocytic Nevi  Melanocytic nevi (\"moles\") are tan or brown, raised or flat areas of the skin which have an increased number of melanocytes. Melanocytes are the cells in our body which make pigment and account for skin color.    Some moles are present at birth (I.e., \"congenital nevi\"), while others come up later in life (i.e., \"acquired nevi\").  The sun can stimulate the body to make more moles.  Sunburns are not the only thing that triggers more moles.  Chronic sun exposure can do it too.     Clinically distinguishing a healthy mole from melanoma may be difficult, even for experienced dermatologists. The \"ABCDE's\" of moles have been suggested as a means of helping to alert a person to a suspicious mole and the possible increased risk of melanoma.  The suggestions for raising alert are as follows:    Asymmetry: Healthy moles tend to be symmetric, while melanomas are often asymmetric.  Asymmetry means if you draw a line through the mole, the two halves do not match in color, size, shape, or surface texture. Asymmetry can be a result of rapid enlargement of a mole, the development of a raised area on a previously flat lesion, scaling, ulceration, bleeding or scabbing within the mole.  Any mole that starts to demonstrate \"asymmetry\" should be examined promptly by a board certified dermatologist.     Border: Healthy moles tend to have discrete, even borders.  The border of a melanoma often blends into " "the normal skin and does not sharply delineate the mole from normal skin.  Any mole that starts to demonstrate \"uneven borders\" should be examined promptly by a board certified dermatologist.     Color: Healthy moles tend to be one color throughout.  Melanomas tend to be made up of different colors ranging from dark black, blue, white, or red.  Any mole that demonstrates a color change should be examined promptly by a board certified dermatologist.     Diameter: Healthy moles tend to be smaller than 0.6 cm in size; an exception are \"congenital nevi\" that can be larger.  Melanomas tend to grow and can often be greater than 0.6 cm (1/4 of an inch, or the size of a pencil eraser). This is only a guideline, and many normal moles may be larger than 0.6 cm without being unhealthy.  Any mole that starts to change in size (small to bigger or bigger to smaller) should be examined promptly by a board certified dermatologist.     Evolving: Healthy moles tend to \"stay the same.\"  Melanomas may often show signs of change or evolution such as a change in size, shape, color, or elevation.  Any mole that starts to itch, bleed, crust, burn, hurt, or ulcerate or demonstrate a change or evolution should be examined promptly by a board certified dermatologist.        LENTIGO    Assessment and Plan:  Based on a thorough discussion of this condition and the management approach to it (including a comprehensive discussion of the known risks, side effects and potential benefits of treatment), the patient (family) agrees to implement the following specific plan:  When outside we recommend using a wide brim hat, sunglasses, long sleeve and pants, sunscreen with SPF 30+ with reapplication every 2 hours, or SPF specific clothing       What is a lentigo?  A lentigo is a pigmented flat or slightly raised lesion with a clearly defined edge. Unlike an ephelis (freckle), it does not fade in the winter months. There are several kinds of lentigo.  The " name lentigo originally referred to its appearance resembling a small lentil. The plural of lentigo is lentigines, although “lentigos” is also in common use.    Who gets lentigines?  Lentigines can affect males and females of all ages and races. Solar lentigines are especially prevalent in fair skinned adults. Lentigines associated with syndromes are present at birth or arise during childhood.    What causes lentigines?  Common forms of lentigo are due to exposure to ultraviolet radiation:  Sun damage including sunburn   Indoor tanning   Phototherapy, especially photochemotherapy (PUVA)    Ionizing radiation, eg radiation therapy, can also cause lentigines.  Several familial syndromes associated with widespread lentigines originate from mutations in Samson-MAP kinase, mTOR signaling and PTEN pathways.    What is the treatment for lentigines?  Most lentigines are left alone. Attempts to lighten them may not be successful. The following approaches are used:  SPF 50+ broad-spectrum sunscreen   Hydroquinone bleaching cream   Alpha hydroxy acids   Vitamin C   Retinoids   Azelaic acid   Chemical peels  Individual lesions can be permanently removed using:  Cryotherapy   Intense pulsed light   Pigment lasers    How can lentigines be prevented?  Lentigines associated with exposure ultraviolet radiation can be prevented by very careful sun protection. Clothing is more successful at preventing new lentigines than are sunscreens.    What is the outlook for lentigines?  Lentigines usually persist. They may increase in number with age and sun exposure. Some in sun-protected sites may fade and disappear.    JIM ANGIOMAS    Assessment and Plan:  Based on a thorough discussion of this condition and the management approach to it (including a comprehensive discussion of the known risks, side effects and potential benefits of treatment), the patient (family) agrees to implement the following specific plan:  Monitor for changes  Benign,  "reassured      Assessment and Plan:    Cherry angioma, also known as Bryant de Marty spots, are benign vascular skin lesions. A \"cherry angioma\" is a firm red, blue or purple papule, 0.1-1 cm in diameter. When thrombosed, they can appear black in colour until evaluated with a dermatoscope when the red or purple colour is more easily seen. Cherry angioma may develop on any part of the body but most often appear on the scalp, face, lips and trunk.  An angioma is due to proliferating endothelial cells; these are the cells that line the inside of a blood vessel.    Angiomas can arise in early life or later in life; the most common type of angioma is a cherry angioma.  Cherry angiomas are very common in males and females of any age or race. They are more noticeable in white skin than in skin of colour. They markedly increase in number from about the age of 40. There may be a family history of similar lesions. Eruptive cherry angiomas have been rarely reported to be associated with internal malignancy. The cause of angiomas is unknown. Genetic analysis of cherry angiomas has shown that they frequently carry specific somatic missense mutations in the GNAQ and GNA11 (Q209H) genes, which are involved in other vascular and melanocytic proliferations.    XEROSIS (\"DRY SKIN\")    Assessment and Plan:  Based on a thorough discussion of this condition and the management approach to it (including a comprehensive discussion of the known risks, side effects and potential benefits of treatment), the patient (family) agrees to implement the following specific plan:  Use moisturizer like Eucerin,Cerave or Aveeno Cream 3 times a day for the dry skin            Dry skin refers to skin that feels dry to touch. Dry skin has a dull surface with a rough, scaly quality. The skin is less pliable and cracked. When dryness is severe, the skin may become inflamed and fissured.  Although any body site can be dry, dry skin tends to affect the " shins more than any other site.    Dry skin is lacking moisture in the outer horny cell layer (stratum corneum) and this results in cracks in the skin surface.  Dry skin is also called xerosis, xeroderma or asteatosis (lack of fat).  It can affect males and females of all ages. There is some racial variability in water and lipid content of the skin.  Dry skin that starts in early childhood may be one of about 20 types of ichthyosis (fish-scale skin). There is often a family history of dry skin.   Dry skin is commonly seen in people with atopic dermatitis.  Nearly everyone > 60 years has dry skin.    Dry skin that begins later may be seen in people with certain diseases and conditions.  Postmenopausal women  Hypothyroidism  Chronic renal disease   Malnutrition and weight loss   Subclinical dermatitis   Treatment with certain drugs such as oral retinoids, diuretics and epidermal growth factor receptor inhibitors      What is the treatment for dry skin?  The mainstay of treatment of dry skin and ichthyosis is moisturisers/emollients. They should be applied liberally and often enough to:  Reduce itch   Improve the barrier function   Prevent entry of irritants, bacteria   Reduce transepidermal water loss.      How can dry skin be prevented?  Eliminate aggravating factors:  Reduce the frequency of bathing.   A humidifier in winter and air conditioner in summer   Compare having a short shower with a prolonged soak in a bath.   Use lukewarm, not hot, water.   Replace standard soap with a substitute such as a synthetic detergent cleanser, water-miscible emollient, bath oil, anti-pruritic tar oil, colloidal oatmeal etc.   Apply an emollient liberally and often, particularly shortly after bathing, and when itchy. The drier the skin, the thicker this should be, especially on the hands.    What is the outlook for dry skin?  A tendency to dry skin may persist life-long, or it may improve once contributing factors are controlled.      RASH    Assessment and Plan:  Based on a thorough discussion of this condition and the management approach to it (including a comprehensive discussion of the known risks, side effects and potential benefits of treatment), the patient (family) agrees to implement the following specific plan:  Unlike from spider bites  If patient thinks they are from spiders, solution would be to get rid of the spiders.   Acetylcysteine 600 mg twice daily.

## 2025-02-13 NOTE — PROGRESS NOTES
"[facility] Dermatology Clinic Note     Patient Name: Isaura Brito  Encounter Date: 02/13/2025     Have you been cared for by a [facility] Dermatologist in the last 3 years and, if so, which description applies to you?    NO.   I am considered a \"new\" patient and must complete all patient intake questions. I am FEMALE/of child-bearing potential.    REVIEW OF SYSTEMS:  Have you recently had or currently have any of the following? Recent fever or chills? No  Any non-healing wound? No  Are you pregnant or planning to become pregnant? No  Are you currently or planning to be nursing or breast feeding? No   PAST MEDICAL HISTORY:  Have you personally ever had or currently have any of the following?  If \"YES,\" then please provide more detail. Skin cancer (such as Melanoma, Basal Cell Carcinoma, Squamous Cell Carcinoma?  No  Tuberculosis, HIV/AIDS, Hepatitis B or C: No  Radiation Treatment No   HISTORY OF IMMUNOSUPPRESSION:   Do you have a history of any of the following:  Systemic Immunosuppression such as Diabetes, Biologic or Immunotherapy, Chemotherapy, Organ Transplantation, Bone Marrow Transplantation or Prednsione?  YES, Diabetes     Answering \"YES\" requires the addition of the dotphrase \"IMMUNOSUPPRESSED\" as the first diagnosis of the patient's visit.   FAMILY HISTORY:  Any \"first degree relatives\" (parent, brother, sister, or child) with the following?    Skin Cancer, Pancreatic or Other Cancer? No   PATIENT EXPERIENCE:    Do you want the Dermatologist to perform a COMPLETE skin exam today including a clinical examination under the \"bra and underwear\" areas?  Yes, did not look under bra or underwear.   If necessary, do we have your permission to call and leave a detailed message on your Preferred Phone number that includes your specific medical information?  Yes      Allergies   Allergen Reactions    Augmentin [Amoxicillin-Pot Clavulanate] Nausea Only     PT stated she only allergic to medication AUGMENTIN    " "Meloxicam GI Intolerance     \"brings on diverticulitis\"      Current Outpatient Medications:     acetaminophen (TYLENOL) 325 mg tablet, Take 1 tablet (325 mg total) by mouth as needed for headaches For headache, Disp: , Rfl:     Albuterol Sulfate 108 (90 Base) MCG/ACT AEPB, Inhale 2 puffs every 6 (six) hours as needed (wheeze, SOB), Disp: 1 each, Rfl: 1    aspirin 81 mg chewable tablet, Chew 81 mg daily, Disp: , Rfl:     Biotin 1000 MCG CHEW, Chew, Disp: , Rfl:     cyclobenzaprine (FLEXERIL) 10 mg tablet, Take 1 tablet (10 mg total) by mouth 3 (three) times a day as needed for muscle spasms, Disp: 30 tablet, Rfl: 5    doxepin (SINEquan) 25 mg capsule, Take 1 capsule (25 mg total) by mouth daily at bedtime, Disp: 30 capsule, Rfl: 2    Empagliflozin (Jardiance) 10 MG TABS tablet, Take 1 tablet (10 mg total) by mouth every morning, Disp: 30 tablet, Rfl: 5    ezetimibe (ZETIA) 10 mg tablet, Take 1 tablet (10 mg total) by mouth daily, Disp: 90 tablet, Rfl: 1    fluticasone (FLONASE) 50 mcg/act nasal spray, SPRAY 1 SPRAY INTO EACH NOSTRIL EVERY DAY, Disp: 24 mL, Rfl: 1    levothyroxine 100 mcg tablet, Take 1 tablet (100 mcg total) by mouth daily Take 112 mcg daily on Mondays Wednesdays and Fridays alternate with 100 mcg all other days, Disp: 90 tablet, Rfl: 1    levothyroxine 112 mcg tablet, TAKE 1 TABLET BY MOUTH EVERY DAY, Disp: 90 tablet, Rfl: 0    losartan (COZAAR) 50 mg tablet, TAKE 1 TABLET BY MOUTH EVERY DAY, Disp: 90 tablet, Rfl: 1    Magnesium Oxide (MAG-CAPS PO), Take 800 mg by mouth, Disp: , Rfl:     methocarbamol (ROBAXIN) 500 mg tablet, Take 1 tablet (500 mg total) by mouth 3 (three) times a day as needed for muscle spasms, Disp: 20 tablet, Rfl: 0    metoprolol succinate (TOPROL-XL) 25 mg 24 hr tablet, Take 1 tablet (25 mg total) by mouth daily, Disp: 90 tablet, Rfl: 1    mupirocin (BACTROBAN) 2 % ointment, Apply topically 3 (three) times a day, Disp: 30 g, Rfl: 0    Omega-3 Fatty Acids (Omega-3 Fish Oil) " "1200 MG CAPS, Take by mouth, Disp: , Rfl:     pantoprazole (PROTONIX) 40 mg tablet, TAKE 1 TABLET BY MOUTH EVERY DAY, Disp: 90 tablet, Rfl: 1    pregabalin (LYRICA) 150 mg capsule, Take 1 capsule (150 mg total) by mouth 3 (three) times a day, Disp: 90 capsule, Rfl: 0    rosuvastatin (CRESTOR) 40 MG tablet, Take 1 tablet (40 mg total) by mouth daily, Disp: 90 tablet, Rfl: 3    triamcinolone (KENALOG) 0.1 % cream, Apply topically as needed for irritation, Disp: , Rfl:           Whom besides the patient is providing clinical information about today's encounter?   NO ADDITIONAL HISTORIAN (patient alone provided history)    Physical Exam and Assessment/Plan by Diagnosis:    Immunosuppressed    Main Reason for Immunosuppression:    Diabetes    Additional History of Immunosuppression (please provide details):    Patient is a diabetic, currently on Jardiance     Plan:  This diagnosis of \"Immunosuppression (HCC) [ID:  [id number]\" should be added to the patient's permanent PROBLEM LIST  Patient requires full skin exams every year with /NP/PA:  . NP or PA         MELANOCYTIC NEVI (\"Moles\")    Physical Exam:  Anatomic Location Affected:   Mostly on sun-exposed areas of the trunk and extremities  Morphological Description:  Scattered, 1-4mm round to ovoid, symmetrical-appearing, even bordered, skin colored to dark brown macules/papules, mostly in sun-exposed areas  Pertinent Positives:  Pertinent Negatives:    Additional History of Present Condition:      Assessment and Plan:  Based on a thorough discussion of this condition and the management approach to it (including a comprehensive discussion of the known risks, side effects and potential benefits of treatment), the patient (family) agrees to implement the following specific plan:  When outside we recommend using a wide brim hat, sunglasses, long sleeve and pants, sunscreen with SPF 30+ with reapplication every 2 hours, or SPF specific clothing   Benign, reassured  Annual " "skin check     Melanocytic Nevi  Melanocytic nevi (\"moles\") are tan or brown, raised or flat areas of the skin which have an increased number of melanocytes. Melanocytes are the cells in our body which make pigment and account for skin color.    Some moles are present at birth (I.e., \"congenital nevi\"), while others come up later in life (i.e., \"acquired nevi\").  The sun can stimulate the body to make more moles.  Sunburns are not the only thing that triggers more moles.  Chronic sun exposure can do it too.     Clinically distinguishing a healthy mole from melanoma may be difficult, even for experienced dermatologists. The \"ABCDE's\" of moles have been suggested as a means of helping to alert a person to a suspicious mole and the possible increased risk of melanoma.  The suggestions for raising alert are as follows:    Asymmetry: Healthy moles tend to be symmetric, while melanomas are often asymmetric.  Asymmetry means if you draw a line through the mole, the two halves do not match in color, size, shape, or surface texture. Asymmetry can be a result of rapid enlargement of a mole, the development of a raised area on a previously flat lesion, scaling, ulceration, bleeding or scabbing within the mole.  Any mole that starts to demonstrate \"asymmetry\" should be examined promptly by a board certified dermatologist.     Border: Healthy moles tend to have discrete, even borders.  The border of a melanoma often blends into the normal skin and does not sharply delineate the mole from normal skin.  Any mole that starts to demonstrate \"uneven borders\" should be examined promptly by a board certified dermatologist.     Color: Healthy moles tend to be one color throughout.  Melanomas tend to be made up of different colors ranging from dark black, blue, white, or red.  Any mole that demonstrates a color change should be examined promptly by a board certified dermatologist.     Diameter: Healthy moles tend to be smaller than 0.6 " "cm in size; an exception are \"congenital nevi\" that can be larger.  Melanomas tend to grow and can often be greater than 0.6 cm (1/4 of an inch, or the size of a pencil eraser). This is only a guideline, and many normal moles may be larger than 0.6 cm without being unhealthy.  Any mole that starts to change in size (small to bigger or bigger to smaller) should be examined promptly by a board certified dermatologist.     Evolving: Healthy moles tend to \"stay the same.\"  Melanomas may often show signs of change or evolution such as a change in size, shape, color, or elevation.  Any mole that starts to itch, bleed, crust, burn, hurt, or ulcerate or demonstrate a change or evolution should be examined promptly by a board certified dermatologist.        LENTIGO    Physical Exam:  Anatomic Location Affected:  trunk, arms  Morphological Description:  Light brown macules  Pertinent Positives:  Pertinent Negatives:    Additional History of Present Condition:      Assessment and Plan:  Based on a thorough discussion of this condition and the management approach to it (including a comprehensive discussion of the known risks, side effects and potential benefits of treatment), the patient (family) agrees to implement the following specific plan:  When outside we recommend using a wide brim hat, sunglasses, long sleeve and pants, sunscreen with SPF 30+ with reapplication every 2 hours, or SPF specific clothing       What is a lentigo?  A lentigo is a pigmented flat or slightly raised lesion with a clearly defined edge. Unlike an ephelis (freckle), it does not fade in the winter months. There are several kinds of lentigo.  The name lentigo originally referred to its appearance resembling a small lentil. The plural of lentigo is lentigines, although “lentigos” is also in common use.    Who gets lentigines?  Lentigines can affect males and females of all ages and races. Solar lentigines are especially prevalent in fair skinned " adults. Lentigines associated with syndromes are present at birth or arise during childhood.    What causes lentigines?  Common forms of lentigo are due to exposure to ultraviolet radiation:  Sun damage including sunburn   Indoor tanning   Phototherapy, especially photochemotherapy (PUVA)    Ionizing radiation, eg radiation therapy, can also cause lentigines.  Several familial syndromes associated with widespread lentigines originate from mutations in Samson-MAP kinase, mTOR signaling and PTEN pathways.    What is the treatment for lentigines?  Most lentigines are left alone. Attempts to lighten them may not be successful. The following approaches are used:  SPF 50+ broad-spectrum sunscreen   Hydroquinone bleaching cream   Alpha hydroxy acids   Vitamin C   Retinoids   Azelaic acid   Chemical peels  Individual lesions can be permanently removed using:  Cryotherapy   Intense pulsed light   Pigment lasers    How can lentigines be prevented?  Lentigines associated with exposure ultraviolet radiation can be prevented by very careful sun protection. Clothing is more successful at preventing new lentigines than are sunscreens.    What is the outlook for lentigines?  Lentigines usually persist. They may increase in number with age and sun exposure. Some in sun-protected sites may fade and disappear.    JIM ANGIOMAS    Physical Exam:  Anatomic Location Affected:  trunk  Morphological Description:  Scattered cherry red, 1-4 mm papules.  Pertinent Positives:  Pertinent Negatives:    Additional History of Present Condition:      Assessment and Plan:  Based on a thorough discussion of this condition and the management approach to it (including a comprehensive discussion of the known risks, side effects and potential benefits of treatment), the patient (family) agrees to implement the following specific plan:  Monitor for changes  Benign, reassured      Assessment and Plan:    Cherry angioma, also known as Bryant de Marty  "spots, are benign vascular skin lesions. A \"cherry angioma\" is a firm red, blue or purple papule, 0.1-1 cm in diameter. When thrombosed, they can appear black in colour until evaluated with a dermatoscope when the red or purple colour is more easily seen. Cherry angioma may develop on any part of the body but most often appear on the scalp, face, lips and trunk.  An angioma is due to proliferating endothelial cells; these are the cells that line the inside of a blood vessel.    Angiomas can arise in early life or later in life; the most common type of angioma is a cherry angioma.  Cherry angiomas are very common in males and females of any age or race. They are more noticeable in white skin than in skin of colour. They markedly increase in number from about the age of 40. There may be a family history of similar lesions. Eruptive cherry angiomas have been rarely reported to be associated with internal malignancy. The cause of angiomas is unknown. Genetic analysis of cherry angiomas has shown that they frequently carry specific somatic missense mutations in the GNAQ and GNA11 (Q209H) genes, which are involved in other vascular and melanocytic proliferations.    XEROSIS (\"DRY SKIN\")    Physical Exam:  Anatomic Location Affected:  diffuse  Morphological Description:  xerosis  Pertinent Positives:  Pertinent Negatives:    Additional History of Present Condition:      Assessment and Plan:  Based on a thorough discussion of this condition and the management approach to it (including a comprehensive discussion of the known risks, side effects and potential benefits of treatment), the patient (family) agrees to implement the following specific plan:  Use moisturizer like Eucerin,Cerave or Aveeno Cream 3 times a day for the dry skin            Dry skin refers to skin that feels dry to touch. Dry skin has a dull surface with a rough, scaly quality. The skin is less pliable and cracked. When dryness is severe, the skin may " become inflamed and fissured.  Although any body site can be dry, dry skin tends to affect the shins more than any other site.    Dry skin is lacking moisture in the outer horny cell layer (stratum corneum) and this results in cracks in the skin surface.  Dry skin is also called xerosis, xeroderma or asteatosis (lack of fat).  It can affect males and females of all ages. There is some racial variability in water and lipid content of the skin.  Dry skin that starts in early childhood may be one of about 20 types of ichthyosis (fish-scale skin). There is often a family history of dry skin.   Dry skin is commonly seen in people with atopic dermatitis.  Nearly everyone > 60 years has dry skin.    Dry skin that begins later may be seen in people with certain diseases and conditions.  Postmenopausal women  Hypothyroidism  Chronic renal disease   Malnutrition and weight loss   Subclinical dermatitis   Treatment with certain drugs such as oral retinoids, diuretics and epidermal growth factor receptor inhibitors      What is the treatment for dry skin?  The mainstay of treatment of dry skin and ichthyosis is moisturisers/emollients. They should be applied liberally and often enough to:  Reduce itch   Improve the barrier function   Prevent entry of irritants, bacteria   Reduce transepidermal water loss.      How can dry skin be prevented?  Eliminate aggravating factors:  Reduce the frequency of bathing.   A humidifier in winter and air conditioner in summer   Compare having a short shower with a prolonged soak in a bath.   Use lukewarm, not hot, water.   Replace standard soap with a substitute such as a synthetic detergent cleanser, water-miscible emollient, bath oil, anti-pruritic tar oil, colloidal oatmeal etc.   Apply an emollient liberally and often, particularly shortly after bathing, and when itchy. The drier the skin, the thicker this should be, especially on the hands.    What is the outlook for dry skin?  A tendency  to dry skin may persist life-long, or it may improve once contributing factors are controlled.     RASH    Physical Exam:  (Anatomic Location); (Size and Morphological Description); (Differential Diagnosis):  Scattered on left face, trunk and extremities multiple excoriated ppaule and scars   Pertinent Positives:  Pertinent Negatives:    Additional History of Present Condition:  Patient presents with multiple open wounds, she states she thinks they are all from spider bites. Patient states that it has been present for 10 years, they follow her everywhere she goes. She has been using Mupirocin and Triamcinolone with no help.     Assessment and Plan:  Based on a thorough discussion of this condition and the management approach to it (including a comprehensive discussion of the known risks, side effects and potential benefits of treatment), the patient (family) agrees to implement the following specific plan:  Unlike from spider bites, possibly folliculitis  If patient thinks they are from spiders, solution would be to get rid of the spiders.   Acetylcysteine 600 mg twice daily. Can be bought at vitamin stores or online.       Scribe Attestation      I,:  Lili Shirley MA am acting as a scribe while in the presence of the attending physician.:       I,:  Afshan Andino MD personally performed the services described in this documentation    as scribed in my presence.:

## 2025-02-13 NOTE — TELEPHONE ENCOUNTER
Spoke with patient and scheduled Medication Management  WHIT for 4/21 1130AM in office due to provider leaving.

## 2025-02-21 ENCOUNTER — TELEPHONE (OUTPATIENT)
Age: 65
End: 2025-02-21

## 2025-02-27 DIAGNOSIS — M79.7 FIBROMYALGIA: ICD-10-CM

## 2025-02-27 DIAGNOSIS — I10 HTN (HYPERTENSION), BENIGN: ICD-10-CM

## 2025-02-27 RX ORDER — PREGABALIN 150 MG/1
150 CAPSULE ORAL 3 TIMES DAILY
Qty: 90 CAPSULE | Refills: 0 | Status: SHIPPED | OUTPATIENT
Start: 2025-02-27

## 2025-02-27 NOTE — TELEPHONE ENCOUNTER
Medication: losartan (COZAAR) 50 mg tablet     Dose/Frequency: TAKE 1 TABLET BY MOUTH EVERY DAY     Quantity: 90 tabs    Pharmacy: [facility]/PHARMACY #[id number] - [place] - [address] [[id number]]     Office:   [x] PCP/Provider -   [] Speciality/Provider -     Does the patient have enough for 3 days?   [] Yes   [] No - Send as HP to POD

## 2025-03-03 RX ORDER — LOSARTAN POTASSIUM 50 MG/1
50 TABLET ORAL DAILY
Qty: 90 TABLET | Refills: 1 | Status: SHIPPED | OUTPATIENT
Start: 2025-03-03

## 2025-03-05 NOTE — PATIENT INSTRUCTIONS
Follow up in 6 months, call if any changes
[FreeTextEntry1] : Obie is a 44yo M born 11/26/1979 seen 01/27/2025 who is in the emergency room on December 4, 2024 as per the note he'd had a pmhx of priapism, polysubstance abuse (alcohol and marijuana) whom presented to the ED with c/o erection since 5am this morning which patient noticed upon awakening today. Patient c/o associated moderate to severe pain. Patient has been applying cold compress in ED and reports his pain is currently 3/10 and his erection slightly improved but still erect. Patient still feels fairly confident that his erection will come down on its own. He reports having erections for up to 8 hours in the past. Patient has not had any workup for priapism. Patient is currently not taking any prescriptions meds. Patient denies hx of clotting disorder of sickle cell dz. at the last visit we reviewed that he had not done the testing nor spoken to the substance abuse team  He tells me that he has tried calling them they have tried calling him but is actually gone ahead and stopped a lot of his smoking and at this point is only having a few cigarettes per day and has not had a drink in 3 weeks.  Since he improves his lifestyle he has not had a prolonged erection in a couple of weeks and even then it was only 30 to 45 minutes.  Blood test were done less than a week ago though I had last seen him about 6 weeks ago Liver function tests were normal Estradiol was 16 PSA is 1.2/24% LH is 8.4 (1.7-8.6) Sex hormone binding globulin is 30.1 Prolactin is 10.3 Bioavailable testosterone is still pending

## 2025-03-12 DIAGNOSIS — I25.10 CORONARY ARTERY DISEASE INVOLVING NATIVE CORONARY ARTERY OF NATIVE HEART WITHOUT ANGINA PECTORIS: Primary | ICD-10-CM

## 2025-03-13 RX ORDER — ATORVASTATIN CALCIUM 80 MG/1
80 TABLET, FILM COATED ORAL DAILY
Qty: 90 TABLET | Refills: 3 | Status: SHIPPED | OUTPATIENT
Start: 2025-03-13

## 2025-03-14 ENCOUNTER — TELEPHONE (OUTPATIENT)
Dept: PSYCHIATRY | Facility: CLINIC | Age: 65
End: 2025-03-14

## 2025-03-14 ENCOUNTER — TELEPHONE (OUTPATIENT)
Age: 65
End: 2025-03-14

## 2025-03-14 NOTE — TELEPHONE ENCOUNTER
Writer called and left a message notifying the patient that the appt scheduled for 4/21 was cancelled due to a change on the providers schedule. A call back was requested to reschedule.

## 2025-03-14 NOTE — TELEPHONE ENCOUNTER
Writer attempted to contact pt off of [facility] wait list to verify if services for are still needed to offer to schedule an appt. Lvm to call writer back.

## 2025-03-18 DIAGNOSIS — M79.18 MYOFASCIAL PAIN: ICD-10-CM

## 2025-03-18 DIAGNOSIS — E78.00 HYPERCHOLESTEROLEMIA: ICD-10-CM

## 2025-03-19 RX ORDER — METHOCARBAMOL 500 MG/1
500 TABLET, FILM COATED ORAL 3 TIMES DAILY PRN
Qty: 20 TABLET | Refills: 0 | OUTPATIENT
Start: 2025-03-19

## 2025-03-19 RX ORDER — EZETIMIBE 10 MG/1
10 TABLET ORAL DAILY
Qty: 90 TABLET | Refills: 0 | Status: SHIPPED | OUTPATIENT
Start: 2025-03-19

## 2025-03-21 DIAGNOSIS — M79.7 FIBROMYALGIA: ICD-10-CM

## 2025-03-21 NOTE — TELEPHONE ENCOUNTER
Jessie called back from [place] to update Pharmacy, [facility] [address]. [place]. Please put the dispense date the day the Prescription is filled.

## 2025-03-21 NOTE — TELEPHONE ENCOUNTER
Medication Refill Request       Medication:   pregabalin (LYRICA) 150 mg capsule       Dose/Frequency: Take 1 capsule (150 mg total) by mouth 3 (three) times a day     Quantity: 150 mg    Pharmacy: [facility]/pharmacy #[id number] - [place] - [address]  [address] [id number]  Phone: [phone]  Fax: [phone]       Office:   [x] PCP/Provider -   [] Specialty/Provider -     Does the patient have enough for 3 days?   [] Yes   [x] No - Send as HP to POD    Is the patient completely out of the medication or does not have enough until the next business day?  [x] Yes - send to Call Hub  [] No - Send as HP to POD

## 2025-03-22 ENCOUNTER — TELEPHONE (OUTPATIENT)
Dept: OTHER | Facility: OTHER | Age: 65
End: 2025-03-22

## 2025-03-22 DIAGNOSIS — Z91.09 ENVIRONMENTAL ALLERGIES: ICD-10-CM

## 2025-03-22 NOTE — TELEPHONE ENCOUNTER
"Pt stated, \"I am very frustrated because I do not have any of my Lyrica and I am out of medication. I would like my script to be filled at the [facility] in [place].\"        "

## 2025-03-23 RX ORDER — FLUTICASONE PROPIONATE 50 MCG
SPRAY, SUSPENSION (ML) NASAL
Qty: 24 ML | Refills: 2 | Status: SHIPPED | OUTPATIENT
Start: 2025-03-23

## 2025-03-24 ENCOUNTER — PREP FOR PROCEDURE (OUTPATIENT)
Age: 65
End: 2025-03-24

## 2025-03-24 DIAGNOSIS — Z12.11 SCREENING FOR COLON CANCER: Primary | ICD-10-CM

## 2025-03-24 DIAGNOSIS — K21.9 GASTROESOPHAGEAL REFLUX DISEASE, UNSPECIFIED WHETHER ESOPHAGITIS PRESENT: ICD-10-CM

## 2025-03-24 DIAGNOSIS — M79.7 FIBROMYALGIA: ICD-10-CM

## 2025-03-24 RX ORDER — PREGABALIN 150 MG/1
150 CAPSULE ORAL 3 TIMES DAILY
Qty: 90 CAPSULE | Refills: 0 | Status: SHIPPED | OUTPATIENT
Start: 2025-03-24

## 2025-03-24 NOTE — TELEPHONE ENCOUNTER
Please call patient.  I sent a 30-day supply of her Lyrica on 2/27.  She should have enough medication through tomorrow.  I did send a refill prescription today, but pharmacy will not fill before 30 days.  Please confirm that she is taking medication only as prescribed, as she should still have one day of medication left

## 2025-03-24 NOTE — TELEPHONE ENCOUNTER
Patient calls to again request the refill of the pregabalin. Refill should be called into the [facility] pharmacy in [place]. Medication has been pended to the provider since Friday. Patient is not happy that she was out of medication and no one assisted her.

## 2025-03-24 NOTE — TELEPHONE ENCOUNTER
Note sent to clinical:      Please call patient.  I sent a 30-day supply of her Lyrica on 2/27.  She should have enough medication through tomorrow.  I did send a refill prescription today, but pharmacy will not fill before 30 days.  Please confirm that she is taking medication only as prescribed, as she should still have one day of medication left

## 2025-04-06 DIAGNOSIS — E02 SUBCLINICAL IODINE-DEFICIENCY HYPOTHYROIDISM: ICD-10-CM

## 2025-04-07 RX ORDER — LEVOTHYROXINE SODIUM 112 UG/1
112 TABLET ORAL DAILY
Qty: 90 TABLET | Refills: 1 | Status: SHIPPED | OUTPATIENT
Start: 2025-04-07

## 2025-04-09 ENCOUNTER — OFFICE VISIT (OUTPATIENT)
Dept: FAMILY MEDICINE CLINIC | Facility: CLINIC | Age: 65
End: 2025-04-09
Payer: COMMERCIAL

## 2025-04-09 VITALS
BODY MASS INDEX: 27.66 KG/M2 | HEART RATE: 63 BPM | WEIGHT: 162 LBS | SYSTOLIC BLOOD PRESSURE: 128 MMHG | TEMPERATURE: 96.2 F | OXYGEN SATURATION: 98 % | HEIGHT: 64 IN | DIASTOLIC BLOOD PRESSURE: 76 MMHG

## 2025-04-09 DIAGNOSIS — M54.16 LUMBAR RADICULOPATHY: ICD-10-CM

## 2025-04-09 DIAGNOSIS — G89.29 CHRONIC NECK PAIN: ICD-10-CM

## 2025-04-09 DIAGNOSIS — G89.4 CHRONIC PAIN SYNDROME: ICD-10-CM

## 2025-04-09 DIAGNOSIS — M54.12 CERVICAL RADICULOPATHY: ICD-10-CM

## 2025-04-09 DIAGNOSIS — M54.2 CHRONIC NECK PAIN: ICD-10-CM

## 2025-04-09 DIAGNOSIS — M79.7 FIBROMYALGIA: ICD-10-CM

## 2025-04-09 DIAGNOSIS — M47.812 CERVICAL SPONDYLOSIS: Primary | ICD-10-CM

## 2025-04-09 DIAGNOSIS — M79.18 MYOFASCIAL PAIN: ICD-10-CM

## 2025-04-09 PROCEDURE — G2211 COMPLEX E/M VISIT ADD ON: HCPCS | Performed by: NURSE PRACTITIONER

## 2025-04-09 PROCEDURE — 99214 OFFICE O/P EST MOD 30 MIN: CPT | Performed by: NURSE PRACTITIONER

## 2025-04-09 NOTE — ASSESSMENT & PLAN NOTE
Orders:    Ambulatory Referral to Orthopedic Surgery; Future    MRI cervical spine wo contrast; Future    Ambulatory referral to Spine & Pain Management; Future

## 2025-04-09 NOTE — PROGRESS NOTES
Name: Isaura Brito      : 1960      MRN: [id number]  Encounter Provider: JORGE A Alfaro  Encounter Date: 2025   Encounter department: [facility]  :  Assessment & Plan  Cervical spondylosis  Chronic cervical pain/worsening  Declines PT  But agreeable with referral to orthopedic surgery  We will try to obtain MRI prior to specialist appointment  Referral also placed to spine and pain for management recommendations     Orders:    Ambulatory Referral to Orthopedic Surgery; Future    MRI cervical spine wo contrast; Future    Ambulatory referral to Spine & Pain Management; Future    Chronic neck pain    Orders:    Ambulatory Referral to Orthopedic Surgery; Future    MRI cervical spine wo contrast; Future    Ambulatory referral to Spine & Pain Management; Future    Chronic pain syndrome    Orders:    Ambulatory Referral to Orthopedic Surgery; Future    MRI cervical spine wo contrast; Future    Ambulatory referral to Spine & Pain Management; Future    Cervical radiculopathy    Orders:    Ambulatory referral to Spine & Pain Management; Future    Lumbar radiculopathy    Orders:    Ambulatory referral to Spine & Pain Management; Future    Fibromyalgia  current treatment: Lyrica 150 3 times daily; Flexeril 10 mg as needed    Orders:    Ambulatory referral to Spine & Pain Management; Future    Myofascial pain    Orders:    Ambulatory referral to Spine & Pain Management; Future          Depression Screening and Follow-up Plan: Patient's depression screening was positive with a PHQ-9 score of 12.   Continue regular follow-up with their mental health provider who is managing their mental health condition(s).  No SI/HI  Continue with Psychiatry - Mary JULES  ER precautions for acute mental health changes      Tobacco Cessation Counseling: Tobacco cessation counseling was provided. The patient is sincerely urged to quit consumption of tobacco. She is not ready to quit tobacco.  "      History of Present Illness   Acute visit  Presents with cervical pain. History of herniated disc. Last MRI 2018. Has had chronic pain in neck since then. Now reports worsening neck pain that radiates into upper arms - down to hands and wrist, causing pain and weakness. Now noting axillary pain. Feels like there is a \"ball\" under her arm. Pain occasionally radiates along the side of her rib cage - under and into her breast. She notes significant decreased ROM. Overusing her arms causes symptoms to increase - additionally worsening her Fibromyalgia.       Prior pain management - Dr. Aburto      Review of Systems   Musculoskeletal:  Positive for arthralgias, back pain, myalgias and neck pain.       Objective   /76   Pulse 63   Temp (!) 96.2 °F (35.7 °C)   Ht 5' 4\" (1.626 m)   Wt 73.5 kg (162 lb)   LMP  (LMP Unknown)   SpO2 98%   BMI 27.81 kg/m²      Physical Exam  Vitals and nursing note reviewed.   Constitutional:       General: She is not in acute distress.     Appearance: Normal appearance. She is well-developed. She is not ill-appearing.   Pulmonary:      Effort: Pulmonary effort is normal. No respiratory distress.   Musculoskeletal:      Comments: Decreased ROM  Unable to rotate arms  Unable to lift above half   Neurological:      Mental Status: She is alert and oriented to person, place, and time.   Psychiatric:         Attention and Perception: Attention normal.         Mood and Affect: Mood normal.         Behavior: Behavior normal.         "

## 2025-04-09 NOTE — ASSESSMENT & PLAN NOTE
current treatment: Lyrica 150 3 times daily; Flexeril 10 mg as needed    Orders:    Ambulatory referral to Spine & Pain Management; Future

## 2025-04-09 NOTE — ASSESSMENT & PLAN NOTE
Chronic cervical pain/worsening  Declines PT  But agreeable with referral to orthopedic surgery  We will try to obtain MRI prior to specialist appointment  Referral also placed to spine and pain for management recommendations     Orders:    Ambulatory Referral to Orthopedic Surgery; Future    MRI cervical spine wo contrast; Future    Ambulatory referral to Spine & Pain Management; Future

## 2025-04-10 ENCOUNTER — TELEPHONE (OUTPATIENT)
Age: 65
End: 2025-04-10

## 2025-04-28 ENCOUNTER — APPOINTMENT (OUTPATIENT)
Dept: RADIOLOGY | Facility: MEDICAL CENTER | Age: 65
End: 2025-04-28
Attending: EMERGENCY MEDICINE
Payer: COMMERCIAL

## 2025-04-28 ENCOUNTER — OFFICE VISIT (OUTPATIENT)
Dept: OBGYN CLINIC | Facility: MEDICAL CENTER | Age: 65
End: 2025-04-28
Attending: NURSE PRACTITIONER
Payer: COMMERCIAL

## 2025-04-28 VITALS — WEIGHT: 162 LBS | HEIGHT: 64 IN | BODY MASS INDEX: 27.66 KG/M2

## 2025-04-28 DIAGNOSIS — G89.29 CHRONIC BILATERAL THORACIC BACK PAIN: ICD-10-CM

## 2025-04-28 DIAGNOSIS — M54.6 CHRONIC BILATERAL THORACIC BACK PAIN: ICD-10-CM

## 2025-04-28 DIAGNOSIS — G89.4 CHRONIC PAIN SYNDROME: ICD-10-CM

## 2025-04-28 DIAGNOSIS — G89.29 CHRONIC NECK PAIN: ICD-10-CM

## 2025-04-28 DIAGNOSIS — M51.34 THORACIC DEGENERATIVE DISC DISEASE: ICD-10-CM

## 2025-04-28 DIAGNOSIS — G89.29 CHRONIC NECK PAIN: Primary | ICD-10-CM

## 2025-04-28 DIAGNOSIS — M54.2 CHRONIC NECK PAIN: ICD-10-CM

## 2025-04-28 DIAGNOSIS — M47.812 CERVICAL SPONDYLOSIS: ICD-10-CM

## 2025-04-28 DIAGNOSIS — M54.2 CHRONIC NECK PAIN: Primary | ICD-10-CM

## 2025-04-28 PROCEDURE — 72040 X-RAY EXAM NECK SPINE 2-3 VW: CPT

## 2025-04-28 PROCEDURE — 99204 OFFICE O/P NEW MOD 45 MIN: CPT | Performed by: EMERGENCY MEDICINE

## 2025-04-28 PROCEDURE — 72072 X-RAY EXAM THORAC SPINE 3VWS: CPT

## 2025-04-28 RX ORDER — METHYLPREDNISOLONE 4 MG/1
TABLET ORAL
Qty: 1 EACH | Refills: 0 | Status: SHIPPED | OUTPATIENT
Start: 2025-04-28
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
## 2025-04-28 NOTE — PATIENT INSTRUCTIONS
While taking the oral steroid Medrol Dosepak, do not take any NSAIDs such as Advil, Motrin, ibuprofen, Motrin, Aleve, naproxen, Celebrex or Meloxicam.  You can restart the NSAIDs after you finish the steroids.    However you may take Tylenol 500mg every 4-6 hours as needed OR max 1,000mg per dose up to 3 times per day for a total of 3,000mg per day  While taking oral steroids, you may experience mild side effects such as feeling jittery or flushing.  Please call if your side effects are significant or you have any questions.     Day 1: 8 mg by mouth before breakfast, 4 mg after lunch and after dinner, and 8 mg at bedtime  Day 2: 4 mg by mouth before breakfast, after lunch, and after dinner and 8 mg at bedtime  Day 3: 4 mg by mouth before breakfast, after lunch, after dinner, and at bedtime  Day 4: 4 mg by mouth before breakfast, after lunch, and at bedtime  Day 5: 4 mg by mouth before breakfast and at bedtime  Day 6: 4 mg by mouth before breakfast

## 2025-04-30 DIAGNOSIS — M79.7 FIBROMYALGIA: ICD-10-CM

## 2025-04-30 RX ORDER — PREGABALIN 150 MG/1
150 CAPSULE ORAL 3 TIMES DAILY
Qty: 90 CAPSULE | Refills: 0 | Status: SHIPPED | OUTPATIENT
Start: 2025-04-30

## 2025-05-01 DIAGNOSIS — M79.18 MYOFASCIAL PAIN: ICD-10-CM

## 2025-05-01 RX ORDER — METHOCARBAMOL 500 MG/1
500 TABLET, FILM COATED ORAL 3 TIMES DAILY PRN
Qty: 20 TABLET | Refills: 0 | Status: CANCELLED | OUTPATIENT
Start: 2025-05-01

## 2025-05-01 NOTE — TELEPHONE ENCOUNTER
LOV-07/23/2024  NOV- Not yet made, replied to patient's mychart message. Possible changed in practice?

## 2025-05-05 ENCOUNTER — TELEPHONE (OUTPATIENT)
Age: 65
End: 2025-05-05

## 2025-05-05 NOTE — TELEPHONE ENCOUNTER
Received call from patient   Requesting medication prescription for   methocarbamol (ROBAXIN) 500 mg tablet    She states she discussed this medication be prescribed at her last OV 4/9     Last prescribed by   Authorizing Provider:  Sarah Owen MD     Please review

## 2025-05-06 DIAGNOSIS — M79.18 MYOFASCIAL PAIN: ICD-10-CM

## 2025-05-06 NOTE — TELEPHONE ENCOUNTER
Patient would like to know if there is another muscle relaxer you can prescribe that her insurance cover ? She says she has therapy tomorrow and she would like it for her shoulders, neck and arms. She states the Flexeril doesn't do it. Thank you

## 2025-05-06 NOTE — TELEPHONE ENCOUNTER
Please call patient.  It appears her insurance does not cover the methocarbamol.  It covers the cyclobenzaprine that she is currently taking.

## 2025-05-09 ENCOUNTER — VBI (OUTPATIENT)
Dept: ADMINISTRATIVE | Facility: OTHER | Age: 65
End: 2025-05-09

## 2025-05-09 NOTE — TELEPHONE ENCOUNTER
Patient contacted to schedule Annual Wellness Visit.   A message was left for the patient to return the call.    Thank you.  Coleen Hsieh  [facility]

## 2025-05-15 ENCOUNTER — HOSPITAL ENCOUNTER (OUTPATIENT)
Dept: RADIOLOGY | Age: 65
Discharge: HOME | End: 2025-05-15
Attending: EMERGENCY MEDICINE
Payer: COMMERCIAL

## 2025-05-15 ENCOUNTER — HOSPITAL ENCOUNTER (OUTPATIENT)
Dept: RADIOLOGY | Age: 65
Discharge: HOME | End: 2025-05-15
Attending: NURSE PRACTITIONER
Payer: COMMERCIAL

## 2025-05-15 DIAGNOSIS — M54.2 CHRONIC NECK PAIN: ICD-10-CM

## 2025-05-15 DIAGNOSIS — G89.29 CHRONIC NECK PAIN: ICD-10-CM

## 2025-05-15 DIAGNOSIS — M54.6 CHRONIC BILATERAL THORACIC BACK PAIN: ICD-10-CM

## 2025-05-15 DIAGNOSIS — M47.812 CERVICAL SPONDYLOSIS: ICD-10-CM

## 2025-05-15 DIAGNOSIS — G89.29 CHRONIC BILATERAL THORACIC BACK PAIN: ICD-10-CM

## 2025-05-15 DIAGNOSIS — G89.4 CHRONIC PAIN SYNDROME: ICD-10-CM

## 2025-05-15 PROCEDURE — 72146 MRI CHEST SPINE W/O DYE: CPT

## 2025-05-15 PROCEDURE — 72141 MRI NECK SPINE W/O DYE: CPT

## 2025-05-18 ENCOUNTER — RESULTS FOLLOW-UP (OUTPATIENT)
Dept: FAMILY MEDICINE CLINIC | Facility: CLINIC | Age: 65
End: 2025-05-18

## 2025-05-22 ENCOUNTER — TELEPHONE (OUTPATIENT)
Age: 65
End: 2025-05-22

## 2025-05-29 DIAGNOSIS — J45.909 ASTHMA, UNSPECIFIED ASTHMA SEVERITY, UNSPECIFIED WHETHER COMPLICATED, UNSPECIFIED WHETHER PERSISTENT: ICD-10-CM

## 2025-05-29 DIAGNOSIS — M79.7 FIBROMYALGIA: ICD-10-CM

## 2025-05-29 RX ORDER — PREGABALIN 150 MG/1
150 CAPSULE ORAL 3 TIMES DAILY
Qty: 90 CAPSULE | Refills: 0 | Status: SHIPPED | OUTPATIENT
Start: 2025-05-29

## 2025-05-30 ENCOUNTER — TELEPHONE (OUTPATIENT)
Age: 65
End: 2025-05-30

## 2025-05-30 DIAGNOSIS — J45.909 ASTHMA, UNSPECIFIED ASTHMA SEVERITY, UNSPECIFIED WHETHER COMPLICATED, UNSPECIFIED WHETHER PERSISTENT: ICD-10-CM

## 2025-05-30 RX ORDER — ALBUTEROL SULFATE 90 UG/1
INHALANT RESPIRATORY (INHALATION)
Refills: 0 | OUTPATIENT
Start: 2025-05-30

## 2025-06-01 RX ORDER — ALBUTEROL SULFATE 90 UG/1
INHALANT RESPIRATORY (INHALATION)
Refills: 0 | OUTPATIENT
Start: 2025-06-01

## 2025-06-02 ENCOUNTER — TELEPHONE (OUTPATIENT)
Age: 65
End: 2025-06-02

## 2025-06-04 ENCOUNTER — OFFICE VISIT (OUTPATIENT)
Dept: FAMILY MEDICINE CLINIC | Facility: CLINIC | Age: 65
End: 2025-06-04
Payer: COMMERCIAL

## 2025-06-04 VITALS
TEMPERATURE: 97.8 F | HEART RATE: 70 BPM | OXYGEN SATURATION: 96 % | WEIGHT: 160.6 LBS | BODY MASS INDEX: 27.42 KG/M2 | DIASTOLIC BLOOD PRESSURE: 72 MMHG | HEIGHT: 64 IN | SYSTOLIC BLOOD PRESSURE: 128 MMHG

## 2025-06-04 DIAGNOSIS — N18.30 TYPE 2 DIABETES MELLITUS WITH STAGE 3 CHRONIC KIDNEY DISEASE, UNSPECIFIED WHETHER LONG TERM INSULIN USE, UNSPECIFIED WHETHER STAGE 3A OR 3B CKD (HCC): ICD-10-CM

## 2025-06-04 DIAGNOSIS — G47.09 OTHER INSOMNIA: ICD-10-CM

## 2025-06-04 DIAGNOSIS — E11.22 TYPE 2 DIABETES MELLITUS WITH STAGE 3 CHRONIC KIDNEY DISEASE, UNSPECIFIED WHETHER LONG TERM INSULIN USE, UNSPECIFIED WHETHER STAGE 3A OR 3B CKD (HCC): ICD-10-CM

## 2025-06-04 DIAGNOSIS — Z12.31 ENCOUNTER FOR SCREENING MAMMOGRAM FOR BREAST CANCER: ICD-10-CM

## 2025-06-04 DIAGNOSIS — F17.210 SMOKING GREATER THAN 20 PACK YEARS: ICD-10-CM

## 2025-06-04 DIAGNOSIS — J01.90 ACUTE SINUSITIS, RECURRENCE NOT SPECIFIED, UNSPECIFIED LOCATION: Primary | ICD-10-CM

## 2025-06-04 LAB — SL AMB POCT HEMOGLOBIN AIC: 8 (ref ?–6.5)

## 2025-06-04 PROCEDURE — 83036 HEMOGLOBIN GLYCOSYLATED A1C: CPT | Performed by: NURSE PRACTITIONER

## 2025-06-04 PROCEDURE — 99214 OFFICE O/P EST MOD 30 MIN: CPT | Performed by: NURSE PRACTITIONER

## 2025-06-04 PROCEDURE — G2211 COMPLEX E/M VISIT ADD ON: HCPCS | Performed by: NURSE PRACTITIONER

## 2025-06-04 RX ORDER — ZOLPIDEM TARTRATE 5 MG/1
5 TABLET ORAL
Qty: 30 TABLET | Refills: 0 | Status: SHIPPED | OUTPATIENT
Start: 2025-06-04

## 2025-06-04 RX ORDER — AZITHROMYCIN 250 MG/1
TABLET, FILM COATED ORAL
Qty: 6 TABLET | Refills: 0 | Status: SHIPPED | OUTPATIENT
Start: 2025-06-04 | End: 2025-06-09

## 2025-06-04 NOTE — PROGRESS NOTES
Name: Isaura Brito      : 1960      MRN: [id number]  Encounter Provider: JORGE A Alfaro  Encounter Date: 2025   Encounter department: [facility]  :  Assessment & Plan  Type 2 diabetes mellitus with stage 3 chronic kidney disease, unspecified whether long term insulin use, unspecified whether stage 3a or 3b CKD (HCC)    Lab Results   Component Value Date    HGBA1C 8.0 (A) 2025     A1c elevated at 8.  Admits she has not been taking the Jardiance for the last 2 months (was unable to get it through the pharmacy at reduced cost)  Intolerant to metformin-significant GI distress  Advise she go to the  website-as there are programs available for $10/$25 monthly  Educated again on healthy diet/lifestyle change  She will let me know if she is unable to get the reduced cost Jardiance  Would plan to trial Januvia  Recheck A1c 3 months    Orders:    Albumin / creatinine urine ratio    POCT hemoglobin A1c    Encounter for screening mammogram for breast cancer    Orders:    Mammo screening bilateral w 3d and cad; Future    Smoking greater than 20 pack years  Smoking cessation encouraged    Orders:    CT lung screening program; Future    Acute sinusitis, recurrence not specified, unspecified location  Abx as ordered-azithromycin (patient reports that is the only antibiotic that works for her sinus infections); advised on supportive care-rest, hydrate, OTC meds for symptoms; f/u guidance given should sx not improve    Orders:    azithromycin (Zithromax) 250 mg tablet; Take 2 tablets (500 mg total) by mouth daily for 1 day, THEN 1 tablet (250 mg total) daily for 4 days.    Other insomnia  Patient with chronic insomnia  Plan today: DC doxepin/melatonin  Restart Ambien (has been effective in the past)  Rx sent for low-dose 5 mg-PM ED with no red flags  Advised to avoid taking along with other sedating medications  Advised to avoid alcohol use  Follow-up if  ineffective    Orders:    zolpidem (AMBIEN) 5 mg tablet; Take 1 tablet (5 mg total) by mouth daily at bedtime as needed for sleep          Depression Screening and Follow-up Plan:   Patient assessed for underlying major depression. Brief counseling provided and recommend additional follow-up/re-evaluation next office visit.     Lung Cancer Screening Shared Decision Making: I discussed with her that she is a candidate for lung cancer CT screening.     The following Shared Decision-Making points were covered:  Benefits of screening were discussed, including the rates of reduction in death from lung cancer and other causes.  Harms of screening were reviewed, including false positive tests, radiation exposure levels, risks of invasive procedures, risks of complications of screening, and risk of overdiagnosis.  I counseled on the importance of adherence to annual lung cancer LDCT screening, impact of co-morbidities, and ability or willingness to undergo diagnosis and treatment.  I counseled on the importance of maintaining abstinence as a former smoker or was counseled on the importance of smoking cessation if a current smoker    Review of Eligibility Criteria: She meets all of the criteria for Lung Cancer Screening.   - She is 64 y.o.   - She has 20 pack year tobacco history and is a current smoker or has quit within the past 15 years  - She presents no signs or symptoms of lung cancer    After discussion, the patient decided to elect lung cancer screening.      History of Present Illness   Acute visit  Patient presents today with URI symptoms.  Started with sinus pain, pressure and congestion several weeks ago.  Wax and wane, worsening over the last few days.  OTC medicine ineffective: Mucinex, cold medicine, antihistamine.  Intermittent cough.  Nonproductive.  No GI.  No fevers.  Questing to restart Ambien.  Ongoing sleep issues/insomnia.  She is been taking doxepin and melatonin for the last year or so.  She does  "sleep, but reports feeling \"foggy\" in the morning.  She has taken Ambien in the past with good relief.  She is no longer drinking at at bedtime to sleep.    Following with orthopedic for chronic neck/back pain      Review of Systems   Constitutional: Negative.    HENT:  Positive for congestion, postnasal drip, rhinorrhea, sinus pressure and sore throat.    Respiratory:  Positive for cough.    Cardiovascular: Negative.    Gastrointestinal: Negative.    Neurological: Negative.    Psychiatric/Behavioral: Negative.         Objective   /72 (BP Location: Left arm, Patient Position: Sitting, Cuff Size: Adult)   Pulse 70   Temp 97.8 °F (36.6 °C) (Temporal)   Ht 5' 4\" (1.626 m)   Wt 72.8 kg (160 lb 9.6 oz)   LMP  (LMP Unknown)   SpO2 96%   BMI 27.57 kg/m²      Physical Exam  Vitals and nursing note reviewed.   Constitutional:       General: She is not in acute distress.     Appearance: Normal appearance. She is well-developed. She is not ill-appearing.   HENT:      Right Ear: Tympanic membrane and ear canal normal.      Left Ear: Tympanic membrane and ear canal normal.      Nose:      Right Turbinates: Swollen.      Left Turbinates: Swollen.      Right Sinus: Maxillary sinus tenderness present.      Left Sinus: Maxillary sinus tenderness present.      Mouth/Throat:      Pharynx: Posterior oropharyngeal erythema present. No oropharyngeal exudate.     Cardiovascular:      Rate and Rhythm: Normal rate and regular rhythm.      Pulses: no weak pulses.           Dorsalis pedis pulses are 2+ on the right side and 2+ on the left side.        Posterior tibial pulses are 2+ on the right side and 2+ on the left side.   Pulmonary:      Effort: Pulmonary effort is normal. No respiratory distress.      Breath sounds: Normal breath sounds and air entry.   Feet:      Right foot:      Skin integrity: No ulcer, skin breakdown, erythema, warmth, callus or dry skin.      Left foot:      Skin integrity: No ulcer, skin breakdown, " erythema, warmth, callus or dry skin.   Lymphadenopathy:      Cervical: No cervical adenopathy.     Skin:     General: Skin is warm and dry.     Neurological:      General: No focal deficit present.      Mental Status: She is alert and oriented to person, place, and time.     Psychiatric:         Attention and Perception: Attention normal.         Mood and Affect: Mood normal.         Behavior: Behavior normal.         Thought Content: Thought content normal.         Judgment: Judgment normal.             Diabetic Foot Exam    Patient's shoes and socks removed.    Right Foot/Ankle   Right Foot Inspection  Skin Exam: skin normal and skin intact. No dry skin, no warmth, no callus, no erythema, no maceration, no abnormal color, no pre-ulcer, no ulcer and no callus.     Sensory   Monofilament testing: intact    Vascular  The right DP pulse is 2+. The right PT pulse is 2+.     Left Foot/Ankle  Left Foot Inspection  Skin Exam: skin normal and skin intact. No dry skin, no warmth, no erythema, no maceration, normal color, no pre-ulcer, no ulcer and no callus.     Sensory   Monofilament testing: intact    Vascular  The left DP pulse is 2+. The left PT pulse is 2+.     Assign Risk Category  No deformity present  No loss of protective sensation  No weak pulses  Risk: 0

## 2025-06-12 ENCOUNTER — PATIENT MESSAGE (OUTPATIENT)
Dept: FAMILY MEDICINE CLINIC | Facility: CLINIC | Age: 65
End: 2025-06-12

## 2025-06-18 DIAGNOSIS — E78.00 HYPERCHOLESTEROLEMIA: ICD-10-CM

## 2025-06-18 RX ORDER — EZETIMIBE 10 MG/1
10 TABLET ORAL DAILY
Qty: 90 TABLET | Refills: 1 | Status: SHIPPED | OUTPATIENT
Start: 2025-06-18

## 2025-06-19 ENCOUNTER — OFFICE VISIT (OUTPATIENT)
Dept: NEUROSURGERY | Facility: CLINIC | Age: 65
End: 2025-06-19
Attending: EMERGENCY MEDICINE
Payer: COMMERCIAL

## 2025-06-19 VITALS
WEIGHT: 159.8 LBS | OXYGEN SATURATION: 96 % | HEIGHT: 64 IN | SYSTOLIC BLOOD PRESSURE: 122 MMHG | TEMPERATURE: 98 F | BODY MASS INDEX: 27.28 KG/M2 | HEART RATE: 73 BPM | DIASTOLIC BLOOD PRESSURE: 64 MMHG | RESPIRATION RATE: 20 BRPM

## 2025-06-19 DIAGNOSIS — G89.29 CHRONIC NECK PAIN: ICD-10-CM

## 2025-06-19 DIAGNOSIS — Z98.890 STATUS POST LUMBAR SURGERY: ICD-10-CM

## 2025-06-19 DIAGNOSIS — M54.2 CHRONIC CERVICAL PAIN: ICD-10-CM

## 2025-06-19 DIAGNOSIS — G89.29 CHRONIC CERVICAL PAIN: ICD-10-CM

## 2025-06-19 DIAGNOSIS — M54.2 CHRONIC NECK PAIN: ICD-10-CM

## 2025-06-19 DIAGNOSIS — G89.4 CHRONIC PAIN SYNDROME: ICD-10-CM

## 2025-06-19 DIAGNOSIS — M47.812 CERVICAL SPONDYLOSIS: Primary | ICD-10-CM

## 2025-06-19 PROCEDURE — 99203 OFFICE O/P NEW LOW 30 MIN: CPT | Performed by: NURSE PRACTITIONER

## 2025-06-19 NOTE — ASSESSMENT & PLAN NOTE
Orders:    Ambulatory Referral to Neurosurgery    Ambulatory referral to Spine & Pain Management; Future     present

## 2025-06-19 NOTE — ASSESSMENT & PLAN NOTE
Presents as referral from orthopedics (Dr. Fraga) for evaluation of cervical pain  Evaluated by Dr. Mio Avitia in 2020 for same with recommendation for conservative management of symptoms.  C/o pain to neck, shoulders, arms, occiput, thoracic spine x 3 weeks with associated weakness.  N/T to bilateral thumbs, dropping objects.  Exam is non-focal. Pain limited weakness to bilateral deltoids.    Imaging:  MRI cervical spine 5/15/25: Multilevel degenerative disc disease as described, slightly worse in the interim. No high-grade canal stenosis at any level. Multilevel foraminal stenosis as outlined above.   MRI thoracic spine 5/15/25: Grossly stable noncompressive degenerative changes as described. No significant canal or foraminal stenosis at any level.     Plan:  Reviewed imaging extensively with patient.  Discussed that she has mild cervical degenerative disease and even more mild disease to thoracic spine.  Findings on MRI do not correlate with degree of symptoms.  She is not myelopathic on exam.  I suggested that symptoms could be related to history of fibromyalgia vs other etiology.  She has been seen in the past by Dr. Aburto (pain management) but was not happy with his care.  When she has tried PT in the past, it has made her symptoms worse.  I do not see any role at this time for surgical intervention.  New referral placed to pain management.  Follow up as needed.
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
## 2025-06-19 NOTE — ASSESSMENT & PLAN NOTE
History of lumbar fusion (anterior and posterior) x 2 with Dr. Sanderson at [facility] in 2004 and 2006

## 2025-07-03 ENCOUNTER — TELEPHONE (OUTPATIENT)
Age: 65
End: 2025-07-03

## 2025-07-03 DIAGNOSIS — B00.1 COLD SORE: ICD-10-CM

## 2025-07-03 RX ORDER — VALACYCLOVIR HYDROCHLORIDE 1 G/1
2000 TABLET, FILM COATED ORAL 2 TIMES DAILY
Qty: 30 TABLET | Refills: 0 | Status: SHIPPED | OUTPATIENT
Start: 2025-07-03 | End: 2025-07-04

## 2025-07-03 NOTE — TELEPHONE ENCOUNTER
Patient requesting a refill on the   valACYclovir (VALTREX) 1,000 mg tablet, not on active medication list. Last prescribed by previous pcp.     Medication: valACYclovir (VALTREX) 1,000 mg tablet     Dose/Frequency: Take 1 tablet (1,000 mg total) by mouth 2 (two) times a day for 14 days    Quantity: 28 tablet (14 day supply)    Pharmacy: [facility]    Office:   [x] PCP/Provider - Previous PCP  [] Speciality/Provider -     Does the patient have enough for 3 days?   [] Yes   [x] No - Send as HP to POD

## 2025-07-07 ENCOUNTER — TELEPHONE (OUTPATIENT)
Age: 65
End: 2025-07-07

## 2025-07-07 DIAGNOSIS — M47.812 CERVICAL SPONDYLOSIS: ICD-10-CM

## 2025-07-07 DIAGNOSIS — G89.29 CHRONIC NECK PAIN: Primary | ICD-10-CM

## 2025-07-07 DIAGNOSIS — M54.2 CHRONIC NECK PAIN: Primary | ICD-10-CM

## 2025-07-07 RX ORDER — METHYLPREDNISOLONE 4 MG/1
TABLET ORAL
Qty: 1 EACH | Refills: 0 | Status: SHIPPED | OUTPATIENT
Start: 2025-07-07

## 2025-07-07 NOTE — TELEPHONE ENCOUNTER
Patient reports she was prescribed Methylprednisolone by Dr. Fraga and felt better after a couple days. The pain returned after she had an MRI and she would like to go back on if possible for a couple days.

## 2025-07-07 NOTE — TELEPHONE ENCOUNTER
Caller: Isaura    Doctor: Dr. Fraga    Reason for call: requesting a refill on Medrol Dosepak,  She used it before and it worked great until recently when she had to have a test done and was laying on her back.    Call into [facility] in [place]    Call back#: [phone]

## 2025-07-11 DIAGNOSIS — E11.9 TYPE 2 DIABETES MELLITUS WITHOUT COMPLICATION, WITHOUT LONG-TERM CURRENT USE OF INSULIN (HCC): ICD-10-CM

## 2025-07-11 DIAGNOSIS — G47.09 OTHER INSOMNIA: ICD-10-CM

## 2025-07-12 RX ORDER — ZOLPIDEM TARTRATE 5 MG/1
5 TABLET ORAL
Qty: 30 TABLET | Refills: 0 | Status: CANCELLED | OUTPATIENT
Start: 2025-07-12

## 2025-07-14 ENCOUNTER — CONSULT (OUTPATIENT)
Dept: PAIN MEDICINE | Facility: MEDICAL CENTER | Age: 65
End: 2025-07-14
Attending: NURSE PRACTITIONER
Payer: COMMERCIAL

## 2025-07-14 ENCOUNTER — TELEPHONE (OUTPATIENT)
Age: 65
End: 2025-07-14

## 2025-07-14 VITALS
DIASTOLIC BLOOD PRESSURE: 84 MMHG | WEIGHT: 155 LBS | HEIGHT: 64 IN | SYSTOLIC BLOOD PRESSURE: 179 MMHG | BODY MASS INDEX: 26.46 KG/M2

## 2025-07-14 DIAGNOSIS — G89.29 CHRONIC NECK PAIN: ICD-10-CM

## 2025-07-14 DIAGNOSIS — M54.2 CHRONIC NECK PAIN: ICD-10-CM

## 2025-07-14 DIAGNOSIS — M47.812 CERVICAL SPONDYLOSIS: Primary | ICD-10-CM

## 2025-07-14 DIAGNOSIS — M79.18 MYOFASCIAL PAIN SYNDROME: ICD-10-CM

## 2025-07-14 PROCEDURE — 99204 OFFICE O/P NEW MOD 45 MIN: CPT | Performed by: PHYSICAL MEDICINE & REHABILITATION

## 2025-07-14 RX ORDER — BACLOFEN 10 MG/1
5 TABLET ORAL 3 TIMES DAILY
Qty: 45 TABLET | Refills: 0 | Status: SHIPPED | OUTPATIENT
Start: 2025-07-14 | End: 2025-08-13

## 2025-07-14 NOTE — PROGRESS NOTES
Name: Isaura Brito      : 1960      MRN: [id number]  Encounter Provider: Georgi Amanda DO  Encounter Date: 2025   Encounter department: [facility]  :  Assessment & Plan  Cervical spondylosis    Orders:    Ambulatory referral to Spine & Pain Management    baclofen 10 mg tablet; Take 0.5 tablets (5 mg total) by mouth 3 (three) times a day    Chronic neck pain    Orders:    Ambulatory referral to Spine & Pain Management    Myofascial pain syndrome         Initiate trial of baclofen.  If this provides relief she can have this refilled by her primary care team.  The patient is a candidate for cervical medial branch nerve blocks and potentially radiofrequency ablation.  She states that she would like to avoid injection based therapies.  I did review with her that we are an interventional pain practice and that is the focus of our treatments and she would be welcome to return for further evaluation at any time.        My impressions and treatment recommendations were discussed in detail with the patient who verbalized understanding and had no further questions.  Discharge instructions were provided. I personally saw and examined the patient and I agree with the above discussed plan of care.    History of Present Illness     Isaura Brito is a 64 y.o. female who is transferring care to me from previous visits with Dr. Aburto.  The patient did undergo cervical epidural steroid injection and was also offered a spinal cord stimulator trial.  She states the epidural did not provide any relief and was not interested in the spinal cord stimulator.    She is describing significant pain in her neck shoulders arms spine and ribs.    She does have history of fibromyalgia.    Currently describing severe pain rated as an 8/10 which is constant described as burning pressure-like throbbing dull aching and sharp pain.  She does describe upper extremity weakness.  She is localizing most of the pain  "to the neck and shoulder region.  She has significant muscle tension in these areas and has crepitus sensation with cervical spine range of motion.    Aggravating factors include standing bending sitting walking coughing and sneezing.  Alleviating factors include lying down.    She has had some moderate relief with ice application.  No relief with TENS unit.    Social history positive for tobacco use marijuana and alcohol consumption.        Review of Systems   Constitutional:  Negative for fatigue and unexpected weight change.   HENT:  Negative for ear pain, hearing loss and sinus pain.    Eyes:  Negative for redness and visual disturbance.   Respiratory:  Positive for cough, shortness of breath and wheezing.    Cardiovascular:  Negative for chest pain and palpitations.   Gastrointestinal:  Positive for abdominal pain. Negative for diarrhea and rectal pain.   Endocrine: Negative for polydipsia and polyuria.   Genitourinary:  Negative for difficulty urinating and frequency.   Musculoskeletal:  Positive for neck pain. Negative for gait problem and myalgias.   Skin:  Positive for rash.   Allergic/Immunologic: Negative for food allergies.   Neurological:  Positive for numbness and headaches.   Psychiatric/Behavioral:  Positive for dysphoric mood and suicidal ideas. Negative for decreased concentration and sleep disturbance. The patient is nervous/anxious.      Medical History Reviewed by provider this encounter:     .       Objective   Ht 5' 4\" (1.626 m)   Wt 70.3 kg (155 lb)   LMP  (LMP Unknown)   BMI 26.61 kg/m²      Pain Score:   3  Physical Exam  Constitutional: normal, well developed, well nourished, alert, in mild to moderate distress and non-toxic.  Eyes: anicteric  HEENT: grossly intact  Neck: supple, symmetric, trachea midline and no masses   Pulmonary: even and unlabored  Cardiovascular: No edema or pitting edema present  Skin: Normal without rashes or lesions and well hydrated  Psychiatric: Mood and " affect appropriate  Neurologic: Cranial Nerves II-XII grossly intact, bilateral upper extremity strength is normal, bilateral upper extremity muscle stretch reflexes are physiologic and symmetric, negative Chuck sign bilaterally.  Musculoskeletal: normal, except for significant tenderness to palpation throughout the cervical paraspinal musculature and trapezius muscles bilaterally, cervical spine range of motion is limited especially with extension and reproduces axial neck pain        MRI CERVICAL SPINE WITHOUT CONTRAST     INDICATION: M47.812: Spondylosis without myelopathy or radiculopathy, cervical region  M54.2: Cervicalgia  G89.29: Other chronic pain  G89.4: Chronic pain syndrome.     COMPARISON: 7/9/2020     TECHNIQUE:  Multiplanar, multisequence imaging of the cervical spine was performed. .        IMAGE QUALITY:  Diagnostic     FINDINGS:     ALIGNMENT: Mild reversal of the usual cervical lordosis. No subluxation.     MARROW SIGNAL: Type I Modic endplate changes at C6-7. No destructive osseous lesions.     CERVICAL AND VISUALIZED THORACIC CORD:  Normal signal within the visualized cord.     PREVERTEBRAL AND PARASPINAL SOFT TISSUES:  Normal.     VISUALIZED POSTERIOR FOSSA:  The visualized posterior fossa demonstrates no abnormal signal.     CERVICAL DISC SPACES:     C2-C3: Mild bilateral facet arthrosis and shallow midline disc protrusion without significant stenosis.     C3-C4: Disc osteophyte with midline protrusion, mild bilateral uncovertebral and facet hypertrophy. Mild canal narrowing. No left foraminal narrowing. Moderate right foraminal stenosis.     C4-C5: Disc osteophyte with midline protrusion, mild bilateral facet hypertrophy, and uncovertebral spurring. Mild canal narrowing. Severe left and moderate to severe right foraminal stenosis.     C5-C6: Bilateral uncovertebral and facet arthropathy and shallow disc osteophyte. Mild canal stenosis. Moderate to severe bilateral foraminal stenosis.      C6-C7: Bilateral facet and uncovertebral arthropathy. Disc height loss with shallow disc osteophyte. No canal stenosis. At least moderate bilateral foraminal stenosis.     C7-T1: Disc desiccation, shallow disc bulge. Mild bilateral facet arthrosis and uncovertebral spurring. No canal stenosis. No significant foraminal narrowing.     UPPER THORACIC DISC SPACES: Refer to separately dictated thoracic spine MRI.     OTHER FINDINGS:  None.     IMPRESSION:     Multilevel degenerative disc disease as described, slightly worse in the interim. No high-grade canal stenosis at any level. Multilevel foraminal stenosis as outlined above.              Workstation performed: [id number]      XR SPINE CERVICAL 2 OR 3 VW INJURY     INDICATION: M47.812: Spondylosis without myelopathy or radiculopathy, cervical region  M54.2: Cervicalgia  G89.29: Other chronic pain.     COMPARISON: None     FINDINGS:     No acute fracture or subluxation.     Straightening of the usual cervical lordosis.     Intervertebral disc space narrowing at C6-7 with small anterior osteophytes.     Multilevel bilateral facet arthropathy.     Normal prevertebral soft tissues.     Clear lung apices.     IMPRESSION:     No acute osseous abnormality.     Degenerative changes as described.                 Workstation performed: [id number]

## 2025-07-14 NOTE — TELEPHONE ENCOUNTER
Patient is calling regarding cancelling an appointment.    Date/Time: Today, 7/14/2025, at 11:30AM in person with Dr. Moni Quintero for a WHIT appt.    Reason: Patient has been dealing with health issues and has not slept for two days. Patient apologized for the cancellation.    Patient was rescheduled: YES [] NO [x]  If yes, when was Patient reschedule for: N/A    Patient requested to cancel and not to reschedule WHIT appt. Patient stated that she does not believe there is anything further that a psychiatrist can do for her.    Provider to be notified via secure chat.

## 2025-07-14 NOTE — ASSESSMENT & PLAN NOTE
Orders:    Ambulatory referral to Spine & Pain Management    baclofen 10 mg tablet; Take 0.5 tablets (5 mg total) by mouth 3 (three) times a day

## 2025-07-15 ENCOUNTER — OFFICE VISIT (OUTPATIENT)
Dept: FAMILY MEDICINE CLINIC | Facility: CLINIC | Age: 65
End: 2025-07-15
Payer: COMMERCIAL

## 2025-07-15 VITALS
OXYGEN SATURATION: 95 % | BODY MASS INDEX: 26.78 KG/M2 | DIASTOLIC BLOOD PRESSURE: 68 MMHG | WEIGHT: 156 LBS | SYSTOLIC BLOOD PRESSURE: 130 MMHG | HEART RATE: 66 BPM | TEMPERATURE: 98 F

## 2025-07-15 DIAGNOSIS — M79.7 FIBROMYALGIA: ICD-10-CM

## 2025-07-15 DIAGNOSIS — J20.9 ACUTE BRONCHITIS, UNSPECIFIED ORGANISM: Primary | ICD-10-CM

## 2025-07-15 DIAGNOSIS — G47.09 OTHER INSOMNIA: ICD-10-CM

## 2025-07-15 DIAGNOSIS — F33.2 SEVERE EPISODE OF RECURRENT MAJOR DEPRESSIVE DISORDER, WITHOUT PSYCHOTIC FEATURES (HCC): ICD-10-CM

## 2025-07-15 PROCEDURE — G2211 COMPLEX E/M VISIT ADD ON: HCPCS | Performed by: FAMILY MEDICINE

## 2025-07-15 PROCEDURE — 99214 OFFICE O/P EST MOD 30 MIN: CPT | Performed by: FAMILY MEDICINE

## 2025-07-15 RX ORDER — ZOLPIDEM TARTRATE 5 MG/1
5 TABLET ORAL
Qty: 30 TABLET | Refills: 0 | Status: SHIPPED | OUTPATIENT
Start: 2025-07-15

## 2025-07-15 RX ORDER — PREGABALIN 150 MG/1
150 CAPSULE ORAL 3 TIMES DAILY
Qty: 90 CAPSULE | Refills: 0 | Status: SHIPPED | OUTPATIENT
Start: 2025-07-15

## 2025-07-15 RX ORDER — DOXYCYCLINE 100 MG/1
100 TABLET ORAL 2 TIMES DAILY
Qty: 14 TABLET | Refills: 0 | Status: SHIPPED | OUTPATIENT
Start: 2025-07-15 | End: 2025-07-22

## 2025-07-18 ENCOUNTER — VBI (OUTPATIENT)
Dept: ADMINISTRATIVE | Facility: OTHER | Age: 65
End: 2025-07-18

## 2025-07-18 NOTE — TELEPHONE ENCOUNTER
07/18/25 1:21 PM    Patient was flagged by a Third Party Payer report as being due for a refill on the following medications, Jardiance. Patient was contacted to review these medications. There was no answer and a message was left.     Thank you.  Amy Segovia MA  [facility]

## 2025-07-21 ENCOUNTER — ANESTHESIA EVENT (OUTPATIENT)
Dept: ANESTHESIOLOGY | Facility: HOSPITAL | Age: 65
End: 2025-07-21

## 2025-07-21 ENCOUNTER — ANESTHESIA (OUTPATIENT)
Dept: ANESTHESIOLOGY | Facility: HOSPITAL | Age: 65
End: 2025-07-21

## 2025-07-23 DIAGNOSIS — K21.9 GASTROESOPHAGEAL REFLUX DISEASE: ICD-10-CM

## 2025-07-24 ENCOUNTER — TELEPHONE (OUTPATIENT)
Dept: GASTROENTEROLOGY | Facility: MEDICAL CENTER | Age: 65
End: 2025-07-24

## 2025-07-24 RX ORDER — PANTOPRAZOLE SODIUM 40 MG/1
40 TABLET, DELAYED RELEASE ORAL DAILY
Qty: 90 TABLET | Refills: 1 | Status: SHIPPED | OUTPATIENT
Start: 2025-07-24

## 2025-07-24 NOTE — TELEPHONE ENCOUNTER
Procedure: Colonoscopy and EGD  Date: August 4  Physician performing: Dr. Barnes  Location of procedure:  [facility]  Instructions given to patient: Miralax  Clearances: None  Diabetic: Jardiance-hold for 4 days prior

## 2025-07-25 ENCOUNTER — TELEPHONE (OUTPATIENT)
Age: 65
End: 2025-07-25

## 2025-07-25 DIAGNOSIS — M79.18 MYOFASCIAL PAIN SYNDROME: Primary | ICD-10-CM

## 2025-07-25 RX ORDER — TIZANIDINE 2 MG/1
2 TABLET ORAL EVERY 8 HOURS PRN
Qty: 90 TABLET | Refills: 1 | Status: SHIPPED | OUTPATIENT
Start: 2025-07-25

## 2025-07-25 NOTE — TELEPHONE ENCOUNTER
Pt aware of same and appreciative.   - 24 kcal FEHM / 24 kcal PM 60/40 q3h (minimum 70 cc per feed)- PO + gavage the rest--Nutrition continues to be involved in determining caloric need  - D12.5 + 1/4 NS @ 20 cc/hr (due to back-up in line)

## 2025-07-25 NOTE — TELEPHONE ENCOUNTER
RN s/w pt regarding previous. Just seen in [facility] with JE on 7/14 and was ordered baclofen 5mg up to TID as needed per pt she took two doses and was very sedated and almost fell off of the toilet.  Pt then tried a 1/4 of a tablet BID and was sleepy but no pain relief.    Pt wondered if JE would change to methocarbamol or tizanidine both had in the past. Tizanidine also caused drowsiness but not like the baclofen and the methocarbamol was great but she didn't;t think her insurance covered it.    Pt is going to [employer] to check and then call back.

## 2025-07-25 NOTE — TELEPHONE ENCOUNTER
Caller: Patient    Doctor: Dr. HERRERA    Reason for call: Patient would like to switch Meds, Having hard time getting out of bed and functioning     Please advise      Call back#:

## 2025-07-25 NOTE — TELEPHONE ENCOUNTER
RN s/w pt who stated her insurance would cover the tizanidine if SHARON would consider same.    SHARON, would you consider sending Tizanidine in for this pt instead of the baclofen that you recently ordered on 7/14 to [facility] on file?

## 2025-08-01 DIAGNOSIS — J45.909 ASTHMA, UNSPECIFIED ASTHMA SEVERITY, UNSPECIFIED WHETHER COMPLICATED, UNSPECIFIED WHETHER PERSISTENT: ICD-10-CM

## 2025-08-01 DIAGNOSIS — J45.909 ASTHMA, UNSPECIFIED ASTHMA SEVERITY, UNSPECIFIED WHETHER COMPLICATED, UNSPECIFIED WHETHER PERSISTENT: Primary | ICD-10-CM

## 2025-08-01 RX ORDER — ALBUTEROL SULFATE 90 UG/1
2 INHALANT RESPIRATORY (INHALATION) EVERY 6 HOURS PRN
Qty: 48 G | Refills: 0 | Status: SHIPPED | OUTPATIENT
Start: 2025-08-01 | End: 2025-08-04

## 2025-08-04 RX ORDER — ALBUTEROL SULFATE 90 UG/1
INHALANT RESPIRATORY (INHALATION)
Qty: 8.5 G | Refills: 0 | Status: SHIPPED | OUTPATIENT
Start: 2025-08-04
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
## 2025-08-05 DIAGNOSIS — S00.06XA TICK BITE OF SCALP, INITIAL ENCOUNTER: Primary | ICD-10-CM

## 2025-08-15 ENCOUNTER — TELEPHONE (OUTPATIENT)
Age: 65
End: 2025-08-15

## 2025-08-19 ENCOUNTER — TELEPHONE (OUTPATIENT)
Dept: PAIN MEDICINE | Facility: MEDICAL CENTER | Age: 65
End: 2025-08-19

## 2025-08-19 DIAGNOSIS — M79.18 MYOFASCIAL PAIN SYNDROME: ICD-10-CM

## 2025-08-20 RX ORDER — TIZANIDINE 2 MG/1
2 TABLET ORAL EVERY 8 HOURS PRN
Qty: 270 TABLET | Refills: 1 | OUTPATIENT
Start: 2025-08-20

## 2025-08-23 DIAGNOSIS — G89.29 CHRONIC LEFT SHOULDER PAIN: Primary | ICD-10-CM

## 2025-08-23 DIAGNOSIS — M25.512 CHRONIC LEFT SHOULDER PAIN: Primary | ICD-10-CM

## (undated) DEVICE — OCCLUSIVE GAUZE STRIP,3% BISMUTH TRIBROMOPHENATE IN PETROLATUM BLEND: Brand: XEROFORM

## (undated) DEVICE — SUT CHROMIC 4-0 P-3 18 MM 1654G

## (undated) DEVICE — GLOVE SRG BIOGEL 6.5

## (undated) DEVICE — CUFF TOURNIQUET 18 X 4 IN QUICK CONNECT DISP 1 BLADDER

## (undated) DEVICE — CURITY STRETCH BANDAGE: Brand: CURITY

## (undated) DEVICE — PAD CAST 4 IN COTTON NON STERILE

## (undated) DEVICE — SPONGE SCRUB 4 PCT CHLORHEXIDINE

## (undated) DEVICE — GAUZE SPONGES,16 PLY: Brand: CURITY

## (undated) DEVICE — STERILE BETHLEHEM PLASTIC HAND: Brand: CARDINAL HEALTH

## (undated) DEVICE — GLOVE INDICATOR PI UNDERGLOVE SZ 7 BLUE

## (undated) DEVICE — NEEDLE 25G X 1 1/2

## (undated) DEVICE — INTENDED FOR TISSUE SEPARATION, AND OTHER PROCEDURES THAT REQUIRE A SHARP SURGICAL BLADE TO PUNCTURE OR CUT.: Brand: BARD-PARKER ® CARBON RIB-BACK BLADES

## (undated) DEVICE — GLOVE SRG BIOGEL ECLIPSE 7